# Patient Record
Sex: FEMALE | Race: BLACK OR AFRICAN AMERICAN | NOT HISPANIC OR LATINO | Employment: FULL TIME | URBAN - METROPOLITAN AREA
[De-identification: names, ages, dates, MRNs, and addresses within clinical notes are randomized per-mention and may not be internally consistent; named-entity substitution may affect disease eponyms.]

---

## 2017-01-03 ENCOUNTER — DOCUMENTATION ONLY (OUTPATIENT)
Dept: BARIATRICS | Facility: CLINIC | Age: 47
End: 2017-01-03

## 2017-01-03 NOTE — PROGRESS NOTES
Bariatric Surgery Online Course Form Submission  Someone has submitted a Bariatric Surgery Online Course Form Submission on this page.  Date: 2017-01-03 - 12:44  Patient's Name: SHAYE ADAME  YOB: 1970 Email: [email or web address]  Phone: [phone]   Patient Address: [facility] [place] [id number]  Preferred Surgical Location: [facility] [place]   I certify that I am 18 years of age or older:   Confirmation Code: [id number]  Verification of Bariatric Seminar: y  Insurance Information  Insurance or Self Pay? Insurance - Fill out fields below  Insurance Company Name: [facility]   Type of Coverage/Coverage Plan (i.e. PPO, HMO): PPO   Group Name: [id number]   Subscriber Name: SHAYE ADAME   Member Name (patient's name)   Member ID/Policy #:[id number]   Plan Effective Date: 09/28/2015  Card Issuance date: 09/28/2015

## 2017-01-06 ENCOUNTER — PATIENT MESSAGE (OUTPATIENT)
Dept: RESEARCH | Facility: HOSPITAL | Age: 47
End: 2017-01-06

## 2017-01-10 ENCOUNTER — CLINICAL SUPPORT (OUTPATIENT)
Dept: BARIATRICS | Facility: CLINIC | Age: 47
End: 2017-01-10
Payer: COMMERCIAL

## 2017-01-10 VITALS — BODY MASS INDEX: 43.41 KG/M2 | WEIGHT: 229.75 LBS

## 2017-01-10 DIAGNOSIS — E66.01 MORBID OBESITY WITH BMI OF 40.0-44.9, ADULT: ICD-10-CM

## 2017-01-10 DIAGNOSIS — E11.9 TYPE 2 DIABETES MELLITUS WITHOUT COMPLICATION, WITHOUT LONG-TERM CURRENT USE OF INSULIN: ICD-10-CM

## 2017-01-10 DIAGNOSIS — G47.33 OBSTRUCTIVE SLEEP APNEA (ADULT) (PEDIATRIC): ICD-10-CM

## 2017-01-10 DIAGNOSIS — Z71.3 DIETARY COUNSELING AND SURVEILLANCE: ICD-10-CM

## 2017-01-10 DIAGNOSIS — I10 ESSENTIAL HYPERTENSION: Chronic | ICD-10-CM

## 2017-01-10 PROCEDURE — 99499 UNLISTED E&M SERVICE: CPT | Mod: S$GLB,,, | Performed by: DIETITIAN, REGISTERED

## 2017-01-10 PROCEDURE — 99999 PR PBB SHADOW E&M-EST. PATIENT-LVL I: CPT | Mod: PBBFAC,,, | Performed by: DIETITIAN, REGISTERED

## 2017-01-10 PROCEDURE — 97802 MEDICAL NUTRITION INDIV IN: CPT | Mod: S$GLB,,, | Performed by: DIETITIAN, REGISTERED

## 2017-01-10 NOTE — PROGRESS NOTES
NUTRITIONAL CONSULT    Referring Physician: Roge Rich M.D.  Reason for MNT Referral: Initial assessment for sleeve gastrectomy work-up    46 y.o. female presents with a BMI of Body mass index is 43.41 kg/(m^2).     Weight history includes She reports that she has been overweight for the past 25 years since having her children who are now 21 and 26. She started the program in 2014 but did not complete it. She is now interested in having a gastric sleeve after David Payne in 2017 d/t the fact that her work at a [employer], [place] is busiest during that time and she cannot take off.    Dieting attempts include Atkins and exercise. She has been able to lose and maintain about 10 pounds.     She says she has recently cut out fried foods and cut back on bread in preparation for lifestyle change.    Past Medical History   Diagnosis Date    Diabetes mellitus     Eczema     GERD (gastroesophageal reflux disease)     Hypertension     Impaired fasting blood sugar     YSABEL on CPAP        CLINICAL DATA:  46 y.o.-year-old Black or  female.  Height: 5 ft. 1 in.  Weight: 229 lbs  IBW: 129 lbs  BMI: 43.4  The patient's goal weight (50 % EBW): 179 lbs    Goal for Bariatric Surgery: to improve health, to improve quality of life, to lose weight and to prevent future medical conditions    NUTRITION & HEALTH HISTORY:  Greatest challenge: dining out frequency, sweets, starchy CHO, portion control, irregular meal patterns and high-fat diet    Current diet recall:     B 7:30am: 1 egg and 3 strips turkey michelle  L: microwave bag caramel popcorn  D: 2 baked tilapia fillets and baked potato with broccoli and cheese    *drinks water and diet cranberry juice all day    Current Diet:  Meal pattern: 1 main meal + 1-2 smaller meals or snacks  Protein supplements: None. Has used whey powder in the past  Snacking: irregular, prefers sweets   Vegetables: Likes a variety. Eats almost daily.  Fruits: Likes a variety. Eats almost  daily.  Beverages: water and sugar-free beverages   Dining out: 2-3 times Weekly. Mostly fast food. Jennifer's grilled chicken sandwich with holland, lettuce and tomatoes.  Cooking at home: Weekly. Mostly baked, grilled and smothered meat, fish, starchy CHO and vegetables.    Exercise:  Past exercise: Adequate. Water aerobics.    Current exercise: Adequate. [place], 3 times/week  Restrictions to exercise: none    Vitamins / Minerals / Herbs:   Vit D Rx  Alive mv tablets, for women with Iron    Food Allergies:   None known    Social:  Works regular daytime shifts. Fairly active job.  Lives with her .  Grocery shopping and food prep done by the pt.  Patient believes the household will be supportive after surgery. She does admit that he gets tired of eating healthy at home and may promote dining out. She says she is willing to make better choices dining out with him.  Alcohol: None.  Smoking: None.    ASSESSMENT:  · Patient reports attempts at weight loss, only to regain lost weight.  · Patient demonstrated knowledge of healthy eating behaviors and exercise patterns; admits to not eating healthy and not exercising at this point.  · Patient states willingness to change lifestyle and make behavior modifications.  · Expect fair  compliance after surgery at this time.    Insurance requires 6 month medically supervised diet prior to consideration for bariatric surgery.    BARIATRIC DIET DISCUSSION:  Discussed diet after surgery and related to patients food record.  Reviewed diet progression before and after surgery.  Reinforced that surgery is not a magic bullet and importance of low fat foods and no snacking.  Stressed importance of exercise and its role in achieving weight loss goals.  Answered all questions.    RECOMMENDATIONS:  Patient is a potential candidate for bariatric surgery - Sleeve.    Needs additional visit(s) with RD for MSD, and to work on diet changes and preparation for  surgery.    PLAN:  Continue to review Bariatric Nutrition Guidebook at home and call with any questions.  Work on Bariatric Nutrition Checklist.  Work on gradually cutting back on starchy CHO in the diet.  Begin trying various protein supplements to determine preference.  1500-calorie diet.  5-6 meals per day.  Start including protein supplements in the diet plan daily.  Reduce frequency of dining out.  More grocery shopping and meal preparation at home.     Suggested sample menu:    B 7am: protein shake  Sn 9am: 2 boiled egg whites  L 12pm: tuna salad with mustard, with celery sticks  Sn 3pm: greek yogurt with 1-2 tbsp unsalted nuts  D 6pm: baked chicken or fish and vegetables or salad with drizzled dressing    RTC in one month with food/activity logs for review.    SESSION TIME:  60 minutes

## 2017-01-10 NOTE — MR AVS SNAPSHOT
[facility] - Bariatric Surgery  [address]  [facility] [address]  Phone: [phone]  Fax: [phone]                  Africa Jennings   1/10/2017 3:00 PM   Clinical Support    Description:  Female : 1970   Provider:  Shanell Graves   Department:  [facility] - Bariatric Surgery                To Do List           Future Appointments        Provider Department Dept Phone    2017 4:15 PM Kim Pritchard DPM [facility] [phone]    2017 4:00 PM [facility] XR1 300 LB LIMIT [facility] [phone]    2017 4:00 PM Shanell Graves [facility] - Bariatric Surgery [phone]      Goals (5 Years of Data)     None      [facility]     [facility] - / Assistance  Registered nurses in the [facility] provide clinical advisement, health education, appointment booking, and other advisory services.  Call for this free service at [phone].             Medications           Message regarding Medications     Verify the changes and/or additions to your medication regime listed below are the same as discussed with your clinician today.  If any of these changes or additions are incorrect, please notify your healthcare provider.             Verify that the below list of medications is an accurate representation of the medications you are currently taking.  If none reported, the list may be blank. If incorrect, please contact your healthcare provider. Carry this list with you in case of emergency.           Current Medications     amlodipine (NORVASC) 5 MG tablet Take 1 tablet (5 mg total) by mouth once daily.    blood sugar diagnostic Strp 100 each by Misc.(Non-Drug; Combo Route) route once daily. Dispense 100 strips and lancets.    carvedilol (COREG) 12.5 MG tablet TAKE 1 TABLET (12.5 MG TOTAL) BY MOUTH 2 (TWO) TIMES DAILY WITH MEALS.    desonide 0.05% (DESOWEN) 0.05 % Oint Apply topically 2 (two) times daily.     dicyclomine (BENTYL) 20 mg tablet Take 1 tablet (20 mg total) by mouth every 6 (six) hours.    ergocalciferol (ERGOCALCIFEROL) 50,000 unit Cap Take 1 capsule (50,000 Units total) by mouth twice a week.    metformin (GLUCOPHAGE-XR) 500 MG 24 hr tablet Take 1 tablet (500 mg total) by mouth every evening.    nystatin (MYCOSTATIN) ointment Apply topically 2 (two) times daily.      omeprazole (PRILOSEC) 40 MG capsule TAKE 1 CAPSULE (40 MG TOTAL) BY MOUTH ONCE DAILY.    ondansetron (ZOFRAN) 4 MG tablet Take 1 tablet (4 mg total) by mouth every 8 (eight) hours as needed.    triamcinolone acetonide 0.1% (KENALOG) 0.1 % cream Apply topically 2 (two) times daily.           Clinical Reference Information           Vital Signs - Last Recorded  Most recent update: 1/10/2017  3:23 PM by Shanell Graves    Wt BMI             104.2 kg (229 lb 11.5 oz) 43.41 kg/m2         Allergies as of 1/10/2017     No Known Allergies      Immunizations Administered on Date of Encounter - 1/10/2017     None      Instructions    1200- 1500 calorie Sample meal plan  80-120g protein per day.   Protein drinks and bars: 0-4 grams sugar  Drink lots of water throughout the day and exercise!  MENU # 1  Breakfast: 2 eggs, 1 turkey sausage alejandro, 1 apple  Snack: Atkins bar  Lunch: 2 roll-ups (sliced turkey, low-fat sliced cheese, mustard), 12 baby carrots dipped in 1 Tbsp natural peanut butter  Mid-Day Snack: ¼ cup unsalted almonds, ½ cup fruit  Dinner: 1 chicken thigh simmered in tomato sauce + 2 Tbsp mozzarella cheese, ½ cup black beans, 1/2 cup steamed carrots  Evening Snack: 1/2 cup grapes with 4 cubes low-fat cheddar cheese   ___________________________________________________  MENU # 2  Breakfast: 200 calorie protein drink  Mid-morning snack : ¼ cup unsalted nuts, medium banana  Lunch: 3oz tuna or chicken salad made with 2 tbsp light holland, over salad with tomatoes and cucumbers.   Snack: low-fat cheese stick  Dinner: 3oz hamburger alejandro, slice of  low-fat cheese, 1 cup boiled yellow squash and zucchini.   Snack: 6oz light yogurt  _______________________________________________________  Menu #3  Breakfast: 6oz plain Greek yogurt + fruit (½ banana, ½ cup fruit - pineapple, blueberries, strawberries, peach), may add Splenda to kashif.  Lunch: Grilled  chicken breast w/ slice low-fat pepper khushboo cheese, 1/2 cup grilled/baked asparagus and small salad with Salad Spritzer.    Mid-Day snack: 200 calorie protein drink   Dinner: 4oz Grilled fish, ½ cup white beans, ½ cup cooked spinach  Evening Snack: fudgsicle no-sugar-added    Menu # 4  Breakfast: 1 scoop vanilla protein powder + 4oz skim milk + 4oz coffee   Snack: Pure protein bar  Lunch: 2 Lettuce tacos: 3oz seasoned ground turkey wrapped in a Misael lettuce leaves with 1 Tbsp shredded cheese and dollop low-fat sour cream  Snack: ½ cup cottage cheese, ½ cup pineapple chunks  Dinner: Shrimp omelet: 2 eggs, ½ cup shrimp, green onions, and shredded cheese  ______________________________________________________  Menu #5  Breakfast: 1 cup low-fat cottage cheese, ½ cup peaches (no sugar added)  Snack: 1 apple, 4 cubes cheese  Lunch: 3oz baked pork chop, 1 cup okra and tomato stew  Snack: 1/2 cup black beans + salsa + dollop light sour cream  Dinner: Caprese chicken salad: 3 oz chicken breast, 1oz fresh mozzarella, sliced tomato, 1 Tbsp olive oil, basil  Snack: sugar-free popsicle    Menu #6  Breakfast: ½ cup part-skim ricotta cheese, 2 Tbsp sugar-free strawberry preserves, sprinkle of slivered almonds  Snack: 1 orange  Lunch: 3 oz canned chicken, 1oz shredded cheddar cheese, ½  cup black beans, 2 Tbsp salsa  Snack: 200 calorie Protein drink  Dinner: 4 oz grilled salmon steak, over mixed green salad with 2 tbsp light dressing    Meal Ideas for Regular Bariatric Diet  *Recipes and products available at [email or web address].com      Breakfast: (15-20g protein)    - Egg white omelet: 2 egg whites or ½ cup Egg Beaters.  (Optional proteins: cheese, shrimp, black beans, chicken, sliced turkey) (Optional veggies: tomatoes, salsa, spinach, mushrooms, onions, green peppers, or small slice avocado)     - Egg and sausage: 1 egg or ¼ cup Egg Beaters (any variety), with 1 alejandro or 2 links of Turkey sausage or Veggie breakfast sausage ([employer] or [facility])    - Crust-less breakfast quiche: To make a glass pie dish, mix 4oz part skim Ricotta, 1 cup skim milk, and 2 eggs as your base. Add protein: shredded cheese, sliced lean ham or turkey, turkey michelle/sausage. Add veggies: tomato, onion, green onion, mushroom, green pepper, spinach, etc.    - Yogurt parfait: Mix 1 - 6oz container Dannon Light N Fit vanilla yogurt, with ¼ cup crushed unsalted nuts    - Cottage cheese and fruit: ½ cup part-skim cottage cheese or ricotta cheese topped with fresh fruit or sugar free preserves     - Chelita Garcia's Vanilla Egg custard* (add 2 Tbsp instant coffee granules to make Cappuccino Custard*)    - Hi-Protein café latte (skim milk, decaf coffee, 1 scoop protein powder). Optional to add Sugar free syrup or extract flavoring.    - Breakfast Lox: spread fat free cream cheese on slices of smoked salmon. Serve over scrambled or egg over easy (sauteed with nonstick cookspray) OR on a cucumber slice    - Eggwhich: Scramble or cook 1 large egg over easy using nonstick cookspray. Place between 2 slices of [place] michelle and low fat cheese.     Lunch: (20-30g protein)    - ½ cup Black bean soup (Homemade or Progresso), with ¼ cup shredded low-fat cheese. Top with chopped tomato or fresh salsa.     - Lean deli turkey breast and low-fat sliced cheese, mustard or light holland to moisten, rolled up together, or wrapped in a Misael lettuce leaf    - Chicken salad made from dinner leftovers, moisten with low-fat salad dressing or light holland. Also try leftover salmon, shrimp, tuna or boiled eggs. Serve ½ cup over dark green salad    - Fat-free canned refried beans,  topped with ¼ cup shredded low-fat cheese. Top with chopped tomato or fresh salsa.     - Greek salad: Top mixed greens with 1-2oz grilled chicken, tomatoes, red onions, 2-3 kalamata olives, and sprinkle lightly with feta cheese. Spritz with Balsamic vinegar to taste.     - Crust-less lunch quiche: To make a glass pie dish, mix 4oz part skim Ricotta, 1 cup skim milk, and 2 eggs as your base. Add protein: shredded cheese, sliced lean ham or turkey, shrimp, chicken. Add veggies: tomato, onion, green onion, mushroom, green pepper, spinach, artichoke, broccoli, etc.    - Pizza bake: spread a  melo tonio mushroom with tomato sauce, low-fat shredded mozzarella and turkey pepperoni or Novi michelle. Add any veggies. Roast for 10-15 minutes, until cheese melted.     - Cucumber crab bites: Spread ¼ cup crab dip (lump crabmeat + light cream cheese and green onions) over sliced cucumber.     - Chicken with light spinach and artichoke dip*: Puree in : 6oz cooked and drained spinach, 2 cloves garlic, 1 can cannelloni beans, ½ cup chopped green onions, 1 can drained artichoke hearts (not marinated in oil), lemon juice and basil. Mix in 2oz chopped up chicken.    Supper: (20-30g protein)    - Serve grilled fish over dark green salad tossed with low-fat dressing, served with grilled asparagus masterson     - Rotisserie chicken salad: served with sliced strawberries, walnuts, fat-free feta cheese crumbles and 1 tbsp Paynes Own Light Raspberry [place] Vinaigrette    - Shrimp cocktail: Dip cold boiled shrimp in homemade low-sugar cocktail sauce (1/2 cup Naseem One Carb ketchup, 2 tbsp horseradish, 1/4 tsp hot sauce, 1 tsp Worcestershire sauce, 1 tbsp freshly-squeezed lemon juice). Serve with dark green salad, walnuts, and crumbled blue cheese drizzled with olive oil and Balsamic vinegar    - Tuna Melt: Spread tuna salad onto 2 thick slices of tomato. Top with low-fat cheese and broil until cheese is melted. May also be  made with chicken salad of shrimp salad. [place] with different types of cheeses.    - Chicken or beef fajitas (no tortilla, rice, beans, chips). Top meat and veggies w/ fresh salsa, fat free sour cream.     - Homemade low-fat Chili using extra lean ground beef or ground turkey. Top with shredded cheese and salsa as desired. May add dollop fat-free sour cream if desired    - Chicken parmesan: Top chicken breast w/ low sugar marinara sauce, mozzarella cheese and bake until chicken reaches 165*.  Serve w/ spaghetti SQUASH or Malaysian cut green beans    - Dinner Omelet with shrimp or chicken and onion, green peppers and chives.    - No noodle lasagna: Use sliced zucchini or eggplant in place of noodles.  Layer with part skim ricotta cheese and low sugar meat sauce (use very lean ground beef or ground turkey).    - Mexican chicken bake: Bake chunks of chicken breast or thigh with taco seasoning, Pace brand enchilada sauce, green onions and low-fat cheese. Serve with ¼ cup black beans or fat free refried beans topped with chopped tomatoes or salsa.    - Prosper frozen meatballs, simmered in Classico Marinara sauce. Different flavors of salsa or spaghetti sauce create different dishes! Sprinkle with parmesan cheese. Serve with grilled or steamed veggies, or a dark green salad.    - Simmer boneless skinless chicken thigh chunks in Classico Marinara sauce or roasted salsa until tender with chopped onion, bell pepper, garlic, mushrooms, spinach, etc.     - Hamburger or veggie burger, without the bun, dressed the way you like. Served with grilled or steamed veggies.    - Eggplant parmesan: Bake slices of eggplant at 350 degrees for 15 minutes. Layer tomato sauce, sliced eggplant and low-fat mozzarella cheese in a baking dish and cover with foil. Bake 30-40 more minutes or until bubbly. Uncover and bake at 400 degrees for about 15 more minutes, or until top is slightly crisp.    - Fish tacos: grilled/baked white fish,  wrapped in Misael lettuce leaf, topped with salsa, shredded low-fat cheese, and light coleslaw.    - Chicken trice: Sprinkle chicken w/ 1 tsp of hidden valley ranch dip mix. Then grill chicken and top with black beans, salsa and 1 tsp fat free sour cream.     - Cauliflower pizza crust: Use cauliflower as crust (see recipe on larisa, no flour!). Top w/ low fat cheese, turkey pepperoni and veggies and bake again    - chicken or turkey crust pizza: use ground chicken or turkey instead of cauliflower, spread in [place] and bake at 350 for about 20-30 minutes(may want to add garlic, black pepper, oregano and other herbs to ground meat mixture).  Remove and top w/ low fat cheese, turkey pepperoni and veggies and bake again for another 10 minutes or until cheese is browned.     Snacks: (100-200 calories; >5g protein)    - 1 low-fat cheese stick with 8 cherry tomatoes or 1 serving fresh fruit  - 4 thin slices fat-free turkey breast and 1 slice low-fat cheese  - 4 thin slices fat-free honey ham with wedge of melon  - 6-8 edamame pods (equivalent to about 1/4 cup edamame without pods).   - 1/4 cup unsalted nuts with ½ cup fruit  - 6-oz container Dannon Light n Fit vanilla yogurt, topped with 1oz unsalted nuts         - apple, celery or baby carrots spread with 2 Tbsp PB2  - apple slices with 1 oz slice low-fat cheese  - Apple slices dipped in 2 Tbsp of PB2  - celery, cucumber, bell pepper or baby carrots dipped in ¼ cup hummus bean spread or light spinach and artichoke dip (*recipe in lunch section)  - celery, cucumber, baby carrots dipped in high protein greek yogurt (Mix 16 oz plain greek yogurt + 1 packet of hidden valley ranch dip mix)  - Barrie Links Beef Steak - 14g protein! (similar to beef jerky)  - 2 wedges Laughing Cow - Light Herb & Garlic Cheese with sliced cucumber or green bell pepper  - 1/2 cup low-fat cottage cheese with ¼ cup fruit or ¼ cup salsa  - RTD Protein drinks: Atkins, Low Carb Slim Fast, EAS  light, Muscle Milk Light, etc.  - Homemade Protein drinks: [facility] Soy95, Isopure, Nectar, UNJURY, Whey Gourmet, etc. Mix 1 scoop powder with 8oz skim/1% milk or light soymilk.  - Protein bars: Atkins, EAS, Pure Protein, Think Thin, Detour, etc. Must have 0-4 grams sugar - Read the label.    Takeout Options: No more than twice/week  Deli - Salads (no pasta or rice), meats, cheeses. Roasted chicken. Lox (salmon)    Mexican - Platters which don't include tortillas, chips, or rice. Go easy on the beans. Example: Fajitas without the tortillas. Ask the  not to bring chips to the table if they are too tempting.    Greek - Meat or fish and vegetable, but no bread or rice. Including hummus, baba ganoush, etc, is OK. Most sit-down Greek restaurants can provide you with cucumber slices for dipping instead of colby bread.    Fast Food (Avoid as much as possible) - Salads (no croutons and limit salad dressing to 2 tbsp), grilled chicken sandwich without the bun and ask for no holland. Jennifers low fat chili or Taco Bell pintos and cheese.    BBQ - The meats are fine if you ask for sauces on the side, but most of the traditional side dishes are loaded with carbs. Fletcher slaw, baked beans and BBQ sauce are typically made with sugar.    Chinese - Nothing deep-fried, no rice or noodles. Many Chinese sauces have starch and sugar in them, so you'll have to use your judgement. If you find that these sauces trigger cravings, or cause Dumping, you can ask for the sauce to be made without sugar or just use soy sauce.

## 2017-01-10 NOTE — PATIENT INSTRUCTIONS
1200- 1500 calorie Sample meal plan  80-120g protein per day.   Protein drinks and bars: 0-4 grams sugar  Drink lots of water throughout the day and exercise!  MENU # 1  Breakfast: 2 eggs, 1 turkey sausage alejandro, 1 apple  Snack: Atkins bar  Lunch: 2 roll-ups (sliced turkey, low-fat sliced cheese, mustard), 12 baby carrots dipped in 1 Tbsp natural peanut butter  Mid-Day Snack: ¼ cup unsalted almonds, ½ cup fruit  Dinner: 1 chicken thigh simmered in tomato sauce + 2 Tbsp mozzarella cheese, ½ cup black beans, 1/2 cup steamed carrots  Evening Snack: 1/2 cup grapes with 4 cubes low-fat cheddar cheese   ___________________________________________________  MENU # 2  Breakfast: 200 calorie protein drink  Mid-morning snack : ¼ cup unsalted nuts, medium banana  Lunch: 3oz tuna or chicken salad made with 2 tbsp light holland, over salad with tomatoes and cucumbers.   Snack: low-fat cheese stick  Dinner: 3oz hamburger alejandro, slice of low-fat cheese, 1 cup boiled yellow squash and zucchini.   Snack: 6oz light yogurt  _______________________________________________________  Menu #3  Breakfast: 6oz plain Greek yogurt + fruit (½ banana, ½ cup fruit - pineapple, blueberries, strawberries, peach), may add Splenda to kashif.  Lunch: Grilled  chicken breast w/ slice low-fat pepper khushboo cheese, 1/2 cup grilled/baked asparagus and small salad with Salad Spritzer.    Mid-Day snack: 200 calorie protein drink   Dinner: 4oz Grilled fish, ½ cup white beans, ½ cup cooked spinach  Evening Snack: fudgsicle no-sugar-added    Menu # 4  Breakfast: 1 scoop vanilla protein powder + 4oz skim milk + 4oz coffee   Snack: Pure protein bar  Lunch: 2 Lettuce tacos: 3oz seasoned ground turkey wrapped in a Misael lettuce leaves with 1 Tbsp shredded cheese and dollop low-fat sour cream  Snack: ½ cup cottage cheese, ½ cup pineapple chunks  Dinner: Shrimp omelet: 2 eggs, ½ cup shrimp, green onions, and shredded  cheese  ______________________________________________________  Menu #5  Breakfast: 1 cup low-fat cottage cheese, ½ cup peaches (no sugar added)  Snack: 1 apple, 4 cubes cheese  Lunch: 3oz baked pork chop, 1 cup okra and tomato stew  Snack: 1/2 cup black beans + salsa + dollop light sour cream  Dinner: Caprese chicken salad: 3 oz chicken breast, 1oz fresh mozzarella, sliced tomato, 1 Tbsp olive oil, basil  Snack: sugar-free popsicle    Menu #6  Breakfast: ½ cup part-skim ricotta cheese, 2 Tbsp sugar-free strawberry preserves, sprinkle of slivered almonds  Snack: 1 orange  Lunch: 3 oz canned chicken, 1oz shredded cheddar cheese, ½  cup black beans, 2 Tbsp salsa  Snack: 200 calorie Protein drink  Dinner: 4 oz grilled salmon steak, over mixed green salad with 2 tbsp light dressing    Meal Ideas for Regular Bariatric Diet  *Recipes and products available at [email or web address]      Breakfast: (15-20g protein)    - Egg white omelet: 2 egg whites or ½ cup Egg Beaters. (Optional proteins: cheese, shrimp, black beans, chicken, sliced turkey) (Optional veggies: tomatoes, salsa, spinach, mushrooms, onions, green peppers, or small slice avocado)     - Egg and sausage: 1 egg or ¼ cup Egg Beaters (any variety), with 1 alejandro or 2 links of Turkey sausage or Veggie breakfast sausage (Vivastream or AdKeeper)    - Crust-less breakfast quiche: To make a glass pie dish, mix 4oz part skim Ricotta, 1 cup skim milk, and 2 eggs as your base. Add protein: shredded cheese, sliced lean ham or turkey, turkey michelle/sausage. Add veggies: tomato, onion, green onion, mushroom, green pepper, spinach, etc.    - Yogurt parfait: Mix 1 - 6oz container Dannon Light N Fit vanilla yogurt, with ¼ cup crushed unsalted nuts    - Cottage cheese and fruit: ½ cup part-skim cottage cheese or ricotta cheese topped with fresh fruit or sugar free preserves     - Chelita Garcia's Vanilla Egg custard* (add 2 Tbsp instant coffee granules to make Cappuccino  Custard*)    - Hi-Protein café latte (skim milk, decaf coffee, 1 scoop protein powder). Optional to add Sugar free syrup or extract flavoring.    - Breakfast Lox: spread fat free cream cheese on slices of smoked salmon. Serve over scrambled or egg over easy (sauteed with nonstick cookspray) OR on a cucumber slice    - Eggwhich: Scramble or cook 1 large egg over easy using nonstick cookspray. Place between 2 slices of Tunisian michelle and low fat cheese.     Lunch: (20-30g protein)    - ½ cup Black bean soup (Homemade or Progresso), with ¼ cup shredded low-fat cheese. Top with chopped tomato or fresh salsa.     - Lean deli turkey breast and low-fat sliced cheese, mustard or light holland to moisten, rolled up together, or wrapped in a Misael lettuce leaf    - Chicken salad made from dinner leftovers, moisten with low-fat salad dressing or light holland. Also try leftover salmon, shrimp, tuna or boiled eggs. Serve ½ cup over dark green salad    - Fat-free canned refried beans, topped with ¼ cup shredded low-fat cheese. Top with chopped tomato or fresh salsa.     - Greek salad: Top mixed greens with 1-2oz grilled chicken, tomatoes, red onions, 2-3 kalamata olives, and sprinkle lightly with feta cheese. Spritz with Balsamic vinegar to taste.     - Crust-less lunch quiche: To make a glass pie dish, mix 4oz part skim Ricotta, 1 cup skim milk, and 2 eggs as your base. Add protein: shredded cheese, sliced lean ham or turkey, shrimp, chicken. Add veggies: tomato, onion, green onion, mushroom, green pepper, spinach, artichoke, broccoli, etc.    - Pizza bake: spread a  melo tonio mushroom with tomato sauce, low-fat shredded mozzarella and turkey pepperoni or [place]cho michelle. Add any veggies. Roast for 10-15 minutes, until cheese melted.     - Cucumber crab bites: Spread ¼ cup crab dip (lump crabmeat + light cream cheese and green onions) over sliced cucumber.     - Chicken with light spinach and artichoke dip*: Puree in food  processor: 6oz cooked and drained spinach, 2 cloves garlic, 1 can cannelloni beans, ½ cup chopped green onions, 1 can drained artichoke hearts (not marinated in oil), lemon juice and basil. Mix in 2oz chopped up chicken.    Supper: (20-30g protein)    - Serve grilled fish over dark green salad tossed with low-fat dressing, served with grilled asparagus masterson     - Rotisserie chicken salad: served with sliced strawberries, walnuts, fat-free feta cheese crumbles and 1 tbsp Paynes Own Light Raspberry Dixon Vinaigrette    - Shrimp cocktail: Dip cold boiled shrimp in homemade low-sugar cocktail sauce (1/2 cup Naseem One Carb ketchup, 2 tbsp horseradish, 1/4 tsp hot sauce, 1 tsp Worcestershire sauce, 1 tbsp freshly-squeezed lemon juice). Serve with dark green salad, walnuts, and crumbled blue cheese drizzled with olive oil and Balsamic vinegar    - Tuna Melt: Spread tuna salad onto 2 thick slices of tomato. Top with low-fat cheese and broil until cheese is melted. May also be made with chicken salad of shrimp salad. [place] with different types of cheeses.    - Chicken or beef fajitas (no tortilla, rice, beans, chips). Top meat and veggies w/ fresh salsa, fat free sour cream.     - Homemade low-fat Chili using extra lean ground beef or ground turkey. Top with shredded cheese and salsa as desired. May add dollop fat-free sour cream if desired    - Chicken parmesan: Top chicken breast w/ low sugar marinara sauce, mozzarella cheese and bake until chicken reaches 165*.  Serve w/ spaghetti SQUASH or Amharic cut green beans    - Dinner Omelet with shrimp or chicken and onion, green peppers and chives.    - No noodle lasagna: Use sliced zucchini or eggplant in place of noodles.  Layer with part skim ricotta cheese and low sugar meat sauce (use very lean ground beef or ground turkey).    - Mexican chicken bake: Bake chunks of chicken breast or thigh with taco seasoning, Pace brand enchilada sauce, green onions and low-fat  cheese. Serve with ¼ cup black beans or fat free refried beans topped with chopped tomatoes or salsa.    - Prosper frozen meatballs, simmered in Classico Marinara sauce. Different flavors of salsa or spaghetti sauce create different dishes! Sprinkle with parmesan cheese. Serve with grilled or steamed veggies, or a dark green salad.    - Simmer boneless skinless chicken thigh chunks in Classico Marinara sauce or roasted salsa until tender with chopped onion, bell pepper, garlic, mushrooms, spinach, etc.     - Hamburger or veggie burger, without the bun, dressed the way you like. Served with grilled or steamed veggies.    - Eggplant parmesan: Bake slices of eggplant at 350 degrees for 15 minutes. Layer tomato sauce, sliced eggplant and low-fat mozzarella cheese in a baking dish and cover with foil. Bake 30-40 more minutes or until bubbly. Uncover and bake at 400 degrees for about 15 more minutes, or until top is slightly crisp.    - Fish tacos: grilled/baked white fish, wrapped in Misael lettuce leaf, topped with salsa, shredded low-fat cheese, and light coleslaw.    - Chicken trice: Sprinkle chicken w/ 1 tsp of hidden valley ranch dip mix. Then grill chicken and top with black beans, salsa and 1 tsp fat free sour cream.     - Cauliflower pizza crust: Use cauliflower as crust (see recipe on larisa, no flour!). Top w/ low fat cheese, turkey pepperoni and veggies and bake again    - chicken or turkey crust pizza: use ground chicken or turkey instead of cauliflower, spread in [place] and bake at 350 for about 20-30 minutes(may want to add garlic, black pepper, oregano and other herbs to ground meat mixture).  Remove and top w/ low fat cheese, turkey pepperoni and veggies and bake again for another 10 minutes or until cheese is browned.     Snacks: (100-200 calories; >5g protein)    - 1 low-fat cheese stick with 8 cherry tomatoes or 1 serving fresh fruit  - 4 thin slices fat-free turkey breast and 1 slice low-fat  cheese  - 4 thin slices fat-free honey ham with wedge of melon  - 6-8 edamame pods (equivalent to about 1/4 cup edamame without pods).   - 1/4 cup unsalted nuts with ½ cup fruit  - 6-oz container Dannon Light n Fit vanilla yogurt, topped with 1oz unsalted nuts         - apple, celery or baby carrots spread with 2 Tbsp PB2  - apple slices with 1 oz slice low-fat cheese  - Apple slices dipped in 2 Tbsp of PB2  - celery, cucumber, bell pepper or baby carrots dipped in ¼ cup hummus bean spread or light spinach and artichoke dip (*recipe in lunch section)  - celery, cucumber, baby carrots dipped in high protein greek yogurt (Mix 16 oz plain greek yogurt + 1 packet of hidden valley ranch dip mix)  - Barrie Links Beef Steak - 14g protein! (similar to beef jerky)  - 2 wedges Laughing Cow - Light Herb & Garlic Cheese with sliced cucumber or green bell pepper  - 1/2 cup low-fat cottage cheese with ¼ cup fruit or ¼ cup salsa  - RTD Protein drinks: Atkins, Low Carb Slim Fast, EAS light, Muscle Milk Light, etc.  - Homemade Protein drinks: GNC Soy95, Isopure, Nectar, UNJURY, Whey Gourmet, etc. Mix 1 scoop powder with 8oz skim/1% milk or light soymilk.  - Protein bars: Atkins, EAS, Pure Protein, Think Thin, Detour, etc. Must have 0-4 grams sugar - Read the label.    Takeout Options: No more than twice/week  Deli - Salads (no pasta or rice), meats, cheeses. Roasted chicken. Lox (salmon)    Mexican - Platters which don't include tortillas, chips, or rice. Go easy on the beans. Example: Fajitas without the tortillas. Ask the  not to bring chips to the table if they are too tempting.    Greek - Meat or fish and vegetable, but no bread or rice. Including hummus, baba ganoush, etc, is OK. Most sit-down Greek restaurants can provide you with cucumber slices for dipping instead of colby bread.    Fast Food (Avoid as much as possible) - Salads (no croutons and limit salad dressing to 2 tbsp), grilled chicken sandwich without the bun  and ask for no holland. Jennifers low fat chili or Taco Bell pintos and cheese.    BBQ - The meats are fine if you ask for sauces on the side, but most of the traditional side dishes are loaded with carbs. Fletcher slaw, baked beans and BBQ sauce are typically made with sugar.    Chinese - Nothing deep-fried, no rice or noodles. Many Chinese sauces have starch and sugar in them, so you'll have to use your judgement. If you find that these sauces trigger cravings, or cause Dumping, you can ask for the sauce to be made without sugar or just use soy sauce.

## 2017-01-12 ENCOUNTER — OFFICE VISIT (OUTPATIENT)
Dept: PODIATRY | Facility: CLINIC | Age: 47
End: 2017-01-12
Payer: COMMERCIAL

## 2017-01-12 VITALS
DIASTOLIC BLOOD PRESSURE: 93 MMHG | HEIGHT: 61 IN | WEIGHT: 229 LBS | SYSTOLIC BLOOD PRESSURE: 155 MMHG | HEART RATE: 75 BPM | BODY MASS INDEX: 43.23 KG/M2

## 2017-01-12 DIAGNOSIS — M21.41 PES PLANUS OF BOTH FEET: ICD-10-CM

## 2017-01-12 DIAGNOSIS — M20.41 HAMMER TOES OF BOTH FEET: ICD-10-CM

## 2017-01-12 DIAGNOSIS — M21.42 PES PLANUS OF BOTH FEET: ICD-10-CM

## 2017-01-12 DIAGNOSIS — M20.42 HAMMER TOES OF BOTH FEET: ICD-10-CM

## 2017-01-12 DIAGNOSIS — E11.9 CONTROLLED TYPE 2 DIABETES MELLITUS WITHOUT COMPLICATION, WITHOUT LONG-TERM CURRENT USE OF INSULIN: Primary | ICD-10-CM

## 2017-01-12 DIAGNOSIS — M20.10 HALLUX ABDUCTO VALGUS, UNSPECIFIED LATERALITY: ICD-10-CM

## 2017-01-12 DIAGNOSIS — B35.1 ONYCHOMYCOSIS DUE TO DERMATOPHYTE: ICD-10-CM

## 2017-01-12 DIAGNOSIS — B35.3 TINEA PEDIS OF BOTH FEET: ICD-10-CM

## 2017-01-12 PROCEDURE — 1159F MED LIST DOCD IN RCRD: CPT | Mod: S$GLB,,, | Performed by: PODIATRIST

## 2017-01-12 PROCEDURE — 2022F DILAT RTA XM EVC RTNOPTHY: CPT | Mod: S$GLB,,, | Performed by: PODIATRIST

## 2017-01-12 PROCEDURE — 3077F SYST BP >= 140 MM HG: CPT | Mod: S$GLB,,, | Performed by: PODIATRIST

## 2017-01-12 PROCEDURE — 99203 OFFICE O/P NEW LOW 30 MIN: CPT | Mod: S$GLB,,, | Performed by: PODIATRIST

## 2017-01-12 PROCEDURE — 99999 PR PBB SHADOW E&M-EST. PATIENT-LVL III: CPT | Mod: PBBFAC,,, | Performed by: PODIATRIST

## 2017-01-12 PROCEDURE — 3080F DIAST BP >= 90 MM HG: CPT | Mod: S$GLB,,, | Performed by: PODIATRIST

## 2017-01-12 PROCEDURE — 3045F PR MOST RECENT HEMOGLOBIN A1C LEVEL 7.0-9.0%: CPT | Mod: S$GLB,,, | Performed by: PODIATRIST

## 2017-01-12 PROCEDURE — 3060F POS MICROALBUMINURIA REV: CPT | Mod: S$GLB,,, | Performed by: PODIATRIST

## 2017-01-12 RX ORDER — TERBINAFINE HYDROCHLORIDE 250 MG/1
250 TABLET ORAL DAILY
Qty: 40 TABLET | Refills: 0 | Status: SHIPPED | OUTPATIENT
Start: 2017-01-12 | End: 2017-04-05

## 2017-01-12 NOTE — PATIENT INSTRUCTIONS
Recommend lotions: eucerin, aquaphor, A&D ointment, gold bond for diabetics, sween    Shoe recommendations: (try 6pm.com, zappos.com , nordstromrack.com, or shoes.com for discounted prices) you can visit DSW shoes in [place] as well    Asics (GT 1000 or gel foundations), new balance, saucony (stabil c3),  Foley (transcend), vionic, propet (tennis shoe)    soft brand, clarks, crocs, aerosoles, naturalizers, SAS, ecco, александр, elisabet clements, rockports (dress shoes)    Vionic, volitiles, burkenstocks, fitflops, propet (sandals)    Nike comfort thong sandals, crocs (house shoes)    Nail Home remedy:  Vicks Vapor rub OR Listerine and apple cider vinegar in a spray bottle to nails    Occasional soaks for 15-20 mins in luke warm water with 1 cup of listerine and 1 cup of apple cider vinegar are ok You may add several drops of oil of oregano or tea tree oil as well      Diabetes: Inspecting Your Feet  Diabetes increases your chances of developing foot problems. So inspect your feet every day. This helps you find small skin irritations before they become serious infections. If you have trouble seeing the bottoms of your feet, use a mirror or ask a family member or friend to help.     Pressure spots on the bottom of the foot are common areas where problems develop.   How to check your feet  Below are tips to help you look for foot problems. Try to check your feet at the same time each day, such as when you get out of bed in the morning:  · Check the top of each foot. The tops of toes, back of the heel, and outer edge of the foot can get a lot of rubbing from poor-fitting shoes.  · Check the bottom of each foot. Daily wear and tear often leads to problems at pressure spots.  · Check the toes and nails. Fungal infections often occur between toes. Toenail problems can also be a sign of fungal infections or lead to breaks in the skin.  · Check your shoes, too. Loose objects inside a shoe can injure the foot. Use your hand to feel  inside your shoes for things like damon, loose stitching, or rough areas that could irritate your skin.  Warning signs  Look for any color changes in the foot. Redness with streaks can signal a severe infection, which needs immediate medical attention. Tell your doctor right away if you have any of these problems:  · Swelling, sometimes with color changes, may be a sign of poor blood flow or infection. Symptoms include tenderness and an increase in the size of your foot.  · Warm or hot areas on your feet may be signs of infection. A foot that is cold may not be getting enough blood.  · Sensations such as burning, tingling, or pins and needles can be signs of a problem. Also check for areas that may be numb.  · Hot spots are caused by friction or pressure. Look for hot spots in areas that get a lot of rubbing. Hot spots can turn into blisters, calluses, or sores.  · Cracks and sores are caused by dry or irritated skin. They are a sign that the skin is breaking down, which can lead to infection.  · Toenail problems to watch for include nails growing into the skin (ingrown toenail) and causing redness or pain. Thick, yellow, or discolored nails can signal a fungal infection.  · Drainage and odor can develop from untreated sores and ulcers. Call your doctor right away if you notice white or yellow drainage, bleeding, or unpleasant odor.   © 3397-1138 [employer]. [address] [place] [address]. All rights reserved. This information is not intended as a substitute for professional medical care. Always follow your healthcare professional's instructions.          Athletes Foot     Athletes Foot is caused by a fungal infection in the skin. It affects the skin between the toes where it causes fissures (cracks in the skin). It can also affect the bottom of the foot where it causes dry white scales and peeling of the skin. This infection is more likely to occur when the foot is in hot, sweaty  socks and shoes for long periods of time.   This infection is treated with skin creams or oral medicine.     Home Care:   It is important to keep the feet dry. Use absorbent cotton socks and change them if they become sweaty; or wear an open-toe shoe or sandal. Wash the feet at least once a day with soap and water.   Rotate your shoes. If you must wear the same shoes everyday then spray the shoes with lysol or antifungal spray and allow that to dry overnight before wearing the shoes again  Apply the antifungal cream as prescribed. Some antifungal creams are available without a prescription (Lotrimin, Tinactin).   It may take a week before the rash starts to improve and it can take about three to four weeks to completely clear. Continue the medicine until the rash is all gone.   Use over-the-counter antifungal powders or sprays on your feet after exposure to high-risk environments (public showers, gyms and locker rooms) may prevent future infections. You may wish to use appropriate footwear to reduce exposure.  Clean tubs and bathroom floor with bleach  Clean feet with Nizoral shampoo or dial antibacterial soap and then dry completely.    Follow Up   with your doctor as recommended by our staff if the rash is not starting to improve after TEN days of treatment, or if the rash continues to spread.     Get Prompt Medical Attention   if any of the following occur:   Increasing redness or swelling of the foot   Pus draining from cracks in the skin   Fever of 100.4ºF (38ºC) or higher, or as directed by your healthcare provider    © 3834-6359 [employer], [place] [address]. All rights reserved. This information is not intended as a substitute for professional medical care. Always follow your healthcare professional's instructions.                 What Are Corns and Calluses?    Corns and calluses are your bodys response to friction or pressure against the skin. If your foot rubs the inside of  your shoe, the affected area of skin thickens. Or, if a bone is not in the normal position, skin caught between bone and shoe or bone and ground builds up. In either case, the outer layer of skin thickens to protect the foot from unusual pressure. In many cases, corns and calluses look bad but are not harmful. However, more severe corns and calluses may become infected, destroy healthy tissue, or affect foot movement.    Corns  Corns usually grow on top of the foot, often at the toe joint. Corns can range from a slight thickening of skin to a painful soft or hard bump. They often form on top of buckled toe joints (hammer toes). If your toes curl under, corns may grow on the tips of the toes. You may also get a corn on the end of a toe if it rubs against your shoe. Corns can also grow between toes, often between the first and second toes.    Calluses  Calluses grow on the bottom of the foot or on the outer edge of a toe or heel. A callus may spread across the ball of your foot. This type of callus is usually due to a problem with a metatarsal (the long bone at the base of a toe, near the ball of the foot). A pinch callus may grow along the outer edge of the heel or the big toe. Some calluses press up into the foot instead of spreading on the outside. A callus may form a central core or plug of tissue where pressure is greatest.  © 9093-1411 [employer]. [address] [place] [address], [place] [address]. All rights reserved. This information is not intended as a substitute for professional medical care. Always follow your healthcare professional's instructions.        Treating Corns and Calluses  If your corns or calluses are mild, reducing friction may help. Different shoes, moleskin patches, or soft pads may be all the treatment you need. In more severe cases, treating tissue buildup may require your doctors care. Sometimes custom-made shoe inserts (orthotics) or special pads are prescribed to reduce friction  and pressure.    Change shoes  If you have corns, your doctor may suggest wearing shoes that have more toe room. This way, buckled joints are less likely to be pinched against the top of the shoe. If you have calluses, wearing a cushioned insole, arch support, or heel counter can help reduce friction.  Visit your doctor  In some cases, your doctor may trim away the outer layers of skin that make up the corn or callus. For a painful corn, medicine may be injected beneath the built-up tissue.  Wear orthotics  Orthotics are specially made to meet the needs of your feet. They cushion calluses or divert pressure away from these problem areas. Worn as directed, orthotics help limit existing problems and prevent new ones from forming.  If you need surgery  If a bone or joint is out of place, certain parts of your foot may be under too much pressure. This can cause severe corns and calluses. In such cases, surgery may be the best way to correct the problem.  Outpatient procedures  In most cases, surgery to improve bone position is an outpatient procedure. Your doctor may cut away excess bone, reposition prominent bones, or even fuse joints. Sometimes tendons or ligaments are cut to reduce tension on a bone or joint. Your doctor will talk with you about the procedure that is best suited to your needs.  © 5154-1347 [employer]. [address] [place] [address], [place] [address]. All rights reserved. This information is not intended as a substitute for professional medical care. Always follow your healthcare professional's instructions.

## 2017-01-12 NOTE — MR AVS SNAPSHOT
[facility]  [address] [facility]  [place] [address]  Phone: [phone]                  Africa Jennings   2017 4:15 PM   Office Visit    Description:  Female : 1970   Provider:  Kim Pritchard DPM   Department:  [facility]           Reason for Visit     Diabetes Mellitus     Diabetic Foot Exam     Nail Care     Callouses           Diagnoses this Visit        Comments    Controlled type 2 diabetes mellitus without complication, without long-term current use of insulin    -  Primary     Tinea pedis of both feet         Onychomycosis due to dermatophyte         Hallux abducto valgus, unspecified laterality         Hammer toes of both feet         Pes planus of both feet                To Do List           Future Appointments        Provider Department Dept Phone    2017 4:00 PM [facility] XR1 [address] LIMIT [facility] [phone]    2017 4:00 PM Shanell Rodriguez [facility] - B[facility] [phone]      Goals (5 Years of Data)     None       These Medications        Disp Refills Start End    terbinafine HCl (LAMISIL) 250 mg tablet 40 tablet 0 2017     Take 1 tablet (250 mg total) by mouth once daily. 1 pill by mouth x 90 days. Avoid alcohol intake while taking medication - Oral    Pharmacy: [facility]/pharm[facility] #[id number] - [place] - [address] [facility] Ph #: [phone]         [facility]     [facility] On Call Nurse Care Line -  Assistance  Registered nurses in the [facility] provide clinical advisement, health education, appointment booking, and other advisory services.  Call for this free service at [phone].             Medications           Message regarding Medications     Verify the changes and/or additions to your medication regime listed below are the same as discussed with your clinician today.  If any of these changes or additions are incorrect, please notify your healthcare provider.        START taking  these NEW medications        Refills    terbinafine HCl (LAMISIL) 250 mg tablet 0    Sig: Take 1 tablet (250 mg total) by mouth once daily. 1 pill by mouth x 90 days. Avoid alcohol intake while taking medication    Class: Normal    Route: Oral           Verify that the below list of medications is an accurate representation of the medications you are currently taking.  If none reported, the list may be blank. If incorrect, please contact your healthcare provider. Carry this list with you in case of emergency.           Current Medications     amlodipine (NORVASC) 5 MG tablet Take 1 tablet (5 mg total) by mouth once daily.    blood sugar diagnostic Strp 100 each by Misc.(Non-Drug; Combo Route) route once daily. Dispense 100 strips and lancets.    carvedilol (COREG) 12.5 MG tablet TAKE 1 TABLET (12.5 MG TOTAL) BY MOUTH 2 (TWO) TIMES DAILY WITH MEALS.    dicyclomine (BENTYL) 20 mg tablet Take 1 tablet (20 mg total) by mouth every 6 (six) hours.    ergocalciferol (ERGOCALCIFEROL) 50,000 unit Cap Take 1 capsule (50,000 Units total) by mouth twice a week.    metformin (GLUCOPHAGE-XR) 500 MG 24 hr tablet Take 1 tablet (500 mg total) by mouth every evening.    nystatin (MYCOSTATIN) ointment Apply topically 2 (two) times daily.      omeprazole (PRILOSEC) 40 MG capsule TAKE 1 CAPSULE (40 MG TOTAL) BY MOUTH ONCE DAILY.    ondansetron (ZOFRAN) 4 MG tablet Take 1 tablet (4 mg total) by mouth every 8 (eight) hours as needed.    triamcinolone acetonide 0.1% (KENALOG) 0.1 % cream Apply topically 2 (two) times daily.    desonide 0.05% (DESOWEN) 0.05 % Oint Apply topically 2 (two) times daily.    terbinafine HCl (LAMISIL) 250 mg tablet Take 1 tablet (250 mg total) by mouth once daily. 1 pill by mouth x 90 days. Avoid alcohol intake while taking medication           Clinical Reference Information           Vital Signs - Last Recorded  Most recent update: 1/12/2017  4:50 PM by Suzy Sullivan MA    BP Pulse Ht Wt BMI    (!) 155/93 75 5'  "1" (1.549 m) 103.9 kg (229 lb) 43.27 kg/m2      Blood Pressure          Most Recent Value    BP  (!)  155/93      Allergies as of 1/12/2017     No Known Allergies      Immunizations Administered on Date of Encounter - 1/12/2017     None      Orders Placed During Today's Visit     Future Labs/Procedures Expected by Expires    Hepatic function panel  2/9/2017 3/13/2018      Instructions    Recommend lotions: eucerin, aquaphor, A&D ointment, gold bond for diabetics, sween    Shoe recommendations: (try 6pm.com, zappos.com , nordstromrack.com, or shoes.com for discounted prices) you can visit DSW shoes in [place] as well    Asics (GT 1000 or gel foundations), new balance, saucony (stabil c3),  Foley (transcend), vionic, propet (tennis shoe)    soft brand, clarks, crocs, aerosoles, naturalizers, SAS, ecco, александр, elisabet clements, rockports (dress shoes)    Vionic, volitiles, burkenstocks, fitflops, propet (sandals)    Nike comfort thong sandals, crocs (house shoes)    Nail Home remedy:  Vicks Vapor rub OR Listerine and apple cider vinegar in a spray bottle to nails    Occasional soaks for 15-20 mins in luke warm water with 1 cup of listerine and 1 cup of apple cider vinegar are ok You may add several drops of oil of oregano or tea tree oil as well      Diabetes: Inspecting Your Feet  Diabetes increases your chances of developing foot problems. So inspect your feet every day. This helps you find small skin irritations before they become serious infections. If you have trouble seeing the bottoms of your feet, use a mirror or ask a family member or friend to help.     Pressure spots on the bottom of the foot are common areas where problems develop.   How to check your feet  Below are tips to help you look for foot problems. Try to check your feet at the same time each day, such as when you get out of bed in the morning:  · Check the top of each foot. The tops of toes, back of the heel, and outer edge of the foot can get a lot of " rubbing from poor-fitting shoes.  · Check the bottom of each foot. Daily wear and tear often leads to problems at pressure spots.  · Check the toes and nails. Fungal infections often occur between toes. Toenail problems can also be a sign of fungal infections or lead to breaks in the skin.  · Check your shoes, too. Loose objects inside a shoe can injure the foot. Use your hand to feel inside your shoes for things like damon, loose stitching, or rough areas that could irritate your skin.  Warning signs  Look for any color changes in the foot. Redness with streaks can signal a severe infection, which needs immediate medical attention. Tell your doctor right away if you have any of these problems:  · Swelling, sometimes with color changes, may be a sign of poor blood flow or infection. Symptoms include tenderness and an increase in the size of your foot.  · Warm or hot areas on your feet may be signs of infection. A foot that is cold may not be getting enough blood.  · Sensations such as burning, tingling, or pins and needles can be signs of a problem. Also check for areas that may be numb.  · Hot spots are caused by friction or pressure. Look for hot spots in areas that get a lot of rubbing. Hot spots can turn into blisters, calluses, or sores.  · Cracks and sores are caused by dry or irritated skin. They are a sign that the skin is breaking down, which can lead to infection.  · Toenail problems to watch for include nails growing into the skin (ingrown toenail) and causing redness or pain. Thick, yellow, or discolored nails can signal a fungal infection.  · Drainage and odor can develop from untreated sores and ulcers. Call your doctor right away if you notice white or yellow drainage, bleeding, or unpleasant odor.   © 0466-3116 [employer]. [address] [place] [address], [place] [address]. All rights reserved. This information is not intended as a substitute for professional medical care. Always follow  your healthcare professional's instructions.          Athletes Foot     Athletes Foot is caused by a fungal infection in the skin. It affects the skin between the toes where it causes fissures (cracks in the skin). It can also affect the bottom of the foot where it causes dry white scales and peeling of the skin. This infection is more likely to occur when the foot is in hot, sweaty socks and shoes for long periods of time.   This infection is treated with skin creams or oral medicine.     Home Care:   It is important to keep the feet dry. Use absorbent cotton socks and change them if they become sweaty; or wear an open-toe shoe or sandal. Wash the feet at least once a day with soap and water.   Rotate your shoes. If you must wear the same shoes everyday then spray the shoes with lysol or antifungal spray and allow that to dry overnight before wearing the shoes again  Apply the antifungal cream as prescribed. Some antifungal creams are available without a prescription (Lotrimin, Tinactin).   It may take a week before the rash starts to improve and it can take about three to four weeks to completely clear. Continue the medicine until the rash is all gone.   Use over-the-counter antifungal powders or sprays on your feet after exposure to high-risk environments (public showers, gyms and locker rooms) may prevent future infections. You may wish to use appropriate footwear to reduce exposure.  Clean tubs and bathroom floor with bleach  Clean feet with Nizoral shampoo or dial antibacterial soap and then dry completely.    Follow Up   with your doctor as recommended by our staff if the rash is not starting to improve after TEN days of treatment, or if the rash continues to spread.     Get Prompt Medical Attention   if any of the following occur:   Increasing redness or swelling of the foot   Pus draining from cracks in the skin   Fever of 100.4ºF (38ºC) or higher, or as directed by your healthcare provider    © 1570-3618  [employer] Fleming, [address] [place], [place] [id number]. All rights reserved. This information is not intended as a substitute for professional medical care. Always follow your healthcare professional's instructions.                 What Are Corns and Calluses?    Corns and calluses are your bodys response to friction or pressure against the skin. If your foot rubs the inside of your shoe, the affected area of skin thickens. Or, if a bone is not in the normal position, skin caught between bone and shoe or bone and ground builds up. In either case, the outer layer of skin thickens to protect the foot from unusual pressure. In many cases, corns and calluses look bad but are not harmful. However, more severe corns and calluses may become infected, destroy healthy tissue, or affect foot movement.    Corns  Corns usually grow on top of the foot, often at the toe joint. Corns can range from a slight thickening of skin to a painful soft or hard bump. They often form on top of buckled toe joints (hammer toes). If your toes curl under, corns may grow on the tips of the toes. You may also get a corn on the end of a toe if it rubs against your shoe. Corns can also grow between toes, often between the first and second toes.    Calluses  Calluses grow on the bottom of the foot or on the outer edge of a toe or heel. A callus may spread across the ball of your foot. This type of callus is usually due to a problem with a metatarsal (the long bone at the base of a toe, near the ball of the foot). A pinch callus may grow along the outer edge of the heel or the big toe. Some calluses press up into the foot instead of spreading on the outside. A callus may form a central core or plug of tissue where pressure is greatest.  © 6343-0989 [employer]. [address] [place] [address], [place] [id number]. All rights reserved. This information is not intended as a substitute for professional medical care. Always follow your healthcare  professional's instructions.        Treating Corns and Calluses  If your corns or calluses are mild, reducing friction may help. Different shoes, moleskin patches, or soft pads may be all the treatment you need. In more severe cases, treating tissue buildup may require your doctors care. Sometimes custom-made shoe inserts (orthotics) or special pads are prescribed to reduce friction and pressure.    Change shoes  If you have corns, your doctor may suggest wearing shoes that have more toe room. This way, buckled joints are less likely to be pinched against the top of the shoe. If you have calluses, wearing a cushioned insole, arch support, or heel counter can help reduce friction.  Visit your doctor  In some cases, your doctor may trim away the outer layers of skin that make up the corn or callus. For a painful corn, medicine may be injected beneath the built-up tissue.  Wear orthotics  Orthotics are specially made to meet the needs of your feet. They cushion calluses or divert pressure away from these problem areas. Worn as directed, orthotics help limit existing problems and prevent new ones from forming.  If you need surgery  If a bone or joint is out of place, certain parts of your foot may be under too much pressure. This can cause severe corns and calluses. In such cases, surgery may be the best way to correct the problem.  Outpatient procedures  In most cases, surgery to improve bone position is an outpatient procedure. Your doctor may cut away excess bone, reposition prominent bones, or even fuse joints. Sometimes tendons or ligaments are cut to reduce tension on a bone or joint. Your doctor will talk with you about the procedure that is best suited to your needs.  © 3243-6123 [employer]. [address] [place] [address], [place] [address]. All rights reserved. This information is not intended as a substitute for professional medical care. Always follow your healthcare professional's  instructions.

## 2017-01-12 NOTE — PROGRESS NOTES
Subjective:      Patient ID: Africa Jennings is a 46 y.o. female.    Chief Complaint: Diabetes Mellitus (pcp Dr. Thompson 7/19/16); Diabetic Foot Exam; Nail Care; and Callouses    Africa is a 46 y.o. female who presents to the clinic for evaluation and treatment of high risk feet. Africa has a past medical history of Diabetes mellitus; Eczema; GERD (gastroesophageal reflux disease); Hypertension; Impaired fasting blood sugar; and YSABEL on CPAP. The patient's chief complaint is dry skin and discolored, long, thick toenails. This patient has documented high risk feet requiring routine maintenance secondary to diabetes mellitis and those secondary complications of diabetes, as mentioned..    PCP: Brandi Thompson MD    Date Last Seen by PCP:   Chief Complaint   Patient presents with    Diabetes Mellitus     pcp Dr. Thompson 7/19/16    Diabetic Foot Exam    Nail Care    Callouses   '    Current shoe gear:  Dress shoes      Hemoglobin A1C   Date Value Ref Range Status   12/30/2016 7.4 (H) 4.5 - 6.2 % Final     Comment:     According to ADA guidelines, hemoglobin A1C <7.0% represents  optimal control in non-pregnant diabetic patients.  Different  metrics may apply to specific populations.   Standards of Medical Care in Diabetes - 2016.  For the purpose of screening for the presence of diabetes:  <5.7%     Consistent with the absence of diabetes  5.7-6.4%  Consistent with increasing risk for diabetes   (prediabetes)  >or=6.5%  Consistent with diabetes  Currently no consensus exists for use of hemoglobin A1C  for diagnosis of diabetes for children.     12/31/2015 7.2 (H) 4.5 - 6.2 % Final   06/14/2014 7.3 (H) 4.5 - 6.2 % Final         Patient Active Problem List   Diagnosis    Essential hypertension    Type 2 diabetes mellitus without complication, without long-term current use of insulin    Obstructive sleep apnea (adult) (pediatric)    Morbid obesity with BMI of 45.0-49.9, adult    Gastroesophageal reflux  disease without esophagitis    Pyloric stenosis       Current Outpatient Prescriptions on File Prior to Visit   Medication Sig Dispense Refill    amlodipine (NORVASC) 5 MG tablet Take 1 tablet (5 mg total) by mouth once daily. 30 tablet 11    blood sugar diagnostic Strp 100 each by Misc.(Non-Drug; Combo Route) route once daily. Dispense 100 strips and lancets. 300 each 0    carvedilol (COREG) 12.5 MG tablet TAKE 1 TABLET (12.5 MG TOTAL) BY MOUTH 2 (TWO) TIMES DAILY WITH MEALS. 180 tablet 1    dicyclomine (BENTYL) 20 mg tablet Take 1 tablet (20 mg total) by mouth every 6 (six) hours. 360 tablet 4    ergocalciferol (ERGOCALCIFEROL) 50,000 unit Cap Take 1 capsule (50,000 Units total) by mouth twice a week. 24 capsule 1    metformin (GLUCOPHAGE-XR) 500 MG 24 hr tablet Take 1 tablet (500 mg total) by mouth every evening. 90 tablet 0    nystatin (MYCOSTATIN) ointment Apply topically 2 (two) times daily.        omeprazole (PRILOSEC) 40 MG capsule TAKE 1 CAPSULE (40 MG TOTAL) BY MOUTH ONCE DAILY. 90 capsule 0    ondansetron (ZOFRAN) 4 MG tablet Take 1 tablet (4 mg total) by mouth every 8 (eight) hours as needed. 20 tablet 0    triamcinolone acetonide 0.1% (KENALOG) 0.1 % cream Apply topically 2 (two) times daily. 135 g 4    desonide 0.05% (DESOWEN) 0.05 % Oint Apply topically 2 (two) times daily. 60 g 0     No current facility-administered medications on file prior to visit.        Review of patient's allergies indicates:  No Known Allergies    Past Surgical History   Procedure Laterality Date    Breast reduction      Cholecystectomy       laprascopic    Esophagogastroduodenoscopy  8/18/14    Hysterectomy  2007     laprascopic, total for fibroids.  Dr Polo       Family History   Problem Relation Age of Onset    Diabetes Maternal Grandmother     Heart disease Maternal Grandmother     Hypertension Maternal Grandmother     Crohn's disease Sister     Diabetes Mother     Hypertension Mother     Heart  "disease Mother     Cancer Father 58     Prostate       Social History     Social History    Marital status:      Spouse name: N/A    Number of children: N/A    Years of education: N/A     Occupational History    Not on file.     Social History Main Topics    Smoking status: Never Smoker    Smokeless tobacco: Never Used    Alcohol use No      Comment: socially    Drug use: No    Sexual activity: Yes     Partners: Male     Birth control/ protection: Surgical     Other Topics Concern    Not on file     Social History Narrative     since 2000.  2 children: 26 and 21.    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   Cardiovascular: Positive for leg swelling. Negative for claudication.   Respiratory: Negative for cough and shortness of breath.    Skin: Positive for dry skin, itching and nail changes. Negative for rash.   Musculoskeletal: Negative for arthritis, falls, joint pain, joint swelling and muscle weakness.   Gastrointestinal: Negative for diarrhea, nausea and vomiting.   Neurological: Negative for numbness, paresthesias and tremors.   Psychiatric/Behavioral: Negative for altered mental status and hallucinations.           Objective:       Vitals:    01/12/17 1649   BP: (!) 155/93   Pulse: 75   Weight: 103.9 kg (229 lb)   Height: 5' 1" (1.549 m)   PainSc: 0-No pain       Physical Exam   Constitutional:  Non-toxic appearance. She does not have a sickly appearance. No distress.   Cardiovascular:   Pulses:       Dorsalis pedis pulses are 2+ on the right side, and 2+ on the left side.        Posterior tibial pulses are 2+ on the right side, and 2+ on the left side.   dorsalis pedis and posterior tibial pulses are palpable bilaterally. Capillary refill time is within normal limits.   Pulmonary/Chest: No respiratory distress.   Musculoskeletal:        Right ankle: She exhibits normal range of " motion and no swelling. No tenderness. No lateral malleolus, no medial malleolus, no AITFL, no CF ligament and no posterior TFL tenderness found. Achilles tendon exhibits no pain, no defect and normal Sierra's test results.        Left ankle: She exhibits normal range of motion and no swelling. No tenderness. No lateral malleolus, no medial malleolus, no AITFL, no CF ligament and no posterior TFL tenderness found. Achilles tendon exhibits no pain, no defect and normal Sierra's test results.        Right foot: There is no tenderness and no bony tenderness.        Left foot: There is no tenderness and no bony tenderness.   Decreased stride, station of gait.  apropulsive toe off.  Increased angle and base of gait.    Patient has hammertoes of digits 2-5 bilateral partially reducible without symptom today.    Visible and palpable bunion without pain at dorsomedial 1st metatarsal head right and left.  Hallux abducted right and left partially reducible, tracks laterally without being track bound.  No ecchymosis, erythema, edema, or cardinal signs infection or signs of trauma same foot.     Neurological: She has normal reflexes. She displays no atrophy and no tremor. No sensory deficit. She exhibits normal muscle tone.   C[place]ova-Lindsay 5.07 monofilament is intact bilateral feet. Sharp/dull sensation is also intact Bilateral feet.     Skin: Skin is warm, dry and intact. No bruising, no burn, no laceration and no lesion noted. She is not diaphoretic. No cyanosis. No pallor. Nails show no clubbing.   Scaling dryness in a moccasin distribution is noted to the bilateral lower extremities with associated erythema.    Toenails 1, 4, 5 bilaterally are thickened by 2-3 mm, discolored/yellowed, dystrophic, brittle with subungual debris. Tender to distal nail plate pressure, without periungual skin abnormality of each.           Psychiatric: Her mood appears not anxious. Her affect is not inappropriate. Her speech is not  slurred. She is not combative. She is communicative. She is attentive.   Nursing note reviewed.            Assessment:       Encounter Diagnoses   Name Primary?    Controlled type 2 diabetes mellitus without complication, without long-term current use of insulin Yes    Tinea pedis of both feet     Onychomycosis due to dermatophyte     Hallux abducto valgus, unspecified laterality     Hammer toes of both feet     Pes planus of both feet          Plan:       Africa was seen today for diabetes mellitus, diabetic foot exam, nail care and callouses.    Diagnoses and all orders for this visit:    Controlled type 2 diabetes mellitus without complication, without long-term current use of insulin  -     Hepatic function panel; Future    Tinea pedis of both feet  -     Hepatic function panel; Future    Onychomycosis due to dermatophyte  -     Hepatic function panel; Future    Hallux abducto valgus, unspecified laterality    Hammer toes of both feet    Pes planus of both feet    Other orders  -     terbinafine HCl (LAMISIL) 250 mg tablet; Take 1 tablet (250 mg total) by mouth once daily. 1 pill by mouth x 90 days. Avoid alcohol intake while taking medication    I counseled the patient on her conditions, their implications and medical management.      Greater than 50% of this visit spent on counseling and coordination of care.    Greater than 20 minutes spent on education about the diabetic foot, neuropathy, and prevention of limb loss.    Shoe inspection. Diabetic Foot Education. Patient reminded of the importance of good nutrition and blood sugar control to help prevent podiatric complications of diabetes. Patient instructed on proper foot hygeine. We discussed wearing proper shoe gear, daily foot inspections, never walking without protective shoe gear, never putting sharp instruments to feet.      Discussed fungal nails and tinea pedis treatment options with pt, as well as poor results with most treatments for  nails. Discussed filing nails, using antifungal topical ointment vs oral treatment options. Instructed patient on the importance of keeping fungus off of skin while treating nails. Patient instructed to use absorbent cotton socks and change them if they become sweaty; or wear an open-toe shoe or sandal. Wash the feet at least once a day with soap and water. We discussed using Lamisil for onychomycosis. This drug offers a fairly high but not universal cure rate. A 12 week treatment course is recommended. The patient is aware that rare cases of liver injury have been reported; and agrees to come in for liver function tests at 6 weeks of treatment. The symptoms of liver disease have been discussed; call if such occurs. In addition, some insurance plans do not cover the expense of this drug for treating a cosmetic condition, and the patient understands they may have to pay for the medication. Other side effects, such as headaches and rashes, have also been discussed.    Patient instructed to use lysol or over-the-counter antifungal powders or sprays to shoes daily and allow them to air dry, switching shoes from every other day would be optimal. Patient is to avoid barefoot walking in  high-risk environments (public showers, gyms and locker rooms) may prevent future infections.     Instructed patient on the importance of keeping feet dry. Patient instructed to use absorbent cotton socks and change them if they become sweaty; or wear an open-toe shoe or sandal. Wash the feet at least once a day with soap and water. Apply the antifungal gel as prescribed. Instructed patient that it takes time for symptoms to completely dissipate. Patient instructed to use lysol or over-the-counter antifungal powders or sprays to shoes daily and allow them to air dry, switching shoes from every other day would be optimal. Patient is to avoid barefoot walking in  high-risk environments (public showers, gyms and locker rooms) may prevent  future infections.     Patient to RTC if:  Increasing redness or swelling of the foot   Pus draining from cracks in the skin   Fever of 100.4ºF (38ºC) or higher    She will continue to monitor the areas daily, inspect her feet, wear protective shoe gear when ambulatory, moisturizer to maintain skin integrity and follow in this office in approximately 12 months, sooner p.r.n.

## 2017-01-19 DIAGNOSIS — K21.9 GASTROESOPHAGEAL REFLUX DISEASE WITHOUT ESOPHAGITIS: ICD-10-CM

## 2017-01-19 RX ORDER — DICYCLOMINE HYDROCHLORIDE 20 MG/1
TABLET ORAL
Qty: 360 TABLET | Refills: 2 | Status: SHIPPED | OUTPATIENT
Start: 2017-01-19 | End: 2018-02-15 | Stop reason: SDUPTHER

## 2017-01-19 RX ORDER — OMEPRAZOLE 40 MG/1
CAPSULE, DELAYED RELEASE ORAL
Qty: 90 CAPSULE | Refills: 0 | Status: SHIPPED | OUTPATIENT
Start: 2017-01-19 | End: 2017-04-05

## 2017-01-26 ENCOUNTER — PATIENT MESSAGE (OUTPATIENT)
Dept: BARIATRICS | Facility: CLINIC | Age: 47
End: 2017-01-26

## 2017-01-30 ENCOUNTER — HOSPITAL ENCOUNTER (OUTPATIENT)
Dept: RADIOLOGY | Facility: HOSPITAL | Age: 47
Discharge: HOME | End: 2017-01-30
Attending: SURGERY
Payer: COMMERCIAL

## 2017-01-30 DIAGNOSIS — J18.9 PNEUMONIA OF RIGHT MIDDLE LOBE DUE TO INFECTIOUS ORGANISM: ICD-10-CM

## 2017-01-30 PROCEDURE — 71020 XR CHEST PA AND LATERAL: CPT | Mod: TC,PO

## 2017-01-30 PROCEDURE — 71020 XR CHEST PA AND LATERAL: CPT | Mod: 26,,, | Performed by: RADIOLOGY

## 2017-02-03 ENCOUNTER — DOCUMENTATION ONLY (OUTPATIENT)
Dept: BARIATRICS | Facility: CLINIC | Age: 47
End: 2017-02-03

## 2017-02-03 NOTE — PROGRESS NOTES
Per ALEJANDRA Costa, new psych eval is not needed unless required per insurance.  Per Erika, - preop psych eval is not needed with [facility].

## 2017-02-04 DIAGNOSIS — K21.9 GASTROESOPHAGEAL REFLUX DISEASE WITHOUT ESOPHAGITIS: ICD-10-CM

## 2017-02-04 RX ORDER — OMEPRAZOLE 40 MG/1
40 CAPSULE, DELAYED RELEASE ORAL DAILY
Qty: 90 CAPSULE | Refills: 0 | Status: SHIPPED | OUTPATIENT
Start: 2017-02-04 | End: 2017-08-18 | Stop reason: SDUPTHER

## 2017-02-08 ENCOUNTER — PATIENT MESSAGE (OUTPATIENT)
Dept: BARIATRICS | Facility: CLINIC | Age: 47
End: 2017-02-08

## 2017-02-13 ENCOUNTER — PATIENT MESSAGE (OUTPATIENT)
Dept: PODIATRY | Facility: CLINIC | Age: 47
End: 2017-02-13

## 2017-02-14 ENCOUNTER — CLINICAL SUPPORT (OUTPATIENT)
Dept: BARIATRICS | Facility: CLINIC | Age: 47
End: 2017-02-14
Payer: COMMERCIAL

## 2017-02-14 VITALS — WEIGHT: 227.31 LBS | BODY MASS INDEX: 42.95 KG/M2

## 2017-02-14 DIAGNOSIS — E66.01 MORBID OBESITY WITH BMI OF 45.0-49.9, ADULT: Chronic | ICD-10-CM

## 2017-02-14 DIAGNOSIS — E11.9 TYPE 2 DIABETES MELLITUS WITHOUT COMPLICATION, WITHOUT LONG-TERM CURRENT USE OF INSULIN: ICD-10-CM

## 2017-02-14 DIAGNOSIS — G47.33 OBSTRUCTIVE SLEEP APNEA (ADULT) (PEDIATRIC): ICD-10-CM

## 2017-02-14 DIAGNOSIS — Z71.3 DIETARY COUNSELING AND SURVEILLANCE: ICD-10-CM

## 2017-02-14 PROCEDURE — 99499 UNLISTED E&M SERVICE: CPT | Mod: S$GLB,,, | Performed by: DIETITIAN, REGISTERED

## 2017-02-14 PROCEDURE — 97803 MED NUTRITION INDIV SUBSEQ: CPT | Mod: S$GLB,ICN,, | Performed by: DIETITIAN, REGISTERED

## 2017-02-14 PROCEDURE — 99999 PR PBB SHADOW E&M-EST. PATIENT-LVL I: CPT | Mod: PBBFAC,,, | Performed by: DIETITIAN, REGISTERED

## 2017-02-14 NOTE — MR AVS SNAPSHOT
[facility]  [address]  [facility] [address]  Phone: [phone]  Fax: [phone]                  Africa Jennings   2017 4:00 PM   Clinical Support    Description:  Female : 1970   Provider:  Shanell Graves   Department:  [facility]                To Do List           Future Appointments        Provider Department Dept Phone    2/15/2017 4:00 PM LAB, [place] [facility] [phone]    3/14/2017 4:00 PM Shanell Graves [facility] [phone]      Goals (5 Years of Data)     None      Follow-Up and Disposition     Return in about 4 weeks (around 3/14/2017).      [facility]     [facility] -  Assistance  Registered nurses in the [facility] provide clinical advisement, health education, appointment booking, and other advisory services.  Call for this free service at [phone].             Medications           Message regarding Medications     Verify the changes and/or additions to your medication regime listed below are the same as discussed with your clinician today.  If any of these changes or additions are incorrect, please notify your healthcare provider.             Verify that the below list of medications is an accurate representation of the medications you are currently taking.  If none reported, the list may be blank. If incorrect, please contact your healthcare provider. Carry this list with you in case of emergency.           Current Medications     amlodipine (NORVASC) 5 MG tablet Take 1 tablet (5 mg total) by mouth once daily.    blood sugar diagnostic Strp 100 each by Misc.(Non-Drug; Combo Route) route once daily. Dispense 100 strips and lancets.    carvedilol (COREG) 12.5 MG tablet TAKE 1 TABLET (12.5 MG TOTAL) BY MOUTH 2 (TWO) TIMES DAILY WITH MEALS.    desonide 0.05% (DESOWEN) 0.05 % Oint Apply topically 2 (two) times daily.    dicyclomine  (BENTYL) 20 mg tablet TAKE 1 TABLET (20 MG TOTAL) BY MOUTH EVERY 6 (SIX) HOURS.    ergocalciferol (ERGOCALCIFEROL) 50,000 unit Cap Take 1 capsule (50,000 Units total) by mouth twice a week.    metformin (GLUCOPHAGE-XR) 500 MG 24 hr tablet Take 1 tablet (500 mg total) by mouth every evening.    nystatin (MYCOSTATIN) ointment Apply topically 2 (two) times daily.      omeprazole (PRILOSEC) 40 MG capsule TAKE 1 CAPSULE (40 MG TOTAL) BY MOUTH ONCE DAILY.    omeprazole (PRILOSEC) 40 MG capsule Take 1 capsule (40 mg total) by mouth once daily. Needs appointment for future refills    ondansetron (ZOFRAN) 4 MG tablet Take 1 tablet (4 mg total) by mouth every 8 (eight) hours as needed.    terbinafine HCl (LAMISIL) 250 mg tablet Take 1 tablet (250 mg total) by mouth once daily. 1 pill by mouth x 90 days. Avoid alcohol intake while taking medication    triamcinolone acetonide 0.1% (KENALOG) 0.1 % cream Apply topically 2 (two) times daily.           Clinical Reference Information           Your Vitals Were     Weight BMI             103.1 kg (227 lb 4.7 oz) 42.95 kg/m2         Allergies as of 2/14/2017     No Known Allergies      Immunizations Administered on Date of Encounter - 2/14/2017     None      Instructions    Menu Plan: 800-1000 Calories;  grams of Protein    DAY 1     Breakfast  ½ cup 2% cottage cheese  ¼ cup fruit (no sugar added)    Snack  2% mozzarella string cheese  10 grapes    Lunch  2oz Lean hamburger or turkey swetha  1 slice low-fat cheese  ¼ cup green beans    Snack  200 calorie low-carb protein drink (4 grams sugar or less)    Dinner  2oz chicken thigh  ¼ cup cooked spinach     Snack  Atkins bar (15g protein)      DAY 2    Breakfast  1 egg with 1oz shredded cheddar cheese and 2T salsa    Snack  200 calorie low-carb protein drink (4 grams sugar or less)    Lunch  Lettuce Wraps: 2oz sliced turkey, 1 slice low-fat Swiss cheese, tomato, and mustard wrapped in a Misael lettuce leaf    Snack  ½ cup low-fat  cottage cheese  Pear cup (no sugar added)    Dinner  2oz baked fish  ½ cup cooked broccoli    Snack  Sugar-free pudding cup      DAY 3    Breakfast   ½ cup low-fat ricotta cheese w/ Splenda to kashif  ½ scoop Vanilla protein powder   ¼ cup fresh fruit    Snack  2% string cheese  6 unsalted almonds    Lunch  Tuna/Chicken Salad: 2oz canned tuna/chicken, 1 egg white, and 1 tsp light holland  Pineapple cup (no sugar added)    Snack  200 calorie low-carb protein drink (4 grams sugar or less)    Dinner  ½ baked pork chop   ¼ cup beans      DAY 4    Breakfast  200 calorie low-carb protein drink (4 grams sugar or less)    Snack  Boiled egg    Lunch  ½ cup grilled shrimp  Salad w/ 2 tbsp crumbled fat-free feta  1 tbsp light vinaigrette    Snack  200 calorie low-carb protein drink (4 grams sugar or less)    Dinner  ¾ cup red beans    Snack  Mini Babybell light      DAY 5    Breakfast  [place] pie: 3oz Greek yogurt, 1 tbsp Splenda, ½ individual pack Crystal Light lemonade. Top with ¼ cup chopped walnuts     Snack  3-4 lean ham or turkey slices, ¼ - ½ cup fruit    Lunch  Fiesta Chicken: 2oz canned chicken, 1oz shredded cheddar cheese, ¼ cup black beans  Top with 2 tbsp salsa and a small dollop light sour cream    Snack  200 calorie low-carb protein drink (4 grams sugar or less)    Dinner  Omelette: ¼ cup Egg Beaters, 4 large (1oz) shrimp, 1oz shredded low-fat cheese. Add bell pepper, onion, mushrooms, green onions, or salsa, optional.      DAY 6    Breakfast  1 alejandro or 2 links turkey sausage  ½ cup fruit    Snack  200 calorie low-carb protein drink (4 grams sugar or less)    Lunch  Grilled tilapia  Salad of baby spinach leaves with light dressing    Snack  200 calorie low-carb protein drink (4 grams sugar or less)    Dinner  Chicken thigh simmered in 98% fat free cream of mushroom soup  ½ cup cooked green beans      Cauliflower Pizza Crust  Recipe courtesy of Kisha Mcgee    Cauliflower Pizza Crust   Total Time: 40 min   Prep: 5  min   Inactive: 10 min   Cook:25 min     Yield: 1 pizza crust   Level: Easy   Ingredients   1 head cauliflower, stalk removed    1/2 cup shredded mozzarella    1/4 cup grated Parmesan    1/2 teaspoon dried oregano    1/2 teaspoon kosher salt    1/4 teaspoon garlic powder    2 eggs, lightly beaten   Directions  Preheat the oven to 400 degrees F. Line a baking sheet with parchment paper.  Break the cauliflower into florets and pulse in a  until fine. Steam in a steamer basket and drain well. (I like to put it on a towel to get all the moisture out.) Let cool.  In a bowl, combine the cauliflower with the mozzarella, Parmesan, oregano, salt, garlic powder and eggs. Transfer to the center of the baking sheet and spread into a [place], resembling a pizza crust. Bake for 20 minutes.  Add desired toppings and bake an additional 10 minutes.  Recipe courtesy of Kisha Mcgee © 2015 [employer] All Rights Reserved.    Meal Ideas for Regular Bariatric Diet  *Recipes and products available at www.[email or web address].com      Breakfast: (15-20g protein)    - Egg white omelet: 2 egg whites or ½ cup Egg Beaters. (Optional proteins: cheese, shrimp, black beans, chicken, sliced turkey) (Optional veggies: tomatoes, salsa, spinach, mushrooms, onions, green peppers, or small slice avocado)     - Egg and sausage: 1 egg or ¼ cup Egg Beaters (any variety), with 1 alejandro or 2 links of Turkey sausage or Veggie breakfast sausage (Tube2Tone or Exit Games)    - Crust-less breakfast quiche: To make a glass pie dish, mix 4oz part skim Ricotta, 1 cup skim milk, and 2 eggs as your base. Add protein: shredded cheese, sliced lean ham or turkey, turkey michelle/sausage. Add veggies: tomato, onion, green onion, mushroom, green pepper, spinach, etc.    - Yogurt parfait: Mix 1 - 6oz container Dannon Light N Fit vanilla yogurt, with ¼ cup crushed unsalted nuts    - Cottage cheese and fruit: ½ cup part-skim cottage cheese or  ricotta cheese topped with fresh fruit or sugar free preserves     - Chelita Garcia's Vanilla Egg custard* (add 2 Tbsp instant coffee granules to make Cappuccino Custard*)    - Hi-Protein café latte (skim milk, decaf coffee, 1 scoop protein powder). Optional to add Sugar free syrup or extract flavoring.    - Breakfast Lox: spread fat free cream cheese on slices of smoked salmon. Serve over scrambled or egg over easy (sauteed with nonstick cookspray) OR on a cucumber slice    - Eggwhich: Scramble or cook 1 large egg over easy using nonstick cookspray. Place between 2 slices of Nigerian michelle and low fat cheese.     Lunch: (20-30g protein)    - ½ cup Black bean soup (Homemade or Progresso), with ¼ cup shredded low-fat cheese. Top with chopped tomato or fresh salsa.     - Lean deli turkey breast and low-fat sliced cheese, mustard or light holland to moisten, rolled up together, or wrapped in a Misael lettuce leaf    - Chicken salad made from dinner leftovers, moisten with low-fat salad dressing or light holland. Also try leftover salmon, shrimp, tuna or boiled eggs. Serve ½ cup over dark green salad    - Fat-free canned refried beans, topped with ¼ cup shredded low-fat cheese. Top with chopped tomato or fresh salsa.     - Greek salad: Top mixed greens with 1-2oz grilled chicken, tomatoes, red onions, 2-3 kalamata olives, and sprinkle lightly with feta cheese. Spritz with Balsamic vinegar to taste.     - Crust-less lunch quiche: To make a glass pie dish, mix 4oz part skim Ricotta, 1 cup skim milk, and 2 eggs as your base. Add protein: shredded cheese, sliced lean ham or turkey, shrimp, chicken. Add veggies: tomato, onion, green onion, mushroom, green pepper, spinach, artichoke, broccoli, etc.    - Pizza bake: spread a  melo tonio mushroom with tomato sauce, low-fat shredded mozzarella and turkey pepperoni or [place] michelle. Add any veggies. Roast for 10-15 minutes, until cheese melted.     - Cucumber crab bites: Spread ¼ cup  crab dip (lump crabmeat + light cream cheese and green onions) over sliced cucumber.     - Chicken with light spinach and artichoke dip*: Puree in : 6oz cooked and drained spinach, 2 cloves garlic, 1 can cannelloni beans, ½ cup chopped green onions, 1 can drained artichoke hearts (not marinated in oil), lemon juice and basil. Mix in 2oz chopped up chicken.    Supper: (20-30g protein)    - Serve grilled fish over dark green salad tossed with low-fat dressing, served with grilled asparagus masterson     - Rotisserie chicken salad: served with sliced strawberries, walnuts, fat-free feta cheese crumbles and 1 tbsp Paynes Own Light Raspberry [place] Vinaigrette    - Shrimp cocktail: Dip cold boiled shrimp in homemade low-sugar cocktail sauce (1/2 cup Naseem One Carb ketchup, 2 tbsp horseradish, 1/4 tsp hot sauce, 1 tsp Worcestershire sauce, 1 tbsp freshly-squeezed lemon juice). Serve with dark green salad, walnuts, and crumbled blue cheese drizzled with olive oil and Balsamic vinegar    - Tuna Melt: Spread tuna salad onto 2 thick slices of tomato. Top with low-fat cheese and broil until cheese is melted. May also be made with chicken salad of shrimp salad. [place] with different types of cheeses.    - Chicken or beef fajitas (no tortilla, rice, beans, chips). Top meat and veggies w/ fresh salsa, fat free sour cream.     - Homemade low-fat Chili using extra lean ground beef or ground turkey. Top with shredded cheese and salsa as desired. May add dollop fat-free sour cream if desired    - Chicken parmesan: Top chicken breast w/ low sugar marinara sauce, mozzarella cheese and bake until chicken reaches 165*.  Serve w/ spaghetti SQUASH or Gabonese cut green beans    - Dinner Omelet with shrimp or chicken and onion, green peppers and chives.    - No noodle lasagna: Use sliced zucchini or eggplant in place of noodles.  Layer with part skim ricotta cheese and low sugar meat sauce (use very lean ground beef or  ground turkey).    - Mexican chicken bake: Bake chunks of chicken breast or thigh with taco seasoning, Pace brand enchilada sauce, green onions and low-fat cheese. Serve with ¼ cup black beans or fat free refried beans topped with chopped tomatoes or salsa.    - Prosper frozen meatballs, simmered in Classico Marinara sauce. Different flavors of salsa or spaghetti sauce create different dishes! Sprinkle with parmesan cheese. Serve with grilled or steamed veggies, or a dark green salad.    - Simmer boneless skinless chicken thigh chunks in Classico Marinara sauce or roasted salsa until tender with chopped onion, bell pepper, garlic, mushrooms, spinach, etc.     - Hamburger or veggie burger, without the bun, dressed the way you like. Served with grilled or steamed veggies.    - Eggplant parmesan: Bake slices of eggplant at 350 degrees for 15 minutes. Layer tomato sauce, sliced eggplant and low-fat mozzarella cheese in a baking dish and cover with foil. Bake 30-40 more minutes or until bubbly. Uncover and bake at 400 degrees for about 15 more minutes, or until top is slightly crisp.    - Fish tacos: grilled/baked white fish, wrapped in Misael lettuce leaf, topped with salsa, shredded low-fat cheese, and light coleslaw.    - Chicken trice: Sprinkle chicken w/ 1 tsp of hidden valley ranch dip mix. Then grill chicken and top with black beans, salsa and 1 tsp fat free sour cream.     - Cauliflower pizza crust: Use cauliflower as crust (see recipe on pinamrik, no flour!). Top w/ low fat cheese, turkey pepperoni and veggies and bake again    - chicken or turkey crust pizza: use ground chicken or turkey instead of cauliflower, spread in [place] and bake at 350 for about 20-30 minutes(may want to add garlic, black pepper, oregano and other herbs to ground meat mixture).  Remove and top w/ low fat cheese, turkey pepperoni and veggies and bake again for another 10 minutes or until cheese is browned.     Snacks: (100-200  calories; >5g protein)    - 1 low-fat cheese stick with 8 cherry tomatoes or 1 serving fresh fruit  - 4 thin slices fat-free turkey breast and 1 slice low-fat cheese  - 4 thin slices fat-free honey ham with wedge of melon  - 6-8 edamame pods (equivalent to about 1/4 cup edamame without pods).   - 1/4 cup unsalted nuts with ½ cup fruit  - 6-oz container Dannon Light n Fit vanilla yogurt, topped with 1oz unsalted nuts         - apple, celery or baby carrots spread with 2 Tbsp PB2  - apple slices with 1 oz slice low-fat cheese  - Apple slices dipped in 2 Tbsp of PB2  - celery, cucumber, bell pepper or baby carrots dipped in ¼ cup hummus bean spread or light spinach and artichoke dip (*recipe in lunch section)  - celery, cucumber, baby carrots dipped in high protein greek yogurt (Mix 16 oz plain greek yogurt + 1 packet of hidden valley ranch dip mix)  - Barrie Links Beef Steak - 14g protein! (similar to beef jerky)  - 2 wedges Laughing Cow - Light Herb & Garlic Cheese with sliced cucumber or green bell pepper  - 1/2 cup low-fat cottage cheese with ¼ cup fruit or ¼ cup salsa  - RTD Protein drinks: Atkins, Low Carb Slim Fast, EAS light, Muscle Milk Light, etc.  - Homemade Protein drinks: GNC Soy95, Isopure, Nectar, UNJURY, Whey Gourmet, etc. Mix 1 scoop powder with 8oz skim/1% milk or light soymilk.  - Protein bars: Atkins, EAS, Pure Protein, Think Thin, Detour, etc. Must have 0-4 grams sugar - Read the label.    Takeout Options: No more than twice/week  Deli - Salads (no pasta or rice), meats, cheeses. Roasted chicken. Lox (salmon)    Mexican - Platters which don't include tortillas, chips, or rice. Go easy on the beans. Example: Fajitas without the tortillas. Ask the  not to bring chips to the table if they are too tempting.    Greek - Meat or fish and vegetable, but no bread or rice. Including hummus, baba ganoush, etc, is OK. Most sit-down Greek restaurants can provide you with cucumber slices for dipping  instead of colby bread.    Fast Food (Avoid as much as possible) - Salads (no croutons and limit salad dressing to 2 tbsp), grilled chicken sandwich without the bun and ask for no holland. Jennifers low fat chili or Taco Bell pintos and cheese.    BBQ - The meats are fine if you ask for sauces on the side, but most of the traditional side dishes are loaded with carbs. Fletcher slaw, baked beans and BBQ sauce are typically made with sugar.    Chinese - Nothing deep-fried, no rice or noodles. Many Chinese sauces have starch and sugar in them, so you'll have to use your judgement. If you find that these sauces trigger cravings, or cause Dumping, you can ask for the sauce to be made without sugar or just use soy sauce.         Language Assistance Services     ATTENTION: Language assistance services are available, free of charge. Please call [phone].      ATENCIÓN: Si habla laura, tiene a minaya disposición servicios gratuitos de asistencia lingüística. Llame al [phone].     ERIK Ý: N?u b?n nói Ti?ng Vi?t, có các d?ch v? h? tr? ngôn ng? mi?n phí dành cho b?n. G?i s? [phone].         [facility] complies with applicable Federal civil rights laws and does not discriminate on the basis of race, color, national origin, age, disability, or sex.

## 2017-02-14 NOTE — PROGRESS NOTES
NUTRITION NOTE    Referring Physician: Roge Rich M.D.  Reason for MNT Referral: Medically Supervised Diet pending Gastric Sleeve    PAST MEDICAL HISTORY:    Patient presents for 2nd visit for MSD with 2 lb weight loss over the past month; 2 lb total weight loss by making numerous dietary and lifestyle changes.    Past Medical History   Diagnosis Date    Diabetes mellitus     Eczema     GERD (gastroesophageal reflux disease)     Hypertension     Impaired fasting blood sugar     YSABEL on CPAP        CLINICAL DATA:  46 y.o. female.    There were no vitals filed for this visit.    Current Weight: 227 lbs  Weight Change Since Initial Visit: 2 lbs  Ideal Body Weight: 129 lbs  BMI: 42.95    CURRENT DIET:  Reduced-calorie diet.  Diet Recall: Food records are present.    Diet Includes: boiled eggs, plain greek yogurt with cinnamon, protein shake, asparagus, grilled fish on the chepe car twice week, and salads. Occasionally pizza.   Meal Pattern: Improved.  Protein Supplements:1 whey protein shake + almond milk per day.  Snacking: Inadequate. Snacks include healthy choices.    Vegetables: Likes a variety. Eats almost daily.  Fruits: Likes a variety. Eats almost daily.  Beverages: water and unsweetened almond milk  Dining Out: Dining out: Reduced lately. Mostly restaurants.  Cooking at home: Daily. Mostly baked and grilled meat, fish, starchy CHO and vegetables.    CURRENT EXERCISE:  Adequate. Patient walking 2 miles 3-4 times a week and biking in the park.     Vitamins / Minerals / Herbs:   Vitamin D  Alive! Women's Daily    Food Allergies:   None    Social:  Works regular daytime shifts.  Alcohol: None.  Smoking: None.    ASSESSMENT:  Patient demonstrates some willingness to change lifestyle habits as evidenced by weight loss, good exercise, daily food logs, dietary changes, increased fruits and increased vegetables.    Doing fairly well with working on greatest challenges.    Adequate calorie  intake.  Adequate protein intake.    PLAN:    Diet: Adjust diet plan.    Exercise: Maintain.    Behavior Modification: Continue to document food & activity logs daily.    Weight loss prior to surgery (5-10% TBW): 11-23 lbs    Return to clinic in one month.    SESSION TIME:  30 minutes

## 2017-02-14 NOTE — PATIENT INSTRUCTIONS
Menu Plan: 800-1000 Calories;  grams of Protein    DAY 1     Breakfast  ½ cup 2% cottage cheese  ¼ cup fruit (no sugar added)    Snack  2% mozzarella string cheese  10 grapes    Lunch  2oz Lean hamburger or turkey swetha  1 slice low-fat cheese  ¼ cup green beans    Snack  200 calorie low-carb protein drink (4 grams sugar or less)    Dinner  2oz chicken thigh  ¼ cup cooked spinach     Snack  Atkins bar (15g protein)      DAY 2    Breakfast  1 egg with 1oz shredded cheddar cheese and 2T salsa    Snack  200 calorie low-carb protein drink (4 grams sugar or less)    Lunch  Lettuce Wraps: 2oz sliced turkey, 1 slice low-fat Swiss cheese, tomato, and mustard wrapped in a Misael lettuce leaf    Snack  ½ cup low-fat cottage cheese  Pear cup (no sugar added)    Dinner  2oz baked fish  ½ cup cooked broccoli    Snack  Sugar-free pudding cup      DAY 3    Breakfast   ½ cup low-fat ricotta cheese w/ Splenda to kashif  ½ scoop Vanilla protein powder   ¼ cup fresh fruit    Snack  2% string cheese  6 unsalted almonds    Lunch  Tuna/Chicken Salad: 2oz canned tuna/chicken, 1 egg white, and 1 tsp light holland  Pineapple cup (no sugar added)    Snack  200 calorie low-carb protein drink (4 grams sugar or less)    Dinner  ½ baked pork chop   ¼ cup beans      DAY 4    Breakfast  200 calorie low-carb protein drink (4 grams sugar or less)    Snack  Boiled egg    Lunch  ½ cup grilled shrimp  Salad w/ 2 tbsp crumbled fat-free feta  1 tbsp light vinaigrette    Snack  200 calorie low-carb protein drink (4 grams sugar or less)    Dinner  ¾ cup red beans    Snack  Mini Babybell light      DAY 5    Breakfast  [place] pie: 3oz Greek yogurt, 1 tbsp Splenda, ½ individual pack Crystal Light lemonade. Top with ¼ cup chopped walnuts     Snack  3-4 lean ham or turkey slices, ¼ - ½ cup fruit    Lunch  Fiesta Chicken: 2oz canned chicken, 1oz shredded cheddar cheese, ¼ cup black beans  Top with 2 tbsp salsa and a small dollop light sour  cream    Snack  200 calorie low-carb protein drink (4 grams sugar or less)    Dinner  Omelette: ¼ cup Egg Beaters, 4 large (1oz) shrimp, 1oz shredded low-fat cheese. Add bell pepper, onion, mushrooms, green onions, or salsa, optional.      DAY 6    Breakfast  1 alejandro or 2 links turkey sausage  ½ cup fruit    Snack  200 calorie low-carb protein drink (4 grams sugar or less)    Lunch  Grilled tilapia  Salad of baby spinach leaves with light dressing    Snack  200 calorie low-carb protein drink (4 grams sugar or less)    Dinner  Chicken thigh simmered in 98% fat free cream of mushroom soup  ½ cup cooked green beans      Cauliflower Pizza Crust  Recipe courtesy of Kisha Mcgee    Cauliflower Pizza Crust   Total Time: 40 min   Prep: 5 min   Inactive: 10 min   Cook:25 min     Yield: 1 pizza crust   Level: Easy   Ingredients   1 head cauliflower, stalk removed    1/2 cup shredded mozzarella    1/4 cup grated Parmesan    1/2 teaspoon dried oregano    1/2 teaspoon kosher salt    1/4 teaspoon garlic powder    2 eggs, lightly beaten   Directions  Preheat the oven to 400 degrees F. Line a baking sheet with parchment paper.  Break the cauliflower into florets and pulse in a  until fine. Steam in a steamer basket and drain well. (I like to put it on a towel to get all the moisture out.) Let cool.  In a bowl, combine the cauliflower with the mozzarella, Parmesan, oregano, salt, garlic powder and eggs. Transfer to the center of the baking sheet and spread into a [place], resembling a pizza crust. Bake for 20 minutes.  Add desired toppings and bake an additional 10 minutes.  Recipe courtesy of Kisha Mcgee © 2015 [employer] All Rights Reserved.    Meal Ideas for Regular Bariatric Diet  *Recipes and products available at [email or web address].com      Breakfast: (15-20g protein)    - Egg white omelet: 2 egg whites or ½ cup Egg Beaters. (Optional proteins: cheese, shrimp, black beans, chicken, sliced  turkey) (Optional veggies: tomatoes, salsa, spinach, mushrooms, onions, green peppers, or small slice avocado)     - Egg and sausage: 1 egg or ¼ cup Egg Beaters (any variety), with 1 alejandro or 2 links of Turkey sausage or Veggie breakfast sausage ([employer] or [employer]    - Crust-less breakfast quiche: To make a glass pie dish, mix 4oz part skim Ricotta, 1 cup skim milk, and 2 eggs as your base. Add protein: shredded cheese, sliced lean ham or turkey, turkey michelle/sausage. Add veggies: tomato, onion, green onion, mushroom, green pepper, spinach, etc.    - Yogurt parfait: Mix 1 - 6oz container Dannon Light N Fit vanilla yogurt, with ¼ cup crushed unsalted nuts    - Cottage cheese and fruit: ½ cup part-skim cottage cheese or ricotta cheese topped with fresh fruit or sugar free preserves     - Chelita Garcia's Vanilla Egg custard* (add 2 Tbsp instant coffee granules to make Cappuccino Custard*)    - Hi-Protein café latte (skim milk, decaf coffee, 1 scoop protein powder). Optional to add Sugar free syrup or extract flavoring.    - Breakfast Lox: spread fat free cream cheese on slices of smoked salmon. Serve over scrambled or egg over easy (sauteed with nonstick cookspray) OR on a cucumber slice    - Eggwhich: Scramble or cook 1 large egg over easy using nonstick cookspray. Place between 2 slices of Burmese michelle and low fat cheese.     Lunch: (20-30g protein)    - ½ cup Black bean soup (Homemade or Progresso), with ¼ cup shredded low-fat cheese. Top with chopped tomato or fresh salsa.     - Lean deli turkey breast and low-fat sliced cheese, mustard or light holland to moisten, rolled up together, or wrapped in a Misael lettuce leaf    - Chicken salad made from dinner leftovers, moisten with low-fat salad dressing or light holland. Also try leftover salmon, shrimp, tuna or boiled eggs. Serve ½ cup over dark green salad    - Fat-free canned refried beans, topped with ¼ cup shredded low-fat cheese. Top with chopped tomato or  fresh salsa.     - Greek salad: Top mixed greens with 1-2oz grilled chicken, tomatoes, red onions, 2-3 kalamata olives, and sprinkle lightly with feta cheese. Spritz with Balsamic vinegar to taste.     - Crust-less lunch quiche: To make a glass pie dish, mix 4oz part skim Ricotta, 1 cup skim milk, and 2 eggs as your base. Add protein: shredded cheese, sliced lean ham or turkey, shrimp, chicken. Add veggies: tomato, onion, green onion, mushroom, green pepper, spinach, artichoke, broccoli, etc.    - Pizza bake: spread a  melo tonio mushroom with tomato sauce, low-fat shredded mozzarella and turkey pepperoni or Angolan michelle. Add any veggies. Roast for 10-15 minutes, until cheese melted.     - Cucumber crab bites: Spread ¼ cup crab dip (lump crabmeat + light cream cheese and green onions) over sliced cucumber.     - Chicken with light spinach and artichoke dip*: Puree in : 6oz cooked and drained spinach, 2 cloves garlic, 1 can cannelloni beans, ½ cup chopped green onions, 1 can drained artichoke hearts (not marinated in oil), lemon juice and basil. Mix in 2oz chopped up chicken.    Supper: (20-30g protein)    - Serve grilled fish over dark green salad tossed with low-fat dressing, served with grilled asparagus masterson     - Rotisserie chicken salad: served with sliced strawberries, walnuts, fat-free feta cheese crumbles and 1 tbsp Paynes Own Light Raspberry [place] Vinaigrette    - Shrimp cocktail: Dip cold boiled shrimp in homemade low-sugar cocktail sauce (1/2 cup Naseem One Carb ketchup, 2 tbsp horseradish, 1/4 tsp hot sauce, 1 tsp Worcestershire sauce, 1 tbsp freshly-squeezed lemon juice). Serve with dark green salad, walnuts, and crumbled blue cheese drizzled with olive oil and Balsamic vinegar    - Tuna Melt: Spread tuna salad onto 2 thick slices of tomato. Top with low-fat cheese and broil until cheese is melted. May also be made with chicken salad of shrimp salad. [place] with different types  of cheeses.    - Chicken or beef fajitas (no tortilla, rice, beans, chips). Top meat and veggies w/ fresh salsa, fat free sour cream.     - Homemade low-fat Chili using extra lean ground beef or ground turkey. Top with shredded cheese and salsa as desired. May add dollop fat-free sour cream if desired    - Chicken parmesan: Top chicken breast w/ low sugar marinara sauce, mozzarella cheese and bake until chicken reaches 165*.  Serve w/ spaghetti SQUASH or [place] cut green beans    - Dinner Omelet with shrimp or chicken and onion, green peppers and chives.    - No noodle lasagna: Use sliced zucchini or eggplant in place of noodles.  Layer with part skim ricotta cheese and low sugar meat sauce (use very lean ground beef or ground turkey).    - Mexican chicken bake: Bake chunks of chicken breast or thigh with taco seasoning, Pace brand enchilada sauce, green onions and low-fat cheese. Serve with ¼ cup black beans or fat free refried beans topped with chopped tomatoes or salsa.    - Prosper frozen meatballs, simmered in Classico Marinara sauce. Different flavors of salsa or spaghetti sauce create different dishes! Sprinkle with parmesan cheese. Serve with grilled or steamed veggies, or a dark green salad.    - Simmer boneless skinless chicken thigh chunks in Classico Marinara sauce or roasted salsa until tender with chopped onion, bell pepper, garlic, mushrooms, spinach, etc.     - Hamburger or veggie burger, without the bun, dressed the way you like. Served with grilled or steamed veggies.    - Eggplant parmesan: Bake slices of eggplant at 350 degrees for 15 minutes. Layer tomato sauce, sliced eggplant and low-fat mozzarella cheese in a baking dish and cover with foil. Bake 30-40 more minutes or until bubbly. Uncover and bake at 400 degrees for about 15 more minutes, or until top is slightly crisp.    - Fish tacos: grilled/baked white fish, wrapped in Misael lettuce leaf, topped with salsa, shredded low-fat cheese,  and light coleslaw.    - Chicken trice: Sprinkle chicken w/ 1 tsp of hidden valley ranch dip mix. Then grill chicken and top with black beans, salsa and 1 tsp fat free sour cream.     - Cauliflower pizza crust: Use cauliflower as crust (see recipe on miltonamrik, no flour!). Top w/ low fat cheese, turkey pepperoni and veggies and bake again    - chicken or turkey crust pizza: use ground chicken or turkey instead of cauliflower, spread in [place] and bake at 350 for about 20-30 minutes(may want to add garlic, black pepper, oregano and other herbs to ground meat mixture).  Remove and top w/ low fat cheese, turkey pepperoni and veggies and bake again for another 10 minutes or until cheese is browned.     Snacks: (100-200 calories; >5g protein)    - 1 low-fat cheese stick with 8 cherry tomatoes or 1 serving fresh fruit  - 4 thin slices fat-free turkey breast and 1 slice low-fat cheese  - 4 thin slices fat-free honey ham with wedge of melon  - 6-8 edamame pods (equivalent to about 1/4 cup edamame without pods).   - 1/4 cup unsalted nuts with ½ cup fruit  - 6-oz container Dannon Light n Fit vanilla yogurt, topped with 1oz unsalted nuts         - apple, celery or baby carrots spread with 2 Tbsp PB2  - apple slices with 1 oz slice low-fat cheese  - Apple slices dipped in 2 Tbsp of PB2  - celery, cucumber, bell pepper or baby carrots dipped in ¼ cup hummus bean spread or light spinach and artichoke dip (*recipe in lunch section)  - celery, cucumber, baby carrots dipped in high protein greek yogurt (Mix 16 oz plain greek yogurt + 1 packet of hidden valley ranch dip mix)  - Barrie Links Beef Steak - 14g protein! (similar to beef jerky)  - 2 wedges Laughing Cow - Light Herb & Garlic Cheese with sliced cucumber or green bell pepper  - 1/2 cup low-fat cottage cheese with ¼ cup fruit or ¼ cup salsa  - RTD Protein drinks: Atkins, Low Carb Slim Fast, EAS light, Muscle Milk Light, etc.  - Homemade Protein drinks: [employer]NC Soy95, Isopure,  [place], UNJURY, Whey Gourmet, etc. Mix 1 scoop powder with 8oz skim/1% milk or light soymilk.  - Protein bars: Atkins, EAS, Pure Protein, Think Thin, Detour, etc. Must have 0-4 grams sugar - Read the label.    Takeout Options: No more than twice/week  Deli - Salads (no pasta or rice), meats, cheeses. Roasted chicken. Lox (salmon)    Mexican - Platters which don't include tortillas, chips, or rice. Go easy on the beans. Example: Fajitas without the tortillas. Ask the  not to bring chips to the table if they are too tempting.    Greek - Meat or fish and vegetable, but no bread or rice. Including hummus, baba ganoush, etc, is OK. Most sit-down Greek restaurants can provide you with cucumber slices for dipping instead of colby bread.    Fast Food (Avoid as much as possible) - Salads (no croutons and limit salad dressing to 2 tbsp), grilled chicken sandwich without the bun and ask for no holland. Jennifers low fat chili or Taco Bell pintos and cheese.    BBQ - The meats are fine if you ask for sauces on the side, but most of the traditional side dishes are loaded with carbs. Fletcher slaw, baked beans and BBQ sauce are typically made with sugar.    Chinese - Nothing deep-fried, no rice or noodles. Many Chinese sauces have starch and sugar in them, so you'll have to use your judgement. If you find that these sauces trigger cravings, or cause Dumping, you can ask for the sauce to be made without sugar or just use soy sauce.

## 2017-02-15 ENCOUNTER — LAB VISIT (OUTPATIENT)
Dept: LAB | Facility: HOSPITAL | Age: 47
End: 2017-02-15
Attending: PODIATRIST
Payer: COMMERCIAL

## 2017-02-15 DIAGNOSIS — B35.1 ONYCHOMYCOSIS DUE TO DERMATOPHYTE: ICD-10-CM

## 2017-02-15 DIAGNOSIS — E11.9 CONTROLLED TYPE 2 DIABETES MELLITUS WITHOUT COMPLICATION, WITHOUT LONG-TERM CURRENT USE OF INSULIN: ICD-10-CM

## 2017-02-15 DIAGNOSIS — B35.3 TINEA PEDIS OF BOTH FEET: ICD-10-CM

## 2017-02-15 LAB
ALBUMIN SERPL BCP-MCNC: 3.9 G/DL
ALP SERPL-CCNC: 93 U/L
ALT SERPL W/O P-5'-P-CCNC: 19 U/L
AST SERPL-CCNC: 16 U/L
BILIRUB DIRECT SERPL-MCNC: 0.1 MG/DL
BILIRUB SERPL-MCNC: 0.3 MG/DL
PROT SERPL-MCNC: 8.4 G/DL

## 2017-02-15 PROCEDURE — 36415 COLL VENOUS BLD VENIPUNCTURE: CPT | Mod: PO

## 2017-02-15 PROCEDURE — 80076 HEPATIC FUNCTION PANEL: CPT

## 2017-02-16 ENCOUNTER — PATIENT MESSAGE (OUTPATIENT)
Dept: PODIATRY | Facility: CLINIC | Age: 47
End: 2017-02-16

## 2017-02-16 RX ORDER — TERBINAFINE HYDROCHLORIDE 250 MG/1
250 TABLET ORAL DAILY
Qty: 50 TABLET | Refills: 0 | Status: SHIPPED | OUTPATIENT
Start: 2017-02-16 | End: 2017-04-05

## 2017-03-04 RX ORDER — AMLODIPINE BESYLATE 5 MG/1
TABLET ORAL
Qty: 30 TABLET | Refills: 10 | Status: SHIPPED | OUTPATIENT
Start: 2017-03-04 | End: 2017-08-21 | Stop reason: SDUPTHER

## 2017-03-14 ENCOUNTER — CLINICAL SUPPORT (OUTPATIENT)
Dept: BARIATRICS | Facility: CLINIC | Age: 47
End: 2017-03-14
Payer: COMMERCIAL

## 2017-03-14 VITALS — BODY MASS INDEX: 43.57 KG/M2 | WEIGHT: 230.63 LBS

## 2017-03-14 DIAGNOSIS — I10 ESSENTIAL HYPERTENSION: Chronic | ICD-10-CM

## 2017-03-14 DIAGNOSIS — G47.33 OBSTRUCTIVE SLEEP APNEA (ADULT) (PEDIATRIC): ICD-10-CM

## 2017-03-14 DIAGNOSIS — Z71.3 DIETARY COUNSELING AND SURVEILLANCE: ICD-10-CM

## 2017-03-14 DIAGNOSIS — E66.01 MORBID OBESITY WITH BMI OF 40.0-44.9, ADULT: ICD-10-CM

## 2017-03-14 DIAGNOSIS — E66.01 MORBID OBESITY DUE TO EXCESS CALORIES: ICD-10-CM

## 2017-03-14 DIAGNOSIS — E11.9 TYPE 2 DIABETES MELLITUS WITHOUT COMPLICATION, WITHOUT LONG-TERM CURRENT USE OF INSULIN: ICD-10-CM

## 2017-03-14 PROCEDURE — 99499 UNLISTED E&M SERVICE: CPT | Mod: S$GLB,,, | Performed by: DIETITIAN, REGISTERED

## 2017-03-14 PROCEDURE — 97803 MED NUTRITION INDIV SUBSEQ: CPT | Mod: S$GLB,,, | Performed by: DIETITIAN, REGISTERED

## 2017-03-14 PROCEDURE — 99999 PR PBB SHADOW E&M-EST. PATIENT-LVL I: CPT | Mod: PBBFAC,,, | Performed by: DIETITIAN, REGISTERED

## 2017-03-14 NOTE — MR AVS SNAPSHOT
[facility]  [address]  [facility] [address]  Phone: [phone]  Fax: [phone]                  Africa Jennings   3/14/2017 4:00 PM   Clinical Support    Description:  Female : 1970   Provider:  Reggie Mckeon RD   Department:  [facility]                To Do List           Future Appointments        Provider Department Dept Phone    2017 4:00 PM Shanell Graves [facility] [phone]      Goals (5 Years of Data)     None      [facility]     [facility] -  Assistance  Registered nurses in the [facility] provide clinical advisement, health education, appointment booking, and other advisory services.  Call for this free service at [phone].             Medications           Message regarding Medications     Verify the changes and/or additions to your medication regime listed below are the same as discussed with your clinician today.  If any of these changes or additions are incorrect, please notify your healthcare provider.             Verify that the below list of medications is an accurate representation of the medications you are currently taking.  If none reported, the list may be blank. If incorrect, please contact your healthcare provider. Carry this list with you in case of emergency.           Current Medications     amlodipine (NORVASC) 5 MG tablet TAKE 1 TABLET (5 MG TOTAL) BY MOUTH ONCE DAILY.    blood sugar diagnostic Strp 100 each by Misc.(Non-Drug; Combo Route) route once daily. Dispense 100 strips and lancets.    carvedilol (COREG) 12.5 MG tablet TAKE 1 TABLET (12.5 MG TOTAL) BY MOUTH 2 (TWO) TIMES DAILY WITH MEALS.    desonide 0.05% (DESOWEN) 0.05 % Oint Apply topically 2 (two) times daily.    dicyclomine (BENTYL) 20 mg tablet TAKE 1 TABLET (20 MG TOTAL) BY MOUTH EVERY 6 (SIX) HOURS.    ergocalciferol (ERGOCALCIFEROL) 50,000 unit Cap Take 1 capsule (50,000  Units total) by mouth twice a week.    metformin (GLUCOPHAGE-XR) 500 MG 24 hr tablet Take 1 tablet (500 mg total) by mouth every evening.    nystatin (MYCOSTATIN) ointment Apply topically 2 (two) times daily.      omeprazole (PRILOSEC) 40 MG capsule TAKE 1 CAPSULE (40 MG TOTAL) BY MOUTH ONCE DAILY.    omeprazole (PRILOSEC) 40 MG capsule Take 1 capsule (40 mg total) by mouth once daily. Needs appointment for future refills    ondansetron (ZOFRAN) 4 MG tablet Take 1 tablet (4 mg total) by mouth every 8 (eight) hours as needed.    terbinafine HCl (LAMISIL) 250 mg tablet Take 1 tablet (250 mg total) by mouth once daily. 1 pill by mouth x 90 days. Avoid alcohol intake while taking medication    terbinafine HCl (LAMISIL) 250 mg tablet Take 1 tablet (250 mg total) by mouth once daily. 1 pill by mouth x 90 days. Avoid alcohol intake while taking medication    triamcinolone acetonide 0.1% (KENALOG) 0.1 % cream Apply topically 2 (two) times daily.           Clinical Reference Information           Your Vitals Were     Weight BMI             104.6 kg (230 lb 9.6 oz) 43.57 kg/m2         Allergies as of 3/14/2017     No Known Allergies      Immunizations Administered on Date of Encounter - 3/14/2017     None      Language Assistance Services     ATTENTION: Language assistance services are available, free of charge. Please call [phone].      ATENCIÓN: Si habla español, tiene a minaya disposición servicios gratuitos de asistencia lingüística. Llame al [phone].     CHÚ Ý: N?u b?n nói Ti?ng Vi?t, có các d?ch v? h? tr? ngôn ng? mi?n phí dành cho b?n. G?i s? [phone].         [facility] complies with applicable Federal civil rights laws and does not discriminate on the basis of race, color, national origin, age, disability, or sex.

## 2017-03-14 NOTE — PROGRESS NOTES
NUTRITION NOTE     Referring Physician: Roge Rich M.D.  Reason for MNT Referral: Medically Supervised Diet pending Gastric Sleeve     PAST MEDICAL HISTORY:     Patient presents for 3rd visit for MSD with 3 lb weight gain over the past month; 1 lb total weight gain by making numerous dietary and lifestyle changes. Patient went on vacation recently and got off track during her trip. However, patient states that she is back on track and feels great.           Past Medical History   Diagnosis Date    Diabetes mellitus      Eczema      GERD (gastroesophageal reflux disease)      Hypertension      Impaired fasting blood sugar      YSABEL on CPAP           CLINICAL DATA:  46 y.o. female.    Current Weight: 230 lbs  Weight Change Since Initial Visit: +1 lbs  Ideal Body Weight: 129 lbs  BMI: 43.57     CURRENT DIET:  Reduced-calorie diet.  Diet Recall: Food records are not present. She accidentally left them in her car and plans to email or scan them in.     B: fruit (banana, apple) + plain greek yogurt  S: peanuts or peanut butter + cinnamon  L: salad (spinach, cherry tomatoes, cottage cheese) + vinaigrette dressing  S: peanuts + activia  D: chicken breast or grilled fish + mixed vegetables (broccoli, cauliflower and carrots) or sauteed spinach     Diet Includes: boiled eggs, plain greek yogurt with cinnamon, protein shake, asparagus, grilled fish on the chepe car twice week, and salads.  Meal Pattern: Improved.  Protein Supplements:1 whey protein shake + almond milk per day. (25 gms protein)  Snacking: Inadequate. Snacks include healthy choices.     Vegetables: Likes a variety. Eats daily.  Fruits: Likes a variety. Eats daily.  Beverages: water, diet cranberry juice and unsweetened almond milk  Dining Out: Dining out: Reduced lately. Mostly restaurants.  Cooking at home: Daily. Mostly baked and grilled meat, fish, and vegetables.     CURRENT EXERCISE:  Adequate. Patient walking 2 miles 3-4 times a week and  biking in the park.   Started water aerobics last week. Plans to attend water aerobics class Monday, Wednesday and Friday.      Vitamins / Minerals / Herbs:   Vitamin D  Alive! Women's Daily     Food Allergies:   None     Social:  Works regular daytime shifts.  Alcohol: None.  Smoking: None.     ASSESSMENT:  Patient demonstrates some willingness to change lifestyle habits as evidenced by weight loss, good exercise, daily food logs, dietary changes, increased fruits and increased vegetables.     Doing well with working on greatest challenges.     Adequate calorie intake.  Adequate protein intake.     PLAN:     Diet: Continue excellent diet plan.      Exercise: Maintain or increase exercise.      Behavior Modification: Continue to document food & activity logs daily.     Weight loss prior to surgery (5-10% TBW): 11-23 lbs    Provided patient with recommended protein bar handout.      Return to clinic in one month.     SESSION TIME:  30 minutes

## 2017-04-04 ENCOUNTER — PATIENT MESSAGE (OUTPATIENT)
Dept: OBSTETRICS AND GYNECOLOGY | Facility: CLINIC | Age: 47
End: 2017-04-04

## 2017-04-04 ENCOUNTER — PATIENT MESSAGE (OUTPATIENT)
Dept: PODIATRY | Facility: CLINIC | Age: 47
End: 2017-04-04

## 2017-04-05 ENCOUNTER — OFFICE VISIT (OUTPATIENT)
Dept: OBSTETRICS AND GYNECOLOGY | Facility: CLINIC | Age: 47
End: 2017-04-05
Payer: COMMERCIAL

## 2017-04-05 VITALS
TEMPERATURE: 99 F | WEIGHT: 228.81 LBS | DIASTOLIC BLOOD PRESSURE: 82 MMHG | BODY MASS INDEX: 43.2 KG/M2 | SYSTOLIC BLOOD PRESSURE: 148 MMHG | HEIGHT: 61 IN

## 2017-04-05 DIAGNOSIS — Z12.31 VISIT FOR SCREENING MAMMOGRAM: ICD-10-CM

## 2017-04-05 DIAGNOSIS — Z01.419 WELL WOMAN EXAM WITH ROUTINE GYNECOLOGICAL EXAM: Primary | ICD-10-CM

## 2017-04-05 PROCEDURE — 3079F DIAST BP 80-89 MM HG: CPT | Mod: S$GLB,,, | Performed by: OBSTETRICS & GYNECOLOGY

## 2017-04-05 PROCEDURE — 3077F SYST BP >= 140 MM HG: CPT | Mod: S$GLB,,, | Performed by: OBSTETRICS & GYNECOLOGY

## 2017-04-05 PROCEDURE — 99396 PREV VISIT EST AGE 40-64: CPT | Mod: S$GLB,,, | Performed by: OBSTETRICS & GYNECOLOGY

## 2017-04-05 PROCEDURE — 99999 PR PBB SHADOW E&M-EST. PATIENT-LVL III: CPT | Mod: PBBFAC,,, | Performed by: OBSTETRICS & GYNECOLOGY

## 2017-04-05 NOTE — PROGRESS NOTES
Subjective:       Patient ID: Africa Jennings is a 47 y.o. female.    Chief Complaint:  Gynecologic Exam      History of Present Illness  HPI  Annual Exam-Premenopausal  Patient presents for annual exam. The patient has no complaints today. The patient is sexually active. GYN screening history: last pap: approximate date 2014 and was normal and last mammogram: approximate date 2016 and was normal. The patient wears seatbelts: yes. The patient participates in regular exercise: yes. Has the patient ever been transfused or tattooed?: yes. The patient reports that there is not domestic violence in her life.    She is status post total laparoscopic hysterectomy.  Normal Pap in  in our records      GYN & OB History  No LMP recorded. Patient has had a hysterectomy.   Date of Last Pap: No result found    OB History    Para Term  AB SAB TAB Ectopic Multiple Living   3 2   1 1          # Outcome Date GA Lbr Chris/2nd Weight Sex Delivery Anes PTL Lv   3 SAB            2 Para      Vag-Spont      1 Para      Vag-Spont           Past Medical History:   Diagnosis Date    Diabetes mellitus     Eczema     GERD (gastroesophageal reflux disease)     Hypertension     Impaired fasting blood sugar     YSABEL on CPAP        Past Surgical History:   Procedure Laterality Date    breast reduction      CHOLECYSTECTOMY      laprascopic    ESOPHAGOGASTRODUODENOSCOPY  14    HYSTERECTOMY  2007    laprascopic, total for fibroids.  Dr Polo       Family History   Problem Relation Age of Onset    Diabetes Maternal Grandmother     Heart disease Maternal Grandmother     Hypertension Maternal Grandmother     Crohn's disease Sister     Diabetes Mother     Hypertension Mother     Heart disease Mother     Cancer Father 58     Prostate       Social History     Social History    Marital status:      Spouse name: N/A    Number of children: N/A    Years of education: N/A     Social  History Main Topics    Smoking status: Never Smoker    Smokeless tobacco: Never Used    Alcohol use No      Comment: socially    Drug use: No    Sexual activity: Yes     Partners: Male     Birth control/ protection: Surgical     Other Topics Concern    None     Social History Narrative     since 2000.  2 children: 26 and 21.    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 30 tablet 10    blood sugar diagnostic Strp 100 each by Misc.(Non-Drug; Combo Route) route once daily. Dispense 100 strips and lancets. 300 each 0    carvedilol (COREG) 12.5 MG tablet TAKE 1 TABLET (12.5 MG TOTAL) BY MOUTH 2 (TWO) TIMES DAILY WITH MEALS. 180 tablet 1    dicyclomine (BENTYL) 20 mg tablet TAKE 1 TABLET (20 MG TOTAL) BY MOUTH EVERY 6 (SIX) HOURS. 360 tablet 2    ergocalciferol (ERGOCALCIFEROL) 50,000 unit Cap Take 1 capsule (50,000 Units total) by mouth twice a week. 24 capsule 1    metformin (GLUCOPHAGE-XR) 500 MG 24 hr tablet Take 1 tablet (500 mg total) by mouth every evening. 90 tablet 0    nystatin (MYCOSTATIN) ointment Apply topically 2 (two) times daily.        omeprazole (PRILOSEC) 40 MG capsule Take 1 capsule (40 mg total) by mouth once daily. Needs appointment for future refills 90 capsule 0    ondansetron (ZOFRAN) 4 MG tablet Take 1 tablet (4 mg total) by mouth every 8 (eight) hours as needed. 20 tablet 0    triamcinolone acetonide 0.1% (KENALOG) 0.1 % cream Apply topically 2 (two) times daily. 135 g 4     No current facility-administered medications for this visit.        Review of patient's allergies indicates:  No Known Allergies    Review of Systems  Review of Systems   Constitutional: Negative for activity change, appetite change, chills, fatigue, fever and unexpected weight change.   HENT: Negative for mouth sores.     Respiratory: Negative for cough, shortness of breath and wheezing.    Cardiovascular: Negative for chest pain and palpitations.   Gastrointestinal: Negative for abdominal pain, bloating, blood in stool, constipation, nausea and vomiting.   Endocrine: Negative for diabetes and hot flashes.   Genitourinary: Negative for dyspareunia, dysuria, frequency, hematuria, menorrhagia, menstrual problem, pelvic pain, urgency, vaginal bleeding, vaginal discharge, vaginal pain, dysmenorrhea, urinary incontinence, postcoital bleeding and vaginal odor.   Musculoskeletal: Negative for back pain and myalgias.   Skin:  Negative for rash.   Neurological: Negative for seizures and headaches.   Psychiatric/Behavioral: Negative for depression and sleep disturbance. The patient is not nervous/anxious.    Breast: Negative for breast mass, breast pain and nipple discharge          Objective:    Physical Exam:   Constitutional: She appears well-developed and well-nourished. No distress.    HENT:   Head: Normocephalic and atraumatic.    Eyes: EOM are normal.    Neck: Normal range of motion.     Pulmonary/Chest: Effort normal. No respiratory distress.   Breasts: Non-tender, no engorgement, no masses, no retraction, no discharge. Negative for lymphadenopathy.         Abdominal: Soft. She exhibits no distension. There is no tenderness. There is no rebound and no guarding.     Genitourinary: Vagina normal. No vaginal discharge found.   Genitourinary Comments: Vulva without any obvious lesions.  Vaginal vault and cuff with good support.  Minimal discharge noted.  No obvious lesion.  Cervix and Uterus are surgically absent.  Adnexa is without any masses or tenderness.           Musculoskeletal: Normal range of motion.       Neurological: She is alert.    Skin: Skin is warm and dry.    Psychiatric: She has a normal mood and affect.          Assessment:        1. Well woman exam with routine gynecological exam    2. Visit for screening mammogram     3.  Status post total laparoscopic hysterectomy         Plan:          I have discussed with the patient her condition.  Monthly breast examination was instructed, discussed, and encouraged.  Patient was encouraged to consume a low-calorie, low fat diet, and to increase of physical activity.  Healthy habits encouraged.  A Pap smear was NOT performed; she no longer needs Pap.  Mammogram was ordered for June 1, 2017 because of the combination of her age and risk factors.  Gonorrhea and Chlamydia testing not performed.  HIV test not ordered.  She will come back to see me in one year for her annual visit.  She can come back to see me sooner as necessary.  All of her questions were answered appropriately to her satisfaction.

## 2017-04-05 NOTE — MR AVS SNAPSHOT
[facility] [place] - [facility]/ GYN  [address]  [place] [address]  Phone: [phone]                  Africa Jennings   2017 8:40 AM   Office Visit    Description:  Female : 1970   Provider:  Tobias Gonzales MD   Department:  [facility] [place] - OB/ GYN           Reason for Visit     Gynecologic Exam           Diagnoses this Visit        Comments    Well woman exam with routine gynecological exam    -  Primary     Visit for screening mammogram                To Do List           Future Appointments        Provider Department Dept Phone    2017 4:00 PM Reggie Mckeon RD [facility] - Bariatric Surgery [phone]      Goals (5 Years of Data)     None      Follow-Up and Disposition     Return in about 1 year (around 2018).      [facility]     [facility] Nurse Care Line -  Assistance  Unless otherwise directed by your provider, please contact [facility], our nurse care line that is available for  assistance.     Registered nurses in the [facility] provide: appointment scheduling, clinical advisement, health education, and other advisory services.  Call: [phone] (toll free)               Medications           Message regarding Medications     Verify the changes and/or additions to your medication regime listed below are the same as discussed with your clinician today.  If any of these changes or additions are incorrect, please notify your healthcare provider.        STOP taking these medications     desonide 0.05% (DESOWEN) 0.05 % Oint Apply topically 2 (two) times daily.    terbinafine HCl (LAMISIL) 250 mg tablet Take 1 tablet (250 mg total) by mouth once daily. 1 pill by mouth x 90 days. Avoid alcohol intake while taking medication    terbinafine HCl (LAMISIL) 250 mg tablet Take 1 tablet (250 mg total) by mouth once daily. 1 pill by mouth x 90 days. Avoid alcohol intake while taking medication           Verify that the below list of medications  "is an accurate representation of the medications you are currently taking.  If none reported, the list may be blank. If incorrect, please contact your healthcare provider. Carry this list with you in case of emergency.           Current Medications     amlodipine (NORVASC) 5 MG tablet TAKE 1 TABLET (5 MG TOTAL) BY MOUTH ONCE DAILY.    blood sugar diagnostic Strp 100 each by Misc.(Non-Drug; Combo Route) route once daily. Dispense 100 strips and lancets.    carvedilol (COREG) 12.5 MG tablet TAKE 1 TABLET (12.5 MG TOTAL) BY MOUTH 2 (TWO) TIMES DAILY WITH MEALS.    dicyclomine (BENTYL) 20 mg tablet TAKE 1 TABLET (20 MG TOTAL) BY MOUTH EVERY 6 (SIX) HOURS.    ergocalciferol (ERGOCALCIFEROL) 50,000 unit Cap Take 1 capsule (50,000 Units total) by mouth twice a week.    metformin (GLUCOPHAGE-XR) 500 MG 24 hr tablet Take 1 tablet (500 mg total) by mouth every evening.    nystatin (MYCOSTATIN) ointment Apply topically 2 (two) times daily.      omeprazole (PRILOSEC) 40 MG capsule Take 1 capsule (40 mg total) by mouth once daily. Needs appointment for future refills    ondansetron (ZOFRAN) 4 MG tablet Take 1 tablet (4 mg total) by mouth every 8 (eight) hours as needed.    triamcinolone acetonide 0.1% (KENALOG) 0.1 % cream Apply topically 2 (two) times daily.           Clinical Reference Information           Your Vitals Were     BP Temp Height Weight BMI    148/82 (BP Location: Right arm, Patient Position: Sitting, BP Method: Manual) 98.6 °F (37 °C) (Oral) 5' 1" (1.549 m) 103.8 kg (228 lb 13.4 oz) 43.24 kg/m2      Blood Pressure          Most Recent Value    BP  (!)  148/82      Allergies as of 4/5/2017     No Known Allergies      Immunizations Administered on Date of Encounter - 4/5/2017     None      Orders Placed During Today's Visit     Future Labs/Procedures Expected by Expires    Mammo Digital Screening Bilat with CAD  6/1/2017 6/5/2018      Instructions    Call [phone] to schedule mammogram after 5/30/2017     "   Language Assistance Services     ATTENTION: Language assistance services are available, free of charge. Please call [phone].      ATENCIÓN: Si habla laura, tiene a minaya disposición servicios gratuitos de asistencia lingüística. Llame al [phone].     CHÚ Ý: N?u b?n nói Ti?ng Vi?t, có các d?ch v? h? tr? ngôn ng? mi?n phí dành cho b?n. G?i s? [phone].         [facility] [place] [facility] complies with applicable Federal civil rights laws and does not discriminate on the basis of race, color, national origin, age, disability, or sex.

## 2017-04-17 ENCOUNTER — TELEPHONE (OUTPATIENT)
Dept: BARIATRICS | Facility: CLINIC | Age: 47
End: 2017-04-17

## 2017-04-18 ENCOUNTER — CLINICAL SUPPORT (OUTPATIENT)
Dept: BARIATRICS | Facility: CLINIC | Age: 47
End: 2017-04-18
Payer: COMMERCIAL

## 2017-04-18 VITALS — WEIGHT: 231.69 LBS | BODY MASS INDEX: 43.78 KG/M2

## 2017-04-18 DIAGNOSIS — I10 ESSENTIAL HYPERTENSION: Chronic | ICD-10-CM

## 2017-04-18 DIAGNOSIS — E11.9 TYPE 2 DIABETES MELLITUS WITHOUT COMPLICATION, WITHOUT LONG-TERM CURRENT USE OF INSULIN: ICD-10-CM

## 2017-04-18 DIAGNOSIS — E66.01 MORBID OBESITY WITH BMI OF 45.0-49.9, ADULT: Chronic | ICD-10-CM

## 2017-04-18 DIAGNOSIS — Z71.3 DIETARY COUNSELING AND SURVEILLANCE: ICD-10-CM

## 2017-04-18 DIAGNOSIS — E66.01 MORBID OBESITY DUE TO EXCESS CALORIES: ICD-10-CM

## 2017-04-18 DIAGNOSIS — G47.33 OBSTRUCTIVE SLEEP APNEA (ADULT) (PEDIATRIC): ICD-10-CM

## 2017-04-18 PROCEDURE — 99999 PR PBB SHADOW E&M-EST. PATIENT-LVL I: CPT | Mod: PBBFAC,,, | Performed by: DIETITIAN, REGISTERED

## 2017-04-18 PROCEDURE — 97803 MED NUTRITION INDIV SUBSEQ: CPT | Mod: S$GLB,,, | Performed by: DIETITIAN, REGISTERED

## 2017-04-18 PROCEDURE — 99499 UNLISTED E&M SERVICE: CPT | Mod: S$GLB,,, | Performed by: DIETITIAN, REGISTERED

## 2017-04-18 NOTE — PATIENT INSTRUCTIONS
Meal Ideas for Regular Bariatric Diet  *Recipes and products available at [email or web address]      Breakfast: (15-20g protein)    - Egg white omelet: 2 egg whites or ½ cup Egg Beaters. (Optional proteins: cheese, shrimp, black beans, chicken, sliced turkey) (Optional veggies: tomatoes, salsa, spinach, mushrooms, onions, green peppers, or small slice avocado)     - Egg and sausage: 1 egg or ¼ cup Egg Beaters (any variety), with 1 alejandro or 2 links of Turkey sausage or Veggie breakfast sausage (MENA PRESTIGE or Cloudfind)    - Crust-less breakfast quiche: To make a glass pie dish, mix 4oz part skim Ricotta, 1 cup skim milk, and 2 eggs as your base. Add protein: shredded cheese, sliced lean ham or turkey, turkey michelle/sausage. Add veggies: tomato, onion, green onion, mushroom, green pepper, spinach, etc.    - Yogurt parfait: Mix 1 - 6oz container Dannon Light N Fit vanilla yogurt, with ¼ cup crushed unsalted nuts    - Cottage cheese and fruit: ½ cup part-skim cottage cheese or ricotta cheese topped with fresh fruit or sugar free preserves     - Chelita Garcia's Vanilla Egg custard* (add 2 Tbsp instant coffee granules to make Cappuccino Custard*)    - Hi-Protein café latte (skim milk, decaf coffee, 1 scoop protein powder). Optional to add Sugar free syrup or extract flavoring.    - Breakfast Lox: spread fat free cream cheese on slices of smoked salmon. Serve over scrambled or egg over easy (sauteed with nonstick cookspray) OR on a cucumber slice    - Eggwhich: Scramble or cook 1 large egg over easy using nonstick cookspray. Place between 2 slices of Taiwanese michelle and low fat cheese.     Lunch: (20-30g protein)    - ½ cup Black bean soup (Homemade or Progresso), with ¼ cup shredded low-fat cheese. Top with chopped tomato or fresh salsa.     - Lean deli turkey breast and low-fat sliced cheese, mustard or light holland to moisten, rolled up together, or wrapped in a Misael lettuce leaf    - Chicken salad made from dinner  leftovers, moisten with low-fat salad dressing or light holland. Also try leftover salmon, shrimp, tuna or boiled eggs. Serve ½ cup over dark green salad    - Fat-free canned refried beans, topped with ¼ cup shredded low-fat cheese. Top with chopped tomato or fresh salsa.     - Greek salad: Top mixed greens with 1-2oz grilled chicken, tomatoes, red onions, 2-3 kalamata olives, and sprinkle lightly with feta cheese. Spritz with Balsamic vinegar to taste.     - Crust-less lunch quiche: To make a glass pie dish, mix 4oz part skim Ricotta, 1 cup skim milk, and 2 eggs as your base. Add protein: shredded cheese, sliced lean ham or turkey, shrimp, chicken. Add veggies: tomato, onion, green onion, mushroom, green pepper, spinach, artichoke, broccoli, etc.    - Pizza bake: spread a  melo tonio mushroom with tomato sauce, low-fat shredded mozzarella and turkey pepperoni or [place] michelle. Add any veggies. Roast for 10-15 minutes, until cheese melted.     - Cucumber crab bites: Spread ¼ cup crab dip (lump crabmeat + light cream cheese and green onions) over sliced cucumber.     - Chicken with light spinach and artichoke dip*: Puree in : 6oz cooked and drained spinach, 2 cloves garlic, 1 can cannelloni beans, ½ cup chopped green onions, 1 can drained artichoke hearts (not marinated in oil), lemon juice and basil. Mix in 2oz chopped up chicken.    Supper: (20-30g protein)    - Serve grilled fish over dark green salad tossed with low-fat dressing, served with grilled asparagus masterson     - Rotisserie chicken salad: served with sliced strawberries, walnuts, fat-free feta cheese crumbles and 1 tbsp Paynes Own Light Raspberry Barre Vinaigrette    - Shrimp cocktail: Dip cold boiled shrimp in homemade low-sugar cocktail sauce (1/2 cup Naseem One Carb ketchup, 2 tbsp horseradish, 1/4 tsp hot sauce, 1 tsp Worcestershire sauce, 1 tbsp freshly-squeezed lemon juice). Serve with dark green salad, walnuts, and crumbled blue  cheese drizzled with olive oil and Balsamic vinegar    - Tuna Melt: Spread tuna salad onto 2 thick slices of tomato. Top with low-fat cheese and broil until cheese is melted. May also be made with chicken salad of shrimp salad. [place] with different types of cheeses.    - Chicken or beef fajitas (no tortilla, rice, beans, chips). Top meat and veggies w/ fresh salsa, fat free sour cream.     - Homemade low-fat Chili using extra lean ground beef or ground turkey. Top with shredded cheese and salsa as desired. May add dollop fat-free sour cream if desired    - Chicken parmesan: Top chicken breast w/ low sugar marinara sauce, mozzarella cheese and bake until chicken reaches 165*.  Serve w/ spaghetti SQUASH or Dutch cut green beans    - Dinner Omelet with shrimp or chicken and onion, green peppers and chives.    - No noodle lasagna: Use sliced zucchini or eggplant in place of noodles.  Layer with part skim ricotta cheese and low sugar meat sauce (use very lean ground beef or ground turkey).    - Mexican chicken bake: Bake chunks of chicken breast or thigh with taco seasoning, Pace brand enchilada sauce, green onions and low-fat cheese. Serve with ¼ cup black beans or fat free refried beans topped with chopped tomatoes or salsa.    - Prosper frozen meatballs, simmered in Classico Marinara sauce. Different flavors of salsa or spaghetti sauce create different dishes! Sprinkle with parmesan cheese. Serve with grilled or steamed veggies, or a dark green salad.    - Simmer boneless skinless chicken thigh chunks in Classico Marinara sauce or roasted salsa until tender with chopped onion, bell pepper, garlic, mushrooms, spinach, etc.     - Hamburger or veggie burger, without the bun, dressed the way you like. Served with grilled or steamed veggies.    - Eggplant parmesan: Bake slices of eggplant at 350 degrees for 15 minutes. Layer tomato sauce, sliced eggplant and low-fat mozzarella cheese in a baking dish and cover with  foil. Bake 30-40 more minutes or until bubbly. Uncover and bake at 400 degrees for about 15 more minutes, or until top is slightly crisp.    - Fish tacos: grilled/baked white fish, wrapped in Misael lettuce leaf, topped with salsa, shredded low-fat cheese, and light coleslaw.    - Chicken trice: Sprinkle chicken w/ 1 tsp of hidden valley ranch dip mix. Then grill chicken and top with black beans, salsa and 1 tsp fat free sour cream.     - Cauliflower pizza crust: Use cauliflower as crust (see recipe on pinterest, no flour!). Top w/ low fat cheese, turkey pepperoni and veggies and bake again    - chicken or turkey crust pizza: use ground chicken or turkey instead of cauliflower, spread in Elem and bake at 350 for about 20-30 minutes(may want to add garlic, black pepper, oregano and other herbs to ground meat mixture).  Remove and top w/ low fat cheese, turkey pepperoni and veggies and bake again for another 10 minutes or until cheese is browned.     Snacks: (100-200 calories; >5g protein)    - 1 low-fat cheese stick with 8 cherry tomatoes or 1 serving fresh fruit  - 4 thin slices fat-free turkey breast and 1 slice low-fat cheese  - 4 thin slices fat-free honey ham with wedge of melon  - 6-8 edamame pods (equivalent to about 1/4 cup edamame without pods).   - 1/4 cup unsalted nuts with ½ cup fruit  - 6-oz container Dannon Light n Fit vanilla yogurt, topped with 1oz unsalted nuts         - apple, celery or baby carrots spread with 2 Tbsp PB2  - apple slices with 1 oz slice low-fat cheese  - Apple slices dipped in 2 Tbsp of PB2  - celery, cucumber, bell pepper or baby carrots dipped in ¼ cup hummus bean spread or light spinach and artichoke dip (*recipe in lunch section)  - celery, cucumber, baby carrots dipped in high protein greek yogurt (Mix 16 oz plain greek yogurt + 1 packet of hidden valley ranch dip mix)  - Barrie Links Beef Steak - 14g protein! (similar to beef jerky)  - 2 wedges Laughing Cow - Light Herb  & Garlic Cheese with sliced cucumber or green bell pepper  - 1/2 cup low-fat cottage cheese with ¼ cup fruit or ¼ cup salsa  - RTD Protein drinks: Atkins, Low Carb Slim Fast, EAS light, Muscle Milk Light, etc.  - Homemade Protein drinks: GNC Soy95, Isopure, Nectar, UNJURY, Whey Gourmet, etc. Mix 1 scoop powder with 8oz skim/1% milk or light soymilk.  - Protein bars: Atkins, EAS, Pure Protein, Think Thin, Detour, etc. Must have 0-4 grams sugar - Read the label.    Takeout Options: No more than twice/week  Deli - Salads (no pasta or rice), meats, cheeses. Roasted chicken. Lox (salmon)    Mexican - Platters which don't include tortillas, chips, or rice. Go easy on the beans. Example: Fajitas without the tortillas. Ask the  not to bring chips to the table if they are too tempting.    Greek - Meat or fish and vegetable, but no bread or rice. Including hummus, baba ganoush, etc, is OK. Most sit-down Greek restaurants can provide you with cucumber slices for dipping instead of colby bread.    Fast Food (Avoid as much as possible) - Salads (no croutons and limit salad dressing to 2 tbsp), grilled chicken sandwich without the bun and ask for no holland. Jennifers low fat chili or Taco Bell pintos and cheese.    BBQ - The meats are fine if you ask for sauces on the side, but most of the traditional side dishes are loaded with carbs. Fletcher slaw, baked beans and BBQ sauce are typically made with sugar.    Chinese - Nothing deep-fried, no rice or noodles. Many Chinese sauces have starch and sugar in them, so you'll have to use your judgement. If you find that these sauces trigger cravings, or cause Dumping, you can ask for the sauce to be made without sugar or just use soy sauce.        Crustless Spinach Quiche Recipe      TOTAL TIME: Prep: 25 min. Bake: 40 min. YIELD:8 servings     Ingredients  1 cup chopped onion   1 cup sliced fresh mushrooms   1 tablespoon vegetable oil   1 package (10 ounces) frozen chopped spinach, thawed  and well drained   5 large eggs   3 cups shredded [place] or Pierson Barrie cheese   1/8 teaspoon pepper     Directions  1. In a large skillet, saute onion and mushrooms in oil until tender. Add spinach and ham; cook and stir until the excess moisture is evaporated. Cool slightly. Beat eggs; add cheese and mix well. Stir in spinach mixture and pepper; blend well. Spread evenly into a greased 9-in. pie plate or quiche dish. Bake at 350° for 40-45 minutes or until a knife inserted in center comes out clean. Yield: 8 servings.           Nutritional Facts     1 piece: 255 calories, 19g fat (10g saturated fat), 180mg cholesterol, 482mg sodium, 5g carbohydrate (2g sugars, 2g fiber), 17g protein.     © 2017 [employer]

## 2017-04-18 NOTE — MR AVS SNAPSHOT
[facility]  [address] [place]  [facility] [address]  Phone: [phone]  Fax: [phone]                  Africa Jennings   2017 4:00 PM   Clinical Support    Description:  Female : 1970   Provider:  Reggie Mckeon RD   [facility]                To Do List           Future Appointments        Provider Department Dept Phone    2017 4:00 PM Reggie Mckeon RD [facility] [phone]    2017 3:30 PM [facility] MAMMO1 [facility] [phone]      Goals (5 Years of Data)     None      [facility]     [facility] Nurse [facility] Line -  Assistance  Unless otherwise directed by your provider, please contact [facility], our nurse care line that is available for  assistance.     Registered nurses in the [facility] provide: appointment scheduling, clinical advisement, health education, and other advisory services.  Call: [phone] (toll free)               Medications           Message regarding Medications     Verify the changes and/or additions to your medication regime listed below are the same as discussed with your clinician today.  If any of these changes or additions are incorrect, please notify your healthcare provider.             Verify that the below list of medications is an accurate representation of the medications you are currently taking.  If none reported, the list may be blank. If incorrect, please contact your healthcare provider. Carry this list with you in case of emergency.           Current Medications     amlodipine (NORVASC) 5 MG tablet TAKE 1 TABLET (5 MG TOTAL) BY MOUTH ONCE DAILY.    blood sugar diagnostic Strp 100 each by Misc.(Non-Drug; Combo Route) route once daily. Dispense 100 strips and lancets.    carvedilol (COREG) 12.5 MG tablet TAKE 1 TABLET (12.5 MG TOTAL) BY MOUTH 2 (TWO) TIMES DAILY WITH MEALS.    dicyclomine (BENTYL) 20 mg  tablet TAKE 1 TABLET (20 MG TOTAL) BY MOUTH EVERY 6 (SIX) HOURS.    ergocalciferol (ERGOCALCIFEROL) 50,000 unit Cap Take 1 capsule (50,000 Units total) by mouth twice a week.    metformin (GLUCOPHAGE-XR) 500 MG 24 hr tablet Take 1 tablet (500 mg total) by mouth every evening.    nystatin (MYCOSTATIN) ointment Apply topically 2 (two) times daily.      omeprazole (PRILOSEC) 40 MG capsule Take 1 capsule (40 mg total) by mouth once daily. Needs appointment for future refills    ondansetron (ZOFRAN) 4 MG tablet Take 1 tablet (4 mg total) by mouth every 8 (eight) hours as needed.    triamcinolone acetonide 0.1% (KENALOG) 0.1 % cream Apply topically 2 (two) times daily.           Clinical Reference Information           Your Vitals Were     Weight BMI             105.1 kg (231 lb 11.3 oz) 43.78 kg/m2         Allergies as of 4/18/2017     No Known Allergies      Immunizations Administered on Date of Encounter - 4/18/2017     None      Instructions    Meal Ideas for Regular Bariatric Diet  *Recipes and products available at [email or web address]      Breakfast: (15-20g protein)    - Egg white omelet: 2 egg whites or ½ cup Egg Beaters. (Optional proteins: cheese, shrimp, black beans, chicken, sliced turkey) (Optional veggies: tomatoes, salsa, spinach, mushrooms, onions, green peppers, or small slice avocado)     - Egg and sausage: 1 egg or ¼ cup Egg Beaters (any variety), with 1 alejandro or 2 links of Turkey sausage or Veggie breakfast sausage (viaCycle or thredUP)    - Crust-less breakfast quiche: To make a glass pie dish, mix 4oz part skim Ricotta, 1 cup skim milk, and 2 eggs as your base. Add protein: shredded cheese, sliced lean ham or turkey, turkey michelle/sausage. Add veggies: tomato, onion, green onion, mushroom, green pepper, spinach, etc.    - Yogurt parfait: Mix 1 - 6oz container Dannon Light N Fit vanilla yogurt, with ¼ cup crushed unsalted nuts    - Cottage cheese and fruit: ½ cup part-skim cottage cheese or  ricotta cheese topped with fresh fruit or sugar free preserves     - Chelita Garcia's Vanilla Egg custard* (add 2 Tbsp instant coffee granules to make Cappuccino Custard*)    - Hi-Protein café latte (skim milk, decaf coffee, 1 scoop protein powder). Optional to add Sugar free syrup or extract flavoring.    - Breakfast Lox: spread fat free cream cheese on slices of smoked salmon. Serve over scrambled or egg over easy (sauteed with nonstick cookspray) OR on a cucumber slice    - Eggwhich: Scramble or cook 1 large egg over easy using nonstick cookspray. Place between 2 slices of Mauritian michelle and low fat cheese.     Lunch: (20-30g protein)    - ½ cup Black bean soup (Homemade or Progresso), with ¼ cup shredded low-fat cheese. Top with chopped tomato or fresh salsa.     - Lean deli turkey breast and low-fat sliced cheese, mustard or light holland to moisten, rolled up together, or wrapped in a Misael lettuce leaf    - Chicken salad made from dinner leftovers, moisten with low-fat salad dressing or light holland. Also try leftover salmon, shrimp, tuna or boiled eggs. Serve ½ cup over dark green salad    - Fat-free canned refried beans, topped with ¼ cup shredded low-fat cheese. Top with chopped tomato or fresh salsa.     - Greek salad: Top mixed greens with 1-2oz grilled chicken, tomatoes, red onions, 2-3 kalamata olives, and sprinkle lightly with feta cheese. Spritz with Balsamic vinegar to taste.     - Crust-less lunch quiche: To make a glass pie dish, mix 4oz part skim Ricotta, 1 cup skim milk, and 2 eggs as your base. Add protein: shredded cheese, sliced lean ham or turkey, shrimp, chicken. Add veggies: tomato, onion, green onion, mushroom, green pepper, spinach, artichoke, broccoli, etc.    - Pizza bake: spread a  melo tonio mushroom with tomato sauce, low-fat shredded mozzarella and turkey pepperoni or Grenadian michelle. Add any veggies. Roast for 10-15 minutes, until cheese melted.     - Cucumber crab bites: Spread ¼ cup  crab dip (lump crabmeat + light cream cheese and green onions) over sliced cucumber.     - Chicken with light spinach and artichoke dip*: Puree in : 6oz cooked and drained spinach, 2 cloves garlic, 1 can cannelloni beans, ½ cup chopped green onions, 1 can drained artichoke hearts (not marinated in oil), lemon juice and basil. Mix in 2oz chopped up chicken.    Supper: (20-30g protein)    - Serve grilled fish over dark green salad tossed with low-fat dressing, served with grilled asparagus masterson     - Rotisserie chicken salad: served with sliced strawberries, walnuts, fat-free feta cheese crumbles and 1 tbsp Paynes Own Light Raspberry Morgan Vinaigrette    - Shrimp cocktail: Dip cold boiled shrimp in homemade low-sugar cocktail sauce (1/2 cup Naseem One Carb ketchup, 2 tbsp horseradish, 1/4 tsp hot sauce, 1 tsp Worcestershire sauce, 1 tbsp freshly-squeezed lemon juice). Serve with dark green salad, walnuts, and crumbled blue cheese drizzled with olive oil and Balsamic vinegar    - Tuna Melt: Spread tuna salad onto 2 thick slices of tomato. Top with low-fat cheese and broil until cheese is melted. May also be made with chicken salad of shrimp salad. Hurt with different types of cheeses.    - Chicken or beef fajitas (no tortilla, rice, beans, chips). Top meat and veggies w/ fresh salsa, fat free sour cream.     - Homemade low-fat Chili using extra lean ground beef or ground turkey. Top with shredded cheese and salsa as desired. May add dollop fat-free sour cream if desired    - Chicken parmesan: Top chicken breast w/ low sugar marinara sauce, mozzarella cheese and bake until chicken reaches 165*.  Serve w/ spaghetti SQUASH or Burmese cut green beans    - Dinner Omelet with shrimp or chicken and onion, green peppers and chives.    - No noodle lasagna: Use sliced zucchini or eggplant in place of noodles.  Layer with part skim ricotta cheese and low sugar meat sauce (use very lean ground beef or  ground turkey).    - Mexican chicken bake: Bake chunks of chicken breast or thigh with taco seasoning, Pace brand enchilada sauce, green onions and low-fat cheese. Serve with ¼ cup black beans or fat free refried beans topped with chopped tomatoes or salsa.    - Prosper frozen meatballs, simmered in Classico Marinara sauce. Different flavors of salsa or spaghetti sauce create different dishes! Sprinkle with parmesan cheese. Serve with grilled or steamed veggies, or a dark green salad.    - Simmer boneless skinless chicken thigh chunks in Classico Marinara sauce or roasted salsa until tender with chopped onion, bell pepper, garlic, mushrooms, spinach, etc.     - Hamburger or veggie burger, without the bun, dressed the way you like. Served with grilled or steamed veggies.    - Eggplant parmesan: Bake slices of eggplant at 350 degrees for 15 minutes. Layer tomato sauce, sliced eggplant and low-fat mozzarella cheese in a baking dish and cover with foil. Bake 30-40 more minutes or until bubbly. Uncover and bake at 400 degrees for about 15 more minutes, or until top is slightly crisp.    - Fish tacos: grilled/baked white fish, wrapped in Misael lettuce leaf, topped with salsa, shredded low-fat cheese, and light coleslaw.    - Chicken trice: Sprinkle chicken w/ 1 tsp of hidden valley ranch dip mix. Then grill chicken and top with black beans, salsa and 1 tsp fat free sour cream.     - Cauliflower pizza crust: Use cauliflower as crust (see recipe on pinamrik, no flour!). Top w/ low fat cheese, turkey pepperoni and veggies and bake again    - chicken or turkey crust pizza: use ground chicken or turkey instead of cauliflower, spread in [place] and bake at 350 for about 20-30 minutes(may want to add garlic, black pepper, oregano and other herbs to ground meat mixture).  Remove and top w/ low fat cheese, turkey pepperoni and veggies and bake again for another 10 minutes or until cheese is browned.     Snacks: (100-200  calories; >5g protein)    - 1 low-fat cheese stick with 8 cherry tomatoes or 1 serving fresh fruit  - 4 thin slices fat-free turkey breast and 1 slice low-fat cheese  - 4 thin slices fat-free honey ham with wedge of melon  - 6-8 edamame pods (equivalent to about 1/4 cup edamame without pods).   - 1/4 cup unsalted nuts with ½ cup fruit  - 6-oz container Dannon Light n Fit vanilla yogurt, topped with 1oz unsalted nuts         - apple, celery or baby carrots spread with 2 Tbsp PB2  - apple slices with 1 oz slice low-fat cheese  - Apple slices dipped in 2 Tbsp of PB2  - celery, cucumber, bell pepper or baby carrots dipped in ¼ cup hummus bean spread or light spinach and artichoke dip (*recipe in lunch section)  - celery, cucumber, baby carrots dipped in high protein greek yogurt (Mix 16 oz plain greek yogurt + 1 packet of hidden valley ranch dip mix)  - Barrie Links Beef Steak - 14g protein! (similar to beef jerky)  - 2 wedges Laughing Cow - Light Herb & Garlic Cheese with sliced cucumber or green bell pepper  - 1/2 cup low-fat cottage cheese with ¼ cup fruit or ¼ cup salsa  - RTD Protein drinks: Atkins, Low Carb Slim Fast, EAS light, Muscle Milk Light, etc.  - Homemade Protein drinks: GNC Soy95, Isopure, Nectar, UNJURY, Whey Gourmet, etc. Mix 1 scoop powder with 8oz skim/1% milk or light soymilk.  - Protein bars: Atkins, EAS, Pure Protein, Think Thin, Detour, etc. Must have 0-4 grams sugar - Read the label.    Takeout Options: No more than twice/week  Deli - Salads (no pasta or rice), meats, cheeses. Roasted chicken. Lox (salmon)    Mexican - Platters which don't include tortillas, chips, or rice. Go easy on the beans. Example: Fajitas without the tortillas. Ask the  not to bring chips to the table if they are too tempting.    Greek - Meat or fish and vegetable, but no bread or rice. Including hummus, baba ganoush, etc, is OK. Most sit-down Greek restaurants can provide you with cucumber slices for dipping  instead of colby bread.    Fast Food (Avoid as much as possible) - Salads (no croutons and limit salad dressing to 2 tbsp), grilled chicken sandwich without the bun and ask for no holland. Jennifers low fat chili or Taco Bell pintos and cheese.    BBQ - The meats are fine if you ask for sauces on the side, but most of the traditional side dishes are loaded with carbs. Fletcher slaw, baked beans and BBQ sauce are typically made with sugar.    Chinese - Nothing deep-fried, no rice or noodles. Many Chinese sauces have starch and sugar in them, so you'll have to use your judgement. If you find that these sauces trigger cravings, or cause Dumping, you can ask for the sauce to be made without sugar or just use soy sauce.        Crustless Spinach Quiche Recipe      TOTAL TIME: Prep: 25 min. Bake: 40 min. YIELD:8 servings     Ingredients  1 cup chopped onion   1 cup sliced fresh mushrooms   1 tablespoon vegetable oil   1 package (10 ounces) frozen chopped spinach, thawed and well drained   5 large eggs   3 cups shredded La Veta or [place] Barrie cheese   1/8 teaspoon pepper     Directions  1. In a large skillet, saute onion and mushrooms in oil until tender. Add spinach and ham; cook and stir until the excess moisture is evaporated. Cool slightly. Beat eggs; add cheese and mix well. Stir in spinach mixture and pepper; blend well. Spread evenly into a greased 9-in. pie plate or quiche dish. Bake at 350° for 40-45 minutes or until a knife inserted in center comes out clean. Yield: 8 servings.           Nutritional Facts     1 piece: 255 calories, 19g fat (10g saturated fat), 180mg cholesterol, 482mg sodium, 5g carbohydrate (2g sugars, 2g fiber), 17g protein.     © 2017 [email or web address]       Language Assistance Services     ATTENTION: Language assistance services are available, free of charge. Please call [phone].      ATENCIÓN: Si habla español, tiene a minaya disposición servicios gratuitos de asistencia lingüística.  Kana spear [phone].     ERIK Ý: N?u b?n nói Ti?ng Vi?t, có các d?ch v? h? tr? ngôn ng? mi?n phí dành cho b?n. G?i s? [phone].         [facility] [place] [facility] complies with applicable Federal civil rights laws and does not discriminate on the basis of race, color, national origin, age, disability, or sex.

## 2017-04-18 NOTE — PROGRESS NOTES
NUTRITION NOTE      Referring Physician: Roge Rich M.D.  Reason for MNT Referral: Medically Supervised Diet pending Gastric Sleeve      PAST MEDICAL HISTORY:      Patient presents for 4th visit for MSD with 1 lb weight gain over the past month; 2 lb total weight gain by making numerous dietary and lifestyle changes. Patient reports doing well.              Past Medical History   Diagnosis Date    Diabetes mellitus       Eczema       GERD (gastroesophageal reflux disease)       Hypertension       Impaired fasting blood sugar       YSABEL on CPAP              CLINICAL DATA:  46 y.o. female.     Current Weight: 231 lbs  Weight Change Since Initial Visit: +2 lbs  Ideal Body Weight: 129 lbs  BMI: 43.78      CURRENT DIET:  Reduced-calorie diet.  Diet Recall: Food records are present.     B: 2 pieces of toast + eggs  S: apple  L: turkey burger with lettuce/tomato  S: almonds  D: chicken breast + 1/2 cup of brown rice     Diet Includes: boiled eggs, plain greek yogurt with cinnamon, protein shake, asparagus, grilled fish on the chepe car twice week, and salads.  Meal Pattern: Improved.  Protein Supplements:1 whey protein shake + almond milk per day. (25 gms protein)  Snacking: Inadequate. Snacks include healthy choices.      Vegetables: Likes a variety. Eats daily.  Fruits: Likes a variety. Eats daily.  Beverages: water, crystal lite and unsweetened almond milk  Dining Out: Reduced lately. Mostly restaurants.  Cooking at home: Daily. Mostly baked and grilled meat, fish, starchy CHO and vegetables.      CURRENT EXERCISE:  Adequate. Patient walking 2 miles 3-4 times a week and biking in the park. Recently purchased a treadmill.   Water aerobics class twice a week.       Vitamins / Minerals / Herbs:   Vitamin D  Alive! Women's Daily      Food Allergies:   None      Social:  Works regular daytime shifts.  Alcohol: None.  Smoking: None.      ASSESSMENT:  Patient demonstrates some willingness to change lifestyle  habits as evidenced by weight loss, good exercise, daily food logs, dietary changes, increased fruits and increased vegetables.      Doing well with working on greatest challenges.      Adequate calorie intake.  Adequate protein intake.      PLAN:      Diet: Continue excellent diet plan.       Exercise: Maintain or increase exercise.       Behavior Modification: Continue to document food & activity logs daily.      Weight loss prior to surgery (5-10% TBW): 11-23 lbs     Provided patient with recommended protein bar handout.       Return to clinic in one month.      SESSION TIME:  30 minutes

## 2017-05-01 RX ORDER — ONDANSETRON 4 MG/1
4 TABLET, FILM COATED ORAL EVERY 8 HOURS PRN
Qty: 20 TABLET | Refills: 0 | Status: SHIPPED | OUTPATIENT
Start: 2017-05-01 | End: 2017-08-21 | Stop reason: SDUPTHER

## 2017-05-01 NOTE — TELEPHONE ENCOUNTER
The Rx that she is requesting she has been using it for nausea she states  She has been feeling this way since Saturday and this is what she usually will take, termed calll

## 2017-05-05 DIAGNOSIS — I10 ESSENTIAL HYPERTENSION: ICD-10-CM

## 2017-05-05 RX ORDER — CARVEDILOL 12.5 MG/1
12.5 TABLET ORAL 2 TIMES DAILY WITH MEALS
Qty: 60 TABLET | Refills: 0 | Status: SHIPPED | OUTPATIENT
Start: 2017-05-05 | End: 2017-05-31 | Stop reason: SDUPTHER

## 2017-05-15 ENCOUNTER — TELEPHONE (OUTPATIENT)
Dept: BARIATRICS | Facility: CLINIC | Age: 47
End: 2017-05-15

## 2017-05-16 ENCOUNTER — CLINICAL SUPPORT (OUTPATIENT)
Dept: BARIATRICS | Facility: CLINIC | Age: 47
End: 2017-05-16
Payer: COMMERCIAL

## 2017-05-16 VITALS — BODY MASS INDEX: 43.61 KG/M2 | WEIGHT: 230.81 LBS

## 2017-05-16 DIAGNOSIS — Z71.3 DIETARY COUNSELING AND SURVEILLANCE: ICD-10-CM

## 2017-05-16 DIAGNOSIS — E66.01 MORBID OBESITY DUE TO EXCESS CALORIES: ICD-10-CM

## 2017-05-16 DIAGNOSIS — E11.9 TYPE 2 DIABETES MELLITUS WITHOUT COMPLICATION, WITHOUT LONG-TERM CURRENT USE OF INSULIN: ICD-10-CM

## 2017-05-16 DIAGNOSIS — I10 ESSENTIAL HYPERTENSION: Chronic | ICD-10-CM

## 2017-05-16 DIAGNOSIS — E66.01 MORBID OBESITY WITH BMI OF 40.0-44.9, ADULT: ICD-10-CM

## 2017-05-16 DIAGNOSIS — G47.33 OBSTRUCTIVE SLEEP APNEA (ADULT) (PEDIATRIC): ICD-10-CM

## 2017-05-16 PROCEDURE — 99499 UNLISTED E&M SERVICE: CPT | Mod: S$GLB,,, | Performed by: DIETITIAN, REGISTERED

## 2017-05-16 PROCEDURE — 97803 MED NUTRITION INDIV SUBSEQ: CPT | Mod: S$GLB,,, | Performed by: DIETITIAN, REGISTERED

## 2017-05-16 NOTE — PROGRESS NOTES
NUTRITION NOTE    Referring Physician: Roge Rich M.D.  Reason for MNT Referral: Medically Supervised Diet pending Gastric Sleeve    PAST MEDICAL HISTORY:    Patient presents for 5th visit for MSD with -1 lb weight loss over the past month; Patient has almost completely eliminated starchy carbohydrates and has not gone out to eat since our last visit. She is exercising 4-5x a week.     Past Medical History:   Diagnosis Date    Diabetes mellitus     Eczema     GERD (gastroesophageal reflux disease)     Hypertension     Impaired fasting blood sugar     YSABEL on CPAP        CLINICAL DATA:  47 y.o. female.    There were no vitals filed for this visit.    Current Weight: 230 lbs  Weight Change Since Initial Visit: +1 lbs  Ideal Body Weight:  lbs  BMI: 43.61    CURRENT DIET:  Regular diet.  Diet Recall: Food records are present.    Day #1  B: 2 scrambled eggs + 2 slices of turkey michelle  L: 1 cup tuna + 4 celery sticks  Sn: protein shake  D: cauliflower rice + black beans    Day # 2  B: 2 scrambled eggs + 2 slices of turkey michelle  Sn: greek yogurt  L: protein shake  D: baked fish + asparagus    Day #3  B: protein shake + breakfast bar  L: 1 slice of vegetable pizza  (wheat crust)  D: black beans, cauliflower rice + broccoli   Sn: sugar-free pudding    Sometimes skips meals particularly lunch.     Diet Includes: some starchy carbohydrates, vegetables, fruits, protein shakes, lean proteins   Meal Pattern: Improved.  Protein Supplements: 1-2 per day.  Snacking: Inadequate. Snacks include healthy choices.    Vegetables: Likes a variety. Eats daily.  Fruits: Likes a variety. Eats daily.  Beverages: water, sugar-free beverages and almond milk  Dining Out: Dining out: Never. Since last visit.   Cooking at home: Daily. Mostly baked and grilled meat, fish and vegetables.    CURRENT EXERCISE:  Adequate   Every other day. Walking, water aerobics and treadmill.     Vitamins / Minerals / Herbs:   Vitamin D   Alive     Food  Allergies:   None    Social:  Works regular daytime shifts.  Alcohol: None.  Smoking: None.    ASSESSMENT:  Patient demonstrates all willingness to change lifestyle habits as evidenced by weight loss, good exercise, daily food logs, dietary changes, including protein drinks, increased fruits, increased vegetables, reduced dining out, better choices when dining out, more food preparation at melinda, healthier cooking at home, bringing snacks to work, healthier snacking at work and healthier snacking at home.    Doing fairly well with working on greatest challenges (dining out frequency, sweets, starchy CHO, portion control, irregular meal patterns and high-fat diet).    Adequate calorie intake.  Adequate protein intake.    PLAN:    Diet: Maintain diet plan.  --Cut out starchy carbohydrates    Exercise: Maintain.    Behavior Modification: Continue to document food & activity logs daily.    Weight loss prior to surgery (5-10% TBW): 12-23 lbs    Return to clinic in one month.    SESSION TIME:  30 minutes

## 2017-05-17 ENCOUNTER — TELEPHONE (OUTPATIENT)
Dept: BARIATRICS | Facility: CLINIC | Age: 47
End: 2017-05-17

## 2017-05-17 NOTE — TELEPHONE ENCOUNTER
----- Message from Reggie Mckeon RD sent at 5/17/2017  8:13 AM CDT -----      ----- Message -----     From: Alexandra Mason PA-C     Sent: 5/16/2017   6:21 PM       To: DEVONTE Willis!      Yes, unfortunately it is out of date.  Pysch clearances are good for 1 year if there is no change in psych history.  So she will need to do it again :(    Bonny  ----- Message -----     From: Reggie Mckeon RD     Sent: 5/16/2017   4:06 PM       To: JAHAIRA Flores,     Mrs. Jennings started the program in 2014 but did not complete it. I was reviewing her dashboard and her Psych Clearance was dated September 2014 and she was cleared. Does she need to see psych again?     Thanks!

## 2017-05-17 NOTE — TELEPHONE ENCOUNTER
Informed patient that our policy is when restarting and if older than a yr the psych eval has to be redone.  She states ok and is aware that our psych dept will call to calule the psych eval. Note sent to Carlyn

## 2017-05-23 ENCOUNTER — OFFICE VISIT (OUTPATIENT)
Dept: OPTOMETRY | Facility: CLINIC | Age: 47
End: 2017-05-23
Payer: COMMERCIAL

## 2017-05-23 DIAGNOSIS — H52.03 HYPEROPIA OF BOTH EYES WITH ASTIGMATISM AND PRESBYOPIA: ICD-10-CM

## 2017-05-23 DIAGNOSIS — H52.4 HYPEROPIA OF BOTH EYES WITH ASTIGMATISM AND PRESBYOPIA: ICD-10-CM

## 2017-05-23 DIAGNOSIS — H52.203 HYPEROPIA OF BOTH EYES WITH ASTIGMATISM AND PRESBYOPIA: ICD-10-CM

## 2017-05-23 DIAGNOSIS — E11.9 DIABETES MELLITUS TYPE 2 WITHOUT RETINOPATHY: Primary | ICD-10-CM

## 2017-05-23 PROCEDURE — 99999 PR PBB SHADOW E&M-EST. PATIENT-LVL III: CPT | Mod: PBBFAC,,, | Performed by: OPTOMETRIST

## 2017-05-23 PROCEDURE — 92015 DETERMINE REFRACTIVE STATE: CPT | Mod: S$GLB,,, | Performed by: OPTOMETRIST

## 2017-05-23 PROCEDURE — 92004 COMPRE OPH EXAM NEW PT 1/>: CPT | Mod: S$GLB,,, | Performed by: OPTOMETRIST

## 2017-05-23 NOTE — PROGRESS NOTES
HPI     Concerns About Ocular Health    Additional comments: Pt is here for Ocular Health Check           Comments   Pt is here for Ocular Health Check  Last Eye Exam was in January 2016  Denies eye pian and f/f.   No noticeable VA changes since last visit.   Itching due to allergies. No burning or tearing.    Meds: No gtt       Last edited by EMMY Andrade on 5/23/2017  3:39 PM. (History)            Assessment /Plan     For exam results, see Encounter Report.    Diabetes mellitus type 2 without retinopathy  -No retinopathy noted today.  Continued control with primary care physician and annual comprehensive eye exam.    Hyperopia of both eyes with astigmatism and presbyopia  Eyeglass Final Rx     Eyeglass Final Rx       Sphere Cylinder Axis Dist VA Add    Right +1.25 +0.50 110 20/20 +1.50    Left +1.00 +0.50 075 20/20 +1.50    Type:  PAL    Expiration Date:  5/24/2018                  RTC 1 yr

## 2017-05-30 ENCOUNTER — TELEPHONE (OUTPATIENT)
Dept: BARIATRICS | Facility: CLINIC | Age: 47
End: 2017-05-30

## 2017-05-30 NOTE — TELEPHONE ENCOUNTER
----- Message from Janna Callahan MA sent at 5/29/2017  1:37 PM CDT -----  Patient no showed testing appt today, and she cancelled previous appts earlier this year. What would you like for me to do with appts for this patient, thanks

## 2017-05-31 DIAGNOSIS — I10 ESSENTIAL HYPERTENSION: ICD-10-CM

## 2017-05-31 RX ORDER — CARVEDILOL 12.5 MG/1
12.5 TABLET ORAL 2 TIMES DAILY WITH MEALS
Qty: 60 TABLET | Refills: 0 | Status: SHIPPED | OUTPATIENT
Start: 2017-05-31 | End: 2017-08-07 | Stop reason: SDUPTHER

## 2017-05-31 NOTE — TELEPHONE ENCOUNTER
She apologized and said she completely forgot and said that she didn't cancel any appointments before. I went through dates in Jan. I told her of the cancellation/no show policy and that it is in force for any dept that she's scheduled with from us for the work up.  She understands. Msg sent to Carlyn to rescheduled and any further no show will be on hold for 3 months.

## 2017-06-05 ENCOUNTER — HOSPITAL ENCOUNTER (OUTPATIENT)
Dept: RADIOLOGY | Facility: HOSPITAL | Age: 47
Discharge: HOME | End: 2017-06-05
Attending: OBSTETRICS & GYNECOLOGY
Payer: COMMERCIAL

## 2017-06-05 DIAGNOSIS — Z12.31 VISIT FOR SCREENING MAMMOGRAM: ICD-10-CM

## 2017-06-05 PROCEDURE — 77067 SCR MAMMO BI INCL CAD: CPT | Mod: TC

## 2017-06-05 PROCEDURE — 77063 BREAST TOMOSYNTHESIS BI: CPT | Mod: 26,,, | Performed by: RADIOLOGY

## 2017-06-05 PROCEDURE — 77067 SCR MAMMO BI INCL CAD: CPT | Mod: 26,,, | Performed by: RADIOLOGY

## 2017-06-07 DIAGNOSIS — E55.9 VITAMIN D DEFICIENCY: ICD-10-CM

## 2017-06-07 DIAGNOSIS — E11.9 TYPE 2 DIABETES MELLITUS WITHOUT COMPLICATION: ICD-10-CM

## 2017-06-07 RX ORDER — METFORMIN HYDROCHLORIDE 500 MG/1
500 TABLET, EXTENDED RELEASE ORAL NIGHTLY
Qty: 30 TABLET | Refills: 0 | Status: SHIPPED | OUTPATIENT
Start: 2017-06-07 | End: 2017-08-21 | Stop reason: SDUPTHER

## 2017-06-09 RX ORDER — ERGOCALCIFEROL 1.25 MG/1
50000 CAPSULE ORAL
Qty: 24 CAPSULE | Refills: 1 | Status: SHIPPED | OUTPATIENT
Start: 2017-06-12 | End: 2017-08-21 | Stop reason: SDUPTHER

## 2017-06-12 ENCOUNTER — PATIENT MESSAGE (OUTPATIENT)
Dept: BARIATRICS | Facility: CLINIC | Age: 47
End: 2017-06-12

## 2017-06-13 ENCOUNTER — CLINICAL SUPPORT (OUTPATIENT)
Dept: BARIATRICS | Facility: CLINIC | Age: 47
End: 2017-06-13
Payer: COMMERCIAL

## 2017-06-13 VITALS — WEIGHT: 215.81 LBS | BODY MASS INDEX: 40.78 KG/M2

## 2017-06-13 NOTE — PROGRESS NOTES
NUTRITION NOTE    Referring Physician: Roge Rich M.D.  Reason for MNT Referral: Medically Supervised Diet pending Gastric Sleeve    PAST MEDICAL HISTORY:    Patient presents for 6th visit for MSD with -15 weight loss over the past month; 14 lb total weight loss by making numerous dietary and lifestyle changes.    Past Medical History:   Diagnosis Date    Diabetes mellitus     Eczema     GERD (gastroesophageal reflux disease)     Hypertension     Impaired fasting blood sugar     YSABEL on CPAP        CLINICAL DATA:  47 y.o. female.    There were no vitals filed for this visit.    Current Weight: 215 lbs  Weight Change Since Initial Visit: -14 lbs  Ideal Body Weight: 129 lbs  BMI: 40.78  Weight at Initial Visit: 229 lbs    CURRENT DIET:  Regular diet.  Diet Recall: Food records are not present. Patient will bring them by or send them in.     B: boiled egg + turkey sausage or whey + almond milk  Sn: PB fit or peanuts  L: tuna + mustard and celery sticks  D: grilled chicken or fish + spinach or broccoli   Sn: plain greek yogurt     Diet Includes: non-starchy carbohydrates, fruits, vegetables, and lean proteins. No sugar-sweetened beverages.   Meal Pattern: Improved.  Protein Supplements:1 per day.  Snacking: Adequate. Snacks include healthy choices.    Vegetables: Likes a variety. Eats daily.  Fruits: Likes a variety. Eats 2-3 times per week.  Beverages: water, sugar-free beverages and almond milk  Dining Out: Dining out: Monthly. Mostly restaurants. Ordered grill chicken salad.   Cooking at home: Daily. Mostly baked and grilled meat, fish and vegetables.    CURRENT EXERCISE:  Adequate   Treadmill for 30 minutes every day  Water aerobics 2x a week    Vitamins / Minerals / Herbs:   Vitamin D 50,000 IU x 2 a week   Alive    Food Allergies:   none    Social:  Works regular daytime shifts.  Alcohol: None.  Smoking: None.    ASSESSMENT:  Patient demonstrates all willingness to change lifestyle habits as  evidenced by weight loss, good exercise, daily food logs, dietary changes, including protein drinks, increased fruits, increased vegetables, reduced dining out, better choices when dining out, more food preparation at [employer], healthier cooking at home, bringing snacks to work, healthier snacking at work and healthier snacking at home.    Doing well with working on greatest challenges (dining out frequency, sweets, starchy CHO, portion control, irregular meal patterns and high-fat diet).    Adequate calorie intake.  Adequate protein intake.    PLAN:    Diet: Maintain diet plan.    Exercise: Maintain.    Behavior Modification: Continue to document food & activity logs daily.    Weight loss prior to surgery (5-10% TBW): 12-24 lbs    Patient is a GOOD candidate for bariatric surgery--sleeve.     SESSION TIME:  30 minutes

## 2017-07-06 ENCOUNTER — PATIENT MESSAGE (OUTPATIENT)
Dept: BARIATRICS | Facility: CLINIC | Age: 47
End: 2017-07-06

## 2017-07-12 ENCOUNTER — DOCUMENTATION ONLY (OUTPATIENT)
Dept: BARIATRICS | Facility: CLINIC | Age: 47
End: 2017-07-12

## 2017-07-22 DIAGNOSIS — E11.9 TYPE 2 DIABETES MELLITUS WITHOUT COMPLICATION: ICD-10-CM

## 2017-07-24 RX ORDER — METFORMIN HYDROCHLORIDE 500 MG/1
500 TABLET, EXTENDED RELEASE ORAL NIGHTLY
Qty: 30 TABLET | Refills: 0 | Status: SHIPPED | OUTPATIENT
Start: 2017-07-24 | End: 2017-08-28 | Stop reason: SDUPTHER

## 2017-08-07 DIAGNOSIS — I10 ESSENTIAL HYPERTENSION: ICD-10-CM

## 2017-08-07 RX ORDER — CARVEDILOL 12.5 MG/1
12.5 TABLET ORAL 2 TIMES DAILY WITH MEALS
Qty: 60 TABLET | Refills: 0 | Status: SHIPPED | OUTPATIENT
Start: 2017-08-07 | End: 2017-08-21 | Stop reason: SDUPTHER

## 2017-08-10 ENCOUNTER — PATIENT OUTREACH (OUTPATIENT)
Dept: ADMINISTRATIVE | Facility: HOSPITAL | Age: 47
End: 2017-08-10

## 2017-08-10 NOTE — PROGRESS NOTES
A letter was mailed to the patient on today in regards to the information below.    The patient is due for a diabetes and  hypertension follow up fasting blood work w/ urine. The patient is also due for immunizations(pneumonia,tetanus,and flu) and a diabetic foot exam.

## 2017-08-18 DIAGNOSIS — K21.9 GASTROESOPHAGEAL REFLUX DISEASE WITHOUT ESOPHAGITIS: ICD-10-CM

## 2017-08-18 RX ORDER — OMEPRAZOLE 40 MG/1
40 CAPSULE, DELAYED RELEASE ORAL DAILY
Qty: 30 CAPSULE | Refills: 0 | Status: SHIPPED | OUTPATIENT
Start: 2017-08-18 | End: 2017-08-28 | Stop reason: SDUPTHER

## 2017-08-21 ENCOUNTER — OFFICE VISIT (OUTPATIENT)
Dept: FAMILY MEDICINE | Facility: CLINIC | Age: 47
End: 2017-08-21
Payer: COMMERCIAL

## 2017-08-21 ENCOUNTER — LAB VISIT (OUTPATIENT)
Dept: LAB | Facility: HOSPITAL | Age: 47
End: 2017-08-21
Attending: SURGERY
Payer: COMMERCIAL

## 2017-08-21 VITALS
BODY MASS INDEX: 42.37 KG/M2 | TEMPERATURE: 98 F | SYSTOLIC BLOOD PRESSURE: 120 MMHG | WEIGHT: 224.44 LBS | HEIGHT: 61 IN | HEART RATE: 70 BPM | OXYGEN SATURATION: 99 % | DIASTOLIC BLOOD PRESSURE: 86 MMHG

## 2017-08-21 DIAGNOSIS — G47.33 OBSTRUCTIVE SLEEP APNEA (ADULT) (PEDIATRIC): ICD-10-CM

## 2017-08-21 DIAGNOSIS — I10 ESSENTIAL HYPERTENSION: ICD-10-CM

## 2017-08-21 DIAGNOSIS — Z00.00 ANNUAL PHYSICAL EXAM: Primary | ICD-10-CM

## 2017-08-21 DIAGNOSIS — E11.9 TYPE 2 DIABETES MELLITUS WITHOUT COMPLICATION, WITHOUT LONG-TERM CURRENT USE OF INSULIN: ICD-10-CM

## 2017-08-21 DIAGNOSIS — K21.9 GASTROESOPHAGEAL REFLUX DISEASE WITHOUT ESOPHAGITIS: ICD-10-CM

## 2017-08-21 DIAGNOSIS — Z00.00 ANNUAL PHYSICAL EXAM: ICD-10-CM

## 2017-08-21 DIAGNOSIS — E66.01 MORBID OBESITY WITH BMI OF 40.0-44.9, ADULT: ICD-10-CM

## 2017-08-21 DIAGNOSIS — E55.9 VITAMIN D DEFICIENCY: ICD-10-CM

## 2017-08-21 LAB
25(OH)D3+25(OH)D2 SERPL-MCNC: 21 NG/ML
ALBUMIN SERPL BCP-MCNC: 3.4 G/DL
ALP SERPL-CCNC: 83 U/L
ALT SERPL W/O P-5'-P-CCNC: 32 U/L
ANION GAP SERPL CALC-SCNC: 9 MMOL/L
AST SERPL-CCNC: 18 U/L
BASOPHILS # BLD AUTO: 0.01 K/UL
BASOPHILS NFR BLD: 0.1 %
BILIRUB SERPL-MCNC: 0.2 MG/DL
BUN SERPL-MCNC: 15 MG/DL
CALCIUM SERPL-MCNC: 9.6 MG/DL
CHLORIDE SERPL-SCNC: 106 MMOL/L
CHOLEST/HDLC SERPL: 4 {RATIO}
CO2 SERPL-SCNC: 28 MMOL/L
CREAT SERPL-MCNC: 0.8 MG/DL
DIFFERENTIAL METHOD: ABNORMAL
EOSINOPHIL # BLD AUTO: 0.2 K/UL
EOSINOPHIL NFR BLD: 2.4 %
ERYTHROCYTE [DISTWIDTH] IN BLOOD BY AUTOMATED COUNT: 14.4 %
EST. GFR  (AFRICAN AMERICAN): >60 ML/MIN/1.73 M^2
EST. GFR  (NON AFRICAN AMERICAN): >60 ML/MIN/1.73 M^2
GLUCOSE SERPL-MCNC: 118 MG/DL
HCT VFR BLD AUTO: 34.4 %
HDL/CHOLESTEROL RATIO: 25 %
HDLC SERPL-MCNC: 172 MG/DL
HDLC SERPL-MCNC: 43 MG/DL
HGB BLD-MCNC: 11.3 G/DL
LDLC SERPL CALC-MCNC: 93 MG/DL
LYMPHOCYTES # BLD AUTO: 3.9 K/UL
LYMPHOCYTES NFR BLD: 48.4 %
MCH RBC QN AUTO: 26.8 PG
MCHC RBC AUTO-ENTMCNC: 32.8 G/DL
MCV RBC AUTO: 82 FL
MONOCYTES # BLD AUTO: 0.7 K/UL
MONOCYTES NFR BLD: 8.5 %
NEUTROPHILS # BLD AUTO: 3.2 K/UL
NEUTROPHILS NFR BLD: 40.3 %
NONHDLC SERPL-MCNC: 129 MG/DL
PLATELET # BLD AUTO: 347 K/UL
PMV BLD AUTO: 11.2 FL
POTASSIUM SERPL-SCNC: 3.5 MMOL/L
PROT SERPL-MCNC: 7.5 G/DL
RBC # BLD AUTO: 4.21 M/UL
SODIUM SERPL-SCNC: 143 MMOL/L
TRIGL SERPL-MCNC: 180 MG/DL
TSH SERPL DL<=0.005 MIU/L-ACNC: 2.37 UIU/ML
WBC # BLD AUTO: 7.97 K/UL

## 2017-08-21 PROCEDURE — 83036 HEMOGLOBIN GLYCOSYLATED A1C: CPT

## 2017-08-21 PROCEDURE — 84443 ASSAY THYROID STIM HORMONE: CPT

## 2017-08-21 PROCEDURE — 36415 COLL VENOUS BLD VENIPUNCTURE: CPT | Mod: PO

## 2017-08-21 PROCEDURE — 80061 LIPID PANEL: CPT

## 2017-08-21 PROCEDURE — 99999 PR PBB SHADOW E&M-EST. PATIENT-LVL III: CPT | Mod: PBBFAC,,, | Performed by: FAMILY MEDICINE

## 2017-08-21 PROCEDURE — 85025 COMPLETE CBC W/AUTO DIFF WBC: CPT

## 2017-08-21 PROCEDURE — 82306 VITAMIN D 25 HYDROXY: CPT

## 2017-08-21 PROCEDURE — 80053 COMPREHEN METABOLIC PANEL: CPT

## 2017-08-21 PROCEDURE — 99396 PREV VISIT EST AGE 40-64: CPT | Mod: S$GLB,,, | Performed by: FAMILY MEDICINE

## 2017-08-21 RX ORDER — CARVEDILOL 12.5 MG/1
12.5 TABLET ORAL 2 TIMES DAILY WITH MEALS
Qty: 180 TABLET | Refills: 3 | Status: SHIPPED | OUTPATIENT
Start: 2017-08-21 | End: 2017-09-13 | Stop reason: SDUPTHER

## 2017-08-21 RX ORDER — ONDANSETRON 4 MG/1
4 TABLET, FILM COATED ORAL EVERY 8 HOURS PRN
Qty: 20 TABLET | Refills: 0 | Status: SHIPPED | OUTPATIENT
Start: 2017-08-21 | End: 2017-08-28 | Stop reason: SDUPTHER

## 2017-08-21 RX ORDER — ERGOCALCIFEROL 1.25 MG/1
50000 CAPSULE ORAL
Qty: 24 CAPSULE | Refills: 1 | Status: SHIPPED | OUTPATIENT
Start: 2017-08-21 | End: 2018-02-15 | Stop reason: SDUPTHER

## 2017-08-21 RX ORDER — AMLODIPINE BESYLATE 5 MG/1
5 TABLET ORAL DAILY
Qty: 90 TABLET | Refills: 3 | Status: SHIPPED | OUTPATIENT
Start: 2017-08-21 | End: 2018-02-15 | Stop reason: SDUPTHER

## 2017-08-21 RX ORDER — METFORMIN HYDROCHLORIDE 500 MG/1
500 TABLET, EXTENDED RELEASE ORAL NIGHTLY
Qty: 90 TABLET | Refills: 3 | Status: SHIPPED | OUTPATIENT
Start: 2017-08-21 | End: 2018-02-15 | Stop reason: SDUPTHER

## 2017-08-21 NOTE — PROGRESS NOTES
Routine Office Visit    Patient Name: Africa Jennings    : 1970  MRN: [id number]    Subjective:  Africa is a 47 y.o. female who presents today for     1. Annual physical   2. Abdominal bloating, diarrhea and painful bowel movements - started recently. Pt has been drinking water and increasing fiber with some relief of symptoms. She would like to know what else can be done for her symptoms.     Review of Systems   Constitutional: Negative for chills and fever.   HENT: Negative for congestion.    Eyes: Negative for blurred vision.   Respiratory: Negative for cough.    Cardiovascular: Negative for chest pain.   Gastrointestinal: Negative for abdominal pain, constipation, diarrhea, heartburn, nausea and vomiting.   Genitourinary: Negative for dysuria.   Musculoskeletal: Negative for myalgias.   Skin: Negative for itching and rash.   Neurological: Negative for dizziness and headaches.   Psychiatric/Behavioral: Negative for depression.       Active Problem List  Patient Active Problem List   Diagnosis    Essential hypertension    Type 2 diabetes mellitus without complication, without long-term current use of insulin    Obstructive sleep apnea (adult) (pediatric)    Morbid obesity with BMI of 40.0-44.9, adult    Gastroesophageal reflux disease without esophagitis    Pyloric stenosis       Past Surgical History  Past Surgical History:   Procedure Laterality Date    breast reduction      CHOLECYSTECTOMY      laprascopic    ESOPHAGOGASTRODUODENOSCOPY  14    HYSTERECTOMY  2007    laprascopic, total for fibroids.  Dr Polo       Family History  Family History   Problem Relation Age of Onset    Diabetes Maternal Grandmother     Heart disease Maternal Grandmother     Hypertension Maternal Grandmother     Crohn's disease Sister     Diabetes Mother     Hypertension Mother     Heart disease Mother     Cancer Father 58     Prostate    Amblyopia Neg Hx     Blindness Neg Hx      Cataracts Neg Hx     Glaucoma Neg Hx     Macular degeneration Neg Hx     Retinal detachment Neg Hx     Strabismus Neg Hx        Social History  Social History     Social History    Marital status:      Spouse name: N/A    Number of children: N/A    Years of education: N/A     Occupational History    Not on file.     Social History Main Topics    Smoking status: Never Smoker    Smokeless tobacco: Never Used    Alcohol use No      Comment: socially    Drug use: No    Sexual activity: Yes     Partners: Male     Birth control/ protection: Surgical     Other Topics Concern    Not on file     Social History Narrative     since 2000.  2 children: 26 and 21.    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   Current Outpatient Prescriptions   Medication Sig    amlodipine (NORVASC) 5 MG tablet Take 1 tablet (5 mg total) by mouth once daily.    blood sugar diagnostic Strp 100 each by Misc.(Non-Drug; Combo Route) route once daily. Dispense 100 strips and lancets.    carvedilol (COREG) 12.5 MG tablet Take 1 tablet (12.5 mg total) by mouth 2 (two) times daily with meals. Due for office visit    dicyclomine (BENTYL) 20 mg tablet TAKE 1 TABLET (20 MG TOTAL) BY MOUTH EVERY 6 (SIX) HOURS.    ergocalciferol (VITAMIN D2) 50,000 unit Cap Take 1 capsule (50,000 Units total) by mouth twice a week.    metformin (GLUCOPHAGE-XR) 500 MG 24 hr tablet Take 1 tablet (500 mg total) by mouth every evening. Needs appointment for future refills    metformin (GLUCOPHAGE-XR) 500 MG 24 hr tablet Take 1 tablet (500 mg total) by mouth every evening. Needs appointment for future refills    nystatin (MYCOSTATIN) ointment Apply topically 2 (two) times daily.      omeprazole (PRILOSEC) 40 MG capsule TAKE 1 CAPSULE (40 MG TOTAL) BY MOUTH ONCE DAILY. NEEDS APPOINTMENT FOR FUTURE REFILLS    ondansetron (ZOFRAN) 4 MG tablet Take 1 tablet (4 mg total)  "by mouth every 8 (eight) hours as needed.    triamcinolone acetonide 0.1% (KENALOG) 0.1 % cream Apply topically 2 (two) times daily.     No current facility-administered medications for this visit.        Physical Exam  /86 (BP Location: Right arm, Patient Position: Sitting, BP Method: Large (Manual))   Pulse 70   Temp 98.2 °F (36.8 °C) (Oral)   Ht 5' 1" (1.549 m)   Wt 101.8 kg (224 lb 6.9 oz)   SpO2 99%   BMI 42.41 kg/m²   Physical Exam   Constitutional: She is oriented to person, place, and time. She appears well-developed and well-nourished.   HENT:   Head: Normocephalic and atraumatic.   Eyes: Conjunctivae and EOM are normal. Pupils are equal, round, and reactive to light.   Neck: Normal range of motion. Neck supple.   Cardiovascular: Normal rate, regular rhythm and normal heart sounds.  Exam reveals no gallop and no friction rub.    No murmur heard.  Pulmonary/Chest: Breath sounds normal. No respiratory distress.   Abdominal: Soft. Bowel sounds are normal. She exhibits no distension. There is no tenderness.   Musculoskeletal: Normal range of motion.   Lymphadenopathy:     She has no cervical adenopathy.   Neurological: She is alert and oriented to person, place, and time.   Skin: Skin is warm.   Psychiatric: She has a normal mood and affect.         Assessment/Plan:  fArica Jennings is a 47 y.o. female who presents today for :    Annual physical exam  -     Microalbumin/creatinine urine ratio; Future; Expected date: 08/21/2017  -     TSH; Future; Expected date: 08/21/2017  -     CBC auto differential; Future; Expected date: 08/21/2017  -     Comprehensive metabolic panel; Future; Expected date: 08/21/2017  -     Hemoglobin A1c; Future; Expected date: 08/21/2017  -     Lipid panel; Future; Expected date: 08/21/2017    Essential hypertension  -     carvedilol (COREG) 12.5 MG tablet; Take 1 tablet (12.5 mg total) by mouth 2 (two) times daily with meals. Due for office visit  Dispense: 180 " tablet; Refill: 3  -     amlodipine (NORVASC) 5 MG tablet; Take 1 tablet (5 mg total) by mouth once daily.  Dispense: 90 tablet; Refill: 3  The current medical regimen is effective;  continue present plan and medications.    Gastroesophageal reflux disease without esophagitis  -     ondansetron (ZOFRAN) 4 MG tablet; Take 1 tablet (4 mg total) by mouth every 8 (eight) hours as needed.  Dispense: 20 tablet; Refill: 0  The current medical regimen is effective;  continue present plan and medications.  Recommend bland diet  Recommend slow eating  Recommend taking probiotic   Will refer to GI if symptoms worsens / progress    Vitamin D deficiency  -     ergocalciferol (VITAMIN D2) 50,000 unit Cap; Take 1 capsule (50,000 Units total) by mouth twice a week.  Dispense: 24 capsule; Refill: 1  The current medical regimen is effective;  continue present plan and medications.    Obstructive sleep apnea (adult) (pediatric)  The current medical regimen is effective;  continue present plan and medications.    Morbid obesity with BMI of 40.0-44.9, adult  Pt has made lifestyle modifications  She has lost 10# since last year  Continue diet / exercise changes     Type 2 diabetes mellitus without complication, without long-term current use of insulin  -     metformin (GLUCOPHAGE-XR) 500 MG 24 hr tablet; Take 1 tablet (500 mg total) by mouth every evening. Needs appointment for future refills  Dispense: 90 tablet; Refill: 3  The current medical regimen is effective;  continue present plan and medications.              Return in about 6 months (around 2/21/2018), or if symptoms worsen or fail to improve.

## 2017-08-22 LAB
ESTIMATED AVG GLUCOSE: 157 MG/DL
HBA1C MFR BLD HPLC: 7.1 %

## 2017-08-24 ENCOUNTER — TELEPHONE (OUTPATIENT)
Dept: BARIATRICS | Facility: CLINIC | Age: 47
End: 2017-08-24

## 2017-08-24 NOTE — TELEPHONE ENCOUNTER
Nutrition    Confirmed that she did take the Vit D Rx twice/week for the past 3 months. She picked up the new 3 month Rx and started it.

## 2017-08-28 ENCOUNTER — PATIENT MESSAGE (OUTPATIENT)
Dept: FAMILY MEDICINE | Facility: CLINIC | Age: 47
End: 2017-08-28

## 2017-08-28 DIAGNOSIS — K21.9 GASTROESOPHAGEAL REFLUX DISEASE WITHOUT ESOPHAGITIS: ICD-10-CM

## 2017-08-28 RX ORDER — ONDANSETRON 4 MG/1
4 TABLET, FILM COATED ORAL EVERY 8 HOURS PRN
Qty: 90 TABLET | Refills: 0 | Status: SHIPPED | OUTPATIENT
Start: 2017-08-28 | End: 2018-02-15 | Stop reason: SDUPTHER

## 2017-08-28 RX ORDER — OMEPRAZOLE 40 MG/1
40 CAPSULE, DELAYED RELEASE ORAL DAILY
Qty: 90 CAPSULE | Refills: 0 | Status: SHIPPED | OUTPATIENT
Start: 2017-08-28 | End: 2017-10-19 | Stop reason: SDUPTHER

## 2017-08-28 NOTE — TELEPHONE ENCOUNTER
I notified the patient the provider approved the prescription,and the pharmacy will notify when prescription is ready for .

## 2017-09-13 DIAGNOSIS — I10 ESSENTIAL HYPERTENSION: ICD-10-CM

## 2017-09-13 RX ORDER — CARVEDILOL 12.5 MG/1
12.5 TABLET ORAL 2 TIMES DAILY WITH MEALS
Qty: 180 TABLET | Refills: 3 | Status: SHIPPED | OUTPATIENT
Start: 2017-09-13 | End: 2018-02-15 | Stop reason: SDUPTHER

## 2017-10-19 DIAGNOSIS — K21.9 GASTROESOPHAGEAL REFLUX DISEASE WITHOUT ESOPHAGITIS: ICD-10-CM

## 2017-10-19 RX ORDER — OMEPRAZOLE 40 MG/1
40 CAPSULE, DELAYED RELEASE ORAL DAILY
Qty: 90 CAPSULE | Refills: 0 | Status: SHIPPED | OUTPATIENT
Start: 2017-10-19 | End: 2018-01-15 | Stop reason: SDUPTHER

## 2017-11-30 ENCOUNTER — OFFICE VISIT (OUTPATIENT)
Dept: FAMILY MEDICINE | Facility: CLINIC | Age: 47
End: 2017-11-30
Payer: COMMERCIAL

## 2017-11-30 VITALS
BODY MASS INDEX: 43.88 KG/M2 | OXYGEN SATURATION: 97 % | WEIGHT: 232.38 LBS | HEART RATE: 70 BPM | TEMPERATURE: 99 F | SYSTOLIC BLOOD PRESSURE: 154 MMHG | DIASTOLIC BLOOD PRESSURE: 92 MMHG | HEIGHT: 61 IN

## 2017-11-30 DIAGNOSIS — J32.9 SINUSITIS, UNSPECIFIED CHRONICITY, UNSPECIFIED LOCATION: Primary | ICD-10-CM

## 2017-11-30 DIAGNOSIS — I10 ESSENTIAL HYPERTENSION: Chronic | ICD-10-CM

## 2017-11-30 DIAGNOSIS — R05.9 COUGHING: ICD-10-CM

## 2017-11-30 DIAGNOSIS — E11.9 TYPE 2 DIABETES MELLITUS WITHOUT COMPLICATION, WITHOUT LONG-TERM CURRENT USE OF INSULIN: Chronic | ICD-10-CM

## 2017-11-30 PROCEDURE — 99214 OFFICE O/P EST MOD 30 MIN: CPT | Mod: S$GLB,,, | Performed by: NURSE PRACTITIONER

## 2017-11-30 PROCEDURE — 99999 PR PBB SHADOW E&M-EST. PATIENT-LVL IV: CPT | Mod: PBBFAC,,, | Performed by: NURSE PRACTITIONER

## 2017-11-30 RX ORDER — FLUTICASONE PROPIONATE 50 MCG
1 SPRAY, SUSPENSION (ML) NASAL DAILY
Qty: 1 BOTTLE | Refills: 2 | Status: SHIPPED | OUTPATIENT
Start: 2017-11-30 | End: 2018-02-15

## 2017-11-30 RX ORDER — AMOXICILLIN AND CLAVULANATE POTASSIUM 875; 125 MG/1; MG/1
1 TABLET, FILM COATED ORAL 2 TIMES DAILY
Qty: 20 TABLET | Refills: 0 | Status: SHIPPED | OUTPATIENT
Start: 2017-11-30 | End: 2017-12-10

## 2017-11-30 RX ORDER — CODEINE PHOSPHATE AND GUAIFENESIN 10; 100 MG/5ML; MG/5ML
5 SOLUTION ORAL 3 TIMES DAILY PRN
Qty: 180 ML | Refills: 0 | Status: SHIPPED | OUTPATIENT
Start: 2017-11-30 | End: 2017-12-10

## 2017-11-30 NOTE — PROGRESS NOTES
Subjective:       Patient ID: Africa Jennings is a 47 y.o. female.    Chief Complaint: Cough (3 weeks) and Headache (3 weeks)    47-year-old female presents to the clinic today with complaint of pressure to her sinuses, ear pain, sneezing, coughing, and headaches off and on for the past 3 weeks.  She states initially she thinks she had fever a couple of nights.  She denies any wheezing, shortness breath, abdominal pain, nausea, vomiting, or diarrhea.  She denies any dizziness or blurred vision.  She's been taking Zyrtec, Tylenol Sinus, daytime cough, and Mucinex cough.  She states her blood sugars have been running anywhere from .  She has hypertension which is probably why it is nothing sleeping elevated today due to all the over-the-counter cold medications.       Past Medical History:   Diagnosis Date    Diabetes mellitus     Eczema     GERD (gastroesophageal reflux disease)     Hypertension     Impaired fasting blood sugar     YSABEL on CPAP      Past Surgical History:   Procedure Laterality Date    breast reduction      CHOLECYSTECTOMY      laprascopic    ESOPHAGOGASTRODUODENOSCOPY  8/18/14    HYSTERECTOMY  2007    laprascopic, total for fibroids.  Dr Polo      reports that she has never smoked. She has never used smokeless tobacco. She reports that she does not drink alcohol or use drugs.  Review of Systems   Constitutional: Positive for chills and fever.   HENT: Positive for congestion, ear pain and sinus pressure. Negative for ear discharge, postnasal drip, rhinorrhea and sore throat.    Eyes: Negative for pain, discharge and itching.   Respiratory: Positive for cough. Negative for shortness of breath and wheezing.    Cardiovascular: Negative for chest pain, palpitations and leg swelling.   Gastrointestinal: Negative for abdominal pain, diarrhea, nausea and vomiting.   Musculoskeletal: Negative for back pain, neck pain and neck stiffness.   Skin: Negative for rash.    Neurological: Negative for dizziness, light-headedness and headaches.   Hematological: Negative for adenopathy.       Objective:      Physical Exam   Constitutional: She is oriented to person, place, and time. She appears well-developed and well-nourished. No distress.   HENT:   Head: Normocephalic and atraumatic.   Right Ear: External ear normal.   Left Ear: External ear normal.   Mouth/Throat: Oropharynx is clear and moist. No oropharyngeal exudate.   Both nares red and inflamed with tenderness over both maxillary sinuses    Eyes: Conjunctivae and EOM are normal. Pupils are equal, round, and reactive to light. Right eye exhibits no discharge. Left eye exhibits no discharge. No scleral icterus.   Neck: Normal range of motion. Neck supple.   Cardiovascular: Normal rate and regular rhythm.  Exam reveals no gallop and no friction rub.    No murmur heard.  Pulmonary/Chest: Effort normal and breath sounds normal. No respiratory distress. She has no wheezes. She has no rales.   Congested cough    Abdominal: Soft. Bowel sounds are normal. There is no tenderness.   Musculoskeletal: Normal range of motion. She exhibits no edema.   Lymphadenopathy:     She has cervical adenopathy.   Neurological: She is alert and oriented to person, place, and time.   Skin: Skin is warm and dry. No rash noted. She is not diaphoretic.   Psychiatric: She has a normal mood and affect.       Assessment:       1. Sinusitis, unspecified chronicity, unspecified location    2. Coughing    3. Type 2 diabetes mellitus without complication, without long-term current use of insulin    4. Essential hypertension        Plan:         Sinusitis, unspecified chronicity, unspecified location  -     amoxicillin-clavulanate 875-125mg (AUGMENTIN) 875-125 mg per tablet; Take 1 tablet by mouth 2 (two) times daily.  Dispense: 20 tablet; Refill: 0  -     fluticasone (FLONASE) 50 mcg/actuation nasal spray; 1 spray by Each Nare route once daily.  Dispense: 1 Bottle;  Refill: 2    Coughing  -     guaifenesin-codeine 100-10 mg/5 ml (TUSSI-ORGANIDIN NR)  mg/5 mL syrup; Take 5 mLs by mouth 3 (three) times daily as needed.  Dispense: 180 mL; Refill: 0    Type 2 diabetes mellitus without complication, without long-term current use of insulin  -     blood sugar diagnostic Strp; 100 each by Misc.(Non-Drug; Combo Route) route once daily. Dispense 100 strips and lancets.  Dispense: 300 each; Refill: 0    Essential hypertension  That's wearing his right now he gets about 5:15- continue current medications  - blood pressure most likely elevated due to all the OTC cold medications     No Follow-up on file.

## 2017-11-30 NOTE — PATIENT INSTRUCTIONS
Drink lots of water  Stop OTC cold medications  Take Zyrtec  Use Flonase   Take all of the antibiotics  Use cough medication at night   Delsym during the day for cough  Avoid all decongestants

## 2018-01-04 ENCOUNTER — OFFICE VISIT (OUTPATIENT)
Dept: FAMILY MEDICINE | Facility: CLINIC | Age: 48
End: 2018-01-04
Payer: COMMERCIAL

## 2018-01-04 VITALS
OXYGEN SATURATION: 98 % | DIASTOLIC BLOOD PRESSURE: 94 MMHG | HEIGHT: 61 IN | SYSTOLIC BLOOD PRESSURE: 160 MMHG | TEMPERATURE: 99 F | WEIGHT: 226.19 LBS | BODY MASS INDEX: 42.71 KG/M2 | HEART RATE: 82 BPM

## 2018-01-04 DIAGNOSIS — J32.9 SINUSITIS, UNSPECIFIED CHRONICITY, UNSPECIFIED LOCATION: Primary | ICD-10-CM

## 2018-01-04 DIAGNOSIS — R05.9 COUGHING: ICD-10-CM

## 2018-01-04 DIAGNOSIS — I10 ESSENTIAL HYPERTENSION: Chronic | ICD-10-CM

## 2018-01-04 DIAGNOSIS — E11.9 TYPE 2 DIABETES MELLITUS WITHOUT COMPLICATION, WITHOUT LONG-TERM CURRENT USE OF INSULIN: Chronic | ICD-10-CM

## 2018-01-04 DIAGNOSIS — R06.2 WHEEZING: ICD-10-CM

## 2018-01-04 PROCEDURE — 99214 OFFICE O/P EST MOD 30 MIN: CPT | Mod: S$GLB,,, | Performed by: NURSE PRACTITIONER

## 2018-01-04 PROCEDURE — 99999 PR PBB SHADOW E&M-EST. PATIENT-LVL V: CPT | Mod: PBBFAC,,, | Performed by: NURSE PRACTITIONER

## 2018-01-04 RX ORDER — ALBUTEROL SULFATE 90 UG/1
2 AEROSOL, METERED RESPIRATORY (INHALATION) EVERY 6 HOURS PRN
Qty: 1 INHALER | Refills: 0 | Status: SHIPPED | OUTPATIENT
Start: 2018-01-04 | End: 2019-01-11

## 2018-01-04 RX ORDER — FLUTICASONE PROPIONATE 50 MCG
1 SPRAY, SUSPENSION (ML) NASAL DAILY
Qty: 1 BOTTLE | Refills: 2 | Status: SHIPPED | OUTPATIENT
Start: 2018-01-04 | End: 2018-02-15

## 2018-01-04 RX ORDER — LANCETS
EACH MISCELLANEOUS
Qty: 300 EACH | Refills: 0 | Status: SHIPPED | OUTPATIENT
Start: 2018-01-04 | End: 2019-01-11 | Stop reason: SDUPTHER

## 2018-01-04 RX ORDER — AZITHROMYCIN 250 MG/1
TABLET, FILM COATED ORAL
Qty: 6 TABLET | Refills: 0 | Status: SHIPPED | OUTPATIENT
Start: 2018-01-04 | End: 2018-02-15 | Stop reason: ALTCHOICE

## 2018-01-04 RX ORDER — BENZONATATE 200 MG/1
200 CAPSULE ORAL 3 TIMES DAILY PRN
Qty: 30 CAPSULE | Refills: 0 | Status: SHIPPED | OUTPATIENT
Start: 2018-01-04 | End: 2018-01-14

## 2018-01-04 NOTE — PROGRESS NOTES
Subjective:       Patient ID: Africa Jennings is a 47 y.o. female.    Chief Complaint: Sinusitis (1 week)    47-year-old female presents to the clinic today with complaint of fever, sinus congestion, sinus pressure, sore throat, ear stopped up, coughing, wheezing, shortness of breath, headaches, abdominal soreness secondary to coughing for the past week.  She states her fever last night was 102 and she took some ibuprofen.  She's been taking Zyrtec without any relief.  She denies any nausea, vomiting, or diarrhea.  She denies any dizziness or blurred vision.  She denies any cardiac chest pain, heart palpitations, shortness breath, or swelling to lower extremities.  She takes her blood pressure as prescribed.  She states last time she checked her blood pressure was 134/80.  She states her blood pressures always elevated when she doesn't feel well.  She has not been taking any decongestants.      Past Medical History:   Diagnosis Date    Diabetes mellitus     Eczema     GERD (gastroesophageal reflux disease)     Hypertension     Impaired fasting blood sugar     YSABEL on CPAP      Past Surgical History:   Procedure Laterality Date    breast reduction      CHOLECYSTECTOMY      laprascopic    ESOPHAGOGASTRODUODENOSCOPY  8/18/14    HYSTERECTOMY  2007    laprascopic, total for fibroids.  Dr Polo      reports that she has never smoked. She has never used smokeless tobacco. She reports that she does not drink alcohol or use drugs.  Review of Systems   Constitutional: Positive for fever. Negative for chills.   HENT: Positive for congestion, ear pain, sinus pressure and sore throat. Negative for ear discharge, postnasal drip, rhinorrhea and sneezing.    Eyes: Negative for pain, discharge and itching.   Respiratory: Positive for cough, shortness of breath and wheezing.    Cardiovascular: Negative for chest pain, palpitations and leg swelling.   Gastrointestinal: Positive for abdominal pain. Negative for  diarrhea, nausea and vomiting.   Musculoskeletal: Negative for back pain, neck pain and neck stiffness.   Skin: Negative for rash.   Neurological: Positive for headaches. Negative for dizziness and light-headedness.   Hematological: Negative for adenopathy.       Objective:      Physical Exam   Constitutional: She is oriented to person, place, and time. She appears well-developed and well-nourished. No distress.   HENT:   Head: Normocephalic and atraumatic.   Right Ear: External ear normal.   Left Ear: External ear normal.   Mouth/Throat: Oropharynx is clear and moist. No oropharyngeal exudate.   Both nares red and inflamed with tenderness noted over both maxillary sinuses    Eyes: Conjunctivae and EOM are normal. Pupils are equal, round, and reactive to light. Right eye exhibits no discharge. Left eye exhibits no discharge. No scleral icterus.   Neck: Normal range of motion. Neck supple.   Cardiovascular: Normal rate and regular rhythm.  Exam reveals no gallop and no friction rub.    No murmur heard.  Pulmonary/Chest: Effort normal and breath sounds normal. No respiratory distress. She has no wheezes. She has no rales.   Congested coughing    Abdominal: Soft. Bowel sounds are normal. There is no tenderness.   Musculoskeletal: Normal range of motion. She exhibits no edema.   Lymphadenopathy:     She has cervical adenopathy.   Neurological: She is alert and oriented to person, place, and time.   Skin: Skin is warm and dry. No rash noted. She is not diaphoretic.   Psychiatric: She has a normal mood and affect.       Assessment:       1. Sinusitis, unspecified chronicity, unspecified location    2. Type 2 diabetes mellitus without complication, without long-term current use of insulin    3. Wheezing    4. Essential hypertension    5. Coughing        Plan:         Sinusitis, unspecified chronicity, unspecified location  -     azithromycin (ZITHROMAX Z-NADER) 250 MG tablet; Take 2 pills day 1, then 1 pill day 2-5.   Dispense: 6 tablet; Refill: 0  -     fluticasone (FLONASE) 50 mcg/actuation nasal spray; 1 spray (50 mcg total) by Each Nare route once daily.  Dispense: 1 Bottle; Refill: 2    Type 2 diabetes mellitus without complication, without long-term current use of insulin  -     Discontinue: blood sugar diagnostic Strp; 100 each by Misc.(Non-Drug; Combo Route) route once daily. Dispense 100 strips and lancets.  Dispense: 300 each; Refill: 0  -     lancets Misc; Use to test blood sugar once daily.  Dispense: 300 each; Refill: 0  -     blood sugar diagnostic Strp; 100 each by Misc.(Non-Drug; Combo Route) route once daily. Dispense 100 strips and lancets.  Dispense: 300 each; Refill: 0    Wheezing  -     albuterol 90 mcg/actuation inhaler; Inhale 2 puffs into the lungs every 6 (six) hours as needed.  Dispense: 1 Inhaler; Refill: 0    Essential hypertension  - continue current blood pressure medication for now   - follow up with Dr. Thompson as scheduled     Coughing  -     benzonatate (TESSALON) 200 MG capsule; Take 1 capsule (200 mg total) by mouth 3 (three) times daily as needed.  Dispense: 30 capsule; Refill: 0        No Follow-up on file.

## 2018-01-04 NOTE — PATIENT INSTRUCTIONS
Alternate Ibuprofen and Tylenol as needed for fever and / or headaches  Take prescriptions  Drink lots of water   Avoid all decongestants

## 2018-01-15 DIAGNOSIS — K21.9 GASTROESOPHAGEAL REFLUX DISEASE WITHOUT ESOPHAGITIS: ICD-10-CM

## 2018-01-15 RX ORDER — OMEPRAZOLE 40 MG/1
40 CAPSULE, DELAYED RELEASE ORAL DAILY
Qty: 90 CAPSULE | Refills: 0 | Status: SHIPPED | OUTPATIENT
Start: 2018-01-15 | End: 2018-02-15 | Stop reason: SDUPTHER

## 2018-02-15 ENCOUNTER — LAB VISIT (OUTPATIENT)
Dept: LAB | Facility: HOSPITAL | Age: 48
End: 2018-02-15
Attending: FAMILY MEDICINE
Payer: COMMERCIAL

## 2018-02-15 ENCOUNTER — OFFICE VISIT (OUTPATIENT)
Dept: FAMILY MEDICINE | Facility: CLINIC | Age: 48
End: 2018-02-15
Payer: COMMERCIAL

## 2018-02-15 VITALS
OXYGEN SATURATION: 99 % | HEART RATE: 68 BPM | HEIGHT: 61 IN | WEIGHT: 228.19 LBS | BODY MASS INDEX: 43.08 KG/M2 | SYSTOLIC BLOOD PRESSURE: 120 MMHG | TEMPERATURE: 98 F | DIASTOLIC BLOOD PRESSURE: 80 MMHG

## 2018-02-15 DIAGNOSIS — E11.9 TYPE 2 DIABETES MELLITUS WITHOUT COMPLICATION, WITHOUT LONG-TERM CURRENT USE OF INSULIN: ICD-10-CM

## 2018-02-15 DIAGNOSIS — E55.9 VITAMIN D DEFICIENCY: ICD-10-CM

## 2018-02-15 DIAGNOSIS — I10 ESSENTIAL HYPERTENSION: Primary | ICD-10-CM

## 2018-02-15 DIAGNOSIS — K21.9 GASTROESOPHAGEAL REFLUX DISEASE WITHOUT ESOPHAGITIS: ICD-10-CM

## 2018-02-15 DIAGNOSIS — E66.01 MORBID OBESITY WITH BMI OF 40.0-44.9, ADULT: Chronic | ICD-10-CM

## 2018-02-15 LAB
ALBUMIN SERPL BCP-MCNC: 3.6 G/DL
ALP SERPL-CCNC: 95 U/L
ALT SERPL W/O P-5'-P-CCNC: 23 U/L
ANION GAP SERPL CALC-SCNC: 8 MMOL/L
AST SERPL-CCNC: 21 U/L
BILIRUB SERPL-MCNC: 0.3 MG/DL
BUN SERPL-MCNC: 17 MG/DL
CALCIUM SERPL-MCNC: 9.3 MG/DL
CHLORIDE SERPL-SCNC: 104 MMOL/L
CO2 SERPL-SCNC: 27 MMOL/L
CREAT SERPL-MCNC: 0.9 MG/DL
EST. GFR  (AFRICAN AMERICAN): >60 ML/MIN/1.73 M^2
EST. GFR  (NON AFRICAN AMERICAN): >60 ML/MIN/1.73 M^2
ESTIMATED AVG GLUCOSE: 163 MG/DL
GLUCOSE SERPL-MCNC: 148 MG/DL
HBA1C MFR BLD HPLC: 7.3 %
POTASSIUM SERPL-SCNC: 3.8 MMOL/L
PROT SERPL-MCNC: 7.9 G/DL
SODIUM SERPL-SCNC: 139 MMOL/L

## 2018-02-15 PROCEDURE — 36415 COLL VENOUS BLD VENIPUNCTURE: CPT | Mod: PO

## 2018-02-15 PROCEDURE — 99214 OFFICE O/P EST MOD 30 MIN: CPT | Mod: S$GLB,,, | Performed by: FAMILY MEDICINE

## 2018-02-15 PROCEDURE — 83036 HEMOGLOBIN GLYCOSYLATED A1C: CPT

## 2018-02-15 PROCEDURE — 3008F BODY MASS INDEX DOCD: CPT | Mod: S$GLB,,, | Performed by: FAMILY MEDICINE

## 2018-02-15 PROCEDURE — 80053 COMPREHEN METABOLIC PANEL: CPT

## 2018-02-15 PROCEDURE — 99999 PR PBB SHADOW E&M-EST. PATIENT-LVL III: CPT | Mod: PBBFAC,,, | Performed by: FAMILY MEDICINE

## 2018-02-15 RX ORDER — NEOMYCIN SULFATE, POLYMYXIN B SULFATE AND DEXAMETHASONE 3.5; 10000; 1 MG/ML; [USP'U]/ML; MG/ML
SUSPENSION/ DROPS OPHTHALMIC
COMMUNITY
Start: 2018-02-12

## 2018-02-15 RX ORDER — DICYCLOMINE HYDROCHLORIDE 20 MG/1
TABLET ORAL
Qty: 360 TABLET | Refills: 2 | Status: SHIPPED | OUTPATIENT
Start: 2018-02-15 | End: 2020-06-25 | Stop reason: ALTCHOICE

## 2018-02-15 RX ORDER — CARVEDILOL 12.5 MG/1
12.5 TABLET ORAL 2 TIMES DAILY WITH MEALS
Qty: 180 TABLET | Refills: 3 | Status: SHIPPED | OUTPATIENT
Start: 2018-02-15 | End: 2019-01-11 | Stop reason: SDUPTHER

## 2018-02-15 RX ORDER — METFORMIN HYDROCHLORIDE 500 MG/1
500 TABLET, EXTENDED RELEASE ORAL NIGHTLY
Qty: 90 TABLET | Refills: 2 | Status: SHIPPED | OUTPATIENT
Start: 2018-02-15 | End: 2019-01-11 | Stop reason: SDUPTHER

## 2018-02-15 RX ORDER — ERGOCALCIFEROL 1.25 MG/1
50000 CAPSULE ORAL
Qty: 24 CAPSULE | Refills: 2 | Status: SHIPPED | OUTPATIENT
Start: 2018-02-15 | End: 2018-10-28 | Stop reason: SDUPTHER

## 2018-02-15 RX ORDER — AMLODIPINE BESYLATE 5 MG/1
5 TABLET ORAL DAILY
Qty: 90 TABLET | Refills: 2 | Status: SHIPPED | OUTPATIENT
Start: 2018-02-15 | End: 2019-01-11 | Stop reason: SDUPTHER

## 2018-02-15 RX ORDER — OMEPRAZOLE 40 MG/1
40 CAPSULE, DELAYED RELEASE ORAL DAILY
Qty: 90 CAPSULE | Refills: 2 | Status: SHIPPED | OUTPATIENT
Start: 2018-02-15 | End: 2018-04-13 | Stop reason: SDUPTHER

## 2018-02-15 RX ORDER — ONDANSETRON 4 MG/1
4 TABLET, FILM COATED ORAL EVERY 8 HOURS PRN
Qty: 90 TABLET | Refills: 1 | Status: SHIPPED | OUTPATIENT
Start: 2018-02-15 | End: 2019-01-11 | Stop reason: SDUPTHER

## 2018-02-15 NOTE — PROGRESS NOTES
Routine Office Visit    Patient Name: Africa Jennings    : 1970  MRN: [id number]    Subjective:  Africa is a 47 y.o. female who presents today for     1. Diabetes follow-up - pt is doing well on current medication regimen. She has no issues/ complaints.   2. Hypertension - follow-up - pt did not take bp medications this morning because she fasted. She does not like taking medications on an empty stomach. She states her blood pressure at home has been in the 120s/80s.     Review of Systems   Constitutional: Negative for chills and fever.   HENT: Negative for congestion and hearing loss.    Eyes: Negative for blurred vision and discharge.   Respiratory: Negative for cough and wheezing.    Cardiovascular: Negative for chest pain and palpitations.   Gastrointestinal: Negative for abdominal pain, blood in stool, constipation, diarrhea, heartburn, nausea and vomiting.   Genitourinary: Negative for dysuria and hematuria.   Musculoskeletal: Negative for myalgias and neck pain.   Skin: Negative for itching and rash.   Neurological: Negative for dizziness, weakness and headaches.   Endo/Heme/Allergies: Negative for polydipsia.   Psychiatric/Behavioral: Negative for depression.       Active Problem List  Patient Active Problem List   Diagnosis    Essential hypertension    Type 2 diabetes mellitus without complication, without long-term current use of insulin    Obstructive sleep apnea (adult) (pediatric)    Morbid obesity with BMI of 40.0-44.9, adult    Gastroesophageal reflux disease without esophagitis    Pyloric stenosis       Past Surgical History  Past Surgical History:   Procedure Laterality Date    breast reduction      CHOLECYSTECTOMY      laprascopic    ESOPHAGOGASTRODUODENOSCOPY  13    HYSTERECTOMY  2007    laprascopic, total for fibroids.  Dr Polo       Family History  Family History   Problem Relation Age of Onset    Diabetes Maternal Grandmother     Heart disease Maternal  Grandmother     Hypertension Maternal Grandmother     Crohn's disease Sister     Diabetes Mother     Hypertension Mother     Heart disease Mother     Cancer Father 58     Prostate    Amblyopia Neg Hx     Blindness Neg Hx     Cataracts Neg Hx     Glaucoma Neg Hx     Macular degeneration Neg Hx     Retinal detachment Neg Hx     Strabismus Neg Hx        Social History  Social History     Social History    Marital status:      Spouse name: N/A    Number of children: N/A    Years of education: N/A     Occupational History    Not on file.     Social History Main Topics    Smoking status: Never Smoker    Smokeless tobacco: Never Used    Alcohol use No      Comment: socially    Drug use: No    Sexual activity: Yes     Partners: Male     Birth control/ protection: Surgical     Other Topics Concern    Not on file     Social History Narrative     since 2000.  2 children: 26 and 21.    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   Current Outpatient Prescriptions   Medication Sig    amLODIPine (NORVASC) 5 MG tablet Take 1 tablet (5 mg total) by mouth once daily.    blood sugar diagnostic Strp 100 each by Misc.(Non-Drug; Combo Route) route once daily. Dispense 100 strips and lancets.    carvedilol (COREG) 12.5 MG tablet Take 1 tablet (12.5 mg total) by mouth 2 (two) times daily with meals.    dicyclomine (BENTYL) 20 mg tablet TAKE 1 TABLET (20 MG TOTAL) BY MOUTH EVERY 6 (SIX) HOURS.    ergocalciferol (VITAMIN D2) 50,000 unit Cap Take 1 capsule (50,000 Units total) by mouth twice a week.    lancets Misc Use to test blood sugar once daily.    metFORMIN (GLUCOPHAGE-XR) 500 MG 24 hr tablet Take 1 tablet (500 mg total) by mouth every evening.    neomycin-polymyxin-dexamethasone (MAXITROL) 3.5mg/mL-10,000 unit/mL-0.1 % DrpS     nystatin (MYCOSTATIN) ointment Apply topically 2 (two) times daily.      omeprazole  "(PRILOSEC) 40 MG capsule Take 1 capsule (40 mg total) by mouth once daily.    ondansetron (ZOFRAN) 4 MG tablet Take 1 tablet (4 mg total) by mouth every 8 (eight) hours as needed.    triamcinolone acetonide 0.1% (KENALOG) 0.1 % cream Apply topically 2 (two) times daily.    albuterol 90 mcg/actuation inhaler Inhale 2 puffs into the lungs every 6 (six) hours as needed.     No current facility-administered medications for this visit.        Physical Exam  /80   Pulse 68   Temp 97.7 °F (36.5 °C) (Oral)   Ht 5' 1" (1.549 m)   Wt 103.5 kg (228 lb 2.8 oz)   SpO2 99%   BMI 43.11 kg/m²   Physical Exam   Constitutional: She is oriented to person, place, and time. She appears well-developed and well-nourished.   HENT:   Head: Normocephalic and atraumatic.   Eyes: Conjunctivae and EOM are normal. Pupils are equal, round, and reactive to light.   Neck: Normal range of motion. Neck supple.   Cardiovascular: Normal rate, regular rhythm and normal heart sounds.  Exam reveals no gallop and no friction rub.    No murmur heard.  Pulmonary/Chest: Breath sounds normal. No respiratory distress.   Abdominal: Soft. Bowel sounds are normal. She exhibits no distension. There is no tenderness.   Musculoskeletal: Normal range of motion.   Lymphadenopathy:     She has no cervical adenopathy.   Neurological: She is alert and oriented to person, place, and time.   Skin: Skin is warm.   Psychiatric: She has a normal mood and affect.         Assessment/Plan:  Africa Jennings is a 47 y.o. female who presents today for :    Problem List Items Addressed This Visit        Cardiac/Vascular    Essential hypertension - Primary (Chronic)    Relevant Medications    carvedilol (COREG) 12.5 MG tablet    amLODIPine (NORVASC) 5 MG tablet       Endocrine    Morbid obesity with BMI of 40.0-44.9, adult (Chronic)    Type 2 diabetes mellitus without complication, without long-term current use of insulin (Chronic)    Relevant Medications "    metFORMIN (GLUCOPHAGE-XR) 500 MG 24 hr tablet  The current medical regimen is effective;  continue present plan and medications.      Other Relevant Orders    Hemoglobin A1c    Comprehensive metabolic panel       GI    Gastroesophageal reflux disease without esophagitis (Chronic)    Relevant Medications    ondansetron (ZOFRAN) 4 MG tablet    omeprazole (PRILOSEC) 40 MG capsule    dicyclomine (BENTYL) 20 mg tablet  The current medical regimen is effective;  continue present plan and medications.        Other Visit Diagnoses     Vitamin D deficiency        Relevant Medications    ergocalciferol (VITAMIN D2) 50,000 unit Cap  The current medical regimen is effective;  continue present plan and medications.              No Follow-up on file.

## 2018-02-15 NOTE — PATIENT INSTRUCTIONS
Diet: Diabetes  Food is an important tool that you can use to control diabetes and stay healthy. Eating well-balanced meals in the correct amounts will help you control your blood glucose levels and prevent low blood sugar reactions. It will also help you reduce the health risks of diabetes. There is no one specific diet that is right for everyone with diabetes. But there are general guidelines to follow. A registered dietitian (RD) will create a tailored diet approach thats just right for you. He or she will also help you plan healthy meals and snacks. If you have any questions, call your dietitian for advice.     Guidelines for success  Talk with your healthcare provider before starting a diabetes diet or weight loss program. If you haven't talked with a dietitian yet, ask your provider for a referral. The following guidelines can help you succeed:  · Select foods from the 6 food groups below. Your dietitian will help you find food choices within each group. He or she will also show you serving sizes and how many servings you can have at each meal.  ¨ Grains, beans, and starchy vegetables  ¨ Vegetables  ¨ Fruit  ¨ Milk or yogurt  ¨ Meat, poultry, fish, or tofu  ¨ Healthy fats  · Check your blood sugar levels as directed by your provider. Take any medicine as prescribed by your provider.  · Learn to read food labels and pick the right portion sizes.  · Eat only the amount of food in your meal plan. Eat about the same amount of food at regular times each day. Dont skip meals. Eat meals 4 to 5 hours apart, with snacks in between.  · Limit alcohol. It raises blood sugar levels. Drink water or calorie-free diet drinks that use safe sweeteners.  · Eat less fat to help lower your risk of heart disease. Use nonfat or low-fat dairy products and lean meats. Avoid fried foods. Use cooking oils that are unsaturated, such as olive, canola, or peanut oil.  · Talk with your dietitian about safe sugar substitutes.  · Avoid  added salt. It can contribute to high blood pressure, which can cause heart disease. People with diabetes already have a risk of high blood pressure and heart disease.  · Stay at a healthy weight. If you need to lose weight, cut down on your portion sizes. But dont skip meals. Exercise is an important part of any weight management program. Talk with your provider about an exercise program thats right for you.  · For more information about the best diet plan for you, talk with a registered dietitian (RD). To find an RD in your area, contact:  ¨ Academy of Nutrition and Dietetics www.eatright.org  ¨ The American Diabetes Association 162-853-9997 www.diabetes.org  Date Last Reviewed: 8/1/2016 © 2000-2017 [employer]. [address] [place] [address], [place] [address]. All rights reserved. This information is not intended as a substitute for professional medical care. Always follow your healthcare professional's instructions.

## 2018-03-08 ENCOUNTER — TELEPHONE (OUTPATIENT)
Dept: ADMINISTRATIVE | Facility: HOSPITAL | Age: 48
End: 2018-03-08

## 2018-03-12 ENCOUNTER — OFFICE VISIT (OUTPATIENT)
Dept: URGENT CARE | Facility: CLINIC | Age: 48
End: 2018-03-12
Payer: COMMERCIAL

## 2018-03-12 VITALS
TEMPERATURE: 98 F | BODY MASS INDEX: 41.57 KG/M2 | WEIGHT: 220 LBS | DIASTOLIC BLOOD PRESSURE: 92 MMHG | SYSTOLIC BLOOD PRESSURE: 148 MMHG | HEART RATE: 86 BPM | OXYGEN SATURATION: 97 %

## 2018-03-12 DIAGNOSIS — R50.9 FEVER AND CHILLS: ICD-10-CM

## 2018-03-12 DIAGNOSIS — J01.40 ACUTE PANSINUSITIS, RECURRENCE NOT SPECIFIED: Primary | ICD-10-CM

## 2018-03-12 LAB
CTP QC/QA: YES
FLUAV AG NPH QL: NEGATIVE
FLUBV AG NPH QL: NEGATIVE

## 2018-03-12 PROCEDURE — 87804 INFLUENZA ASSAY W/OPTIC: CPT | Mod: QW,S$GLB,, | Performed by: FAMILY MEDICINE

## 2018-03-12 PROCEDURE — 96372 THER/PROPH/DIAG INJ SC/IM: CPT | Mod: S$GLB,,, | Performed by: FAMILY MEDICINE

## 2018-03-12 PROCEDURE — 99214 OFFICE O/P EST MOD 30 MIN: CPT | Mod: 25,S$GLB,, | Performed by: FAMILY MEDICINE

## 2018-03-12 PROCEDURE — 3080F DIAST BP >= 90 MM HG: CPT | Mod: CPTII,S$GLB,, | Performed by: FAMILY MEDICINE

## 2018-03-12 PROCEDURE — 3077F SYST BP >= 140 MM HG: CPT | Mod: CPTII,S$GLB,, | Performed by: FAMILY MEDICINE

## 2018-03-12 RX ORDER — CETIRIZINE HYDROCHLORIDE 10 MG/1
10 TABLET ORAL DAILY
Qty: 30 TABLET | Refills: 0 | COMMUNITY
Start: 2018-03-12 | End: 2019-01-11 | Stop reason: SDUPTHER

## 2018-03-12 RX ORDER — BETAMETHASONE SODIUM PHOSPHATE AND BETAMETHASONE ACETATE 3; 3 MG/ML; MG/ML
6 INJECTION, SUSPENSION INTRA-ARTICULAR; INTRALESIONAL; INTRAMUSCULAR; SOFT TISSUE
Status: COMPLETED | OUTPATIENT
Start: 2018-03-12 | End: 2018-03-12

## 2018-03-12 RX ORDER — AMOXICILLIN AND CLAVULANATE POTASSIUM 875; 125 MG/1; MG/1
1 TABLET, FILM COATED ORAL 2 TIMES DAILY
Qty: 20 TABLET | Refills: 0 | Status: SHIPPED | OUTPATIENT
Start: 2018-03-12 | End: 2018-03-22

## 2018-03-12 RX ADMIN — BETAMETHASONE SODIUM PHOSPHATE AND BETAMETHASONE ACETATE 6 MG: 3; 3 INJECTION, SUSPENSION INTRA-ARTICULAR; INTRALESIONAL; INTRAMUSCULAR; SOFT TISSUE at 05:03

## 2018-03-12 NOTE — PATIENT INSTRUCTIONS
Sinusitis (Antibiotic Treatment)    The sinuses are air-filled spaces within the bones of the face. They connect to the inside of the nose. Sinusitis is an inflammation of the tissue lining the sinus cavity. Sinus inflammation can occur during a cold. It can also be due to allergies to pollens and other particles in the air. Sinusitis can cause symptoms of sinus congestion and fullness. A sinus infection causes fever, headache and facial pain. There is often green or yellow drainage from the nose or into the back of the throat (post-nasal drip). You have been given antibiotics to treat this condition.  Home care:  · Take the full course of antibiotics as instructed. Do not stop taking them, even if you feel better.  · Drink plenty of water, hot tea, and other liquids. This may help thin mucus. It also may promote sinus drainage.  · Heat may help soothe painful areas of the face. Use a towel soaked in hot water. Or,  the shower and direct the hot spray onto your face. Using a vaporizer along with a menthol rub at night may also help.   · An expectorant containing guaifenesin may help thin the mucus and promote drainage from the sinuses.  · Over-the-counter decongestants may be used unless a similar medicine was prescribed. Nasal sprays work the fastest. Use one that contains phenylephrine or oxymetazoline. First blow the nose gently. Then use the spray. Do not use these medicines more often than directed on the label or symptoms may get worse. You may also use tablets containing pseudoephedrine. Avoid products that combine ingredients, because side effects may be increased. Read labels. You can also ask the pharmacist for help. (NOTE: Persons with high blood pressure should not use decongestants. They can raise blood pressure.)  · Over-the-counter antihistamines may help if allergies contributed to your sinusitis.    · Do not use nasal rinses or irrigation during an acute sinus infection, unless told to by  your health care provider. Rinsing may spread the infection to other sinuses.  · Use acetaminophen or ibuprofen to control pain, unless another pain medicine was prescribed. (If you have chronic liver or kidney disease or ever had a stomach ulcer, talk with your doctor before using these medicines. Aspirin should never be used in anyone under 18 years of age who is ill with a fever. It may cause severe liver damage.)  · Don't smoke. This can worsen symptoms.  Follow-up care  Follow up with your healthcare provider or our staff if you are not improving within the next week.  When to seek medical advice  Call your healthcare provider if any of these occur:  · Facial pain or headache becoming more severe  · Stiff neck  · Unusual drowsiness or confusion  · Swelling of the forehead or eyelids  · Vision problems, including blurred or double vision  · Fever of 100.4ºF (38ºC) or higher, or as directed by your healthcare provider  · Seizure  · Breathing problems  · Symptoms not resolving within 10 days  Date Last Reviewed: 4/13/2015  © 1358-7037 The "[employer]. [address] [place] [address], [place] [address]. All rights reserved. This information is not intended as a substitute for professional medical care. Always follow your healthcare professional's instructions.

## 2018-03-12 NOTE — PROGRESS NOTES
Subjective:       Patient ID: Africa Jennings is a 48 y.o. female.    Vitals:  weight is 99.8 kg (220 lb). Her oral temperature is 98.2 °F (36.8 °C). Her blood pressure is 148/92 (abnormal) and her pulse is 86. Her oxygen saturation is 97%.     Chief Complaint: Sinus Problem    Sinus Problem   This is a new problem. The current episode started in the past 7 days. The problem has been gradually worsening since onset. Associated symptoms include congestion, coughing, ear pain, headaches, sinus pressure, sneezing and a sore throat. Pertinent negatives include no chills, hoarse voice or shortness of breath. Treatments tried: zyrtec. The treatment provided no relief.     Review of Systems   Constitution: Positive for malaise/fatigue. Negative for chills and fever.   HENT: Positive for congestion, ear pain, sinus pressure, sneezing and sore throat. Negative for hoarse voice.    Eyes: Negative for discharge and redness.   Cardiovascular: Negative for chest pain, dyspnea on exertion and leg swelling.   Respiratory: Positive for cough and sputum production. Negative for shortness of breath and wheezing.    Musculoskeletal: Positive for myalgias.   Gastrointestinal: Negative for abdominal pain and nausea.   Neurological: Positive for headaches.   All other systems reviewed and are negative.      Objective:      Physical Exam   Constitutional: She is oriented to person, place, and time. She appears well-developed and well-nourished.   HENT:   Head: Normocephalic and atraumatic.   Nose: Mucosal edema, rhinorrhea and sinus tenderness present.   Mouth/Throat: Posterior oropharyngeal erythema present.   Eyes: Conjunctivae and EOM are normal. Pupils are equal, round, and reactive to light.   Neck: Normal range of motion.   Cardiovascular: Normal rate, regular rhythm and normal heart sounds.    Pulmonary/Chest: Effort normal and breath sounds normal. She has no wheezes. She has no rales.   Neurological: She is alert and  oriented to person, place, and time.       Assessment:       1. Acute pansinusitis, recurrence not specified    2. Fever and chills        Plan:         Acute pansinusitis, recurrence not specified  -     betamethasone acetate-betamethasone sodium phosphate injection 6 mg; Inject 1 mL (6 mg total) into the muscle one time.  -     amoxicillin-clavulanate 875-125mg (AUGMENTIN) 875-125 mg per tablet; Take 1 tablet by mouth 2 (two) times daily.  Dispense: 20 tablet; Refill: 0  -     cetirizine (ZYRTEC) 10 MG tablet; Take 1 tablet (10 mg total) by mouth once daily.  Dispense: 30 tablet; Refill: 0    Fever and chills  -     POCT Influenza A/B    Other orders  -     (Magic mouthwash) 1:1:1 Benadryl 12.5mg/5ml liq, aluminum & magnesium hydroxide-simehticone (Maalox), lidocaine viscous 2%; Swish and spit 10 mLs every 4 (four) hours as needed. for mouth sores  Dispense: 180 mL; Refill: 0        Consider adding over-the-counter PROBIOTICS to decrease some side-effects associated with antibiotics. When a person takes antibiotics, both the harmful bacteria and the beneficial bacteria are killed. A reduction of beneficial bacteria can lead to digestive problems, such as diarrhea, yeast infections and urinary tract infections.        Patient Instructions     Sinusitis (Antibiotic Treatment)    The sinuses are air-filled spaces within the bones of the face. They connect to the inside of the nose. Sinusitis is an inflammation of the tissue lining the sinus cavity. Sinus inflammation can occur during a cold. It can also be due to allergies to pollens and other particles in the air. Sinusitis can cause symptoms of sinus congestion and fullness. A sinus infection causes fever, headache and facial pain. There is often green or yellow drainage from the nose or into the back of the throat (post-nasal drip). You have been given antibiotics to treat this condition.  Home care:  · Take the full course of antibiotics as instructed. Do not  stop taking them, even if you feel better.  · Drink plenty of water, hot tea, and other liquids. This may help thin mucus. It also may promote sinus drainage.  · Heat may help soothe painful areas of the face. Use a towel soaked in hot water. Or,  the shower and direct the hot spray onto your face. Using a vaporizer along with a menthol rub at night may also help.   · An expectorant containing guaifenesin may help thin the mucus and promote drainage from the sinuses.  · Over-the-counter decongestants may be used unless a similar medicine was prescribed. Nasal sprays work the fastest. Use one that contains phenylephrine or oxymetazoline. First blow the nose gently. Then use the spray. Do not use these medicines more often than directed on the label or symptoms may get worse. You may also use tablets containing pseudoephedrine. Avoid products that combine ingredients, because side effects may be increased. Read labels. You can also ask the pharmacist for help. (NOTE: Persons with high blood pressure should not use decongestants. They can raise blood pressure.)  · Over-the-counter antihistamines may help if allergies contributed to your sinusitis.    · Do not use nasal rinses or irrigation during an acute sinus infection, unless told to by your health care provider. Rinsing may spread the infection to other sinuses.  · Use acetaminophen or ibuprofen to control pain, unless another pain medicine was prescribed. (If you have chronic liver or kidney disease or ever had a stomach ulcer, talk with your doctor before using these medicines. Aspirin should never be used in anyone under 18 years of age who is ill with a fever. It may cause severe liver damage.)  · Don't smoke. This can worsen symptoms.  Follow-up care  Follow up with your healthcare provider or our staff if you are not improving within the next week.  When to seek medical advice  Call your healthcare provider if any of these occur:  · Facial pain or  headache becoming more severe  · Stiff neck  · Unusual drowsiness or confusion  · Swelling of the forehead or eyelids  · Vision problems, including blurred or double vision  · Fever of 100.4ºF (38ºC) or higher, or as directed by your healthcare provider  · Seizure  · Breathing problems  · Symptoms not resolving within 10 days  Date Last Reviewed: 4/13/2015  © 0076-5740 [employer]. [address] [place] [address], [place] [address]. All rights reserved. This information is not intended as a substitute for professional medical care. Always follow your healthcare professional's instructions.

## 2018-04-13 DIAGNOSIS — K21.9 GASTROESOPHAGEAL REFLUX DISEASE WITHOUT ESOPHAGITIS: ICD-10-CM

## 2018-04-13 DIAGNOSIS — E11.9 DIABETES MELLITUS WITHOUT COMPLICATION: ICD-10-CM

## 2018-04-13 RX ORDER — OMEPRAZOLE 40 MG/1
40 CAPSULE, DELAYED RELEASE ORAL DAILY
Qty: 90 CAPSULE | Refills: 2 | Status: SHIPPED | OUTPATIENT
Start: 2018-04-13 | End: 2018-04-18 | Stop reason: SDUPTHER

## 2018-04-18 DIAGNOSIS — K21.9 GASTROESOPHAGEAL REFLUX DISEASE WITHOUT ESOPHAGITIS: ICD-10-CM

## 2018-04-18 RX ORDER — OMEPRAZOLE 40 MG/1
40 CAPSULE, DELAYED RELEASE ORAL DAILY
Qty: 90 CAPSULE | Refills: 0 | Status: SHIPPED | OUTPATIENT
Start: 2018-04-18 | End: 2019-01-11 | Stop reason: SDUPTHER

## 2018-06-13 ENCOUNTER — PATIENT MESSAGE (OUTPATIENT)
Dept: FAMILY MEDICINE | Facility: CLINIC | Age: 48
End: 2018-06-13

## 2018-06-13 DIAGNOSIS — Z12.11 SPECIAL SCREENING FOR MALIGNANT NEOPLASMS, COLON: Primary | ICD-10-CM

## 2018-06-13 DIAGNOSIS — Z12.31 SCREENING MAMMOGRAM, ENCOUNTER FOR: Primary | ICD-10-CM

## 2018-06-15 DIAGNOSIS — Z12.11 SPECIAL SCREENING FOR MALIGNANT NEOPLASMS, COLON: Primary | ICD-10-CM

## 2018-06-15 RX ORDER — POLYETHYLENE GLYCOL 3350, SODIUM SULFATE ANHYDROUS, SODIUM BICARBONATE, SODIUM CHLORIDE, POTASSIUM CHLORIDE 236; 22.74; 6.74; 5.86; 2.97 G/4L; G/4L; G/4L; G/4L; G/4L
4 POWDER, FOR SOLUTION ORAL ONCE
Qty: 4000 ML | Refills: 0 | Status: SHIPPED | OUTPATIENT
Start: 2018-06-15 | End: 2018-06-15

## 2018-06-20 ENCOUNTER — ANESTHESIA EVENT (OUTPATIENT)
Dept: ENDOSCOPY | Facility: HOSPITAL | Age: 48
End: 2018-06-20
Payer: COMMERCIAL

## 2018-06-21 ENCOUNTER — ANESTHESIA (OUTPATIENT)
Dept: ENDOSCOPY | Facility: HOSPITAL | Age: 48
End: 2018-06-21
Payer: COMMERCIAL

## 2018-06-21 ENCOUNTER — SURGERY (OUTPATIENT)
Age: 48
End: 2018-06-21

## 2018-06-21 ENCOUNTER — HOSPITAL ENCOUNTER (OUTPATIENT)
Facility: HOSPITAL | Age: 48
Discharge: HOME | End: 2018-06-21
Attending: INTERNAL MEDICINE | Admitting: INTERNAL MEDICINE
Payer: COMMERCIAL

## 2018-06-21 VITALS
SYSTOLIC BLOOD PRESSURE: 144 MMHG | DIASTOLIC BLOOD PRESSURE: 93 MMHG | RESPIRATION RATE: 16 BRPM | OXYGEN SATURATION: 93 % | BODY MASS INDEX: 43.43 KG/M2 | TEMPERATURE: 98 F | HEIGHT: 61 IN | WEIGHT: 230 LBS | HEART RATE: 68 BPM

## 2018-06-21 DIAGNOSIS — Z12.11 ENCOUNTER FOR SCREENING COLONOSCOPY: Primary | ICD-10-CM

## 2018-06-21 LAB — POCT GLUCOSE: 144 MG/DL (ref 70–110)

## 2018-06-21 PROCEDURE — 37000008 HC ANESTHESIA 1ST 15 MINUTES: Performed by: INTERNAL MEDICINE

## 2018-06-21 PROCEDURE — 25000003 PHARM REV CODE 250: Performed by: NURSE ANESTHETIST, CERTIFIED REGISTERED

## 2018-06-21 PROCEDURE — G0121 COLON CA SCRN NOT HI RSK IND: HCPCS | Mod: ,,, | Performed by: INTERNAL MEDICINE

## 2018-06-21 PROCEDURE — 37000009 HC ANESTHESIA EA ADD 15 MINS: Performed by: INTERNAL MEDICINE

## 2018-06-21 PROCEDURE — E9220 PRA ENDO ANESTHESIA: HCPCS | Mod: ,,, | Performed by: NURSE ANESTHETIST, CERTIFIED REGISTERED

## 2018-06-21 PROCEDURE — 63600175 PHARM REV CODE 636 W HCPCS: Performed by: NURSE ANESTHETIST, CERTIFIED REGISTERED

## 2018-06-21 PROCEDURE — G0121 COLON CA SCRN NOT HI RSK IND: HCPCS | Performed by: INTERNAL MEDICINE

## 2018-06-21 RX ORDER — PROPOFOL 10 MG/ML
VIAL (ML) INTRAVENOUS
Status: DISCONTINUED | OUTPATIENT
Start: 2018-06-21 | End: 2018-06-21

## 2018-06-21 RX ORDER — PROPOFOL 10 MG/ML
VIAL (ML) INTRAVENOUS CONTINUOUS PRN
Status: DISCONTINUED | OUTPATIENT
Start: 2018-06-21 | End: 2018-06-21

## 2018-06-21 RX ORDER — SODIUM CHLORIDE 9 MG/ML
INJECTION, SOLUTION INTRAVENOUS CONTINUOUS
Status: DISCONTINUED | OUTPATIENT
Start: 2018-06-21 | End: 2018-06-21 | Stop reason: HOSPADM

## 2018-06-21 RX ORDER — SODIUM CHLORIDE 9 MG/ML
INJECTION, SOLUTION INTRAVENOUS CONTINUOUS PRN
Status: DISCONTINUED | OUTPATIENT
Start: 2018-06-21 | End: 2018-06-21

## 2018-06-21 RX ORDER — LIDOCAINE HCL/PF 100 MG/5ML
SYRINGE (ML) INTRAVENOUS
Status: DISCONTINUED | OUTPATIENT
Start: 2018-06-21 | End: 2018-06-21

## 2018-06-21 RX ADMIN — LIDOCAINE HYDROCHLORIDE 100 MG: 20 INJECTION, SOLUTION INTRAVENOUS at 08:06

## 2018-06-21 RX ADMIN — PROPOFOL 150 MCG/KG/MIN: 10 INJECTION, EMULSION INTRAVENOUS at 08:06

## 2018-06-21 RX ADMIN — SODIUM CHLORIDE: 0.9 INJECTION, SOLUTION INTRAVENOUS at 08:06

## 2018-06-21 RX ADMIN — PROPOFOL 80 MG: 10 INJECTION, EMULSION INTRAVENOUS at 08:06

## 2018-06-21 NOTE — H&P
Short Stay Endoscopy History and Physical    PCP - Brandi Thompson MD  Referring Physician - Maximo Dobson MD  [address] [place] [address]    Procedure - Colonoscopy  ASA - per anesthesia  Mallampati - per anesthesia  History of Anesthesia problems - no  Family history Anesthesia problems -  no   Plan of anesthesia - General    HPI  48 y.o. female  Reason for procedure:  Screening colonoscopy      ROS:  Constitutional: No fevers, chills, No weight loss  CV: No chest pain  Pulm: No cough, No shortness of breath  GI: see HPI    Medical History:  has a past medical history of Diabetes mellitus; Diabetes mellitus, type 2; Eczema; GERD (gastroesophageal reflux disease); Hypertension; Impaired fasting blood sugar; and YSABEL on CPAP.    Surgical History:  has a past surgical history that includes breast reduction; Cholecystectomy; Esophagogastroduodenoscopy (8/18/14); and Hysterectomy (2007).    Family History: family history includes Cancer (age of onset: 58) in her father; Crohn's disease in her sister; Diabetes in her maternal grandmother and mother; Heart disease in her maternal grandmother and mother; Hypertension in her maternal grandmother and mother.. Otherwise no colon cancer, inflammatory bowel disease, or GI malignancies.    Social History:  reports that she has never smoked. She has never used smokeless tobacco. She reports that she does not drink alcohol or use drugs.    Review of patient's allergies indicates:  No Known Allergies    Medications:   Prescriptions Prior to Admission   Medication Sig Dispense Refill Last Dose    amLODIPine (NORVASC) 5 MG tablet Take 1 tablet (5 mg total) by mouth once daily. 90 tablet 2 6/21/2018 at Unknown time    carvedilol (COREG) 12.5 MG tablet Take 1 tablet (12.5 mg total) by mouth 2 (two) times daily with meals. 180 tablet 3 6/21/2018 at Unknown time    cetirizine (ZYRTEC) 10 MG tablet Take 1 tablet (10 mg total) by mouth once daily. 30 tablet 0 Past Month  at Unknown time    dicyclomine (BENTYL) 20 mg tablet TAKE 1 TABLET (20 MG TOTAL) BY MOUTH EVERY 6 (SIX) HOURS. 360 tablet 2 6/20/2018 at Unknown time    ergocalciferol (VITAMIN D2) 50,000 unit Cap Take 1 capsule (50,000 Units total) by mouth twice a week. 24 capsule 2 6/20/2018 at Unknown time    metFORMIN (GLUCOPHAGE-XR) 500 MG 24 hr tablet Take 1 tablet (500 mg total) by mouth every evening. 90 tablet 2 6/20/2018 at Unknown time    omeprazole (PRILOSEC) 40 MG capsule Take 1 capsule (40 mg total) by mouth once daily. 90 capsule 0 6/20/2018 at Unknown time    ondansetron (ZOFRAN) 4 MG tablet Take 1 tablet (4 mg total) by mouth every 8 (eight) hours as needed. 90 tablet 1 Past Month at Unknown time    albuterol 90 mcg/actuation inhaler Inhale 2 puffs into the lungs every 6 (six) hours as needed. 1 Inhaler 0 More than a month at Unknown time    blood sugar diagnostic Strp 100 each by Misc.(Non-Drug; Combo Route) route once daily. Dispense 100 strips and lancets. 300 each 0 Taking    lancets Misc Use to test blood sugar once daily. 300 each 0 Taking    neomycin-polymyxin-dexamethasone (MAXITROL) 3.5mg/mL-10,000 unit/mL-0.1 % DrpS    Taking    nystatin (MYCOSTATIN) ointment Apply topically 2 (two) times daily.     Unknown at Unknown time    triamcinolone acetonide 0.1% (KENALOG) 0.1 % cream Apply topically 2 (two) times daily. 135 g 4 Taking       Physical Exam:    Vital Signs:   Vitals:    06/21/18 0758   BP: (!) 155/90   Pulse: 69   Resp: 18   Temp: 98.1 °F (36.7 °C)       General Appearance: Well appearing in no acute distress  Lungs: CTA anteriorly  Heart:  Regular rate, S1, S2 normal, no murmurs heard.  Abdomen: Soft, non tender, non distended with normal bowel sounds, no masses  Extremities: No edema    Labs:  Lab Results   Component Value Date    WBC 7.97 08/21/2017    HGB 11.3 (L) 08/21/2017    HCT 34.4 (L) 08/21/2017     08/21/2017    CHOL 172 08/21/2017    TRIG 180 (H) 08/21/2017    HDL 43  08/21/2017    ALT 23 02/15/2018    AST 21 02/15/2018     02/15/2018    K 3.8 02/15/2018     02/15/2018    CREATININE 0.9 02/15/2018    BUN 17 02/15/2018    CO2 27 02/15/2018    TSH 2.367 08/21/2017    INR 1.1 10/02/2006    GLUF 101 06/19/2006    HGBA1C 7.3 (H) 02/15/2018       I have explained the risks and benefits of this endoscopic procedure to the patient including but not limited to bleeding, inflammation, infection, perforation, and death.      Miracle Burr MD

## 2018-06-21 NOTE — ANESTHESIA POSTPROCEDURE EVALUATION
"Anesthesia Post Evaluation    Patient: Africa Jennings    Procedure(s) Performed: Procedure(s) (LRB):  COLONOSCOPY (N/A)    Final Anesthesia Type: general  Patient location during evaluation: GI PACU  Patient participation: Yes- Able to Participate  Level of consciousness: awake and alert  Post-procedure vital signs: reviewed and stable  Pain management: adequate  Airway patency: patent  PONV status at discharge: No PONV  Anesthetic complications: no      Cardiovascular status: blood pressure returned to baseline  Respiratory status: unassisted  Hydration status: euvolemic  Follow-up not needed.        Visit Vitals  BP (!) 144/93   Pulse 68   Temp 36.8 °C (98.2 °F) (Temporal)   Resp 16   Ht 5' 1" (1.549 m)   Wt 104.3 kg (230 lb)   SpO2 (!) 93%   Breastfeeding? No   BMI 43.46 kg/m²       Pain/Sophia Score: Pain Assessment Performed: Yes (6/21/2018  9:57 AM)  Presence of Pain: denies (6/21/2018  9:57 AM)  Pain Rating Prior to Med Admin: 0 (6/21/2018  7:53 AM)  Sophia Score: 8 (6/21/2018  9:22 AM)      "

## 2018-06-21 NOTE — ANESTHESIA PREPROCEDURE EVALUATION
Past Medical History:   Diagnosis Date    Diabetes mellitus     Diabetes mellitus, type 2     Eczema     GERD (gastroesophageal reflux disease)     Hypertension     Impaired fasting blood sugar     YSABEL on CPAP      Past Surgical History:   Procedure Laterality Date    breast reduction      CHOLECYSTECTOMY      laprascopic    ESOPHAGOGASTRODUODENOSCOPY  8/18/14    HYSTERECTOMY  2007    laprascopic, total for fibroids.  Dr Polo     Patient Active Problem List   Diagnosis    Essential hypertension    Type 2 diabetes mellitus without complication, without long-term current use of insulin    Obstructive sleep apnea (adult) (pediatric)    Morbid obesity with BMI of 40.0-44.9, adult    Gastroesophageal reflux disease without esophagitis    Pyloric stenosis     2D Echo:  No results found for this or any previous visit.    Please See ROS/PMH and Active Problem List above                                                                                                              06/20/2018  Africa Jennings is a 48 y.o., female.    Anesthesia Evaluation    I have reviewed the Patient Summary Reports.    I have reviewed the Nursing Notes.   I have reviewed the Medications.     Review of Systems  Anesthesia Hx:  No problems with previous Anesthesia   Denies Personal Hx of Anesthesia complications.   Social:  Non-Smoker    Cardiovascular:   Exercise tolerance: good Hypertension Denies MI.  Denies CAD.    Denies CABG/stent.         Pulmonary:   Denies COPD.  Denies Asthma. Sleep Apnea    Renal/:  Renal/ Normal     Hepatic/GI:   No Bowel Prep. PUD, GERD    Neurological:  Neurology Normal    Endocrine:   Diabetes, type 2 Denies Hypothyroidism. Denies Hyperthyroidism.        Physical Exam  General:  Obesity    Airway/Jaw/Neck:  Airway Findings: Mouth Opening: Normal Tongue: Normal  Mallampati: II  TM Distance: Normal, at least 6 cm  Jaw/Neck Findings:  Neck ROM: Normal ROM      Eyes/Ears/Nose:  EYES/EARS/NOSE FINDINGS: Normal   Dental:  Dental Findings: In tact   Chest/Lungs:  Chest/Lungs Clear    Heart/Vascular:  Heart Findings: Normal       Mental Status:  Mental Status Findings: Normal        Anesthesia Plan  Type of Anesthesia, risks & benefits discussed:  Anesthesia Type:  general  Patient's Preference: gen  Intra-op Monitoring Plan: standard ASA monitors  Intra-op Monitoring Plan Comments:   Post Op Pain Control Plan:   Post Op Pain Control Plan Comments: Iv>po  Induction:   IV  Beta Blocker:  Patient is on a Beta-Blocker and has received one dose within the past 24 hours (No further documentation required).       Informed Consent: Patient understands risks and agrees with Anesthesia plan.  Questions answered. Anesthesia consent signed with patient.  ASA Score: 3     Day of Surgery Review of History & Physical:    H&P update referred to the surgeon.         Ready For Surgery From Anesthesia Perspective.

## 2018-06-21 NOTE — TRANSFER OF CARE
"Anesthesia Transfer of Care Note    Patient: Africa Jennings    Procedure(s) Performed: Procedure(s) (LRB):  COLONOSCOPY (N/A)    Patient location: PACU    Anesthesia Type: general    Transport from OR: Transported from OR on room air with adequate spontaneous ventilation    Post pain: adequate analgesia    Post assessment: tolerated procedure well and no apparent anesthetic complications    Post vital signs: stable    Level of consciousness: sedated and responds to stimulation    Nausea/Vomiting: no nausea/vomiting    Complications: none    Transfer of care protocol was followed      Last vitals:   Visit Vitals  BP (!) 155/90 (BP Location: Left arm, Patient Position: Lying)   Pulse 69   Temp 36.7 °C (98.1 °F) (Temporal)   Resp 18   Ht 5' 1" (1.549 m)   Wt 104.3 kg (230 lb)   SpO2 97%   Breastfeeding? No   BMI 43.46 kg/m²     "

## 2018-06-21 NOTE — [employer] PATIENT INSTRUCTIONS
Discharge Summary/Instructions after an Endoscopic Procedure  Patient Name: Africa Jennings  Patient MRN: [id number]  Patient YOB: 1970 Thursday, June 21, 2018  Miracle Burr MD  RESTRICTIONS:  During your procedure today, you received medications for sedation.  These   medications may affect your judgment, balance and coordination.  Therefore,   for 24 hours, you have the following restrictions:   - DO NOT drive a car, operate machinery, make legal/financial decisions,   sign important papers or drink alcohol.    ACTIVITY:  Today: no heavy lifting, straining or running due to procedural   sedation/anesthesia.  The following day: return to full activity including work.  DIET:  Eat and drink normally unless instructed otherwise.     TREATMENT FOR COMMON SIDE EFFECTS:  - Mild abdominal pain, nausea, belching, bloating or excessive gas:  rest,   eat lightly and use a heating pad.  - Sore Throat: treat with throat lozenges and/or gargle with warm salt   water.  - Because air was used during the procedure, expelling large amounts of air   from your rectum or belching is normal.  - If a bowel prep was taken, you may not have a bowel movement for 1-3 days.    This is normal.  SYMPTOMS TO WATCH FOR AND REPORT TO YOUR PHYSICIAN:  1. Abdominal pain or bloating, other than gas cramps.  2. Chest pain.  3. Back pain.  4. Signs of infection such as: chills or fever occurring within 24 hours   after the procedure.  5. Rectal bleeding, which would show as bright red, maroon, or black stools.   (A tablespoon of blood from the rectum is not serious, especially if   hemorrhoids are present.)  6. Vomiting.  7. Weakness or dizziness.  GO DIRECTLY TO THE NEAREST EMERGENCY ROOM IF YOU HAVE ANY OF THE FOLLOWING:      Difficulty breathing              Chills and/or fever over 101 F   Persistent vomiting and/or vomiting blood   Severe abdominal pain   Severe chest pain   Black, tarry stools   Bleeding- more than one  tablespoon   Any other symptom or condition that you feel may need urgent attention  Your doctor recommends these additional instructions:  If any biopsies were taken, your doctors clinic will contact you in 1 to 2   weeks with any results.  - Discharge patient to home.   - Patient has a contact number available for emergencies.  The signs and   symptoms of potential delayed complications were discussed with the   patient.  Return to normal activities tomorrow.  Written discharge   instructions were provided to the patient.   - Resume previous diet.   - Continue present medications.   - Repeat colonoscopy at appointment to be scheduled with advanced   endosopists on 2nd floor  For questions, problems or results please call your physician - Miracle Burr MD at Work:  [phone].  [facility], EMERGENCY ROOM PHONE NUMBER: [phone]  IF A COMPLICATION OR EMERGENCY SITUATION ARISES AND YOU ARE UNABLE TO REACH   YOUR PHYSICIAN - GO DIRECTLY TO THE EMERGENCY ROOM.  Miracle Burr MD  6/21/2018 9:29:31 AM  This report has been verified and signed electronically.  [employer]

## 2018-06-21 NOTE — DISCHARGE INSTRUCTIONS

## 2018-06-25 ENCOUNTER — HOSPITAL ENCOUNTER (OUTPATIENT)
Dept: RADIOLOGY | Facility: HOSPITAL | Age: 48
Discharge: HOME | End: 2018-06-25
Attending: FAMILY MEDICINE
Payer: COMMERCIAL

## 2018-06-25 VITALS — WEIGHT: 230 LBS | BODY MASS INDEX: 43.43 KG/M2 | HEIGHT: 61 IN

## 2018-06-25 DIAGNOSIS — Z12.31 SCREENING MAMMOGRAM, ENCOUNTER FOR: ICD-10-CM

## 2018-06-25 PROCEDURE — 77067 SCR MAMMO BI INCL CAD: CPT | Mod: TC,PO

## 2018-06-25 PROCEDURE — 77067 SCR MAMMO BI INCL CAD: CPT | Mod: 26,,, | Performed by: RADIOLOGY

## 2018-06-25 PROCEDURE — 77063 BREAST TOMOSYNTHESIS BI: CPT | Mod: 26,,, | Performed by: RADIOLOGY

## 2018-06-28 ENCOUNTER — TELEPHONE (OUTPATIENT)
Dept: ENDOSCOPY | Facility: HOSPITAL | Age: 48
End: 2018-06-28

## 2018-08-23 ENCOUNTER — OFFICE VISIT (OUTPATIENT)
Dept: URGENT CARE | Facility: CLINIC | Age: 48
End: 2018-08-23
Payer: COMMERCIAL

## 2018-08-23 VITALS
HEIGHT: 61 IN | SYSTOLIC BLOOD PRESSURE: 140 MMHG | DIASTOLIC BLOOD PRESSURE: 80 MMHG | TEMPERATURE: 97 F | BODY MASS INDEX: 43.43 KG/M2 | WEIGHT: 230 LBS | HEART RATE: 78 BPM | OXYGEN SATURATION: 98 %

## 2018-08-23 DIAGNOSIS — M54.42 ACUTE LEFT-SIDED LOW BACK PAIN WITH LEFT-SIDED SCIATICA: Primary | ICD-10-CM

## 2018-08-23 PROCEDURE — 96372 THER/PROPH/DIAG INJ SC/IM: CPT | Mod: S$GLB,,, | Performed by: SURGERY

## 2018-08-23 PROCEDURE — 99214 OFFICE O/P EST MOD 30 MIN: CPT | Mod: 25,S$GLB,, | Performed by: SURGERY

## 2018-08-23 PROCEDURE — 3079F DIAST BP 80-89 MM HG: CPT | Mod: CPTII,S$GLB,, | Performed by: SURGERY

## 2018-08-23 PROCEDURE — 3077F SYST BP >= 140 MM HG: CPT | Mod: CPTII,S$GLB,, | Performed by: SURGERY

## 2018-08-23 RX ORDER — METHYLPREDNISOLONE 4 MG/1
TABLET ORAL
Qty: 1 PACKAGE | Refills: 0 | Status: SHIPPED | OUTPATIENT
Start: 2018-08-24 | End: 2019-01-11

## 2018-08-23 RX ORDER — DEXAMETHASONE SODIUM PHOSPHATE 100 MG/10ML
10 INJECTION INTRAMUSCULAR; INTRAVENOUS ONCE
Status: COMPLETED | OUTPATIENT
Start: 2018-08-23 | End: 2018-08-23

## 2018-08-23 RX ORDER — CYCLOBENZAPRINE HCL 10 MG
10 TABLET ORAL 3 TIMES DAILY PRN
Qty: 21 TABLET | Refills: 0 | Status: SHIPPED | OUTPATIENT
Start: 2018-08-23 | End: 2018-08-30

## 2018-08-23 RX ADMIN — DEXAMETHASONE SODIUM PHOSPHATE 10 MG: 100 INJECTION INTRAMUSCULAR; INTRAVENOUS at 05:08

## 2018-08-23 NOTE — PROGRESS NOTES
"Subjective:       Patient ID: Africa Jennings is a 48 y.o. female.    Vitals:  height is 5' 1" (1.549 m) and weight is 104.3 kg (230 lb). Her temperature is 97.2 °F (36.2 °C). Her blood pressure is 140/80 (abnormal) and her pulse is 78. Her oxygen saturation is 98%.     Chief Complaint: Back Pain (hx of Buldging disc L4-L5)    Pt reports of having low back pain radiates to left leg c/o numbness and tingling       Back Pain   This is a chronic problem. Episode onset: 5 days. The problem has been gradually worsening since onset. The quality of the pain is described as aching and shooting. Radiates to: left leg. The pain is at a severity of 10/10. The pain is severe. The pain is worse during the night. The symptoms are aggravated by lying down, bending, standing and sitting. Stiffness is present all day. Associated symptoms include leg pain, numbness and tingling. Pertinent negatives include no abdominal pain, chest pain, fever or headaches. She has tried ice and NSAIDs (warm soaks, aleve) for the symptoms. The treatment provided no relief.     Review of Systems   Constitution: Negative for chills and fever.   HENT: Negative for sore throat.    Eyes: Negative for blurred vision.   Cardiovascular: Negative for chest pain.   Respiratory: Negative for shortness of breath.    Skin: Negative for rash.   Musculoskeletal: Positive for back pain and stiffness. Negative for joint pain.   Gastrointestinal: Negative for abdominal pain, diarrhea, nausea and vomiting.   Neurological: Positive for numbness and tingling. Negative for headaches.   Psychiatric/Behavioral: The patient is not nervous/anxious.        Objective:      Physical Exam   Constitutional: She is oriented to person, place, and time. Vital signs are normal. She appears well-developed and well-nourished. She is active and cooperative. No distress.   HENT:   Head: Normocephalic and atraumatic.   Nose: Nose normal.   Mouth/Throat: Oropharynx is clear and " moist and mucous membranes are normal.   Eyes: Conjunctivae and lids are normal.   Neck: Trachea normal, normal range of motion, full passive range of motion without pain and phonation normal. Neck supple.   Cardiovascular: Normal rate, regular rhythm, normal heart sounds, intact distal pulses and normal pulses.   Pulmonary/Chest: Effort normal and breath sounds normal.   Abdominal: Soft. Normal appearance and bowel sounds are normal. She exhibits no abdominal bruit, no pulsatile midline mass and no mass.   Musculoskeletal: She exhibits no edema or deformity.        Lumbar back: She exhibits decreased range of motion, tenderness, pain and spasm.        Back:    Left low back pain and positive SLR left   Neurological: She is alert and oriented to person, place, and time. She has normal strength and normal reflexes. No sensory deficit.   Skin: Skin is warm, dry and intact. She is not diaphoretic.   Psychiatric: She has a normal mood and affect. Her speech is normal and behavior is normal. Judgment and thought content normal. Cognition and memory are normal.   Nursing note and vitals reviewed.      Assessment:       1. Acute left-sided low back pain with left-sided sciatica        Plan:         Acute left-sided low back pain with left-sided sciatica  -     dexamethasone injection 10 mg; Inject 1 mL (10 mg total) into the muscle once.  -     methylPREDNISolone (MEDROL DOSEPACK) 4 mg tablet; use as directed  Dispense: 1 Package; Refill: 0  -     cyclobenzaprine (FLEXERIL) 10 MG tablet; Take 1 tablet (10 mg total) by mouth 3 (three) times daily as needed for Muscle spasms.  Dispense: 21 tablet; Refill: 0      Patient Instructions       Self-Care for Low Back Pain    Most people have low back pain now and then. In many cases, it isnt serious and self-care can help. Sometimes low back pain can be a sign of a bigger problem. Call your healthcare provider if your pain returns often or gets worse over time. For the long-term  care of your back, get regular exercise, lose any excess weight and learn good posture.  Take a short rest  Lying down during the day may be beneficial for short periods of time if severe pain increases with sitting or standing. Long-term bed rest could be detrimental.  Reduce pain and swelling  Cold reduces swelling. Both cold and heat can reduce pain. Protect your skin by placing a towel between your body and the ice or heat source.  · For the first few days, apply an ice pack for 15 to 20 minutes .  · After the first few days, try heat for 15 minutes at a time to ease pain. Never sleep on a heating pad.  · Over-the-counter medicine can help control pain and swelling. Try aspirin or ibuprofen.  Exercise  Exercise can help your back heal. It also helps your back get stronger and more flexible, preventing any reinjury. Ask your healthcare provider about specific exercises for your back.  Use good posture to avoid reinjury  · When moving, bend at the hips and knees. Dont bend at the waist or twist around.  · When lifting, keep the object close to your body. Dont try to lift more than you can handle.  · When sitting, keep your lower back supported. Use a rolled-up towel as needed.  Seek immediate medical care if:  · Youre unable to stand or walk.  · You have a temperature over 100.4°F (38.0°C)  · You have frequent, painful, or bloody urination.  · You have severe abdominal pain.  · You have a sharp, stabbing pain.  · Your pain is constant.  · You have pain or numbness in your leg.  · You feel pain in a new area of your back.  · You notice that the pain isnt decreasing after more than a week.   Date Last Reviewed: 9/29/2015  © 1308-1535 [employer]. [address] [place] [address], [place] [address]. All rights reserved. This information is not intended as a substitute for professional medical care. Always follow your healthcare professional's instructions.

## 2018-08-23 NOTE — PATIENT INSTRUCTIONS
Self-Care for Low Back Pain    Most people have low back pain now and then. In many cases, it isnt serious and self-care can help. Sometimes low back pain can be a sign of a bigger problem. Call your healthcare provider if your pain returns often or gets worse over time. For the long-term care of your back, get regular exercise, lose any excess weight and learn good posture.  Take a short rest  Lying down during the day may be beneficial for short periods of time if severe pain increases with sitting or standing. Long-term bed rest could be detrimental.  Reduce pain and swelling  Cold reduces swelling. Both cold and heat can reduce pain. Protect your skin by placing a towel between your body and the ice or heat source.  · For the first few days, apply an ice pack for 15 to 20 minutes .  · After the first few days, try heat for 15 minutes at a time to ease pain. Never sleep on a heating pad.  · Over-the-counter medicine can help control pain and swelling. Try aspirin or ibuprofen.  Exercise  Exercise can help your back heal. It also helps your back get stronger and more flexible, preventing any reinjury. Ask your healthcare provider about specific exercises for your back.  Use good posture to avoid reinjury  · When moving, bend at the hips and knees. Dont bend at the waist or twist around.  · When lifting, keep the object close to your body. Dont try to lift more than you can handle.  · When sitting, keep your lower back supported. Use a rolled-up towel as needed.  Seek immediate medical care if:  · Youre unable to stand or walk.  · You have a temperature over 100.4°F (38.0°C)  · You have frequent, painful, or bloody urination.  · You have severe abdominal pain.  · You have a sharp, stabbing pain.  · Your pain is constant.  · You have pain or numbness in your leg.  · You feel pain in a new area of your back.  · You notice that the pain isnt decreasing after more than a week.   Date Last Reviewed: 9/29/2015  ©  0230-8162 [employer]. [address] [place] [address], [place] [address]. All rights reserved. This information is not intended as a substitute for professional medical care. Always follow your healthcare professional's instructions.

## 2018-08-24 DIAGNOSIS — E11.9 TYPE 2 DIABETES MELLITUS WITHOUT COMPLICATION: ICD-10-CM

## 2018-10-28 DIAGNOSIS — E55.9 VITAMIN D DEFICIENCY: ICD-10-CM

## 2018-10-29 RX ORDER — ERGOCALCIFEROL 1.25 MG/1
CAPSULE ORAL
Qty: 24 CAPSULE | Refills: 2 | Status: SHIPPED | OUTPATIENT
Start: 2018-10-29 | End: 2019-01-11 | Stop reason: SDUPTHER

## 2018-11-02 DIAGNOSIS — E11.9 TYPE 2 DIABETES MELLITUS WITHOUT COMPLICATION: ICD-10-CM

## 2018-11-05 ENCOUNTER — DOCUMENTATION ONLY (OUTPATIENT)
Dept: BARIATRICS | Facility: CLINIC | Age: 48
End: 2018-11-05

## 2018-12-18 ENCOUNTER — DOCUMENTATION ONLY (OUTPATIENT)
Dept: BARIATRICS | Facility: CLINIC | Age: 48
End: 2018-12-18

## 2019-01-11 ENCOUNTER — OFFICE VISIT (OUTPATIENT)
Dept: FAMILY MEDICINE | Facility: CLINIC | Age: 49
End: 2019-01-11
Payer: COMMERCIAL

## 2019-01-11 VITALS
BODY MASS INDEX: 41.5 KG/M2 | WEIGHT: 219.81 LBS | HEART RATE: 101 BPM | TEMPERATURE: 98 F | OXYGEN SATURATION: 96 % | SYSTOLIC BLOOD PRESSURE: 120 MMHG | HEIGHT: 61 IN | DIASTOLIC BLOOD PRESSURE: 80 MMHG

## 2019-01-11 DIAGNOSIS — E11.9 TYPE 2 DIABETES MELLITUS WITHOUT COMPLICATION, WITHOUT LONG-TERM CURRENT USE OF INSULIN: ICD-10-CM

## 2019-01-11 DIAGNOSIS — E66.01 MORBID OBESITY WITH BMI OF 40.0-44.9, ADULT: Chronic | ICD-10-CM

## 2019-01-11 DIAGNOSIS — Z23 NEED FOR PROPHYLACTIC VACCINATION AND INOCULATION AGAINST INFLUENZA: ICD-10-CM

## 2019-01-11 DIAGNOSIS — Z23 NEED FOR VACCINATION FOR PNEUMOCOCCUS: ICD-10-CM

## 2019-01-11 DIAGNOSIS — E55.9 VITAMIN D DEFICIENCY: ICD-10-CM

## 2019-01-11 DIAGNOSIS — J01.40 ACUTE PANSINUSITIS, RECURRENCE NOT SPECIFIED: ICD-10-CM

## 2019-01-11 DIAGNOSIS — I10 ESSENTIAL HYPERTENSION: ICD-10-CM

## 2019-01-11 DIAGNOSIS — Z00.00 ANNUAL PHYSICAL EXAM: Primary | ICD-10-CM

## 2019-01-11 DIAGNOSIS — K21.9 GASTROESOPHAGEAL REFLUX DISEASE WITHOUT ESOPHAGITIS: ICD-10-CM

## 2019-01-11 PROCEDURE — 99396 PR PREVENTIVE VISIT,EST,40-64: ICD-10-PCS | Mod: 25,S$GLB,, | Performed by: FAMILY MEDICINE

## 2019-01-11 PROCEDURE — 90732 PNEUMOCOCCAL POLYSACCHARIDE VACCINE 23-VALENT =>2YO SQ IM: ICD-10-PCS | Mod: S$GLB,,, | Performed by: FAMILY MEDICINE

## 2019-01-11 PROCEDURE — 99999 PR PBB SHADOW E&M-EST. PATIENT-LVL III: CPT | Mod: PBBFAC,,, | Performed by: FAMILY MEDICINE

## 2019-01-11 PROCEDURE — 3079F DIAST BP 80-89 MM HG: CPT | Mod: CPTII,S$GLB,, | Performed by: FAMILY MEDICINE

## 2019-01-11 PROCEDURE — 99396 PREV VISIT EST AGE 40-64: CPT | Mod: 25,S$GLB,, | Performed by: FAMILY MEDICINE

## 2019-01-11 PROCEDURE — 90686 FLU VACCINE (QUAD) GREATER THAN OR EQUAL TO 3YO PRESERVATIVE FREE IM: ICD-10-PCS | Mod: S$GLB,,, | Performed by: FAMILY MEDICINE

## 2019-01-11 PROCEDURE — 3045F PR MOST RECENT HEMOGLOBIN A1C LEVEL 7.0-9.0%: CPT | Mod: CPTII,S$GLB,, | Performed by: FAMILY MEDICINE

## 2019-01-11 PROCEDURE — 90471 IMMUNIZATION ADMIN: CPT | Mod: S$GLB,,, | Performed by: FAMILY MEDICINE

## 2019-01-11 PROCEDURE — 90472 IMMUNIZATION ADMIN EACH ADD: CPT | Mod: S$GLB,,, | Performed by: FAMILY MEDICINE

## 2019-01-11 PROCEDURE — 3045F PR MOST RECENT HEMOGLOBIN A1C LEVEL 7.0-9.0%: ICD-10-PCS | Mod: CPTII,S$GLB,, | Performed by: FAMILY MEDICINE

## 2019-01-11 PROCEDURE — 90732 PPSV23 VACC 2 YRS+ SUBQ/IM: CPT | Mod: S$GLB,,, | Performed by: FAMILY MEDICINE

## 2019-01-11 PROCEDURE — 99999 PR PBB SHADOW E&M-EST. PATIENT-LVL III: ICD-10-PCS | Mod: PBBFAC,,, | Performed by: FAMILY MEDICINE

## 2019-01-11 PROCEDURE — 90471 FLU VACCINE (QUAD) GREATER THAN OR EQUAL TO 3YO PRESERVATIVE FREE IM: ICD-10-PCS | Mod: S$GLB,,, | Performed by: FAMILY MEDICINE

## 2019-01-11 PROCEDURE — 90472 PNEUMOCOCCAL POLYSACCHARIDE VACCINE 23-VALENT =>2YO SQ IM: ICD-10-PCS | Mod: S$GLB,,, | Performed by: FAMILY MEDICINE

## 2019-01-11 PROCEDURE — 3079F PR MOST RECENT DIASTOLIC BLOOD PRESSURE 80-89 MM HG: ICD-10-PCS | Mod: CPTII,S$GLB,, | Performed by: FAMILY MEDICINE

## 2019-01-11 PROCEDURE — 3074F SYST BP LT 130 MM HG: CPT | Mod: CPTII,S$GLB,, | Performed by: FAMILY MEDICINE

## 2019-01-11 PROCEDURE — 3074F PR MOST RECENT SYSTOLIC BLOOD PRESSURE < 130 MM HG: ICD-10-PCS | Mod: CPTII,S$GLB,, | Performed by: FAMILY MEDICINE

## 2019-01-11 PROCEDURE — 90686 IIV4 VACC NO PRSV 0.5 ML IM: CPT | Mod: S$GLB,,, | Performed by: FAMILY MEDICINE

## 2019-01-11 RX ORDER — LANCETS
EACH MISCELLANEOUS
Qty: 300 EACH | Refills: 0 | Status: SHIPPED | OUTPATIENT
Start: 2019-01-11 | End: 2023-10-12

## 2019-01-11 RX ORDER — CETIRIZINE HYDROCHLORIDE 10 MG/1
10 TABLET ORAL DAILY
Qty: 90 TABLET | Refills: 0 | Status: SHIPPED | OUTPATIENT
Start: 2019-01-11 | End: 2019-04-08 | Stop reason: SDUPTHER

## 2019-01-11 RX ORDER — METFORMIN HYDROCHLORIDE 500 MG/1
500 TABLET, EXTENDED RELEASE ORAL NIGHTLY
Qty: 90 TABLET | Refills: 2 | Status: SHIPPED | OUTPATIENT
Start: 2019-01-11 | End: 2019-09-30 | Stop reason: SDUPTHER

## 2019-01-11 RX ORDER — CARVEDILOL 12.5 MG/1
12.5 TABLET ORAL 2 TIMES DAILY WITH MEALS
Qty: 180 TABLET | Refills: 3 | Status: SHIPPED | OUTPATIENT
Start: 2019-01-11 | End: 2020-01-03

## 2019-01-11 RX ORDER — OMEPRAZOLE 40 MG/1
40 CAPSULE, DELAYED RELEASE ORAL DAILY
Qty: 90 CAPSULE | Refills: 0 | Status: SHIPPED | OUTPATIENT
Start: 2019-01-11 | End: 2019-07-04

## 2019-01-11 RX ORDER — ERGOCALCIFEROL 1.25 MG/1
CAPSULE ORAL
Qty: 24 CAPSULE | Refills: 2 | Status: SHIPPED | OUTPATIENT
Start: 2019-01-11 | End: 2020-03-23

## 2019-01-11 RX ORDER — AMLODIPINE BESYLATE 5 MG/1
5 TABLET ORAL DAILY
Qty: 90 TABLET | Refills: 2 | Status: SHIPPED | OUTPATIENT
Start: 2019-01-11 | End: 2020-01-03

## 2019-01-11 RX ORDER — ONDANSETRON 4 MG/1
4 TABLET, FILM COATED ORAL EVERY 8 HOURS PRN
Qty: 90 TABLET | Refills: 1 | Status: SHIPPED | OUTPATIENT
Start: 2019-01-11 | End: 2022-02-22 | Stop reason: SDUPTHER

## 2019-01-11 NOTE — PROGRESS NOTES
Patient received Pneumococcal 23 vaccine and flu vaccine tolerated it well. Patient received VIS information.

## 2019-01-11 NOTE — PROGRESS NOTES
Office Visit for Annual Exam    Patient Name: Africa Jennings    : 1970  MRN: [id number]    Subjective:  Africa is a 48 y.o. female who presents today for     1. Annual physical   2. Diabetes follow-up - pt has been working on her diet. She eats mainly fish and vegetables. She tries to exercise regularly especially when the weather is nice outside. She has been taking her medications regularly and reports no adverse side effects. She has lost ~10 pounds since starting her diet and she plans on continuing her lifestyle modifications. No issues/complaints. She is requesting prescription refills     Review of Systems   Constitutional: Negative for chills and fever.   HENT: Negative for congestion.    Eyes: Negative for blurred vision.   Respiratory: Negative for cough.    Cardiovascular: Negative for chest pain.   Gastrointestinal: Negative for abdominal pain, constipation, diarrhea, heartburn, nausea and vomiting.   Genitourinary: Negative for dysuria.   Musculoskeletal: Negative for myalgias.   Skin: Negative for itching and rash.   Neurological: Negative for dizziness and headaches.   Psychiatric/Behavioral: Negative for depression.       Active Problem List  Patient Active Problem List   Diagnosis    Essential hypertension    Type 2 diabetes mellitus without complication, without long-term current use of insulin    Obstructive sleep apnea (adult) (pediatric)    Morbid obesity with BMI of 40.0-44.9, adult    Gastroesophageal reflux disease without esophagitis    Pyloric stenosis    Encounter for screening colonoscopy       Past Surgical History  Past Surgical History:   Procedure Laterality Date    breast reduction      CHOLECYSTECTOMY      laprascopic    ESOPHAGOGASTRODUODENOSCOPY  14    ESOPHAGOGASTRODUODENOSCOPY (EGD) N/A 2014    Performed by Rufino Hough MD at [facility] ([address] [place] [address])    ESOPHAGOGASTRODUODENOSCOPY (EGD) N/A 2014    Performed by Rufino Hough,  MD at [facility] (4TH FLR)    HYSTERECTOMY  2007    laprascopic, total for fibroids.  Dr Polo    OOPHORECTOMY      TOTAL REDUCTION MAMMOPLASTY         Family History  Family History   Problem Relation Age of Onset    Diabetes Maternal Grandmother     Heart disease Maternal Grandmother     Hypertension Maternal Grandmother     Crohn's disease Sister     Diabetes Mother     Hypertension Mother     Heart disease Mother     Cancer Father 58        Prostate    Breast cancer Sister     Amblyopia Neg Hx     Blindness Neg Hx     Cataracts Neg Hx     Glaucoma Neg Hx     Macular degeneration Neg Hx     Retinal detachment Neg Hx     Strabismus Neg Hx        Social History  Social History     Socioeconomic History    Marital status:      Spouse name: Not on file    Number of children: Not on file    Years of education: Not on file    Highest education level: Not on file   Social Needs    Financial resource strain: Not on file    Food insecurity - worry: Not on file    Food insecurity - inability: Not on file    Transportation needs - medical: Not on file    Transportation needs - non-medical: Not on file   Occupational History    Not on file   Tobacco Use    Smoking status: Never Smoker    Smokeless tobacco: Never Used   Substance and Sexual Activity    Alcohol use: No     Comment: socially    Drug use: No    Sexual activity: Yes     Partners: Male     Birth control/protection: Surgical   Other Topics Concern    Not on file   Social History Narrative     since 2000.  2 children: 26 and 21.    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   Current Outpatient Medications   Medication Sig    amLODIPine (NORVASC) 5 MG tablet Take 1 tablet (5 mg total) by mouth once daily.    blood sugar diagnostic Strp 100 each by Misc.(Non-Drug; Combo Route) route once daily. Dispense 100 strips and lancets.     "carvedilol (COREG) 12.5 MG tablet Take 1 tablet (12.5 mg total) by mouth 2 (two) times daily with meals.    cetirizine (ZYRTEC) 10 MG tablet Take 1 tablet (10 mg total) by mouth once daily.    dicyclomine (BENTYL) 20 mg tablet TAKE 1 TABLET (20 MG TOTAL) BY MOUTH EVERY 6 (SIX) HOURS.    ergocalciferol (VITAMIN D2) 50,000 unit Cap TAKE 1 CAPSULE (50,000 UNITS TOTAL) BY MOUTH TWICE A WEEK.    lancets Misc Use to test blood sugar once daily.    metFORMIN (GLUCOPHAGE-XR) 500 MG 24 hr tablet Take 1 tablet (500 mg total) by mouth every evening.    neomycin-polymyxin-dexamethasone (MAXITROL) 3.5mg/mL-10,000 unit/mL-0.1 % DrpS     nystatin (MYCOSTATIN) ointment Apply topically 2 (two) times daily.      omeprazole (PRILOSEC) 40 MG capsule Take 1 capsule (40 mg total) by mouth once daily.    ondansetron (ZOFRAN) 4 MG tablet Take 1 tablet (4 mg total) by mouth every 8 (eight) hours as needed.    triamcinolone acetonide 0.1% (KENALOG) 0.1 % cream Apply topically 2 (two) times daily.     No current facility-administered medications for this visit.        Physical Exam  /80 (BP Location: Right arm, Patient Position: Sitting, BP Method: Medium (Manual))   Pulse 101   Temp 98.1 °F (36.7 °C) (Oral)   Ht 5' 1" (1.549 m)   Wt 99.7 kg (219 lb 12.8 oz)   SpO2 96%   BMI 41.53 kg/m²   Physical Exam   Constitutional: She is oriented to person, place, and time. She appears well-developed and well-nourished.   HENT:   Head: Normocephalic and atraumatic.   Right Ear: Hearing, tympanic membrane, external ear and ear canal normal.   Left Ear: Hearing, tympanic membrane, external ear and ear canal normal.   Nose: Nose normal.   Mouth/Throat: Oropharynx is clear and moist and mucous membranes are normal.   Eyes: Conjunctivae and EOM are normal. Pupils are equal, round, and reactive to light.   Neck: Normal range of motion. Neck supple.   Cardiovascular: Normal rate, regular rhythm and normal heart sounds. Exam reveals no " gallop and no friction rub.   No murmur heard.  Pulmonary/Chest: Breath sounds normal. No respiratory distress.   Abdominal: Soft. Bowel sounds are normal. She exhibits no distension. There is no tenderness.   Musculoskeletal: Normal range of motion.   Lymphadenopathy:     She has no cervical adenopathy.   Neurological: She is alert and oriented to person, place, and time.   Skin: Skin is warm.   Psychiatric: She has a normal mood and affect.     Protective Sensation (w/ 10 gram monofilament):  Right: Intact  Left: Intact    Visual Inspection:  Normal -  Bilateral    Pedal Pulses:   Right: Present  Left: Present    Posterior tibialis:   Right:Present  Left: Present          Assessment/Plan:  Africa Jennings is a 48 y.o. female who presents today for :    Annual physical exam  -     Lipid panel; Future; Expected date: 01/11/2019  -     TSH; Future; Expected date: 01/11/2019  -     CBC auto differential; Future; Expected date: 01/11/2019  -     Comprehensive metabolic panel; Future; Expected date: 01/11/2019  -     Hemoglobin A1c; Future; Expected date: 01/11/2019  Health Maintenance       Date Due Completion Date    TETANUS VACCINE 02/18/1987 ---    Low Dose Statin 02/18/1990 ---    Hemoglobin A1c 08/15/2018 2/15/2018    Lipid Panel 08/21/2018 8/21/2017    Urine Microalbumin 08/21/2018 8/21/2017    Eye Exam 02/12/2019 2/12/2018 (Done)    Override on 2/12/2018: Done (Dr. Roge Hudson([facility])- negative diabetic retinopathy bilat, positive bilat for presbyopia and hypermetropia)    Override on 5/23/2017: Done    Override on 12/31/2015: Done    Foot Exam 01/11/2020 1/11/2019 (Done)    Override on 1/11/2019: Done    Mammogram 06/25/2020 6/25/2018        I addressed all major concerns as it related to health maintenance.  All were ordered and scheduled based on the patients wishes.  Any additional health maintenance will be readdressed at the next physical if declined or deferred by the  patient.    Essential hypertension  -     amLODIPine (NORVASC) 5 MG tablet; Take 1 tablet (5 mg total) by mouth once daily.  Dispense: 90 tablet; Refill: 2  -     carvedilol (COREG) 12.5 MG tablet; Take 1 tablet (12.5 mg total) by mouth 2 (two) times daily with meals.  Dispense: 180 tablet; Refill: 3  The current medical regimen is effective;  continue present plan and medications.    Acute pansinusitis, recurrence not specified  -     cetirizine (ZYRTEC) 10 MG tablet; Take 1 tablet (10 mg total) by mouth once daily.  Dispense: 90 tablet; Refill: 0  The current medical regimen is effective;  continue present plan and medications.    Gastroesophageal reflux disease without esophagitis  -     ondansetron (ZOFRAN) 4 MG tablet; Take 1 tablet (4 mg total) by mouth every 8 (eight) hours as needed.  Dispense: 90 tablet; Refill: 1  -     omeprazole (PRILOSEC) 40 MG capsule; Take 1 capsule (40 mg total) by mouth once daily.  Dispense: 90 capsule; Refill: 0  The current medical regimen is effective;  continue present plan and medications.    Type 2 diabetes mellitus without complication, without long-term current use of insulin  -     metFORMIN (GLUCOPHAGE-XR) 500 MG 24 hr tablet; Take 1 tablet (500 mg total) by mouth every evening.  Dispense: 90 tablet; Refill: 2  -     lancets Misc; Use to test blood sugar once daily.  Dispense: 300 each; Refill: 0  -     blood sugar diagnostic Strp; 100 each by Misc.(Non-Drug; Combo Route) route once daily. Dispense 100 strips and lancets.  Dispense: 300 each; Refill: 0  -     Microalbumin/creatinine urine ratio; Future; Expected date: 01/11/2019  -     Lipid panel; Future; Expected date: 01/11/2019  -     TSH; Future; Expected date: 01/11/2019  -     CBC auto differential; Future; Expected date: 01/11/2019  -     Comprehensive metabolic panel; Future; Expected date: 01/11/2019  -     Hemoglobin A1c; Future; Expected date: 01/11/2019  The current medical regimen is effective;  continue  present plan and medications.     Vitamin D deficiency  -     ergocalciferol (VITAMIN D2) 50,000 unit Cap; TAKE 1 CAPSULE (50,000 UNITS TOTAL) BY MOUTH TWICE A WEEK.  Dispense: 24 capsule; Refill: 2  The current medical regimen is effective;  continue present plan and medications.    Morbid obesity with BMI of 40.0-44.9, adult  The patient's BMI has been recorded in the chart. The patient has been provided educational materials regarding the benefits of attaining and maintaining a normal weight. We will continue to address and follow this issue during follow up visits.    Need for prophylactic vaccination and inoculation against influenza  -     Influenza - Quadrivalent (3 years & older) (PF)    Need for vaccination for pneumococcus  -     (In Office Administered) Pneumococcal Polysaccharide Vaccine (23 Valent) (SQ/IM)            Follow-up in about 6 months (around 7/11/2019).

## 2019-01-12 ENCOUNTER — LAB VISIT (OUTPATIENT)
Dept: LAB | Facility: HOSPITAL | Age: 49
End: 2019-01-12
Attending: FAMILY MEDICINE
Payer: COMMERCIAL

## 2019-01-12 DIAGNOSIS — Z00.00 ANNUAL PHYSICAL EXAM: ICD-10-CM

## 2019-01-12 DIAGNOSIS — E11.9 TYPE 2 DIABETES MELLITUS WITHOUT COMPLICATION, WITHOUT LONG-TERM CURRENT USE OF INSULIN: ICD-10-CM

## 2019-01-12 LAB
ALBUMIN SERPL BCP-MCNC: 3.6 G/DL
ALP SERPL-CCNC: 100 U/L
ALT SERPL W/O P-5'-P-CCNC: 15 U/L
ANION GAP SERPL CALC-SCNC: 9 MMOL/L
AST SERPL-CCNC: 12 U/L
BASOPHILS # BLD AUTO: 0.04 K/UL
BASOPHILS NFR BLD: 0.5 %
BILIRUB SERPL-MCNC: 0.5 MG/DL
BUN SERPL-MCNC: 13 MG/DL
CALCIUM SERPL-MCNC: 9.5 MG/DL
CHLORIDE SERPL-SCNC: 103 MMOL/L
CHOLEST SERPL-MCNC: 213 MG/DL
CHOLEST/HDLC SERPL: 4.5 {RATIO}
CO2 SERPL-SCNC: 26 MMOL/L
CREAT SERPL-MCNC: 0.8 MG/DL
DIFFERENTIAL METHOD: ABNORMAL
EOSINOPHIL # BLD AUTO: 0.3 K/UL
EOSINOPHIL NFR BLD: 4.4 %
ERYTHROCYTE [DISTWIDTH] IN BLOOD BY AUTOMATED COUNT: 13.8 %
EST. GFR  (AFRICAN AMERICAN): >60 ML/MIN/1.73 M^2
EST. GFR  (NON AFRICAN AMERICAN): >60 ML/MIN/1.73 M^2
ESTIMATED AVG GLUCOSE: 171 MG/DL
GLUCOSE SERPL-MCNC: 120 MG/DL
HBA1C MFR BLD HPLC: 7.6 %
HCT VFR BLD AUTO: 38.5 %
HDLC SERPL-MCNC: 47 MG/DL
HDLC SERPL: 22.1 %
HGB BLD-MCNC: 12.3 G/DL
IMM GRANULOCYTES # BLD AUTO: 0.01 K/UL
IMM GRANULOCYTES NFR BLD AUTO: 0.1 %
LDLC SERPL CALC-MCNC: 146.8 MG/DL
LYMPHOCYTES # BLD AUTO: 3.4 K/UL
LYMPHOCYTES NFR BLD: 46.6 %
MCH RBC QN AUTO: 27 PG
MCHC RBC AUTO-ENTMCNC: 31.9 G/DL
MCV RBC AUTO: 85 FL
MONOCYTES # BLD AUTO: 0.5 K/UL
MONOCYTES NFR BLD: 6.6 %
NEUTROPHILS # BLD AUTO: 3.1 K/UL
NEUTROPHILS NFR BLD: 41.8 %
NONHDLC SERPL-MCNC: 166 MG/DL
NRBC BLD-RTO: 0 /100 WBC
PLATELET # BLD AUTO: 302 K/UL
PMV BLD AUTO: 11.9 FL
POTASSIUM SERPL-SCNC: 3.6 MMOL/L
PROT SERPL-MCNC: 7.7 G/DL
RBC # BLD AUTO: 4.55 M/UL
SODIUM SERPL-SCNC: 138 MMOL/L
TRIGL SERPL-MCNC: 96 MG/DL
TSH SERPL DL<=0.005 MIU/L-ACNC: 1.73 UIU/ML
WBC # BLD AUTO: 7.32 K/UL

## 2019-01-12 PROCEDURE — 83036 HEMOGLOBIN GLYCOSYLATED A1C: CPT

## 2019-01-12 PROCEDURE — 85025 COMPLETE CBC W/AUTO DIFF WBC: CPT

## 2019-01-12 PROCEDURE — 80053 COMPREHEN METABOLIC PANEL: CPT

## 2019-01-12 PROCEDURE — 84443 ASSAY THYROID STIM HORMONE: CPT

## 2019-01-12 PROCEDURE — 36415 COLL VENOUS BLD VENIPUNCTURE: CPT | Mod: PO

## 2019-01-12 PROCEDURE — 80061 LIPID PANEL: CPT

## 2019-04-08 DIAGNOSIS — J01.40 ACUTE PANSINUSITIS, RECURRENCE NOT SPECIFIED: ICD-10-CM

## 2019-04-08 RX ORDER — CETIRIZINE HYDROCHLORIDE 10 MG/1
TABLET ORAL
Qty: 90 TABLET | Refills: 0 | Status: SHIPPED | OUTPATIENT
Start: 2019-04-08 | End: 2019-07-05 | Stop reason: SDUPTHER

## 2019-07-04 DIAGNOSIS — K21.9 GASTROESOPHAGEAL REFLUX DISEASE WITHOUT ESOPHAGITIS: ICD-10-CM

## 2019-07-04 RX ORDER — OMEPRAZOLE 40 MG/1
40 CAPSULE, DELAYED RELEASE ORAL DAILY
Qty: 90 CAPSULE | Refills: 0 | Status: SHIPPED | OUTPATIENT
Start: 2019-07-04 | End: 2019-09-27 | Stop reason: SDUPTHER

## 2019-07-05 DIAGNOSIS — J01.40 ACUTE PANSINUSITIS, RECURRENCE NOT SPECIFIED: ICD-10-CM

## 2019-07-05 RX ORDER — CETIRIZINE HYDROCHLORIDE 10 MG/1
TABLET ORAL
Qty: 90 TABLET | Refills: 0 | Status: SHIPPED | OUTPATIENT
Start: 2019-07-05 | End: 2019-10-23 | Stop reason: SDUPTHER

## 2019-07-08 ENCOUNTER — TELEPHONE (OUTPATIENT)
Dept: FAMILY MEDICINE | Facility: CLINIC | Age: 49
End: 2019-07-08

## 2019-07-08 DIAGNOSIS — Z12.31 VISIT FOR SCREENING MAMMOGRAM: Primary | ICD-10-CM

## 2019-07-08 NOTE — TELEPHONE ENCOUNTER
----- Message from Anamika Laughlin sent at 7/5/2019  5:25 PM CDT -----  Contact: Health system  Appointment Request From: Africa Jennings    With Provider: radha    Preferred Date Range: 7/5/2019 - 7/19/2019    Preferred Times: Any time    Reason for visit: Annual Radha    Comments:  annual visit

## 2019-07-15 ENCOUNTER — HOSPITAL ENCOUNTER (OUTPATIENT)
Dept: RADIOLOGY | Facility: HOSPITAL | Age: 49
Discharge: HOME | End: 2019-07-15
Attending: NURSE PRACTITIONER
Payer: COMMERCIAL

## 2019-07-15 VITALS — BODY MASS INDEX: 41.35 KG/M2 | HEIGHT: 61 IN | WEIGHT: 219 LBS

## 2019-07-15 DIAGNOSIS — Z12.31 VISIT FOR SCREENING MAMMOGRAM: ICD-10-CM

## 2019-07-15 PROCEDURE — 77067 SCR MAMMO BI INCL CAD: CPT | Mod: 26,,, | Performed by: RADIOLOGY

## 2019-07-15 PROCEDURE — 77063 MAMMO DIGITAL SCREENING BILAT WITH TOMOSYNTHESIS_CAD: ICD-10-PCS | Mod: 26,,, | Performed by: RADIOLOGY

## 2019-07-15 PROCEDURE — 77063 BREAST TOMOSYNTHESIS BI: CPT | Mod: 26,,, | Performed by: RADIOLOGY

## 2019-07-15 PROCEDURE — 77067 SCR MAMMO BI INCL CAD: CPT | Mod: TC,PO

## 2019-07-15 PROCEDURE — 77067 MAMMO DIGITAL SCREENING BILAT WITH TOMOSYNTHESIS_CAD: ICD-10-PCS | Mod: 26,,, | Performed by: RADIOLOGY

## 2019-09-27 DIAGNOSIS — K21.9 GASTROESOPHAGEAL REFLUX DISEASE WITHOUT ESOPHAGITIS: ICD-10-CM

## 2019-09-27 RX ORDER — OMEPRAZOLE 40 MG/1
40 CAPSULE, DELAYED RELEASE ORAL DAILY
Qty: 30 CAPSULE | Refills: 0 | Status: SHIPPED | OUTPATIENT
Start: 2019-09-27 | End: 2019-10-15

## 2019-09-30 DIAGNOSIS — E11.9 TYPE 2 DIABETES MELLITUS WITHOUT COMPLICATION, WITHOUT LONG-TERM CURRENT USE OF INSULIN: ICD-10-CM

## 2019-09-30 DIAGNOSIS — K21.9 GASTROESOPHAGEAL REFLUX DISEASE WITHOUT ESOPHAGITIS: ICD-10-CM

## 2019-09-30 RX ORDER — BLOOD SUGAR DIAGNOSTIC
STRIP MISCELLANEOUS
Qty: 100 STRIP | Refills: 2 | Status: SHIPPED | OUTPATIENT
Start: 2019-09-30 | End: 2019-11-12

## 2019-09-30 RX ORDER — METFORMIN HYDROCHLORIDE 500 MG/1
500 TABLET, EXTENDED RELEASE ORAL NIGHTLY
Qty: 90 TABLET | Refills: 0 | Status: SHIPPED | OUTPATIENT
Start: 2019-09-30 | End: 2019-11-12 | Stop reason: SDUPTHER

## 2019-09-30 RX ORDER — OMEPRAZOLE 40 MG/1
40 CAPSULE, DELAYED RELEASE ORAL DAILY
Qty: 90 CAPSULE | Refills: 0 | OUTPATIENT
Start: 2019-09-30

## 2019-10-15 DIAGNOSIS — K21.9 GASTROESOPHAGEAL REFLUX DISEASE WITHOUT ESOPHAGITIS: ICD-10-CM

## 2019-10-15 RX ORDER — OMEPRAZOLE 40 MG/1
40 CAPSULE, DELAYED RELEASE ORAL DAILY
Qty: 90 CAPSULE | Refills: 0 | Status: SHIPPED | OUTPATIENT
Start: 2019-10-15 | End: 2019-11-12 | Stop reason: SDUPTHER

## 2019-10-23 DIAGNOSIS — J01.40 ACUTE PANSINUSITIS, RECURRENCE NOT SPECIFIED: ICD-10-CM

## 2019-10-23 RX ORDER — CETIRIZINE HYDROCHLORIDE 10 MG/1
10 TABLET ORAL DAILY
Qty: 90 TABLET | Refills: 0 | Status: SHIPPED | OUTPATIENT
Start: 2019-10-23 | End: 2019-11-12 | Stop reason: SDUPTHER

## 2019-10-23 RX ORDER — CETIRIZINE HYDROCHLORIDE 10 MG/1
10 TABLET ORAL DAILY
Qty: 90 TABLET | Refills: 0 | OUTPATIENT
Start: 2019-10-23

## 2019-10-23 RX ORDER — TRIAMCINOLONE ACETONIDE 1 MG/G
CREAM TOPICAL 2 TIMES DAILY
Qty: 135 G | Refills: 4 | Status: CANCELLED | OUTPATIENT
Start: 2019-10-23

## 2019-10-23 RX ORDER — TRIAMCINOLONE ACETONIDE 1 MG/G
CREAM TOPICAL 2 TIMES DAILY
OUTPATIENT
Start: 2019-10-23 | End: 2019-11-22

## 2019-10-25 DIAGNOSIS — E11.9 TYPE 2 DIABETES MELLITUS WITHOUT COMPLICATION: ICD-10-CM

## 2019-11-05 ENCOUNTER — TELEPHONE (OUTPATIENT)
Dept: FAMILY MEDICINE | Facility: CLINIC | Age: 49
End: 2019-11-05

## 2019-11-05 DIAGNOSIS — E11.9 TYPE 2 DIABETES MELLITUS WITHOUT COMPLICATION, WITHOUT LONG-TERM CURRENT USE OF INSULIN: Primary | ICD-10-CM

## 2019-11-08 DIAGNOSIS — E11.9 TYPE 2 DIABETES MELLITUS WITHOUT COMPLICATION, UNSPECIFIED WHETHER LONG TERM INSULIN USE: ICD-10-CM

## 2019-11-11 ENCOUNTER — LAB VISIT (OUTPATIENT)
Dept: LAB | Facility: HOSPITAL | Age: 49
End: 2019-11-11
Attending: FAMILY MEDICINE
Payer: COMMERCIAL

## 2019-11-11 DIAGNOSIS — E11.9 TYPE 2 DIABETES MELLITUS WITHOUT COMPLICATION, WITHOUT LONG-TERM CURRENT USE OF INSULIN: ICD-10-CM

## 2019-11-11 LAB
ALBUMIN SERPL BCP-MCNC: 3.7 G/DL (ref 3.5–5.2)
ALP SERPL-CCNC: 103 U/L (ref 55–135)
ALT SERPL W/O P-5'-P-CCNC: 22 U/L (ref 10–44)
ANION GAP SERPL CALC-SCNC: 9 MMOL/L (ref 8–16)
AST SERPL-CCNC: 17 U/L (ref 10–40)
BASOPHILS # BLD AUTO: 0.04 K/UL (ref 0–0.2)
BASOPHILS NFR BLD: 0.5 % (ref 0–1.9)
BILIRUB SERPL-MCNC: 0.3 MG/DL (ref 0.1–1)
BUN SERPL-MCNC: 12 MG/DL (ref 6–20)
CALCIUM SERPL-MCNC: 9.3 MG/DL (ref 8.7–10.5)
CHLORIDE SERPL-SCNC: 103 MMOL/L (ref 95–110)
CHOLEST SERPL-MCNC: 211 MG/DL (ref 120–199)
CHOLEST/HDLC SERPL: 4.4 {RATIO} (ref 2–5)
CO2 SERPL-SCNC: 29 MMOL/L (ref 23–29)
CREAT SERPL-MCNC: 0.7 MG/DL (ref 0.5–1.4)
DIFFERENTIAL METHOD: ABNORMAL
EOSINOPHIL # BLD AUTO: 0.2 K/UL (ref 0–0.5)
EOSINOPHIL NFR BLD: 1.9 % (ref 0–8)
ERYTHROCYTE [DISTWIDTH] IN BLOOD BY AUTOMATED COUNT: 14.1 % (ref 11.5–14.5)
EST. GFR  (AFRICAN AMERICAN): >60 ML/MIN/1.73 M^2
EST. GFR  (NON AFRICAN AMERICAN): >60 ML/MIN/1.73 M^2
ESTIMATED AVG GLUCOSE: 197 MG/DL (ref 68–131)
GLUCOSE SERPL-MCNC: 154 MG/DL (ref 70–110)
HBA1C MFR BLD HPLC: 8.5 % (ref 4–5.6)
HCT VFR BLD AUTO: 39.5 % (ref 37–48.5)
HDLC SERPL-MCNC: 48 MG/DL (ref 40–75)
HDLC SERPL: 22.7 % (ref 20–50)
HGB BLD-MCNC: 11.9 G/DL (ref 12–16)
IMM GRANULOCYTES # BLD AUTO: 0.02 K/UL (ref 0–0.04)
IMM GRANULOCYTES NFR BLD AUTO: 0.2 % (ref 0–0.5)
LDLC SERPL CALC-MCNC: 138.4 MG/DL (ref 63–159)
LYMPHOCYTES # BLD AUTO: 4.4 K/UL (ref 1–4.8)
LYMPHOCYTES NFR BLD: 52.5 % (ref 18–48)
MCH RBC QN AUTO: 26.6 PG (ref 27–31)
MCHC RBC AUTO-ENTMCNC: 30.1 G/DL (ref 32–36)
MCV RBC AUTO: 88 FL (ref 82–98)
MONOCYTES # BLD AUTO: 0.6 K/UL (ref 0.3–1)
MONOCYTES NFR BLD: 6.7 % (ref 4–15)
NEUTROPHILS # BLD AUTO: 3.2 K/UL (ref 1.8–7.7)
NEUTROPHILS NFR BLD: 38.2 % (ref 38–73)
NONHDLC SERPL-MCNC: 163 MG/DL
NRBC BLD-RTO: 0 /100 WBC
PLATELET # BLD AUTO: 305 K/UL (ref 150–350)
PLATELET BLD QL SMEAR: ABNORMAL
PMV BLD AUTO: 11.7 FL (ref 9.2–12.9)
POTASSIUM SERPL-SCNC: 4.2 MMOL/L (ref 3.5–5.1)
PROT SERPL-MCNC: 7.8 G/DL (ref 6–8.4)
RBC # BLD AUTO: 4.47 M/UL (ref 4–5.4)
SODIUM SERPL-SCNC: 141 MMOL/L (ref 136–145)
TRIGL SERPL-MCNC: 123 MG/DL (ref 30–150)
TSH SERPL DL<=0.005 MIU/L-ACNC: 1.71 UIU/ML (ref 0.4–4)
WBC # BLD AUTO: 8.38 K/UL (ref 3.9–12.7)

## 2019-11-11 PROCEDURE — 80061 LIPID PANEL: CPT

## 2019-11-11 PROCEDURE — 80053 COMPREHEN METABOLIC PANEL: CPT

## 2019-11-11 PROCEDURE — 84443 ASSAY THYROID STIM HORMONE: CPT

## 2019-11-11 PROCEDURE — 36415 COLL VENOUS BLD VENIPUNCTURE: CPT | Mod: PO

## 2019-11-11 PROCEDURE — 85025 COMPLETE CBC W/AUTO DIFF WBC: CPT

## 2019-11-11 PROCEDURE — 83036 HEMOGLOBIN GLYCOSYLATED A1C: CPT

## 2019-11-12 ENCOUNTER — OFFICE VISIT (OUTPATIENT)
Dept: FAMILY MEDICINE | Facility: CLINIC | Age: 49
End: 2019-11-12
Payer: COMMERCIAL

## 2019-11-12 VITALS
WEIGHT: 230.38 LBS | OXYGEN SATURATION: 98 % | TEMPERATURE: 98 F | HEIGHT: 61 IN | HEART RATE: 81 BPM | SYSTOLIC BLOOD PRESSURE: 122 MMHG | DIASTOLIC BLOOD PRESSURE: 80 MMHG | BODY MASS INDEX: 43.5 KG/M2

## 2019-11-12 DIAGNOSIS — L30.9 ECZEMA, UNSPECIFIED TYPE: ICD-10-CM

## 2019-11-12 DIAGNOSIS — E11.9 TYPE 2 DIABETES MELLITUS WITHOUT COMPLICATION, WITHOUT LONG-TERM CURRENT USE OF INSULIN: ICD-10-CM

## 2019-11-12 DIAGNOSIS — J32.9 SINUSITIS, UNSPECIFIED CHRONICITY, UNSPECIFIED LOCATION: ICD-10-CM

## 2019-11-12 DIAGNOSIS — E66.01 MORBID OBESITY WITH BMI OF 40.0-44.9, ADULT: Chronic | ICD-10-CM

## 2019-11-12 DIAGNOSIS — E78.5 HYPERLIPIDEMIA, UNSPECIFIED HYPERLIPIDEMIA TYPE: ICD-10-CM

## 2019-11-12 DIAGNOSIS — I10 ESSENTIAL HYPERTENSION: Primary | Chronic | ICD-10-CM

## 2019-11-12 DIAGNOSIS — K21.9 GASTROESOPHAGEAL REFLUX DISEASE WITHOUT ESOPHAGITIS: ICD-10-CM

## 2019-11-12 DIAGNOSIS — J30.9 ALLERGIC RHINITIS, UNSPECIFIED SEASONALITY, UNSPECIFIED TRIGGER: ICD-10-CM

## 2019-11-12 PROCEDURE — 3079F DIAST BP 80-89 MM HG: CPT | Mod: CPTII,S$GLB,, | Performed by: FAMILY MEDICINE

## 2019-11-12 PROCEDURE — 3079F PR MOST RECENT DIASTOLIC BLOOD PRESSURE 80-89 MM HG: ICD-10-PCS | Mod: CPTII,S$GLB,, | Performed by: FAMILY MEDICINE

## 2019-11-12 PROCEDURE — 99214 OFFICE O/P EST MOD 30 MIN: CPT | Mod: S$GLB,,, | Performed by: FAMILY MEDICINE

## 2019-11-12 PROCEDURE — 3074F PR MOST RECENT SYSTOLIC BLOOD PRESSURE < 130 MM HG: ICD-10-PCS | Mod: CPTII,S$GLB,, | Performed by: FAMILY MEDICINE

## 2019-11-12 PROCEDURE — 99999 PR PBB SHADOW E&M-EST. PATIENT-LVL III: CPT | Mod: PBBFAC,,, | Performed by: FAMILY MEDICINE

## 2019-11-12 PROCEDURE — 3008F PR BODY MASS INDEX (BMI) DOCUMENTED: ICD-10-PCS | Mod: CPTII,S$GLB,, | Performed by: FAMILY MEDICINE

## 2019-11-12 PROCEDURE — 99999 PR PBB SHADOW E&M-EST. PATIENT-LVL III: ICD-10-PCS | Mod: PBBFAC,,, | Performed by: FAMILY MEDICINE

## 2019-11-12 PROCEDURE — 3008F BODY MASS INDEX DOCD: CPT | Mod: CPTII,S$GLB,, | Performed by: FAMILY MEDICINE

## 2019-11-12 PROCEDURE — 3074F SYST BP LT 130 MM HG: CPT | Mod: CPTII,S$GLB,, | Performed by: FAMILY MEDICINE

## 2019-11-12 PROCEDURE — 99214 PR OFFICE/OUTPT VISIT, EST, LEVL IV, 30-39 MIN: ICD-10-PCS | Mod: S$GLB,,, | Performed by: FAMILY MEDICINE

## 2019-11-12 RX ORDER — PEN NEEDLE, DIABETIC 30 GX3/16"
1 NEEDLE, DISPOSABLE MISCELLANEOUS WEEKLY
Qty: 50 EACH | Refills: 1 | Status: SHIPPED | OUTPATIENT
Start: 2019-11-12 | End: 2023-09-20

## 2019-11-12 RX ORDER — LANCING DEVICE
1 EACH MISCELLANEOUS 2 TIMES DAILY WITH MEALS
Qty: 1 EACH | Refills: 0 | Status: SHIPPED | OUTPATIENT
Start: 2019-11-12 | End: 2020-11-11

## 2019-11-12 RX ORDER — INSULIN PUMP SYRINGE, 3 ML
EACH MISCELLANEOUS
Qty: 1 EACH | Refills: 0 | Status: SHIPPED | OUTPATIENT
Start: 2019-11-12 | End: 2020-02-13

## 2019-11-12 RX ORDER — TRIAMCINOLONE ACETONIDE 1 MG/G
CREAM TOPICAL 2 TIMES DAILY
Qty: 135 G | Refills: 4 | Status: SHIPPED | OUTPATIENT
Start: 2019-11-12 | End: 2020-09-11 | Stop reason: SDUPTHER

## 2019-11-12 RX ORDER — CETIRIZINE HYDROCHLORIDE 10 MG/1
10 TABLET ORAL DAILY
Qty: 90 TABLET | Refills: 1 | Status: ON HOLD | OUTPATIENT
Start: 2019-11-12 | End: 2023-07-27 | Stop reason: HOSPADM

## 2019-11-12 RX ORDER — FLUTICASONE PROPIONATE 50 MCG
SPRAY, SUSPENSION (ML) NASAL
Qty: 16 ML | Refills: 2 | Status: SHIPPED | OUTPATIENT
Start: 2019-11-12 | End: 2019-12-06 | Stop reason: SDUPTHER

## 2019-11-12 RX ORDER — METFORMIN HYDROCHLORIDE 500 MG/1
500 TABLET, EXTENDED RELEASE ORAL 2 TIMES DAILY WITH MEALS
Qty: 180 TABLET | Refills: 1 | Status: SHIPPED | OUTPATIENT
Start: 2019-11-12 | End: 2020-03-14 | Stop reason: ALTCHOICE

## 2019-11-12 RX ORDER — OMEPRAZOLE 40 MG/1
40 CAPSULE, DELAYED RELEASE ORAL DAILY
Qty: 90 CAPSULE | Refills: 1 | Status: SHIPPED | OUTPATIENT
Start: 2019-11-12 | End: 2020-03-04 | Stop reason: SDUPTHER

## 2019-11-12 RX ORDER — ATORVASTATIN CALCIUM 20 MG/1
20 TABLET, FILM COATED ORAL DAILY
Qty: 90 TABLET | Refills: 3 | Status: SHIPPED | OUTPATIENT
Start: 2019-11-12 | End: 2020-03-05 | Stop reason: SDUPTHER

## 2019-11-12 NOTE — PROGRESS NOTES
Routine Office Visit    Patient Name: Africa Jennings    : 1970  MRN: [id number]    Subjective:  Africa is a 49 y.o. female who presents today for     1. Diabetes follow-up - pt has been working on her die, but admits it could improve.  She tries to exercise regularly especially when the weather is nice outside, however it has been colder outside. She has been taking her medications regularly and reports no adverse side effects. She plans on continuing her lifestyle modifications. No issues/complaints. She is requesting prescription refills. She has found it difficult to lose weight.   2. Menopause symptoms - Patient c/o increased night sweats. She feels hot and cold. She does not want to be on medications. She does not feel johnson or have fluctuations in mood. Recommend black cohash for symptoms   3. Bulging disc - chronic condition for patient - Patient has intermittent episodes of low back pain 2/2 disc problems. She has been doing exercises for sciatica and feels walking helps. She does not feel she needs physical therapy at this time.     Review of Systems   Constitutional: Negative for chills, fever and weight loss.   HENT: Negative for congestion.    Eyes: Negative for blurred vision.   Respiratory: Negative for cough.    Cardiovascular: Negative for chest pain.   Gastrointestinal: Negative for abdominal pain, constipation, diarrhea, heartburn, nausea and vomiting.   Genitourinary: Negative for dysuria.   Musculoskeletal: Positive for back pain. Negative for myalgias.   Skin: Negative for itching and rash.   Neurological: Negative for dizziness, tremors, seizures, weakness and headaches.   Endo/Heme/Allergies: Positive for polydipsia.   Psychiatric/Behavioral: Negative for depression. The patient is not nervous/anxious.        Active Problem List  Patient Active Problem List   Diagnosis    Essential hypertension    Type 2 diabetes mellitus without complication, without long-term current  use of insulin    Obstructive sleep apnea (adult) (pediatric)    Morbid obesity with BMI of 40.0-44.9, adult    Gastroesophageal reflux disease without esophagitis    Pyloric stenosis    Encounter for screening colonoscopy       Past Surgical History  Past Surgical History:   Procedure Laterality Date    breast reduction      CHOLECYSTECTOMY      laprascopic    ESOPHAGOGASTRODUODENOSCOPY  8/18/14    HYSTERECTOMY  2007    laprascopic, total for fibroids.  Dr Polo    OOPHORECTOMY      TOTAL REDUCTION MAMMOPLASTY         Family History  Family History   Problem Relation Age of Onset    Diabetes Maternal Grandmother     Heart disease Maternal Grandmother     Hypertension Maternal Grandmother     Crohn's disease Sister     Diabetes Mother     Hypertension Mother     Heart disease Mother     Cancer Father 58        Prostate    Breast cancer Sister     Amblyopia Neg Hx     Blindness Neg Hx     Cataracts Neg Hx     Glaucoma Neg Hx     Macular degeneration Neg Hx     Retinal detachment Neg Hx     Strabismus Neg Hx        Social History  Social History     Socioeconomic History    Marital status:      Spouse name: Not on file    Number of children: Not on file    Years of education: Not on file    Highest education level: Not on file   Occupational History    Not on file   Social Needs    Financial resource strain: Hard    Food insecurity:     Worry: Often true     Inability: Often true    Transportation needs:     Medical: No     Non-medical: No   Tobacco Use    Smoking status: Never Smoker    Smokeless tobacco: Never Used   Substance and Sexual Activity    Alcohol use: No     Frequency: Monthly or less     Drinks per session: Patient refused     Binge frequency: Never     Comment: socially    Drug use: No    Sexual activity: Yes     Partners: Male     Birth control/protection: Surgical   Lifestyle    Physical activity:     Days per week: 3 days     Minutes per session: 60  min    Stress: To some extent   Relationships    Social connections:     Talks on phone: More than three times a week     Gets together: Once a week     Attends Christianity service: Not on file     Active member of club or organization: Yes     Attends meetings of clubs or organizations: More than 4 times per year     Relationship status:    Other Topics Concern    Not on file   Social History Narrative     since 2000.  2 children: 26 and 21.    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   Current Outpatient Medications   Medication Sig    amLODIPine (NORVASC) 5 MG tablet Take 1 tablet (5 mg total) by mouth once daily.    carvedilol (COREG) 12.5 MG tablet Take 1 tablet (12.5 mg total) by mouth 2 (two) times daily with meals.    cetirizine (ZYRTEC) 10 MG tablet Take 1 tablet (10 mg total) by mouth once daily.    dicyclomine (BENTYL) 20 mg tablet TAKE 1 TABLET (20 MG TOTAL) BY MOUTH EVERY 6 (SIX) HOURS.    ergocalciferol (VITAMIN D2) 50,000 unit Cap TAKE 1 CAPSULE (50,000 UNITS TOTAL) BY MOUTH TWICE A WEEK.    lancets Misc Use to test blood sugar once daily.    metFORMIN (GLUCOPHAGE-XR) 500 MG 24 hr tablet Take 1 tablet (500 mg total) by mouth 2 (two) times daily with meals.    neomycin-polymyxin-dexamethasone (MAXITROL) 3.5mg/mL-10,000 unit/mL-0.1 % DrpS     nystatin (MYCOSTATIN) ointment Apply topically 2 (two) times daily.      omeprazole (PRILOSEC) 40 MG capsule Take 1 capsule (40 mg total) by mouth once daily.    ondansetron (ZOFRAN) 4 MG tablet Take 1 tablet (4 mg total) by mouth every 8 (eight) hours as needed.    triamcinolone acetonide 0.1% (KENALOG) 0.1 % cream Apply topically 2 (two) times daily. Apply topically 2 (two) times daily.    atorvastatin (LIPITOR) 20 MG tablet Take 1 tablet (20 mg total) by mouth once daily.    blood sugar diagnostic Strp 1 strip by Misc.(Non-Drug; Combo Route) route 2 (two)  "times daily with meals.    blood-glucose meter kit Use as instructed    lancing device Misc 1 Device by Misc.(Non-Drug; Combo Route) route 2 (two) times daily with meals.    pen needle, diabetic (PEN NEEDLE) 32 gauge x 5/32" Ndle 1 each by Misc.(Non-Drug; Combo Route) route once a week.    semaglutide (OZEMPIC) 0.25 mg or 0.5 mg(2 mg/1.5 mL) PnIj Inject 0.25 mg into the skin every 7 days. X 2 weeks, then increase to 0.5mg once weekly     No current facility-administered medications for this visit.        Physical Exam  /80 (BP Location: Left arm, Patient Position: Sitting, BP Method: Large (Manual))   Pulse 81   Temp 98.3 °F (36.8 °C) (Oral)   Ht 5' 1" (1.549 m)   Wt 104.5 kg (230 lb 6.1 oz)   SpO2 98%   BMI 43.53 kg/m²   Physical Exam   Constitutional: She is oriented to person, place, and time. She appears well-developed and well-nourished.   HENT:   Head: Normocephalic and atraumatic.   Eyes: Pupils are equal, round, and reactive to light. Conjunctivae and EOM are normal.   Neck: Normal range of motion. Neck supple.   Cardiovascular: Normal rate, regular rhythm and normal heart sounds. Exam reveals no gallop and no friction rub.   No murmur heard.  Pulmonary/Chest: Breath sounds normal. No respiratory distress.   Abdominal: Soft. Bowel sounds are normal. She exhibits no distension. There is no tenderness.   Musculoskeletal: Normal range of motion.   Lymphadenopathy:     She has no cervical adenopathy.   Neurological: She is alert and oriented to person, place, and time.   Skin: Skin is warm.   Psychiatric: She has a normal mood and affect.         Assessment/Plan:  Africa Jennings is a 49 y.o. female who presents today for :    Problem List Items Addressed This Visit        Cardiac/Vascular    Essential hypertension - Primary (Chronic)  The current medical regimen is effective;  continue present plan and medications.        Endocrine    Morbid obesity with BMI of 40.0-44.9, adult " "(Chronic)    Type 2 diabetes mellitus without complication, without long-term current use of insulin (Chronic)    Relevant Medications    semaglutide (OZEMPIC) 0.25 mg or 0.5 mg(2 mg/1.5 mL) PnIj    metFORMIN (GLUCOPHAGE-XR) 500 MG 24 hr tablet    pen needle, diabetic (PEN NEEDLE) 32 gauge x 5/32" Ndle    blood-glucose meter kit    blood sugar diagnostic Strp    lancing device Misc    a1c increased from 7.6 to 8.5  Recommend increase metformin 500mg bid   Will also start on ozempic   Common side effects of this medication were discussed with the patient. Questions regarding medications were discussed during this visit.   Discussed cardiovascular and weight loss benefits   F/u 3 months       Other Relevant Orders    Comprehensive metabolic panel    Lipid panel    Hemoglobin A1c       GI    Gastroesophageal reflux disease without esophagitis (Chronic)    Relevant Medications    omeprazole (PRILOSEC) 40 MG capsule  The current medical regimen is effective;  continue present plan and medications.        Other Visit Diagnoses     Allergic rhinitis, unspecified seasonality, unspecified trigger        Relevant Medications    cetirizine (ZYRTEC) 10 MG tablet    Hyperlipidemia, unspecified hyperlipidemia type        Relevant Medications    atorvastatin (LIPITOR) 20 MG tablet  Common side effects of this medication were discussed with the patient. Questions regarding medications were discussed during this visit.   Recommend to start on statin due to increase in cholesterol levels   Recommend to take with coQ10 100mg       Eczema, unspecified type        Relevant Medications    triamcinolone acetonide 0.1% (KENALOG) 0.1 % cream            Follow up in about 3 months (around 2/12/2020), or if symptoms worsen or fail to improve.    "

## 2019-11-18 LAB
LEFT EYE DM RETINOPATHY: NEGATIVE
RIGHT EYE DM RETINOPATHY: NEGATIVE

## 2019-12-06 DIAGNOSIS — J32.9 SINUSITIS, UNSPECIFIED CHRONICITY, UNSPECIFIED LOCATION: ICD-10-CM

## 2019-12-06 RX ORDER — FLUTICASONE PROPIONATE 50 MCG
SPRAY, SUSPENSION (ML) NASAL
Qty: 16 ML | Refills: 2 | Status: SHIPPED | OUTPATIENT
Start: 2019-12-06 | End: 2022-11-08 | Stop reason: ALTCHOICE

## 2019-12-12 ENCOUNTER — PATIENT OUTREACH (OUTPATIENT)
Dept: ADMINISTRATIVE | Facility: HOSPITAL | Age: 49
End: 2019-12-12

## 2019-12-12 RX ORDER — TOBRAMYCIN AND DEXAMETHASONE 3; 1 MG/ML; MG/ML
SUSPENSION/ DROPS OPHTHALMIC
Refills: 2 | COMMUNITY
Start: 2019-11-18 | End: 2022-10-04 | Stop reason: CLARIF

## 2019-12-26 ENCOUNTER — TELEPHONE (OUTPATIENT)
Dept: PAIN MEDICINE | Facility: CLINIC | Age: 49
End: 2019-12-26

## 2019-12-26 ENCOUNTER — PATIENT MESSAGE (OUTPATIENT)
Dept: PAIN MEDICINE | Facility: CLINIC | Age: 49
End: 2019-12-26

## 2019-12-26 NOTE — TELEPHONE ENCOUNTER
Called to confirm appointment with Dr. Valdez on 12/30/19 @ 1:30 in [facility].    Pt did not answer. Left pt detailed message to contact office @ [phone] to confirm appointment

## 2019-12-27 ENCOUNTER — PATIENT OUTREACH (OUTPATIENT)
Dept: ADMINISTRATIVE | Facility: OTHER | Age: 49
End: 2019-12-27

## 2020-01-03 DIAGNOSIS — I10 ESSENTIAL HYPERTENSION: ICD-10-CM

## 2020-01-03 RX ORDER — AMLODIPINE BESYLATE 5 MG/1
TABLET ORAL
Qty: 90 TABLET | Refills: 2 | Status: SHIPPED | OUTPATIENT
Start: 2020-01-03 | End: 2020-08-27 | Stop reason: DRUGHIGH

## 2020-01-03 RX ORDER — CARVEDILOL 12.5 MG/1
TABLET ORAL
Qty: 180 TABLET | Refills: 3 | Status: SHIPPED | OUTPATIENT
Start: 2020-01-03 | End: 2020-02-13 | Stop reason: ALTCHOICE

## 2020-01-30 ENCOUNTER — PATIENT OUTREACH (OUTPATIENT)
Dept: ADMINISTRATIVE | Facility: HOSPITAL | Age: 50
End: 2020-01-30

## 2020-01-30 DIAGNOSIS — E11.9 TYPE 2 DIABETES MELLITUS WITHOUT COMPLICATION, WITHOUT LONG-TERM CURRENT USE OF INSULIN: Primary | Chronic | ICD-10-CM

## 2020-01-31 ENCOUNTER — PATIENT OUTREACH (OUTPATIENT)
Dept: ADMINISTRATIVE | Facility: OTHER | Age: 50
End: 2020-01-31

## 2020-02-03 ENCOUNTER — OFFICE VISIT (OUTPATIENT)
Dept: PAIN MEDICINE | Facility: CLINIC | Age: 50
End: 2020-02-03
Payer: COMMERCIAL

## 2020-02-03 VITALS
WEIGHT: 227.13 LBS | BODY MASS INDEX: 42.88 KG/M2 | DIASTOLIC BLOOD PRESSURE: 71 MMHG | TEMPERATURE: 98 F | OXYGEN SATURATION: 96 % | HEIGHT: 61 IN | HEART RATE: 68 BPM | SYSTOLIC BLOOD PRESSURE: 133 MMHG

## 2020-02-03 DIAGNOSIS — M51.36 DDD (DEGENERATIVE DISC DISEASE), LUMBAR: Primary | ICD-10-CM

## 2020-02-03 DIAGNOSIS — M47.816 LUMBAR SPONDYLOSIS: ICD-10-CM

## 2020-02-03 DIAGNOSIS — M54.16 LUMBAR RADICULOPATHY: ICD-10-CM

## 2020-02-03 PROBLEM — M51.369 DDD (DEGENERATIVE DISC DISEASE), LUMBAR: Status: ACTIVE | Noted: 2020-02-03

## 2020-02-03 PROCEDURE — 99999 PR PBB SHADOW E&M-EST. PATIENT-LVL IV: CPT | Mod: PBBFAC,,, | Performed by: PAIN MEDICINE

## 2020-02-03 PROCEDURE — 99204 PR OFFICE/OUTPT VISIT, NEW, LEVL IV, 45-59 MIN: ICD-10-PCS | Mod: S$GLB,,, | Performed by: PAIN MEDICINE

## 2020-02-03 PROCEDURE — 99204 OFFICE O/P NEW MOD 45 MIN: CPT | Mod: S$GLB,,, | Performed by: PAIN MEDICINE

## 2020-02-03 PROCEDURE — 99999 PR PBB SHADOW E&M-EST. PATIENT-LVL IV: ICD-10-PCS | Mod: PBBFAC,,, | Performed by: PAIN MEDICINE

## 2020-02-03 RX ORDER — GABAPENTIN 300 MG/1
600 CAPSULE ORAL 3 TIMES DAILY
Qty: 180 CAPSULE | Refills: 11 | Status: SHIPPED | OUTPATIENT
Start: 2020-02-03 | End: 2021-02-02

## 2020-02-10 ENCOUNTER — HOSPITAL ENCOUNTER (OUTPATIENT)
Dept: RADIOLOGY | Facility: HOSPITAL | Age: 50
Discharge: HOME | End: 2020-02-10
Attending: PAIN MEDICINE
Payer: COMMERCIAL

## 2020-02-10 DIAGNOSIS — M54.16 LUMBAR RADICULOPATHY: ICD-10-CM

## 2020-02-10 DIAGNOSIS — M51.36 DDD (DEGENERATIVE DISC DISEASE), LUMBAR: ICD-10-CM

## 2020-02-10 DIAGNOSIS — M47.816 LUMBAR SPONDYLOSIS: ICD-10-CM

## 2020-02-10 PROCEDURE — 72148 MRI LUMBAR SPINE WITHOUT CONTRAST: ICD-10-PCS | Mod: 26,,, | Performed by: RADIOLOGY

## 2020-02-10 PROCEDURE — 72148 MRI LUMBAR SPINE W/O DYE: CPT | Mod: TC

## 2020-02-10 PROCEDURE — 72148 MRI LUMBAR SPINE W/O DYE: CPT | Mod: 26,,, | Performed by: RADIOLOGY

## 2020-02-13 ENCOUNTER — PATIENT MESSAGE (OUTPATIENT)
Dept: ADMINISTRATIVE | Facility: OTHER | Age: 50
End: 2020-02-13

## 2020-02-13 ENCOUNTER — OFFICE VISIT (OUTPATIENT)
Dept: FAMILY MEDICINE | Facility: CLINIC | Age: 50
End: 2020-02-13
Payer: COMMERCIAL

## 2020-02-13 ENCOUNTER — PATIENT OUTREACH (OUTPATIENT)
Dept: ADMINISTRATIVE | Facility: OTHER | Age: 50
End: 2020-02-13

## 2020-02-13 VITALS
TEMPERATURE: 98 F | WEIGHT: 228.19 LBS | DIASTOLIC BLOOD PRESSURE: 84 MMHG | HEIGHT: 61 IN | HEART RATE: 83 BPM | SYSTOLIC BLOOD PRESSURE: 130 MMHG | BODY MASS INDEX: 43.08 KG/M2 | OXYGEN SATURATION: 95 %

## 2020-02-13 DIAGNOSIS — K21.9 GASTROESOPHAGEAL REFLUX DISEASE WITHOUT ESOPHAGITIS: Chronic | ICD-10-CM

## 2020-02-13 DIAGNOSIS — Z00.00 ANNUAL PHYSICAL EXAM: Primary | ICD-10-CM

## 2020-02-13 DIAGNOSIS — I10 ESSENTIAL HYPERTENSION: Chronic | ICD-10-CM

## 2020-02-13 DIAGNOSIS — M51.36 DDD (DEGENERATIVE DISC DISEASE), LUMBAR: ICD-10-CM

## 2020-02-13 DIAGNOSIS — E11.9 TYPE 2 DIABETES MELLITUS WITHOUT COMPLICATION, WITHOUT LONG-TERM CURRENT USE OF INSULIN: Chronic | ICD-10-CM

## 2020-02-13 DIAGNOSIS — E66.01 MORBID OBESITY WITH BMI OF 40.0-44.9, ADULT: Chronic | ICD-10-CM

## 2020-02-13 PROCEDURE — 99396 PREV VISIT EST AGE 40-64: CPT | Mod: S$GLB,,, | Performed by: FAMILY MEDICINE

## 2020-02-13 PROCEDURE — 99999 PR PBB SHADOW E&M-EST. PATIENT-LVL III: CPT | Mod: PBBFAC,,, | Performed by: FAMILY MEDICINE

## 2020-02-13 PROCEDURE — 99999 PR PBB SHADOW E&M-EST. PATIENT-LVL III: ICD-10-PCS | Mod: PBBFAC,,, | Performed by: FAMILY MEDICINE

## 2020-02-13 PROCEDURE — 3075F PR MOST RECENT SYSTOLIC BLOOD PRESS GE 130-139MM HG: ICD-10-PCS | Mod: CPTII,S$GLB,, | Performed by: FAMILY MEDICINE

## 2020-02-13 PROCEDURE — 3051F HG A1C>EQUAL 7.0%<8.0%: CPT | Mod: CPTII,S$GLB,, | Performed by: FAMILY MEDICINE

## 2020-02-13 PROCEDURE — 3079F DIAST BP 80-89 MM HG: CPT | Mod: CPTII,S$GLB,, | Performed by: FAMILY MEDICINE

## 2020-02-13 PROCEDURE — 3075F SYST BP GE 130 - 139MM HG: CPT | Mod: CPTII,S$GLB,, | Performed by: FAMILY MEDICINE

## 2020-02-13 PROCEDURE — 3079F PR MOST RECENT DIASTOLIC BLOOD PRESSURE 80-89 MM HG: ICD-10-PCS | Mod: CPTII,S$GLB,, | Performed by: FAMILY MEDICINE

## 2020-02-13 PROCEDURE — 3051F PR MOST RECENT HEMOGLOBIN A1C LEVEL 7.0 - < 8.0%: ICD-10-PCS | Mod: CPTII,S$GLB,, | Performed by: FAMILY MEDICINE

## 2020-02-13 PROCEDURE — 99396 PR PREVENTIVE VISIT,EST,40-64: ICD-10-PCS | Mod: S$GLB,,, | Performed by: FAMILY MEDICINE

## 2020-02-13 RX ORDER — LANCING DEVICE
1 EACH MISCELLANEOUS 2 TIMES DAILY WITH MEALS
Qty: 100 EACH | Refills: 11 | Status: SHIPPED | OUTPATIENT
Start: 2020-02-13 | End: 2023-09-20

## 2020-02-13 RX ORDER — VALSARTAN 80 MG/1
80 TABLET ORAL DAILY
Qty: 30 TABLET | Refills: 3 | Status: SHIPPED | OUTPATIENT
Start: 2020-02-13 | End: 2020-06-16

## 2020-02-13 RX ORDER — INSULIN PUMP SYRINGE, 3 ML
EACH MISCELLANEOUS
Qty: 1 EACH | Refills: 0 | Status: SHIPPED | OUTPATIENT
Start: 2020-02-13 | End: 2023-09-20

## 2020-02-13 NOTE — PROGRESS NOTES
Routine Office Visit    Patient Name: Africa Jennings    : 1970  MRN: [id number]    Subjective:  Africa is a 49 y.o. female who presents today for     1. Annual physical   2. Hypertension - Patient has been on medication regimen for quite some time. It is working for her but she is not on an ACEi or ARB and UA does show abnormal micro albumin. She was advised to change bp medications which she is open to changing.     Review of Systems   Constitutional: Negative for chills and fever.   HENT: Negative for congestion and hearing loss.    Eyes: Negative for blurred vision and discharge.   Respiratory: Negative for cough and wheezing.    Cardiovascular: Negative for chest pain and palpitations.   Gastrointestinal: Negative for abdominal pain, blood in stool, constipation, diarrhea, heartburn, nausea and vomiting.   Genitourinary: Negative for dysuria and hematuria.   Musculoskeletal: Negative for myalgias and neck pain.   Skin: Negative for itching and rash.   Neurological: Negative for dizziness, weakness and headaches.   Endo/Heme/Allergies: Negative for polydipsia.   Psychiatric/Behavioral: Negative for depression.       Active Problem List  Patient Active Problem List   Diagnosis    Essential hypertension    Type 2 diabetes mellitus without complication, without long-term current use of insulin    Obstructive sleep apnea (adult) (pediatric)    Morbid obesity with BMI of 40.0-44.9, adult    Gastroesophageal reflux disease without esophagitis    Pyloric stenosis    Encounter for screening colonoscopy    Lumbar radiculopathy    Lumbar spondylosis    DDD (degenerative disc disease), lumbar       Past Surgical History  Past Surgical History:   Procedure Laterality Date    breast reduction      CHOLECYSTECTOMY      laprascopic    ESOPHAGOGASTRODUODENOSCOPY  14    HYSTERECTOMY  2007    laprascopic, total for fibroids.  Dr Polo    OOPHORECTOMY      TOTAL REDUCTION MAMMOPLASTY          Family History  Family History   Problem Relation Age of Onset    Diabetes Maternal Grandmother     Heart disease Maternal Grandmother     Hypertension Maternal Grandmother     Crohn's disease Sister     Diabetes Mother     Hypertension Mother     Heart disease Mother     Cancer Father 58        Prostate    Breast cancer Sister     Amblyopia Neg Hx     Blindness Neg Hx     Cataracts Neg Hx     Glaucoma Neg Hx     Macular degeneration Neg Hx     Retinal detachment Neg Hx     Strabismus Neg Hx        Social History  Social History     Socioeconomic History    Marital status:      Spouse name: Not on file    Number of children: Not on file    Years of education: Not on file    Highest education level: Not on file   Occupational History    Not on file   Social Needs    Financial resource strain: Hard    Food insecurity:     Worry: Often true     Inability: Often true    Transportation needs:     Medical: No     Non-medical: No   Tobacco Use    Smoking status: Never Smoker    Smokeless tobacco: Never Used   Substance and Sexual Activity    Alcohol use: No     Frequency: Monthly or less     Drinks per session: Patient refused     Binge frequency: Never     Comment: socially    Drug use: No    Sexual activity: Yes     Partners: Male     Birth control/protection: Surgical   Lifestyle    Physical activity:     Days per week: 3 days     Minutes per session: 60 min    Stress: To some extent   Relationships    Social connections:     Talks on phone: More than three times a week     Gets together: Once a week     Attends [employer] service: Not on file     Active member of club or organization: Yes     Attends meetings of clubs or organizations: More than 4 times per year     Relationship status:    Other Topics Concern    Not on file   Social History Narrative     since 2000.  2 children: 26 and 21.    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   Current Outpatient Medications   Medication Sig    amLODIPine (NORVASC) 5 MG tablet TAKE 1 TABLET BY MOUTH EVERY DAY    atorvastatin (LIPITOR) 20 MG tablet Take 1 tablet (20 mg total) by mouth once daily.    blood sugar diagnostic Strp 1 strip by Misc.(Non-Drug; Combo Route) route 2 (two) times daily with meals.    cetirizine (ZYRTEC) 10 MG tablet Take 1 tablet (10 mg total) by mouth once daily.    ergocalciferol (VITAMIN D2) 50,000 unit Cap TAKE 1 CAPSULE (50,000 UNITS TOTAL) BY MOUTH TWICE A WEEK.    fluticasone propionate (FLONASE) 50 mcg/actuation nasal spray INSTILL 1 SPRAY (50 MCG TOTAL) IN EACH NOSTRIL ONCE DAILY.    gabapentin (NEURONTIN) 300 MG capsule Take 2 capsules (600 mg total) by mouth 3 (three) times daily.    lancets [facility] Use to test blood sugar once daily.    lancing device Misc 1 Device by Misc.(Non-Drug; Combo Route) route 2 (two) times daily with meals.    metFORMIN (GLUCOPHAGE-XR) 500 MG 24 hr tablet Take 1 tablet (500 mg total) by mouth 2 (two) times daily with meals.    neomycin-polymyxin-dexamethasone (MAXITROL) 3.5mg/mL-10,000 unit/mL-0.1 % DrpS     nystatin (MYCOSTATIN) ointment Apply topically 2 (two) times daily.      omeprazole (PRILOSEC) 40 MG capsule Take 1 capsule (40 mg total) by mouth once daily.    ondansetron (ZOFRAN) 4 MG tablet Take 1 tablet (4 mg total) by mouth every 8 (eight) hours as needed.    pen needle, diabetic (PEN NEEDLE) 32 gauge x 5/32" Ndle 1 each by Misc.(Non-Drug; Combo Route) route once a week.    semaglutide (OZEMPIC) 0.25 mg or 0.5 mg(2 mg/1.5 mL) PnIj Inject 0.5 mg into the skin every 7 days.    tobramycin-dexamethasone 0.3-0.1% (TOBRADEX) 0.3-0.1 % DrpS INSTILL 1 DROP INTO BOTH EYES 4 TIMES A DAY    triamcinolone acetonide 0.1% (KENALOG) 0.1 % cream Apply topically 2 (two) times daily. Apply topically 2 (two) times daily.    blood sugar diagnostic Strp 1 strip by Misc.(Non-Drug; " "Combo Route) route 2 (two) times daily with meals.    blood-glucose meter kit Use as instructed    dicyclomine (BENTYL) 20 mg tablet TAKE 1 TABLET (20 MG TOTAL) BY MOUTH EVERY 6 (SIX) HOURS. (Patient not taking: Reported on 2/13/2020)    lancing device Misc 1 Device by Misc.(Non-Drug; Combo Route) route 2 (two) times daily with meals.    valsartan (DIOVAN) 80 MG tablet Take 1 tablet (80 mg total) by mouth once daily.     No current facility-administered medications for this visit.        Physical Exam  /84 (BP Location: Right arm, Patient Position: Sitting, BP Method: Large (Manual))   Pulse 83   Temp 98 °F (36.7 °C) (Oral)   Ht 5' 1" (1.549 m)   Wt 103.5 kg (228 lb 2.8 oz)   SpO2 95%   BMI 43.11 kg/m²   Physical Exam   Constitutional: She is oriented to person, place, and time. She appears well-developed and well-nourished.   HENT:   Head: Normocephalic and atraumatic.   Eyes: Pupils are equal, round, and reactive to light. Conjunctivae and EOM are normal.   Neck: Normal range of motion. Neck supple.   Cardiovascular: Normal rate, regular rhythm and normal heart sounds. Exam reveals no gallop and no friction rub.   No murmur heard.  Pulmonary/Chest: Breath sounds normal. No respiratory distress.   Abdominal: Soft. Bowel sounds are normal. She exhibits no distension. There is no tenderness.   Musculoskeletal: Normal range of motion.   Lymphadenopathy:     She has no cervical adenopathy.   Neurological: She is alert and oriented to person, place, and time.   Skin: Skin is warm.   Psychiatric: She has a normal mood and affect.     Protective Sensation (w/ 10 gram monofilament):  Right: Intact  Left: Intact    Visual Inspection:  Normal -  Bilateral    Pedal Pulses:   Right: Present  Left: Present    Posterior tibialis:   Right:Present  Left: Present        Assessment/Plan:  Africa Jennings is a 49 y.o. female who presents today for :    Problem List Items Addressed This Visit        Neuro "    DDD (degenerative disc disease), lumbar  Noted in chart         Cardiac/Vascular    Essential hypertension (Chronic)    Relevant Medications    valsartan (DIOVAN) 80 MG tablet  The current medical regimen is effective;  continue present plan and medications.  Stop coreg   F.u nurse visit in 2 weeks   Recommend digital medicine program       Other Relevant Orders    Hypertension Digital Medicine (HDMP) Enrollment Order (Completed)    Hypertension Digital Medicine (HDMP): Assign Onboarding Questionnaires (Completed)       Endocrine    Morbid obesity with BMI of 40.0-44.9, adult (Chronic)  The patient is asked to make an attempt to improve diet and exercise patterns to aid in medical management of this problem.      Type 2 diabetes mellitus without complication, without long-term current use of insulin (Chronic)    Relevant Medications    blood-glucose meter kit    blood sugar diagnostic Strp    lancing device Misc    semaglutide (OZEMPIC) 0.25 mg or 0.5 mg(2 mg/1.5 mL) PnIj  Increase ozempic       Other Relevant Orders    Diabetes Digital Medicine (DDMP) Enrollment Order (Completed)    Diabetes Digital Medicine (DDMP): Assign Onboarding Questionnaires (Completed)    CBC auto differential    Comprehensive metabolic panel    Lipid panel    Hemoglobin A1c    TSH       GI    Gastroesophageal reflux disease without esophagitis (Chronic)  The current medical regimen is effective;  continue present plan and medications.        Other Visit Diagnoses     Annual physical exam    -  Primary  Health Maintenance       Date Due Completion Date    TETANUS VACCINE 02/18/1987 ---    Shingles Vaccine (1 of 2) 02/18/2019 ---    Hemoglobin A1c 05/10/2020 2/10/2020    Eye Exam 11/18/2020 11/18/2019    Override on 12/31/2015: Done    Lipid Panel 02/10/2021 2/10/2020    Low Dose Statin 02/13/2021 2/13/2020    Foot Exam 02/13/2021 2/13/2020 (Done)    Override on 2/13/2020: Done    Mammogram 07/15/2021 7/15/2019    Colonoscopy 06/21/2028  6/21/2018        I addressed all major concerns as it related to health maintenance.  All were ordered and scheduled based on the patients wishes.  Any additional health maintenance will be readdressed at the next physical if declined or deferred by the patient.              Follow up in about 6 months (around 8/13/2020), or if symptoms worsen or fail to improve.

## 2020-02-14 ENCOUNTER — PATIENT OUTREACH (OUTPATIENT)
Dept: OTHER | Facility: OTHER | Age: 50
End: 2020-02-14

## 2020-02-14 DIAGNOSIS — E11.9 TYPE 2 DIABETES MELLITUS: ICD-10-CM

## 2020-02-14 NOTE — LETTER
March 5, 2020     Africa Finleyqian Jennings  [address] [place] [address]       Dear Africa,    Welcome to [facility]! Our goal is to make care effective, proactive and convenient by using data you send us from home to better treat your chronic conditions.              My name is Ilya MantillaYobanyMatt, and I am your dedicated Digital Medicine clinician. As an expert in medication management, I will help ensure that the medications you are taking continue to provide the intended benefits and help you reach your goals. You can reach me directly at [phone] or by sending me a message directly through your [facility] account.      I am Angelica Kyle and I will be your health . My job is to help you identify lifestyle changes to improve your disease control. We will talk about nutrition, exercise, and other ways you may be able to adjust your current habits to better your health. Additionally, we will help ensure you are completing the tests and screenings that are necessary to help manage your conditions. You can reach me directly at [phone] or by sending me a message directly through your [facility] account.    Most importantly, YOU are at the center of this team. Together, we will work to improve your overall health and encourage you to meet your goals for a healthier lifestyle.     What we expect from YOU:  · Please take frequent home blood pressure measurements. We ask that you take at least 1 blood pressure reading per week, but more information will better help us get you know you. Be sure you rest for a few minutes before taking the reading in a quiet, comfortable place.     Be available to receive phone calls or [employer] messages, when appropriate, from your care team. Please let us know if there are any specific days or times that work best for us to reach you via phone.     Complete routine tests and screenings. Dont worry, we will help keep you on track!            What you should expect from your Digital Medicine Care Team:   We will work with you to create a personalized plan of care and provide you with encouragement and education, including regarding lifestyle changes, that could help you manage your disease states.     We will adjust your current medications, if needed, and continue to monitor your long-term progress.     We will provide you and your physician with monthly progress reports after you have been in the program for more than 30 days.     We will send you reminders through [employer] and text messages to help ensure you do not miss any testing deadlines to help manage your disease states.    You will be able to reach us by phone or through your [employer] account by clicking our names under Care Team on the right side of the home screen.    I look forward to working with you to achieve your blood pressure goals!    We look forward to working with you to help manage your health,    Sincerely,    Your Digital Medicine Team    Please visit our websites to learn more:   · Hypertension: [email or web address]      Remember, we are not available for emergencies. If you have an emergency, please contact your doctors office directly or call [facility]call ([phone] or [phone]) or [phone].

## 2020-02-14 NOTE — LETTER
March 5, 2020     Africa Finleyqian Jennings  [address] [place] [id number]       Dear Africa,    Welcome to [facility]! Our goal is to make care effective, proactive and convenient by using data you send us from home to better treat your chronic conditions.              My name is Ilya MantillaYobanyMatt, and I am your dedicated Digital Medicine clinician. As an expert in medication management, I will help ensure that the medications you are taking continue to provide the intended benefits and help you reach your goals. You can reach me directly at [phone] or by sending me a message directly through your [facility] account.      I am Angelica Kyle and I will be your health . My job is to help you identify lifestyle changes to improve your disease control. We will talk about nutrition, exercise, and other ways you may be able to adjust your current habits to better your health. Additionally, we will help ensure you are completing the tests and screenings that are necessary to help manage your conditions. You can reach me directly at [phone] or by sending me a message directly through your [facility] account.    Most importantly, YOU are at the center of this team. Together, we will work to improve your overall health and encourage you to meet your goals for a healthier lifestyle.     What we expect from YOU:  · Please take frequent home blood pressure measurements. We ask that you take at least 1 blood pressure reading per week, but more information will better help us get you know you. Be sure you rest for a few minutes before taking the reading in a quiet, comfortable place.     Be available to receive phone calls or [employer] messages, when appropriate, from your care team. Please let us know if there are any specific days or times that work best for us to reach you via phone.     Complete routine tests and screenings. Dont worry, we will help keep you on track!            What you should expect from your Digital Medicine Care Team:   We will work with you to create a personalized plan of care and provide you with encouragement and education, including regarding lifestyle changes, that could help you manage your disease states.     We will adjust your current medications, if needed, and continue to monitor your long-term progress.     We will provide you and your physician with monthly progress reports after you have been in the program for more than 30 days.     We will send you reminders through [employer] and text messages to help ensure you do not miss any testing deadlines to help manage your disease states.    You will be able to reach us by phone or through your [employer] account by clicking our names under Care Team on the right side of the home screen.    I look forward to working with you to achieve your blood pressure goals!    We look forward to working with you to help manage your health,    Sincerely,    Your Digital Medicine Team    Please visit our websites to learn more:   · Hypertension: [email or web address]      Remember, we are not available for emergencies. If you have an emergency, please contact your doctors office directly or call [facility] ([phone] or [phone]) or [phone].

## 2020-02-14 NOTE — PROGRESS NOTES
In attempt to complete the enrollment call the patient reports she's headed to a safety meeting. She request to complete the enrollment next week after 3pm.

## 2020-02-17 ENCOUNTER — OFFICE VISIT (OUTPATIENT)
Dept: PAIN MEDICINE | Facility: CLINIC | Age: 50
End: 2020-02-17
Payer: COMMERCIAL

## 2020-02-17 VITALS
WEIGHT: 228 LBS | SYSTOLIC BLOOD PRESSURE: 129 MMHG | HEART RATE: 86 BPM | OXYGEN SATURATION: 96 % | HEIGHT: 61 IN | BODY MASS INDEX: 43.05 KG/M2 | DIASTOLIC BLOOD PRESSURE: 75 MMHG | TEMPERATURE: 99 F

## 2020-02-17 DIAGNOSIS — M47.816 LUMBAR SPONDYLOSIS: Primary | ICD-10-CM

## 2020-02-17 DIAGNOSIS — M51.36 DDD (DEGENERATIVE DISC DISEASE), LUMBAR: ICD-10-CM

## 2020-02-17 PROCEDURE — 99999 PR PBB SHADOW E&M-EST. PATIENT-LVL III: ICD-10-PCS | Mod: PBBFAC,,, | Performed by: PAIN MEDICINE

## 2020-02-17 PROCEDURE — 3008F BODY MASS INDEX DOCD: CPT | Mod: CPTII,S$GLB,, | Performed by: PAIN MEDICINE

## 2020-02-17 PROCEDURE — 3074F SYST BP LT 130 MM HG: CPT | Mod: CPTII,S$GLB,, | Performed by: PAIN MEDICINE

## 2020-02-17 PROCEDURE — 3078F PR MOST RECENT DIASTOLIC BLOOD PRESSURE < 80 MM HG: ICD-10-PCS | Mod: CPTII,S$GLB,, | Performed by: PAIN MEDICINE

## 2020-02-17 PROCEDURE — 3008F PR BODY MASS INDEX (BMI) DOCUMENTED: ICD-10-PCS | Mod: CPTII,S$GLB,, | Performed by: PAIN MEDICINE

## 2020-02-17 PROCEDURE — 99213 OFFICE O/P EST LOW 20 MIN: CPT | Mod: S$GLB,,, | Performed by: PAIN MEDICINE

## 2020-02-17 PROCEDURE — 99213 PR OFFICE/OUTPT VISIT, EST, LEVL III, 20-29 MIN: ICD-10-PCS | Mod: S$GLB,,, | Performed by: PAIN MEDICINE

## 2020-02-17 PROCEDURE — 3078F DIAST BP <80 MM HG: CPT | Mod: CPTII,S$GLB,, | Performed by: PAIN MEDICINE

## 2020-02-17 PROCEDURE — 3074F PR MOST RECENT SYSTOLIC BLOOD PRESSURE < 130 MM HG: ICD-10-PCS | Mod: CPTII,S$GLB,, | Performed by: PAIN MEDICINE

## 2020-02-17 PROCEDURE — 99999 PR PBB SHADOW E&M-EST. PATIENT-LVL III: CPT | Mod: PBBFAC,,, | Performed by: PAIN MEDICINE

## 2020-02-17 NOTE — PROGRESS NOTES
Subjective:     Patient ID: Africa Jennings is a 50 y.o. female    Chief Complaint: No chief complaint on file.      Referred by: No ref. provider found      HPI:    Interval History (2/17/20):  She returns today for follow up and MRI review.  She reports that her pain is unchanged in quality and location since last encounter.  She does state that the pain has significantly improved and is overall not very bothersome for the time being.        Initial Encounter (2/3/20):  Africa Jennings is a 50 y.o. female who presents today with chronic right-sided low back and lower extremity pain. This pain has been present for 6-7 months.  No specific inciting event or injury noted.  Patient localizes the pain to the right lumbar region.  Pain radiates to the right posterior and anterior thigh.  She reports associated numbness in this area as well. She also reports weakness of the right lower extremity when ambulating.  She denies any associated bowel or bladder dysfunction.  The pain is constant and worsened with activity.  She states that the pain interrupt her sleep.  Patient does have a history of a similar problem roughly 8 years ago.  She underwent epidural steroid injections with good relief.  This pain is described in detail below.    Physical Therapy:  Not for this episode.    Non-pharmacologic Treatment:  Rest helps         · TENS?  No    Pain Medications:         · Currently taking:  Aleve    · Has tried in the past:      · Has not tried:  Opioids, Tylenol, Muscle relaxants, TCAs, SNRIs, anticonvulsants, topical creams    Blood thinners:  None    Interventional Therapies:   L4-5 interlaminar epidural steroid injection x2    Relevant Surgeries:  None    Affecting sleep?  Yes    Affecting daily activities? yes    Depressive symptoms? no          · SI/HI? No    Work status: Employed    Pain Scores:    Best:       6/10  Worst:     10/10  Usually:   8/10  Today:    0/10    Review of Systems    Constitutional: Negative for activity change, appetite change, chills, fatigue, fever and unexpected weight change.   HENT: Negative for hearing loss.    Eyes: Negative for visual disturbance.   Respiratory: Negative for chest tightness and shortness of breath.    Cardiovascular: Negative for chest pain.   Gastrointestinal: Negative for abdominal pain, constipation, diarrhea, nausea and vomiting.   Genitourinary: Negative for difficulty urinating.   Musculoskeletal: Positive for back pain, gait problem and myalgias. Negative for neck pain.   Skin: Negative for rash.   Neurological: Positive for weakness and numbness. Negative for dizziness, light-headedness and headaches.   Psychiatric/Behavioral: Positive for sleep disturbance. Negative for hallucinations and suicidal ideas. The patient is not nervous/anxious.        Past Medical History:   Diagnosis Date    Diabetes mellitus     Diabetes mellitus, type 2     Eczema     GERD (gastroesophageal reflux disease)     Hypertension     Impaired fasting blood sugar     YSABEL on CPAP        Past Surgical History:   Procedure Laterality Date    breast reduction      CHOLECYSTECTOMY      laprascopic    ESOPHAGOGASTRODUODENOSCOPY  8/18/14    HYSTERECTOMY  2007    laprascopic, total for fibroids.  Dr Polo    OOPHORECTOMY      TOTAL REDUCTION MAMMOPLASTY         Social History     Socioeconomic History    Marital status:      Spouse name: Not on file    Number of children: Not on file    Years of education: Not on file    Highest education level: Not on file   Occupational History    Not on file   Social Needs    Financial resource strain: Hard    Food insecurity:     Worry: Often true     Inability: Often true    Transportation needs:     Medical: No     Non-medical: No   Tobacco Use    Smoking status: Never Smoker    Smokeless tobacco: Never Used   Substance and Sexual Activity    Alcohol use: No     Frequency: Monthly or less     Drinks per  session: Patient refused     Binge frequency: Never     Comment: socially    Drug use: No    Sexual activity: Yes     Partners: Male     Birth control/protection: Surgical   Lifestyle    Physical activity:     Days per week: 3 days     Minutes per session: 60 min    Stress: To some extent   Relationships    Social connections:     Talks on phone: More than three times a week     Gets together: Once a week     Attends [employer] service: Not on file     Active member of club or organization: Yes     Attends meetings of clubs or organizations: More than 4 times per year     Relationship status:    Other Topics Concern    Not on file   Social History Narrative     since 2000.  2 children: 26 and 21.    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 90 tablet 3    blood sugar diagnostic Strp 1 strip by Misc.(Non-Drug; Combo Route) route 2 (two) times daily with meals. 100 strip 11    blood sugar diagnostic Strp 1 strip by Misc.(Non-Drug; Combo Route) route 2 (two) times daily with meals. 100 strip 11    blood-glucose meter kit Use as instructed 1 each 0    cetirizine (ZYRTEC) 10 MG tablet Take 1 tablet (10 mg total) by mouth once daily. 90 tablet 1    dicyclomine (BENTYL) 20 mg tablet TAKE 1 TABLET (20 MG TOTAL) BY MOUTH EVERY 6 (SIX) HOURS. 360 tablet 2    ergocalciferol (VITAMIN D2) 50,000 unit Cap TAKE 1 CAPSULE (50,000 UNITS TOTAL) BY MOUTH TWICE A WEEK. 24 capsule 2    fluticasone propionate (FLONASE) 50 mcg/actuation nasal spray INSTILL 1 SPRAY (50 MCG  "TOTAL) IN EACH NOSTRIL ONCE DAILY. 16 mL 2    gabapentin (NEURONTIN) 300 MG capsule Take 2 capsules (600 mg total) by mouth 3 (three) times daily. 180 capsule 11    lancets Misc Use to test blood sugar once daily. 300 each 0    lancing device Misc 1 Device by Misc.(Non-Drug; Combo Route) route 2 (two) times daily with meals. 1 each 0    lancing device Misc 1 Device by Misc.(Non-Drug; Combo Route) route 2 (two) times daily with meals. 100 each 11    metFORMIN (GLUCOPHAGE-XR) 500 MG 24 hr tablet Take 1 tablet (500 mg total) by mouth 2 (two) times daily with meals. 180 tablet 1    neomycin-polymyxin-dexamethasone (MAXITROL) 3.5mg/mL-10,000 unit/mL-0.1 % DrpS       nystatin (MYCOSTATIN) ointment Apply topically 2 (two) times daily.        omeprazole (PRILOSEC) 40 MG capsule Take 1 capsule (40 mg total) by mouth once daily. 90 capsule 1    ondansetron (ZOFRAN) 4 MG tablet Take 1 tablet (4 mg total) by mouth every 8 (eight) hours as needed. 90 tablet 1    pen needle, diabetic (PEN NEEDLE) 32 gauge x 5/32" Ndle 1 each by Misc.(Non-Drug; Combo Route) route once a week. 50 each 1    semaglutide (OZEMPIC) 0.25 mg or 0.5 mg(2 mg/1.5 mL) PnIj Inject 0.5 mg into the skin every 7 days. 3 mL 5    tobramycin-dexamethasone 0.3-0.1% (TOBRADEX) 0.3-0.1 % DrpS INSTILL 1 DROP INTO BOTH EYES 4 TIMES A DAY  2    triamcinolone acetonide 0.1% (KENALOG) 0.1 % cream Apply topically 2 (two) times daily. Apply topically 2 (two) times daily. 135 g 4    valsartan (DIOVAN) 80 MG tablet Take 1 tablet (80 mg total) by mouth once daily. 30 tablet 3     No current facility-administered medications on file prior to visit.        Objective:      /75 (BP Location: Right arm, Patient Position: Sitting, BP Method: Large (Automatic))   Pulse 86   Temp 98.5 °F (36.9 °C) (Oral)   Ht 5' 1" (1.549 m)   Wt 103.4 kg (228 lb)   SpO2 96%   BMI 43.08 kg/m²     Exam:  GEN:  Well developed, well nourished.  No acute distress.   HEENT:  No " trauma.  Mucous membranes moist.  Nares patent bilaterally.  PSYCH: Normal affect. Thought content appropriate.  CHEST:  Breathing symmetric.  No audible wheezing.  ABD: Soft, non-distended.  SKIN:  Warm, pink, dry.  No rash on exposed areas.    EXT:  No cyanosis, clubbing, or edema.  No color change or changes in nail or hair growth.  NEURO/MUSCULOSKELETAL:  Fully alert, oriented, and appropriate. Speech normal brant. No cranial nerve deficits.   Gait:  Normal.  No focal motor deficits.         Imaging:  Narrative     EXAMINATION:  MRI LUMBAR SPINE WITHOUT CONTRAST    CLINICAL HISTORY:  lumbar radiculopathy; Other intervertebral disc degeneration, lumbar region    TECHNIQUE:  Multiplanar, multisequence MR images were acquired from the thoracolumbar junction to the sacrum without the administration of contrast.    COMPARISON:  MRI of the lumbar spine 09/21/2011.    FINDINGS:  There is again normal lumbar lordosis.  There is no spondylolisthesis or spondylolysis or signs for acute fractures.  The tip of the conus is at L1.  No definite signs for intradural masses.  Paraspinal soft tissues are unremarkable.    L5-S1: There is disc dehydration.  Mild circumferential annular disc bulge.  No significant disc protrusions or spinal stenosis.  There is bilateral mild foraminal narrowing associated with disc bulge.  There is degenerative hypertrophic facet arthropathy.    L4-5: Disc dehydration.  There is a left L4-5 neural foraminal disc protrusion without definite signs for compression of the exiting L4 root in the foramen.  These findings have improved when compared to the previous study.  Within the central canal there is a broad-based mild disc protrusion or disc bulge greater on the left associated with left lateral recess stenosis.  Findings suspicious for compression of the descending left L5 root in the lateral recess (image 21 series 6.  Clinical correlation for left L5 radiculopathy suggested.  Hypertrophic  changes of the facet joints and the ligamentum flava resulting in at least a mild central canal spinal stenosis.    L3-4: There is dehydration of the nucleus pulposis.  Mild circumferential annular disc bulge.  No significant central canal spinal stenosis or foraminal stenosis.  There is facet arthropathy bilaterally.    L2-3: No disc protrusions or spinal stenosis or foraminal stenosis.    L1-2: No disc herniations or spinal stenosis or foraminal stenosis.    T12-L1: No disc herniations or spinal stenosis or foraminal stenosis.    T11-T12: No axial images performed through this region.  However on the sagittal images there is dehydration of nucleus pulposis associated with a circumferential annular disc bulge with mild compression of the thecal sac.  No cord compression however noted.   Impression       1. Multifactorial central canal spinal stenosis L4-5 disc space as above.  In addition there is a left paracentral disc bulge or protrusion associated with left lateral recess stenosis and compression of the descending left L5 root as above.  The left foraminal disc protrusion seen on the previous examination less pronounced when compared to the previous study.  2. Multilevel facet arthropathy as above.  3. Milder spondylotic changes at the rest of the disc spaces as above.      Electronically signed by: Gideon Webber MD  Date: 02/11/2020  Time: 07:42         Assessment:       Encounter Diagnoses   Name Primary?    Lumbar spondylosis Yes    DDD (degenerative disc disease), lumbar          Plan:       Diagnoses and all orders for this visit:    Lumbar spondylosis    DDD (degenerative disc disease), lumbar        Africa Ayaka Jennings is a 50 y.o. female with chronic right-sided low back and lower extremity pain. Symptoms somewhat consistent with a right upper to mid lumbar radiculopathy, but MRI without any significant evidence to suggest this.  Does have some evidence for possible left lumbar radiculopathy on  MRI the patient without symptoms consistent with this.    1.  At this time, pain is fairly well managed.  No further treatment is needed.  2.  If pain worsens or persists and patient wishes to attempt physical therapy she can call to request a referral.  3.  Return to clinic as needed.

## 2020-02-17 NOTE — PROGRESS NOTES
Digital Medicine Program Enrollment  HPI     In an attempt at enrollment pt was at a doctors appt and requested a callback tomorrow around this same time.

## 2020-02-20 NOTE — PROGRESS NOTES
Digital Medicine Program Enrollment  HPI     Note is in ref to 02/19 call     1st attempt for enrollment call.  No answer, left voicemail.

## 2020-03-04 DIAGNOSIS — K21.9 GASTROESOPHAGEAL REFLUX DISEASE WITHOUT ESOPHAGITIS: ICD-10-CM

## 2020-03-04 RX ORDER — OMEPRAZOLE 40 MG/1
40 CAPSULE, DELAYED RELEASE ORAL DAILY
Qty: 90 CAPSULE | Refills: 1 | Status: SHIPPED | OUTPATIENT
Start: 2020-03-04 | End: 2021-01-11 | Stop reason: SDUPTHER

## 2020-03-05 DIAGNOSIS — E78.5 HYPERLIPIDEMIA, UNSPECIFIED HYPERLIPIDEMIA TYPE: ICD-10-CM

## 2020-03-05 RX ORDER — ATORVASTATIN CALCIUM 20 MG/1
20 TABLET, FILM COATED ORAL DAILY
Qty: 90 TABLET | Refills: 3 | Status: SHIPPED | OUTPATIENT
Start: 2020-03-05 | End: 2020-06-16 | Stop reason: SDUPTHER

## 2020-03-05 NOTE — PROGRESS NOTES
Digital Medicine Program Enrollment      Our goal is to get BP to consistently below 130/80mmHg and make the process convenient so patient can avoid extra trips to the office. Getting your blood pressure below 130/80mmHg (definition of control) will reduce your risk for heart attack, kidney failure, stroke and death (as well as kidney failure, eye disease, & dementia)      Reviewed that the Digital Medicine care team - consisting of a clinician and a health  - will follow the most current evidence-based national guidelines for treating your condition.  The health  will focus on lifestyle modifications and motivation while the clinician will focus on medication therapy.  The care team will review all data on a regular basis and reach out as needed.      Explained that one of the key parts of the program is communication with the care team.  Asked patient to respond to outreach attempts and complete questionnaires.  Stressed importance of medication adherence.      Explained that we expect patient to obtain several blood pressures per week at random times of day.  Instructed patient not to allow anyone else to use phone and monitoring device.  Confirmed appropriate BP monitoring technique.      Explained to patient that the digital medicine team is not available for emergencies.  Patient will call [facility] on-call ([phone] or [phone]) or [phone] if needed.      The patient reports that she had a death in her family recently which is why she stopped monitoring her BP for about 2 weeks. She plans to monitor everyday going forward.

## 2020-03-07 DIAGNOSIS — E11.9 TYPE 2 DIABETES MELLITUS WITHOUT COMPLICATION, WITHOUT LONG-TERM CURRENT USE OF INSULIN: Chronic | ICD-10-CM

## 2020-03-07 RX ORDER — SEMAGLUTIDE 1.34 MG/ML
INJECTION, SOLUTION SUBCUTANEOUS
Qty: 4.5 SYRINGE | Refills: 0 | Status: SHIPPED | OUTPATIENT
Start: 2020-03-07 | End: 2020-04-30

## 2020-03-09 ENCOUNTER — PATIENT OUTREACH (OUTPATIENT)
Dept: OTHER | Facility: OTHER | Age: 50
End: 2020-03-09

## 2020-03-09 NOTE — LETTER
April 1, 2020     Africa Finleyqian Jennings  [address] [place] [address]       Dear Africa,    Welcome to [facility]! Our goal is to make care effective, proactive and convenient by using data you send us from home to better treat your chronic conditions.              My name is Ilya MantillaYobanyMatt, and I am your dedicated Digital Medicine clinician. As an expert in medication management, I will help ensure that the medications you are taking continue to provide the intended benefits and help you reach your goals. You can reach me directly at [phone] or by sending me a message directly through your [facility] account.      I am Angelica Kyle and I will be your health . My job is to help you identify lifestyle changes to improve your disease control. We will talk about nutrition, exercise, and other ways you may be able to adjust your current habits to better your health. Additionally, we will help ensure you are completing the tests and screenings that are necessary to help manage your conditions. You can reach me directly at [phone] or by sending me a message directly through your [facility] account.    Most importantly, YOU are at the center of this team. Together, we will work to improve your overall health and encourage you to meet your goals for a healthier lifestyle.     What we expect from YOU:  · Please take frequent home blood pressure measurements. We ask that you take at least 1 blood pressure reading per week, but more information will better help us get you know you. Be sure you rest for a few minutes before taking the reading in a quiet, comfortable place.     Be available to receive phone calls or [employer] messages, when appropriate, from your care team. Please let us know if there are any specific days or times that work best for us to reach you via phone.     Complete routine tests and screenings. Dont worry, we will help keep you on track!            What you should expect from your Digital Medicine Care Team:   We will work with you to create a personalized plan of care and provide you with encouragement and education, including regarding lifestyle changes, that could help you manage your disease states.     We will adjust your current medications, if needed, and continue to monitor your long-term progress.     We will provide you and your physician with monthly progress reports after you have been in the program for more than 30 days.     We will send you reminders through [employer] and text messages to help ensure you do not miss any testing deadlines to help manage your disease states.    You will be able to reach us by phone or through your [employer] account by clicking our names under Care Team on the right side of the home screen.    I look forward to working with you to achieve your blood pressure goals!    We look forward to working with you to help manage your health,    Sincerely,    Your Digital Medicine Team    Please visit our websites to learn more:   · Hypertension: [email or web address]      Remember, we are not available for emergencies. If you have an emergency, please contact your doctors office directly or call [facility] ([phone] or [phone]) or [phone].

## 2020-03-10 ENCOUNTER — OFFICE VISIT (OUTPATIENT)
Dept: URGENT CARE | Facility: CLINIC | Age: 50
End: 2020-03-10
Payer: COMMERCIAL

## 2020-03-10 VITALS
WEIGHT: 230 LBS | RESPIRATION RATE: 18 BRPM | BODY MASS INDEX: 43.43 KG/M2 | TEMPERATURE: 102 F | OXYGEN SATURATION: 98 % | HEART RATE: 104 BPM | SYSTOLIC BLOOD PRESSURE: 156 MMHG | HEIGHT: 61 IN | DIASTOLIC BLOOD PRESSURE: 99 MMHG

## 2020-03-10 DIAGNOSIS — R50.9 FEVER, UNSPECIFIED FEVER CAUSE: ICD-10-CM

## 2020-03-10 DIAGNOSIS — R06.2 WHEEZING: ICD-10-CM

## 2020-03-10 DIAGNOSIS — J18.9 PNEUMONIA OF LEFT LOWER LOBE DUE TO INFECTIOUS ORGANISM: Primary | ICD-10-CM

## 2020-03-10 LAB
CTP QC/QA: YES
FLUAV AG NPH QL: NEGATIVE
FLUBV AG NPH QL: NEGATIVE

## 2020-03-10 PROCEDURE — 87804 INFLUENZA ASSAY W/OPTIC: CPT | Mod: QW,S$GLB,, | Performed by: PHYSICIAN ASSISTANT

## 2020-03-10 PROCEDURE — 87804 POCT INFLUENZA A/B: ICD-10-PCS | Mod: QW,S$GLB,, | Performed by: PHYSICIAN ASSISTANT

## 2020-03-10 PROCEDURE — 99214 PR OFFICE/OUTPT VISIT, EST, LEVL IV, 30-39 MIN: ICD-10-PCS | Mod: 25,S$GLB,, | Performed by: PHYSICIAN ASSISTANT

## 2020-03-10 PROCEDURE — 94640 PR INHAL RX, AIRWAY OBST/DX SPUTUM INDUCT: ICD-10-PCS | Mod: S$GLB,,, | Performed by: EMERGENCY MEDICINE

## 2020-03-10 PROCEDURE — 71046 X-RAY EXAM CHEST 2 VIEWS: CPT | Mod: S$GLB,,, | Performed by: RADIOLOGY

## 2020-03-10 PROCEDURE — 99214 OFFICE O/P EST MOD 30 MIN: CPT | Mod: 25,S$GLB,, | Performed by: PHYSICIAN ASSISTANT

## 2020-03-10 PROCEDURE — 94640 AIRWAY INHALATION TREATMENT: CPT | Mod: S$GLB,,, | Performed by: EMERGENCY MEDICINE

## 2020-03-10 PROCEDURE — 71046 XR CHEST PA AND LATERAL: ICD-10-PCS | Mod: S$GLB,,, | Performed by: RADIOLOGY

## 2020-03-10 RX ORDER — ALBUTEROL SULFATE 90 UG/1
2 AEROSOL, METERED RESPIRATORY (INHALATION) EVERY 4 HOURS PRN
Qty: 6.7 G | Refills: 0 | Status: SHIPPED | OUTPATIENT
Start: 2020-03-10 | End: 2022-03-16

## 2020-03-10 RX ORDER — BENZONATATE 100 MG/1
100 CAPSULE ORAL EVERY 6 HOURS PRN
Qty: 30 CAPSULE | Refills: 1 | Status: SHIPPED | OUTPATIENT
Start: 2020-03-10 | End: 2020-03-17

## 2020-03-10 RX ORDER — AMOXICILLIN AND CLAVULANATE POTASSIUM 875; 125 MG/1; MG/1
1 TABLET, FILM COATED ORAL 2 TIMES DAILY
Qty: 20 TABLET | Refills: 0 | Status: ON HOLD | OUTPATIENT
Start: 2020-03-10 | End: 2020-03-19 | Stop reason: HOSPADM

## 2020-03-10 RX ORDER — IPRATROPIUM BROMIDE 0.5 MG/2.5ML
0.5 SOLUTION RESPIRATORY (INHALATION)
Status: COMPLETED | OUTPATIENT
Start: 2020-03-10 | End: 2020-03-10

## 2020-03-10 RX ORDER — ALBUTEROL SULFATE 0.83 MG/ML
2.5 SOLUTION RESPIRATORY (INHALATION)
Status: COMPLETED | OUTPATIENT
Start: 2020-03-10 | End: 2020-03-10

## 2020-03-10 RX ORDER — AZITHROMYCIN 250 MG/1
TABLET, FILM COATED ORAL
Qty: 6 TABLET | Refills: 0 | Status: ON HOLD | OUTPATIENT
Start: 2020-03-10 | End: 2020-03-19 | Stop reason: HOSPADM

## 2020-03-10 RX ADMIN — ALBUTEROL SULFATE 2.5 MG: 0.83 SOLUTION RESPIRATORY (INHALATION) at 07:03

## 2020-03-10 RX ADMIN — IPRATROPIUM BROMIDE 0.5 MG: 0.5 SOLUTION RESPIRATORY (INHALATION) at 07:03

## 2020-03-10 NOTE — PROGRESS NOTES
"Subjective:       Patient ID: Africa Jennings is a 50 y.o. female.    Vitals:  height is 5' 1" (1.549 m) and weight is 104.3 kg (230 lb). Her temperature is 101.9 °F (38.8 °C) (abnormal). Her blood pressure is 156/99 (abnormal) and her pulse is 104. Her respiration is 18 and oxygen saturation is 98%.     Chief Complaint: URI    Pt c/o productive cough and fever since Friday. Associated symptoms include SOB. Denies wheezing, CP, sinus pain/pressure, headaches, neck pain/stiffness. Denies history of asthma or COPD. Reprots taking ibuprofen for the fever with mild relief.     URI    This is a new problem. The current episode started in the past 7 days. The maximum temperature recorded prior to her arrival was 102 - 102.9 F. Associated symptoms include congestion and coughing. Pertinent negatives include no abdominal pain, chest pain, diarrhea, dysuria, ear pain, headaches, nausea, neck pain, rash, sinus pain, sore throat, vomiting or wheezing. She has tried acetaminophen and NSAIDs for the symptoms. The treatment provided mild relief.       Constitution: Positive for chills, fatigue and fever. Negative for sweating.   HENT: Positive for congestion and postnasal drip. Negative for ear pain, sinus pain, sinus pressure, sore throat and trouble swallowing.    Neck: Negative for neck pain, neck stiffness and painful lymph nodes.   Cardiovascular: Negative for chest pain and leg swelling.   Eyes: Negative for double vision and blurred vision.   Respiratory: Positive for cough, sputum production and shortness of breath. Negative for bloody sputum, COPD, wheezing and asthma.    Gastrointestinal: Negative for abdominal pain, nausea, vomiting and diarrhea.   Genitourinary: Negative for dysuria, frequency, urgency and history of kidney stones.   Musculoskeletal: Negative for joint pain, joint swelling, muscle cramps and muscle ache.   Skin: Negative for color change, pale, rash and bruising.   Allergic/Immunologic: " Negative for seasonal allergies and asthma.   Neurological: Negative for dizziness, history of vertigo, light-headedness, passing out, headaches, numbness and tingling.   Hematologic/Lymphatic: Negative for swollen lymph nodes.   Psychiatric/Behavioral: Negative for nervous/anxious, sleep disturbance and depression. The patient is not nervous/anxious.        Objective:      Physical Exam   Constitutional: She is oriented to person, place, and time. She appears well-developed and well-nourished. She is cooperative.  Non-toxic appearance. She does not have a sickly appearance. She appears ill. No distress.   Patient is sitting on exam table in no acute distress. Nontoxic appearing.    HENT:   Head: Normocephalic and atraumatic.   Right Ear: Hearing, tympanic membrane, external ear and ear canal normal.   Left Ear: Hearing, tympanic membrane, external ear and ear canal normal.   Nose: Nose normal. No mucosal edema, rhinorrhea or nasal deformity. No epistaxis. Right sinus exhibits no maxillary sinus tenderness and no frontal sinus tenderness. Left sinus exhibits no maxillary sinus tenderness and no frontal sinus tenderness.   Mouth/Throat: Uvula is midline and mucous membranes are normal. No trismus in the jaw. Normal dentition. No uvula swelling. Posterior oropharyngeal erythema and cobblestoning present. No oropharyngeal exudate or posterior oropharyngeal edema. No tonsillar exudate.   Eyes: Pupils are equal, round, and reactive to light. Conjunctivae and lids are normal. No scleral icterus.   Neck: Trachea normal, full passive range of motion without pain and phonation normal. Neck supple. No neck rigidity. No edema and no erythema present.   Cardiovascular: Normal rate, regular rhythm, normal heart sounds, intact distal pulses and normal pulses.   Pulmonary/Chest: Effort normal. No respiratory distress. She has no decreased breath sounds. She has wheezes. She has no rhonchi. She has rales.   On initial assessment,  diffuse expiratory wheezing noted to all lung fields. O2 sat 96% on RA. Duo neb given in clinic. On reassessment, wheezing significantly improved, O2 sat improved to 98%, and patient reports improvement in ease of breathing after treatment.    Abdominal: Normal appearance.   Musculoskeletal: Normal range of motion. She exhibits no edema or deformity.   Lymphadenopathy:     She has no cervical adenopathy.   Neurological: She is alert and oriented to person, place, and time. She exhibits normal muscle tone. Coordination normal.   Skin: Skin is warm, dry, intact, not diaphoretic and not pale.   Psychiatric: She has a normal mood and affect. Her speech is normal and behavior is normal. Judgment and thought content normal. Cognition and memory are normal.   Nursing note and vitals reviewed.        X-ray Chest Pa And Lateral  Result Date: 3/10/2020  EXAMINATION: XR CHEST PA AND LATERAL CLINICAL HISTORY: COUGH; Fever, unspecified TECHNIQUE: PA and lateral views of the chest were performed. COMPARISON: January 2017. FINDINGS: Cardiac silhouette is normal in size.  Lungs are symmetrically expanded.  Multifocal airspace opacities are seen within the left lung base and within the right mid to upper lung zone.  No confluent focal consolidation seen.  No evidence of pneumothorax or significant pleural effusion.  No acute osseous abnormality identified.   Multifocal airspace opacities seen at the left lung base and within the right mid to upper lung zone.  Findings could reflect developing pneumonia or other multifocal infectious or inflammatory process.  Future radiographic follow-up is recommended to ensure resolution. Electronically signed by: Parul Jackson MD Date:    03/10/2020 Time:    19:42    Results for orders placed or performed in visit on 03/10/20   POCT Influenza A/B   Result Value Ref Range    Rapid Influenza A Ag Negative Negative    Rapid Influenza B Ag Negative Negative     Acceptable Yes      CXR  indicates developing pneumonia which is consistent with clinical exam findings. Advised on strict ER precautions. Advised on return/follow-up precautions. Answered all patient questions. Patient verbalized understanding and voiced agreement with current treatment plan.    Assessment:       1. Pneumonia of left lower lobe due to infectious organism    2. Fever, unspecified fever cause    3. Wheezing        Plan:         Pneumonia of left lower lobe due to infectious organism  -     albuterol (PROVENTIL/VENTOLIN HFA) 90 mcg/actuation inhaler; Inhale 2 puffs into the lungs every 4 (four) hours as needed for Wheezing or Shortness of Breath. Rescue  Dispense: 6.7 g; Refill: 0  -     amoxicillin-clavulanate 875-125mg (AUGMENTIN) 875-125 mg per tablet; Take 1 tablet by mouth 2 (two) times daily. for 10 days  Dispense: 20 tablet; Refill: 0  -     azithromycin (Z-NADER) 250 MG tablet; Take 2 tablets by mouth on day 1; Take 1 tablet by mouth on days 2-5  Dispense: 6 tablet; Refill: 0  -     benzonatate (TESSALON PERLES) 100 MG capsule; Take 1 capsule (100 mg total) by mouth every 6 (six) hours as needed.  Dispense: 30 capsule; Refill: 1    Fever, unspecified fever cause  -     POCT Influenza A/B  -     X-Ray Chest PA And Lateral; Future; Expected date: 03/10/2020    Wheezing  -     X-Ray Chest PA And Lateral; Future; Expected date: 03/10/2020  -     ipratropium 0.02 % nebulizer solution 0.5 mg  -     albuterol nebulizer solution 2.5 mg      Patient Instructions     If your condition worsens or fails to improve we recommend that you receive another evaluation at the ER immediately or contact your PCP to discuss your concerns or return here. You must understand that you've received an urgent care treatment only and that you may be released before all your medical problems are known or treated. You the patient will arrange for followup care as instructed.    Rest and fluids are important.  Can use honey with haydee to soothe your  throat.    Take full course of both antibiotics as directed.  -  Take antibiotics with meals and to add yogurt or probiotic over the counter to diet to help prevent GI upset while taking antibiotic.    Take inhaler as prescribed and needed for wheezing. Nebulizer treatments every 4-6 hours as needed.  Take prescription cough medication as prescribed as needed for cough.  Take mucinex as directed to help with chest congestion.    -  Flonase (fluticasone) is a nasal spray which is available over the counter and may help with symptoms of nasal congestion.     -  Tylenol or ibuprofen can also be used as directed for pain unless you have an allergy to them or medical condition such as stomach ulcers, kidney or liver disease or blood thinners etc for which you should not be taking these type of medications.     Please follow up with your primary care doctor or specialist in the next 48-72hrs as needed and if no improvement.    Repeat Chest Xray in the next 10 days.    Please stop smoking.      Pneumonia (Adult)  Pneumonia is an infection deep within the lungs. It is in the small air sacs (alveoli). Pneumonia may be caused by a virus or bacteria. Pneumonia caused by bacteria is usually treated with an antibiotic. Severe cases may need to be treated in the hospital. Milder cases can be treated at home. Symptoms usually start to get better during the first 2 days of treatment.    Home care  Follow these guidelines when caring for yourself at home:  · Rest at home for the first 2 to 3 days, or until you feel stronger. Dont let yourself get overly tired when you go back to your activities.  · Stay away from cigarette smoke - yours or other peoples.  · You may use acetaminophen or ibuprofen to control fever or pain, unless another medicine was prescribed. If you have chronic liver or kidney disease, talk with your healthcare provider before using these medicines. Also talk with your provider if youve had a stomach ulcer or  gastrointestinal bleeding. Dont give aspirin to anyone younger than 18 years of age who is ill with a fever. It may cause severe liver damage.  · Your appetite may be poor, so a light diet is fine.  · Drink 6 to 8 glasses of fluids every day to make sure you are getting enough fluids. Beverages can include water, sport drinks, sodas without caffeine, juices, tea, or soup. Fluids will help loosen secretions in the lung. This will make it easier for you to cough up the phlegm (sputum). If you also have heart or kidney disease, check with your healthcare provider before you drink extra fluids.  · Take antibiotic medicine prescribed until it is all gone, even if you are feeling better after a few days.  Follow-up care  Follow up with your healthcare provider in the next 2 to 3 days, or as advised. This is to be sure the medicine is helping you get better.  If you are 65 or older, you should get a pneumococcal vaccine and a yearly flu (influenza) shot. You should also get these vaccines if you have chronic lung disease like asthma, emphysema, or COPD. Recently, a second type of pneumonia vaccine has become available for everyone over 65 years old. This is in addition to the previous vaccine. Ask your provider about this.  When to seek medical advice  Call your healthcare provider right away if any of these occur:  · You dont get better within the first 48 hours of treatment  · Shortness of breath gets worse  · Rapid breathing (more than 25 breaths per minute)  · Coughing up blood  · Chest pain gets worse with breathing  · Fever of 100.4°F (38°C) or higher that doesnt get better with fever medicine  · Weakness, dizziness, or fainting that gets worse  · Thirst or dry mouth that gets worse  · Sinus pain, headache, or a stiff neck  · Chest pain not caused by coughing  Date Last Reviewed: 1/1/2017  © 6980-8533 [employer]. [address] [place] [address], [place] [address]. All rights reserved. This information is not  intended as a substitute for professional medical care. Always follow your healthcare professional's instructions.

## 2020-03-10 NOTE — LETTER
March 10, 2020      [facility] [address] [facility] [address]  Phone: [phone]  Fax: [phone]       Patient: Africa Jennings   YOB: 1970  Date of Visit: 03/10/2020    To Whom It May Concern:    Zaki Jennings  was at [facility] on 03/10/2020. She may return to work/school on 02/13/2020 with no restrictions. If you have any questions or concerns, or if I can be of further assistance, please do not hesitate to contact me.    Sincerely,    Liban Rain PA-C

## 2020-03-11 ENCOUNTER — PATIENT OUTREACH (OUTPATIENT)
Dept: OTHER | Facility: OTHER | Age: 50
End: 2020-03-11

## 2020-03-11 NOTE — PATIENT INSTRUCTIONS
If your condition worsens or fails to improve we recommend that you receive another evaluation at the ER immediately or contact your PCP to discuss your concerns or return here. You must understand that you've received an urgent care treatment only and that you may be released before all your medical problems are known or treated. You the patient will arrange for followup care as instructed.    Rest and fluids are important.  Can use honey with haydee to soothe your throat.    Take full course of both antibiotics as directed.  -  Take antibiotics with meals and to add yogurt or probiotic over the counter to diet to help prevent GI upset while taking antibiotic.    Take inhaler as prescribed and needed for wheezing. Nebulizer treatments every 4-6 hours as needed.  Take prescription cough medication as prescribed as needed for cough.  Take mucinex as directed to help with chest congestion.    -  Flonase (fluticasone) is a nasal spray which is available over the counter and may help with symptoms of nasal congestion.     -  Tylenol or ibuprofen can also be used as directed for pain unless you have an allergy to them or medical condition such as stomach ulcers, kidney or liver disease or blood thinners etc for which you should not be taking these type of medications.     Please follow up with your primary care doctor or specialist in the next 48-72hrs as needed and if no improvement.    Repeat Chest Xray in the next 10 days.    Please stop smoking.      Pneumonia (Adult)  Pneumonia is an infection deep within the lungs. It is in the small air sacs (alveoli). Pneumonia may be caused by a virus or bacteria. Pneumonia caused by bacteria is usually treated with an antibiotic. Severe cases may need to be treated in the hospital. Milder cases can be treated at home. Symptoms usually start to get better during the first 2 days of treatment.    Home care  Follow these guidelines when caring for yourself at home:  · Rest at  home for the first 2 to 3 days, or until you feel stronger. Dont let yourself get overly tired when you go back to your activities.  · Stay away from cigarette smoke - yours or other peoples.  · You may use acetaminophen or ibuprofen to control fever or pain, unless another medicine was prescribed. If you have chronic liver or kidney disease, talk with your healthcare provider before using these medicines. Also talk with your provider if youve had a stomach ulcer or gastrointestinal bleeding. Dont give aspirin to anyone younger than 18 years of age who is ill with a fever. It may cause severe liver damage.  · Your appetite may be poor, so a light diet is fine.  · Drink 6 to 8 glasses of fluids every day to make sure you are getting enough fluids. Beverages can include water, sport drinks, sodas without caffeine, juices, tea, or soup. Fluids will help loosen secretions in the lung. This will make it easier for you to cough up the phlegm (sputum). If you also have heart or kidney disease, check with your healthcare provider before you drink extra fluids.  · Take antibiotic medicine prescribed until it is all gone, even if you are feeling better after a few days.  Follow-up care  Follow up with your healthcare provider in the next 2 to 3 days, or as advised. This is to be sure the medicine is helping you get better.  If you are 65 or older, you should get a pneumococcal vaccine and a yearly flu (influenza) shot. You should also get these vaccines if you have chronic lung disease like asthma, emphysema, or COPD. Recently, a second type of pneumonia vaccine has become available for everyone over 65 years old. This is in addition to the previous vaccine. Ask your provider about this.  When to seek medical advice  Call your healthcare provider right away if any of these occur:  · You dont get better within the first 48 hours of treatment  · Shortness of breath gets worse  · Rapid breathing (more than 25 breaths per  minute)  · Coughing up blood  · Chest pain gets worse with breathing  · Fever of 100.4°F (38°C) or higher that doesnt get better with fever medicine  · Weakness, dizziness, or fainting that gets worse  · Thirst or dry mouth that gets worse  · Sinus pain, headache, or a stiff neck  · Chest pain not caused by coughing  Date Last Reviewed: 1/1/2017  © 4091-6138 [employer]. [address] [place] [address], [place] [address]. All rights reserved. This information is not intended as a substitute for professional medical care. Always follow your healthcare professional's instructions.

## 2020-03-11 NOTE — PROGRESS NOTES
03/11/2020 10:03 AM   Called patient, I was able to leave a voicemail. I will reach back out in 2 weeks.

## 2020-03-14 ENCOUNTER — HOSPITAL ENCOUNTER (INPATIENT)
Facility: HOSPITAL | Age: 50
LOS: 5 days | Discharge: HOME | DRG: 193 | End: 2020-03-19
Attending: EMERGENCY MEDICINE | Admitting: HOSPITALIST
Payer: COMMERCIAL

## 2020-03-14 ENCOUNTER — OFFICE VISIT (OUTPATIENT)
Dept: URGENT CARE | Facility: CLINIC | Age: 50
End: 2020-03-14
Payer: COMMERCIAL

## 2020-03-14 VITALS
RESPIRATION RATE: 18 BRPM | OXYGEN SATURATION: 85 % | TEMPERATURE: 101 F | HEART RATE: 108 BPM | SYSTOLIC BLOOD PRESSURE: 129 MMHG | BODY MASS INDEX: 43.43 KG/M2 | DIASTOLIC BLOOD PRESSURE: 87 MMHG | WEIGHT: 230 LBS | HEIGHT: 61 IN

## 2020-03-14 DIAGNOSIS — R50.9 FEVER, UNSPECIFIED FEVER CAUSE: ICD-10-CM

## 2020-03-14 DIAGNOSIS — R09.02 HYPOXIA: ICD-10-CM

## 2020-03-14 DIAGNOSIS — R00.0 SINUS TACHYCARDIA: ICD-10-CM

## 2020-03-14 DIAGNOSIS — J18.9 PNEUMONIA OF LEFT LOWER LOBE DUE TO INFECTIOUS ORGANISM: Primary | ICD-10-CM

## 2020-03-14 DIAGNOSIS — J96.01 ACUTE RESPIRATORY FAILURE WITH HYPOXEMIA: ICD-10-CM

## 2020-03-14 DIAGNOSIS — J18.9 PNEUMONIA OF BOTH LUNGS DUE TO INFECTIOUS ORGANISM, UNSPECIFIED PART OF LUNG: Primary | ICD-10-CM

## 2020-03-14 LAB
ALBUMIN SERPL BCP-MCNC: 3.4 G/DL (ref 3.5–5.2)
ALP SERPL-CCNC: 94 U/L (ref 55–135)
ALT SERPL W/O P-5'-P-CCNC: 25 U/L (ref 10–44)
ANION GAP SERPL CALC-SCNC: 13 MMOL/L (ref 8–16)
AST SERPL-CCNC: 22 U/L (ref 10–40)
BASOPHILS # BLD AUTO: ABNORMAL K/UL (ref 0–0.2)
BASOPHILS NFR BLD: 0 % (ref 0–1.9)
BILIRUB SERPL-MCNC: 0.5 MG/DL (ref 0.1–1)
BUN SERPL-MCNC: 7 MG/DL (ref 6–20)
CALCIUM SERPL-MCNC: 8.4 MG/DL (ref 8.7–10.5)
CHLORIDE SERPL-SCNC: 98 MMOL/L (ref 95–110)
CO2 SERPL-SCNC: 26 MMOL/L (ref 23–29)
CREAT SERPL-MCNC: 0.9 MG/DL (ref 0.5–1.4)
CRP SERPL-MCNC: 86.2 MG/L (ref 0–8.2)
CTP QC/QA: YES
DIFFERENTIAL METHOD: ABNORMAL
EOSINOPHIL # BLD AUTO: ABNORMAL K/UL (ref 0–0.5)
EOSINOPHIL NFR BLD: 0 % (ref 0–8)
ERYTHROCYTE [DISTWIDTH] IN BLOOD BY AUTOMATED COUNT: 14.3 % (ref 11.5–14.5)
EST. GFR  (AFRICAN AMERICAN): >60 ML/MIN/1.73 M^2
EST. GFR  (NON AFRICAN AMERICAN): >60 ML/MIN/1.73 M^2
GLUCOSE SERPL-MCNC: 254 MG/DL (ref 70–110)
GLUCOSE SERPL-MCNC: 282 MG/DL (ref 70–110)
HCT VFR BLD AUTO: 36.3 % (ref 37–48.5)
HGB BLD-MCNC: 11.2 G/DL (ref 12–16)
IMM GRANULOCYTES # BLD AUTO: ABNORMAL K/UL (ref 0–0.04)
IMM GRANULOCYTES NFR BLD AUTO: ABNORMAL % (ref 0–0.5)
LYMPHOCYTES # BLD AUTO: ABNORMAL K/UL (ref 1–4.8)
LYMPHOCYTES NFR BLD: 12 % (ref 18–48)
MCH RBC QN AUTO: 26.7 PG (ref 27–31)
MCHC RBC AUTO-ENTMCNC: 30.9 G/DL (ref 32–36)
MCV RBC AUTO: 87 FL (ref 82–98)
MONOCYTES # BLD AUTO: ABNORMAL K/UL (ref 0.3–1)
MONOCYTES NFR BLD: 3 % (ref 4–15)
NEUTROPHILS NFR BLD: 42 % (ref 38–73)
NEUTS BAND NFR BLD MANUAL: 43 %
NRBC BLD-RTO: 0 /100 WBC
PLATELET # BLD AUTO: 275 K/UL (ref 150–350)
PMV BLD AUTO: 11.1 FL (ref 9.2–12.9)
POC MOLECULAR INFLUENZA A AGN: NEGATIVE
POC MOLECULAR INFLUENZA B AGN: NEGATIVE
POCT GLUCOSE: 254 MG/DL (ref 70–110)
POCT GLUCOSE: 408 MG/DL (ref 70–110)
POTASSIUM SERPL-SCNC: 3.9 MMOL/L (ref 3.5–5.1)
PROCALCITONIN SERPL IA-MCNC: 0.03 NG/ML
PROT SERPL-MCNC: 7.9 G/DL (ref 6–8.4)
RBC # BLD AUTO: 4.19 M/UL (ref 4–5.4)
SODIUM SERPL-SCNC: 137 MMOL/L (ref 136–145)
WBC # BLD AUTO: 7.82 K/UL (ref 3.9–12.7)

## 2020-03-14 PROCEDURE — 96372 PR INJECTION,THERAP/PROPH/DIAG2ST, IM OR SUBCUT: ICD-10-PCS | Mod: 59,S$GLB,, | Performed by: PHYSICIAN ASSISTANT

## 2020-03-14 PROCEDURE — 94640 PR INHAL RX, AIRWAY OBST/DX SPUTUM INDUCT: ICD-10-PCS | Mod: 59,S$GLB,, | Performed by: EMERGENCY MEDICINE

## 2020-03-14 PROCEDURE — 63600175 PHARM REV CODE 636 W HCPCS: Performed by: EMERGENCY MEDICINE

## 2020-03-14 PROCEDURE — 87205 SMEAR GRAM STAIN: CPT

## 2020-03-14 PROCEDURE — 99285 EMERGENCY DEPT VISIT HI MDM: CPT | Mod: 25

## 2020-03-14 PROCEDURE — 25000003 PHARM REV CODE 250: Performed by: EMERGENCY MEDICINE

## 2020-03-14 PROCEDURE — 85007 BL SMEAR W/DIFF WBC COUNT: CPT

## 2020-03-14 PROCEDURE — 71046 XR CHEST PA AND LATERAL: ICD-10-PCS | Mod: S$GLB,,, | Performed by: RADIOLOGY

## 2020-03-14 PROCEDURE — 12000002 HC ACUTE/MED SURGE SEMI-PRIVATE ROOM

## 2020-03-14 PROCEDURE — 96375 TX/PRO/DX INJ NEW DRUG ADDON: CPT

## 2020-03-14 PROCEDURE — 94640 AIRWAY INHALATION TREATMENT: CPT | Mod: 59,S$GLB,, | Performed by: PHYSICIAN ASSISTANT

## 2020-03-14 PROCEDURE — 96372 THER/PROPH/DIAG INJ SC/IM: CPT

## 2020-03-14 PROCEDURE — 96372 THER/PROPH/DIAG INJ SC/IM: CPT | Mod: 59,S$GLB,, | Performed by: PHYSICIAN ASSISTANT

## 2020-03-14 PROCEDURE — 87633 RESP VIRUS 12-25 TARGETS: CPT

## 2020-03-14 PROCEDURE — U0002 COVID-19 LAB TEST NON-CDC: HCPCS

## 2020-03-14 PROCEDURE — 80053 COMPREHEN METABOLIC PANEL: CPT

## 2020-03-14 PROCEDURE — 94640 AIRWAY INHALATION TREATMENT: CPT

## 2020-03-14 PROCEDURE — 71046 X-RAY EXAM CHEST 2 VIEWS: CPT | Mod: S$GLB,,, | Performed by: RADIOLOGY

## 2020-03-14 PROCEDURE — 63600175 PHARM REV CODE 636 W HCPCS: Performed by: HOSPITALIST

## 2020-03-14 PROCEDURE — 94640 AIRWAY INHALATION TREATMENT: CPT | Mod: 59,S$GLB,, | Performed by: EMERGENCY MEDICINE

## 2020-03-14 PROCEDURE — 86140 C-REACTIVE PROTEIN: CPT

## 2020-03-14 PROCEDURE — 96365 THER/PROPH/DIAG IV INF INIT: CPT

## 2020-03-14 PROCEDURE — 85027 COMPLETE CBC AUTOMATED: CPT

## 2020-03-14 PROCEDURE — 87070 CULTURE OTHR SPECIMN AEROBIC: CPT

## 2020-03-14 PROCEDURE — 94640 PR INHAL RX, AIRWAY OBST/DX SPUTUM INDUCT: ICD-10-PCS | Mod: 59,S$GLB,, | Performed by: PHYSICIAN ASSISTANT

## 2020-03-14 PROCEDURE — 99214 PR OFFICE/OUTPT VISIT, EST, LEVL IV, 30-39 MIN: ICD-10-PCS | Mod: 25,S$GLB,, | Performed by: PHYSICIAN ASSISTANT

## 2020-03-14 PROCEDURE — 87040 BLOOD CULTURE FOR BACTERIA: CPT

## 2020-03-14 PROCEDURE — 84145 PROCALCITONIN (PCT): CPT

## 2020-03-14 PROCEDURE — 94761 N-INVAS EAR/PLS OXIMETRY MLT: CPT

## 2020-03-14 PROCEDURE — 99214 OFFICE O/P EST MOD 30 MIN: CPT | Mod: 25,S$GLB,, | Performed by: PHYSICIAN ASSISTANT

## 2020-03-14 PROCEDURE — 25000242 PHARM REV CODE 250 ALT 637 W/ HCPCS: Performed by: EMERGENCY MEDICINE

## 2020-03-14 RX ORDER — GLUCAGON 1 MG
1 KIT INJECTION
Status: DISCONTINUED | OUTPATIENT
Start: 2020-03-14 | End: 2020-03-19 | Stop reason: HOSPADM

## 2020-03-14 RX ORDER — PANTOPRAZOLE SODIUM 40 MG/1
40 TABLET, DELAYED RELEASE ORAL DAILY
Status: DISCONTINUED | OUTPATIENT
Start: 2020-03-15 | End: 2020-03-19 | Stop reason: HOSPADM

## 2020-03-14 RX ORDER — ATORVASTATIN CALCIUM 10 MG/1
20 TABLET, FILM COATED ORAL DAILY
Status: DISCONTINUED | OUTPATIENT
Start: 2020-03-15 | End: 2020-03-19 | Stop reason: HOSPADM

## 2020-03-14 RX ORDER — ALBUTEROL SULFATE 0.83 MG/ML
2.5 SOLUTION RESPIRATORY (INHALATION)
Status: DISCONTINUED | OUTPATIENT
Start: 2020-03-14 | End: 2020-03-14 | Stop reason: HOSPADM

## 2020-03-14 RX ORDER — IPRATROPIUM BROMIDE 0.5 MG/2.5ML
0.5 SOLUTION RESPIRATORY (INHALATION)
Status: DISCONTINUED | OUTPATIENT
Start: 2020-03-14 | End: 2020-03-14 | Stop reason: HOSPADM

## 2020-03-14 RX ORDER — INSULIN ASPART 100 [IU]/ML
1-10 INJECTION, SOLUTION INTRAVENOUS; SUBCUTANEOUS
Status: DISCONTINUED | OUTPATIENT
Start: 2020-03-14 | End: 2020-03-19 | Stop reason: HOSPADM

## 2020-03-14 RX ORDER — ENOXAPARIN SODIUM 100 MG/ML
40 INJECTION SUBCUTANEOUS EVERY 24 HOURS
Status: DISCONTINUED | OUTPATIENT
Start: 2020-03-14 | End: 2020-03-15 | Stop reason: SDUPTHER

## 2020-03-14 RX ORDER — AMLODIPINE BESYLATE 5 MG/1
5 TABLET ORAL DAILY
Status: DISCONTINUED | OUTPATIENT
Start: 2020-03-15 | End: 2020-03-19 | Stop reason: HOSPADM

## 2020-03-14 RX ORDER — IBUPROFEN 200 MG
16 TABLET ORAL
Status: DISCONTINUED | OUTPATIENT
Start: 2020-03-14 | End: 2020-03-19 | Stop reason: HOSPADM

## 2020-03-14 RX ORDER — IBUPROFEN 200 MG
24 TABLET ORAL
Status: DISCONTINUED | OUTPATIENT
Start: 2020-03-14 | End: 2020-03-19 | Stop reason: HOSPADM

## 2020-03-14 RX ORDER — IBUPROFEN 600 MG/1
600 TABLET ORAL EVERY 8 HOURS PRN
Status: DISCONTINUED | OUTPATIENT
Start: 2020-03-14 | End: 2020-03-19 | Stop reason: HOSPADM

## 2020-03-14 RX ORDER — SODIUM CHLORIDE 0.9 % (FLUSH) 0.9 %
10 SYRINGE (ML) INJECTION
Status: DISCONTINUED | OUTPATIENT
Start: 2020-03-14 | End: 2020-03-19 | Stop reason: HOSPADM

## 2020-03-14 RX ORDER — ACETAMINOPHEN 325 MG/1
650 TABLET ORAL EVERY 6 HOURS PRN
Status: DISCONTINUED | OUTPATIENT
Start: 2020-03-14 | End: 2020-03-19 | Stop reason: HOSPADM

## 2020-03-14 RX ORDER — IPRATROPIUM BROMIDE AND ALBUTEROL SULFATE 2.5; .5 MG/3ML; MG/3ML
3 SOLUTION RESPIRATORY (INHALATION)
Status: DISCONTINUED | OUTPATIENT
Start: 2020-03-14 | End: 2020-03-15

## 2020-03-14 RX ORDER — IBUPROFEN 200 MG
600 TABLET ORAL
Status: DISCONTINUED | OUTPATIENT
Start: 2020-03-14 | End: 2020-03-14 | Stop reason: HOSPADM

## 2020-03-14 RX ORDER — VALSARTAN 80 MG/1
80 TABLET ORAL DAILY
Status: DISCONTINUED | OUTPATIENT
Start: 2020-03-15 | End: 2020-03-19 | Stop reason: HOSPADM

## 2020-03-14 RX ORDER — METHYLPREDNISOLONE SOD SUCC 125 MG
80 VIAL (EA) INJECTION EVERY 8 HOURS
Status: DISCONTINUED | OUTPATIENT
Start: 2020-03-15 | End: 2020-03-15

## 2020-03-14 RX ORDER — ONDANSETRON 4 MG/1
4 TABLET, FILM COATED ORAL EVERY 6 HOURS PRN
Status: DISCONTINUED | OUTPATIENT
Start: 2020-03-14 | End: 2020-03-19 | Stop reason: HOSPADM

## 2020-03-14 RX ORDER — IBUPROFEN 600 MG/1
600 TABLET ORAL
Status: COMPLETED | OUTPATIENT
Start: 2020-03-14 | End: 2020-03-14

## 2020-03-14 RX ORDER — METHYLPREDNISOLONE SOD SUCC 125 MG
80 VIAL (EA) INJECTION EVERY 6 HOURS
Status: DISCONTINUED | OUTPATIENT
Start: 2020-03-14 | End: 2020-03-14

## 2020-03-14 RX ADMIN — CEFTRIAXONE SODIUM 1 G: 1 INJECTION, POWDER, FOR SOLUTION INTRAMUSCULAR; INTRAVENOUS at 08:03

## 2020-03-14 RX ADMIN — ALBUTEROL SULFATE 2.5 MG: 0.83 SOLUTION RESPIRATORY (INHALATION) at 11:03

## 2020-03-14 RX ADMIN — AZITHROMYCIN MONOHYDRATE 500 MG: 500 INJECTION, POWDER, LYOPHILIZED, FOR SOLUTION INTRAVENOUS at 08:03

## 2020-03-14 RX ADMIN — Medication 600 MG: at 10:03

## 2020-03-14 RX ADMIN — IBUPROFEN 600 MG: 600 TABLET, FILM COATED ORAL at 03:03

## 2020-03-14 RX ADMIN — IPRATROPIUM BROMIDE 0.5 MG: 0.5 SOLUTION RESPIRATORY (INHALATION) at 10:03

## 2020-03-14 RX ADMIN — ALBUTEROL SULFATE 2.5 MG: 0.83 SOLUTION RESPIRATORY (INHALATION) at 10:03

## 2020-03-14 RX ADMIN — METHYLPREDNISOLONE SODIUM SUCCINATE 80 MG: 125 INJECTION, POWDER, FOR SOLUTION INTRAMUSCULAR; INTRAVENOUS at 08:03

## 2020-03-14 RX ADMIN — ENOXAPARIN SODIUM 40 MG: 100 INJECTION SUBCUTANEOUS at 08:03

## 2020-03-14 RX ADMIN — IPRATROPIUM BROMIDE AND ALBUTEROL SULFATE 3 ML: .5; 3 SOLUTION RESPIRATORY (INHALATION) at 08:03

## 2020-03-14 RX ADMIN — IPRATROPIUM BROMIDE 0.5 MG: 0.5 SOLUTION RESPIRATORY (INHALATION) at 11:03

## 2020-03-14 NOTE — ED PROVIDER NOTES
"Encounter Date: 3/14/2020    SCRIBE #1 NOTE: I, Ana Maria Gruber, am scribing for, and in the presence of,  Dr. Munoz. I have scribed the following portions of the note - Other sections scribed: HPI, ROS.       History     Chief Complaint   Patient presents with    Cough     c/o " I have low oxygen level and they sent me over here from ." Pt endorses cough x 2 months. edorses fevers of "103".      Time of initial assessment: 2:19 PM    CC: Cough    HPI:  This is a 50 y.o. female with a PMHx of Diabetes Mellitus, Hypertension, and GERD, who presents to the Emergency Department with a cc of coughing x5 days. Pt was diagnosed with Pneumonia via chest x-ray 5 days ago. Associated symptoms include fever, SOB, and headaches. She notes that her headaches are most likely due to her sinuses. She denies any congestion, rhinorrhea, CP, abdominal pain, diarrhea, dysuria, nausea, vomiting, diarrhea, eye pain, or ear pain. Denies a prior history of Pneumonia. Pt is currently on medication for HTN. No known allergies. She reports a history of multiple surgeries including a breast reduction, cholecystectomy, esophagogastroduodenoscopy, hysterectomy, oophorectomy, and a total reduction mammoplasty. Pt denies use of tobacco or any alcohol consumption.     The history is provided by the patient. No  was used.     Review of patient's allergies indicates:  No Known Allergies  Past Medical History:   Diagnosis Date    Diabetes mellitus     Diabetes mellitus, type 2     Eczema     GERD (gastroesophageal reflux disease)     Hypertension     Impaired fasting blood sugar     YSABEL on CPAP      Past Surgical History:   Procedure Laterality Date    breast reduction      CHOLECYSTECTOMY      laprascopic    ESOPHAGOGASTRODUODENOSCOPY  8/18/14    HYSTERECTOMY  2007    laprascopic, total for fibroids.  Dr Polo    OOPHORECTOMY      TOTAL REDUCTION MAMMOPLASTY       Family History   Problem Relation Age of Onset "    Diabetes Maternal Grandmother     Heart disease Maternal Grandmother     Hypertension Maternal Grandmother     Crohn's disease Sister     Diabetes Mother     Hypertension Mother     Heart disease Mother     Cancer Father 58        Prostate    Breast cancer Sister     Amblyopia Neg Hx     Blindness Neg Hx     Cataracts Neg Hx     Glaucoma Neg Hx     Macular degeneration Neg Hx     Retinal detachment Neg Hx     Strabismus Neg Hx      Social History     Tobacco Use    Smoking status: Never Smoker    Smokeless tobacco: Never Used   Substance Use Topics    Alcohol use: No     Frequency: Monthly or less     Drinks per session: Patient refused     Binge frequency: Never     Comment: socially    Drug use: No     Review of Systems   Constitutional: Positive for fever. Negative for chills and diaphoresis.   HENT: Negative for congestion, ear pain, rhinorrhea and sore throat.    Eyes: Negative for pain.   Respiratory: Positive for cough and shortness of breath.    Cardiovascular: Negative for chest pain.   Gastrointestinal: Negative for abdominal pain, diarrhea, nausea and vomiting.   Genitourinary: Negative for dysuria.   Musculoskeletal:        (-) arm/ leg pain   Skin: Negative for rash.   Neurological: Positive for headaches.   Psychiatric/Behavioral: Negative for confusion.       Physical Exam     Initial Vitals [03/14/20 1410]   BP Pulse Resp Temp SpO2   132/79 101 19 99 °F (37.2 °C) (S) (!) 83 %      MAP       --         Physical Exam  The patient was examined specifically for the following:   General:No significant distress, Good color, Warm and dry. Head and neck:Scalp atraumatic, Neck supple. Neurological:Appropriate conversation, Gross motor deficits. Eyes:Conjugate gaze, Clear corneas. ENT: No epistaxis. Cardiac: Regular rate and rhythm, Grossly normal heart tones. Pulmonary: Wheezing, Rales. Gastrointestinal: Abdominal tenderness, Abdominal distention. Musculoskeletal: Extremity deformity,  Apparent pain with range of motion of the joints. Skin: Rash.   The findings on examination were normal except for the following:  The patient is slightly breathless.  Oxygen saturations are 83%.  The heart rate is 101.  The patient is afebrile.  The respiratory rate is 19.  ED Course   Procedures  Labs Reviewed   CULTURE, BLOOD   CULTURE, BLOOD   CULTURE, RESPIRATORY   COMPREHENSIVE METABOLIC PANEL   CBC W/ AUTO DIFFERENTIAL   PROCALCITONIN   SARS-COV-2 (COVID-19) QUALITATIVE PCR   POCT INFLUENZA A/B MOLECULAR          Imaging Results    None          Medical Decision Making:   History:   Old Medical Records: I decided to obtain old medical records.  Initial Assessment:   This is a 50 y.o. female with a PMHx of Diabetes Mellitus, Hypertension, and GERD, who presents to the Emergency Department with a cc of coughing x5 days.   Medical decision making:  Given the above this patient has a very unimpressive patchy bilateral pneumonia pattern on chest x-ray.  Oxygen saturations are low.  There is low 77%.  The patient does have a fever.  I believe this is likely Covid-19.  I discussed the case with , who advises treatment with Rocephin and Zithromax.  She also has for CRP.  I will admit to her service.          Scribe Attestation:   Scribe #1: I performed the above scribed service and the documentation accurately describes the services I performed. I attest to the accuracy of the note.                          Clinical Impression:     1. Pneumonia of both lungs due to infectious organism, unspecified part of lung    2. Hypoxia    3. Sinus tachycardia    4. Fever, unspecified fever cause            ED Disposition Condition    Admit            I personally performed the services described in this documentation.  All medical record  entries made by the scribe are at my direction and in my presence.  Signed, Dr. Alexander Munoz MD  03/14/20 1539       Gordon Munoz MD  03/14/20 1545

## 2020-03-14 NOTE — PATIENT INSTRUCTIONS
GO TO THE EMERGENCY ROOM IMMEDIATELY.      What is Pneumonia?    Pneumonia is a serious lung infection. Many cases of pneumonia are caused by bacteria or viruses. Fungi may also cause pneumonia, but this is less common.  You may also get pneumonia after another illness, such as a cold, flu, or bronchitis. Those most at risk include older adults, smokers, and people with long-term (chronic) health problems or weak immune systems.  Healthy lungs  · Air travels in and out of the lungs through tubes called airways.  · The tubes branch into smaller passages called bronchioles. These end in tiny sacs called alveoli.  · Blood vessels surrounding the alveoli take oxygen into the bloodstream. At the same time, the alveoli remove carbon dioxide (a waste gas) from the blood. The carbon dioxide is then exhaled.  When you have pneumonia  · Pneumonia causes the bronchioles and the alveoli to fill with excess mucus and become inflamed.  · Your bodys response may be to cough. This can help clear out the fluid.  · The fluid (or mucus) you cough up may appear green or dark yellow.  · The excess mucus may make you feel short of breath.  · The inflammation and infection may give you a fever.  What are the symptoms?  Symptoms of pneumonia can come without warning. At first, you may think you have a cold or flu. But symptoms may get worse quickly, turning into pneumonia. Symptoms can be different for bacterial and viral pneumonia. Common symptoms may include the following:  · Severe cough with green or yellow mucus that doesn't improve or that gets worse  · Fever and chills  · Nausea, vomiting or diarrhea  · Shortness of breath with normal daily activities  · Increased heart rate  · Chest pain or discomfort when breathing in or coughing  · Headache  · Excessive sweating and clammy skin  Date Last Reviewed: 12/1/2016  © 8971-7386 [employer]. [address] [place] [address], [place] [address]. All rights reserved. This information  is not intended as a substitute for professional medical care. Always follow your healthcare professional's instructions.

## 2020-03-14 NOTE — PROGRESS NOTES
"Subjective:       Patient ID: Africa Jennings is a 50 y.o. female.    Vitals:  height is 5' 1" (1.549 m) and weight is 104.3 kg (230 lb). Her temperature is 100.7 °F (38.2 °C) (abnormal). Her blood pressure is 129/87 and her pulse is 108. Her respiration is 18 and oxygen saturation is 85% (abnormal).     Chief Complaint: URI    Pt was seen four days ago and diagnosed with LLL PNA. She was treated with a duoneb in clinic and sent home with Tessalon, albuterol inhaler, Azithromycin, and Augmentin. She has returned with worsening shortness of breath, dizziness, and vomiting. States that she has been taking all the medication as prescribed however thinks she may have vomited a few doses of the antibiotics. Last dose of Tylenol around 8am (3 hours ago).    URI    This is a new problem. The current episode started in the past 7 days. The problem has been gradually worsening. The maximum temperature recorded prior to her arrival was 102 - 102.9 F. Associated symptoms include coughing, nausea and vomiting. Pertinent negatives include no chest pain, congestion, diarrhea, dysuria, headaches, rash or sore throat.       Constitution: Negative for chills, fatigue and fever.   HENT: Negative for congestion and sore throat.    Neck: Negative for painful lymph nodes.   Cardiovascular: Negative for chest pain and leg swelling.   Eyes: Negative for double vision and blurred vision.   Respiratory: Positive for chest tightness, cough, sputum production and shortness of breath.    Gastrointestinal: Positive for nausea and vomiting. Negative for diarrhea.   Genitourinary: Negative for dysuria, frequency, urgency and history of kidney stones.   Musculoskeletal: Negative for joint pain, joint swelling, muscle cramps and muscle ache.   Skin: Negative for color change, pale, rash and bruising.   Allergic/Immunologic: Negative for seasonal allergies.   Neurological: Positive for dizziness. Negative for history of vertigo, " light-headedness, passing out and headaches.   Hematologic/Lymphatic: Negative for swollen lymph nodes.   Psychiatric/Behavioral: Negative for nervous/anxious, sleep disturbance and depression. The patient is not nervous/anxious.        Objective:      Physical Exam   Constitutional: She is oriented to person, place, and time. She appears well-developed and well-nourished. She is cooperative.  Non-toxic appearance. She has a sickly appearance. She does not appear ill. She appears distressed.   Febrile 102.2 on presentation. Ibuprofen 600mg given. Repeat temp 100.7   HENT:   Head: Normocephalic and atraumatic.   Right Ear: Hearing, tympanic membrane, external ear and ear canal normal.   Left Ear: Hearing, tympanic membrane, external ear and ear canal normal.   Nose: Nose normal. No mucosal edema, rhinorrhea or nasal deformity. No epistaxis. Right sinus exhibits no maxillary sinus tenderness and no frontal sinus tenderness. Left sinus exhibits no maxillary sinus tenderness and no frontal sinus tenderness.   Mouth/Throat: Uvula is midline, oropharynx is clear and moist and mucous membranes are normal. No trismus in the jaw. Normal dentition. No uvula swelling. No oropharyngeal exudate, posterior oropharyngeal edema or posterior oropharyngeal erythema.   Eyes: Conjunctivae and lids are normal. No scleral icterus.   Neck: Trachea normal, full passive range of motion without pain and phonation normal. Neck supple. No neck rigidity. No edema and no erythema present.   Cardiovascular: Normal rate, regular rhythm, normal heart sounds, intact distal pulses and normal pulses.   Pulmonary/Chest: Accessory muscle usage present. She is in respiratory distress. She has decreased breath sounds. She has no wheezes. She has no rhonchi.   Pre-neb tx: decreased breath sounds bilat.; patient in acute respiratory distress with accessory muscle usage; Unable to take a deep breath O2 stat on presentation 74-77%  Pt placed on 6L O2 nasal  "canula and given 125mg Solumedrol and duoneb tx: patient able to take deeper breaths; reports mild relief of breathing; O2 on 6L 93% and 80% without supplemental O2  Pt given second duoneb tx: patient reports resolution of dizziness and "feels a world of difference;" O2 on 6L 85% and 76% without supplemental O2   Abdominal: Normal appearance.   Musculoskeletal: Normal range of motion. She exhibits no edema or deformity.   Neurological: She is alert and oriented to person, place, and time. She exhibits normal muscle tone. Coordination normal.   Skin: Skin is warm, dry, intact, not diaphoretic and not pale.   Psychiatric: She has a normal mood and affect. Her speech is normal and behavior is normal. Judgment and thought content normal. Cognition and memory are normal.   Nursing note and vitals reviewed.      Xr Chest Pa And Lateral    Result Date: 3/14/2020  EXAMINATION: XR CHEST PA AND LATERAL CLINICAL HISTORY: . Hypoxemia TECHNIQUE: Single frontal portable view of the chest was performed. COMPARISON: March 10, 2020 FINDINGS: Support devices: None Cardiac silhouette is exaggerated by submaximal inspiration.  Pulmonary vascularity is not increased.  Since the previous examination, patchy areas of consolidation have progressed in the mid and lower lungs with associated atelectasis bilaterally.  The lateral image is somewhat under penetrated, but there may be a small amount of pleural fluid bilaterally.  No pneumothorax.  Visualized osseous structures are stable.     Interval progression of patchy areas of airspace opacification and atelectasis since March 10, 2020.  It would be difficult to exclude a small amount of pleural fluid. This report was flagged in Epic as abnormal. Electronically signed by: Margarita James MD Date:    03/14/2020 Time:    11:28    Assessment:       1. Pneumonia of left lower lobe due to infectious organism    2. Fever, unspecified fever cause    3. Hypoxia        Plan:         Pneumonia of " left lower lobe due to infectious organism  -     XR CHEST PA AND LATERAL; Future; Expected date: 03/14/2020    Fever, unspecified fever cause  -     ibuprofen tablet 600 mg    Hypoxia  -     Discontinue: methylPREDNISolone sod suc(PF) injection 125 mg  -     albuterol nebulizer solution 2.5 mg  -     ipratropium 0.02 % nebulizer solution 0.5 mg  -     methylPREDNISolone sod suc(PF) injection 125 mg  -     XR CHEST PA AND LATERAL; Future; Expected date: 03/14/2020  -     albuterol nebulizer solution 2.5 mg  -     ipratropium 0.02 % nebulizer solution 0.5 mg  -     Refer to Emergency Dept.      Patient Instructions   GO TO THE EMERGENCY ROOM IMMEDIATELY.      What is Pneumonia?    Pneumonia is a serious lung infection. Many cases of pneumonia are caused by bacteria or viruses. Fungi may also cause pneumonia, but this is less common.  You may also get pneumonia after another illness, such as a cold, flu, or bronchitis. Those most at risk include older adults, smokers, and people with long-term (chronic) health problems or weak immune systems.  Healthy lungs  · Air travels in and out of the lungs through tubes called airways.  · The tubes branch into smaller passages called bronchioles. These end in tiny sacs called alveoli.  · Blood vessels surrounding the alveoli take oxygen into the bloodstream. At the same time, the alveoli remove carbon dioxide (a waste gas) from the blood. The carbon dioxide is then exhaled.  When you have pneumonia  · Pneumonia causes the bronchioles and the alveoli to fill with excess mucus and become inflamed.  · Your bodys response may be to cough. This can help clear out the fluid.  · The fluid (or mucus) you cough up may appear green or dark yellow.  · The excess mucus may make you feel short of breath.  · The inflammation and infection may give you a fever.  What are the symptoms?  Symptoms of pneumonia can come without warning. At first, you may think you have a cold or flu. But symptoms  may get worse quickly, turning into pneumonia. Symptoms can be different for bacterial and viral pneumonia. Common symptoms may include the following:  · Severe cough with green or yellow mucus that doesn't improve or that gets worse  · Fever and chills  · Nausea, vomiting or diarrhea  · Shortness of breath with normal daily activities  · Increased heart rate  · Chest pain or discomfort when breathing in or coughing  · Headache  · Excessive sweating and clammy skin  Date Last Reviewed: 12/1/2016  © 6072-3371 [employer]. [address] [place] [address], [place] [address]. All rights reserved. This information is not intended as a substitute for professional medical care. Always follow your healthcare professional's instructions.

## 2020-03-14 NOTE — ED TRIAGE NOTES
"Pt comes to ED comlpains of coughing, vomiting, fever, headache since Monday.states" I have pneumonia". Denies diarrhea.  "

## 2020-03-15 LAB
ADENOVIRUS: NOT DETECTED
ANION GAP SERPL CALC-SCNC: 13 MMOL/L (ref 8–16)
BASOPHILS # BLD AUTO: 0.02 K/UL (ref 0–0.2)
BASOPHILS NFR BLD: 0.2 % (ref 0–1.9)
BORDETELLA PARAPERTUSSIS (IS1001): NOT DETECTED
BORDETELLA PERTUSSIS (PTXP): NOT DETECTED
BUN SERPL-MCNC: 10 MG/DL (ref 6–20)
CALCIUM SERPL-MCNC: 8.7 MG/DL (ref 8.7–10.5)
CHLAMYDIA PNEUMONIAE: NOT DETECTED
CHLORIDE SERPL-SCNC: 103 MMOL/L (ref 95–110)
CO2 SERPL-SCNC: 23 MMOL/L (ref 23–29)
CORONAVIRUS 229E, COMMON COLD VIRUS: NOT DETECTED
CORONAVIRUS HKU1, COMMON COLD VIRUS: NOT DETECTED
CORONAVIRUS NL63, COMMON COLD VIRUS: NOT DETECTED
CORONAVIRUS OC43, COMMON COLD VIRUS: NOT DETECTED
CREAT SERPL-MCNC: 0.8 MG/DL (ref 0.5–1.4)
DIFFERENTIAL METHOD: ABNORMAL
EOSINOPHIL # BLD AUTO: 0 K/UL (ref 0–0.5)
EOSINOPHIL NFR BLD: 0.2 % (ref 0–8)
ERYTHROCYTE [DISTWIDTH] IN BLOOD BY AUTOMATED COUNT: 14.5 % (ref 11.5–14.5)
EST. GFR  (AFRICAN AMERICAN): >60 ML/MIN/1.73 M^2
EST. GFR  (NON AFRICAN AMERICAN): >60 ML/MIN/1.73 M^2
FLUBV RNA NPH QL NAA+NON-PROBE: NOT DETECTED
GLUCOSE SERPL-MCNC: 254 MG/DL (ref 70–110)
HCT VFR BLD AUTO: 39.2 % (ref 37–48.5)
HGB BLD-MCNC: 12 G/DL (ref 12–16)
HPIV1 RNA NPH QL NAA+NON-PROBE: NOT DETECTED
HPIV2 RNA NPH QL NAA+NON-PROBE: NOT DETECTED
HPIV3 RNA NPH QL NAA+NON-PROBE: NOT DETECTED
HPIV4 RNA NPH QL NAA+NON-PROBE: NOT DETECTED
HUMAN METAPNEUMOVIRUS: NOT DETECTED
IMM GRANULOCYTES # BLD AUTO: 0.11 K/UL (ref 0–0.04)
IMM GRANULOCYTES NFR BLD AUTO: 0.9 % (ref 0–0.5)
INFLUENZA A (SUBTYPES H1,H1-2009,H3): NOT DETECTED
LYMPHOCYTES # BLD AUTO: 2.2 K/UL (ref 1–4.8)
LYMPHOCYTES NFR BLD: 17.1 % (ref 18–48)
MCH RBC QN AUTO: 27 PG (ref 27–31)
MCHC RBC AUTO-ENTMCNC: 30.6 G/DL (ref 32–36)
MCV RBC AUTO: 88 FL (ref 82–98)
MONOCYTES # BLD AUTO: 0.7 K/UL (ref 0.3–1)
MONOCYTES NFR BLD: 5.1 % (ref 4–15)
MYCOPLASMA PNEUMONIAE: NOT DETECTED
NEUTROPHILS # BLD AUTO: 9.8 K/UL (ref 1.8–7.7)
NEUTROPHILS NFR BLD: 76.5 % (ref 38–73)
NRBC BLD-RTO: 0 /100 WBC
PLATELET # BLD AUTO: 320 K/UL (ref 150–350)
PMV BLD AUTO: 11.2 FL (ref 9.2–12.9)
POCT GLUCOSE: 212 MG/DL (ref 70–110)
POCT GLUCOSE: 236 MG/DL (ref 70–110)
POCT GLUCOSE: 277 MG/DL (ref 70–110)
POCT GLUCOSE: 336 MG/DL (ref 70–110)
POTASSIUM SERPL-SCNC: 4.2 MMOL/L (ref 3.5–5.1)
RBC # BLD AUTO: 4.45 M/UL (ref 4–5.4)
RESPIRATORY INFECTION PANEL SOURCE: NORMAL
RSV RNA NPH QL NAA+NON-PROBE: NOT DETECTED
RV+EV RNA NPH QL NAA+NON-PROBE: NOT DETECTED
SODIUM SERPL-SCNC: 139 MMOL/L (ref 136–145)
WBC # BLD AUTO: 12.84 K/UL (ref 3.9–12.7)

## 2020-03-15 PROCEDURE — 20000000 HC ICU ROOM

## 2020-03-15 PROCEDURE — 63600175 PHARM REV CODE 636 W HCPCS: Performed by: HOSPITALIST

## 2020-03-15 PROCEDURE — 85025 COMPLETE CBC W/AUTO DIFF WBC: CPT

## 2020-03-15 PROCEDURE — C9399 UNCLASSIFIED DRUGS OR BIOLOG: HCPCS | Performed by: HOSPITALIST

## 2020-03-15 PROCEDURE — 36415 COLL VENOUS BLD VENIPUNCTURE: CPT

## 2020-03-15 PROCEDURE — 25000242 PHARM REV CODE 250 ALT 637 W/ HCPCS: Performed by: HOSPITALIST

## 2020-03-15 PROCEDURE — 25000003 PHARM REV CODE 250: Performed by: HOSPITALIST

## 2020-03-15 PROCEDURE — 94640 AIRWAY INHALATION TREATMENT: CPT

## 2020-03-15 PROCEDURE — 27100092 HC HIGH FLOW DELIVERY CANNULA

## 2020-03-15 PROCEDURE — 94761 N-INVAS EAR/PLS OXIMETRY MLT: CPT

## 2020-03-15 PROCEDURE — 80048 BASIC METABOLIC PNL TOTAL CA: CPT

## 2020-03-15 PROCEDURE — 27100171 HC OXYGEN HIGH FLOW UP TO 24 HOURS

## 2020-03-15 RX ORDER — ALBUTEROL SULFATE 90 UG/1
2 AEROSOL, METERED RESPIRATORY (INHALATION) 4 TIMES DAILY
Status: DISCONTINUED | OUTPATIENT
Start: 2020-03-15 | End: 2020-03-16

## 2020-03-15 RX ORDER — ENOXAPARIN SODIUM 100 MG/ML
40 INJECTION SUBCUTANEOUS EVERY 24 HOURS
Status: DISCONTINUED | OUTPATIENT
Start: 2020-03-15 | End: 2020-03-19 | Stop reason: HOSPADM

## 2020-03-15 RX ORDER — INSULIN ASPART 100 [IU]/ML
8 INJECTION, SOLUTION INTRAVENOUS; SUBCUTANEOUS
Status: DISCONTINUED | OUTPATIENT
Start: 2020-03-15 | End: 2020-03-19 | Stop reason: HOSPADM

## 2020-03-15 RX ORDER — GABAPENTIN 300 MG/1
600 CAPSULE ORAL 3 TIMES DAILY
Status: DISCONTINUED | OUTPATIENT
Start: 2020-03-15 | End: 2020-03-16

## 2020-03-15 RX ORDER — BENZONATATE 100 MG/1
200 CAPSULE ORAL 3 TIMES DAILY PRN
Status: DISCONTINUED | OUTPATIENT
Start: 2020-03-15 | End: 2020-03-19 | Stop reason: HOSPADM

## 2020-03-15 RX ORDER — CETIRIZINE HYDROCHLORIDE 10 MG/1
10 TABLET ORAL DAILY
Status: DISCONTINUED | OUTPATIENT
Start: 2020-03-15 | End: 2020-03-17

## 2020-03-15 RX ADMIN — INSULIN ASPART 2 UNITS: 100 INJECTION, SOLUTION INTRAVENOUS; SUBCUTANEOUS at 09:03

## 2020-03-15 RX ADMIN — INSULIN ASPART 8 UNITS: 100 INJECTION, SOLUTION INTRAVENOUS; SUBCUTANEOUS at 05:03

## 2020-03-15 RX ADMIN — ATORVASTATIN CALCIUM 20 MG: 10 TABLET, FILM COATED ORAL at 08:03

## 2020-03-15 RX ADMIN — INSULIN ASPART 4 UNITS: 100 INJECTION, SOLUTION INTRAVENOUS; SUBCUTANEOUS at 05:03

## 2020-03-15 RX ADMIN — PANTOPRAZOLE SODIUM 40 MG: 40 TABLET, DELAYED RELEASE ORAL at 08:03

## 2020-03-15 RX ADMIN — CEFTRIAXONE SODIUM 1 G: 1 INJECTION, POWDER, FOR SOLUTION INTRAMUSCULAR; INTRAVENOUS at 08:03

## 2020-03-15 RX ADMIN — AMLODIPINE BESYLATE 5 MG: 5 TABLET ORAL at 08:03

## 2020-03-15 RX ADMIN — ACETAMINOPHEN 650 MG: 325 TABLET ORAL at 08:03

## 2020-03-15 RX ADMIN — ALBUTEROL SULFATE 2 PUFF: 90 AEROSOL, METERED RESPIRATORY (INHALATION) at 08:03

## 2020-03-15 RX ADMIN — GABAPENTIN 600 MG: 300 CAPSULE ORAL at 08:03

## 2020-03-15 RX ADMIN — CETIRIZINE HYDROCHLORIDE 10 MG: 10 TABLET, FILM COATED ORAL at 08:03

## 2020-03-15 RX ADMIN — INSULIN DETEMIR 20 UNITS: 100 INJECTION, SOLUTION SUBCUTANEOUS at 12:03

## 2020-03-15 RX ADMIN — INSULIN DETEMIR 30 UNITS: 100 INJECTION, SOLUTION SUBCUTANEOUS at 09:03

## 2020-03-15 RX ADMIN — ENOXAPARIN SODIUM 40 MG: 100 INJECTION SUBCUTANEOUS at 04:03

## 2020-03-15 RX ADMIN — ALBUTEROL SULFATE 2 PUFF: 90 AEROSOL, METERED RESPIRATORY (INHALATION) at 01:03

## 2020-03-15 RX ADMIN — AZITHROMYCIN MONOHYDRATE 500 MG: 500 INJECTION, POWDER, LYOPHILIZED, FOR SOLUTION INTRAVENOUS at 08:03

## 2020-03-15 RX ADMIN — BENZONATATE 200 MG: 100 CAPSULE ORAL at 08:03

## 2020-03-15 RX ADMIN — INSULIN ASPART 6 UNITS: 100 INJECTION, SOLUTION INTRAVENOUS; SUBCUTANEOUS at 11:03

## 2020-03-15 RX ADMIN — VALSARTAN 80 MG: 80 TABLET, FILM COATED ORAL at 08:03

## 2020-03-15 RX ADMIN — ALBUTEROL SULFATE 2 PUFF: 90 AEROSOL, METERED RESPIRATORY (INHALATION) at 05:03

## 2020-03-15 NOTE — HOSPITAL COURSE
Ms Jennings presented with acute respiratory failure with hypoxia. Given viral like symptoms and negative flu in setting of COVID pandemic, COVID infection very likely. Patient required ICU admission for close observation while on high flow supplemental O2. Also initiated on hydroxychloroquine for 5 days (investigational drug that has shown in vitro benefit). Patient clinically improved and stepped down to floor on low flow supplemental O2 and clinically stable. Did well overall and remained independent/ambulatory. In no distress and able to speak in full sentences. Discharged with portable O2 for hypoxia at room air. Will need two more days of hydroxychloroquine to complete treatment. Information provided regarding self care pending COVID-19 results. Masks also provided. PCP follow up. Diabetic diet. Activity as tolerated.

## 2020-03-15 NOTE — HPI
This is a 50 y.o. female with a PMHx of Diabetes Mellitus, Hypertension, and GERD, who presents to the Emergency Department with a cc of coughing x5 days. Pt was diagnosed with Pneumonia via chest x-ray 5 days ago. Associated symptoms include fever, SOB, and headaches. She notes that her headaches are most likely due to her sinuses. She denies any congestion, rhinorrhea, CP, abdominal pain, diarrhea, dysuria, nausea, vomiting, diarrhea, eye pain, or ear pain. Denies a prior history of Pneumonia. Pt is currently on medication for HTN. No known allergies. She reports a history of multiple surgeries including a breast reduction, cholecystectomy, esophagogastroduodenoscopy, hysterectomy, oophorectomy, and a total reduction mammoplasty. Pt denies use of tobacco or any alcohol consumption.     Pt reports that she went to a party with known Covid19 patient at another hospital in the area.  Pt will be admitted on isolation and covid19 test pending. Supportive care.

## 2020-03-15 NOTE — SUBJECTIVE & OBJECTIVE
Past Medical History:   Diagnosis Date    Diabetes mellitus     Diabetes mellitus, type 2     Eczema     GERD (gastroesophageal reflux disease)     Hypertension     Impaired fasting blood sugar     YSABEL on CPAP        Past Surgical History:   Procedure Laterality Date    breast reduction      CHOLECYSTECTOMY      laprascopic    ESOPHAGOGASTRODUODENOSCOPY  8/18/14    HYSTERECTOMY  2007    laprascopic, total for fibroids.  Dr Polo    OOPHORECTOMY      TOTAL REDUCTION MAMMOPLASTY         Review of patient's allergies indicates:  No Known Allergies    Current Facility-Administered Medications on File Prior to Encounter   Medication    [COMPLETED] albuterol nebulizer solution 2.5 mg    [COMPLETED] albuterol nebulizer solution 2.5 mg    [COMPLETED] ibuprofen tablet 600 mg    [COMPLETED] ipratropium 0.02 % nebulizer solution 0.5 mg    [COMPLETED] ipratropium 0.02 % nebulizer solution 0.5 mg    [COMPLETED] methylPREDNISolone sod suc(PF) injection 125 mg    [DISCONTINUED] methylPREDNISolone sod suc(PF) injection 125 mg     Current Outpatient Medications on File Prior to Encounter   Medication Sig    albuterol (PROVENTIL/VENTOLIN HFA) 90 mcg/actuation inhaler Inhale 2 puffs into the lungs every 4 (four) hours as needed for Wheezing or Shortness of Breath. Rescue    amLODIPine (NORVASC) 5 MG tablet TAKE 1 TABLET BY MOUTH EVERY DAY    atorvastatin (LIPITOR) 20 MG tablet Take 1 tablet (20 mg total) by mouth once daily.    azithromycin (Z-NADER) 250 MG tablet Take 2 tablets by mouth on day 1; Take 1 tablet by mouth on days 2-5    benzonatate (TESSALON PERLES) 100 MG capsule Take 1 capsule (100 mg total) by mouth every 6 (six) hours as needed.    blood sugar diagnostic Strp 1 strip by Misc.(Non-Drug; Combo Route) route 2 (two) times daily with meals.    dicyclomine (BENTYL) 20 mg tablet TAKE 1 TABLET (20 MG TOTAL) BY MOUTH EVERY 6 (SIX) HOURS.    ergocalciferol (VITAMIN D2) 50,000 unit Cap TAKE 1  "CAPSULE (50,000 UNITS TOTAL) BY MOUTH TWICE A WEEK.    gabapentin (NEURONTIN) 300 MG capsule Take 2 capsules (600 mg total) by mouth 3 (three) times daily.    lancets [facility] Use to test blood sugar once daily.    omeprazole (PRILOSEC) 40 MG capsule Take 1 capsule (40 mg total) by mouth once daily.    ondansetron (ZOFRAN) 4 MG tablet Take 1 tablet (4 mg total) by mouth every 8 (eight) hours as needed.    OZEMPIC 0.25 mg or 0.5 mg(2 mg/1.5 mL) PnIj INJECT 0.25 MG INTO THE SKIN EVERY 7 DAYS. X 2 WEEKS, THEN INCREASE TO 0.5MG ONCE WEEKLY    valsartan (DIOVAN) 80 MG tablet Take 1 tablet (80 mg total) by mouth once daily.    [DISCONTINUED] metFORMIN (GLUCOPHAGE-XR) 500 MG 24 hr tablet Take 1 tablet (500 mg total) by mouth 2 (two) times daily with meals.    amoxicillin-clavulanate 875-125mg (AUGMENTIN) 875-125 mg per tablet Take 1 tablet by mouth 2 (two) times daily. for 10 days    blood sugar diagnostic Strp 1 strip by Misc.(Non-Drug; Combo Route) route 2 (two) times daily with meals.    blood-glucose meter kit Use as instructed    cetirizine (ZYRTEC) 10 MG tablet Take 1 tablet (10 mg total) by mouth once daily.    fluticasone propionate (FLONASE) 50 mcg/actuation nasal spray INSTILL 1 SPRAY (50 MCG TOTAL) IN EACH NOSTRIL ONCE DAILY.    lancing device Misc 1 Device by Misc.(Non-Drug; Combo Route) route 2 (two) times daily with meals.    lancing device Misc 1 Device by Misc.(Non-Drug; Combo Route) route 2 (two) times daily with meals.    neomycin-polymyxin-dexamethasone (MAXITROL) 3.5mg/mL-10,000 unit/mL-0.1 % DrpS     nystatin (MYCOSTATIN) ointment Apply topically 2 (two) times daily.      pen needle, diabetic (PEN NEEDLE) 32 gauge x 5/32" Ndle 1 each by Misc.(Non-Drug; Combo Route) route once a week.    tobramycin-dexamethasone 0.3-0.1% (TOBRADEX) 0.3-0.1 % DrpS INSTILL 1 DROP INTO BOTH EYES 4 TIMES A DAY    triamcinolone acetonide 0.1% (KENALOG) 0.1 % cream Apply topically 2 (two) times daily. Apply " topically 2 (two) times daily.     Family History     Problem Relation (Age of Onset)    Breast cancer Sister    Cancer Father (58)    Crohn's disease Sister    Diabetes Maternal Grandmother, Mother    Heart disease Maternal Grandmother, Mother    Hypertension Maternal Grandmother, Mother        Tobacco Use    Smoking status: Never Smoker    Smokeless tobacco: Never Used   Substance and Sexual Activity    Alcohol use: No     Frequency: Monthly or less     Drinks per session: Patient refused     Binge frequency: Never     Comment: socially    Drug use: No    Sexual activity: Yes     Partners: Male     Birth control/protection: Surgical     Review of Systems   Constitutional: Positive for chills, fatigue and fever.   HENT: Negative.    Eyes: Negative.    Respiratory: Positive for cough and shortness of breath.    Cardiovascular: Negative.    Gastrointestinal: Negative.    Endocrine: Negative.    Genitourinary: Negative.    Musculoskeletal: Positive for myalgias.   Skin: Negative.    Neurological: Negative.    Psychiatric/Behavioral: Negative.      Objective:     Vital Signs (Most Recent):  Temp: 98.5 °F (36.9 °C) (03/14/20 1902)  Pulse: 94 (03/14/20 2131)  Resp: (!) 26 (03/14/20 2131)  BP: 130/79 (03/14/20 2131)  SpO2: (!) 88 % (03/14/20 2131) Vital Signs (24h Range):  Temp:  [98.5 °F (36.9 °C)-102.2 °F (39 °C)] 98.5 °F (36.9 °C)  Pulse:  [] 94  Resp:  [18-33] 26  SpO2:  [77 %-98 %] 88 %  BP: (128-158)/(66-94) 130/79     Weight: 90.3 kg (199 lb)  Body mass index is 37.6 kg/m².    Physical Exam   Constitutional: She is oriented to person, place, and time. She appears well-developed. No distress.   HENT:   Head: Normocephalic and atraumatic.   Mouth/Throat: Oropharynx is clear and moist.   Eyes: Pupils are equal, round, and reactive to light. EOM are normal.   Neck: Normal range of motion. Neck supple. No JVD present.   Cardiovascular: Normal rate, regular rhythm, normal heart sounds and intact distal pulses.    Pulmonary/Chest: She has no wheezes.   Shallow breaths   Abdominal: Soft. Bowel sounds are normal. She exhibits no distension. There is no tenderness.   Musculoskeletal: Normal range of motion. She exhibits no edema.   Lymphadenopathy:     She has no cervical adenopathy.   Neurological: She is alert and oriented to person, place, and time.   Skin: Capillary refill takes less than 2 seconds.   Psychiatric: She has a normal mood and affect. Her behavior is normal.         CRANIAL NERVES     CN III, IV, VI   Pupils are equal, round, and reactive to light.  Extraocular motions are normal.        Significant Labs:   A1C:   Recent Labs   Lab 11/11/19  0815 02/10/20  0817   HGBA1C 8.5* 7.5*     ABGs: No results for input(s): PH, PCO2, HCO3, POCSATURATED, BE, TOTALHB, COHB, METHB, O2HB, POCFIO2 in the last 48 hours.  Blood Culture: No results for input(s): LABBLOO in the last 48 hours.  CBC:   Recent Labs   Lab 03/14/20  1513   WBC 7.82   HGB 11.2*   HCT 36.3*        CMP:   Recent Labs   Lab 03/14/20  1513      K 3.9   CL 98   CO2 26   *   BUN 7   CREATININE 0.9   CALCIUM 8.4*   PROT 7.9   ALBUMIN 3.4*   BILITOT 0.5   ALKPHOS 94   AST 22   ALT 25   ANIONGAP 13   EGFRNONAA >60     Cardiac Markers: No results for input(s): CKMB, MYOGLOBIN, BNP, TROPISTAT in the last 48 hours.  Coagulation: No results for input(s): PT, INR, APTT in the last 48 hours.  Lactic Acid: No results for input(s): LACTATE in the last 48 hours.  Magnesium: No results for input(s): MG in the last 48 hours.  POCT Glucose:   Recent Labs   Lab 03/14/20  1442   POCTGLUCOSE 254*     Respiratory Culture: No results for input(s): GSRESP, RESPIRATORYC in the last 48 hours.  Urine Culture: No results for input(s): LABURIN in the last 48 hours.  Urine Studies: No results for input(s): COLORU, APPEARANCEUA, PHUR, SPECGRAV, PROTEINUA, GLUCUA, KETONESU, BILIRUBINUA, OCCULTUA, NITRITE, UROBILINOGEN, LEUKOCYTESUR, RBCUA, WBCUA, BACTERIA,  SQUAMEPITHEL, HYALINECASTS in the last 48 hours.    Invalid input(s): WRIGHTSUR    Significant Imaging:   CXR:  Impression     Interval progression of patchy areas of airspace opacification and atelectasis since March 10, 2020. It would be difficult to exclude a small amount of pleural fluid.

## 2020-03-15 NOTE — PLAN OF CARE
Problem: Adult Inpatient Plan of Care  Goal: Plan of Care Review  Outcome: Ongoing, Progressing  Goal: Patient-Specific Goal (Individualization)  Outcome: Ongoing, Progressing  Goal: Absence of Hospital-Acquired Illness or Injury  Outcome: Ongoing, Progressing  Goal: Optimal Comfort and Wellbeing  Outcome: Ongoing, Progressing  Goal: Readiness for Transition of Care  Outcome: Ongoing, Progressing  Goal: Rounds/Family Conference  Outcome: Ongoing, Progressing     Problem: Diabetes Comorbidity  Goal: Blood Glucose Level Within Desired Range  Outcome: Ongoing, Progressing     Problem: Fluid Imbalance (Pneumonia)  Goal: Fluid Balance  Outcome: Ongoing, Progressing     Problem: Infection (Pneumonia)  Goal: Resolution of Infection Signs/Symptoms  Outcome: Ongoing, Progressing     Problem: Respiratory Compromise (Pneumonia)  Goal: Effective Oxygenation and Ventilation  Outcome: Ongoing, Progressing     Problem: Airway Clearance Ineffective  Goal: Effective Airway Clearance  Outcome: Ongoing, Progressing

## 2020-03-15 NOTE — SUBJECTIVE & OBJECTIVE
Interval History: pt reports breathing still difficult and no noticeable difference    Review of Systems   Constitutional: Positive for chills, fatigue and fever.   HENT: Negative.    Eyes: Negative.    Respiratory: Positive for cough and shortness of breath.    Cardiovascular: Negative.    Gastrointestinal: Negative.    Endocrine: Negative.    Genitourinary: Negative.    Musculoskeletal: Positive for myalgias.   Skin: Negative.    Neurological: Negative.    Psychiatric/Behavioral: Negative.      Objective:     Vital Signs (Most Recent):  Temp: 98.1 °F (36.7 °C) (03/15/20 1426)  Pulse: 87 (03/15/20 1703)  Resp: (!) 28 (03/15/20 1703)  BP: 137/86 (03/15/20 1426)  SpO2: (!) 94 % (03/15/20 1703) Vital Signs (24h Range):  Temp:  [97.3 °F (36.3 °C)-98.6 °F (37 °C)] 98.1 °F (36.7 °C)  Pulse:  [] 87  Resp:  [18-51] 28  SpO2:  [84 %-98 %] 94 %  BP: (123-176)/() 137/86     Weight: 90.8 kg (200 lb 3.6 oz)  Body mass index is 37.83 kg/m².    Intake/Output Summary (Last 24 hours) at 3/15/2020 1726  Last data filed at 3/15/2020 1430  Gross per 24 hour   Intake 620 ml   Output 500 ml   Net 120 ml      Physical Exam   Constitutional: She is oriented to person, place, and time. She appears well-developed. No distress.   HENT:   Head: Normocephalic and atraumatic.   Mouth/Throat: Oropharynx is clear and moist.   Eyes: Pupils are equal, round, and reactive to light. EOM are normal.   Neck: Normal range of motion. Neck supple. No JVD present.   Cardiovascular: Normal rate, regular rhythm, normal heart sounds and intact distal pulses.   Pulmonary/Chest: She has no wheezes.   Shallow breaths   Abdominal: Soft. Bowel sounds are normal. She exhibits no distension. There is no tenderness.   Musculoskeletal: Normal range of motion. She exhibits no edema.   Lymphadenopathy:     She has no cervical adenopathy.   Neurological: She is alert and oriented to person, place, and time.   Skin: Capillary refill takes less than 2 seconds.    Psychiatric: She has a normal mood and affect. Her behavior is normal.       Significant Labs:   Blood Culture:   Recent Labs   Lab 03/14/20  1513 03/14/20  1521   LABBLOO No Growth to date  No Growth to date No Growth to date  No Growth to date     BMP:   Recent Labs   Lab 03/16/20  0416   *      K 4.3      CO2 27   BUN 15   CREATININE 0.8   CALCIUM 8.4*     CBC:   Recent Labs   Lab 03/14/20  1513 03/15/20  1243 03/16/20  0416   WBC 7.82 12.84* 16.89*   HGB 11.2* 12.0 10.9*   HCT 36.3* 39.2 35.4*    320 377*     Respiratory Culture:   Recent Labs   Lab 03/14/20  1505   GSRESP CYTOSPIN SHOWS: No WBC's or organisms seen  03/14/2020  23:03  Corrected gram stain report:  >10 epithelial cells per low power field  Few WBC's  Moderate Gram positive rods  Few budding yeast  Few Gram positive cocci in pairs and chains   RESPIRATORYC Normal respiratory skinny       Significant Imaging: I have reviewed and interpreted all pertinent imaging results/findings within the past 24 hours.

## 2020-03-15 NOTE — PLAN OF CARE
03/15/20 1541   Discharge Assessment   Assessment Type Discharge Planning Assessment   Confirmed/corrected address and phone number on facesheet? Yes   Assessment information obtained from? Caregiver;Medical Record   Communicated expected length of stay with patient/caregiver no   Prior to hospitilization cognitive status: Alert/Oriented   Prior to hospitalization functional status: Independent   Current cognitive status: Alert/Oriented   Current Functional Status: Independent   Facility Arrived From: Home   Lives With alone   Able to Return to Prior Arrangements yes   Is patient able to care for self after discharge? Yes   Who are your caregiver(s) and their phone number(s)? Bud-son: [phone]   Patient's perception of discharge disposition home or selfcare   Readmission Within the Last 30 Days planned readmission   Patient currently being followed by outpatient case management? No   Patient currently receives any other outside agency services? No   Equipment Currently Used at Home none   Do you have any problems affording any of your prescribed medications? No   Is the patient taking medications as prescribed? yes   Does the patient have transportation home? Yes   Transportation Anticipated car, drives self;family or friend will provide   Does the patient receive services at the [facility]? No   Discharge Plan A Home   Discharge Plan B Other  (TBD)   DME Needed Upon Discharge  other (see comments)  (TBD)   Patient/Family in Agreement with Plan yes   Readmission Questionnaire   At the time of your discharge, did someone talk to you about what your health problems were? Yes   At the time of discharge, did someone talk to you about what to watch out for regarding worsening of your health problem? Yes   At the time of discharge, did someone talk to you about what to do if you experienced worsening of your health problem? Yes   At the time of discharge, did someone talk to you about which medication to take  when you left the hospital and which ones to stop taking? Yes   At the time of discharge, did someone talk to you about when and where to follow up with a doctor after you left the hospital? Yes   What do you believe caused you to be sick enough to be re-admitted? Unknown   How often do you need to have someone help you when you read instructions, pamphlets, or other written material from your doctor or pharmacy? Rarely   Do you have problems taking your medications as prescribed? No   Do you have any problems affording any of  your prescribed medications? No   Do you have problems obtaining/receiving your medications? No   Does the patient have transportation to healthcare appointments? Yes   Living Arrangements house   Does the patient have family/friends to help with healtcare needs after discharge? yes   Does your caregiver provide all the help you need? Yes   If no, what kind of help do you need at home? None really needed   SW Role explained to patient; two patient identifiers recognized; SW contact information placed on Communication board. Discussed patient managing health care at home; determined who would be helping patient at home with recovery: home alone and independent; family (Bud-son) helps if needed    PCP: Brandi Thompson MD Prefers morning appointments    Extended Emergency Contact Information  Primary Emergency Contact: Bud Marley           [place], [place]  Home Phone: [phone]  Mobile Phone: [phone]  Relation: Son     [id number] - [place] - [address]  [address] [address]  Phone: [phone] Fax: [phone]    Payor: [employer] CROSS BLUE SHIELD / Plan: BCBS ALL OUT OF STATE / Product Type: PPO /

## 2020-03-15 NOTE — H&P
"[facility] Hospital Medicine  History & Physical    Patient Name: Africa Jennings  MRN: [id number]  Admission Date: 3/14/2020  Attending Physician: Rosa Taylor MD   Primary Care Provider: Brandi Thompson MD         Patient information was obtained from patient and ER records.     Subjective:     Principal Problem:Pneumonia of both lungs due to infectious organism    Chief Complaint:   Chief Complaint   Patient presents with    Cough     c/o " I have low oxygen level and they sent me over here from ." Pt endorses cough x 2 months. edorses fevers of "103".         HPI: This is a 50 y.o. female with a PMHx of Diabetes Mellitus, Hypertension, and GERD, who presents to the Emergency Department with a cc of coughing x5 days. Pt was diagnosed with Pneumonia via chest x-ray 5 days ago. Associated symptoms include fever, SOB, and headaches. She notes that her headaches are most likely due to her sinuses. She denies any congestion, rhinorrhea, CP, abdominal pain, diarrhea, dysuria, nausea, vomiting, diarrhea, eye pain, or ear pain. Denies a prior history of Pneumonia. Pt is currently on medication for HTN. No known allergies. She reports a history of multiple surgeries including a breast reduction, cholecystectomy, esophagogastroduodenoscopy, hysterectomy, oophorectomy, and a total reduction mammoplasty. Pt denies use of tobacco or any alcohol consumption.     Pt reports that she went to a party with known Covid19 patient at another hospital in the area.  Pt will be admitted on isolation and covid19 test pending. Supportive care.     Past Medical History:   Diagnosis Date    Diabetes mellitus     Diabetes mellitus, type 2     Eczema     GERD (gastroesophageal reflux disease)     Hypertension     Impaired fasting blood sugar     YSABEL on CPAP        Past Surgical History:   Procedure Laterality Date    breast reduction      CHOLECYSTECTOMY      laprascopic    " ESOPHAGOGASTRODUODENOSCOPY  8/18/14    HYSTERECTOMY  2007    laprascopic, total for fibroids.  Dr Polo    OOPHORECTOMY      TOTAL REDUCTION MAMMOPLASTY         Review of patient's allergies indicates:  No Known Allergies    Current Facility-Administered Medications on File Prior to Encounter   Medication    [COMPLETED] albuterol nebulizer solution 2.5 mg    [COMPLETED] albuterol nebulizer solution 2.5 mg    [COMPLETED] ibuprofen tablet 600 mg    [COMPLETED] ipratropium 0.02 % nebulizer solution 0.5 mg    [COMPLETED] ipratropium 0.02 % nebulizer solution 0.5 mg    [COMPLETED] methylPREDNISolone sod suc(PF) injection 125 mg    [DISCONTINUED] methylPREDNISolone sod suc(PF) injection 125 mg     Current Outpatient Medications on File Prior to Encounter   Medication Sig    albuterol (PROVENTIL/VENTOLIN HFA) 90 mcg/actuation inhaler Inhale 2 puffs into the lungs every 4 (four) hours as needed for Wheezing or Shortness of Breath. Rescue    amLODIPine (NORVASC) 5 MG tablet TAKE 1 TABLET BY MOUTH EVERY DAY    atorvastatin (LIPITOR) 20 MG tablet Take 1 tablet (20 mg total) by mouth once daily.    azithromycin (Z-NADER) 250 MG tablet Take 2 tablets by mouth on day 1; Take 1 tablet by mouth on days 2-5    benzonatate (TESSALON PERLES) 100 MG capsule Take 1 capsule (100 mg total) by mouth every 6 (six) hours as needed.    blood sugar diagnostic Strp 1 strip by Misc.(Non-Drug; Combo Route) route 2 (two) times daily with meals.    dicyclomine (BENTYL) 20 mg tablet TAKE 1 TABLET (20 MG TOTAL) BY MOUTH EVERY 6 (SIX) HOURS.    ergocalciferol (VITAMIN D2) 50,000 unit Cap TAKE 1 CAPSULE (50,000 UNITS TOTAL) BY MOUTH TWICE A WEEK.    gabapentin (NEURONTIN) 300 MG capsule Take 2 capsules (600 mg total) by mouth 3 (three) times daily.    lancets [facility] Use to test blood sugar once daily.    omeprazole (PRILOSEC) 40 MG capsule Take 1 capsule (40 mg total) by mouth once daily.    ondansetron (ZOFRAN) 4 MG tablet Take 1  "tablet (4 mg total) by mouth every 8 (eight) hours as needed.    OZEMPIC 0.25 mg or 0.5 mg(2 mg/1.5 mL) PnIj INJECT 0.25 MG INTO THE SKIN EVERY 7 DAYS. X 2 WEEKS, THEN INCREASE TO 0.5MG ONCE WEEKLY    valsartan (DIOVAN) 80 MG tablet Take 1 tablet (80 mg total) by mouth once daily.    [DISCONTINUED] metFORMIN (GLUCOPHAGE-XR) 500 MG 24 hr tablet Take 1 tablet (500 mg total) by mouth 2 (two) times daily with meals.    amoxicillin-clavulanate 875-125mg (AUGMENTIN) 875-125 mg per tablet Take 1 tablet by mouth 2 (two) times daily. for 10 days    blood sugar diagnostic Strp 1 strip by Misc.(Non-Drug; Combo Route) route 2 (two) times daily with meals.    blood-glucose meter kit Use as instructed    cetirizine (ZYRTEC) 10 MG tablet Take 1 tablet (10 mg total) by mouth once daily.    fluticasone propionate (FLONASE) 50 mcg/actuation nasal spray INSTILL 1 SPRAY (50 MCG TOTAL) IN EACH NOSTRIL ONCE DAILY.    lancing device Misc 1 Device by Misc.(Non-Drug; Combo Route) route 2 (two) times daily with meals.    lancing device Misc 1 Device by Misc.(Non-Drug; Combo Route) route 2 (two) times daily with meals.    neomycin-polymyxin-dexamethasone (MAXITROL) 3.5mg/mL-10,000 unit/mL-0.1 % DrpS     nystatin (MYCOSTATIN) ointment Apply topically 2 (two) times daily.      pen needle, diabetic (PEN NEEDLE) 32 gauge x 5/32" Ndle 1 each by Misc.(Non-Drug; Combo Route) route once a week.    tobramycin-dexamethasone 0.3-0.1% (TOBRADEX) 0.3-0.1 % DrpS INSTILL 1 DROP INTO BOTH EYES 4 TIMES A DAY    triamcinolone acetonide 0.1% (KENALOG) 0.1 % cream Apply topically 2 (two) times daily. Apply topically 2 (two) times daily.     Family History     Problem Relation (Age of Onset)    Breast cancer Sister    Cancer Father (58)    Crohn's disease Sister    Diabetes Maternal Grandmother, Mother    Heart disease Maternal Grandmother, Mother    Hypertension Maternal Grandmother, Mother        Tobacco Use    Smoking status: Never Smoker    " Smokeless tobacco: Never Used   Substance and Sexual Activity    Alcohol use: No     Frequency: Monthly or less     Drinks per session: Patient refused     Binge frequency: Never     Comment: socially    Drug use: No    Sexual activity: Yes     Partners: Male     Birth control/protection: Surgical     Review of Systems   Constitutional: Positive for chills, fatigue and fever.   HENT: Negative.    Eyes: Negative.    Respiratory: Positive for cough and shortness of breath.    Cardiovascular: Negative.    Gastrointestinal: Negative.    Endocrine: Negative.    Genitourinary: Negative.    Musculoskeletal: Positive for myalgias.   Skin: Negative.    Neurological: Negative.    Psychiatric/Behavioral: Negative.      Objective:     Vital Signs (Most Recent):  Temp: 98.5 °F (36.9 °C) (03/14/20 1902)  Pulse: 94 (03/14/20 2131)  Resp: (!) 26 (03/14/20 2131)  BP: 130/79 (03/14/20 2131)  SpO2: (!) 88 % (03/14/20 2131) Vital Signs (24h Range):  Temp:  [98.5 °F (36.9 °C)-102.2 °F (39 °C)] 98.5 °F (36.9 °C)  Pulse:  [] 94  Resp:  [18-33] 26  SpO2:  [77 %-98 %] 88 %  BP: (128-158)/(66-94) 130/79     Weight: 90.3 kg (199 lb)  Body mass index is 37.6 kg/m².    Physical Exam   Constitutional: She is oriented to person, place, and time. She appears well-developed. No distress.   HENT:   Head: Normocephalic and atraumatic.   Mouth/Throat: Oropharynx is clear and moist.   Eyes: Pupils are equal, round, and reactive to light. EOM are normal.   Neck: Normal range of motion. Neck supple. No JVD present.   Cardiovascular: Normal rate, regular rhythm, normal heart sounds and intact distal pulses.   Pulmonary/Chest: She has no wheezes.   Shallow breaths   Abdominal: Soft. Bowel sounds are normal. She exhibits no distension. There is no tenderness.   Musculoskeletal: Normal range of motion. She exhibits no edema.   Lymphadenopathy:     She has no cervical adenopathy.   Neurological: She is alert and oriented to person, place, and time.    Skin: Capillary refill takes less than 2 seconds.   Psychiatric: She has a normal mood and affect. Her behavior is normal.         CRANIAL NERVES     CN III, IV, VI   Pupils are equal, round, and reactive to light.  Extraocular motions are normal.        Significant Labs:   A1C:   Recent Labs   Lab 11/11/19  0815 02/10/20  0817   HGBA1C 8.5* 7.5*     ABGs: No results for input(s): PH, PCO2, HCO3, POCSATURATED, BE, TOTALHB, COHB, METHB, O2HB, POCFIO2 in the last 48 hours.  Blood Culture: No results for input(s): LABBLOO in the last 48 hours.  CBC:   Recent Labs   Lab 03/14/20  1513   WBC 7.82   HGB 11.2*   HCT 36.3*        CMP:   Recent Labs   Lab 03/14/20  1513      K 3.9   CL 98   CO2 26   *   BUN 7   CREATININE 0.9   CALCIUM 8.4*   PROT 7.9   ALBUMIN 3.4*   BILITOT 0.5   ALKPHOS 94   AST 22   ALT 25   ANIONGAP 13   EGFRNONAA >60     Cardiac Markers: No results for input(s): CKMB, MYOGLOBIN, BNP, TROPISTAT in the last 48 hours.  Coagulation: No results for input(s): PT, INR, APTT in the last 48 hours.  Lactic Acid: No results for input(s): LACTATE in the last 48 hours.  Magnesium: No results for input(s): MG in the last 48 hours.  POCT Glucose:   Recent Labs   Lab 03/14/20  1442   POCTGLUCOSE 254*     Respiratory Culture: No results for input(s): GSRESP, RESPIRATORYC in the last 48 hours.  Urine Culture: No results for input(s): LABURIN in the last 48 hours.  Urine Studies: No results for input(s): COLORU, APPEARANCEUA, PHUR, SPECGRAV, PROTEINUA, GLUCUA, KETONESU, BILIRUBINUA, OCCULTUA, NITRITE, UROBILINOGEN, LEUKOCYTESUR, RBCUA, WBCUA, BACTERIA, SQUAMEPITHEL, HYALINECASTS in the last 48 hours.    Invalid input(s): WRIGHTSUR    Significant Imaging:   CXR:  Impression     Interval progression of patchy areas of airspace opacification and atelectasis since March 10, 2020. It would be difficult to exclude a small amount of pleural fluid.      Assessment/Plan:     * Pneumonia of both lungs due to  infectious organism  Likely viral/ covid19  Pt had exposure with known positive patient  See above hypoxemia         Hypoxemia  Secondary to pneumonia   POssible covid19  High flow O2 and titrate for O2 sats >90%  IV steroid + antibiotics   CRP elevated  Pulm consulted  Supportive care         Gastroesophageal reflux disease without esophagitis  Resume PPI      Type 2 diabetes mellitus without complication, without long-term current use of insulin  Hold home agents  Basal-bolus regimen  Diabetic diet  Gabapentin resumed        Essential hypertension  Uncontrolled  Resume home meds        VTE Risk Mitigation (From admission, onward)         Ordered     enoxaparin injection 40 mg  Daily      03/14/20 1934     IP VTE HIGH RISK PATIENT  Once      03/14/20 1934                   Rosa Taylor MD  Department of Hospital Medicine   [facility]

## 2020-03-15 NOTE — EICU
Rounding (Video Assessment):  Video rounding completed.  Pt awake & talking eating lunch.  Hernandez c/o's.  B/P 139/84, , RR 25, Sat 91% on NC.

## 2020-03-15 NOTE — ASSESSMENT & PLAN NOTE
Secondary to pneumonia   POssible covid19  High flow O2 and titrate for O2 sats >90%  IV steroid + antibiotics   CRP elevated  Pulm consulted  Supportive care

## 2020-03-16 LAB
ANION GAP SERPL CALC-SCNC: 10 MMOL/L (ref 8–16)
BACTERIA SPEC AEROBE CULT: NORMAL
BASOPHILS # BLD AUTO: 0.01 K/UL (ref 0–0.2)
BASOPHILS NFR BLD: 0.1 % (ref 0–1.9)
BUN SERPL-MCNC: 15 MG/DL (ref 6–20)
CALCIUM SERPL-MCNC: 8.4 MG/DL (ref 8.7–10.5)
CHLORIDE SERPL-SCNC: 101 MMOL/L (ref 95–110)
CO2 SERPL-SCNC: 27 MMOL/L (ref 23–29)
CREAT SERPL-MCNC: 0.8 MG/DL (ref 0.5–1.4)
DIFFERENTIAL METHOD: ABNORMAL
EOSINOPHIL # BLD AUTO: 0 K/UL (ref 0–0.5)
EOSINOPHIL NFR BLD: 0 % (ref 0–8)
ERYTHROCYTE [DISTWIDTH] IN BLOOD BY AUTOMATED COUNT: 14.3 % (ref 11.5–14.5)
EST. GFR  (AFRICAN AMERICAN): >60 ML/MIN/1.73 M^2
EST. GFR  (NON AFRICAN AMERICAN): >60 ML/MIN/1.73 M^2
GLUCOSE SERPL-MCNC: 136 MG/DL (ref 70–110)
GRAM STN SPEC: NORMAL
HCT VFR BLD AUTO: 35.4 % (ref 37–48.5)
HGB BLD-MCNC: 10.9 G/DL (ref 12–16)
IMM GRANULOCYTES # BLD AUTO: 0.19 K/UL (ref 0–0.04)
IMM GRANULOCYTES NFR BLD AUTO: 1.1 % (ref 0–0.5)
LYMPHOCYTES # BLD AUTO: 2.6 K/UL (ref 1–4.8)
LYMPHOCYTES NFR BLD: 15.6 % (ref 18–48)
MCH RBC QN AUTO: 26.6 PG (ref 27–31)
MCHC RBC AUTO-ENTMCNC: 30.8 G/DL (ref 32–36)
MCV RBC AUTO: 86 FL (ref 82–98)
MONOCYTES # BLD AUTO: 0.9 K/UL (ref 0.3–1)
MONOCYTES NFR BLD: 5.6 % (ref 4–15)
NEUTROPHILS # BLD AUTO: 13.1 K/UL (ref 1.8–7.7)
NEUTROPHILS NFR BLD: 77.6 % (ref 38–73)
NRBC BLD-RTO: 0 /100 WBC
PLATELET # BLD AUTO: 377 K/UL (ref 150–350)
PMV BLD AUTO: 10.5 FL (ref 9.2–12.9)
POCT GLUCOSE: 110 MG/DL (ref 70–110)
POTASSIUM SERPL-SCNC: 4.3 MMOL/L (ref 3.5–5.1)
RBC # BLD AUTO: 4.1 M/UL (ref 4–5.4)
SODIUM SERPL-SCNC: 138 MMOL/L (ref 136–145)
WBC # BLD AUTO: 16.89 K/UL (ref 3.9–12.7)

## 2020-03-16 PROCEDURE — 20000000 HC ICU ROOM

## 2020-03-16 PROCEDURE — 36415 COLL VENOUS BLD VENIPUNCTURE: CPT

## 2020-03-16 PROCEDURE — 94640 AIRWAY INHALATION TREATMENT: CPT

## 2020-03-16 PROCEDURE — 25000003 PHARM REV CODE 250: Performed by: INTERNAL MEDICINE

## 2020-03-16 PROCEDURE — 99291 PR CRITICAL CARE, E/M 30-74 MINUTES: ICD-10-PCS | Mod: ,,, | Performed by: INTERNAL MEDICINE

## 2020-03-16 PROCEDURE — 25000003 PHARM REV CODE 250: Performed by: HOSPITALIST

## 2020-03-16 PROCEDURE — 99291 CRITICAL CARE FIRST HOUR: CPT | Mod: ,,, | Performed by: INTERNAL MEDICINE

## 2020-03-16 PROCEDURE — 63600175 PHARM REV CODE 636 W HCPCS: Performed by: HOSPITALIST

## 2020-03-16 PROCEDURE — 99292 CRITICAL CARE ADDL 30 MIN: CPT | Mod: ,,, | Performed by: INTERNAL MEDICINE

## 2020-03-16 PROCEDURE — 85025 COMPLETE CBC W/AUTO DIFF WBC: CPT

## 2020-03-16 PROCEDURE — 80048 BASIC METABOLIC PNL TOTAL CA: CPT

## 2020-03-16 PROCEDURE — 99292 PR CRITICAL CARE, ADDL 30 MIN: ICD-10-PCS | Mod: ,,, | Performed by: INTERNAL MEDICINE

## 2020-03-16 PROCEDURE — 27000221 HC OXYGEN, UP TO 24 HOURS

## 2020-03-16 RX ORDER — ALBUTEROL SULFATE 90 UG/1
2 AEROSOL, METERED RESPIRATORY (INHALATION) EVERY 8 HOURS
Status: DISCONTINUED | OUTPATIENT
Start: 2020-03-16 | End: 2020-03-18

## 2020-03-16 RX ORDER — GABAPENTIN 300 MG/1
600 CAPSULE ORAL 2 TIMES DAILY
Status: DISCONTINUED | OUTPATIENT
Start: 2020-03-16 | End: 2020-03-19 | Stop reason: HOSPADM

## 2020-03-16 RX ADMIN — AMLODIPINE BESYLATE 5 MG: 5 TABLET ORAL at 08:03

## 2020-03-16 RX ADMIN — INSULIN DETEMIR 30 UNITS: 100 INJECTION, SOLUTION SUBCUTANEOUS at 09:03

## 2020-03-16 RX ADMIN — INSULIN ASPART 2 UNITS: 100 INJECTION, SOLUTION INTRAVENOUS; SUBCUTANEOUS at 04:03

## 2020-03-16 RX ADMIN — ALBUTEROL SULFATE 2 PUFF: 90 AEROSOL, METERED RESPIRATORY (INHALATION) at 02:03

## 2020-03-16 RX ADMIN — INSULIN ASPART 8 UNITS: 100 INJECTION, SOLUTION INTRAVENOUS; SUBCUTANEOUS at 04:03

## 2020-03-16 RX ADMIN — AZITHROMYCIN MONOHYDRATE 500 MG: 500 INJECTION, POWDER, LYOPHILIZED, FOR SOLUTION INTRAVENOUS at 08:03

## 2020-03-16 RX ADMIN — ALBUTEROL SULFATE 2 PUFF: 90 AEROSOL, METERED RESPIRATORY (INHALATION) at 05:03

## 2020-03-16 RX ADMIN — ATORVASTATIN CALCIUM 20 MG: 10 TABLET, FILM COATED ORAL at 08:03

## 2020-03-16 RX ADMIN — INSULIN ASPART 2 UNITS: 100 INJECTION, SOLUTION INTRAVENOUS; SUBCUTANEOUS at 11:03

## 2020-03-16 RX ADMIN — ENOXAPARIN SODIUM 40 MG: 100 INJECTION SUBCUTANEOUS at 04:03

## 2020-03-16 RX ADMIN — VALSARTAN 80 MG: 80 TABLET, FILM COATED ORAL at 08:03

## 2020-03-16 RX ADMIN — CETIRIZINE HYDROCHLORIDE 10 MG: 10 TABLET, FILM COATED ORAL at 08:03

## 2020-03-16 RX ADMIN — PANTOPRAZOLE SODIUM 40 MG: 40 TABLET, DELAYED RELEASE ORAL at 08:03

## 2020-03-16 RX ADMIN — ALBUTEROL SULFATE 2 PUFF: 90 AEROSOL, METERED RESPIRATORY (INHALATION) at 08:03

## 2020-03-16 RX ADMIN — ALBUTEROL SULFATE 2 PUFF: 90 AEROSOL, METERED RESPIRATORY (INHALATION) at 11:03

## 2020-03-16 RX ADMIN — GABAPENTIN 600 MG: 300 CAPSULE ORAL at 09:03

## 2020-03-16 RX ADMIN — INSULIN ASPART 8 UNITS: 100 INJECTION, SOLUTION INTRAVENOUS; SUBCUTANEOUS at 11:03

## 2020-03-16 RX ADMIN — CEFTRIAXONE SODIUM 1 G: 1 INJECTION, POWDER, FOR SOLUTION INTRAMUSCULAR; INTRAVENOUS at 07:03

## 2020-03-16 RX ADMIN — INSULIN ASPART 1 UNITS: 100 INJECTION, SOLUTION INTRAVENOUS; SUBCUTANEOUS at 09:03

## 2020-03-16 NOTE — HPI
50 y.o. female with a PMHx of Diabetes Mellitus, Hypertension, and GERD, who presents to the Emergency Department with a cc of coughing x5 days. Pt was diagnosed with Pneumonia via chest x-ray 5 days ago. Associated symptoms include fever, SOB, and headaches. She notes that her headaches are most likely due to her sinuses. She denies any congestion, rhinorrhea, CP, abdominal pain, diarrhea, dysuria, nausea, vomiting, diarrhea, eye pain, or ear pain. Denies a prior history of Pneumonia. Pt is currently on medication for HTN. No known allergies. She reports a history of multiple surgeries including a breast reduction, cholecystectomy, esophagogastroduodenoscopy, hysterectomy, oophorectomy, and a total reduction mammoplasty. Pt denies use of tobacco or any alcohol consumption.      Pt reports that she went to a party with known Covid19 patient at another hospital in the area.  Pt will be admitted on isolation and covid19 test pending. Supportive care.      Pulmonary consulted for acute respiratory failure.

## 2020-03-16 NOTE — PLAN OF CARE
PT in ICU on isolation for COVID-19 rule out. On 15 L high flow NC. Sats decrease to 87% when pt gets on bedside commode. 1 stool occurrence. Pt tolerating diet. Accuchecks monitored per order. No falls, injuries, or skin breakdown. Pt educated to not get out of bed without calling. Bed in low position, bed locked, call light in reach.

## 2020-03-16 NOTE — ASSESSMENT & PLAN NOTE
Patient bilateral infiltrates. Concern for COVID-19.   -Follow up SARS-CoV2 PCR  -Limit fluids  -steroid

## 2020-03-16 NOTE — PROGRESS NOTES
[facility] Hospital Medicine  Progress Note    Patient Name: Africa Jennings  MRN: [id number]  Patient Class: IP- Inpatient   Admission Date: 3/14/2020  Length of Stay: 2 days  Attending Physician: Rosa Taylro MD  Primary Care Provider: Brandi Thompson MD        Subjective:     Principal Problem:Pneumonia of both lungs due to infectious organism        HPI:  This is a 50 y.o. female with a PMHx of Diabetes Mellitus, Hypertension, and GERD, who presents to the Emergency Department with a cc of coughing x5 days. Pt was diagnosed with Pneumonia via chest x-ray 5 days ago. Associated symptoms include fever, SOB, and headaches. She notes that her headaches are most likely due to her sinuses. She denies any congestion, rhinorrhea, CP, abdominal pain, diarrhea, dysuria, nausea, vomiting, diarrhea, eye pain, or ear pain. Denies a prior history of Pneumonia. Pt is currently on medication for HTN. No known allergies. She reports a history of multiple surgeries including a breast reduction, cholecystectomy, esophagogastroduodenoscopy, hysterectomy, oophorectomy, and a total reduction mammoplasty. Pt denies use of tobacco or any alcohol consumption.     Pt reports that she went to a party with known Covid19 patient at another hospital in the area.  Pt will be admitted on isolation and covid19 test pending. Supportive care.     Overview/Hospital Course:  Pt admitted with flu symptoms and hypoxia. COVID19 testing in place and pt placed in ICU. O2 requirements currently at 10-12 liters high flow.     3/16- O2 sats not improved, up to 15 liters high flow; discussed with patient that if O2 requirements need to be escalated further, we may need to go toward intubation.  COVID pending      Interval History: pt reports breathing still difficult and no noticeable difference    Review of Systems   Constitutional: Positive for chills, fatigue and fever.   HENT: Negative.    Eyes: Negative.    Respiratory:  Positive for cough and shortness of breath.    Cardiovascular: Negative.    Gastrointestinal: Negative.    Endocrine: Negative.    Genitourinary: Negative.    Musculoskeletal: Positive for myalgias.   Skin: Negative.    Neurological: Negative.    Psychiatric/Behavioral: Negative.      Objective:     Vital Signs (Most Recent):  Temp: 98.1 °F (36.7 °C) (03/15/20 1426)  Pulse: 87 (03/15/20 1703)  Resp: (!) 28 (03/15/20 1703)  BP: 137/86 (03/15/20 1426)  SpO2: (!) 94 % (03/15/20 1703) Vital Signs (24h Range):  Temp:  [97.3 °F (36.3 °C)-98.6 °F (37 °C)] 98.1 °F (36.7 °C)  Pulse:  [] 87  Resp:  [18-51] 28  SpO2:  [84 %-98 %] 94 %  BP: (123-176)/() 137/86     Weight: 90.8 kg (200 lb 3.6 oz)  Body mass index is 37.83 kg/m².    Intake/Output Summary (Last 24 hours) at 3/15/2020 1726  Last data filed at 3/15/2020 1430  Gross per 24 hour   Intake 620 ml   Output 500 ml   Net 120 ml      Physical Exam   Constitutional: She is oriented to person, place, and time. She appears well-developed. No distress.   HENT:   Head: Normocephalic and atraumatic.   Mouth/Throat: Oropharynx is clear and moist.   Eyes: Pupils are equal, round, and reactive to light. EOM are normal.   Neck: Normal range of motion. Neck supple. No JVD present.   Cardiovascular: Normal rate, regular rhythm, normal heart sounds and intact distal pulses.   Pulmonary/Chest: She has no wheezes.   Shallow breaths   Abdominal: Soft. Bowel sounds are normal. She exhibits no distension. There is no tenderness.   Musculoskeletal: Normal range of motion. She exhibits no edema.   Lymphadenopathy:     She has no cervical adenopathy.   Neurological: She is alert and oriented to person, place, and time.   Skin: Capillary refill takes less than 2 seconds.   Psychiatric: She has a normal mood and affect. Her behavior is normal.       Significant Labs:   Blood Culture:   Recent Labs   Lab 03/14/20  1513 03/14/20  1521   [facility] No Growth to date  No Growth to date No  Growth to date  No Growth to date     BMP:   Recent Labs   Lab 03/16/20  0416   *      K 4.3      CO2 27   BUN 15   CREATININE 0.8   CALCIUM 8.4*     CBC:   Recent Labs   Lab 03/14/20  1513 03/15/20  1243 03/16/20  0416   WBC 7.82 12.84* 16.89*   HGB 11.2* 12.0 10.9*   HCT 36.3* 39.2 35.4*    320 377*     Respiratory Culture:   Recent Labs   Lab 03/14/20  1505   GSRESP CYTOSPIN SHOWS: No WBC's or organisms seen  03/14/2020  23:03  Corrected gram stain report:  >10 epithelial cells per low power field  Few WBC's  Moderate Gram positive rods  Few budding yeast  Few Gram positive cocci in pairs and chains   RESPIRATORYC Normal respiratory skinny       Significant Imaging: I have reviewed and interpreted all pertinent imaging results/findings within the past 24 hours.      Assessment/Plan:      * Pneumonia of both lungs due to infectious organism  Likely viral/ covid19  Pt had exposure with known positive patient  See above hypoxemia         Hypoxemia  Secondary to pneumonia   POssible covid19  High flow O2 and titrate for O2 sats >90%  IV steroid + antibiotics   CRP elevated  Pulm consulted  Supportive care         Gastroesophageal reflux disease without esophagitis  Resume PPI      Obstructive sleep apnea (adult) (pediatric)  Not recommending PPV with concern for covid        Type 2 diabetes mellitus without complication, without long-term current use of insulin  Hold home agents  Basal-bolus regimen  Diabetic diet  Gabapentin resumed        Essential hypertension  Uncontrolled  Resume home meds        VTE Risk Mitigation (From admission, onward)         Ordered     enoxaparin injection 40 mg  Daily      03/15/20 0625     IP VTE HIGH RISK PATIENT  Once      03/14/20 1934                Critical care time spent on the evaluation and treatment of severe organ dysfunction, review of pertinent labs and imaging studies, discussions with consulting providers and discussions with  patient/family: 40 minutes.      Rosa Taylor MD  Department of Hospital Medicine   [facility]

## 2020-03-16 NOTE — PROGRESS NOTES
Report given to COLLIN Srivastava. Pt is AO x4. Denies pain. 15 lpm high flow n/c. Up to bedside commode x standby assist. CBG corrected with scheduled novolog and SSI. VS stable. Denies additional needs at this time

## 2020-03-16 NOTE — NURSING
Pt remains in ICU overnight. AAOX4. Afebrile. 15L HFNC. OOB to commode with minimal assist. Denies any pain or SOB. ABX given per order. CBG monitored with SSI given per orders. Pt remains under isolation pending COVID 19 test results. Pt remains free of any falls, injuries, or new skin breakdown during this shift

## 2020-03-16 NOTE — SUBJECTIVE & OBJECTIVE
Past Medical History:   Diagnosis Date    Diabetes mellitus     Diabetes mellitus, type 2     Eczema     GERD (gastroesophageal reflux disease)     Hypertension     Impaired fasting blood sugar     YSABEL on CPAP        Past Surgical History:   Procedure Laterality Date    breast reduction      CHOLECYSTECTOMY      laprascopic    ESOPHAGOGASTRODUODENOSCOPY  8/18/14    HYSTERECTOMY  2007    laprascopic, total for fibroids.  Dr Polo    OOPHORECTOMY      TOTAL REDUCTION MAMMOPLASTY         Review of patient's allergies indicates:  No Known Allergies    No current facility-administered medications on file prior to encounter.      Current Outpatient Medications on File Prior to Encounter   Medication Sig    albuterol (PROVENTIL/VENTOLIN HFA) 90 mcg/actuation inhaler Inhale 2 puffs into the lungs every 4 (four) hours as needed for Wheezing or Shortness of Breath. Rescue    amLODIPine (NORVASC) 5 MG tablet TAKE 1 TABLET BY MOUTH EVERY DAY    atorvastatin (LIPITOR) 20 MG tablet Take 1 tablet (20 mg total) by mouth once daily.    azithromycin (Z-NADER) 250 MG tablet Take 2 tablets by mouth on day 1; Take 1 tablet by mouth on days 2-5    benzonatate (TESSALON PERLES) 100 MG capsule Take 1 capsule (100 mg total) by mouth every 6 (six) hours as needed.    blood sugar diagnostic Strp 1 strip by Misc.(Non-Drug; Combo Route) route 2 (two) times daily with meals.    dicyclomine (BENTYL) 20 mg tablet TAKE 1 TABLET (20 MG TOTAL) BY MOUTH EVERY 6 (SIX) HOURS.    ergocalciferol (VITAMIN D2) 50,000 unit Cap TAKE 1 CAPSULE (50,000 UNITS TOTAL) BY MOUTH TWICE A WEEK.    gabapentin (NEURONTIN) 300 MG capsule Take 2 capsules (600 mg total) by mouth 3 (three) times daily.    lancets [facility] Use to test blood sugar once daily.    omeprazole (PRILOSEC) 40 MG capsule Take 1 capsule (40 mg total) by mouth once daily.    ondansetron (ZOFRAN) 4 MG tablet Take 1 tablet (4 mg total) by mouth every 8 (eight) hours as needed.     "OZEMPIC 0.25 mg or 0.5 mg(2 mg/1.5 mL) PnIj INJECT 0.25 MG INTO THE SKIN EVERY 7 DAYS. X 2 WEEKS, THEN INCREASE TO 0.5MG ONCE WEEKLY    valsartan (DIOVAN) 80 MG tablet Take 1 tablet (80 mg total) by mouth once daily.    amoxicillin-clavulanate 875-125mg (AUGMENTIN) 875-125 mg per tablet Take 1 tablet by mouth 2 (two) times daily. for 10 days    blood sugar diagnostic Strp 1 strip by Misc.(Non-Drug; Combo Route) route 2 (two) times daily with meals.    blood-glucose meter kit Use as instructed    cetirizine (ZYRTEC) 10 MG tablet Take 1 tablet (10 mg total) by mouth once daily.    fluticasone propionate (FLONASE) 50 mcg/actuation nasal spray INSTILL 1 SPRAY (50 MCG TOTAL) IN EACH NOSTRIL ONCE DAILY.    lancing device Misc 1 Device by Misc.(Non-Drug; Combo Route) route 2 (two) times daily with meals.    lancing device Misc 1 Device by Misc.(Non-Drug; Combo Route) route 2 (two) times daily with meals.    neomycin-polymyxin-dexamethasone (MAXITROL) 3.5mg/mL-10,000 unit/mL-0.1 % DrpS     nystatin (MYCOSTATIN) ointment Apply topically 2 (two) times daily.      pen needle, diabetic (PEN NEEDLE) 32 gauge x 5/32" Ndle 1 each by Misc.(Non-Drug; Combo Route) route once a week.    tobramycin-dexamethasone 0.3-0.1% (TOBRADEX) 0.3-0.1 % DrpS INSTILL 1 DROP INTO BOTH EYES 4 TIMES A DAY    triamcinolone acetonide 0.1% (KENALOG) 0.1 % cream Apply topically 2 (two) times daily. Apply topically 2 (two) times daily.     Family History     Problem Relation (Age of Onset)    Breast cancer Sister    Cancer Father (58)    Crohn's disease Sister    Diabetes Maternal Grandmother, Mother    Heart disease Maternal Grandmother, Mother    Hypertension Maternal Grandmother, Mother        Tobacco Use    Smoking status: Never Smoker    Smokeless tobacco: Never Used   Substance and Sexual Activity    Alcohol use: No     Frequency: Monthly or less     Drinks per session: Patient refused     Binge frequency: Never     Comment: socially "    Drug use: No    Sexual activity: Yes     Partners: Male     Birth control/protection: Surgical     Review of Systems   Constitutional: Positive for chills, fatigue and fever.   HENT: Negative.    Eyes: Negative.    Respiratory: Positive for cough and shortness of breath.    Cardiovascular: Negative.    Gastrointestinal: Negative.    Endocrine: Negative.    Genitourinary: Negative.    Musculoskeletal: Positive for myalgias.   Skin: Negative.    Neurological: Negative.    Psychiatric/Behavioral: Negative.      Objective:     Vital Signs (Most Recent):  Temp: 98.1 °F (36.7 °C) (03/16/20 1138)  Pulse: 90 (03/16/20 1200)  Resp: (!) 31 (03/16/20 1200)  BP: 138/82 (03/16/20 1200)  SpO2: (!) 94 % (03/16/20 1200) Vital Signs (24h Range):  Temp:  [98 °F (36.7 °C)-98.2 °F (36.8 °C)] 98.1 °F (36.7 °C)  Pulse:  [60-98] 90  Resp:  [10-39] 31  SpO2:  [87 %-100 %] 94 %  BP: (118-152)/(72-89) 138/82     Weight: 90.8 kg (200 lb 3.6 oz)  Body mass index is 37.83 kg/m².    Physical Exam   Constitutional: She is oriented to person, place, and time. She appears well-developed. No distress.   HENT:   Head: Normocephalic and atraumatic.   Mouth/Throat: Oropharynx is clear and moist.   Eyes: Pupils are equal, round, and reactive to light. EOM are normal.   Neck: Normal range of motion. Neck supple. No JVD present.   Cardiovascular: Normal rate, regular rhythm, normal heart sounds and intact distal pulses.   Pulmonary/Chest: She has no wheezes.   Shallow breaths   Abdominal: Soft. Bowel sounds are normal. She exhibits no distension. There is no tenderness.   Musculoskeletal: Normal range of motion. She exhibits no edema.   Lymphadenopathy:     She has no cervical adenopathy.   Neurological: She is alert and oriented to person, place, and time.   Skin: Capillary refill takes less than 2 seconds.   Psychiatric: She has a normal mood and affect. Her behavior is normal.         CRANIAL NERVES     CN III, IV, VI   Pupils are equal, round,  and reactive to light.  Extraocular motions are normal.        Significant Labs:   A1C:   Recent Labs   Lab 11/11/19  0815 02/10/20  0817   HGBA1C 8.5* 7.5*     ABGs: No results for input(s): PH, PCO2, HCO3, POCSATURATED, BE, TOTALHB, COHB, METHB, O2HB, POCFIO2 in the last 48 hours.  Blood Culture:   Recent Labs   Lab 03/14/20  1513 03/14/20  1521   [facility] No Growth to date  No Growth to date No Growth to date  No Growth to date     CBC:   Recent Labs   Lab 03/14/20  1513 03/15/20  1243 03/16/20  0416   WBC 7.82 12.84* 16.89*   HGB 11.2* 12.0 10.9*   HCT 36.3* 39.2 35.4*    320 377*     CMP:   Recent Labs   Lab 03/14/20  1513 03/15/20  1243 03/16/20  0416    139 138   K 3.9 4.2 4.3   CL 98 103 101   CO2 26 23 27   * 254* 136*   BUN 7 10 15   CREATININE 0.9 0.8 0.8   CALCIUM 8.4* 8.7 8.4*   PROT 7.9  --   --    ALBUMIN 3.4*  --   --    BILITOT 0.5  --   --    ALKPHOS 94  --   --    AST 22  --   --    ALT 25  --   --    ANIONGAP 13 13 10   EGFRNONAA >60 >60 >60     Cardiac Markers: No results for input(s): CKMB, MYOGLOBIN, BNP, TROPISTAT in the last 48 hours.  Coagulation: No results for input(s): PT, INR, APTT in the last 48 hours.  Lactic Acid: No results for input(s): LACTATE in the last 48 hours.  Magnesium: No results for input(s): MG in the last 48 hours.  POCT Glucose:   Recent Labs   Lab 03/15/20  1658 03/15/20  2150 03/16/20  0810   POCTGLUCOSE 236* 212* 110     Respiratory Culture:   Recent Labs   Lab 03/14/20  1505   GSRESP CYTOSPIN SHOWS: No WBC's or organisms seen  03/14/2020  23:03  Corrected gram stain report:  >10 epithelial cells per low power field  Few WBC's  Moderate Gram positive rods  Few budding yeast  Few Gram positive cocci in pairs and chains   RESPIRATORYC Normal respiratory skinny     Urine Culture: No results for input(s): LABURIN in the last 48 hours.  Urine Studies: No results for input(s): COLORU, APPEARANCEUA, PHUR, SPECGRAV, PROTEINUA, GLUCUA, KETONESU,  BILIRUBINUA, OCCULTUA, NITRITE, UROBILINOGEN, LEUKOCYTESUR, RBCUA, WBCUA, BACTERIA, SQUAMEPITHEL, HYALINECASTS in the last 48 hours.    Invalid input(s): WRIGHTSUR    Significant Imaging:   CXR:  Impression     Interval progression of patchy areas of airspace opacification and atelectasis since March 10, 2020. It would be difficult to exclude a small amount of pleural fluid.

## 2020-03-16 NOTE — CARE UPDATE
[facility]  ICU Multidisciplinary Bedside Rounds   SUMMARY     Date: 3/16/2020    Prehospitalization: Home  Admit Date / LOS : 3/14/2020/ 2 days    Diagnosis: Pneumonia of both lungs due to infectious organism    Consults:        Active: SW       Needed: N/A     Code Status: Full Code   Advanced Directive: <no information>    LDA: PIV       Central Lines/Site/Justification:Patient Does Not Have Central Line       Urinary Cath/Order/Justification:Patient Does Not Have Urinary Catheter    Vasopressors/Infusions:        GOALS: Volume/ Hemodynamic: SBP >160                     RASS: N/A    CAM ICU: N/A  Pain Management: none       Pain Controlled: not applicable     Rhythm: NSR    Respiratory Device: Room Air                    VTE Prophylaxis: Pharm  Mobility: A: Assist  Stress Ulcer Prophylaxis: Yes    Dietary: PO  Tolerance: yes  /  Advancement: @ goal    Isolation: Airborne and Contact and Droplet    Noteworthy Labs:  none    CBC/Anemia Labs: Coags:    Recent Labs   Lab 03/15/20  1243 03/16/20  0416   WBC 12.84* 16.89*   HGB 12.0 10.9*   HCT 39.2 35.4*    377*   MCV 88 86   RDW 14.5 14.3    No results for input(s): PT, INR, APTT in the last 168 hours.     Chemistries:   Recent Labs   Lab 03/14/20  1513 03/15/20  1243 03/16/20  0416    139 138   K 3.9 4.2 4.3   CL 98 103 101   CO2 26 23 27   BUN 7 10 15   CREATININE 0.9 0.8 0.8   CALCIUM 8.4* 8.7 8.4*   PROT 7.9  --   --    BILITOT 0.5  --   --    ALKPHOS 94  --   --    ALT 25  --   --    AST 22  --   --         Cardiac Enzymes: Ejection Fractions:    No results for input(s): CPK, CPKMB, MB, TROPONINI in the last 72 hours. No results found for: EF     POCT Glucose: HbA1c:    Recent Labs   Lab 03/15/20  1116 03/15/20  1658 03/15/20  2150   POCTGLUCOSE 277* 236* 212*    Hemoglobin A1C   Date Value Ref Range Status   02/10/2020 7.5 (H) 4.0 - 5.6 % Final     Comment:     ADA Screening Guidelines:  5.7-6.4%  Consistent with  prediabetes  >or=6.5%  Consistent with diabetes  High levels of fetal hemoglobin interfere with the HbA1C  assay. Heterozygous hemoglobin variants (HbS, HgC, etc)do  not significantly interfere with this assay.   However, presence of multiple variants may affect accuracy.          Needs from Care Team: awaiting COVID19 test results     ICU LOS 23h  Level of Care: Critical Care

## 2020-03-16 NOTE — CONSULTS
[facility]  Pulmonology  Consult Note    Patient Name: Africa Jennings  MRN: [id number]  Admission Date: 3/14/2020  Hospital Length of Stay: 2 days  Code Status: Full Code  Attending Physician: Rosa Taylor MD  Primary Care Provider: Brandi Thompson MD   Principal Problem: Pneumonia of both lungs due to infectious organism    Inpatient consult to Pulmonology  Consult performed by: Adrienne Kemp MD  Consult ordered by: Rosa Taylor MD        Subjective:     HPI:   50 y.o. female with a PMHx of Diabetes Mellitus, Hypertension, and GERD, who presents to the Emergency Department with a cc of coughing x5 days. Pt was diagnosed with Pneumonia via chest x-ray 5 days ago. Associated symptoms include fever, SOB, and headaches. She notes that her headaches are most likely due to her sinuses. She denies any congestion, rhinorrhea, CP, abdominal pain, diarrhea, dysuria, nausea, vomiting, diarrhea, eye pain, or ear pain. Denies a prior history of Pneumonia. Pt is currently on medication for HTN. No known allergies. She reports a history of multiple surgeries including a breast reduction, cholecystectomy, esophagogastroduodenoscopy, hysterectomy, oophorectomy, and a total reduction mammoplasty. Pt denies use of tobacco or any alcohol consumption.      Pt reports that she went to a party with known Covid19 patient at another hospital in the area.  Pt will be admitted on isolation and covid19 test pending. Supportive care.      Pulmonary consulted for acute respiratory failure.     Past Medical History:   Diagnosis Date    Diabetes mellitus     Diabetes mellitus, type 2     Eczema     GERD (gastroesophageal reflux disease)     Hypertension     Impaired fasting blood sugar     YSABEL on CPAP        Past Surgical History:   Procedure Laterality Date    breast reduction      CHOLECYSTECTOMY      laprascopic    ESOPHAGOGASTRODUODENOSCOPY  8/18/14    HYSTERECTOMY  2007    laprascopic,  total for fibroids.  Dr Polo    OOPHORECTOMY      TOTAL REDUCTION MAMMOPLASTY         Review of patient's allergies indicates:  No Known Allergies    No current facility-administered medications on file prior to encounter.      Current Outpatient Medications on File Prior to Encounter   Medication Sig    albuterol (PROVENTIL/VENTOLIN HFA) 90 mcg/actuation inhaler Inhale 2 puffs into the lungs every 4 (four) hours as needed for Wheezing or Shortness of Breath. Rescue    amLODIPine (NORVASC) 5 MG tablet TAKE 1 TABLET BY MOUTH EVERY DAY    atorvastatin (LIPITOR) 20 MG tablet Take 1 tablet (20 mg total) by mouth once daily.    azithromycin (Z-NADER) 250 MG tablet Take 2 tablets by mouth on day 1; Take 1 tablet by mouth on days 2-5    benzonatate (TESSALON PERLES) 100 MG capsule Take 1 capsule (100 mg total) by mouth every 6 (six) hours as needed.    blood sugar diagnostic Strp 1 strip by Misc.(Non-Drug; Combo Route) route 2 (two) times daily with meals.    dicyclomine (BENTYL) 20 mg tablet TAKE 1 TABLET (20 MG TOTAL) BY MOUTH EVERY 6 (SIX) HOURS.    ergocalciferol (VITAMIN D2) 50,000 unit Cap TAKE 1 CAPSULE (50,000 UNITS TOTAL) BY MOUTH TWICE A WEEK.    gabapentin (NEURONTIN) 300 MG capsule Take 2 capsules (600 mg total) by mouth 3 (three) times daily.    lancets [facility] Use to test blood sugar once daily.    omeprazole (PRILOSEC) 40 MG capsule Take 1 capsule (40 mg total) by mouth once daily.    ondansetron (ZOFRAN) 4 MG tablet Take 1 tablet (4 mg total) by mouth every 8 (eight) hours as needed.    OZEMPIC 0.25 mg or 0.5 mg(2 mg/1.5 mL) PnIj INJECT 0.25 MG INTO THE SKIN EVERY 7 DAYS. X 2 WEEKS, THEN INCREASE TO 0.5MG ONCE WEEKLY    valsartan (DIOVAN) 80 MG tablet Take 1 tablet (80 mg total) by mouth once daily.    amoxicillin-clavulanate 875-125mg (AUGMENTIN) 875-125 mg per tablet Take 1 tablet by mouth 2 (two) times daily. for 10 days    blood sugar diagnostic Strp 1 strip by Misc.(Non-Drug; Combo  "Route) route 2 (two) times daily with meals.    blood-glucose meter kit Use as instructed    cetirizine (ZYRTEC) 10 MG tablet Take 1 tablet (10 mg total) by mouth once daily.    fluticasone propionate (FLONASE) 50 mcg/actuation nasal spray INSTILL 1 SPRAY (50 MCG TOTAL) IN EACH NOSTRIL ONCE DAILY.    lancing device Misc 1 Device by Misc.(Non-Drug; Combo Route) route 2 (two) times daily with meals.    lancing device Misc 1 Device by Misc.(Non-Drug; Combo Route) route 2 (two) times daily with meals.    neomycin-polymyxin-dexamethasone (MAXITROL) 3.5mg/mL-10,000 unit/mL-0.1 % DrpS     nystatin (MYCOSTATIN) ointment Apply topically 2 (two) times daily.      pen needle, diabetic (PEN NEEDLE) 32 gauge x 5/32" Ndle 1 each by Misc.(Non-Drug; Combo Route) route once a week.    tobramycin-dexamethasone 0.3-0.1% (TOBRADEX) 0.3-0.1 % DrpS INSTILL 1 DROP INTO BOTH EYES 4 TIMES A DAY    triamcinolone acetonide 0.1% (KENALOG) 0.1 % cream Apply topically 2 (two) times daily. Apply topically 2 (two) times daily.     Family History     Problem Relation (Age of Onset)    Breast cancer Sister    Cancer Father (58)    Crohn's disease Sister    Diabetes Maternal Grandmother, Mother    Heart disease Maternal Grandmother, Mother    Hypertension Maternal Grandmother, Mother        Tobacco Use    Smoking status: Never Smoker    Smokeless tobacco: Never Used   Substance and Sexual Activity    Alcohol use: No     Frequency: Monthly or less     Drinks per session: Patient refused     Binge frequency: Never     Comment: socially    Drug use: No    Sexual activity: Yes     Partners: Male     Birth control/protection: Surgical     Review of Systems   Constitutional: Positive for chills, fatigue and fever.   HENT: Negative.    Eyes: Negative.    Respiratory: Positive for cough and shortness of breath.    Cardiovascular: Negative.    Gastrointestinal: Negative.    Endocrine: Negative.    Genitourinary: Negative.    Musculoskeletal: " Positive for myalgias.   Skin: Negative.    Neurological: Negative.    Psychiatric/Behavioral: Negative.      Objective:     Vital Signs (Most Recent):  Temp: 98.1 °F (36.7 °C) (03/16/20 1138)  Pulse: 90 (03/16/20 1200)  Resp: (!) 31 (03/16/20 1200)  BP: 138/82 (03/16/20 1200)  SpO2: (!) 94 % (03/16/20 1200) Vital Signs (24h Range):  Temp:  [98 °F (36.7 °C)-98.2 °F (36.8 °C)] 98.1 °F (36.7 °C)  Pulse:  [60-96] 90  Resp:  [10-34] 31  SpO2:  [88 %-100 %] 94 %  BP: (118-152)/(72-86) 138/82     Weight: 90.8 kg (200 lb 3.6 oz)  Body mass index is 37.83 kg/m².    Physical Exam   Constitutional: She is oriented to person, place, and time. She appears well-developed. No distress.   HENT:   Head: Normocephalic and atraumatic.   Mouth/Throat: Oropharynx is clear and moist.   Eyes: Pupils are equal, round, and reactive to light. EOM are normal.   Neck: Normal range of motion. Neck supple. No JVD present.   Cardiovascular: Normal rate, regular rhythm, normal heart sounds and intact distal pulses.   Pulmonary/Chest: She has no wheezes.   Shallow breaths   Abdominal: Soft. Bowel sounds are normal. She exhibits no distension. There is no tenderness.   Musculoskeletal: Normal range of motion. She exhibits no edema.   Lymphadenopathy:     She has no cervical adenopathy.   Neurological: She is alert and oriented to person, place, and time.   Skin: Capillary refill takes less than 2 seconds.   Psychiatric: She has a normal mood and affect. Her behavior is normal.         CRANIAL NERVES     CN III, IV, VI   Pupils are equal, round, and reactive to light.  Extraocular motions are normal.        Significant Labs:   A1C:   Recent Labs   Lab 11/11/19  0815 02/10/20  0817   HGBA1C 8.5* 7.5*     ABGs: No results for input(s): PH, PCO2, HCO3, POCSATURATED, BE, TOTALHB, COHB, METHB, O2HB, POCFIO2 in the last 48 hours.  Blood Culture:   Recent Labs   Lab 03/14/20  1513 03/14/20  1521   LABBLOO No Growth to date  No Growth to date No Growth to  date  No Growth to date     CBC:   Recent Labs   Lab 03/14/20  1513 03/15/20  1243 03/16/20  0416   WBC 7.82 12.84* 16.89*   HGB 11.2* 12.0 10.9*   HCT 36.3* 39.2 35.4*    320 377*     CMP:   Recent Labs   Lab 03/14/20  1513 03/15/20  1243 03/16/20  0416    139 138   K 3.9 4.2 4.3   CL 98 103 101   CO2 26 23 27   * 254* 136*   BUN 7 10 15   CREATININE 0.9 0.8 0.8   CALCIUM 8.4* 8.7 8.4*   PROT 7.9  --   --    ALBUMIN 3.4*  --   --    BILITOT 0.5  --   --    ALKPHOS 94  --   --    AST 22  --   --    ALT 25  --   --    ANIONGAP 13 13 10   EGFRNONAA >60 >60 >60     Cardiac Markers: No results for input(s): CKMB, MYOGLOBIN, BNP, TROPISTAT in the last 48 hours.  Coagulation: No results for input(s): PT, INR, APTT in the last 48 hours.  Lactic Acid: No results for input(s): LACTATE in the last 48 hours.  Magnesium: No results for input(s): MG in the last 48 hours.  POCT Glucose:   Recent Labs   Lab 03/15/20  1658 03/15/20  2150 03/16/20  0810   POCTGLUCOSE 236* 212* 110     Respiratory Culture:   Recent Labs   Lab 03/14/20  1505   GSRESP CYTOSPIN SHOWS: No WBC's or organisms seen  03/14/2020  23:03  Corrected gram stain report:  >10 epithelial cells per low power field  Few WBC's  Moderate Gram positive rods  Few budding yeast  Few Gram positive cocci in pairs and chains   RESPIRATORYC Normal respiratory skinny     Urine Culture: No results for input(s): LABURIN in the last 48 hours.  Urine Studies: No results for input(s): COLORU, APPEARANCEUA, PHUR, SPECGRAV, PROTEINUA, GLUCUA, KETONESU, BILIRUBINUA, OCCULTUA, NITRITE, UROBILINOGEN, LEUKOCYTESUR, RBCUA, WBCUA, BACTERIA, SQUAMEPITHEL, HYALINECASTS in the last 48 hours.    Invalid input(s): WRIGHTSUR    Significant Imaging:   CXR:  Impression     Interval progression of patchy areas of airspace opacification and atelectasis since March 10, 2020. It would be difficult to exclude a small amount of pleural fluid.      Assessment/Plan:     *  Pneumonia of both lungs due to infectious organism  Acute hypoxic respiratory failure- Patient with bilateral infiltrates. Procal normal  Concern for COVID-19 PUI  -SARS-CoV2 PCR pending  -Limit fluids  -BNP with AM labs  -Titrate oxygen. Low threshold for intubation.   -No indication for steroids.   -Supportive therapy  -Agree with CAP coverage.     Obstructive sleep apnea (adult) (pediatric)  CPAP as needed at night.    Critical Care time: 85 minutes    Critical Care time for the evaluation and treatment for severe organ dysfunction, review of pertinent labs and imaging studies discussions with consulting services and discussions with patient/family.    Family updated at bedside    Continue ICU care.       Thank you for your consult. I will follow-up with patient. Please contact us if you have any additional questions.     Adrienne Kemp MD  Pulmonology  [facility]

## 2020-03-16 NOTE — SUBJECTIVE & OBJECTIVE
Past Medical History:   Diagnosis Date    Diabetes mellitus     Diabetes mellitus, type 2     Eczema     GERD (gastroesophageal reflux disease)     Hypertension     Impaired fasting blood sugar     YSABEL on CPAP        Past Surgical History:   Procedure Laterality Date    breast reduction      CHOLECYSTECTOMY      laprascopic    ESOPHAGOGASTRODUODENOSCOPY  8/18/14    HYSTERECTOMY  2007    laprascopic, total for fibroids.  Dr Polo    OOPHORECTOMY      TOTAL REDUCTION MAMMOPLASTY         Review of patient's allergies indicates:  No Known Allergies    No current facility-administered medications on file prior to encounter.      Current Outpatient Medications on File Prior to Encounter   Medication Sig    albuterol (PROVENTIL/VENTOLIN HFA) 90 mcg/actuation inhaler Inhale 2 puffs into the lungs every 4 (four) hours as needed for Wheezing or Shortness of Breath. Rescue    amLODIPine (NORVASC) 5 MG tablet TAKE 1 TABLET BY MOUTH EVERY DAY    atorvastatin (LIPITOR) 20 MG tablet Take 1 tablet (20 mg total) by mouth once daily.    azithromycin (Z-NADER) 250 MG tablet Take 2 tablets by mouth on day 1; Take 1 tablet by mouth on days 2-5    benzonatate (TESSALON PERLES) 100 MG capsule Take 1 capsule (100 mg total) by mouth every 6 (six) hours as needed.    blood sugar diagnostic Strp 1 strip by Misc.(Non-Drug; Combo Route) route 2 (two) times daily with meals.    dicyclomine (BENTYL) 20 mg tablet TAKE 1 TABLET (20 MG TOTAL) BY MOUTH EVERY 6 (SIX) HOURS.    ergocalciferol (VITAMIN D2) 50,000 unit Cap TAKE 1 CAPSULE (50,000 UNITS TOTAL) BY MOUTH TWICE A WEEK.    gabapentin (NEURONTIN) 300 MG capsule Take 2 capsules (600 mg total) by mouth 3 (three) times daily.    lancets [facility] Use to test blood sugar once daily.    omeprazole (PRILOSEC) 40 MG capsule Take 1 capsule (40 mg total) by mouth once daily.    ondansetron (ZOFRAN) 4 MG tablet Take 1 tablet (4 mg total) by mouth every 8 (eight) hours as needed.     "OZEMPIC 0.25 mg or 0.5 mg(2 mg/1.5 mL) PnIj INJECT 0.25 MG INTO THE SKIN EVERY 7 DAYS. X 2 WEEKS, THEN INCREASE TO 0.5MG ONCE WEEKLY    valsartan (DIOVAN) 80 MG tablet Take 1 tablet (80 mg total) by mouth once daily.    amoxicillin-clavulanate 875-125mg (AUGMENTIN) 875-125 mg per tablet Take 1 tablet by mouth 2 (two) times daily. for 10 days    blood sugar diagnostic Strp 1 strip by Misc.(Non-Drug; Combo Route) route 2 (two) times daily with meals.    blood-glucose meter kit Use as instructed    cetirizine (ZYRTEC) 10 MG tablet Take 1 tablet (10 mg total) by mouth once daily.    fluticasone propionate (FLONASE) 50 mcg/actuation nasal spray INSTILL 1 SPRAY (50 MCG TOTAL) IN EACH NOSTRIL ONCE DAILY.    lancing device Misc 1 Device by Misc.(Non-Drug; Combo Route) route 2 (two) times daily with meals.    lancing device Misc 1 Device by Misc.(Non-Drug; Combo Route) route 2 (two) times daily with meals.    neomycin-polymyxin-dexamethasone (MAXITROL) 3.5mg/mL-10,000 unit/mL-0.1 % DrpS     nystatin (MYCOSTATIN) ointment Apply topically 2 (two) times daily.      pen needle, diabetic (PEN NEEDLE) 32 gauge x 5/32" Ndle 1 each by Misc.(Non-Drug; Combo Route) route once a week.    tobramycin-dexamethasone 0.3-0.1% (TOBRADEX) 0.3-0.1 % DrpS INSTILL 1 DROP INTO BOTH EYES 4 TIMES A DAY    triamcinolone acetonide 0.1% (KENALOG) 0.1 % cream Apply topically 2 (two) times daily. Apply topically 2 (two) times daily.     Family History     Problem Relation (Age of Onset)    Breast cancer Sister    Cancer Father (58)    Crohn's disease Sister    Diabetes Maternal Grandmother, Mother    Heart disease Maternal Grandmother, Mother    Hypertension Maternal Grandmother, Mother        Tobacco Use    Smoking status: Never Smoker    Smokeless tobacco: Never Used   Substance and Sexual Activity    Alcohol use: No     Frequency: Monthly or less     Drinks per session: Patient refused     Binge frequency: Never     Comment: socially "    Drug use: No    Sexual activity: Yes     Partners: Male     Birth control/protection: Surgical     Review of Systems   Constitutional: Positive for chills, fatigue and fever.   HENT: Negative.    Eyes: Negative.    Respiratory: Positive for cough and shortness of breath.    Cardiovascular: Negative.    Gastrointestinal: Negative.    Endocrine: Negative.    Genitourinary: Negative.    Musculoskeletal: Positive for myalgias.   Skin: Negative.    Neurological: Negative.    Psychiatric/Behavioral: Negative.      Objective:     Vital Signs (Most Recent):  Temp: 98.1 °F (36.7 °C) (03/16/20 1138)  Pulse: 90 (03/16/20 1200)  Resp: (!) 31 (03/16/20 1200)  BP: 138/82 (03/16/20 1200)  SpO2: (!) 94 % (03/16/20 1200) Vital Signs (24h Range):  Temp:  [98 °F (36.7 °C)-98.2 °F (36.8 °C)] 98.1 °F (36.7 °C)  Pulse:  [60-96] 90  Resp:  [10-34] 31  SpO2:  [88 %-100 %] 94 %  BP: (118-152)/(72-86) 138/82     Weight: 90.8 kg (200 lb 3.6 oz)  Body mass index is 37.83 kg/m².    Physical Exam   Constitutional: She is oriented to person, place, and time. She appears well-developed. No distress.   HENT:   Head: Normocephalic and atraumatic.   Mouth/Throat: Oropharynx is clear and moist.   Eyes: Pupils are equal, round, and reactive to light. EOM are normal.   Neck: Normal range of motion. Neck supple. No JVD present.   Cardiovascular: Normal rate, regular rhythm, normal heart sounds and intact distal pulses.   Pulmonary/Chest: She has no wheezes.   Shallow breaths   Abdominal: Soft. Bowel sounds are normal. She exhibits no distension. There is no tenderness.   Musculoskeletal: Normal range of motion. She exhibits no edema.   Lymphadenopathy:     She has no cervical adenopathy.   Neurological: She is alert and oriented to person, place, and time.   Skin: Capillary refill takes less than 2 seconds.   Psychiatric: She has a normal mood and affect. Her behavior is normal.         CRANIAL NERVES     CN III, IV, VI   Pupils are equal, round,  and reactive to light.  Extraocular motions are normal.        Significant Labs:   A1C:   Recent Labs   Lab 11/11/19  0815 02/10/20  0817   HGBA1C 8.5* 7.5*     ABGs: No results for input(s): PH, PCO2, HCO3, POCSATURATED, BE, TOTALHB, COHB, METHB, O2HB, POCFIO2 in the last 48 hours.  Blood Culture:   Recent Labs   Lab 03/14/20  1513 03/14/20  1521   LABBLOO No Growth to date  No Growth to date No Growth to date  No Growth to date     CBC:   Recent Labs   Lab 03/14/20  1513 03/15/20  1243 03/16/20  0416   WBC 7.82 12.84* 16.89*   HGB 11.2* 12.0 10.9*   HCT 36.3* 39.2 35.4*    320 377*     CMP:   Recent Labs   Lab 03/14/20  1513 03/15/20  1243 03/16/20  0416    139 138   K 3.9 4.2 4.3   CL 98 103 101   CO2 26 23 27   * 254* 136*   BUN 7 10 15   CREATININE 0.9 0.8 0.8   CALCIUM 8.4* 8.7 8.4*   PROT 7.9  --   --    ALBUMIN 3.4*  --   --    BILITOT 0.5  --   --    ALKPHOS 94  --   --    AST 22  --   --    ALT 25  --   --    ANIONGAP 13 13 10   EGFRNONAA >60 >60 >60     Cardiac Markers: No results for input(s): CKMB, MYOGLOBIN, BNP, TROPISTAT in the last 48 hours.  Coagulation: No results for input(s): PT, INR, APTT in the last 48 hours.  Lactic Acid: No results for input(s): LACTATE in the last 48 hours.  Magnesium: No results for input(s): MG in the last 48 hours.  POCT Glucose:   Recent Labs   Lab 03/15/20  1658 03/15/20  2150 03/16/20  0810   POCTGLUCOSE 236* 212* 110     Respiratory Culture:   Recent Labs   Lab 03/14/20  1505   GSRESP CYTOSPIN SHOWS: No WBC's or organisms seen  03/14/2020  23:03  Corrected gram stain report:  >10 epithelial cells per low power field  Few WBC's  Moderate Gram positive rods  Few budding yeast  Few Gram positive cocci in pairs and chains   RESPIRATORYC Normal respiratory skinny     Urine Culture: No results for input(s): LABURIN in the last 48 hours.  Urine Studies: No results for input(s): COLORU, APPEARANCEUA, PHUR, SPECGRAV, PROTEINUA, GLUCUA, KETONESU,  BILIRUBINUA, OCCULTUA, NITRITE, UROBILINOGEN, LEUKOCYTESUR, RBCUA, WBCUA, BACTERIA, SQUAMEPITHEL, HYALINECASTS in the last 48 hours.    Invalid input(s): WRIGHTSUR    Significant Imaging:   CXR:  Impression     Interval progression of patchy areas of airspace opacification and atelectasis since March 10, 2020. It would be difficult to exclude a small amount of pleural fluid.

## 2020-03-16 NOTE — ASSESSMENT & PLAN NOTE
Acute hypoxic respiratory failure- Patient with bilateral infiltrates. Procal normal  Concern for COVID-19 PUI  -SARS-CoV2 PCR pending  -Limit fluids  -BNP with AM labs  -Titrate oxygen. Low threshold for intubation.   -No indication for steroids.   -Supportive therapy  -Agree with CAP coverage.

## 2020-03-16 NOTE — EICU
Rounding (Video Assessment):  Yes    Comments: patient asleep, receiving supplemental O2 with no apparent signs of distress.

## 2020-03-16 NOTE — NURSING
AAOx4, denies pain or SOB/distress. Unable to maintain O2 sat on 5L this AM, increased to 8L then 10L with O2 90-91%. High Flow started at 15L O2 sat increased to 94%. Blood glucose in 230's. OOB to BSC with SBA. Minimal weakness noted. Lovenox for VTE. Abx continue for bilateral pna. Airborne, contract, droplet isolation continues for pending COVID-19 test. Bed is low and call light in reach.

## 2020-03-16 NOTE — HPI
50 y.o. female with a PMHx of Diabetes Mellitus, Hypertension, and GERD, who presents to the Emergency Department with a cc of coughing x5 days. Pt was diagnosed with Pneumonia via chest x-ray 5 days ago. Associated symptoms include fever, SOB, and headaches. She notes that her headaches are most likely due to her sinuses. She denies any congestion, rhinorrhea, CP, abdominal pain, diarrhea, dysuria, nausea, vomiting, diarrhea, eye pain, or ear pain. Denies a prior history of Pneumonia. Pt is currently on medication for HTN. No known allergies. She reports a history of multiple surgeries including a breast reduction, cholecystectomy, esophagogastroduodenoscopy, hysterectomy, oophorectomy, and a total reduction mammoplasty. Pt denies use of tobacco or any alcohol consumption.      Pt reports that she went to a party with known Covid19 patient at another hospital in the area.  Pt will be admitted on isolation and covid19 test pending. Supportive care.      Pulmonary consulted for acute hypoxic respiratory failure.    You were diagnosed with iron deficiency anemia during this admission. Because blood loss is a common cause of iron deficiency, we consulted gastroenterology to perform an esophagoduodenoscopy and a colonoscopy to evaluate for a gastrointestinal source of bleeding. The studies revealed no significant abnormalities except for hemorrhoids which are the likely cause of chronic bleeding. Therefore, to replete your iron levels, we have prescribed iron tablets for you to take with meals. Please take them with a source of vitamin C or orange juice to help increase absorption. Avoid taking them with dairy, coffee, tea, or antacids as they may reduce absorption of iron.  Please follow up with your primary care physician in 1 week to check a Complete Blood Count to keep track of your anemia. After the gastroenterology studies, it is likely that the hemorrhoids were the likely source of bleeding. Constipation can make them worse, and iron supplements can induce constipation. To combat constipation, eat a high-fiber diet and keep yourself hydrate. In addition, we have prescribed colace as a stool softener to be used as needed if you do become constipated. Please follow up You are currently on irbesartan for your hypertension. We had you on losartan during the hospital admission and your blood pressure was well-controlled. You expressed interest in changing to losartan. Please discuss with your primary care doctor whether changing to losartan is right for you. Please continue to take your Synthroid as prescribed. You were found to have a diastolic heart murmur on physical examination. You had an echocardiogram performed. The gastroenterologist team was concerned about adenomyomatosis of your gallbladder and wanted to check your gallbladder with an ultrasound. Specifically, they want to make sure there are no enlarging polyps which can become malignant. Please follow up with a gastroenterologist for an outpatient ultrasound. Because you had arm/chest pain in the setting of anemia, it is possible it may have been due to an underlying heart problem due to a lack of blood (because of the anemia). After the anemia is corrected, any pain should have resolved. We recommend that you follow up with your primary care doctor to see whether a cardiology referral for a stress test may be appropriate to evaluate your heart's functional ability. Resolution You were found to have a diastolic heart murmur on physical examination. You had an echocardiogram performed which did not show significant valvular abnormalities. Please follow up with your cardiologist for further management.

## 2020-03-17 PROBLEM — J96.01 ACUTE RESPIRATORY FAILURE WITH HYPOXEMIA: Status: ACTIVE | Noted: 2020-03-14

## 2020-03-17 LAB
BASOPHILS # BLD AUTO: 0.02 K/UL (ref 0–0.2)
BASOPHILS NFR BLD: 0.2 % (ref 0–1.9)
BNP SERPL-MCNC: <10 PG/ML (ref 0–99)
DIFFERENTIAL METHOD: ABNORMAL
EOSINOPHIL # BLD AUTO: 0 K/UL (ref 0–0.5)
EOSINOPHIL NFR BLD: 0.1 % (ref 0–8)
ERYTHROCYTE [DISTWIDTH] IN BLOOD BY AUTOMATED COUNT: 14.2 % (ref 11.5–14.5)
HCT VFR BLD AUTO: 35.4 % (ref 37–48.5)
HGB BLD-MCNC: 10.9 G/DL (ref 12–16)
IMM GRANULOCYTES # BLD AUTO: 0.19 K/UL (ref 0–0.04)
IMM GRANULOCYTES NFR BLD AUTO: 1.7 % (ref 0–0.5)
LYMPHOCYTES # BLD AUTO: 3.8 K/UL (ref 1–4.8)
LYMPHOCYTES NFR BLD: 33.8 % (ref 18–48)
MCH RBC QN AUTO: 26.8 PG (ref 27–31)
MCHC RBC AUTO-ENTMCNC: 30.8 G/DL (ref 32–36)
MCV RBC AUTO: 87 FL (ref 82–98)
MONOCYTES # BLD AUTO: 0.9 K/UL (ref 0.3–1)
MONOCYTES NFR BLD: 8.2 % (ref 4–15)
NEUTROPHILS # BLD AUTO: 6.3 K/UL (ref 1.8–7.7)
NEUTROPHILS NFR BLD: 56 % (ref 38–73)
NRBC BLD-RTO: 0 /100 WBC
PLATELET # BLD AUTO: 392 K/UL (ref 150–350)
PMV BLD AUTO: 10.4 FL (ref 9.2–12.9)
POCT GLUCOSE: 126 MG/DL (ref 70–110)
POCT GLUCOSE: 133 MG/DL (ref 70–110)
RBC # BLD AUTO: 4.06 M/UL (ref 4–5.4)
WBC # BLD AUTO: 11.28 K/UL (ref 3.9–12.7)

## 2020-03-17 PROCEDURE — 20000000 HC ICU ROOM

## 2020-03-17 PROCEDURE — 99291 CRITICAL CARE FIRST HOUR: CPT | Mod: ,,, | Performed by: INTERNAL MEDICINE

## 2020-03-17 PROCEDURE — 94761 N-INVAS EAR/PLS OXIMETRY MLT: CPT

## 2020-03-17 PROCEDURE — 63600175 PHARM REV CODE 636 W HCPCS: Performed by: HOSPITALIST

## 2020-03-17 PROCEDURE — 99292 CRITICAL CARE ADDL 30 MIN: CPT | Mod: ,,, | Performed by: INTERNAL MEDICINE

## 2020-03-17 PROCEDURE — 25000003 PHARM REV CODE 250: Performed by: INTERNAL MEDICINE

## 2020-03-17 PROCEDURE — 85025 COMPLETE CBC W/AUTO DIFF WBC: CPT

## 2020-03-17 PROCEDURE — 25000003 PHARM REV CODE 250: Performed by: HOSPITALIST

## 2020-03-17 PROCEDURE — 99291 PR CRITICAL CARE, E/M 30-74 MINUTES: ICD-10-PCS | Mod: ,,, | Performed by: INTERNAL MEDICINE

## 2020-03-17 PROCEDURE — 94640 AIRWAY INHALATION TREATMENT: CPT

## 2020-03-17 PROCEDURE — 99292 PR CRITICAL CARE, ADDL 30 MIN: ICD-10-PCS | Mod: ,,, | Performed by: INTERNAL MEDICINE

## 2020-03-17 PROCEDURE — 27100171 HC OXYGEN HIGH FLOW UP TO 24 HOURS

## 2020-03-17 PROCEDURE — 83880 ASSAY OF NATRIURETIC PEPTIDE: CPT

## 2020-03-17 RX ORDER — HYDROXYCHLOROQUINE SULFATE 200 MG/1
400 TABLET, FILM COATED ORAL DAILY
Status: DISCONTINUED | OUTPATIENT
Start: 2020-03-17 | End: 2020-03-19 | Stop reason: HOSPADM

## 2020-03-17 RX ADMIN — ENOXAPARIN SODIUM 40 MG: 100 INJECTION SUBCUTANEOUS at 05:03

## 2020-03-17 RX ADMIN — VALSARTAN 80 MG: 80 TABLET, FILM COATED ORAL at 08:03

## 2020-03-17 RX ADMIN — GABAPENTIN 600 MG: 300 CAPSULE ORAL at 09:03

## 2020-03-17 RX ADMIN — AMLODIPINE BESYLATE 5 MG: 5 TABLET ORAL at 08:03

## 2020-03-17 RX ADMIN — INSULIN ASPART 8 UNITS: 100 INJECTION, SOLUTION INTRAVENOUS; SUBCUTANEOUS at 08:03

## 2020-03-17 RX ADMIN — INSULIN DETEMIR 30 UNITS: 100 INJECTION, SOLUTION SUBCUTANEOUS at 09:03

## 2020-03-17 RX ADMIN — ATORVASTATIN CALCIUM 20 MG: 10 TABLET, FILM COATED ORAL at 08:03

## 2020-03-17 RX ADMIN — ALBUTEROL SULFATE 2 PUFF: 90 AEROSOL, METERED RESPIRATORY (INHALATION) at 05:03

## 2020-03-17 RX ADMIN — ALBUTEROL SULFATE 2 PUFF: 90 AEROSOL, METERED RESPIRATORY (INHALATION) at 09:03

## 2020-03-17 RX ADMIN — ALBUTEROL SULFATE 2 PUFF: 90 AEROSOL, METERED RESPIRATORY (INHALATION) at 02:03

## 2020-03-17 RX ADMIN — CEFTRIAXONE SODIUM 1 G: 1 INJECTION, POWDER, FOR SOLUTION INTRAMUSCULAR; INTRAVENOUS at 07:03

## 2020-03-17 RX ADMIN — AZITHROMYCIN MONOHYDRATE 500 MG: 500 INJECTION, POWDER, LYOPHILIZED, FOR SOLUTION INTRAVENOUS at 08:03

## 2020-03-17 RX ADMIN — HYDROXYCHLOROQUINE SULFATE 400 MG: 200 TABLET, FILM COATED ORAL at 11:03

## 2020-03-17 RX ADMIN — INSULIN ASPART 8 UNITS: 100 INJECTION, SOLUTION INTRAVENOUS; SUBCUTANEOUS at 05:03

## 2020-03-17 RX ADMIN — IBUPROFEN 600 MG: 600 TABLET, FILM COATED ORAL at 02:03

## 2020-03-17 RX ADMIN — PANTOPRAZOLE SODIUM 40 MG: 40 TABLET, DELAYED RELEASE ORAL at 08:03

## 2020-03-17 RX ADMIN — GABAPENTIN 600 MG: 300 CAPSULE ORAL at 08:03

## 2020-03-17 RX ADMIN — CETIRIZINE HYDROCHLORIDE 10 MG: 10 TABLET, FILM COATED ORAL at 08:03

## 2020-03-17 RX ADMIN — INSULIN ASPART 8 UNITS: 100 INJECTION, SOLUTION INTRAVENOUS; SUBCUTANEOUS at 11:03

## 2020-03-17 NOTE — SUBJECTIVE & OBJECTIVE
Interval History: feeling better today. Still on high flow. Denies SOB while at rest. O2 sats drop to 88% while speaking but not using accessory muscles.     Review of Systems   Respiratory: Positive for shortness of breath (on exertion only).    Cardiovascular: Negative.    Gastrointestinal: Negative.      Objective:     Vital Signs (Most Recent):  Temp: 98.3 °F (36.8 °C) (03/17/20 0800)  Pulse: 86 (03/17/20 0945)  Resp: (!) 26 (03/17/20 0945)  BP: 118/77 (03/17/20 0900)  SpO2: 99 % (03/17/20 0945) Vital Signs (24h Range):  Temp:  [97.6 °F (36.4 °C)-98.3 °F (36.8 °C)] 98.3 °F (36.8 °C)  Pulse:  [67-92] 86  Resp:  [17-32] 26  SpO2:  [24 %-100 %] 99 %  BP: (118-160)/(72-94) 118/77     Weight: 90.8 kg (200 lb 3.6 oz)  Body mass index is 37.83 kg/m².    Intake/Output Summary (Last 24 hours) at 3/17/2020 1001  Last data filed at 3/16/2020 2305  Gross per 24 hour   Intake 300 ml   Output 750 ml   Net -450 ml      Physical Exam   Constitutional: She is oriented to person, place, and time. She appears well-developed. No distress.   Sitting up in chair   Cardiovascular: Normal rate and regular rhythm.   Pulmonary/Chest: Effort normal.   Coarse throughout (minimally)  No rales or wheezing   Abdominal: Soft.   Musculoskeletal: She exhibits no edema.   Neurological: She is alert and oriented to person, place, and time.   Skin: She is not diaphoretic.   Psychiatric: She has a normal mood and affect. Her behavior is normal. Judgment and thought content normal.   Nursing note and vitals reviewed.      Significant Labs: All pertinent labs within the past 24 hours have been reviewed.    Significant Imaging: I have reviewed all pertinent imaging results/findings within the past 24 hours.  I have reviewed and interpreted all pertinent imaging results/findings within the past 24 hours.

## 2020-03-17 NOTE — PLAN OF CARE
POC reviewed with pt at 0500. Pt verbalized understanding. Questions and concerns addressed. No acute events overnight. Pt progressing toward goals.  Pt ambulated to chair.  Tolerated well.  Pt on 15L nonrebreather. Will continue to monitor. See flowsheets for full assessment and VS info

## 2020-03-17 NOTE — PROGRESS NOTES
[facility] Hospital Medicine  Progress Note    Patient Name: Africa Jennings  MRN: [id number]  Patient Class: IP- Inpatient   Admission Date: 3/14/2020  Length of Stay: 3 days  Attending Physician: Trish Andrade MD  Primary Care Provider: Brandi Thompson MD        Subjective:     Principal Problem:Acute respiratory failure with hypoxemia        HPI:  This is a 50 y.o. female with a PMHx of Diabetes Mellitus, Hypertension, and GERD, who presents to the Emergency Department with a cc of coughing x5 days. Pt was diagnosed with Pneumonia via chest x-ray 5 days ago. Associated symptoms include fever, SOB, and headaches. She notes that her headaches are most likely due to her sinuses. She denies any congestion, rhinorrhea, CP, abdominal pain, diarrhea, dysuria, nausea, vomiting, diarrhea, eye pain, or ear pain. Denies a prior history of Pneumonia. Pt is currently on medication for HTN. No known allergies. She reports a history of multiple surgeries including a breast reduction, cholecystectomy, esophagogastroduodenoscopy, hysterectomy, oophorectomy, and a total reduction mammoplasty. Pt denies use of tobacco or any alcohol consumption.     Pt reports that she went to a party with known Covid19 patient at another hospital in the area.  Pt will be admitted on isolation and covid19 test pending. Supportive care.     Overview/Hospital Course:  Pt admitted with flu symptoms and hypoxia. COVID19 testing in place and pt placed in ICU. O2 requirements currently at 10-12 liters high flow.     3/16- O2 sats not improved, up to 15 liters high flow; discussed with patient that if O2 requirements need to be escalated further, we may need to go toward intubation.  COVID pending      Interval History: feeling better today. Still on high flow. Denies SOB while at rest. O2 sats drop to 88% while speaking but not using accessory muscles.     Review of Systems   Respiratory: Positive for shortness of breath  (on exertion only).    Cardiovascular: Negative.    Gastrointestinal: Negative.      Objective:     Vital Signs (Most Recent):  Temp: 98.3 °F (36.8 °C) (03/17/20 0800)  Pulse: 86 (03/17/20 0945)  Resp: (!) 26 (03/17/20 0945)  BP: 118/77 (03/17/20 0900)  SpO2: 99 % (03/17/20 0945) Vital Signs (24h Range):  Temp:  [97.6 °F (36.4 °C)-98.3 °F (36.8 °C)] 98.3 °F (36.8 °C)  Pulse:  [67-92] 86  Resp:  [17-32] 26  SpO2:  [24 %-100 %] 99 %  BP: (118-160)/(72-94) 118/77     Weight: 90.8 kg (200 lb 3.6 oz)  Body mass index is 37.83 kg/m².    Intake/Output Summary (Last 24 hours) at 3/17/2020 1001  Last data filed at 3/16/2020 2305  Gross per 24 hour   Intake 300 ml   Output 750 ml   Net -450 ml      Physical Exam   Constitutional: She is oriented to person, place, and time. She appears well-developed. No distress.   Sitting up in chair   Cardiovascular: Normal rate and regular rhythm.   Pulmonary/Chest: Effort normal.   Coarse throughout (minimally)  No rales or wheezing   Abdominal: Soft.   Musculoskeletal: She exhibits no edema.   Neurological: She is alert and oriented to person, place, and time.   Skin: She is not diaphoretic.   Psychiatric: She has a normal mood and affect. Her behavior is normal. Judgment and thought content normal.   Nursing note and vitals reviewed.      Significant Labs: All pertinent labs within the past 24 hours have been reviewed.    Significant Imaging: I have reviewed all pertinent imaging results/findings within the past 24 hours.  I have reviewed and interpreted all pertinent imaging results/findings within the past 24 hours.      Assessment/Plan:      * Acute respiratory failure with hypoxemia  Secondary to pneumonia   Viral vs bacterial  Possible COVID 19 given recent exposure  Agree with empiric antibiotics for possible superimposed CAP as this has not been ruled out (on day # 4)  High flow O2 and titrate for O2 sats >90%. Nebulized bronchodilators  No steroids. Initiated hydroxychloroquine  x 5 days (this is an investigational drug with some in vitro data)  Wean supplemental O2 as tolerated  Pulm on board for co-management        Pneumonia of both lungs due to infectious organism  See above        Gastroesophageal reflux disease without esophagitis  Resumed PPI      Obstructive sleep apnea (adult) (pediatric)  Not recommending PPV with concern for covid        Type 2 diabetes mellitus without complication, without long-term current use of insulin  Hold home agents  Basal-bolus regimen  Diabetic diet  Gabapentin resumed        Essential hypertension  BP at goal on current oral antihypertensive regimen        VTE Risk Mitigation (From admission, onward)         Ordered     enoxaparin injection 40 mg  Daily      03/15/20 0625     IP VTE HIGH RISK PATIENT  Once      03/14/20 1934                Critical care time spent on the evaluation and treatment of severe organ dysfunction, review of pertinent labs and imaging studies, discussions with consulting providers and discussions with patient/family: > 35 minutes.      Trish Bustillos MD  Department of Hospital Medicine   [facility]

## 2020-03-17 NOTE — SUBJECTIVE & OBJECTIVE
Interval History: Patient continues to be on 15L with good O2 sat.     Review of Systems   Constitutional: Positive for chills, fatigue and fever.   HENT: Negative.    Eyes: Negative.    Respiratory: Positive for cough and shortness of breath.    Cardiovascular: Negative.    Gastrointestinal: Negative.    Endocrine: Negative.    Genitourinary: Negative.    Musculoskeletal: Positive for myalgias.   Skin: Negative.    Neurological: Negative.    Psychiatric/Behavioral: Negative.      Objective:     Vital Signs (Most Recent):  Temp: 98.3 °F (36.8 °C) (03/17/20 0800)  Pulse: 92 (03/17/20 0815)  Resp: (!) 30 (03/17/20 0815)  BP: 128/77 (03/17/20 0815)  SpO2: (!) 94 % (03/17/20 0815) Vital Signs (24h Range):  Temp:  [97.6 °F (36.4 °C)-98.3 °F (36.8 °C)] 98.3 °F (36.8 °C)  Pulse:  [67-92] 92  Resp:  [17-32] 30  SpO2:  [24 %-100 %] 94 %  BP: (118-160)/(72-94) 128/77     Weight: 90.8 kg (200 lb 3.6 oz)  Body mass index is 37.83 kg/m².    Intake/Output Summary (Last 24 hours) at 3/17/2020 0835  Last data filed at 3/16/2020 2305  Gross per 24 hour   Intake 300 ml   Output 750 ml   Net -450 ml      Physical Exam   Constitutional: She is oriented to person, place, and time. She appears well-developed. No distress.   HENT:   Head: Normocephalic and atraumatic.   Mouth/Throat: Oropharynx is clear and moist.   Eyes: Pupils are equal, round, and reactive to light. EOM are normal.   Neck: Normal range of motion. Neck supple. No JVD present.   Cardiovascular: Normal rate, regular rhythm, normal heart sounds and intact distal pulses.   Pulmonary/Chest: She has no wheezes.   Shallow breaths   Abdominal: Soft. Bowel sounds are normal. She exhibits no distension. There is no tenderness.   Musculoskeletal: Normal range of motion. She exhibits no edema.   Lymphadenopathy:     She has no cervical adenopathy.   Neurological: She is alert and oriented to person, place, and time.   Skin: Capillary refill takes less than 2 seconds.   Psychiatric:  She has a normal mood and affect. Her behavior is normal.       Significant Labs:   Blood Culture:   No results for input(s): LABBLOO in the last 48 hours.  BMP:   Recent Labs   Lab 03/16/20  0416   *      K 4.3      CO2 27   BUN 15   CREATININE 0.8   CALCIUM 8.4*     CBC:   Recent Labs   Lab 03/15/20  1243 03/16/20  0416 03/17/20  0320   WBC 12.84* 16.89* 11.28   HGB 12.0 10.9* 10.9*   HCT 39.2 35.4* 35.4*    377* 392*     Respiratory Culture:   No results for input(s): GSRESP, RESPIRATORYC in the last 48 hours.    Significant Imaging: I have reviewed and interpreted all pertinent imaging results/findings within the past 24 hours.

## 2020-03-17 NOTE — PROGRESS NOTES
[facility]  Pulmonology  Progress Note    Patient Name: Africa Jennings  MRN: [id number]  Admission Date: 3/14/2020  Hospital Length of Stay: 3 days  Code Status: Full Code  Attending Provider: Trish Andrade MD  Primary Care Provider: Brandi Thompson MD   Principal Problem: Pneumonia of both lungs due to infectious organism    Subjective:     Interval History: Patient continues to be on 15L with good O2 sat.     Review of Systems   Constitutional: Positive for chills, fatigue and fever.   HENT: Negative.    Eyes: Negative.    Respiratory: Positive for cough and shortness of breath.    Cardiovascular: Negative.    Gastrointestinal: Negative.    Endocrine: Negative.    Genitourinary: Negative.    Musculoskeletal: Positive for myalgias.   Skin: Negative.    Neurological: Negative.    Psychiatric/Behavioral: Negative.      Objective:     Vital Signs (Most Recent):  Temp: 98.3 °F (36.8 °C) (03/17/20 0800)  Pulse: 92 (03/17/20 0815)  Resp: (!) 30 (03/17/20 0815)  BP: 128/77 (03/17/20 0815)  SpO2: (!) 94 % (03/17/20 0815) Vital Signs (24h Range):  Temp:  [97.6 °F (36.4 °C)-98.3 °F (36.8 °C)] 98.3 °F (36.8 °C)  Pulse:  [67-92] 92  Resp:  [17-32] 30  SpO2:  [24 %-100 %] 94 %  BP: (118-160)/(72-94) 128/77     Weight: 90.8 kg (200 lb 3.6 oz)  Body mass index is 37.83 kg/m².    Intake/Output Summary (Last 24 hours) at 3/17/2020 0835  Last data filed at 3/16/2020 2305  Gross per 24 hour   Intake 300 ml   Output 750 ml   Net -450 ml      Physical Exam   Constitutional: She is oriented to person, place, and time. She appears well-developed. No distress.   HENT:   Head: Normocephalic and atraumatic.   Mouth/Throat: Oropharynx is clear and moist.   Eyes: Pupils are equal, round, and reactive to light. EOM are normal.   Neck: Normal range of motion. Neck supple. No JVD present.   Cardiovascular: Normal rate, regular rhythm, normal heart sounds and intact distal pulses.   Pulmonary/Chest: She has no  wheezes.   Shallow breaths   Abdominal: Soft. Bowel sounds are normal. She exhibits no distension. There is no tenderness.   Musculoskeletal: Normal range of motion. She exhibits no edema.   Lymphadenopathy:     She has no cervical adenopathy.   Neurological: She is alert and oriented to person, place, and time.   Skin: Capillary refill takes less than 2 seconds.   Psychiatric: She has a normal mood and affect. Her behavior is normal.       Significant Labs:   Blood Culture:   No results for input(s): LABBLOO in the last 48 hours.  BMP:   Recent Labs   Lab 03/16/20  0416   *      K 4.3      CO2 27   BUN 15   CREATININE 0.8   CALCIUM 8.4*     CBC:   Recent Labs   Lab 03/15/20  1243 03/16/20  0416 03/17/20  0320   WBC 12.84* 16.89* 11.28   HGB 12.0 10.9* 10.9*   HCT 39.2 35.4* 35.4*    377* 392*     Respiratory Culture:   No results for input(s): GSRESP, RESPIRATORYC in the last 48 hours.    Significant Imaging: I have reviewed and interpreted all pertinent imaging results/findings within the past 24 hours.    Assessment/Plan:     * Pneumonia of both lungs due to infectious organism  Acute hypoxic respiratory failure- Patient with bilateral infiltrates. Procal normal  Concern for COVID-19 PUI  -SARS-CoV2 PCR pending  -Limit fluids  -BNP normal  -Titrate oxygen. Low threshold for intubation.   -No indication for steroids.   -Supportive therapy  -Agree with CAP coverage.   hydroxychlorquine     Critical Care time: 85 minutes.    Critical Care time for the evaluation and treatment for severe organ dysfunction, review of pertinent labs and imaging studies discussions with primary team/consulting services and discussions with patient.        Continue ICU care.          Adrienne Kemp MD  Pulmonology  [facility]

## 2020-03-17 NOTE — ASSESSMENT & PLAN NOTE
Acute hypoxic respiratory failure- Patient with bilateral infiltrates. Procal normal  Concern for COVID-19 PUI  -SARS-CoV2 PCR pending  -Limit fluids  -BNP normal  -Titrate oxygen. Low threshold for intubation.   -No indication for steroids.   -Supportive therapy  -Agree with CAP coverage.   hydroxychlorquine

## 2020-03-17 NOTE — ASSESSMENT & PLAN NOTE
Secondary to pneumonia   Viral vs bacterial  Possible COVID 19 given recent exposure  Agree with empiric antibiotics for possible superimposed CAP as this has not been ruled out (on day # 4)  High flow O2 and titrate for O2 sats >90%. Nebulized bronchodilators  No steroids. Initiated hydroxychloroquine x 5 days (this is an investigational drug with some in vitro data)  Wean supplemental O2 as tolerated  Pulm on board for co-management

## 2020-03-18 LAB
POCT GLUCOSE: 123 MG/DL (ref 70–110)
POCT GLUCOSE: 136 MG/DL (ref 70–110)
POCT GLUCOSE: 141 MG/DL (ref 70–110)
POCT GLUCOSE: 166 MG/DL (ref 70–110)
POCT GLUCOSE: 97 MG/DL (ref 70–110)

## 2020-03-18 PROCEDURE — 25000003 PHARM REV CODE 250: Performed by: INTERNAL MEDICINE

## 2020-03-18 PROCEDURE — 63600175 PHARM REV CODE 636 W HCPCS: Performed by: INTERNAL MEDICINE

## 2020-03-18 PROCEDURE — 11000001 HC ACUTE MED/SURG PRIVATE ROOM

## 2020-03-18 RX ADMIN — ALBUTEROL SULFATE 2 PUFF: 90 AEROSOL, METERED RESPIRATORY (INHALATION) at 02:03

## 2020-03-18 RX ADMIN — AMLODIPINE BESYLATE 5 MG: 5 TABLET ORAL at 09:03

## 2020-03-18 RX ADMIN — GABAPENTIN 600 MG: 300 CAPSULE ORAL at 09:03

## 2020-03-18 RX ADMIN — INSULIN ASPART 8 UNITS: 100 INJECTION, SOLUTION INTRAVENOUS; SUBCUTANEOUS at 11:03

## 2020-03-18 RX ADMIN — PANTOPRAZOLE SODIUM 40 MG: 40 TABLET, DELAYED RELEASE ORAL at 09:03

## 2020-03-18 RX ADMIN — INSULIN DETEMIR 30 UNITS: 100 INJECTION, SOLUTION SUBCUTANEOUS at 09:03

## 2020-03-18 RX ADMIN — ENOXAPARIN SODIUM 40 MG: 100 INJECTION SUBCUTANEOUS at 05:03

## 2020-03-18 RX ADMIN — HYDROXYCHLOROQUINE SULFATE 400 MG: 200 TABLET, FILM COATED ORAL at 09:03

## 2020-03-18 RX ADMIN — VALSARTAN 80 MG: 80 TABLET, FILM COATED ORAL at 09:03

## 2020-03-18 RX ADMIN — ATORVASTATIN CALCIUM 20 MG: 10 TABLET, FILM COATED ORAL at 09:03

## 2020-03-18 RX ADMIN — INSULIN ASPART 8 UNITS: 100 INJECTION, SOLUTION INTRAVENOUS; SUBCUTANEOUS at 04:03

## 2020-03-18 NOTE — PLAN OF CARE
Brief ID Note:     50F with DM, HTN admitted 3/14 with cough x5d with fever 103f and sob and headaches. Reports positive covid19 contact. CXR with Interval progression of patchy areas of airspace opacification. On 3L O2. SARS-CoV-2 (COVID-19) pending.    Vital Signs (Most Recent):   Temp: 97.3 °F (36.3 °C) (03/18/20 1300)  Pulse: 82 (03/18/20 1300)  Resp: 18 (03/18/20 1300)  BP: 131/86 (03/18/20 1300)  SpO2: (!) 94 % (03/18/20 1300)  Body mass index is 37.83 kg/m².    Last ABG:   No results for input(s): PH, PO2, PCO2, HCO3, BE in the last 168 hours.    Laboratory:  Lab Results   Component Value Date    WBC 11.28 03/17/2020    HGB 10.9 (L) 03/17/2020    HCT 35.4 (L) 03/17/2020    MCV 87 03/17/2020     (H) 03/17/2020       Ventilator Settings:       Chest Imaging:   Most recent CXR: bilateral infiltrates    EKG:  No ekg available for review.     Recommendations:  - Continue airborne and contact precautions  - would consider stopping antibiotics  - if respiratory status stable and doesn't require home o2, not opposed to discharge with home isolation from the ID perspective. family members/close contacts of positive case should self quarantine x 2 weeks and call [facility] if symptoms for testing  - If respiratory status worsens, would consider starting hydroxychloroquine 400mg po bid x 1 day, then 200mg po bid x4d (5 days total). . Will likely help with cytokine storm process and there is in vitro data and clinical experience.   - If patient requires mechanical ventilation, may meet criteria for compassionate use Remdesivir. Please contact ID ASAP if intubated and will then complete this request.   - goals of care discussion     The following patient criteria must currently be met in order to submit a compassionate use request for remdesivir:    Key Inclusion criteria:  [x] Hospitalization  [] Confirmed SARS-CoV-2 by PCR  [] Invasive Mechanical ventilation    Key Exclusion criteria:  [x] Evidence of  Multi-organ failure  [x] Pressor requirement to maintain blood pressure  [x] ALT levels > 5 X ULN  [x] Cr Clearance <30 mL/min or dialysis or Continuous Veno-Venous Hemofiltration  [x] Remdesivir cannot be used in conjunction with other experimental antiviral agents for COVID-19       Reference:  Marvel lopez al. In vitro antiviral activity and projection of optimized dosing design of hydroxychloroquine for the treatment of severe ARDS coronavirus 2 (SARS-CoV-2). [facility] 2020.

## 2020-03-18 NOTE — SUBJECTIVE & OBJECTIVE
Interval History: doing well on low flow.     Review of Systems   Respiratory: Negative for shortness of breath.    Cardiovascular: Negative.    Gastrointestinal: Negative.      Objective:     Vital Signs (Most Recent):  Temp: 97.3 °F (36.3 °C) (03/18/20 1300)  Pulse: 82 (03/18/20 1300)  Resp: 18 (03/18/20 1300)  BP: 131/86 (03/18/20 1300)  SpO2: (!) 94 % (03/18/20 1300) Vital Signs (24h Range):  Temp:  [97.3 °F (36.3 °C)-98.7 °F (37.1 °C)] 97.3 °F (36.3 °C)  Pulse:  [68-88] 82  Resp:  [18-28] 18  SpO2:  [90 %-100 %] 94 %  BP: (131-159)/(75-93) 131/86     Weight: 90.8 kg (200 lb 3.6 oz)  Body mass index is 37.83 kg/m².    Intake/Output Summary (Last 24 hours) at 3/18/2020 1628  Last data filed at 3/17/2020 2300  Gross per 24 hour   Intake 480 ml   Output 900 ml   Net -420 ml      Physical Exam   Constitutional: She is oriented to person, place, and time. She appears well-developed. No distress.   Sitting up in chair   Cardiovascular: Normal rate and regular rhythm.   Pulmonary/Chest: Effort normal.     No rales or wheezing   Abdominal: Soft.   Musculoskeletal: She exhibits no edema.   Neurological: She is alert and oriented to person, place, and time.   Skin: She is not diaphoretic.   Psychiatric: She has a normal mood and affect. Her behavior is normal. Judgment and thought content normal.   Nursing note and vitals reviewed.      Significant Labs: All pertinent labs within the past 24 hours have been reviewed.    Significant Imaging: I have reviewed all pertinent imaging results/findings within the past 24 hours.  I have reviewed and interpreted all pertinent imaging results/findings within the past 24 hours.

## 2020-03-18 NOTE — PROGRESS NOTES
[facility] Hospital Medicine  Progress Note    Patient Name: Africa Jennings  MRN: [id number]  Patient Class: IP- Inpatient   Admission Date: 3/14/2020  Length of Stay: 4 days  Attending Physician: Trish Andrade MD  Primary Care Provider: Brandi Thompson MD        Subjective:     Principal Problem:Acute respiratory failure with hypoxemia        HPI:  This is a 50 y.o. female with a PMHx of Diabetes Mellitus, Hypertension, and GERD, who presents to the Emergency Department with a cc of coughing x5 days. Pt was diagnosed with Pneumonia via chest x-ray 5 days ago. Associated symptoms include fever, SOB, and headaches. She notes that her headaches are most likely due to her sinuses. She denies any congestion, rhinorrhea, CP, abdominal pain, diarrhea, dysuria, nausea, vomiting, diarrhea, eye pain, or ear pain. Denies a prior history of Pneumonia. Pt is currently on medication for HTN. No known allergies. She reports a history of multiple surgeries including a breast reduction, cholecystectomy, esophagogastroduodenoscopy, hysterectomy, oophorectomy, and a total reduction mammoplasty. Pt denies use of tobacco or any alcohol consumption.     Pt reports that she went to a party with known Covid19 patient at another hospital in the area.  Pt will be admitted on isolation and covid19 test pending. Supportive care.     Overview/Hospital Course:        Interval History: doing well on low flow.     Review of Systems   Respiratory: Negative for shortness of breath.    Cardiovascular: Negative.    Gastrointestinal: Negative.      Objective:     Vital Signs (Most Recent):  Temp: 97.3 °F (36.3 °C) (03/18/20 1300)  Pulse: 82 (03/18/20 1300)  Resp: 18 (03/18/20 1300)  BP: 131/86 (03/18/20 1300)  SpO2: (!) 94 % (03/18/20 1300) Vital Signs (24h Range):  Temp:  [97.3 °F (36.3 °C)-98.7 °F (37.1 °C)] 97.3 °F (36.3 °C)  Pulse:  [68-88] 82  Resp:  [18-28] 18  SpO2:  [90 %-100 %] 94 %  BP: (131-159)/(75-93)  131/86     Weight: 90.8 kg (200 lb 3.6 oz)  Body mass index is 37.83 kg/m².    Intake/Output Summary (Last 24 hours) at 3/18/2020 1628  Last data filed at 3/17/2020 2300  Gross per 24 hour   Intake 480 ml   Output 900 ml   Net -420 ml      Physical Exam   Constitutional: She is oriented to person, place, and time. She appears well-developed. No distress.   Sitting up in chair   Cardiovascular: Normal rate and regular rhythm.   Pulmonary/Chest: Effort normal.     No rales or wheezing   Abdominal: Soft.   Musculoskeletal: She exhibits no edema.   Neurological: She is alert and oriented to person, place, and time.   Skin: She is not diaphoretic.   Psychiatric: She has a normal mood and affect. Her behavior is normal. Judgment and thought content normal.   Nursing note and vitals reviewed.      Significant Labs: All pertinent labs within the past 24 hours have been reviewed.    Significant Imaging: I have reviewed all pertinent imaging results/findings within the past 24 hours.  I have reviewed and interpreted all pertinent imaging results/findings within the past 24 hours.      Assessment/Plan:      * Acute respiratory failure with hypoxemia  Secondary to viral lower resp infection  Possible COVID 19 given recent exposure  Dc antibiotics, as recommended by ID  No steroids. Initiated hydroxychloroquine x 5 days (this is an investigational drug with some in vitro data)  Wean supplemental O2 as tolerated  Pulm on board for co-management        Pneumonia of both lungs due to infectious organism  See above        Gastroesophageal reflux disease without esophagitis  Resumed PPI      Obstructive sleep apnea (adult) (pediatric)  Not recommending PPV with concern for covid        Type 2 diabetes mellitus without complication, without long-term current use of insulin  Hold home agents  Basal-bolus regimen  Diabetic diet  Gabapentin resumed        Essential hypertension  BP at goal on current oral antihypertensive  regimen      VTE Risk Mitigation (From admission, onward)         Ordered     enoxaparin injection 40 mg  Daily      03/15/20 0625     IP VTE HIGH RISK PATIENT  Once      03/14/20 1934                Dispo: home antoinette Bustillos MD  Department of Hospital Medicine   [facility]

## 2020-03-18 NOTE — ASSESSMENT & PLAN NOTE
Secondary to viral lower resp infection  Possible COVID 19 given recent exposure  Dc antibiotics, as recommended by ID  No steroids. Initiated hydroxychloroquine x 5 days (this is an investigational drug with some in vitro data)  Wean supplemental O2 as tolerated  Pulm on board for co-management

## 2020-03-18 NOTE — PLAN OF CARE
Problem: Adult Inpatient Plan of Care  Goal: Plan of Care Review  Outcome: Ongoing, Progressing  Goal: Patient-Specific Goal (Individualization)  Outcome: Ongoing, Progressing  Goal: Absence of Hospital-Acquired Illness or Injury  Outcome: Ongoing, Progressing  Intervention: Identify and Manage Fall Risk  Flowsheets (Taken 3/18/2020 0330)  Safety Promotion/Fall Prevention: assistive device/personal item within reach; Fall Risk reviewed with patient/family; Fall Risk signage in place; medications reviewed; nonskid shoes/socks when out of bed; room near unit station; instructed to call staff for mobility  Goal: Optimal Comfort and Wellbeing  Outcome: Ongoing, Progressing  Goal: Readiness for Transition of Care  Outcome: Ongoing, Progressing  Goal: Rounds/Family Conference  Outcome: Ongoing, Progressing     Problem: Diabetes Comorbidity  Goal: Blood Glucose Level Within Desired Range  Outcome: Ongoing, Progressing     Problem: Infection (Pneumonia)  Goal: Resolution of Infection Signs/Symptoms  Outcome: Ongoing, Progressing     Problem: Respiratory Compromise (Pneumonia)  Goal: Effective Oxygenation and Ventilation  Outcome: Ongoing, Progressing  Intervention: Promote Airway Secretion Clearance  Flowsheets (Taken 3/18/2020 0330)  Cough And Deep Breathing: --  Intervention: Optimize Oxygenation and Ventilation  Flowsheets (Taken 3/18/2020 0330)  Head of Bed (HOB): HOB at 30-45 degrees     Problem: Airway Clearance Ineffective  Goal: Effective Airway Clearance  Outcome: Ongoing, Progressing  Intervention: Promote Airway Secretion Clearance  Flowsheets (Taken 3/18/2020 0330)  Cough And Deep Breathing: done independently per patient  Activity Management: activity clustered for rest period

## 2020-03-18 NOTE — NURSING
Pt on floor via stretcher, connected to 3LNC, pt resting comfortably, NADN, VSS, will continue to monitor.

## 2020-03-18 NOTE — NURSING TRANSFER
Nursing Transfer Note      3/18/2020     Transfer To: [id number]    Transfer via bed    Transfer with NC  to O2    Transported by Rn and transport    Medicines sent: yes    Chart send with patient: Yes    Notified: pt notified her family    Patient reassessed at:   3/18/2020 ,0100 (date, time)    Upon arrival to floor: patient oriented to room, call bell in reach and bed in lowest position

## 2020-03-18 NOTE — CARE UPDATE
Patient currently on 3L NC. Pulmonary will sign off at this time. Please call with any questions.     Kinza Obrien NP  Critical Care Medicine

## 2020-03-18 NOTE — PLAN OF CARE
03/18/20 1200   Discharge Reassessment   Assessment Type Discharge Planning Reassessment   Patient lives alone and is independent.  She plans to return home with her son's help if needed.  TN to continue to assist with discharge planning as needed.

## 2020-03-18 NOTE — EICU
Rounding (Video Assessment):  Yes    Intervention Initiated From:  COR / EICU    Comments:   Pt awake, no distress noted, O2 via NC on, RR 26, O2 sat 96%

## 2020-03-19 VITALS
HEIGHT: 61 IN | HEART RATE: 95 BPM | OXYGEN SATURATION: 91 % | TEMPERATURE: 99 F | SYSTOLIC BLOOD PRESSURE: 145 MMHG | DIASTOLIC BLOOD PRESSURE: 82 MMHG | BODY MASS INDEX: 37.81 KG/M2 | RESPIRATION RATE: 17 BRPM | WEIGHT: 200.25 LBS

## 2020-03-19 LAB
BACTERIA BLD CULT: NORMAL
BACTERIA BLD CULT: NORMAL
POCT GLUCOSE: 106 MG/DL (ref 70–110)
POCT GLUCOSE: 121 MG/DL (ref 70–110)

## 2020-03-19 PROCEDURE — 63600175 PHARM REV CODE 636 W HCPCS: Performed by: INTERNAL MEDICINE

## 2020-03-19 PROCEDURE — 25000003 PHARM REV CODE 250: Performed by: INTERNAL MEDICINE

## 2020-03-19 RX ORDER — POLYETHYLENE GLYCOL 3350 17 G/17G
17 POWDER, FOR SOLUTION ORAL DAILY
Status: DISCONTINUED | OUTPATIENT
Start: 2020-03-19 | End: 2020-03-19 | Stop reason: HOSPADM

## 2020-03-19 RX ORDER — HYDROXYCHLOROQUINE SULFATE 200 MG/1
400 TABLET, FILM COATED ORAL DAILY
Qty: 4 TABLET | Refills: 0 | Status: SHIPPED | OUTPATIENT
Start: 2020-03-20 | End: 2020-03-22

## 2020-03-19 RX ADMIN — INSULIN ASPART 8 UNITS: 100 INJECTION, SOLUTION INTRAVENOUS; SUBCUTANEOUS at 11:03

## 2020-03-19 RX ADMIN — HYDROXYCHLOROQUINE SULFATE 400 MG: 200 TABLET, FILM COATED ORAL at 08:03

## 2020-03-19 RX ADMIN — PANTOPRAZOLE SODIUM 40 MG: 40 TABLET, DELAYED RELEASE ORAL at 08:03

## 2020-03-19 RX ADMIN — VALSARTAN 80 MG: 80 TABLET, FILM COATED ORAL at 08:03

## 2020-03-19 RX ADMIN — GABAPENTIN 600 MG: 300 CAPSULE ORAL at 08:03

## 2020-03-19 RX ADMIN — ATORVASTATIN CALCIUM 20 MG: 10 TABLET, FILM COATED ORAL at 08:03

## 2020-03-19 RX ADMIN — INSULIN ASPART 8 UNITS: 100 INJECTION, SOLUTION INTRAVENOUS; SUBCUTANEOUS at 08:03

## 2020-03-19 RX ADMIN — AMLODIPINE BESYLATE 5 MG: 5 TABLET ORAL at 08:03

## 2020-03-19 RX ADMIN — POLYETHYLENE GLYCOL 3350 17 G: 17 POWDER, FOR SOLUTION ORAL at 11:03

## 2020-03-19 NOTE — NURSING
Covid-19 discharge instruction given to patient verbalized understanding. Patient was given a surgical mask and applied correctly upon discharge. Patient left with transport by wheelchair to the family vehicle. No distress noted.

## 2020-03-19 NOTE — PLAN OF CARE
03/19/20 1130   Post-Acute Status   Post-Acute Authorization [facility] Status Pending Payor Review   Order received for Home O2.  Awaiting response from [facility].

## 2020-03-19 NOTE — DISCHARGE SUMMARY
[facility] Hospital Medicine  Discharge Summary      Patient Name: Africa Jennings  MRN: [id number]  Admission Date: 3/14/2020  Hospital Length of Stay: 5 days  Discharge Date and Time:  03/19/2020 2:42 PM  Attending Physician: Trish Andrade MD   Discharging Provider: Trish Bustillos MD  Primary Care Provider: Brandi Thompson MD      HPI:   This is a 50 y.o. female with a PMHx of Diabetes Mellitus, Hypertension, and GERD, who presents to the Emergency Department with a cc of coughing x5 days. Pt was diagnosed with Pneumonia via chest x-ray 5 days ago. Associated symptoms include fever, SOB, and headaches. She notes that her headaches are most likely due to her sinuses. She denies any congestion, rhinorrhea, CP, abdominal pain, diarrhea, dysuria, nausea, vomiting, diarrhea, eye pain, or ear pain. Denies a prior history of Pneumonia. Pt is currently on medication for HTN. No known allergies. She reports a history of multiple surgeries including a breast reduction, cholecystectomy, esophagogastroduodenoscopy, hysterectomy, oophorectomy, and a total reduction mammoplasty. Pt denies use of tobacco or any alcohol consumption.     Pt reports that she went to a party with known Covid19 patient at another hospital in the area.  Pt will be admitted on isolation and covid19 test pending. Supportive care.     * No surgery found *      Hospital Course:   Ms Jennings presented with acute respiratory failure with hypoxia. Given viral like symptoms and negative flu in setting of COVID pandemic, COVID infection very likely. Patient required ICU admission for close observation while on high flow supplemental O2. Also initiated on hydroxychloroquine for 5 days (investigational drug that has shown in vitro benefit). Patient clinically improved and stepped down to floor on low flow supplemental O2 and clinically stable. Did well overall and remained independent/ambulatory. In no distress and able to speak in  "full sentences. Discharged with portable O2 for hypoxia at room air. Will need two more days of hydroxychloroquine to complete treatment. Information provided regarding self care and precautions pending COVID-19 results. Masks also provided. PCP follow up. Diabetic diet. Activity as tolerated.        Consults:   Consults (From admission, onward)        Status Ordering Provider     Inpatient consult to Pulmonology  Once     Provider:  Maximo Owen MD    Completed MAINE NAVARRO        Final Active Diagnoses:    Diagnosis Date Noted POA    PRINCIPAL PROBLEM:  Acute respiratory failure with hypoxemia [J96.01] 03/14/2020 Yes    Pneumonia of both lungs due to infectious organism [J18.9] 03/14/2020 Yes    Gastroesophageal reflux disease without esophagitis [K21.9] 07/03/2014 Yes     Chronic    Obstructive sleep apnea (adult) (pediatric) [G47.33] 08/23/2013 Yes    Type 2 diabetes mellitus without complication, without long-term current use of insulin [E11.9] 08/03/2012 Yes     Chronic    Essential hypertension [I10] 06/29/2012 Yes     Chronic      Problems Resolved During this Admission:       Discharged Condition: good    Disposition: home    Follow Up:  Follow-up Information     Brandi Thompson MD On 3/24/2020.    Specialties:  Family Medicine, Wound Care  Why:  @1:20pm, for Hospital Follow up  Contact information:  [address] [place] [id number] [phone]             [facility].    Specialty:  DME Provider  Why:  DME  Contact information:  [address] [place] [id number] [phone]                 Patient Instructions:      OXYGEN FOR HOME USE     Order Specific Question Answer Comments   Liter Flow 2    Duration Continuous    Qualifying SpO2: 85    Testing done at: Rest    Route nasal cannula    Device home concentrator with portable unit    Length of need (in months):  2 weeks   Patient condition with qualifying saturation  COVID and resp failure   Height: 5' 1" " (1.549 m)    Weight: 90.8 kg (200 lb 3.6 oz)    Does patient have medical equipment at home? none    Alternative treatment measures have been tried or considered and deemed clinically ineffective. Yes    Vendor: [employer]    Expected Date of Delivery: 3/19/2020      Pending Diagnostic Studies:     Procedure Component Value Units Date/Time    SARS- CoV-2 (COVID-19) QUALITATIVE PCR [[id number]] Collected:  03/14/20 7032    Order Status:  Sent Lab Status:  In process Updated:  03/14/20 9207    Specimen:  Nasopharyngeal          Medications:  Reconciled Home Medications:      Medication List      START taking these medications    hydroxychloroquine 200 mg tablet  Commonly known as:  PLAQUENIL  Take 2 tablets (400 mg total) by mouth once daily. for 2 days  Start taking on:  March 20, 2020        CONTINUE taking these medications    albuterol 90 mcg/actuation inhaler  Commonly known as:  PROVENTIL/VENTOLIN HFA  Inhale 2 puffs into the lungs every 4 (four) hours as needed for Wheezing or Shortness of Breath. Rescue     amLODIPine 5 MG tablet  Commonly known as:  NORVASC  TAKE 1 TABLET BY MOUTH EVERY DAY     atorvastatin 20 MG tablet  Commonly known as:  LIPITOR  Take 1 tablet (20 mg total) by mouth once daily.     * blood sugar diagnostic Strp  1 strip by Misc.(Non-Drug; Combo Route) route 2 (two) times daily with meals.     * blood sugar diagnostic Strp  1 strip by Misc.(Non-Drug; Combo Route) route 2 (two) times daily with meals.     blood-glucose meter kit  Use as instructed     cetirizine 10 MG tablet  Commonly known as:  ZYRTEC  Take 1 tablet (10 mg total) by mouth once daily.     dicyclomine 20 mg tablet  Commonly known as:  BENTYL  TAKE 1 TABLET (20 MG TOTAL) BY MOUTH EVERY 6 (SIX) HOURS.     ergocalciferol 50,000 unit Cap  Commonly known as:  VITAMIN D2  TAKE 1 CAPSULE (50,000 UNITS TOTAL) BY MOUTH TWICE A WEEK.     fluticasone propionate 50 mcg/actuation nasal spray  Commonly known as:  FLONASE  INSTILL 1 SPRAY  "(50 MCG TOTAL) IN EACH NOSTRIL ONCE DAILY.     gabapentin 300 MG capsule  Commonly known as:  NEURONTIN  Take 2 capsules (600 mg total) by mouth 3 (three) times daily.     lancets Misc  Use to test blood sugar once daily.     * lancing device Misc  1 Device by Misc.(Non-Drug; Combo Route) route 2 (two) times daily with meals.     * lancing device Misc  1 Device by Misc.(Non-Drug; Combo Route) route 2 (two) times daily with meals.     neomycin-polymyxin-dexamethasone 3.5mg/mL-10,000 unit/mL-0.1 % Drps  Commonly known as:  MAXITROL     nystatin ointment  Commonly known as:  MYCOSTATIN  Apply topically 2 (two) times daily.     omeprazole 40 MG capsule  Commonly known as:  PRILOSEC  Take 1 capsule (40 mg total) by mouth once daily.     ondansetron 4 MG tablet  Commonly known as:  ZOFRAN  Take 1 tablet (4 mg total) by mouth every 8 (eight) hours as needed.     OZEMPIC 0.25 mg or 0.5 mg(2 mg/1.5 mL) Pnij  Generic drug:  semaglutide  INJECT 0.25 MG INTO THE SKIN EVERY 7 DAYS. X 2 WEEKS, THEN INCREASE TO 0.5MG ONCE WEEKLY     pen needle, diabetic 32 gauge x 5/32" Ndle  Commonly known as:  PEN NEEDLE  1 each by Misc.(Non-Drug; Combo Route) route once a week.     tobramycin-dexamethasone 0.3-0.1% 0.3-0.1 % Drps  Commonly known as:  TOBRADEX  INSTILL 1 DROP INTO BOTH EYES 4 TIMES A DAY     triamcinolone acetonide 0.1% 0.1 % cream  Commonly known as:  KENALOG  Apply topically 2 (two) times daily. Apply topically 2 (two) times daily.     valsartan 80 MG tablet  Commonly known as:  DIOVAN  Take 1 tablet (80 mg total) by mouth once daily.         * This list has 4 medication(s) that are the same as other medications prescribed for you. Read the directions carefully, and ask your doctor or other care provider to review them with you.            STOP taking these medications    amoxicillin-clavulanate 875-125mg 875-125 mg per tablet  Commonly known as:  AUGMENTIN     azithromycin 250 MG tablet  Commonly known as:  Z-NADER        ASK " your doctor about these medications    benzonatate 100 MG capsule  Commonly known as:  TESSALON PERLES  Take 1 capsule (100 mg total) by mouth every 6 (six) hours as needed.  Ask about: Should I take this medication?            Indwelling Lines/Drains at time of discharge:   Lines/Drains/Airways     None                 Time spent on the discharge of patient: > 35 minutes  Patient was seen and examined on the date of discharge and determined to be suitable for discharge.         Trish Bustillos MD  Department of Hospital Medicine  [facility]

## 2020-03-19 NOTE — PLAN OF CARE
03/19/20 1244   Final Note   Assessment Type Final Discharge Note   Anticipated Discharge Disposition Home   Hospital Follow Up  Appt(s) scheduled? Yes   Discharge plans and expectations educations in teach back method with documentation complete? Yes   Right Care Referral Info   Post Acute Recommendation No Care   Post-Acute Status   Discharge Delays None known at this time   TN attempted to speak with patient via phone to discuss DC, no answer.  Nurse to provide written discharge instructions and education.  TN notified Margo CINTRON that all CM needs are met once O2 tank delivered to the room.

## 2020-03-19 NOTE — PLAN OF CARE
03/19/20 1236   Post-Acute Status   Post-Acute Authorization [facility] Status Set-up Complete   Per Hannah at [facility] ok to dispense O2.  TN notified RT who will deliver to the unit.

## 2020-03-19 NOTE — PLAN OF CARE
Pt remained free from falls and injury during shift, medication administered per MD orders, 3LNC, pt ambulates to restroom in room, safety maintained, bed in low lock position with call bell in reach, pt is able to make her needs known, will continue to monitor.   Problem: Adult Inpatient Plan of Care  Goal: Plan of Care Review  Outcome: Ongoing, Progressing  Goal: Patient-Specific Goal (Individualization)  Outcome: Ongoing, Progressing  Goal: Absence of Hospital-Acquired Illness or Injury  Outcome: Ongoing, Progressing  Goal: Optimal Comfort and Wellbeing  Outcome: Ongoing, Progressing  Goal: Readiness for Transition of Care  Outcome: Ongoing, Progressing  Goal: Rounds/Family Conference  Outcome: Ongoing, Progressing     Problem: Diabetes Comorbidity  Goal: Blood Glucose Level Within Desired Range  Outcome: Ongoing, Progressing     Problem: Fluid Imbalance (Pneumonia)  Goal: Fluid Balance  Outcome: Ongoing, Progressing     Problem: Infection (Pneumonia)  Goal: Resolution of Infection Signs/Symptoms  Outcome: Ongoing, Progressing     Problem: Respiratory Compromise (Pneumonia)  Goal: Effective Oxygenation and Ventilation  Outcome: Ongoing, Progressing

## 2020-03-21 ENCOUNTER — TELEPHONE (OUTPATIENT)
Dept: FAMILY MEDICINE | Facility: CLINIC | Age: 50
End: 2020-03-21

## 2020-03-21 LAB
POCT GLUCOSE: 156 MG/DL (ref 70–110)
POCT GLUCOSE: 165 MG/DL (ref 70–110)
POCT GLUCOSE: 175 MG/DL (ref 70–110)

## 2020-03-21 NOTE — TELEPHONE ENCOUNTER
This result was communicated to the patient by Shanell Boateng MD on 03/21/2020.    Patient states she got ill 3/7. States she is not going back to work until May.  Patient self isolating and feeling better.  All questions answered.    Has follow up with PCP on 3/24 - asked her to call the clinic on Monday and change this to a virtual visit.    Your test was POSITIVE for COVID-19 (coronavirus).     [facility]  Preventing the Spread of Coronavirus Disease 2019 in Homes and Residential Communities      Prevention steps for people with confirmed or suspected COVID-19 (including persons under investigation) who do not need to be hospitalized and people with confirmed COVID-19 who were hospitalized and determined to be medically stable to go home.    Your healthcare provider and public health staff will evaluate whether you can be cared for at home.   Stay home except to get medical care.   Separate yourself from other people and animals in your home   Call ahead before visiting your doctor.   Wear a facemask.   Cover your coughs and sneezes.   Clean your hands often.   Avoid sharing personal household items.   Clean all high-touch surfaces every day.   Monitor your symptoms. Seek prompt medical attention if your illness is worsening (e.g., difficulty breathing). Before seeking care, call your healthcare provider.   If you have a medical emergency and need to call 911, notify the dispatch personnel that you have, or are being evaluated for COVID-19. If possible, put on a facemask before emergency medical services arrive.   Discontinuing home isolation. Call your provider about guidance to discontinue home isolation.    Recommended precautions for household members, intimate partners, and caregivers in a nonhealthcare setting of a patient with symptomatic laboratory-confirmed COVID-19 or a patient under investigation.  Household members, intimate partners, and caregivers in  a nonhealthcare setting may have close contact with a person with symptomatic, laboratory-confirmed COVID-19 or a person under investigation. Close contacts should monitor their health; they should call their healthcare provider right away if they develop symptoms suggestive of COVID-19 (e.g., fever, cough, shortness of breath).    Close contacts should also follow these recommendations:   Make sure that you understand and can help the patient follow their healthcare provider's instructions for medication(s) and care. You should help the patient with basic needs in the home and provide support for getting groceries, prescriptions, and other personal needs.   Monitor the patient's symptoms. If the patient is getting sicker, call his or her healthcare provider and tell them that the patient has laboratory-confirmed COVID-19. This will help the healthcare provider's office take steps to keep other people in the office or waiting room from getting infected. Ask the healthcare provider to call the local or [facility] health department for additional guidance. If the patient has a medical emergency and you need to call 911, notify the dispatch personnel that the patient has, or is being evaluated for COVID-19.   Household members should stay in another room or be  from the patient as much as possible. Household members should use a separate bedroom and bathroom, if available.   Prohibit visitors who do not have an essential need to be in the home.   Household members should care for any pets in the home. Do not handle pets or other animals while sick.   Make sure that shared spaces in the home have good air flow, such as by an air conditioner or an opened window, weather permitting.   Perform hand hygiene frequently. Wash your hands often with soap and water for at least 20 seconds or use an alcohol-based hand  that contains 60 to 95% alcohol, covering all surfaces of your hands and rubbing them  together until they feel dry. Soap and water should be used preferentially if hands are visibly dirty.   Avoid touching your eyes, nose, and mouth with unwashed hands.   The patient should wear a facemask when you are around other people. If the patient is not able to wear a facemask (for example, because it causes trouble breathing), you, as the caregiver should wear a mask when you are in the same room as the patient.   Wear a disposable facemask and gloves when you touch or have contact with the patient's blood, stool, or body fluids, such as saliva, sputum, nasal mucus, vomit, urine.  o Throw out disposable facemasks and gloves after using them. Do not reuse.  o When removing personal protective equipment, first remove and dispose of gloves. Then, immediately clean your hands with soap and water or alcohol-based hand . Next, remove and dispose of facemask, and immediately clean your hands again with soap and water or alcohol-based hand .   Avoid sharing household items with the patient. You should not share dishes, drinking glasses, cups, eating utensils, towels, bedding, or other items. After the patient uses these items, you should wash them thoroughly (see below Wash laundry thoroughly).   Clean all high-touch surfaces, such as counters, tabletops, doorknobs, bathroom fixtures, toilets, phones, keyboards, tablets, and bedside tables, every day. Also, clean any surfaces that may have blood, stool, or body fluids on them.   Use a household cleaning spray or wipe, according to the label instructions. Labels contain instructions for safe and effective use of the cleaning product including precautions you should take when applying the product, such as wearing gloves and making sure you have good ventilation during use of the product.   Wash laundry thoroughly.  o Immediately remove and wash clothes or bedding that have blood, stool, or body fluids on them.  o Wear disposable gloves  while handling soiled items and keep soiled items away from your body. Clean your hands (with soap and water or an alcohol-based hand ) immediately after removing your gloves.  o Read and follow directions on labels of laundry or clothing items and detergent. In general, using a normal laundry detergent according to washing machine instructions and dry thoroughly using the warmest temperatures recommended on the clothing label.   Place all used disposable gloves, facemasks, and other contaminated items in a lined container before disposing of them with other household waste. Clean your hands (with soap and water or an alcohol-based hand ) immediately after handling these items. Soap and water should be used preferentially if hands are visibly dirty.   Discuss any additional questions with your state or local health department or healthcare provider. Check available hours when contacting your local health department.    For more information see CDC link below.      https://www.cdc.gov/coronavirus/2019-ncov/hcp/guidance-prevent-spread.html#precautions              If your symptoms worsen or if you have any other concerns, please contact [facility] at [phone].     Sincerely,     Shanell Boateng MD

## 2020-03-22 DIAGNOSIS — E55.9 VITAMIN D DEFICIENCY: ICD-10-CM

## 2020-03-23 DIAGNOSIS — E11.9 TYPE 2 DIABETES MELLITUS: ICD-10-CM

## 2020-03-23 RX ORDER — ERGOCALCIFEROL 1.25 MG/1
CAPSULE ORAL
Qty: 24 CAPSULE | Refills: 2 | Status: SHIPPED | OUTPATIENT
Start: 2020-03-23 | End: 2021-01-04 | Stop reason: SDUPTHER

## 2020-03-24 ENCOUNTER — OFFICE VISIT (OUTPATIENT)
Dept: FAMILY MEDICINE | Facility: CLINIC | Age: 50
End: 2020-03-24
Payer: COMMERCIAL

## 2020-03-24 DIAGNOSIS — U07.1 COVID-19 VIRUS DETECTED: Primary | ICD-10-CM

## 2020-03-24 DIAGNOSIS — E11.9 TYPE 2 DIABETES MELLITUS WITHOUT COMPLICATION, WITHOUT LONG-TERM CURRENT USE OF INSULIN: Chronic | ICD-10-CM

## 2020-03-24 DIAGNOSIS — E66.01 MORBID OBESITY WITH BMI OF 40.0-44.9, ADULT: Chronic | ICD-10-CM

## 2020-03-24 DIAGNOSIS — K21.9 GASTROESOPHAGEAL REFLUX DISEASE WITHOUT ESOPHAGITIS: Chronic | ICD-10-CM

## 2020-03-24 DIAGNOSIS — G47.33 OBSTRUCTIVE SLEEP APNEA (ADULT) (PEDIATRIC): ICD-10-CM

## 2020-03-24 PROCEDURE — 99214 PR OFFICE/OUTPT VISIT, EST, LEVL IV, 30-39 MIN: ICD-10-PCS | Mod: 95,,, | Performed by: FAMILY MEDICINE

## 2020-03-24 PROCEDURE — 3051F PR MOST RECENT HEMOGLOBIN A1C LEVEL 7.0 - < 8.0%: ICD-10-PCS | Mod: CPTII,95,, | Performed by: FAMILY MEDICINE

## 2020-03-24 PROCEDURE — 99214 OFFICE O/P EST MOD 30 MIN: CPT | Mod: 95,,, | Performed by: FAMILY MEDICINE

## 2020-03-24 PROCEDURE — 3051F HG A1C>EQUAL 7.0%<8.0%: CPT | Mod: CPTII,95,, | Performed by: FAMILY MEDICINE

## 2020-03-24 NOTE — PROGRESS NOTES
Telemedicine Office Visit    Patient Name: Africa Jennings    : 1970  MRN: [id number]    Subjective:  Africa is a 50 y.o. female who presents today for    The patient location is: home   The chief complaint leading to consultation is: positive covid 19 follow-up  Visit type: Virtual visit with synchronous audio and video  Total time spent with patient: 12 minutes   Each patient to whom he or she provides medical services by telemedicine is:  (1) informed of the relationship between the physician and patient and the respective role of any other health care provider with respect to management of the patient; and (2) notified that he or she may decline to receive medical services by telemedicine and may withdraw from such care at any time.    [facility]   Hospital follow-up for ARF from COVID - Patient was recently admitted for respiratory failure from covid-19 confirmed. She has been discharged and is now how and self quarantined. She has completed her course of antibiotics.     Currently, she is feeling much better. She is still short of breath at times but is feeling much better. She has been practicing breathing exercises. She is social distancing.     Review of Systems   Constitutional: Negative for chills and fever.   HENT: Positive for congestion.    Eyes: Negative for blurred vision.   Respiratory: Positive for shortness of breath. Negative for cough and wheezing.    Cardiovascular: Negative for chest pain.   Gastrointestinal: Negative for abdominal pain, constipation, diarrhea, heartburn, nausea and vomiting.   Genitourinary: Negative for dysuria.   Musculoskeletal: Negative for myalgias.   Skin: Negative for itching and rash.   Neurological: Negative for dizziness and headaches.   Psychiatric/Behavioral: Negative for depression.       Active Problem List  Patient Active Problem List   Diagnosis    Essential hypertension    Type 2 diabetes mellitus without complication, without long-term  current use of insulin    Obstructive sleep apnea (adult) (pediatric)    Morbid obesity with BMI of 40.0-44.9, adult    Gastroesophageal reflux disease without esophagitis    Pyloric stenosis    Encounter for screening colonoscopy    Lumbar radiculopathy    Lumbar spondylosis    DDD (degenerative disc disease), lumbar    Pneumonia of both lungs due to infectious organism    Acute respiratory failure with hypoxemia       Past Surgical History  Past Surgical History:   Procedure Laterality Date    breast reduction      CHOLECYSTECTOMY      laprascopic    ESOPHAGOGASTRODUODENOSCOPY  8/18/14    HYSTERECTOMY  2007    laprascopic, total for fibroids.  Dr Polo    OOPHORECTOMY      TOTAL REDUCTION MAMMOPLASTY         Family History  Family History   Problem Relation Age of Onset    Diabetes Maternal Grandmother     Heart disease Maternal Grandmother     Hypertension Maternal Grandmother     Crohn's disease Sister     Diabetes Mother     Hypertension Mother     Heart disease Mother     Cancer Father 58        Prostate    Breast cancer Sister     Amblyopia Neg Hx     Blindness Neg Hx     Cataracts Neg Hx     Glaucoma Neg Hx     Macular degeneration Neg Hx     Retinal detachment Neg Hx     Strabismus Neg Hx        Social History  Social History     Socioeconomic History    Marital status:      Spouse name: Not on file    Number of children: Not on file    Years of education: Not on file    Highest education level: Not on file   Occupational History    Not on file   Social Needs    Financial resource strain: Hard    Food insecurity:     Worry: Often true     Inability: Often true    Transportation needs:     Medical: No     Non-medical: No   Tobacco Use    Smoking status: Never Smoker    Smokeless tobacco: Never Used   Substance and Sexual Activity    Alcohol use: No     Frequency: Monthly or less     Drinks per session: Patient refused     Binge frequency: Never      Comment: socially    Drug use: No    Sexual activity: Yes     Partners: Male     Birth control/protection: Surgical   Lifestyle    Physical activity:     Days per week: 3 days     Minutes per session: 60 min    Stress: To some extent   Relationships    Social connections:     Talks on phone: More than three times a week     Gets together: Once a week     Attends Church service: Not on file     Active member of club or organization: Yes     Attends meetings of clubs or organizations: More than 4 times per year     Relationship status:    Other Topics Concern    Not on file   Social History Narrative     since 2000.  2 children: 26 and 21.    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       Physical Exam  LMP  (LMP Unknown)   Physical Exam   Constitutional: She is oriented to person, place, and time. She appears well-developed and well-nourished.   HENT:   Head: Normocephalic and atraumatic.   Eyes: Pupils are equal, round, and reactive to light. Conjunctivae and EOM are normal.   Neck: Normal range of motion. Neck supple.   Cardiovascular: Normal rate, regular rhythm and normal heart sounds. Exam reveals no gallop and no friction rub.   No murmur heard.  Pulmonary/Chest: Breath sounds normal. No respiratory distress.   Abdominal: Soft. Bowel sounds are normal. She exhibits no distension. There is no tenderness.   Musculoskeletal: Normal range of motion.   Lymphadenopathy:     She has no cervical adenopathy.   Neurological: She is alert and oriented to person, place, and time.   Skin: Skin is warm.   Psychiatric: She has a normal mood and affect.         Assessment/Plan:  Africa Jennings is a 50 y.o. female who presents today for :    Covid-19 Virus Detected  Symptoms are improving  Continue to monitor  Recommend breathing exercises  Recommend to continue self quarantine   Recommend good hand hygiene      Morbid obesity with BMI of 40.0-44.9, adult  Noted in chart    Type 2 diabetes mellitus without complication, without long-term current use of insulin  The current medical regimen is effective;  continue present plan and medications.    Gastroesophageal reflux disease without esophagitis  The current medical regimen is effective;  continue present plan and medications.    Obstructive sleep apnea (adult) (pediatric)  Noted in chart        Follow up if symptoms worsen or fail to improve.

## 2020-03-30 ENCOUNTER — TELEPHONE (OUTPATIENT)
Dept: FAMILY MEDICINE | Facility: CLINIC | Age: 50
End: 2020-03-30

## 2020-03-30 NOTE — TELEPHONE ENCOUNTER
----- Message from Bessie Jackson sent at 3/30/2020  1:20 PM CDT -----  Contact: SHAYE ADAME [[id number]]  Name of Who is Calling: SHAYE ADAME [[id number]]    What is the request in detail: Patient is requesting a letter to return back to work on 4/6. Please contact to further discuss and advise      Can the clinic reply by [facility]: yes    What Number to Call Back if not in [facility]: [phone]

## 2020-03-30 NOTE — LETTER
[address] [facility] ? [place] [address] ? Phone [phone] ? Fax [phone] ? [email or web address]"          Return to Work/School    Patient: Africa Jennings  YOB: 1970   Date: 03/30/2020      To Whom It May Concern:     Africa Jennings was evaluated by me on 03/24/2020. COVID-19 is present in our communities across the state. There is limited testing for COVID at this time, so not all patients can be tested. In this situation, your employee meets the following criteria:     Africa Jennings has met the criteria for COVID-19 testing and has a POSITIVE result. The employee can return to work once they are asymptomatic for 72 hours without the use of fever reducing medications AND at least 14 days from the onset of symptoms. Anticipate return to work 4/6/2020.      If you have any questions or concerns, or if I can be of further assistance, please do not hesitate to contact me.     Sincerely,      Brandi Thompson MD

## 2020-04-01 NOTE — PROGRESS NOTES
Digital Medicine: Health  Introduction    Introduced Africa Jennings to Digital Medicine. Discussed health  role and recommended lifestyle modifications.    Brief health  enrollment call. Patient stated she is doing well. Was taking her BP while seated in a soft chair with her arm resting on the arm of the chair. Advised proper technique. Patient stated she will start taking her readings at her dinning room table. Agreed to discuss lifestyle at follow-up.    The history is provided by the patient.     HYPERTENSION  Our goal is to get BP to consistently below 130/80mmHg and make the process convenient so patient can avoid extra trips to the office. Getting your blood pressure below 130/80mmHg (definition of control) will reduce your risk for heart attack, kidney failure, stroke and death (as well as kidney failure, eye disease, & dementia)      Reviewed that the Digital Medicine care team - consisting of a clinician and a health  - will follow the most current evidence-based national guidelines for treating your condition.  The health  will focus on lifestyle modifications and motivation while the clinician will focus on medication therapy.  The care team will review all data on a regular basis and reach out as needed.      Explained that one of the key parts of the program is communication with the care team.  Asked patient to respond to outreach attempts and complete questionnaires.  Stressed importance of medication adherence.    Reviewed non-pharmacologic therapies and impact on BP.      Explained that we expect patient to obtain several blood pressures per week at random times of day.  Instructed patient not to allow anyone else to use phone and monitoring device.  Confirmed appropriate BP monitoring technique.      Explained to patient that the digital medicine team is not available for emergencies.  Patient will call [facility] on-call ([phone] or [phone]) or [phone] if  needed.      Patient's BP goal is 130/80.Patient's BP average is 133/87 mmHg, which is above goal, per 2017 ACC/AHA Hypertension Guidelines.          Last 5 Patient Entered Readings                                      Current 30 Day Average: 133/87     Recent Readings 3/31/2020 3/25/2020 3/25/2020 3/23/2020 3/21/2020    SBP (mmHg) 133 132 130 138 133    DBP (mmHg) 88 88 87 89 75    Pulse 99 97 94 88 89            INTERVENTION(S)  reviewed monitoring technique, encouragement/support and denied questions    PLAN  patient verbalizes understanding      There are no preventive care reminders to display for this patient.    Reviewed the importance of self-monitoring, medication adherence, and that the health  can be used as a resource for lifestyle modifications to help reduce or maintain a healthy lifestyle.    Sent link to [facility] [place] webpages and my contact information via [employer] for future questions. Follow up scheduled.         Screenings    SDOH

## 2020-04-08 ENCOUNTER — PATIENT MESSAGE (OUTPATIENT)
Dept: FAMILY MEDICINE | Facility: CLINIC | Age: 50
End: 2020-04-08

## 2020-04-09 ENCOUNTER — PATIENT MESSAGE (OUTPATIENT)
Dept: PAIN MEDICINE | Facility: CLINIC | Age: 50
End: 2020-04-09

## 2020-04-13 ENCOUNTER — TELEPHONE (OUTPATIENT)
Dept: PAIN MEDICINE | Facility: CLINIC | Age: 50
End: 2020-04-13

## 2020-04-13 ENCOUNTER — OFFICE VISIT (OUTPATIENT)
Dept: PAIN MEDICINE | Facility: CLINIC | Age: 50
End: 2020-04-13
Payer: COMMERCIAL

## 2020-04-13 ENCOUNTER — PATIENT OUTREACH (OUTPATIENT)
Dept: ADMINISTRATIVE | Facility: OTHER | Age: 50
End: 2020-04-13

## 2020-04-13 DIAGNOSIS — M51.36 DDD (DEGENERATIVE DISC DISEASE), LUMBAR: Primary | ICD-10-CM

## 2020-04-13 DIAGNOSIS — M47.816 LUMBAR SPONDYLOSIS: ICD-10-CM

## 2020-04-13 PROCEDURE — 99212 PR OFFICE/OUTPT VISIT, EST, LEVL II, 10-19 MIN: ICD-10-PCS | Mod: 95,,, | Performed by: PAIN MEDICINE

## 2020-04-13 PROCEDURE — 99212 OFFICE O/P EST SF 10 MIN: CPT | Mod: 95,,, | Performed by: PAIN MEDICINE

## 2020-04-13 NOTE — TELEPHONE ENCOUNTER
----- Message from Keke Marie sent at 4/13/2020 10:11 AM CDT -----  Contact: pt  Type: Patient Call Back    Who called:pt    What is the request in detail:pt returned the doctor's call. Call pt    Can the clinic reply by [facility]?    Would the patient rather a call back or a response via [facility]? call    Best call back number:[phone] (home)       Additional Information:

## 2020-04-13 NOTE — PROGRESS NOTES
Subjective:     Patient ID: Africa Jennings is a 50 y.o. female    Chief Complaint: Follow-up      Referred by: No ref. provider found      HPI:    Interval History (4/13/20):    The patient location is: home  The chief complaint leading to consultation is: follow up  Visit type: Virtual visit with audio.  Patient verbally consented for audio visit.  Total time spent with patient: 15 minutes  Each patient to whom he or she provides medical services by telemedicine is:  (1) informed of the relationship between the physician and patient and the respective role of any other health care provider with respect to management of the patient; and (2) notified that he or she may decline to receive medical services by telemedicine and may withdraw from such care at any time.      She returns today for follow up.  She reports that her right-sided low back and lower extremity pain has returned since last encounter.  She denies any changes in the quality or location of pain.  She continues to report associated numbness.  She denies any associated weakness or bowel bladder dysfunction.  She has attempted gabapentin 600 mg t.i.d..  She states this medication did not provide any relief.  She has also tried goody's powder, Motrin, Tylenol without any relief.        Interval History (2/17/20):  She returns today for follow up and MRI review.  She reports that her pain is unchanged in quality and location since last encounter.  She does state that the pain has significantly improved and is overall not very bothersome for the time being.        Initial Encounter (2/3/20):  Africa Jennings is a 50 y.o. female who presents today with chronic right-sided low back and lower extremity pain. This pain has been present for 6-7 months.  No specific inciting event or injury noted.  Patient localizes the pain to the right lumbar region.  Pain radiates to the right posterior and anterior thigh.  She reports associated  numbness in this area as well. She also reports weakness of the right lower extremity when ambulating.  She denies any associated bowel or bladder dysfunction.  The pain is constant and worsened with activity.  She states that the pain interrupt her sleep.  Patient does have a history of a similar problem roughly 8 years ago.  She underwent epidural steroid injections with good relief.  This pain is described in detail below.    Physical Therapy:  No    Non-pharmacologic Treatment:  Rest helps         · TENS?  No    Pain Medications:         · Currently taking:  Aleve, gabapentin    · Has tried in the past:  Tylenol, Motrin    · Has not tried:  Opioids, Tylenol, Muscle relaxants, TCAs, SNRIs, anticonvulsants, topical creams    Blood thinners:  None    Interventional Therapies:   L4-5 interlaminar epidural steroid injection x2    Relevant Surgeries:  None    Affecting sleep?  Yes    Affecting daily activities? yes    Depressive symptoms? no          · SI/HI? No    Work status: Employed    Pain Scores:    Best:       6/10  Worst:     10/10  Usually:   8/10  Today:    0/10    Review of Systems   Constitutional: Negative for activity change, appetite change, chills, fatigue, fever and unexpected weight change.   HENT: Negative for hearing loss.    Eyes: Negative for visual disturbance.   Respiratory: Negative for chest tightness and shortness of breath.    Cardiovascular: Negative for chest pain.   Gastrointestinal: Negative for abdominal pain, constipation, diarrhea, nausea and vomiting.   Genitourinary: Negative for difficulty urinating.   Musculoskeletal: Positive for back pain, gait problem and myalgias. Negative for neck pain.   Skin: Negative for rash.   Neurological: Positive for weakness and numbness. Negative for dizziness, light-headedness and headaches.   Psychiatric/Behavioral: Positive for sleep disturbance. Negative for hallucinations and suicidal ideas. The patient is not nervous/anxious.        Past  Medical History:   Diagnosis Date    Diabetes mellitus     Diabetes mellitus, type 2     Eczema     GERD (gastroesophageal reflux disease)     Hypertension     Impaired fasting blood sugar     YSABEL on CPAP        Past Surgical History:   Procedure Laterality Date    breast reduction      CHOLECYSTECTOMY      laprascopic    ESOPHAGOGASTRODUODENOSCOPY  8/18/14    HYSTERECTOMY  2007    laprascopic, total for fibroids.  Dr Polo    OOPHORECTOMY      TOTAL REDUCTION MAMMOPLASTY         Social History     Socioeconomic History    Marital status:      Spouse name: Not on file    Number of children: Not on file    Years of education: Not on file    Highest education level: Not on file   Occupational History    Not on file   Social Needs    Financial resource strain: Hard    Food insecurity:     Worry: Often true     Inability: Often true    Transportation needs:     Medical: No     Non-medical: No   Tobacco Use    Smoking status: Never Smoker    Smokeless tobacco: Never Used   Substance and Sexual Activity    Alcohol use: No     Frequency: Monthly or less     Drinks per session: Patient refused     Binge frequency: Never     Comment: socially    Drug use: No    Sexual activity: Yes     Partners: Male     Birth control/protection: Surgical   Lifestyle    Physical activity:     Days per week: 3 days     Minutes per session: 60 min    Stress: To some extent   Relationships    Social connections:     Talks on phone: More than three times a week     Gets together: Once a week     Attends Evangelical service: Not on file     Active member of club or organization: Yes     Attends meetings of clubs or organizations: More than 4 times per year     Relationship status:    Other Topics Concern    Not on file   Social History Narrative     since 2000.  2 children: 26 and 21.    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 Rescue 6.7 g 0    amLODIPine (NORVASC) 5 MG tablet TAKE 1 TABLET BY MOUTH EVERY DAY 90 tablet 2    atorvastatin (LIPITOR) 20 MG tablet Take 1 tablet (20 mg total) by mouth once daily. 90 tablet 3    blood sugar diagnostic Strp 1 strip by Misc.(Non-Drug; Combo Route) route 2 (two) times daily with meals. 100 strip 11    blood sugar diagnostic Strp 1 strip by Misc.(Non-Drug; Combo Route) route 2 (two) times daily with meals. 100 strip 11    blood-glucose meter kit Use as instructed 1 each 0    cetirizine (ZYRTEC) 10 MG tablet Take 1 tablet (10 mg total) by mouth once daily. 90 tablet 1    dicyclomine (BENTYL) 20 mg tablet TAKE 1 TABLET (20 MG TOTAL) BY MOUTH EVERY 6 (SIX) HOURS. 360 tablet 2    ergocalciferol (ERGOCALCIFEROL) 50,000 unit Cap TAKE 1 CAPSULE (50,000 UNITS TOTAL) BY MOUTH TWICE A WEEK. 24 capsule 2    fluticasone propionate (FLONASE) 50 mcg/actuation nasal spray INSTILL 1 SPRAY (50 MCG TOTAL) IN EACH NOSTRIL ONCE DAILY. 16 mL 2    gabapentin (NEURONTIN) 300 MG capsule Take 2 capsules (600 mg total) by mouth 3 (three) times daily. 180 capsule 11    lancets Misc Use to test blood sugar once daily. 300 each 0    lancing device Misc 1 Device by Misc.(Non-Drug; Combo Route) route 2 (two) times daily with meals. 1 each 0    lancing device Misc 1 Device by Misc.(Non-Drug; Combo Route) route 2 (two) times daily with meals. 100 each 11    neomycin-polymyxin-dexamethasone (MAXITROL) 3.5mg/mL-10,000 unit/mL-0.1 % DrpS       nystatin (MYCOSTATIN) ointment Apply topically 2 (two) times daily.        omeprazole (PRILOSEC) 40 MG capsule Take 1 capsule (40 mg total) by mouth once daily. 90 capsule 1    ondansetron (ZOFRAN) 4  "MG tablet Take 1 tablet (4 mg total) by mouth every 8 (eight) hours as needed. 90 tablet 1    OZEMPIC 0.25 mg or 0.5 mg(2 mg/1.5 mL) PnIj INJECT 0.25 MG INTO THE SKIN EVERY 7 DAYS. X 2 WEEKS, THEN INCREASE TO 0.5MG ONCE WEEKLY 4.5 Syringe 0    pen needle, diabetic (PEN NEEDLE) 32 gauge x 5/32" Ndle 1 each by Misc.(Non-Drug; Combo Route) route once a week. 50 each 1    tobramycin-dexamethasone 0.3-0.1% (TOBRADEX) 0.3-0.1 % DrpS INSTILL 1 DROP INTO BOTH EYES 4 TIMES A DAY  2    triamcinolone acetonide 0.1% (KENALOG) 0.1 % cream Apply topically 2 (two) times daily. Apply topically 2 (two) times daily. 135 g 4    valsartan (DIOVAN) 80 MG tablet Take 1 tablet (80 mg total) by mouth once daily. 30 tablet 3     No current facility-administered medications on file prior to visit.        Objective:      LMP  (LMP Unknown)     Exam:  No examination done        Imaging:  Narrative     EXAMINATION:  MRI LUMBAR SPINE WITHOUT CONTRAST    CLINICAL HISTORY:  lumbar radiculopathy; Other intervertebral disc degeneration, lumbar region    TECHNIQUE:  Multiplanar, multisequence MR images were acquired from the thoracolumbar junction to the sacrum without the administration of contrast.    COMPARISON:  MRI of the lumbar spine 09/21/2011.    FINDINGS:  There is again normal lumbar lordosis.  There is no spondylolisthesis or spondylolysis or signs for acute fractures.  The tip of the conus is at L1.  No definite signs for intradural masses.  Paraspinal soft tissues are unremarkable.    L5-S1: There is disc dehydration.  Mild circumferential annular disc bulge.  No significant disc protrusions or spinal stenosis.  There is bilateral mild foraminal narrowing associated with disc bulge.  There is degenerative hypertrophic facet arthropathy.    L4-5: Disc dehydration.  There is a left L4-5 neural foraminal disc protrusion without definite signs for compression of the exiting L4 root in the foramen.  These findings have improved when " compared to the previous study.  Within the central canal there is a broad-based mild disc protrusion or disc bulge greater on the left associated with left lateral recess stenosis.  Findings suspicious for compression of the descending left L5 root in the lateral recess (image 21 series 6.  Clinical correlation for left L5 radiculopathy suggested.  Hypertrophic changes of the facet joints and the ligamentum flava resulting in at least a mild central canal spinal stenosis.    L3-4: There is dehydration of the nucleus pulposis.  Mild circumferential annular disc bulge.  No significant central canal spinal stenosis or foraminal stenosis.  There is facet arthropathy bilaterally.    L2-3: No disc protrusions or spinal stenosis or foraminal stenosis.    L1-2: No disc herniations or spinal stenosis or foraminal stenosis.    T12-L1: No disc herniations or spinal stenosis or foraminal stenosis.    T11-T12: No axial images performed through this region.  However on the sagittal images there is dehydration of nucleus pulposis associated with a circumferential annular disc bulge with mild compression of the thecal sac.  No cord compression however noted.   Impression       1. Multifactorial central canal spinal stenosis L4-5 disc space as above.  In addition there is a left paracentral disc bulge or protrusion associated with left lateral recess stenosis and compression of the descending left L5 root as above.  The left foraminal disc protrusion seen on the previous examination less pronounced when compared to the previous study.  2. Multilevel facet arthropathy as above.  3. Milder spondylotic changes at the rest of the disc spaces as above.      Electronically signed by: Gideon Webber MD  Date: 02/11/2020  Time: 07:42         Assessment:       Encounter Diagnoses   Name Primary?    DDD (degenerative disc disease), lumbar Yes    Lumbar spondylosis          Plan:       Africa was seen today for follow-up.    Diagnoses and  all orders for this visit:    DDD (degenerative disc disease), lumbar    Lumbar spondylosis        Africa Jennings is a 50 y.o. female with chronic right-sided low back and lower extremity pain. Symptoms somewhat consistent with a right upper to mid lumbar radiculopathy, but MRI without any significant evidence to suggest this.  Does have some evidence for possible left lumbar radiculopathy on MRI the patient without symptoms consistent with this.    1.  We discussed that I would ideally like to refer her to physical therapy, but given current COVID-19 restrictions we will not do that at this time.  Once COVID-19 restrictions have been lifted, we will likely refer to physical therapy for her low back pain.  2.  When COVID-19 restrictions have been lift, patient should return to clinic.  At that time we will repeat physical examination the help highlight specific etiology of pain.  May consider lumbar medial branch blocks versus sacroiliac joint steroid injection.  3.  Okay to discontinue gabapentin.  This medication is not helpful.  4.  Okay to continue over-the-counter NSAIDs as needed for pain.  We discussed potential GI adverse effects of these medications.  I advised patient to discontinue these medications if she experiences any GI upset or black/bloody stools.  Patient expressed understanding.

## 2020-04-30 DIAGNOSIS — E11.9 TYPE 2 DIABETES MELLITUS WITHOUT COMPLICATION, WITHOUT LONG-TERM CURRENT USE OF INSULIN: Chronic | ICD-10-CM

## 2020-04-30 RX ORDER — SEMAGLUTIDE 1.34 MG/ML
INJECTION, SOLUTION SUBCUTANEOUS
Qty: 1.5 SYRINGE | Refills: 2 | Status: SHIPPED | OUTPATIENT
Start: 2020-04-30 | End: 2020-08-23

## 2020-05-04 DIAGNOSIS — E11.9 TYPE 2 DIABETES MELLITUS WITHOUT COMPLICATION, WITHOUT LONG-TERM CURRENT USE OF INSULIN: ICD-10-CM

## 2020-05-04 RX ORDER — METFORMIN HYDROCHLORIDE 500 MG/1
TABLET, EXTENDED RELEASE ORAL
Qty: 180 TABLET | Refills: 1 | Status: SHIPPED | OUTPATIENT
Start: 2020-05-04 | End: 2020-07-09 | Stop reason: SDUPTHER

## 2020-05-15 ENCOUNTER — PATIENT OUTREACH (OUTPATIENT)
Dept: OTHER | Facility: OTHER | Age: 50
End: 2020-05-15

## 2020-06-16 ENCOUNTER — PATIENT MESSAGE (OUTPATIENT)
Dept: FAMILY MEDICINE | Facility: CLINIC | Age: 50
End: 2020-06-16

## 2020-06-16 DIAGNOSIS — E78.5 HYPERLIPIDEMIA, UNSPECIFIED HYPERLIPIDEMIA TYPE: ICD-10-CM

## 2020-06-16 RX ORDER — ATORVASTATIN CALCIUM 20 MG/1
20 TABLET, FILM COATED ORAL DAILY
Qty: 90 TABLET | Refills: 3 | Status: SHIPPED | OUTPATIENT
Start: 2020-06-16 | End: 2020-06-23 | Stop reason: SDUPTHER

## 2020-06-23 RX ORDER — ATORVASTATIN CALCIUM 20 MG/1
20 TABLET, FILM COATED ORAL DAILY
Qty: 90 TABLET | Refills: 3 | Status: SHIPPED | OUTPATIENT
Start: 2020-06-23 | End: 2021-07-06

## 2020-06-25 NOTE — PROGRESS NOTES
Digital Medicine: Clinician Introduction    Africa Jennings is a 50 y.o. female who is newly enrolled in the Digital Medicine Clinic.    The following information was reviewed and updated:  Preferred pharmacy   [facility]/pharmacy #[id number] - [place] - [address]  [address] [place] [id number]  Phone: [phone] Fax: [phone]      Patient prefers a 90 days supply.     Review of patient's allergies indicates:  No Known Allergies    Called patient to welcome into [facility]. Patient endorses adherence to medication regimen. Patient denies hypotensive s/sx (lightheadedness, dizziness, nausea, fatigue); patient denies hypertensive s/sx (SOB, CP, severe headaches, changes in vision). Instructed patient to seek medical care if BP > 180/110 and is accompanied by hypertensive s/sx associated, patient confirms understanding.     Pt was diagnosed with COVID earlier this year, and since then has been making lifestyle modifications. She exercises by walking 5 miles 3x/week, and has recently lost 15 lbs. She is also about to start water aerobics classes. She has improved nutrition, and for the past month just been doing only fish and vegetables. She does admit she needs to increase water intake. She uses [employer] seasoning and only oven bakes.    Reviewed BP monitoring technique, pt has been taking readings between 5:30-7am, but does not take meds until after 7am. I counseled pt to take readings at least an hour after taking meds, pt verbalized understanding. She will begin taking monitor to work, I encouraged her to only take readings when she is rested and relaxed. I also counseled on how to take a 2nd reading, pt verbally agreed to do so.     Patient denies having questions or concerns. Patient has my contact information and knows to call with any concerns or clinical changes.        The history is provided by the patient. No  was used.     HYPERTENSION  Our goal is to get BP to  consistently below 130/80mmHg and make the process convenient so patient can avoid extra trips to the office. Getting your blood pressure below 130/80mmHg (definition of control) will reduce your risk for heart attack, kidney failure, stroke and death (as well as kidney failure, eye disease, & dementia)      Reviewed non-pharmacologic therapies and impact on BP      Explained that we expect patient to obtain several blood pressures per week at random times of day.  Instructed patient not to allow anyone else to use phone and monitoring device.  Confirmed appropriate BP monitoring technique.      Explained to patient that the digital medicine team is not available for emergencies.  Patient will call [facility] on-call ([phone] or [phone]) or [phone] if needed.    Patient's BP goal is 130/80. Patients BP average is 142/96 mmHg, which is above goal, per 2017 ACC/AHA Hypertension Guidelines.          Assessment:  Reviewed recent readings. Per 2017 ACC/ AHA HTN guidelines (goal of BP < 130/80), current 30-day average needs to be addressed more thoroughly today. Although pt is making positive lifestyle changes, she was not utilizing proper monitoring technique. Both CCB and ARB therapy can be increased if appropriate.        Med Review complete.    Allergies reviewed.      Last 5 Patient Entered Readings                                      Current 30 Day Average: 142/96     Recent Readings 6/17/2020 6/8/2020 6/2/2020 5/30/2020 5/30/2020    SBP (mmHg) 144 140 149 136 138    DBP (mmHg) 98 87 92 103 98    Pulse 74 80 85 110 82            INTERVENTION(S)  reviewed appropriate dose schedule, reviewed monitoring technique, encouragement/support and goal setting    PLAN  patient verbalizes understanding, demonstrates understanding via teach back, patient amenable to changes and continue monitoring    Continue current medication regimen as this time, focus on improving technique and maintaining lifestyle changes. I will  continue to monitor regularly and will follow-up in 2 to 3 weeks, sooner if blood pressure begins to trend upward or downward. If BP remains elevated, can increase valsartan to 160 mg QD.           Topic    Hemoglobin A1C        Current Medication Regimen:  Hypertension Medications             amLODIPine (NORVASC) 5 MG tablet TAKE 1 TABLET BY MOUTH EVERY DAY    valsartan (DIOVAN) 80 MG tablet TAKE 1 TABLET BY MOUTH EVERY DAY            Reviewed the importance of self-monitoring, medication adherence, and that the health  can be used as a resource for lifestyle modifications to help reduce or maintain a healthy lifestyle.    Sent link to [facility] Digital [facility] webpages and my contact information via [employer] for future questions. Follow up scheduled.             Sleep Apnea Screening  Patient previously diagnosed with YSABEL         Medication Adherence Screening   She did not miss a dose this month.  Patient knows purpose of medications.      Patient identified the following reasons for non-compliance: None    Adherence tools used: Pill box

## 2020-07-06 ENCOUNTER — PATIENT OUTREACH (OUTPATIENT)
Dept: OTHER | Facility: OTHER | Age: 50
End: 2020-07-06

## 2020-07-06 ENCOUNTER — PATIENT OUTREACH (OUTPATIENT)
Dept: ADMINISTRATIVE | Facility: OTHER | Age: 50
End: 2020-07-06

## 2020-07-06 DIAGNOSIS — I10 ESSENTIAL HYPERTENSION: ICD-10-CM

## 2020-07-06 NOTE — PROGRESS NOTES
Requested updates within Care Everywhere.  Patient's chart was reviewed for overdue JONAH topics.  Immunizations reconciled.

## 2020-07-07 ENCOUNTER — OFFICE VISIT (OUTPATIENT)
Dept: PODIATRY | Facility: CLINIC | Age: 50
End: 2020-07-07
Payer: COMMERCIAL

## 2020-07-07 VITALS
HEIGHT: 61 IN | HEART RATE: 69 BPM | BODY MASS INDEX: 37.76 KG/M2 | DIASTOLIC BLOOD PRESSURE: 89 MMHG | WEIGHT: 200 LBS | SYSTOLIC BLOOD PRESSURE: 125 MMHG

## 2020-07-07 DIAGNOSIS — M72.2 PLANTAR FASCIITIS: ICD-10-CM

## 2020-07-07 DIAGNOSIS — M21.41 PES PLANUS OF BOTH FEET: ICD-10-CM

## 2020-07-07 DIAGNOSIS — M20.41 HAMMER TOES OF BOTH FEET: ICD-10-CM

## 2020-07-07 DIAGNOSIS — M20.5X2 HALLUX LIMITUS, ACQUIRED, LEFT: ICD-10-CM

## 2020-07-07 DIAGNOSIS — M20.5X1 HALLUX LIMITUS, ACQUIRED, RIGHT: ICD-10-CM

## 2020-07-07 DIAGNOSIS — M20.42 HAMMER TOES OF BOTH FEET: ICD-10-CM

## 2020-07-07 DIAGNOSIS — E11.9 COMPREHENSIVE DIABETIC FOOT EXAMINATION, TYPE 2 DM, ENCOUNTER FOR: Primary | ICD-10-CM

## 2020-07-07 DIAGNOSIS — M21.42 PES PLANUS OF BOTH FEET: ICD-10-CM

## 2020-07-07 DIAGNOSIS — M79.672 PAIN OF LEFT HEEL: ICD-10-CM

## 2020-07-07 PROCEDURE — 99203 PR OFFICE/OUTPT VISIT, NEW, LEVL III, 30-44 MIN: ICD-10-PCS | Mod: 25,S$GLB,, | Performed by: PODIATRIST

## 2020-07-07 PROCEDURE — 99999 PR PBB SHADOW E&M-EST. PATIENT-LVL V: ICD-10-PCS | Mod: PBBFAC,,, | Performed by: PODIATRIST

## 2020-07-07 PROCEDURE — 99999 PR PBB SHADOW E&M-EST. PATIENT-LVL V: CPT | Mod: PBBFAC,,, | Performed by: PODIATRIST

## 2020-07-07 PROCEDURE — 20550 NJX 1 TENDON SHEATH/LIGAMENT: CPT | Mod: LT,S$GLB,, | Performed by: PODIATRIST

## 2020-07-07 PROCEDURE — 3079F DIAST BP 80-89 MM HG: CPT | Mod: CPTII,S$GLB,, | Performed by: PODIATRIST

## 2020-07-07 PROCEDURE — 3079F PR MOST RECENT DIASTOLIC BLOOD PRESSURE 80-89 MM HG: ICD-10-PCS | Mod: CPTII,S$GLB,, | Performed by: PODIATRIST

## 2020-07-07 PROCEDURE — 20550 PR INJECT TENDON SHEATH/LIGAMENT: ICD-10-PCS | Mod: LT,S$GLB,, | Performed by: PODIATRIST

## 2020-07-07 PROCEDURE — 3008F PR BODY MASS INDEX (BMI) DOCUMENTED: ICD-10-PCS | Mod: CPTII,S$GLB,, | Performed by: PODIATRIST

## 2020-07-07 PROCEDURE — 3051F PR MOST RECENT HEMOGLOBIN A1C LEVEL 7.0 - < 8.0%: ICD-10-PCS | Mod: CPTII,S$GLB,, | Performed by: PODIATRIST

## 2020-07-07 PROCEDURE — 99203 OFFICE O/P NEW LOW 30 MIN: CPT | Mod: 25,S$GLB,, | Performed by: PODIATRIST

## 2020-07-07 PROCEDURE — 3051F HG A1C>EQUAL 7.0%<8.0%: CPT | Mod: CPTII,S$GLB,, | Performed by: PODIATRIST

## 2020-07-07 PROCEDURE — 3074F SYST BP LT 130 MM HG: CPT | Mod: CPTII,S$GLB,, | Performed by: PODIATRIST

## 2020-07-07 PROCEDURE — 3074F PR MOST RECENT SYSTOLIC BLOOD PRESSURE < 130 MM HG: ICD-10-PCS | Mod: CPTII,S$GLB,, | Performed by: PODIATRIST

## 2020-07-07 PROCEDURE — 3008F BODY MASS INDEX DOCD: CPT | Mod: CPTII,S$GLB,, | Performed by: PODIATRIST

## 2020-07-07 RX ORDER — TRIAMCINOLONE ACETONIDE 40 MG/ML
20 INJECTION, SUSPENSION INTRA-ARTICULAR; INTRAMUSCULAR ONCE
Status: COMPLETED | OUTPATIENT
Start: 2020-07-07 | End: 2020-07-07

## 2020-07-07 RX ORDER — DEXAMETHASONE SODIUM PHOSPHATE 4 MG/ML
2 INJECTION, SOLUTION INTRA-ARTICULAR; INTRALESIONAL; INTRAMUSCULAR; INTRAVENOUS; SOFT TISSUE ONCE
Status: COMPLETED | OUTPATIENT
Start: 2020-07-07 | End: 2020-07-07

## 2020-07-07 RX ORDER — LIDOCAINE HYDROCHLORIDE 20 MG/ML
1 INJECTION, SOLUTION EPIDURAL; INFILTRATION; INTRACAUDAL; PERINEURAL ONCE
Status: COMPLETED | OUTPATIENT
Start: 2020-07-07 | End: 2020-07-07

## 2020-07-07 RX ORDER — MELOXICAM 15 MG/1
15 TABLET ORAL DAILY
Qty: 30 TABLET | Refills: 1 | Status: SHIPPED | OUTPATIENT
Start: 2020-07-07 | End: 2020-08-30

## 2020-07-07 RX ORDER — BUPIVACAINE HYDROCHLORIDE 5 MG/ML
1 INJECTION, SOLUTION EPIDURAL; INTRACAUDAL ONCE
Status: COMPLETED | OUTPATIENT
Start: 2020-07-07 | End: 2020-07-07

## 2020-07-07 RX ADMIN — TRIAMCINOLONE ACETONIDE 20 MG: 40 INJECTION, SUSPENSION INTRA-ARTICULAR; INTRAMUSCULAR at 07:07

## 2020-07-07 RX ADMIN — DEXAMETHASONE SODIUM PHOSPHATE 2 MG: 4 INJECTION, SOLUTION INTRA-ARTICULAR; INTRALESIONAL; INTRAMUSCULAR; INTRAVENOUS; SOFT TISSUE at 07:07

## 2020-07-07 RX ADMIN — BUPIVACAINE HYDROCHLORIDE 5 MG: 5 INJECTION, SOLUTION EPIDURAL; INTRACAUDAL at 07:07

## 2020-07-07 RX ADMIN — LIDOCAINE HYDROCHLORIDE 20 MG: 20 INJECTION, SOLUTION EPIDURAL; INFILTRATION; INTRACAUDAL; PERINEURAL at 07:07

## 2020-07-07 NOTE — PROGRESS NOTES
Subjective:      Patient ID: Africa Jennings is a 50 y.o. female.    Chief Complaint: Foot Problem (ov 3/24/20 [facility] pcp), Heel Pain (left heel), and Diabetes Mellitus (initial visit)    Africa is a 50 y.o. female who presents to the clinic for evaluation and treatment of high risk feet. Africa has a past medical history of Diabetes mellitus, Diabetes mellitus, type 2, Eczema, GERD (gastroesophageal reflux disease), Hypertension, Impaired fasting blood sugar, and YSABEL on CPAP. The patient's chief complaint is left plantar heel pain , especially with the first step in the morning. The pain is described as Aching and Throbbing. The onset of the pain was gradual and has worsened over the past several months. Africa Jennings rates the pain as almost unbearable. she denies a history of trauma. she relates pain began with increased physical activity/exercise regimen Prior treatments include some stretching and icing..    Shoe gear: Slip-on shoes  Hours on Feet: 8+  Exercise: moderately active    PCP: Brandi Thompson MD    Date Last Seen by PCP:   Chief Complaint   Patient presents with    Foot Problem     ov 3/24/20 [facility] pcp    Heel Pain     left heel    Diabetes Mellitus     initial visit       Hemoglobin A1C   Date Value Ref Range Status   02/10/2020 7.5 (H) 4.0 - 5.6 % Final     Comment:     ADA Screening Guidelines:  5.7-6.4%  Consistent with prediabetes  >or=6.5%  Consistent with diabetes  High levels of fetal hemoglobin interfere with the HbA1C  assay. Heterozygous hemoglobin variants (HbS, HgC, etc)do  not significantly interfere with this assay.   However, presence of multiple variants may affect accuracy.     11/11/2019 8.5 (H) 4.0 - 5.6 % Final     Comment:     ADA Screening Guidelines:  5.7-6.4%  Consistent with prediabetes  >or=6.5%  Consistent with diabetes  High levels of fetal hemoglobin interfere with the HbA1C  assay. Heterozygous hemoglobin variants (HbS, HgC, etc)do  not  significantly interfere with this assay.   However, presence of multiple variants may affect accuracy.     01/12/2019 7.6 (H) 4.0 - 5.6 % Final     Comment:     ADA Screening Guidelines:  5.7-6.4%  Consistent with prediabetes  >or=6.5%  Consistent with diabetes  High levels of fetal hemoglobin interfere with the HbA1C  assay. Heterozygous hemoglobin variants (HbS, HgC, etc)do  not significantly interfere with this assay.   However, presence of multiple variants may affect accuracy.           Patient Active Problem List   Diagnosis    Essential hypertension    Type 2 diabetes mellitus without complication, without long-term current use of insulin    Obstructive sleep apnea (adult) (pediatric)    Morbid obesity with BMI of 40.0-44.9, adult    Gastroesophageal reflux disease without esophagitis    Pyloric stenosis    Encounter for screening colonoscopy    Lumbar radiculopathy    Lumbar spondylosis    DDD (degenerative disc disease), lumbar    Pneumonia of both lungs due to infectious organism    Acute respiratory failure with hypoxemia       Current Outpatient Medications on File Prior to Visit   Medication Sig Dispense Refill    albuterol (PROVENTIL/VENTOLIN HFA) 90 mcg/actuation inhaler Inhale 2 puffs into the lungs every 4 (four) hours as needed for Wheezing or Shortness of Breath. Rescue 6.7 g 0    amLODIPine (NORVASC) 5 MG tablet TAKE 1 TABLET BY MOUTH EVERY DAY 90 tablet 2    atorvastatin (LIPITOR) 20 MG tablet Take 1 tablet (20 mg total) by mouth once daily. 90 tablet 3    blood sugar diagnostic Strp 1 strip by Misc.(Non-Drug; Combo Route) route 2 (two) times daily with meals. 100 strip 11    blood sugar diagnostic Strp 1 strip by Misc.(Non-Drug; Combo Route) route 2 (two) times daily with meals. 100 strip 11    blood-glucose meter kit Use as instructed 1 each 0    cetirizine (ZYRTEC) 10 MG tablet Take 1 tablet (10 mg total) by mouth once daily. 90 tablet 1    ergocalciferol (ERGOCALCIFEROL)  "50,000 unit Cap TAKE 1 CAPSULE (50,000 UNITS TOTAL) BY MOUTH TWICE A WEEK. 24 capsule 2    fluticasone propionate (FLONASE) 50 mcg/actuation nasal spray INSTILL 1 SPRAY (50 MCG TOTAL) IN EACH NOSTRIL ONCE DAILY. 16 mL 2    gabapentin (NEURONTIN) 300 MG capsule Take 2 capsules (600 mg total) by mouth 3 (three) times daily. 180 capsule 11    lancets Misc Use to test blood sugar once daily. 300 each 0    lancing device Misc 1 Device by Misc.(Non-Drug; Combo Route) route 2 (two) times daily with meals. 1 each 0    lancing device Misc 1 Device by Misc.(Non-Drug; Combo Route) route 2 (two) times daily with meals. 100 each 11    metFORMIN (GLUCOPHAGE-XR) 500 MG XR 24hr tablet TAKE 1 TABLET BY MOUTH TWICE A DAY WITH MEALS 180 tablet 1    neomycin-polymyxin-dexamethasone (MAXITROL) 3.5mg/mL-10,000 unit/mL-0.1 % DrpS       nystatin (MYCOSTATIN) ointment Apply topically 2 (two) times daily.        omeprazole (PRILOSEC) 40 MG capsule Take 1 capsule (40 mg total) by mouth once daily. 90 capsule 1    ondansetron (ZOFRAN) 4 MG tablet Take 1 tablet (4 mg total) by mouth every 8 (eight) hours as needed. 90 tablet 1    OZEMPIC 0.25 mg or 0.5 mg(2 mg/1.5 mL) PnIj INJECT 0.25 MG INTO THE SKIN EVERY 7 DAYS. X 2 WEEKS, THEN INCREASE TO 0.5MG ONCE WEEKLY 1.5 Syringe 2    pen needle, diabetic (PEN NEEDLE) 32 gauge x 5/32" Ndle 1 each by Misc.(Non-Drug; Combo Route) route once a week. 50 each 1    tobramycin-dexamethasone 0.3-0.1% (TOBRADEX) 0.3-0.1 % DrpS INSTILL 1 DROP INTO BOTH EYES 4 TIMES A DAY  2    triamcinolone acetonide 0.1% (KENALOG) 0.1 % cream Apply topically 2 (two) times daily. Apply topically 2 (two) times daily. 135 g 4    valsartan (DIOVAN) 80 MG tablet TAKE 1 TABLET BY MOUTH EVERY DAY 90 tablet 1     No current facility-administered medications on file prior to visit.        Review of patient's allergies indicates:  No Known Allergies    Past Surgical History:   Procedure Laterality Date    breast reduction "      CHOLECYSTECTOMY      laprascopic    ESOPHAGOGASTRODUODENOSCOPY  8/18/14    HYSTERECTOMY  2007    laprascopic, total for fibroids.  Dr Polo    OOPHORECTOMY      TOTAL REDUCTION MAMMOPLASTY         Family History   Problem Relation Age of Onset    Diabetes Maternal Grandmother     Heart disease Maternal Grandmother     Hypertension Maternal Grandmother     Crohn's disease Sister     Diabetes Mother     Hypertension Mother     Heart disease Mother     Cancer Father 58        Prostate    Breast cancer Sister     Amblyopia Neg Hx     Blindness Neg Hx     Cataracts Neg Hx     Glaucoma Neg Hx     Macular degeneration Neg Hx     Retinal detachment Neg Hx     Strabismus Neg Hx        Social History     Socioeconomic History    Marital status:      Spouse name: Not on file    Number of children: Not on file    Years of education: Not on file    Highest education level: Not on file   Occupational History    Not on file   Social Needs    Financial resource strain: Hard    Food insecurity     Worry: Often true     Inability: Often true    Transportation needs     Medical: No     Non-medical: No   Tobacco Use    Smoking status: Never Smoker    Smokeless tobacco: Never Used   Substance and Sexual Activity    Alcohol use: No     Frequency: Monthly or less     Drinks per session: Patient refused     Binge frequency: Never     Comment: socially    Drug use: No    Sexual activity: Yes     Partners: Male     Birth control/protection: Surgical   Lifestyle    Physical activity     Days per week: 3 days     Minutes per session: 60 min    Stress: To some extent   Relationships    Social connections     Talks on phone: More than three times a week     Gets together: Once a week     Attends Mandaen service: Not on file     Active member of club or organization: Yes     Attends meetings of clubs or organizations: More than 4 times per year     Relationship status:    Other Topics  "Concern    Not on file   Social History Narrative     since 2000.  2 children: 26 and 21.    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   Cardiovascular: Positive for leg swelling. Negative for claudication.   Respiratory: Negative for cough and shortness of breath.    Skin: Positive for dry skin, itching and nail changes. Negative for rash.   Musculoskeletal: Positive for arthritis, back pain, joint pain and myalgias. Negative for falls, joint swelling and muscle weakness.   Gastrointestinal: Negative for diarrhea, nausea and vomiting.   Neurological: Negative for numbness, paresthesias, tremors and weakness.   Psychiatric/Behavioral: Negative for altered mental status and hallucinations.           Objective:       Vitals:    07/07/20 0715   BP: 125/89   Pulse: 69   Weight: 90.7 kg (200 lb)   Height: 5' 1" (1.549 m)   PainSc:   6       Physical Exam  Nursing note reviewed.   Constitutional:       General: She is not in acute distress.     Appearance: She is not toxic-appearing or diaphoretic.   Cardiovascular:      Pulses:           Dorsalis pedis pulses are 2+ on the right side and 2+ on the left side.        Posterior tibial pulses are 2+ on the right side and 2+ on the left side.      Comments: dorsalis pedis and posterior tibial pulses are palpable bilaterally. Capillary refill time is within normal limits.  Pulmonary:      Effort: No respiratory distress.   Musculoskeletal:      Right ankle: She exhibits normal range of motion and no swelling. No tenderness. No lateral malleolus, no medial malleolus, no AITFL, no CF ligament and no posterior TFL tenderness found. Achilles tendon exhibits no pain, no defect and normal Sierra's test results.      Left ankle: She exhibits normal range of motion and no swelling. No tenderness. No lateral malleolus, no medial malleolus, no AITFL, no CF ligament and no posterior " TFL tenderness found. Achilles tendon exhibits no pain, no defect and normal Sierra's test results.      Right foot: No tenderness or bony tenderness.      Left foot: Tenderness present. No bony tenderness.      Comments: Biomechanical exam: Pain on palpation left medial calcaneal tubercle at origin of plantar fascia. There is equinus deformity bilateral with decreased dorsiflexion at the ankle joint bilateral. No tenderness with compression of heel. Negative tinels sign    Decreased stride, station of gait.  apropulsive toe off.  Increased angle and base of gait.    Patient has hammertoes of digits 2-5 bilateral partially reducible without symptom today.    Visible and palpable bunion without pain at dorsomedial 1st metatarsal head right and left.  Hallux abducted right and left partially reducible, tracks laterally without being track bound.  No ecchymosis, erythema, edema, or cardinal signs infection or signs of trauma same foot.     Skin:     General: Skin is warm and dry.      Coloration: Skin is not pale.      Findings: No bruising, burn, laceration or lesion.      Nails: There is no clubbing.        Comments: Scaling dryness in a moccasin distribution is noted to the bilateral lower extremities with associated erythema.    Toenails 1, 4, 5 bilaterally are thickened by 2-3 mm, discolored/yellowed, dystrophic, brittle with subungual debris. Tender to distal nail plate pressure, without periungual skin abnormality of each.           Neurological:      Sensory: No sensory deficit.      Motor: No tremor, atrophy or abnormal muscle tone.      Deep Tendon Reflexes: Reflexes are normal and symmetric.      Comments: Zenia-Lindsay 5.07 monofilament is intact bilateral feet. Sharp/dull sensation is also intact Bilateral feet.     Psychiatric:         Attention and Perception: She is attentive.         Mood and Affect: Mood is not anxious. Affect is not inappropriate.         Speech: She is communicative. Speech is  not slurred.         Behavior: Behavior is not combative.               Assessment:       Encounter Diagnoses   Name Primary?    Comprehensive diabetic foot examination, type 2 DM, encounter for Yes    Pain of left heel     Plantar fasciitis     Pes planus of both feet     Hammer toes of both feet     Hallux limitus, acquired, left     Hallux limitus, acquired, right          Plan:       Africa was seen today for foot problem, heel pain and diabetes mellitus.    Diagnoses and all orders for this visit:    Comprehensive diabetic foot examination, type 2 DM, encounter for    Pain of left heel    Plantar fasciitis    Pes planus of both feet    Hammer toes of both feet    Hallux limitus, acquired, left    Hallux limitus, acquired, right    Other orders  -     bupivacaine (PF) 0.5% (5 mg/mL) injection 5 mg  -     dexamethasone injection 2 mg  -     lidocaine (PF) 20 mg/ml (2%) injection 20 mg  -     triamcinolone acetonide injection 20 mg  -     meloxicam (MOBIC) 15 MG tablet; Take 1 tablet (15 mg total) by mouth once daily. 1 pill per day everyday for the next 3 weeks with food      I counseled the patient on her conditions, their implications and medical management.    Greater than 50% of this visit spent on counseling and coordination of care.      Discussed different treatment options for heel pain. I gave written and verbal instructions on stretching exercises. Patient expressed understanding. Discussed icing the affected area as needed and also wearing appropriate shoe gear and avoiding flats, slippers, sandals, and going barefoot. My recommendation for OTC supports is [employer]. Patient instructed on adequate icing techniques. Patient should ice the affected area at least once per day x 10 minutes for 10 days . I advised the patient that extra icing would also be beneficial to ensure adequate anti inflammatory effect. We also discussed cortisone injections and NSAID therapy. Mobic prescribed.  Patient was instructed on dosing information. Discontinue if adverse effects occur     Patient would like injection today. Skin was prepped with alcohol and anesthetized with ethyl chloride.  The following mixture was injected into left heel, plantar medial approach: 3ccs of mixture of (1cc 1% plain Lidocaine : 1cc 0.5% Marcaine plain:  0.5cc kenalog-[id number] : 0.5cc dexamethasone) directly into problematic areas.  Patient  tolerated the injection well. Related nearly 100% relief following injection.     Shoe inspection. Diabetic Foot Education. Patient reminded of the importance of good nutrition/healthy diet/weight management and blood sugar control to help prevent podiatric complications of diabetes. Patient instructed on proper foot hygeine. Wear comfortable, proper fitting shoes. Wash feet daily. Dry well. After drying, apply moisturizer to feet (no lotion to webspaces). Inspect feet daily for skin breaks, blisters, swelling, or redness. Wear cotton socks (preferably white)  Change socks every day. Do NOT walk barefoot. Do NOT use heating pads or hot water soaks. We discussed wearing proper shoe gear, daily foot inspections, never walking without protective shoe gear.     Discussed edema control and the importance of daily moisturizer to the feet such as Gold bonds diabetic foot cream    Recommend applying vicks vaporub to thick abnormal toenails daily x 6 months to treat fungal nail infection.      RTC in 9-12 weeks if no improvement, at this time she will receive cortisone injections and referral to PT. Patient is amenable to plan.

## 2020-07-07 NOTE — PATIENT INSTRUCTIONS
Recommend lotions: eucerin, eucerin for diabetics, aquaphor, A&D ointment, gold bond for diabetics, sween, Keymar's Bees all purpose baby ointment,  urea 40 with aloe (found on amazon.com)    Shoe recommendations: (try 6pm.com, zappos.WellnessFX , nordstromrack.WellnessFX, or shoes.WellnessFX for discounted prices) you can visit [employer] shoes in [place]  or [employer] in the [facility] (there are also several shoe brand outlets in the [facility])    Asics (GT 2000 or gel foundations), new balance stability type shoes, saucony (stabil c3),  Foley (GTS or Beast or transcend), propet (tennis shoe)    Sofft Jos (women) Geraldine&Geoff (men), clarks, crocs, aerosoles, naturalizers, SAS, ecco, born, elisabet clements, rockports (dress shoes)    Vionic, burkenstocks, fitflops, propet (sandals)  Nike comfort thong sandals, crocs, propet (house shoes)    Nail Home remedy:  Vicks Vapor rub to nails for easier manageability    Occasional soaks for 15-20 mins in luke warm water with 1/2 cup of listerine and 1 cup of apple cider vinegar are ok You may add several drops of oil of oregano or tea tree oil as well    Understanding Plantar Fasciitis    Plantar fasciitis is a condition that causes foot and heel pain. The plantar fascia is a tough band of tissue that runs across the bottom of the foot from the heel to the toes. This tissue pulls on the heel bone. It supports the arch of the foot as it pushes off the ground. If the tissue becomes irritated or red and swollen (inflamed), it is called plantar fasciitis.  How to say it  PLAN-tuhr fa-see-IY-tis   What causes plantar fasciitis?  Plantar fasciitis most often occurs from overusing the plantar fascia. The tissue may become damaged from activities that put repeated stress on the heel and foot. Or it may wear down over time with age and ankle stiffness. You are more likely to have plantar fasciitis if you:  · Do activities that require a lot of running, jumping, or dancing  · Have a job that requires being on  your feet for long periods  · Are overweight or obese  · Have certain foot problems, such as a tight Achilles tendon, flat feet, or high arches  · Often wear poorly fitting shoes  Symptoms of plantar fasciitis  The condition most often causes pain in the heel and the bottom of the foot. The pain may occur when you take your first steps in the morning. It may get better as you walk throughout the day. But as you continue to put weight on the foot, the pain often returns. Pain may also occur after standing or sitting for long periods.  Treating plantar fasciitis  Treatments for plantar fasciitis include:  · Resting the foot. This involves limiting movements that make your foot hurt. You may also need to avoid certain sports and types of work for a time.  · Using cold packs. Put an ice pack on the heel and foot to help reduce pain and swelling.  · Taking pain medicines. Prescription and over-the-counter pain medicines can help relieve pain and swelling.  · Using heel cups or foot inserts (orthotics). These are placed in the shoes to help support the heel or arch and cushion the heel. You may also be told to buy proper-fitting shoes with good arch support and cushioned soles.  · Taping the foot. This supports the arch and limits the movement of the plantar fascia to help relieve symptoms.  · Wearing a night splint. This stretches the plantar fascia and leg muscles while you sleep. This may help relieve pain.  · Doing exercises and physical therapy. These stretch and strengthen the plantar fascia and the muscles in the leg that support the heel and foot.  · Getting shots of medicine into the foot. These may help relieve symptoms for a time.  · Having surgery. This may be needed if other treatments fail to relieve symptoms. During surgery, the surgeon may partially cut the plantar fascia to release tension.  Possible complications of plantar fasciitis  Without proper care and treatment, healing may take longer than  normal. Also, symptoms may continue or get worse. Over time, the plantar fascia may be damaged. This can make it hard to walk or even stand without pain.  When to call your healthcare provider  Call your healthcare provider right away if you have any of these:  · Fever of 100.4°F (38°C) or higher, or as directed  · Symptoms that dont get better with treatment, or get worse  · New symptoms, such as numbness, tingling, or weakness in the foot   © 5133-9459 [employer]. [address] [place] [address]. All rights reserved. This information is not intended as a substitute for professional medical care. Always follow your healthcare professional's instructions.        Treating Plantar Fasciitis  First, your healthcare provider tries to determine the cause of your problem in order to suggest ways to relieve pain. If your pain is due to poor foot mechanics, custom-made shoe inserts (orthoses) may help.    Reduce symptoms  · To relieve mild symptoms, try aspirin, ibuprofen, or other medicines as directed. Rubbing ice on the affected area may also help.  · To reduce severe pain and swelling, your healthcare provider may prescribe pills or injections or a walking cast in some instances. Physical therapy, such as ultrasound or a daily stretching program, may also be recommended. Surgery is rarely required.  · To reduce symptoms caused by poor foot mechanics, your foot may be taped. This supports the arch and temporarily controls movement. Night splints may also help by stretching the fascia.  Control movement  If taping helps, your healthcare provider may prescribe orthoses. Built from plaster casts of your feet, these inserts control the way your foot moves. As a result, your symptoms should go away.  Reduce overuse  Every time your foot strikes the ground, the plantar fascia is stretched. You can reduce the strain on the plantar fascia and the possibility of overuse by following these  suggestions:  · Lose any excess weight.  · Avoid running on hard or uneven ground.  · Use orthoses at all times in your shoes and house slippers.  If surgery is needed  Your healthcare provider may consider surgery if other types of treatment don't control your pain. During surgery, the plantar fascia is partially cut to release tension. As you heal, fibrous tissue fills the space between the heel bone and the plantar fascia.   © 5374-1522 [employer]. [address] [place] [address], [place] [address]. All rights reserved. This information is not intended as a substitute for professional medical care. Always follow your healthcare professional's instructions.        Wearing Proper Shoes                    You walk on your feet every day, forcing them to support the weight of your body. Repeated stress on your feet can cause damage over time. The right shoes can help protect your feet. The wrong shoes can cause more foot problems. Read the information below to help you find a shoe that fits your foot needs.     A good shoe fit will cover your foot outline.       A shoe that doesnt cover the outline is a bad fit.      Whats Your Foot Shape?  To get a good fit, you need to know the shape of your foot. Do this simple test: While standing, place your foot on a piece of paper and trace around it. Is your foot straight or curved? Do you have a foot problem, such as a bunion, that causes your foot outline to show a bulge on the side of your big toe?  Finding Your Fit  Bring your foot outline to the [employer] store to help you find the right shoe. Place a shoe you like on top of the outline to see if it matches the shape. The shoe should cover the outline. (If you have a bunion, the shoe may not cover the bulge on the outline. Look for soft leather shoes to stretch over the bunion.) Once youve found a pair of proper shoes, put them on. Walk around. Be sure the shoes dont rub or pinch. If the shoes feel good, youve  found your fit!  The Right Shoe for You  A good shoe has features that provide comfort and support. It must also be the right size and shape for your feet. Look for a shoe made of breathable fabric and lining, such as leather or canvas. Be sure that shoes have enough tread to prevent slipping. Go to a good shoe store for help finding the right shoe.  Good Shoe Features  An ideal shoe has the following:  · Laces for support. If tying laces is a problem for you, try shoes with Velcro fasteners or imelda.  · A front of the shoe (toe box) with ½ inch space in front of your longest toes.  · An arch shape that supports your foot.  · No more than 1½ inches of heel.  · A stiff, snug back of the shoe to keep your foot from sliding around.  · A smooth lining with no rough seams.  Shoe Shopping Tips  Below are some dos and donts for when you go to the shoe store.  Do:  · Select the shoes that feel right. Wear them around the house. Then bring them to your foot doctor to check for fit. If they dont fit well, return them.  · Shop late in the day, when your feet will be slightly bigger.  · Each time you buy shoes, have both your feet measured while you are standing. Foot size changes with time.  · Pick shoes to suit their purpose. High heels are okay for an occasional night on the town. But for everyday wear, choose a more sensible shoe.  · Try on shoes while wearing any inserts specially made for your feet (orthoses).  · Try on both the right and left shoes. If your feet are different sizes, pick a pair that fits the larger foot.  Dont:  · Dont buy shoes based on shoe size alone. Always try on shoes, as sizes differ from brand to brand and within brands.  · Dont expect shoes to break in. If they dont fit at the store, dont buy them.  · Dont buy a shoe that doesnt match your foot shape.  What About Socks?  Always wear socks with shoes. Socks help absorb sweat and reduce friction and blistering. When shopping for  shoes, choose soft, padded socks with seams that dont irritate your feet.  If You Have Foot Problems  Some foot problems cause deformities. This can make it hard to find a good fit. Look for shoes made of soft leather to stretch over the deformity. If you have bunions, buy shoes with a wider toe box. To fit hammertoes, look for shoes with a tall toe box. If you have arch problems, you may need inserts. In some cases, youll need to have custom footwear or orthoses made for your feet.  Suggested Footwear  Ask your healthcare provider what kind of footwear you need. He or she may recommend a certain brand or shoe store.  © 4776-9204 [employer]. [address] [place] [address]. All rights reserved. This information is not intended as a substitute for professional medical care. Always follow your healthcare professional's instructions.        Toe Extension (Flexibility)    These instructions are for your right foot. Switch sides for your left foot.  1. Sit in a chair. Rest your right ankle on your left knee.  2. Hold your toes with your right hand. Gently bend the toes backward. Feel a stretch in the undersides of the toes and ball of the foot. Hold for 30 to 60 seconds.  3. Then gently bend the toes in the other direction. Gently press on them until your foot is pointed. Hold for 30 to 60 seconds.  4. Repeat 5 times, or as instructed.  © 2880-7207 [employer]. [address] [place] [address]. All rights reserved. This information is not intended as a substitute for professional medical care. Always follow your healthcare professional's instructions.

## 2020-07-29 ENCOUNTER — PATIENT MESSAGE (OUTPATIENT)
Dept: FAMILY MEDICINE | Facility: CLINIC | Age: 50
End: 2020-07-29

## 2020-07-29 DIAGNOSIS — Z12.31 ENCOUNTER FOR SCREENING MAMMOGRAM FOR BREAST CANCER: Primary | ICD-10-CM

## 2020-07-31 ENCOUNTER — PATIENT MESSAGE (OUTPATIENT)
Dept: FAMILY MEDICINE | Facility: CLINIC | Age: 50
End: 2020-07-31

## 2020-08-27 RX ORDER — AMLODIPINE BESYLATE 10 MG/1
10 TABLET ORAL DAILY
Qty: 90 TABLET | Refills: 1 | Status: SHIPPED | OUTPATIENT
Start: 2020-08-27 | End: 2021-05-06 | Stop reason: SDUPTHER

## 2020-08-27 NOTE — PROGRESS NOTES
Digital Medicine: Clinician Follow-Up    Called patient to f/u. She is doing well, but is not able to walk as much d/t recent surgery to resolve plantar fasciitis. She continues to maintain good nutrition and eats mainly fish and vegetables. She does not use a lot of seasoning, if any she will use garlic. She continues to drink a lot of water throughout the day.     The history is provided by the patient.      Review of patient's allergies indicates:  No Known Allergies  Follow-up reason(s): routine follow up.     Hypertension    Readings are trending down       Last 5 Patient Entered Readings                                      Current 30 Day Average: 144/93     Recent Readings 8/19/2020 8/2/2020 7/28/2020 7/24/2020 7/23/2020    SBP (mmHg) 138 137 157 148 131    DBP (mmHg) 94 89 97 97 85    Pulse 92 86 74 72 80               Depression Screening  Did not address depression screening.    Sleep Apnea Screening    Did not address sleep apnea screening.     Medication Affordability Screening  Did not address medication affordability screening.     Medication Adherence-Medication adherence was assessed.          ASSESSMENT(S)  Patients BP average is 144/93 mmHg, which is above goal. Patient's BP goal is less than or equal to 130/80 per 2017 ACC/AHA Hypertension Guidelines.     BP still elevated, either ARB or CCB can be elevated at this time.       Hypertension Plan  Medication change. Increase amlodipine to 10 mg.   Continue current therapy. Valsartan as prescribed.   Continue current diet/physical activity routine.  Provided patient education. BP monitoring technique  I sent new amlodipine rx to preferred pharmacy, patient to use home supply at this time. F/u in 2 weeks.     Addressed any questions or concerns and patient has my contact information if needed prior to next outreach. Patient verbalizes understanding.                Topic    Hemoglobin A1C          Hypertension Medications             amLODIPine  (NORVASC) 5 MG tablet TAKE 1 TABLET BY MOUTH EVERY DAY    valsartan (DIOVAN) 80 MG tablet TAKE 1 TABLET BY MOUTH EVERY DAY

## 2020-08-30 ENCOUNTER — HOSPITAL ENCOUNTER (OUTPATIENT)
Dept: RADIOLOGY | Facility: HOSPITAL | Age: 50
Discharge: HOME | End: 2020-08-30
Attending: FAMILY MEDICINE
Payer: COMMERCIAL

## 2020-08-30 DIAGNOSIS — Z12.31 ENCOUNTER FOR SCREENING MAMMOGRAM FOR BREAST CANCER: ICD-10-CM

## 2020-08-30 PROCEDURE — 77067 SCR MAMMO BI INCL CAD: CPT | Mod: TC

## 2020-08-30 PROCEDURE — 77067 SCR MAMMO BI INCL CAD: CPT | Mod: 26,,, | Performed by: RADIOLOGY

## 2020-08-30 PROCEDURE — 77063 MAMMO DIGITAL SCREENING BILAT WITH TOMOSYNTHESIS_CAD: ICD-10-PCS | Mod: 26,,, | Performed by: RADIOLOGY

## 2020-08-30 PROCEDURE — 77063 BREAST TOMOSYNTHESIS BI: CPT | Mod: 26,,, | Performed by: RADIOLOGY

## 2020-08-30 PROCEDURE — 77067 MAMMO DIGITAL SCREENING BILAT WITH TOMOSYNTHESIS_CAD: ICD-10-PCS | Mod: 26,,, | Performed by: RADIOLOGY

## 2020-09-08 ENCOUNTER — PATIENT OUTREACH (OUTPATIENT)
Dept: OTHER | Facility: OTHER | Age: 50
End: 2020-09-08

## 2020-09-08 NOTE — PROGRESS NOTES
Digital Medicine: Clinician Follow-Up    The history is provided by the patient.   Follow-up reason(s): medication change follow-up.   Care Team received high BP alert.  Patient stated that reading on 9/4 of 192/102 was not her.       Hypertension    Patient readings are stable Patient is not experiencing signs/symptoms of hypertension.      Patient did make medication change.    Is patient tolerating med change? yes      Additional Follow-up details: I educated patient to not let others use her BP digital cuff. She did start taking 2 tabs of amlodipine 5mg, and will be picking up her new prescription soon. Denies ADRs.       Last 5 Patient Entered Readings                                      Current 30 Day Average: 155/92     Recent Readings 9/4/2020 9/4/2020 9/4/2020 8/31/2020 8/30/2020    SBP (mmHg) 192 162 134 152 150    DBP (mmHg) 102 77 93 90 93    Pulse 89 93 92 71 72             Screenings    ASSESSMENT(S)  Patients BP average is 155/92 mmHg, which is above goal. Patient's BP goal is less than or equal to 130/80 per 2017 ACC/AHA Hypertension Guidelines.       Hypertension Plan  Continue current therapy. Continue amlodipine and valsartan as prescribed  Continue current diet/physical activity routine.  Provided patient education.  I will delete false reading. F/u in 1-2 weeks.      Addressed any questions or concerns and patient has my contact information if needed prior to next outreach. Patient verbalizes understanding.                Topic    Hemoglobin A1C          Hypertension Medications             amLODIPine (NORVASC) 10 MG tablet Take 1 tablet (10 mg total) by mouth once daily.    valsartan (DIOVAN) 80 MG tablet TAKE 1 TABLET BY MOUTH EVERY DAY

## 2020-09-11 DIAGNOSIS — L30.9 ECZEMA, UNSPECIFIED TYPE: ICD-10-CM

## 2020-09-11 RX ORDER — TRIAMCINOLONE ACETONIDE 1 MG/G
CREAM TOPICAL 2 TIMES DAILY
Qty: 135 G | Refills: 4 | Status: SHIPPED | OUTPATIENT
Start: 2020-09-11

## 2020-09-28 ENCOUNTER — PATIENT OUTREACH (OUTPATIENT)
Dept: ADMINISTRATIVE | Facility: OTHER | Age: 50
End: 2020-09-28

## 2020-09-28 ENCOUNTER — PATIENT OUTREACH (OUTPATIENT)
Dept: OTHER | Facility: OTHER | Age: 50
End: 2020-09-28

## 2020-09-29 ENCOUNTER — OFFICE VISIT (OUTPATIENT)
Dept: PODIATRY | Facility: CLINIC | Age: 50
End: 2020-09-29
Payer: COMMERCIAL

## 2020-09-29 VITALS
WEIGHT: 200 LBS | HEART RATE: 85 BPM | BODY MASS INDEX: 37.76 KG/M2 | SYSTOLIC BLOOD PRESSURE: 128 MMHG | DIASTOLIC BLOOD PRESSURE: 87 MMHG | HEIGHT: 61 IN

## 2020-09-29 DIAGNOSIS — M20.5X2 HALLUX LIMITUS, ACQUIRED, LEFT: ICD-10-CM

## 2020-09-29 DIAGNOSIS — M21.42 PES PLANUS OF BOTH FEET: ICD-10-CM

## 2020-09-29 DIAGNOSIS — M72.2 PLANTAR FASCIITIS: ICD-10-CM

## 2020-09-29 DIAGNOSIS — M21.41 PES PLANUS OF BOTH FEET: ICD-10-CM

## 2020-09-29 DIAGNOSIS — M20.41 HAMMER TOES OF BOTH FEET: ICD-10-CM

## 2020-09-29 DIAGNOSIS — M20.5X1 HALLUX LIMITUS, ACQUIRED, RIGHT: ICD-10-CM

## 2020-09-29 DIAGNOSIS — M20.42 HAMMER TOES OF BOTH FEET: ICD-10-CM

## 2020-09-29 DIAGNOSIS — M79.672 PAIN OF LEFT HEEL: Primary | ICD-10-CM

## 2020-09-29 PROCEDURE — 3074F SYST BP LT 130 MM HG: CPT | Mod: CPTII,S$GLB,, | Performed by: PODIATRIST

## 2020-09-29 PROCEDURE — 3079F DIAST BP 80-89 MM HG: CPT | Mod: CPTII,S$GLB,, | Performed by: PODIATRIST

## 2020-09-29 PROCEDURE — 99214 PR OFFICE/OUTPT VISIT, EST, LEVL IV, 30-39 MIN: ICD-10-PCS | Mod: S$GLB,,, | Performed by: PODIATRIST

## 2020-09-29 PROCEDURE — 99214 OFFICE O/P EST MOD 30 MIN: CPT | Mod: S$GLB,,, | Performed by: PODIATRIST

## 2020-09-29 PROCEDURE — 3074F PR MOST RECENT SYSTOLIC BLOOD PRESSURE < 130 MM HG: ICD-10-PCS | Mod: CPTII,S$GLB,, | Performed by: PODIATRIST

## 2020-09-29 PROCEDURE — 3008F BODY MASS INDEX DOCD: CPT | Mod: CPTII,S$GLB,, | Performed by: PODIATRIST

## 2020-09-29 PROCEDURE — 3079F PR MOST RECENT DIASTOLIC BLOOD PRESSURE 80-89 MM HG: ICD-10-PCS | Mod: CPTII,S$GLB,, | Performed by: PODIATRIST

## 2020-09-29 PROCEDURE — 99999 PR PBB SHADOW E&M-EST. PATIENT-LVL V: CPT | Mod: PBBFAC,,, | Performed by: PODIATRIST

## 2020-09-29 PROCEDURE — 99999 PR PBB SHADOW E&M-EST. PATIENT-LVL V: ICD-10-PCS | Mod: PBBFAC,,, | Performed by: PODIATRIST

## 2020-09-29 PROCEDURE — 3008F PR BODY MASS INDEX (BMI) DOCUMENTED: ICD-10-PCS | Mod: CPTII,S$GLB,, | Performed by: PODIATRIST

## 2020-09-29 NOTE — PATIENT INSTRUCTIONS
Supplements for inflammation: Arnica Tabs, bromelain with tumeric, alpha lipoic acid, garlic     Over the counter pain creams: Biofreeze, Bengay, tiger balm, two old goat, lidocaine gel,  Absorbine Veterinary Liniment Gel Topical Analgesic Sore Muscle and Joint Pain Relief    Recommend lotions: eucerin, eucerin for diabetics, aquaphor, A&D ointment, gold bond for diabetics, sween, Jose's Bees all purpose baby ointment,  urea 40 with aloe (found on amazon.com)    Shoe recommendations: (try 6pm.com, zappos.com , [email or web address].[employer], or shoes[email or web address] for discounted prices) you can visit [employer] in [place]  or kinkon in the [facility] (there are also several shoe brand outlets in the [facility])    Asics (GT 2000 or gel foundations), new balance stability type shoes (such as the 940 series), saucony (stabil c3),  Foley (GTS or Beast or transcend), propet (tennis shoe)    Sofft Brand, baretraps (women) Geraldine&Geoff (men), clarks, crocs, aerosoles, naturalizers, SAS, ecco, born, elisabet clements, thelma (dress shoes)    Vionic, burkenstocks, fitflops, propet, baretraps (sandals)    Nike comfort thong sandals, crocs, propet (house shoes)    Nail Home remedy:  Vicks Vapor rub or Emuaid to nails for easier manageability      Understanding Plantar Fasciitis    Plantar fasciitis is a condition that causes foot and heel pain. The plantar fascia is a tough band of tissue that runs across the bottom of the foot from the heel to the toes. This tissue pulls on the heel bone. It supports the arch of the foot as it pushes off the ground. If the tissue becomes irritated or red and swollen (inflamed), it is called plantar fasciitis.  How to say it  PLAN-tuhr fa-see-IY-tis   What causes plantar fasciitis?  Plantar fasciitis most often occurs from overusing the plantar fascia. The tissue may become damaged from activities that put repeated stress on the heel and foot. Or it may wear down over time with age and ankle stiffness. You are  more likely to have plantar fasciitis if you:  · Do activities that require a lot of running, jumping, or dancing  · Have a job that requires being on your feet for long periods  · Are overweight or obese  · Have certain foot problems, such as a tight Achilles tendon, flat feet, or high arches  · Often wear poorly fitting shoes  Symptoms of plantar fasciitis  The condition most often causes pain in the heel and the bottom of the foot. The pain may occur when you take your first steps in the morning. It may get better as you walk throughout the day. But as you continue to put weight on the foot, the pain often returns. Pain may also occur after standing or sitting for long periods.  Treating plantar fasciitis  Treatments for plantar fasciitis include:  · Resting the foot. This involves limiting movements that make your foot hurt. You may also need to avoid certain sports and types of work for a time.  · Using cold packs. Put an ice pack on the heel and foot to help reduce pain and swelling.  · Taking pain medicines. Prescription and over-the-counter pain medicines can help relieve pain and swelling.  · Using heel cups or foot inserts (orthotics). These are placed in the shoes to help support the heel or arch and cushion the heel. You may also be told to buy proper-fitting shoes with good arch support and cushioned soles.  · Taping the foot. This supports the arch and limits the movement of the plantar fascia to help relieve symptoms.  · Wearing a night splint. This stretches the plantar fascia and leg muscles while you sleep. This may help relieve pain.  · Doing exercises and physical therapy. These stretch and strengthen the plantar fascia and the muscles in the leg that support the heel and foot.  · Getting shots of medicine into the foot. These may help relieve symptoms for a time.  · Having surgery. This may be needed if other treatments fail to relieve symptoms. During surgery, the surgeon may partially cut the  plantar fascia to release tension.  Possible complications of plantar fasciitis  Without proper care and treatment, healing may take longer than normal. Also, symptoms may continue or get worse. Over time, the plantar fascia may be damaged. This can make it hard to walk or even stand without pain.  When to call your healthcare provider  Call your healthcare provider right away if you have any of these:  · Fever of 100.4°F (38°C) or higher, or as directed  · Symptoms that dont get better with treatment, or get worse  · New symptoms, such as numbness, tingling, or weakness in the foot   © 7493-2467 [employer]. [address] [place] [id number]. All rights reserved. This information is not intended as a substitute for professional medical care. Always follow your healthcare professional's instructions.        Treating Plantar Fasciitis  First, your healthcare provider tries to determine the cause of your problem in order to suggest ways to relieve pain. If your pain is due to poor foot mechanics, custom-made shoe inserts (orthoses) may help.    Reduce symptoms  · To relieve mild symptoms, try aspirin, ibuprofen, or other medicines as directed. Rubbing ice on the affected area may also help.  · To reduce severe pain and swelling, your healthcare provider may prescribe pills or injections or a walking cast in some instances. Physical therapy, such as ultrasound or a daily stretching program, may also be recommended. Surgery is rarely required.  · To reduce symptoms caused by poor foot mechanics, your foot may be taped. This supports the arch and temporarily controls movement. Night splints may also help by stretching the fascia.  Control movement  If taping helps, your healthcare provider may prescribe orthoses. Built from plaster casts of your feet, these inserts control the way your foot moves. As a result, your symptoms should go away.  Reduce overuse  Every time your foot strikes the ground,  the plantar fascia is stretched. You can reduce the strain on the plantar fascia and the possibility of overuse by following these suggestions:  · Lose any excess weight.  · Avoid running on hard or uneven ground.  · Use orthoses at all times in your shoes and house slippers.  If surgery is needed  Your healthcare provider may consider surgery if other types of treatment don't control your pain. During surgery, the plantar fascia is partially cut to release tension. As you heal, fibrous tissue fills the space between the heel bone and the plantar fascia.   © 9421-4315 [employer]. [address] [place] [id number]. All rights reserved. This information is not intended as a substitute for professional medical care. Always follow your healthcare professional's instructions.        Wearing Proper Shoes                    You walk on your feet every day, forcing them to support the weight of your body. Repeated stress on your feet can cause damage over time. The right shoes can help protect your feet. The wrong shoes can cause more foot problems. Read the information below to help you find a shoe that fits your foot needs.     A good shoe fit will cover your foot outline.       A shoe that doesnt cover the outline is a bad fit.      Whats Your Foot Shape?  To get a good fit, you need to know the shape of your foot. Do this simple test: While standing, place your foot on a piece of paper and trace around it. Is your foot straight or curved? Do you have a foot problem, such as a bunion, that causes your foot outline to show a bulge on the side of your big toe?  Finding Your Fit  Bring your foot outline to the [employer] store to help you find the right shoe. Place a shoe you like on top of the outline to see if it matches the shape. The shoe should cover the outline. (If you have a bunion, the shoe may not cover the bulge on the outline. Look for soft leather shoes to stretch over the bunion.) Once  youve found a pair of proper shoes, put them on. Walk around. Be sure the shoes dont rub or pinch. If the shoes feel good, youve found your fit!  The Right Shoe for You  A good shoe has features that provide comfort and support. It must also be the right size and shape for your feet. Look for a shoe made of breathable fabric and lining, such as leather or canvas. Be sure that shoes have enough tread to prevent slipping. Go to a good shoe store for help finding the right shoe.  Good Shoe Features  An ideal shoe has the following:  · Laces for support. If tying laces is a problem for you, try shoes with Velcro fasteners or imelda.  · A front of the shoe (toe box) with ½ inch space in front of your longest toes.  · An arch shape that supports your foot.  · No more than 1½ inches of heel.  · A stiff, snug back of the shoe to keep your foot from sliding around.  · A smooth lining with no rough seams.  Shoe Shopping Tips  Below are some dos and donts for when you go to the shoe store.  Do:  · Select the shoes that feel right. Wear them around the house. Then bring them to your foot doctor to check for fit. If they dont fit well, return them.  · Shop late in the day, when your feet will be slightly bigger.  · Each time you buy shoes, have both your feet measured while you are standing. Foot size changes with time.  · Pick shoes to suit their purpose. High heels are okay for an occasional night on the town. But for everyday wear, choose a more sensible shoe.  · Try on shoes while wearing any inserts specially made for your feet (orthoses).  · Try on both the right and left shoes. If your feet are different sizes, pick a pair that fits the larger foot.  Dont:  · Dont buy shoes based on shoe size alone. Always try on shoes, as sizes differ from brand to brand and within brands.  · Dont expect shoes to break in. If they dont fit at the store, dont buy them.  · Dont buy a shoe that doesnt match your foot  shape.  What About Socks?  Always wear socks with shoes. Socks help absorb sweat and reduce friction and blistering. When shopping for shoes, choose soft, padded socks with seams that dont irritate your feet.  If You Have Foot Problems  Some foot problems cause deformities. This can make it hard to find a good fit. Look for shoes made of soft leather to stretch over the deformity. If you have bunions, buy shoes with a wider toe box. To fit hammertoes, look for shoes with a tall toe box. If you have arch problems, you may need inserts. In some cases, youll need to have custom footwear or orthoses made for your feet.  Suggested Footwear  Ask your healthcare provider what kind of footwear you need. He or she may recommend a certain brand or shoe store.  © 9880-0299 [employer]. [address] [place] [address]. All rights reserved. This information is not intended as a substitute for professional medical care. Always follow your healthcare professional's instructions.        Toe Extension (Flexibility)    These instructions are for your right foot. Switch sides for your left foot.  1. Sit in a chair. Rest your right ankle on your left knee.  2. Hold your toes with your right hand. Gently bend the toes backward. Feel a stretch in the undersides of the toes and ball of the foot. Hold for 30 to 60 seconds.  3. Then gently bend the toes in the other direction. Gently press on them until your foot is pointed. Hold for 30 to 60 seconds.  4. Repeat 5 times, or as instructed.  © 8484-7760 [employer]. [address] [place] [address]. All rights reserved. This information is not intended as a substitute for professional medical care. Always follow your healthcare professional's instructions.

## 2020-09-29 NOTE — PROGRESS NOTES
Subjective:      Patient ID: Africa Jennings is a 50 y.o. female.    Chief Complaint: Foot Problem (f/u plantar fasciitis PCP Dr. Thompson 03/24/2020), Foot Pain, and Follow-up    Africa is a 50 y.o. female who presents to the clinic for evaluation and treatment of high risk feet. Africa has a past medical history of Diabetes mellitus, Diabetes mellitus, type 2, Eczema, GERD (gastroesophageal reflux disease), Hypertension, Impaired fasting blood sugar, and YSABEL on CPAP. The patient's chief complaint is left plantar heel pain , especially with the first step in the morning. The pain is described as Aching and Throbbing. The onset of the pain was gradual and has worsened over the past several months. Africa Jennings rates the pain as almost unbearable. she denies a history of trauma. she relates pain began with increased physical activity/exercise regimen Prior treatments include some stretching and icing..      09/29/20 She has been wearing supportive shoes and doing some stretching, she relates improvement but not resolution    Shoe gear: Slip-on shoes  Hours on Feet: 8+  Exercise: moderately active    PCP: Brandi Thompson MD    Date Last Seen by PCP:   Chief Complaint   Patient presents with    Foot Problem     f/u plantar fasciitis PCP Dr. Thompson 03/24/2020    Foot Pain    Follow-up       Hemoglobin A1C   Date Value Ref Range Status   02/10/2020 7.5 (H) 4.0 - 5.6 % Final     Comment:     ADA Screening Guidelines:  5.7-6.4%  Consistent with prediabetes  >or=6.5%  Consistent with diabetes  High levels of fetal hemoglobin interfere with the HbA1C  assay. Heterozygous hemoglobin variants (HbS, HgC, etc)do  not significantly interfere with this assay.   However, presence of multiple variants may affect accuracy.     11/11/2019 8.5 (H) 4.0 - 5.6 % Final     Comment:     ADA Screening Guidelines:  5.7-6.4%  Consistent with prediabetes  >or=6.5%  Consistent with diabetes  High levels of fetal  hemoglobin interfere with the HbA1C  assay. Heterozygous hemoglobin variants (HbS, HgC, etc)do  not significantly interfere with this assay.   However, presence of multiple variants may affect accuracy.     01/12/2019 7.6 (H) 4.0 - 5.6 % Final     Comment:     ADA Screening Guidelines:  5.7-6.4%  Consistent with prediabetes  >or=6.5%  Consistent with diabetes  High levels of fetal hemoglobin interfere with the HbA1C  assay. Heterozygous hemoglobin variants (HbS, HgC, etc)do  not significantly interfere with this assay.   However, presence of multiple variants may affect accuracy.           Patient Active Problem List   Diagnosis    Essential hypertension    Type 2 diabetes mellitus without complication, without long-term current use of insulin    Obstructive sleep apnea (adult) (pediatric)    Morbid obesity with BMI of 40.0-44.9, adult    Gastroesophageal reflux disease without esophagitis    Pyloric stenosis    Encounter for screening colonoscopy    Lumbar radiculopathy    Lumbar spondylosis    DDD (degenerative disc disease), lumbar    Pneumonia of both lungs due to infectious organism    Acute respiratory failure with hypoxemia       Current Outpatient Medications on File Prior to Visit   Medication Sig Dispense Refill    albuterol (PROVENTIL/VENTOLIN HFA) 90 mcg/actuation inhaler Inhale 2 puffs into the lungs every 4 (four) hours as needed for Wheezing or Shortness of Breath. Rescue 6.7 g 0    amLODIPine (NORVASC) 10 MG tablet Take 1 tablet (10 mg total) by mouth once daily. 90 tablet 1    atorvastatin (LIPITOR) 20 MG tablet Take 1 tablet (20 mg total) by mouth once daily. 90 tablet 3    blood sugar diagnostic Strp 1 strip by Misc.(Non-Drug; Combo Route) route 2 (two) times daily with meals. 100 strip 11    blood sugar diagnostic Strp 1 strip by Misc.(Non-Drug; Combo Route) route 2 (two) times daily with meals. 100 strip 11    blood-glucose meter kit Use as instructed 1 each 0    cetirizine  "(ZYRTEC) 10 MG tablet Take 1 tablet (10 mg total) by mouth once daily. 90 tablet 1    ergocalciferol (ERGOCALCIFEROL) 50,000 unit Cap TAKE 1 CAPSULE (50,000 UNITS TOTAL) BY MOUTH TWICE A WEEK. 24 capsule 2    fluticasone propionate (FLONASE) 50 mcg/actuation nasal spray INSTILL 1 SPRAY (50 MCG TOTAL) IN EACH NOSTRIL ONCE DAILY. 16 mL 2    gabapentin (NEURONTIN) 300 MG capsule Take 2 capsules (600 mg total) by mouth 3 (three) times daily. 180 capsule 11    lancets Misc Use to test blood sugar once daily. 300 each 0    lancing device Misc 1 Device by Misc.(Non-Drug; Combo Route) route 2 (two) times daily with meals. 1 each 0    lancing device Misc 1 Device by Misc.(Non-Drug; Combo Route) route 2 (two) times daily with meals. 100 each 11    meloxicam (MOBIC) 15 MG tablet TAKE 1 TABLET BY MOUTH ONCE DAILY WITH FOOD FOR THE NEXT 3 WEEKS 30 tablet 1    metFORMIN (GLUCOPHAGE-XR) 500 MG XR 24hr tablet Take 1 tablet (500 mg total) by mouth 2 (two) times daily with meals. 180 tablet 1    neomycin-polymyxin-dexamethasone (MAXITROL) 3.5mg/mL-10,000 unit/mL-0.1 % DrpS       nystatin (MYCOSTATIN) ointment Apply topically 2 (two) times daily.        omeprazole (PRILOSEC) 40 MG capsule Take 1 capsule (40 mg total) by mouth once daily. 90 capsule 1    ondansetron (ZOFRAN) 4 MG tablet Take 1 tablet (4 mg total) by mouth every 8 (eight) hours as needed. 90 tablet 1    OZEMPIC 0.25 mg or 0.5 mg(2 mg/1.5 mL) PnIj INJECT 0.25 MG INTO THE SKIN EVERY 7 DAYS. X 2 WEEKS, THEN INCREASE TO 0.5MG ONCE WEEKLY 4.5 Syringe 0    pen needle, diabetic (PEN NEEDLE) 32 gauge x 5/32" Ndle 1 each by Misc.(Non-Drug; Combo Route) route once a week. 50 each 1    tobramycin-dexamethasone 0.3-0.1% (TOBRADEX) 0.3-0.1 % DrpS INSTILL 1 DROP INTO BOTH EYES 4 TIMES A DAY  2    triamcinolone acetonide 0.1% (KENALOG) 0.1 % cream Apply topically 2 (two) times daily. Apply topically 2 (two) times daily. 135 g 4    valsartan (DIOVAN) 80 MG tablet TAKE " 1 TABLET BY MOUTH EVERY DAY 90 tablet 1     No current facility-administered medications on file prior to visit.        Review of patient's allergies indicates:  No Known Allergies    Past Surgical History:   Procedure Laterality Date    breast reduction      CHOLECYSTECTOMY      laprascopic    ESOPHAGOGASTRODUODENOSCOPY  8/18/14    HYSTERECTOMY  2007    laprascopic, total for fibroids.  Dr Polo    OOPHORECTOMY      TOTAL REDUCTION MAMMOPLASTY         Family History   Problem Relation Age of Onset    Diabetes Maternal Grandmother     Heart disease Maternal Grandmother     Hypertension Maternal Grandmother     Crohn's disease Sister     Diabetes Mother     Hypertension Mother     Heart disease Mother     Cancer Father 58        Prostate    Breast cancer Sister     Amblyopia Neg Hx     Blindness Neg Hx     Cataracts Neg Hx     Glaucoma Neg Hx     Macular degeneration Neg Hx     Retinal detachment Neg Hx     Strabismus Neg Hx        Social History     Socioeconomic History    Marital status:      Spouse name: Not on file    Number of children: Not on file    Years of education: Not on file    Highest education level: Not on file   Occupational History    Not on file   Social Needs    Financial resource strain: Hard    Food insecurity     Worry: Often true     Inability: Often true    Transportation needs     Medical: No     Non-medical: No   Tobacco Use    Smoking status: Never Smoker    Smokeless tobacco: Never Used   Substance and Sexual Activity    Alcohol use: No     Frequency: Monthly or less     Drinks per session: Patient refused     Binge frequency: Never     Comment: socially    Drug use: No    Sexual activity: Yes     Partners: Male     Birth control/protection: Surgical   Lifestyle    Physical activity     Days per week: 3 days     Minutes per session: 60 min    Stress: To some extent   Relationships    Social connections     Talks on phone: More than three  "times a week     Gets together: Once a week     Attends Spiritism service: More than 4 times per year     Active member of club or organization: Yes     Attends meetings of clubs or organizations: More than 4 times per year     Relationship status:    Other Topics Concern    Not on file   Social History Narrative     since 2000.  2 children: 26 and 21.    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   Cardiovascular: Positive for leg swelling. Negative for claudication.   Respiratory: Negative for cough and shortness of breath.    Skin: Positive for dry skin, itching and nail changes. Negative for rash.   Musculoskeletal: Positive for arthritis, back pain, joint pain and myalgias. Negative for falls, joint swelling and muscle weakness.   Gastrointestinal: Negative for diarrhea, nausea and vomiting.   Neurological: Negative for numbness, paresthesias, tremors and weakness.   Psychiatric/Behavioral: Negative for altered mental status and hallucinations.           Objective:       Vitals:    09/29/20 0722   BP: 128/87   Pulse: 85   Weight: 90.7 kg (200 lb)   Height: 5' 1" (1.549 m)   PainSc:   5       Physical Exam  Nursing note reviewed.   Constitutional:       General: She is not in acute distress.     Appearance: She is not toxic-appearing or diaphoretic.   Cardiovascular:      Pulses:           Dorsalis pedis pulses are 2+ on the right side and 2+ on the left side.        Posterior tibial pulses are 2+ on the right side and 2+ on the left side.      Comments: dorsalis pedis and posterior tibial pulses are palpable bilaterally. Capillary refill time is within normal limits.  Pulmonary:      Effort: No respiratory distress.   Musculoskeletal:      Right ankle: She exhibits normal range of motion and no swelling. No tenderness. No lateral malleolus, no medial malleolus, no AITFL, no CF ligament and no posterior " TFL tenderness found. Achilles tendon exhibits no pain, no defect and normal Sierra's test results.      Left ankle: She exhibits normal range of motion and no swelling. No tenderness. No lateral malleolus, no medial malleolus, no AITFL, no CF ligament and no posterior TFL tenderness found. Achilles tendon exhibits no pain, no defect and normal Sierra's test results.      Right foot: No tenderness or bony tenderness.      Left foot: Tenderness present. No bony tenderness.      Comments: Biomechanical exam: Pain on palpation left medial calcaneal tubercle at origin of plantar fascia. There is equinus deformity bilateral with decreased dorsiflexion at the ankle joint bilateral. No tenderness with compression of heel. Negative tinels sign    Decreased stride, station of gait.  apropulsive toe off.  Increased angle and base of gait.    Patient has hammertoes of digits 2-5 bilateral partially reducible without symptom today.    Visible and palpable bunion without pain at dorsomedial 1st metatarsal head right and left.  Hallux abducted right and left partially reducible, tracks laterally without being track bound.  No ecchymosis, erythema, edema, or cardinal signs infection or signs of trauma same foot.     Skin:     General: Skin is warm and dry.      Coloration: Skin is not pale.      Findings: No bruising, burn, laceration or lesion.      Nails: There is no clubbing.        Comments: Scaling dryness in a moccasin distribution is noted to the bilateral lower extremities with associated erythema.    Toenails 1, 4, 5 bilaterally are thickened by 2-3 mm, discolored/yellowed, dystrophic, brittle with subungual debris. Tender to distal nail plate pressure, without periungual skin abnormality of each.           Neurological:      Sensory: No sensory deficit.      Motor: No tremor, atrophy or abnormal muscle tone.      Deep Tendon Reflexes: Reflexes are normal and symmetric.      Comments: [place] 5.07  monofilament is intact bilateral feet. Sharp/dull sensation is also intact Bilateral feet.     Psychiatric:         Attention and Perception: She is attentive.         Mood and Affect: Mood is not anxious. Affect is not inappropriate.         Speech: She is communicative. Speech is not slurred.         Behavior: Behavior is not combative.               Assessment:       Encounter Diagnoses   Name Primary?    Pain of left heel Yes    Plantar fasciitis     Pes planus of both feet     Hammer toes of both feet     Hallux limitus, acquired, left     Hallux limitus, acquired, right          Plan:       Africa was seen today for foot problem, foot pain and follow-up.    Diagnoses and all orders for this visit:    Pain of left heel    Plantar fasciitis  -     Ambulatory referral/consult to Physical/Occupational Therapy; Future    Pes planus of both feet    Hammer toes of both feet    Hallux limitus, acquired, left    Hallux limitus, acquired, right      I counseled the patient on her conditions, their implications and medical management.    Greater than 50% of this visit spent on counseling and coordination of care.      Discussed different treatment options for heel pain. I gave written and verbal instructions on stretching exercises. Patient expressed understanding. Discussed icing the affected area as needed and also wearing appropriate shoe gear and avoiding flats, slippers, sandals, and going barefoot. My recommendation for OTC supports is [employer]. Patient instructed on adequate icing techniques. Patient should ice the affected area at least once per day x 10 minutes for 10 days . I advised the patient that extra icing would also be beneficial to ensure adequate anti inflammatory effect. We also discussed cortisone injections and NSAID therapy. Continue Mobic as prescribed. Patient was instructed on dosing information. Discontinue if adverse effects occur     Referral placed to PT to aid in  increasing ROM and breaking up of scar tissue. Dry needling requested    RTC in 9-12 weeks if no improvement, at this time she will receive cortisone injections and MRI for surgical planning. Patient is amenable to plan.

## 2020-09-30 ENCOUNTER — OFFICE VISIT (OUTPATIENT)
Dept: URGENT CARE | Facility: CLINIC | Age: 50
End: 2020-09-30
Payer: COMMERCIAL

## 2020-09-30 VITALS
DIASTOLIC BLOOD PRESSURE: 89 MMHG | HEART RATE: 125 BPM | TEMPERATURE: 98 F | SYSTOLIC BLOOD PRESSURE: 132 MMHG | OXYGEN SATURATION: 96 % | RESPIRATION RATE: 16 BRPM

## 2020-09-30 DIAGNOSIS — J02.9 SORE THROAT: ICD-10-CM

## 2020-09-30 DIAGNOSIS — J03.90 EXUDATIVE TONSILLITIS: Primary | ICD-10-CM

## 2020-09-30 LAB
CTP QC/QA: YES
MOLECULAR STREP A: NEGATIVE

## 2020-09-30 PROCEDURE — 99214 PR OFFICE/OUTPT VISIT, EST, LEVL IV, 30-39 MIN: ICD-10-PCS | Mod: S$GLB,,, | Performed by: NURSE PRACTITIONER

## 2020-09-30 PROCEDURE — 87651 POCT STREP A MOLECULAR: ICD-10-PCS | Mod: QW,S$GLB,, | Performed by: NURSE PRACTITIONER

## 2020-09-30 PROCEDURE — 87651 STREP A DNA AMP PROBE: CPT | Mod: QW,S$GLB,, | Performed by: NURSE PRACTITIONER

## 2020-09-30 PROCEDURE — 99214 OFFICE O/P EST MOD 30 MIN: CPT | Mod: S$GLB,,, | Performed by: NURSE PRACTITIONER

## 2020-09-30 RX ORDER — AMOXICILLIN 500 MG/1
1000 CAPSULE ORAL EVERY 12 HOURS
Qty: 28 CAPSULE | Refills: 0 | Status: SHIPPED | OUTPATIENT
Start: 2020-09-30 | End: 2020-10-07

## 2020-09-30 NOTE — PATIENT INSTRUCTIONS
If not allergic,take tylenol (acetominophen) for fever control, chills, or body aches every 4 hours. Do not exceed 4000 mg/ day.If not allergic, take Motrin (Ibuprofen) every 4 hours for fever, chills, pain or inflammation. Do not exceed 2400 mg/day. You can alternate taking tylenol and motrin.    You must understand that you've received an Urgent Care treatment only and that you may be released before all your medical problems are known or treated. You, the patient, will arrange for follow up care as instructed.     Follow up with your PCP or specialty clinic as instructed in the next 2-3 days if not improved or as needed. You can call [phone] to schedule an appointment with appropriate provider.     If you condition worsens, we recommend that you receive another evaluation at the emergency room immediately or contact your primary medical clinic's after hours call service to discuss your concerns.     Please return here or go to the Emergency Department for any concerns or worsening condition.     If you were prescribed a narcotic or controlled substance, do not drive or operate heavy equipment or machinery while taking these medications.     Self-Care for Sore Throats    Sore throats happen for many reasons, such as colds, allergies, and infections caused by viruses or bacteria. In any case, your throat becomes red and sore. Your goal for self-care is to reduce your discomfort while giving your throat a chance to heal.  Moisten and soothe your throat  Tips include the following:  · Try a sip of water first thing after waking up.  · Keep your throat moist by drinking 6 or more glasses of clear liquids every day.  · Run a cool-air humidifier in your room overnight.  · Avoid cigarette smoke.   · Suck on throat lozenges, cough drops, hard candy, ice chips, or frozen fruit-juice bars. Use the sugar-free versions if your diet or medical condition requires them.  Gargle to ease irritation  Gargling every hour  or 2 can ease irritation. Try gargling with 1 of these solutions:  · 1/4 teaspoon of salt in 1/2 cup of warm water  · An over-the-counter anesthetic gargle  Use medicine for more relief  Over-the-counter medicine can reduce sore throat symptoms. Ask your pharmacist if you have questions about which medicine to use:  · Ease pain with anesthetic sprays. Aspirin or an aspirin substitute also helps. Remember, never give aspirin to anyone 18 or younger, or if you are already taking blood thinners.   · For sore throats caused by allergies, try antihistamines to block the allergic reaction.  · Remember: unless a sore throat is caused by a bacterial infection, antibiotics wont help you.  Prevent future sore throats  Prevention tips include the following:  · Stop smoking or reduce contact with secondhand smoke. Smoke irritates the tender throat lining.  · Limit contact with pets and with allergy-causing substances, such as pollen and mold.  · When youre around someone with a sore throat or cold, wash your hands often to keep viruses or bacteria from spreading.  · Dont strain your vocal cords.  Call your healthcare provider  Contact your healthcare provider if you have:  · A temperature over 101°F (38.3°C)  · White spots on the throat  · Great difficulty swallowing  · Trouble breathing  · A skin rash  · Recent exposure to someone else with strep bacteria  · Severe hoarseness and swollen glands in the neck or jaw   Date Last Reviewed: 8/1/2016  © 4985-4787 [employer]. [address] [place] [address], [place] [address]. All rights reserved. This information is not intended as a substitute for professional medical care. Always follow your healthcare professional's instructions.

## 2020-09-30 NOTE — PROGRESS NOTES
Subjective:       Patient ID: Africa Jennings is a 50 y.o. female.    Vitals:  temperature is 97.5 °F (36.4 °C). Her blood pressure is 132/89 and her pulse is 125 (abnormal). Her respiration is 16 and oxygen saturation is 96%.     Chief Complaint: Sore Throat    Pt reports sore throat x 2 days. Hurts to swallow. Pain is moderate to severe. She developed fever last night- tmax 102. She has some sinus pressure and headache as well. No cough, neck pain, CP, SOB, abd pain, n/v/d, anosmia. She had covid 6 months ago. No known sick contacts.     Sore Throat   This is a new problem. The current episode started yesterday. The problem has been unchanged. The pain is worse on the right side. The maximum temperature recorded prior to her arrival was 101 - 101.9 F. The fever has been present for less than 1 day. The pain is mild. Pertinent negatives include no congestion, coughing, diarrhea, headaches, shortness of breath or vomiting. She has tried acetaminophen for the symptoms. The treatment provided no relief.       Constitution: Positive for fever. Negative for chills and fatigue.   HENT: Positive for sore throat. Negative for congestion.    Neck: Negative for painful lymph nodes.   Cardiovascular: Negative for chest pain and leg swelling.   Eyes: Negative for double vision and blurred vision.   Respiratory: Negative for cough and shortness of breath.    Gastrointestinal: Negative for nausea, vomiting and diarrhea.   Genitourinary: Negative for dysuria, frequency, urgency and history of kidney stones.   Musculoskeletal: Negative for joint pain, joint swelling, muscle cramps and muscle ache.   Skin: Negative for color change, pale, rash and bruising.   Allergic/Immunologic: Negative for seasonal allergies.   Neurological: Negative for dizziness, history of vertigo, light-headedness, passing out and headaches.   Hematologic/Lymphatic: Negative for swollen lymph nodes.   Psychiatric/Behavioral: Negative for  nervous/anxious, sleep disturbance and depression. The patient is not nervous/anxious.        Objective:      Physical Exam   Constitutional: She is oriented to person, place, and time. She appears well-developed. She is cooperative.  Non-toxic appearance. She does not appear ill. No distress.   HENT:   Head: Normocephalic and atraumatic.   Ears:   Right Ear: Hearing, tympanic membrane, external ear and ear canal normal.   Left Ear: Hearing, tympanic membrane, external ear and ear canal normal.   Nose: Nose normal. No mucosal edema, rhinorrhea or nasal deformity. No epistaxis. Right sinus exhibits no maxillary sinus tenderness and no frontal sinus tenderness. Left sinus exhibits no maxillary sinus tenderness and no frontal sinus tenderness.   Mouth/Throat: Uvula is midline and mucous membranes are normal. No trismus in the jaw. Normal dentition. No uvula swelling. Posterior oropharyngeal erythema present. No oropharyngeal exudate or posterior oropharyngeal edema. Tonsils are 2+ on the right. Tonsils are 2+ on the left. Tonsillar exudate (purulent exudate).   Eyes: Pupils are equal, round, and reactive to light. Conjunctivae and lids are normal. No scleral icterus.   Neck: Trachea normal, full passive range of motion without pain and phonation normal. Neck supple. No neck rigidity. No edema and no erythema present.   Cardiovascular: Normal rate, regular rhythm, normal heart sounds and normal pulses.   Pulmonary/Chest: Effort normal and breath sounds normal. No respiratory distress. She has no decreased breath sounds. She has no wheezes. She has no rhonchi. She has no rales.   Abdominal: Normal appearance.   Musculoskeletal: Normal range of motion.         General: No deformity.   Lymphadenopathy:     She has cervical adenopathy (TTP).   Neurological: She is alert and oriented to person, place, and time. She exhibits normal muscle tone. Coordination normal.   Skin: Skin is warm, dry, intact, not diaphoretic and not  pale. Psychiatric: Her speech is normal and behavior is normal. Judgment and thought content normal.   Nursing note and vitals reviewed.    Results for orders placed or performed in visit on 09/30/20   POCT Strep A, Molecular   Result Value Ref Range    Molecular Strep A, POC Negative Negative     Acceptable Yes            Assessment:       1. Exudative tonsillitis    2. Sore throat        Plan:         Exudative tonsillitis  -     amoxicillin (AMOXIL) 500 MG capsule; Take 2 capsules (1,000 mg total) by mouth every 12 (twelve) hours. for 7 days  Dispense: 28 capsule; Refill: 0  -     (Magic mouthwash) 1:1:1 Benadryl 12.5mg/5ml liq, aluminum & magnesium hydroxide-simehticone (Maalox), lidocaine viscous 2%; Swish and spit 10 mLs every 4 (four) hours as needed. for sore throat  Dispense: 360 mL; Refill: 0    Sore throat  -     POCT Strep A, Molecular         Reviewed previous pertinent office visits, PMH, PSH, fam hx  Recommended otc motrin or tylenol as needed for fever/aches unless contraindicated  F/u with PCP if no improvement or if worsening  Advised on return/follow-up precautions. Advised on ER precautions. Answered all patient questions. Patient verbalized understanding and voiced agreement with current treatment plan.    Patient Instructions        If not allergic,take tylenol (acetominophen) for fever control, chills, or body aches every 4 hours. Do not exceed 4000 mg/ day.If not allergic, take Motrin (Ibuprofen) every 4 hours for fever, chills, pain or inflammation. Do not exceed 2400 mg/day. You can alternate taking tylenol and motrin.    You must understand that you've received an Urgent Care treatment only and that you may be released before all your medical problems are known or treated. You, the patient, will arrange for follow up care as instructed.     Follow up with your PCP or specialty clinic as instructed in the next 2-3 days if not improved or as needed. You can call [phone]  to schedule an appointment with appropriate provider.     If you condition worsens, we recommend that you receive another evaluation at the emergency room immediately or contact your primary medical clinic's after hours call service to discuss your concerns.     Please return here or go to the Emergency Department for any concerns or worsening condition.     If you were prescribed a narcotic or controlled substance, do not drive or operate heavy equipment or machinery while taking these medications.     Self-Care for Sore Throats    Sore throats happen for many reasons, such as colds, allergies, and infections caused by viruses or bacteria. In any case, your throat becomes red and sore. Your goal for self-care is to reduce your discomfort while giving your throat a chance to heal.  Moisten and soothe your throat  Tips include the following:  · Try a sip of water first thing after waking up.  · Keep your throat moist by drinking 6 or more glasses of clear liquids every day.  · Run a cool-air humidifier in your room overnight.  · Avoid cigarette smoke.   · Suck on throat lozenges, cough drops, hard candy, ice chips, or frozen fruit-juice bars. Use the sugar-free versions if your diet or medical condition requires them.  Gargle to ease irritation  Gargling every hour or 2 can ease irritation. Try gargling with 1 of these solutions:  · 1/4 teaspoon of salt in 1/2 cup of warm water  · An over-the-counter anesthetic gargle  Use medicine for more relief  Over-the-counter medicine can reduce sore throat symptoms. Ask your pharmacist if you have questions about which medicine to use:  · Ease pain with anesthetic sprays. Aspirin or an aspirin substitute also helps. Remember, never give aspirin to anyone 18 or younger, or if you are already taking blood thinners.   · For sore throats caused by allergies, try antihistamines to block the allergic reaction.  · Remember: unless a sore throat is caused by a bacterial infection,  antibiotics wont help you.  Prevent future sore throats  Prevention tips include the following:  · Stop smoking or reduce contact with secondhand smoke. Smoke irritates the tender throat lining.  · Limit contact with pets and with allergy-causing substances, such as pollen and mold.  · When youre around someone with a sore throat or cold, wash your hands often to keep viruses or bacteria from spreading.  · Dont strain your vocal cords.  Call your healthcare provider  Contact your healthcare provider if you have:  · A temperature over 101°F (38.3°C)  · White spots on the throat  · Great difficulty swallowing  · Trouble breathing  · A skin rash  · Recent exposure to someone else with strep bacteria  · Severe hoarseness and swollen glands in the neck or jaw   Date Last Reviewed: 8/1/2016  © 0862-5097 [employer]. [address] [place] [address], [place] [address]. All rights reserved. This information is not intended as a substitute for professional medical care. Always follow your healthcare professional's instructions.

## 2020-11-09 ENCOUNTER — LAB VISIT (OUTPATIENT)
Dept: LAB | Facility: HOSPITAL | Age: 50
End: 2020-11-09
Attending: FAMILY MEDICINE
Payer: COMMERCIAL

## 2020-11-09 DIAGNOSIS — E11.9 TYPE 2 DIABETES MELLITUS WITHOUT COMPLICATION, WITHOUT LONG-TERM CURRENT USE OF INSULIN: Chronic | ICD-10-CM

## 2020-11-09 LAB
ALBUMIN SERPL BCP-MCNC: 4 G/DL (ref 3.5–5.2)
ALP SERPL-CCNC: 82 U/L (ref 55–135)
ALT SERPL W/O P-5'-P-CCNC: 29 U/L (ref 10–44)
ANION GAP SERPL CALC-SCNC: 11 MMOL/L (ref 8–16)
AST SERPL-CCNC: 24 U/L (ref 10–40)
BASOPHILS # BLD AUTO: 0.03 K/UL (ref 0–0.2)
BASOPHILS NFR BLD: 0.4 % (ref 0–1.9)
BILIRUB SERPL-MCNC: 0.4 MG/DL (ref 0.1–1)
BUN SERPL-MCNC: 15 MG/DL (ref 6–20)
CALCIUM SERPL-MCNC: 9 MG/DL (ref 8.7–10.5)
CHLORIDE SERPL-SCNC: 104 MMOL/L (ref 95–110)
CHOLEST SERPL-MCNC: 132 MG/DL (ref 120–199)
CHOLEST/HDLC SERPL: 2.9 {RATIO} (ref 2–5)
CO2 SERPL-SCNC: 23 MMOL/L (ref 23–29)
CREAT SERPL-MCNC: 0.8 MG/DL (ref 0.5–1.4)
DIFFERENTIAL METHOD: ABNORMAL
EOSINOPHIL # BLD AUTO: 0.2 K/UL (ref 0–0.5)
EOSINOPHIL NFR BLD: 2.2 % (ref 0–8)
ERYTHROCYTE [DISTWIDTH] IN BLOOD BY AUTOMATED COUNT: 14.1 % (ref 11.5–14.5)
EST. GFR  (AFRICAN AMERICAN): >60 ML/MIN/1.73 M^2
EST. GFR  (NON AFRICAN AMERICAN): >60 ML/MIN/1.73 M^2
ESTIMATED AVG GLUCOSE: 146 MG/DL (ref 68–131)
GLUCOSE SERPL-MCNC: 145 MG/DL (ref 70–110)
HBA1C MFR BLD HPLC: 6.7 % (ref 4–5.6)
HCT VFR BLD AUTO: 40.2 % (ref 37–48.5)
HDLC SERPL-MCNC: 45 MG/DL (ref 40–75)
HDLC SERPL: 34.1 % (ref 20–50)
HGB BLD-MCNC: 12.1 G/DL (ref 12–16)
IMM GRANULOCYTES # BLD AUTO: 0.01 K/UL (ref 0–0.04)
IMM GRANULOCYTES NFR BLD AUTO: 0.1 % (ref 0–0.5)
LDLC SERPL CALC-MCNC: 67 MG/DL (ref 63–159)
LYMPHOCYTES # BLD AUTO: 3.7 K/UL (ref 1–4.8)
LYMPHOCYTES NFR BLD: 45.6 % (ref 18–48)
MCH RBC QN AUTO: 27 PG (ref 27–31)
MCHC RBC AUTO-ENTMCNC: 30.1 G/DL (ref 32–36)
MCV RBC AUTO: 90 FL (ref 82–98)
MONOCYTES # BLD AUTO: 0.5 K/UL (ref 0.3–1)
MONOCYTES NFR BLD: 5.6 % (ref 4–15)
NEUTROPHILS # BLD AUTO: 3.7 K/UL (ref 1.8–7.7)
NEUTROPHILS NFR BLD: 46.1 % (ref 38–73)
NONHDLC SERPL-MCNC: 87 MG/DL
NRBC BLD-RTO: 0 /100 WBC
PLATELET # BLD AUTO: 289 K/UL (ref 150–350)
PMV BLD AUTO: 12.3 FL (ref 9.2–12.9)
POTASSIUM SERPL-SCNC: 3.3 MMOL/L (ref 3.5–5.1)
PROT SERPL-MCNC: 7.8 G/DL (ref 6–8.4)
RBC # BLD AUTO: 4.48 M/UL (ref 4–5.4)
SODIUM SERPL-SCNC: 138 MMOL/L (ref 136–145)
TRIGL SERPL-MCNC: 100 MG/DL (ref 30–150)
TSH SERPL DL<=0.005 MIU/L-ACNC: 1.56 UIU/ML (ref 0.4–4)
WBC # BLD AUTO: 8.03 K/UL (ref 3.9–12.7)

## 2020-11-09 PROCEDURE — 83036 HEMOGLOBIN GLYCOSYLATED A1C: CPT

## 2020-11-09 PROCEDURE — 84443 ASSAY THYROID STIM HORMONE: CPT

## 2020-11-09 PROCEDURE — 80053 COMPREHEN METABOLIC PANEL: CPT

## 2020-11-09 PROCEDURE — 80061 LIPID PANEL: CPT

## 2020-11-09 PROCEDURE — 85025 COMPLETE CBC W/AUTO DIFF WBC: CPT

## 2020-11-09 PROCEDURE — 36415 COLL VENOUS BLD VENIPUNCTURE: CPT | Mod: PO

## 2020-11-09 NOTE — PROGRESS NOTES
"Digital Medicine: Clinician Follow-Up    Patient states that she has been working to improve her nutrition and physical activity. Her meals consist of "fish and zucchini, fish and broccoli", etc, and she is watching the amount of salt that she consumes. She uses the elliptical 3x/week, and walks every day except Sundays. She does a little over 1 mile a day and will gradually increase this to avoid exacerbating her plantar fascitis.     The history is provided by the patient.      Review of patient's allergies indicates:  No Known Allergies  Follow-up reason(s): routine follow up.     Hypertension    Readings are trending down due to lifestyle change.     Patient is not experiencing signs/symptoms of hypertension.            Last 5 Patient Entered Readings                                      Current 30 Day Average: 127/92     Recent Readings 11/4/2020 10/22/2020 10/21/2020 10/17/2020 10/1/2020    SBP (mmHg) 118 135 107 148 119    DBP (mmHg) 84 94 98 90 72    Pulse 74 94 78 104 118                 Depression Screening  Did not address depression screening.    Sleep Apnea Screening    Did not address sleep apnea screening.     Medication Affordability Screening  Did not address medication affordability screening.     Medication Adherence-Medication adherence was assessed.        Denies any issues/concerns with HTN meds.       ASSESSMENT(S)  Patients BP average is 127/92 mmHg, which is above goal. Patient's BP goal is less than or equal to 130/80.     DBP remains elevated but BP is trending down nicely. No med changes recommended at this time.      Hypertension Plan  Continue current therapy.  Continue current diet/physical activity routine.  Instructed to charge device.  Provided patient education. BP monitoring technique including appropriate rest and retaking BP if elevated/   F/u in 6-8 weeks. Can consider addition of HCTZ 12.5 mg if DBP remains elevated.      Addressed patient questions and patient has my contact " information if needed prior to next outreach. Patient verbalizes understanding.                 Topic    Hemoglobin A1C     Eye Exam      There are no preventive care reminders to display for this patient.      Hypertension Medications             amLODIPine (NORVASC) 10 MG tablet Take 1 tablet (10 mg total) by mouth once daily.    valsartan (DIOVAN) 80 MG tablet TAKE 1 TABLET BY MOUTH EVERY DAY

## 2020-11-13 ENCOUNTER — OFFICE VISIT (OUTPATIENT)
Dept: FAMILY MEDICINE | Facility: CLINIC | Age: 50
End: 2020-11-13
Payer: COMMERCIAL

## 2020-11-13 VITALS
DIASTOLIC BLOOD PRESSURE: 80 MMHG | OXYGEN SATURATION: 97 % | HEIGHT: 61 IN | WEIGHT: 227.75 LBS | SYSTOLIC BLOOD PRESSURE: 120 MMHG | TEMPERATURE: 98 F | HEART RATE: 77 BPM | BODY MASS INDEX: 43 KG/M2

## 2020-11-13 DIAGNOSIS — Z11.59 ENCOUNTER FOR HEPATITIS C SCREENING TEST FOR LOW RISK PATIENT: ICD-10-CM

## 2020-11-13 DIAGNOSIS — E11.9 TYPE 2 DIABETES MELLITUS WITHOUT COMPLICATION, WITHOUT LONG-TERM CURRENT USE OF INSULIN: ICD-10-CM

## 2020-11-13 DIAGNOSIS — Z23 NEED FOR PROPHYLACTIC VACCINATION AND INOCULATION AGAINST INFLUENZA: Primary | ICD-10-CM

## 2020-11-13 DIAGNOSIS — Z23 NEED FOR SHINGLES VACCINE: ICD-10-CM

## 2020-11-13 PROCEDURE — 1126F AMNT PAIN NOTED NONE PRSNT: CPT | Mod: S$GLB,,, | Performed by: FAMILY MEDICINE

## 2020-11-13 PROCEDURE — 3079F DIAST BP 80-89 MM HG: CPT | Mod: CPTII,S$GLB,, | Performed by: FAMILY MEDICINE

## 2020-11-13 PROCEDURE — 3074F SYST BP LT 130 MM HG: CPT | Mod: CPTII,S$GLB,, | Performed by: FAMILY MEDICINE

## 2020-11-13 PROCEDURE — 3008F PR BODY MASS INDEX (BMI) DOCUMENTED: ICD-10-PCS | Mod: CPTII,S$GLB,, | Performed by: FAMILY MEDICINE

## 2020-11-13 PROCEDURE — 90750 HZV VACC RECOMBINANT IM: CPT | Mod: S$GLB,,, | Performed by: FAMILY MEDICINE

## 2020-11-13 PROCEDURE — 99999 PR PBB SHADOW E&M-EST. PATIENT-LVL V: ICD-10-PCS | Mod: PBBFAC,,, | Performed by: FAMILY MEDICINE

## 2020-11-13 PROCEDURE — 90686 FLU VACCINE (QUAD) GREATER THAN OR EQUAL TO 3YO PRESERVATIVE FREE IM: ICD-10-PCS | Mod: S$GLB,,, | Performed by: FAMILY MEDICINE

## 2020-11-13 PROCEDURE — 99214 PR OFFICE/OUTPT VISIT, EST, LEVL IV, 30-39 MIN: ICD-10-PCS | Mod: 25,S$GLB,, | Performed by: FAMILY MEDICINE

## 2020-11-13 PROCEDURE — 90750 ZOSTER RECOMBINANT VACCINE: ICD-10-PCS | Mod: S$GLB,,, | Performed by: FAMILY MEDICINE

## 2020-11-13 PROCEDURE — 90471 FLU VACCINE (QUAD) GREATER THAN OR EQUAL TO 3YO PRESERVATIVE FREE IM: ICD-10-PCS | Mod: S$GLB,,, | Performed by: FAMILY MEDICINE

## 2020-11-13 PROCEDURE — 99999 PR PBB SHADOW E&M-EST. PATIENT-LVL V: CPT | Mod: PBBFAC,,, | Performed by: FAMILY MEDICINE

## 2020-11-13 PROCEDURE — 3079F PR MOST RECENT DIASTOLIC BLOOD PRESSURE 80-89 MM HG: ICD-10-PCS | Mod: CPTII,S$GLB,, | Performed by: FAMILY MEDICINE

## 2020-11-13 PROCEDURE — 90686 IIV4 VACC NO PRSV 0.5 ML IM: CPT | Mod: S$GLB,,, | Performed by: FAMILY MEDICINE

## 2020-11-13 PROCEDURE — 90471 IMMUNIZATION ADMIN: CPT | Mod: S$GLB,,, | Performed by: FAMILY MEDICINE

## 2020-11-13 PROCEDURE — 99214 OFFICE O/P EST MOD 30 MIN: CPT | Mod: 25,S$GLB,, | Performed by: FAMILY MEDICINE

## 2020-11-13 PROCEDURE — 3044F HG A1C LEVEL LT 7.0%: CPT | Mod: CPTII,S$GLB,, | Performed by: FAMILY MEDICINE

## 2020-11-13 PROCEDURE — 3008F BODY MASS INDEX DOCD: CPT | Mod: CPTII,S$GLB,, | Performed by: FAMILY MEDICINE

## 2020-11-13 PROCEDURE — 3044F PR MOST RECENT HEMOGLOBIN A1C LEVEL <7.0%: ICD-10-PCS | Mod: CPTII,S$GLB,, | Performed by: FAMILY MEDICINE

## 2020-11-13 PROCEDURE — 1126F PR PAIN SEVERITY QUANTIFIED, NO PAIN PRESENT: ICD-10-PCS | Mod: S$GLB,,, | Performed by: FAMILY MEDICINE

## 2020-11-13 PROCEDURE — 90472 ZOSTER RECOMBINANT VACCINE: ICD-10-PCS | Mod: S$GLB,,, | Performed by: FAMILY MEDICINE

## 2020-11-13 PROCEDURE — 3074F PR MOST RECENT SYSTOLIC BLOOD PRESSURE < 130 MM HG: ICD-10-PCS | Mod: CPTII,S$GLB,, | Performed by: FAMILY MEDICINE

## 2020-11-13 PROCEDURE — 90472 IMMUNIZATION ADMIN EACH ADD: CPT | Mod: S$GLB,,, | Performed by: FAMILY MEDICINE

## 2020-11-13 NOTE — PROGRESS NOTES
Routine Office Visit    Patient Name: Africa Jennings    : 1970  MRN: [id number]    Subjective:  Africa is a 50 y.o. female who presents today for     1. Diabetes follow-up - Patient has no issues/concerns. She is doing well on current medication regimen. She is here for blood work follow-up. She has been making changes to her diet and eating more vegetables. She likes zucchini and squash. She admits that she needs to exercise more frequently  2. Plantar fascitis - Patient has pain from plantar fascitis. She has been trying to stretch and use the proper shoes. It is still painful. She is unable to walk regularly.     Answers for HPI/ROS submitted by the patient on 2020   activity change: No  unexpected weight change: No  rhinorrhea: No  trouble swallowing: No  visual disturbance: No  chest tightness: No  polyuria: No  difficulty urinating: No  menstrual problem: No  joint swelling: No  confusion: No  dysphoric mood: No      Review of Systems   Constitutional: Negative for chills and fever.   HENT: Negative for congestion and hearing loss.    Eyes: Negative for blurred vision and discharge.   Respiratory: Negative for cough and wheezing.    Cardiovascular: Negative for chest pain and palpitations.   Gastrointestinal: Negative for abdominal pain, blood in stool, constipation, diarrhea, heartburn, nausea and vomiting.   Genitourinary: Negative for dysuria and hematuria.   Musculoskeletal: Negative for myalgias and neck pain.   Skin: Negative for itching and rash.   Neurological: Negative for dizziness, weakness and headaches.   Endo/Heme/Allergies: Negative for polydipsia.   Psychiatric/Behavioral: Negative for depression.       Active Problem List  Patient Active Problem List   Diagnosis    Essential hypertension    Type 2 diabetes mellitus without complication, without long-term current use of insulin    Obstructive sleep apnea (adult) (pediatric)    Morbid obesity with BMI of  40.0-44.9, adult    Gastroesophageal reflux disease without esophagitis    Pyloric stenosis    Encounter for screening colonoscopy    Lumbar radiculopathy    Lumbar spondylosis    DDD (degenerative disc disease), lumbar    Pneumonia of both lungs due to infectious organism    Acute respiratory failure with hypoxemia       Past Surgical History  Past Surgical History:   Procedure Laterality Date    breast reduction      CHOLECYSTECTOMY      laprascopic    ESOPHAGOGASTRODUODENOSCOPY  8/18/14    HYSTERECTOMY  2007    laprascopic, total for fibroids.  Dr Polo    OOPHORECTOMY      TOTAL REDUCTION MAMMOPLASTY         Family History  Family History   Problem Relation Age of Onset    Diabetes Maternal Grandmother     Heart disease Maternal Grandmother     Hypertension Maternal Grandmother     Crohn's disease Sister     Diabetes Mother     Hypertension Mother     Heart disease Mother     Cancer Father 58        Prostate    Breast cancer Sister     Amblyopia Neg Hx     Blindness Neg Hx     Cataracts Neg Hx     Glaucoma Neg Hx     Macular degeneration Neg Hx     Retinal detachment Neg Hx     Strabismus Neg Hx        Social History  Social History     Socioeconomic History    Marital status:      Spouse name: Not on file    Number of children: Not on file    Years of education: Not on file    Highest education level: Not on file   Occupational History    Not on file   Social Needs    Financial resource strain: Hard    Food insecurity     Worry: Often true     Inability: Often true    Transportation needs     Medical: No     Non-medical: No   Tobacco Use    Smoking status: Never Smoker    Smokeless tobacco: Never Used   Substance and Sexual Activity    Alcohol use: No     Frequency: Monthly or less     Drinks per session: Patient refused     Binge frequency: Never     Comment: socially    Drug use: No    Sexual activity: Yes     Partners: Male     Birth control/protection:  Surgical   Lifestyle    Physical activity     Days per week: 3 days     Minutes per session: 60 min    Stress: To some extent   Relationships    Social connections     Talks on phone: More than three times a week     Gets together: Once a week     Attends [employer] witness service: More than 4 times per year     Active member of club or organization: Yes     Attends meetings of clubs or organizations: More than 4 times per year     Relationship status:    Other Topics Concern    Not on file   Social History Narrative     since 2000.  2 children: 26 and 21.    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   Current Outpatient Medications   Medication Sig    (Magic mouthwash) 1:1:1 Benadryl 12.5mg/5ml liq, aluminum & magnesium hydroxide-simehticone (Maalox), lidocaine viscous 2% Swish and spit 10 mLs every 4 (four) hours as needed. for sore throat    albuterol (PROVENTIL/VENTOLIN HFA) 90 mcg/actuation inhaler Inhale 2 puffs into the lungs every 4 (four) hours as needed for Wheezing or Shortness of Breath. Rescue    amLODIPine (NORVASC) 10 MG tablet Take 1 tablet (10 mg total) by mouth once daily.    atorvastatin (LIPITOR) 20 MG tablet Take 1 tablet (20 mg total) by mouth once daily.    blood sugar diagnostic Strp 1 strip by Misc.(Non-Drug; Combo Route) route 2 (two) times daily with meals.    blood sugar diagnostic Strp 1 strip by Misc.(Non-Drug; Combo Route) route 2 (two) times daily with meals.    blood-glucose meter kit Use as instructed    cetirizine (ZYRTEC) 10 MG tablet Take 1 tablet (10 mg total) by mouth once daily.    ergocalciferol (ERGOCALCIFEROL) 50,000 unit Cap TAKE 1 CAPSULE (50,000 UNITS TOTAL) BY MOUTH TWICE A WEEK.    fluticasone propionate (FLONASE) 50 mcg/actuation nasal spray INSTILL 1 SPRAY (50 MCG TOTAL) IN EACH NOSTRIL ONCE DAILY.    gabapentin (NEURONTIN) 300 MG capsule Take 2 capsules (600 mg total)  "by mouth 3 (three) times daily.    lancets [facility] Use to test blood sugar once daily.    lancing device Misc 1 Device by Misc.(Non-Drug; Combo Route) route 2 (two) times daily with meals.    meloxicam (MOBIC) 15 MG tablet TAKE 1 TABLET BY MOUTH ONCE DAILY WITH FOOD FOR THE NEXT 3 WEEKS    metFORMIN (GLUCOPHAGE-XR) 500 MG XR 24hr tablet Take 1 tablet (500 mg total) by mouth 2 (two) times daily with meals.    neomycin-polymyxin-dexamethasone (MAXITROL) 3.5mg/mL-10,000 unit/mL-0.1 % DrpS     nystatin (MYCOSTATIN) ointment Apply topically 2 (two) times daily.      omeprazole (PRILOSEC) 40 MG capsule Take 1 capsule (40 mg total) by mouth once daily.    ondansetron (ZOFRAN) 4 MG tablet Take 1 tablet (4 mg total) by mouth every 8 (eight) hours as needed.    OZEMPIC 0.25 mg or 0.5 mg(2 mg/1.5 mL) PnIj INJECT 0.25 MG INTO THE SKIN EVERY 7 DAYS. X 2 WEEKS, THEN INCREASE TO 0.5MG ONCE WEEKLY    pen needle, diabetic (PEN NEEDLE) 32 gauge x 5/32" Ndle 1 each by Misc.(Non-Drug; Combo Route) route once a week.    tobramycin-dexamethasone 0.3-0.1% (TOBRADEX) 0.3-0.1 % DrpS INSTILL 1 DROP INTO BOTH EYES 4 TIMES A DAY    triamcinolone acetonide 0.1% (KENALOG) 0.1 % cream Apply topically 2 (two) times daily. Apply topically 2 (two) times daily.    valsartan (DIOVAN) 80 MG tablet TAKE 1 TABLET BY MOUTH EVERY DAY     No current facility-administered medications for this visit.        Physical Exam  /80 (BP Location: Left arm, Patient Position: Sitting, BP Method: Large (Manual))   Pulse 77   Temp 98.3 °F (36.8 °C) (Oral)   Ht 5' 1" (1.549 m)   Wt 103.3 kg (227 lb 11.8 oz)   LMP  (LMP Unknown)   SpO2 97%   BMI 43.03 kg/m²   Physical Exam  Constitutional:       Appearance: She is well-developed.   HENT:      Head: Normocephalic and atraumatic.   Eyes:      Conjunctiva/sclera: Conjunctivae normal.      Pupils: Pupils are equal, round, and reactive to light.   Neck:      Musculoskeletal: Normal range of motion and " neck supple.   Cardiovascular:      Rate and Rhythm: Normal rate and regular rhythm.      Heart sounds: Normal heart sounds. No murmur. No friction rub. No gallop.    Pulmonary:      Effort: No respiratory distress.      Breath sounds: Normal breath sounds.   Abdominal:      General: Bowel sounds are normal. There is no distension.      Palpations: Abdomen is soft.      Tenderness: There is no abdominal tenderness.   Musculoskeletal: Normal range of motion.   Lymphadenopathy:      Cervical: No cervical adenopathy.   Skin:     General: Skin is warm.   Neurological:      Mental Status: She is alert and oriented to person, place, and time.           Assessment/Plan:  Africa Jennings is a 50 y.o. female who presents today for :    Problem List Items Addressed This Visit        Endocrine    Type 2 diabetes mellitus without complication, without long-term current use of insulin (Chronic)    Relevant Orders    Comprehensive Metabolic Panel    Hemoglobin A1C  The current medical regimen is effective;  continue present plan and medications.        Other Visit Diagnoses     Need for prophylactic vaccination and inoculation against influenza    -  Primary    Relevant Orders    (In Office Administered) Zoster Recombinant Vaccine (Completed)    Need for shingles vaccine        Relevant Orders    Influenza - Quadrivalent (PF) (Completed)    Encounter for hepatitis C screening test for low risk patient        Relevant Orders    Hepatitis C Antibody          Follow up in about 6 months (around 5/13/2021), or if symptoms worsen or fail to improve.

## 2020-11-13 NOTE — PROGRESS NOTES
Patient tolerated Shingrix and  Flu Shot well. Patient advised to wait 15 minutes. Gave patient VIS information.

## 2020-11-24 ENCOUNTER — PATIENT OUTREACH (OUTPATIENT)
Dept: ADMINISTRATIVE | Facility: HOSPITAL | Age: 50
End: 2020-11-24

## 2021-01-04 DIAGNOSIS — E55.9 VITAMIN D DEFICIENCY: ICD-10-CM

## 2021-01-04 RX ORDER — ERGOCALCIFEROL 1.25 MG/1
CAPSULE ORAL
Qty: 24 CAPSULE | Refills: 2 | Status: SHIPPED | OUTPATIENT
Start: 2021-01-04 | End: 2021-09-21

## 2021-01-08 DIAGNOSIS — E11.9 TYPE 2 DIABETES MELLITUS WITHOUT COMPLICATION, WITHOUT LONG-TERM CURRENT USE OF INSULIN: Chronic | ICD-10-CM

## 2021-01-11 RX ORDER — SEMAGLUTIDE 1.34 MG/ML
1.5 INJECTION, SOLUTION SUBCUTANEOUS WEEKLY
Qty: 4.5 SYRINGE | Refills: 0 | Status: SHIPPED | OUTPATIENT
Start: 2021-01-11 | End: 2021-04-20

## 2021-01-13 ENCOUNTER — CLINICAL SUPPORT (OUTPATIENT)
Dept: FAMILY MEDICINE | Facility: CLINIC | Age: 51
End: 2021-01-13
Payer: COMMERCIAL

## 2021-01-13 DIAGNOSIS — Z23 NEED FOR SHINGLES VACCINE: Primary | ICD-10-CM

## 2021-01-13 PROCEDURE — 90471 ZOSTER RECOMBINANT VACCINE: ICD-10-PCS | Mod: S$GLB,,, | Performed by: FAMILY MEDICINE

## 2021-01-13 PROCEDURE — 99499 UNLISTED E&M SERVICE: CPT | Mod: S$GLB,,, | Performed by: FAMILY MEDICINE

## 2021-01-13 PROCEDURE — 90750 ZOSTER RECOMBINANT VACCINE: ICD-10-PCS | Mod: S$GLB,,, | Performed by: FAMILY MEDICINE

## 2021-01-13 PROCEDURE — 90471 IMMUNIZATION ADMIN: CPT | Mod: S$GLB,,, | Performed by: FAMILY MEDICINE

## 2021-01-13 PROCEDURE — 99499 NO LOS: ICD-10-PCS | Mod: S$GLB,,, | Performed by: FAMILY MEDICINE

## 2021-01-13 PROCEDURE — 90750 HZV VACC RECOMBINANT IM: CPT | Mod: S$GLB,,, | Performed by: FAMILY MEDICINE

## 2021-01-25 DIAGNOSIS — E11.9 TYPE 2 DIABETES MELLITUS WITHOUT COMPLICATION, WITHOUT LONG-TERM CURRENT USE OF INSULIN: ICD-10-CM

## 2021-01-25 RX ORDER — METFORMIN HYDROCHLORIDE 500 MG/1
500 TABLET, EXTENDED RELEASE ORAL 2 TIMES DAILY WITH MEALS
Qty: 180 TABLET | Refills: 1 | Status: SHIPPED | OUTPATIENT
Start: 2021-01-25 | End: 2021-08-02

## 2021-03-02 ENCOUNTER — PATIENT MESSAGE (OUTPATIENT)
Dept: FAMILY MEDICINE | Facility: CLINIC | Age: 51
End: 2021-03-02

## 2021-03-20 ENCOUNTER — IMMUNIZATION (OUTPATIENT)
Dept: PRIMARY CARE CLINIC | Facility: CLINIC | Age: 51
End: 2021-03-20
Payer: COMMERCIAL

## 2021-03-20 DIAGNOSIS — Z23 NEED FOR VACCINATION: Primary | ICD-10-CM

## 2021-03-20 PROCEDURE — 0001A PR IMMUNIZ ADMIN, SARS-COV-2 COVID-19 VACC, 30MCG/0.3ML, 1ST DOSE: ICD-10-PCS | Mod: CV19,S$GLB,, | Performed by: INTERNAL MEDICINE

## 2021-03-20 PROCEDURE — 91300 PR SARS-COV- 2 COVID-19 VACCINE, NO PRSV, 30MCG/0.3ML, IM: CPT | Mod: S$GLB,,, | Performed by: INTERNAL MEDICINE

## 2021-03-20 PROCEDURE — 0001A PR IMMUNIZ ADMIN, SARS-COV-2 COVID-19 VACC, 30MCG/0.3ML, 1ST DOSE: CPT | Mod: CV19,S$GLB,, | Performed by: INTERNAL MEDICINE

## 2021-03-20 PROCEDURE — 91300 PR SARS-COV- 2 COVID-19 VACCINE, NO PRSV, 30MCG/0.3ML, IM: ICD-10-PCS | Mod: S$GLB,,, | Performed by: INTERNAL MEDICINE

## 2021-03-20 RX ADMIN — Medication 0.3 ML: at 09:03

## 2021-04-11 ENCOUNTER — IMMUNIZATION (OUTPATIENT)
Dept: PRIMARY CARE CLINIC | Facility: CLINIC | Age: 51
End: 2021-04-11
Payer: COMMERCIAL

## 2021-04-11 DIAGNOSIS — Z23 NEED FOR VACCINATION: Primary | ICD-10-CM

## 2021-04-11 PROCEDURE — 0002A PR IMMUNIZ ADMIN, SARS-COV-2 COVID-19 VACC, 30MCG/0.3ML, 2ND DOSE: CPT | Mod: CV19,S$GLB,, | Performed by: INTERNAL MEDICINE

## 2021-04-11 PROCEDURE — 0002A PR IMMUNIZ ADMIN, SARS-COV-2 COVID-19 VACC, 30MCG/0.3ML, 2ND DOSE: ICD-10-PCS | Mod: CV19,S$GLB,, | Performed by: INTERNAL MEDICINE

## 2021-04-11 PROCEDURE — 91300 PR SARS-COV- 2 COVID-19 VACCINE, NO PRSV, 30MCG/0.3ML, IM: ICD-10-PCS | Mod: S$GLB,,, | Performed by: INTERNAL MEDICINE

## 2021-04-11 PROCEDURE — 91300 PR SARS-COV- 2 COVID-19 VACCINE, NO PRSV, 30MCG/0.3ML, IM: CPT | Mod: S$GLB,,, | Performed by: INTERNAL MEDICINE

## 2021-04-11 RX ADMIN — Medication 0.3 ML: at 08:04

## 2021-04-19 DIAGNOSIS — E11.9 TYPE 2 DIABETES MELLITUS WITHOUT COMPLICATION, WITHOUT LONG-TERM CURRENT USE OF INSULIN: Chronic | ICD-10-CM

## 2021-04-20 RX ORDER — SEMAGLUTIDE 1.34 MG/ML
1 INJECTION, SOLUTION SUBCUTANEOUS WEEKLY
Qty: 3 ML | Refills: 3 | Status: SHIPPED | OUTPATIENT
Start: 2021-04-20 | End: 2021-05-13

## 2021-04-28 ENCOUNTER — PATIENT MESSAGE (OUTPATIENT)
Dept: OTHER | Facility: OTHER | Age: 51
End: 2021-04-28

## 2021-04-29 ENCOUNTER — PATIENT OUTREACH (OUTPATIENT)
Dept: ADMINISTRATIVE | Facility: HOSPITAL | Age: 51
End: 2021-04-29

## 2021-04-29 DIAGNOSIS — E11.9 TYPE 2 DIABETES MELLITUS WITHOUT COMPLICATION, WITHOUT LONG-TERM CURRENT USE OF INSULIN: Primary | ICD-10-CM

## 2021-04-29 RX ORDER — OMEPRAZOLE 40 MG/1
40 CAPSULE, DELAYED RELEASE ORAL
COMMUNITY
End: 2021-05-07 | Stop reason: SDUPTHER

## 2021-04-29 RX ORDER — ERGOCALCIFEROL 1.25 MG/1
CAPSULE ORAL
COMMUNITY
Start: 2021-01-04 | End: 2021-05-07 | Stop reason: SDUPTHER

## 2021-04-29 RX ORDER — GABAPENTIN 300 MG/1
300 CAPSULE ORAL
COMMUNITY
End: 2022-03-16

## 2021-04-29 RX ORDER — METFORMIN HYDROCHLORIDE 500 MG/1
500 TABLET ORAL
COMMUNITY
End: 2021-05-07 | Stop reason: SDUPTHER

## 2021-04-29 RX ORDER — VALSARTAN 80 MG/1
80 TABLET ORAL
COMMUNITY
End: 2021-05-07 | Stop reason: SDUPTHER

## 2021-04-29 RX ORDER — AMLODIPINE BESYLATE AND ATORVASTATIN CALCIUM 10; 10 MG/1; MG/1
1 TABLET, FILM COATED ORAL
COMMUNITY
End: 2022-01-25

## 2021-04-29 RX ORDER — SEMAGLUTIDE 1.34 MG/ML
INJECTION, SOLUTION SUBCUTANEOUS
COMMUNITY
Start: 2021-04-20 | End: 2021-05-13 | Stop reason: SDUPTHER

## 2021-04-29 RX ORDER — SEMAGLUTIDE 1.34 MG/ML
0.25 INJECTION, SOLUTION SUBCUTANEOUS
COMMUNITY
End: 2021-05-07 | Stop reason: SDUPTHER

## 2021-04-29 RX ORDER — ATORVASTATIN CALCIUM 20 MG/1
20 TABLET, FILM COATED ORAL
COMMUNITY
End: 2021-05-07 | Stop reason: SDUPTHER

## 2021-05-01 ENCOUNTER — PATIENT OUTREACH (OUTPATIENT)
Dept: ADMINISTRATIVE | Facility: OTHER | Age: 51
End: 2021-05-01

## 2021-05-01 DIAGNOSIS — E11.9 TYPE 2 DIABETES MELLITUS WITHOUT COMPLICATION, WITHOUT LONG-TERM CURRENT USE OF INSULIN: Primary | ICD-10-CM

## 2021-05-06 ENCOUNTER — LAB VISIT (OUTPATIENT)
Dept: LAB | Facility: HOSPITAL | Age: 51
End: 2021-05-06
Attending: FAMILY MEDICINE
Payer: COMMERCIAL

## 2021-05-06 DIAGNOSIS — E11.9 TYPE 2 DIABETES MELLITUS WITHOUT COMPLICATION, WITHOUT LONG-TERM CURRENT USE OF INSULIN: ICD-10-CM

## 2021-05-06 DIAGNOSIS — Z11.59 ENCOUNTER FOR HEPATITIS C SCREENING TEST FOR LOW RISK PATIENT: ICD-10-CM

## 2021-05-06 LAB
ALBUMIN SERPL BCP-MCNC: 3.9 G/DL (ref 3.5–5.2)
ALP SERPL-CCNC: 75 U/L (ref 55–135)
ALT SERPL W/O P-5'-P-CCNC: 25 U/L (ref 10–44)
ANION GAP SERPL CALC-SCNC: 7 MMOL/L (ref 8–16)
AST SERPL-CCNC: 16 U/L (ref 10–40)
BILIRUB SERPL-MCNC: 0.4 MG/DL (ref 0.1–1)
BUN SERPL-MCNC: 11 MG/DL (ref 6–20)
CALCIUM SERPL-MCNC: 9.6 MG/DL (ref 8.7–10.5)
CHLORIDE SERPL-SCNC: 102 MMOL/L (ref 95–110)
CO2 SERPL-SCNC: 32 MMOL/L (ref 23–29)
CREAT SERPL-MCNC: 0.8 MG/DL (ref 0.5–1.4)
EST. GFR  (AFRICAN AMERICAN): >60 ML/MIN/1.73 M^2
EST. GFR  (NON AFRICAN AMERICAN): >60 ML/MIN/1.73 M^2
ESTIMATED AVG GLUCOSE: 148 MG/DL (ref 68–131)
GLUCOSE SERPL-MCNC: 104 MG/DL (ref 70–110)
HBA1C MFR BLD: 6.8 % (ref 4–5.6)
HCV AB SERPL QL IA: NEGATIVE
POTASSIUM SERPL-SCNC: 3.9 MMOL/L (ref 3.5–5.1)
PROT SERPL-MCNC: 7.7 G/DL (ref 6–8.4)
SODIUM SERPL-SCNC: 141 MMOL/L (ref 136–145)

## 2021-05-06 PROCEDURE — 86803 HEPATITIS C AB TEST: CPT | Performed by: FAMILY MEDICINE

## 2021-05-06 PROCEDURE — 83036 HEMOGLOBIN GLYCOSYLATED A1C: CPT | Performed by: FAMILY MEDICINE

## 2021-05-06 PROCEDURE — 80053 COMPREHEN METABOLIC PANEL: CPT | Performed by: FAMILY MEDICINE

## 2021-05-06 PROCEDURE — 36415 COLL VENOUS BLD VENIPUNCTURE: CPT | Mod: PO | Performed by: FAMILY MEDICINE

## 2021-05-07 ENCOUNTER — OFFICE VISIT (OUTPATIENT)
Dept: OBSTETRICS AND GYNECOLOGY | Facility: CLINIC | Age: 51
End: 2021-05-07
Payer: COMMERCIAL

## 2021-05-07 VITALS
BODY MASS INDEX: 43.28 KG/M2 | SYSTOLIC BLOOD PRESSURE: 120 MMHG | WEIGHT: 229.25 LBS | DIASTOLIC BLOOD PRESSURE: 80 MMHG | HEIGHT: 61 IN

## 2021-05-07 DIAGNOSIS — N95.1 HOT FLASH, MENOPAUSAL: ICD-10-CM

## 2021-05-07 DIAGNOSIS — N95.1 MENOPAUSE SYNDROME: ICD-10-CM

## 2021-05-07 DIAGNOSIS — Z01.419 WELL WOMAN EXAM WITH ROUTINE GYNECOLOGICAL EXAM: Primary | ICD-10-CM

## 2021-05-07 DIAGNOSIS — Z12.31 SCREENING MAMMOGRAM, ENCOUNTER FOR: ICD-10-CM

## 2021-05-07 PROCEDURE — 3008F BODY MASS INDEX DOCD: CPT | Mod: CPTII,S$GLB,, | Performed by: OBSTETRICS & GYNECOLOGY

## 2021-05-07 PROCEDURE — 99386 PR PREVENTIVE VISIT,NEW,40-64: ICD-10-PCS | Mod: S$GLB,,, | Performed by: OBSTETRICS & GYNECOLOGY

## 2021-05-07 PROCEDURE — 3008F PR BODY MASS INDEX (BMI) DOCUMENTED: ICD-10-PCS | Mod: CPTII,S$GLB,, | Performed by: OBSTETRICS & GYNECOLOGY

## 2021-05-07 PROCEDURE — 99386 PREV VISIT NEW AGE 40-64: CPT | Mod: S$GLB,,, | Performed by: OBSTETRICS & GYNECOLOGY

## 2021-05-07 PROCEDURE — 1126F PR PAIN SEVERITY QUANTIFIED, NO PAIN PRESENT: ICD-10-PCS | Mod: S$GLB,,, | Performed by: OBSTETRICS & GYNECOLOGY

## 2021-05-07 PROCEDURE — 99999 PR PBB SHADOW E&M-EST. PATIENT-LVL V: ICD-10-PCS | Mod: PBBFAC,,, | Performed by: OBSTETRICS & GYNECOLOGY

## 2021-05-07 PROCEDURE — 1126F AMNT PAIN NOTED NONE PRSNT: CPT | Mod: S$GLB,,, | Performed by: OBSTETRICS & GYNECOLOGY

## 2021-05-07 PROCEDURE — 99999 PR PBB SHADOW E&M-EST. PATIENT-LVL V: CPT | Mod: PBBFAC,,, | Performed by: OBSTETRICS & GYNECOLOGY

## 2021-05-07 RX ORDER — ESTRADIOL 1 MG/1
1 TABLET ORAL DAILY
Qty: 30 TABLET | Refills: 3 | Status: SHIPPED | OUTPATIENT
Start: 2021-05-07 | End: 2022-02-22 | Stop reason: SDUPTHER

## 2021-05-12 ENCOUNTER — PATIENT MESSAGE (OUTPATIENT)
Dept: OTHER | Facility: OTHER | Age: 51
End: 2021-05-12

## 2021-05-13 ENCOUNTER — OFFICE VISIT (OUTPATIENT)
Dept: FAMILY MEDICINE | Facility: CLINIC | Age: 51
End: 2021-05-13
Payer: COMMERCIAL

## 2021-05-13 VITALS
HEART RATE: 70 BPM | OXYGEN SATURATION: 96 % | TEMPERATURE: 98 F | WEIGHT: 229.25 LBS | DIASTOLIC BLOOD PRESSURE: 80 MMHG | BODY MASS INDEX: 43.28 KG/M2 | HEIGHT: 61 IN | SYSTOLIC BLOOD PRESSURE: 120 MMHG

## 2021-05-13 DIAGNOSIS — M51.36 DDD (DEGENERATIVE DISC DISEASE), LUMBAR: ICD-10-CM

## 2021-05-13 DIAGNOSIS — I10 ESSENTIAL HYPERTENSION: Chronic | ICD-10-CM

## 2021-05-13 DIAGNOSIS — E11.9 TYPE 2 DIABETES MELLITUS WITHOUT COMPLICATION, WITHOUT LONG-TERM CURRENT USE OF INSULIN: Primary | ICD-10-CM

## 2021-05-13 DIAGNOSIS — K21.9 GASTROESOPHAGEAL REFLUX DISEASE WITHOUT ESOPHAGITIS: Chronic | ICD-10-CM

## 2021-05-13 DIAGNOSIS — E66.01 MORBID OBESITY WITH BMI OF 40.0-44.9, ADULT: Chronic | ICD-10-CM

## 2021-05-13 DIAGNOSIS — Z23 NEED FOR DIPHTHERIA-TETANUS-PERTUSSIS (TDAP) VACCINE: ICD-10-CM

## 2021-05-13 PROCEDURE — 99999 PR PBB SHADOW E&M-EST. PATIENT-LVL V: ICD-10-PCS | Mod: PBBFAC,,, | Performed by: FAMILY MEDICINE

## 2021-05-13 PROCEDURE — 3079F PR MOST RECENT DIASTOLIC BLOOD PRESSURE 80-89 MM HG: ICD-10-PCS | Mod: CPTII,S$GLB,, | Performed by: FAMILY MEDICINE

## 2021-05-13 PROCEDURE — 3008F BODY MASS INDEX DOCD: CPT | Mod: CPTII,S$GLB,, | Performed by: FAMILY MEDICINE

## 2021-05-13 PROCEDURE — 1126F AMNT PAIN NOTED NONE PRSNT: CPT | Mod: S$GLB,,, | Performed by: FAMILY MEDICINE

## 2021-05-13 PROCEDURE — 99213 PR OFFICE/OUTPT VISIT, EST, LEVL III, 20-29 MIN: ICD-10-PCS | Mod: 25,S$GLB,, | Performed by: FAMILY MEDICINE

## 2021-05-13 PROCEDURE — 3008F PR BODY MASS INDEX (BMI) DOCUMENTED: ICD-10-PCS | Mod: CPTII,S$GLB,, | Performed by: FAMILY MEDICINE

## 2021-05-13 PROCEDURE — 3044F PR MOST RECENT HEMOGLOBIN A1C LEVEL <7.0%: ICD-10-PCS | Mod: CPTII,S$GLB,, | Performed by: FAMILY MEDICINE

## 2021-05-13 PROCEDURE — 99213 OFFICE O/P EST LOW 20 MIN: CPT | Mod: 25,S$GLB,, | Performed by: FAMILY MEDICINE

## 2021-05-13 PROCEDURE — 90471 TDAP VACCINE GREATER THAN OR EQUAL TO 7YO IM: ICD-10-PCS | Mod: S$GLB,,, | Performed by: FAMILY MEDICINE

## 2021-05-13 PROCEDURE — 99999 PR PBB SHADOW E&M-EST. PATIENT-LVL V: CPT | Mod: PBBFAC,,, | Performed by: FAMILY MEDICINE

## 2021-05-13 PROCEDURE — 3074F PR MOST RECENT SYSTOLIC BLOOD PRESSURE < 130 MM HG: ICD-10-PCS | Mod: CPTII,S$GLB,, | Performed by: FAMILY MEDICINE

## 2021-05-13 PROCEDURE — 90715 TDAP VACCINE GREATER THAN OR EQUAL TO 7YO IM: ICD-10-PCS | Mod: S$GLB,,, | Performed by: FAMILY MEDICINE

## 2021-05-13 PROCEDURE — 3074F SYST BP LT 130 MM HG: CPT | Mod: CPTII,S$GLB,, | Performed by: FAMILY MEDICINE

## 2021-05-13 PROCEDURE — 90715 TDAP VACCINE 7 YRS/> IM: CPT | Mod: S$GLB,,, | Performed by: FAMILY MEDICINE

## 2021-05-13 PROCEDURE — 3079F DIAST BP 80-89 MM HG: CPT | Mod: CPTII,S$GLB,, | Performed by: FAMILY MEDICINE

## 2021-05-13 PROCEDURE — 3044F HG A1C LEVEL LT 7.0%: CPT | Mod: CPTII,S$GLB,, | Performed by: FAMILY MEDICINE

## 2021-05-13 PROCEDURE — 90471 IMMUNIZATION ADMIN: CPT | Mod: S$GLB,,, | Performed by: FAMILY MEDICINE

## 2021-05-13 PROCEDURE — 1126F PR PAIN SEVERITY QUANTIFIED, NO PAIN PRESENT: ICD-10-PCS | Mod: S$GLB,,, | Performed by: FAMILY MEDICINE

## 2021-05-13 RX ORDER — SEMAGLUTIDE 1.34 MG/ML
1 INJECTION, SOLUTION SUBCUTANEOUS
Qty: 3 PEN | Refills: 0 | Status: SHIPPED | OUTPATIENT
Start: 2021-05-13 | End: 2021-06-04

## 2021-05-13 RX ORDER — SEMAGLUTIDE 1.34 MG/ML
INJECTION, SOLUTION SUBCUTANEOUS
Qty: 2 PEN | Refills: 3 | Status: SHIPPED | OUTPATIENT
Start: 2021-05-13 | End: 2021-05-13

## 2021-05-19 LAB
LEFT EYE DM RETINOPATHY: NEGATIVE
RIGHT EYE DM RETINOPATHY: NEGATIVE

## 2021-05-25 ENCOUNTER — PATIENT OUTREACH (OUTPATIENT)
Dept: ADMINISTRATIVE | Facility: HOSPITAL | Age: 51
End: 2021-05-25

## 2021-05-25 RX ORDER — PREDNISOLONE ACETATE 10 MG/ML
SUSPENSION/ DROPS OPHTHALMIC
COMMUNITY
Start: 2021-05-19

## 2021-05-25 RX ORDER — SEMAGLUTIDE 1.34 MG/ML
1 INJECTION, SOLUTION SUBCUTANEOUS
COMMUNITY
Start: 2021-05-13 | End: 2022-01-25

## 2021-06-04 DIAGNOSIS — E11.9 TYPE 2 DIABETES MELLITUS WITHOUT COMPLICATION, WITHOUT LONG-TERM CURRENT USE OF INSULIN: ICD-10-CM

## 2021-06-04 RX ORDER — SEMAGLUTIDE 1.34 MG/ML
INJECTION, SOLUTION SUBCUTANEOUS
Qty: 3 PEN | Refills: 3 | Status: SHIPPED | OUTPATIENT
Start: 2021-06-04 | End: 2021-12-08

## 2021-08-25 ENCOUNTER — PATIENT MESSAGE (OUTPATIENT)
Dept: FAMILY MEDICINE | Facility: CLINIC | Age: 51
End: 2021-08-25

## 2021-08-26 ENCOUNTER — PATIENT MESSAGE (OUTPATIENT)
Dept: FAMILY MEDICINE | Facility: CLINIC | Age: 51
End: 2021-08-26

## 2021-09-20 ENCOUNTER — HOSPITAL ENCOUNTER (OUTPATIENT)
Dept: RADIOLOGY | Facility: HOSPITAL | Age: 51
Discharge: HOME | End: 2021-09-20
Attending: OBSTETRICS & GYNECOLOGY
Payer: COMMERCIAL

## 2021-09-20 DIAGNOSIS — Z12.31 SCREENING MAMMOGRAM, ENCOUNTER FOR: ICD-10-CM

## 2021-09-20 PROCEDURE — 77067 SCR MAMMO BI INCL CAD: CPT | Mod: 26,,, | Performed by: RADIOLOGY

## 2021-09-20 PROCEDURE — 77067 SCR MAMMO BI INCL CAD: CPT | Mod: TC,PO

## 2021-09-20 PROCEDURE — 77063 BREAST TOMOSYNTHESIS BI: CPT | Mod: 26,,, | Performed by: RADIOLOGY

## 2021-09-20 PROCEDURE — 77067 MAMMO DIGITAL SCREENING BILAT WITH TOMO: ICD-10-PCS | Mod: 26,,, | Performed by: RADIOLOGY

## 2021-09-20 PROCEDURE — 77063 MAMMO DIGITAL SCREENING BILAT WITH TOMO: ICD-10-PCS | Mod: 26,,, | Performed by: RADIOLOGY

## 2021-09-27 ENCOUNTER — OCCUPATIONAL HEALTH (OUTPATIENT)
Dept: URGENT CARE | Facility: CLINIC | Age: 51
End: 2021-09-27

## 2021-09-27 DIAGNOSIS — Z02.89 ENCOUNTER FOR PHYSICAL EXAMINATION RELATED TO EMPLOYMENT: ICD-10-CM

## 2021-09-27 PROCEDURE — 97750 PHYSICAL PERFORMANCE TEST: CPT | Mod: S$GLB,,, | Performed by: PREVENTIVE MEDICINE

## 2021-09-27 PROCEDURE — 97750 PR IPCS BIODEX: ICD-10-PCS | Mod: S$GLB,,, | Performed by: PREVENTIVE MEDICINE

## 2021-10-12 ENCOUNTER — OFFICE VISIT (OUTPATIENT)
Dept: PODIATRY | Facility: CLINIC | Age: 51
End: 2021-10-12
Payer: COMMERCIAL

## 2021-10-12 VITALS — HEIGHT: 61 IN | BODY MASS INDEX: 43.28 KG/M2 | WEIGHT: 229.25 LBS

## 2021-10-12 DIAGNOSIS — M20.5X1 HALLUX LIMITUS, ACQUIRED, RIGHT: ICD-10-CM

## 2021-10-12 DIAGNOSIS — M20.41 HAMMER TOES OF BOTH FEET: ICD-10-CM

## 2021-10-12 DIAGNOSIS — M21.41 PES PLANUS OF BOTH FEET: ICD-10-CM

## 2021-10-12 DIAGNOSIS — M20.5X2 HALLUX LIMITUS, ACQUIRED, LEFT: ICD-10-CM

## 2021-10-12 DIAGNOSIS — E11.9 COMPREHENSIVE DIABETIC FOOT EXAMINATION, TYPE 2 DM, ENCOUNTER FOR: Primary | ICD-10-CM

## 2021-10-12 DIAGNOSIS — M21.42 PES PLANUS OF BOTH FEET: ICD-10-CM

## 2021-10-12 DIAGNOSIS — M20.42 HAMMER TOES OF BOTH FEET: ICD-10-CM

## 2021-10-12 PROCEDURE — 99213 OFFICE O/P EST LOW 20 MIN: CPT | Mod: S$GLB,,, | Performed by: PODIATRIST

## 2021-10-12 PROCEDURE — 1160F PR REVIEW ALL MEDS BY PRESCRIBER/CLIN PHARMACIST DOCUMENTED: ICD-10-PCS | Mod: CPTII,S$GLB,, | Performed by: PODIATRIST

## 2021-10-12 PROCEDURE — 3008F PR BODY MASS INDEX (BMI) DOCUMENTED: ICD-10-PCS | Mod: CPTII,S$GLB,, | Performed by: PODIATRIST

## 2021-10-12 PROCEDURE — 1160F RVW MEDS BY RX/DR IN RCRD: CPT | Mod: CPTII,S$GLB,, | Performed by: PODIATRIST

## 2021-10-12 PROCEDURE — 99999 PR PBB SHADOW E&M-EST. PATIENT-LVL III: CPT | Mod: PBBFAC,,, | Performed by: PODIATRIST

## 2021-10-12 PROCEDURE — 3066F PR DOCUMENTATION OF TREATMENT FOR NEPHROPATHY: ICD-10-PCS | Mod: CPTII,S$GLB,, | Performed by: PODIATRIST

## 2021-10-12 PROCEDURE — 3061F NEG MICROALBUMINURIA REV: CPT | Mod: CPTII,S$GLB,, | Performed by: PODIATRIST

## 2021-10-12 PROCEDURE — 3061F PR NEG MICROALBUMINURIA RESULT DOCUMENTED/REVIEW: ICD-10-PCS | Mod: CPTII,S$GLB,, | Performed by: PODIATRIST

## 2021-10-12 PROCEDURE — 3044F PR MOST RECENT HEMOGLOBIN A1C LEVEL <7.0%: ICD-10-PCS | Mod: CPTII,S$GLB,, | Performed by: PODIATRIST

## 2021-10-12 PROCEDURE — 3066F NEPHROPATHY DOC TX: CPT | Mod: CPTII,S$GLB,, | Performed by: PODIATRIST

## 2021-10-12 PROCEDURE — 1159F MED LIST DOCD IN RCRD: CPT | Mod: CPTII,S$GLB,, | Performed by: PODIATRIST

## 2021-10-12 PROCEDURE — 3044F HG A1C LEVEL LT 7.0%: CPT | Mod: CPTII,S$GLB,, | Performed by: PODIATRIST

## 2021-10-12 PROCEDURE — 1159F PR MEDICATION LIST DOCUMENTED IN MEDICAL RECORD: ICD-10-PCS | Mod: CPTII,S$GLB,, | Performed by: PODIATRIST

## 2021-10-12 PROCEDURE — 4010F ACE/ARB THERAPY RXD/TAKEN: CPT | Mod: CPTII,S$GLB,, | Performed by: PODIATRIST

## 2021-10-12 PROCEDURE — 4010F PR ACE/ARB THEARPY RXD/TAKEN: ICD-10-PCS | Mod: CPTII,S$GLB,, | Performed by: PODIATRIST

## 2021-10-12 PROCEDURE — 3008F BODY MASS INDEX DOCD: CPT | Mod: CPTII,S$GLB,, | Performed by: PODIATRIST

## 2021-10-12 PROCEDURE — 99999 PR PBB SHADOW E&M-EST. PATIENT-LVL III: ICD-10-PCS | Mod: PBBFAC,,, | Performed by: PODIATRIST

## 2021-10-12 PROCEDURE — 99213 PR OFFICE/OUTPT VISIT, EST, LEVL III, 20-29 MIN: ICD-10-PCS | Mod: S$GLB,,, | Performed by: PODIATRIST

## 2021-10-26 ENCOUNTER — PATIENT MESSAGE (OUTPATIENT)
Dept: ADMINISTRATIVE | Facility: OTHER | Age: 51
End: 2021-10-26
Payer: COMMERCIAL

## 2021-10-27 ENCOUNTER — IMMUNIZATION (OUTPATIENT)
Dept: OBSTETRICS AND GYNECOLOGY | Facility: CLINIC | Age: 51
End: 2021-10-27
Payer: COMMERCIAL

## 2021-10-27 DIAGNOSIS — Z23 NEED FOR VACCINATION: Primary | ICD-10-CM

## 2021-10-27 PROCEDURE — 91300 COVID-19, MRNA, LNP-S, PF, 30 MCG/0.3 ML DOSE VACCINE: CPT | Mod: PBBFAC | Performed by: FAMILY MEDICINE

## 2021-10-27 PROCEDURE — 0003A COVID-19, MRNA, LNP-S, PF, 30 MCG/0.3 ML DOSE VACCINE: CPT | Mod: PBBFAC | Performed by: FAMILY MEDICINE

## 2021-11-08 ENCOUNTER — LAB VISIT (OUTPATIENT)
Dept: LAB | Facility: HOSPITAL | Age: 51
End: 2021-11-08
Attending: FAMILY MEDICINE
Payer: COMMERCIAL

## 2021-11-08 DIAGNOSIS — E11.9 TYPE 2 DIABETES MELLITUS WITHOUT COMPLICATION, WITHOUT LONG-TERM CURRENT USE OF INSULIN: ICD-10-CM

## 2021-11-08 LAB
ALBUMIN SERPL BCP-MCNC: 3.8 G/DL (ref 3.5–5.2)
ALP SERPL-CCNC: 77 U/L (ref 55–135)
ALT SERPL W/O P-5'-P-CCNC: 23 U/L (ref 10–44)
ANION GAP SERPL CALC-SCNC: 10 MMOL/L (ref 8–16)
AST SERPL-CCNC: 24 U/L (ref 10–40)
BASOPHILS # BLD AUTO: 0.03 K/UL (ref 0–0.2)
BASOPHILS NFR BLD: 0.3 % (ref 0–1.9)
BILIRUB SERPL-MCNC: 0.4 MG/DL (ref 0.1–1)
BUN SERPL-MCNC: 15 MG/DL (ref 6–20)
CALCIUM SERPL-MCNC: 9.2 MG/DL (ref 8.7–10.5)
CHLORIDE SERPL-SCNC: 104 MMOL/L (ref 95–110)
CHOLEST SERPL-MCNC: 120 MG/DL (ref 120–199)
CHOLEST/HDLC SERPL: 3.2 {RATIO} (ref 2–5)
CO2 SERPL-SCNC: 27 MMOL/L (ref 23–29)
CREAT SERPL-MCNC: 0.7 MG/DL (ref 0.5–1.4)
DIFFERENTIAL METHOD: ABNORMAL
EOSINOPHIL # BLD AUTO: 0.3 K/UL (ref 0–0.5)
EOSINOPHIL NFR BLD: 2.9 % (ref 0–8)
ERYTHROCYTE [DISTWIDTH] IN BLOOD BY AUTOMATED COUNT: 14.1 % (ref 11.5–14.5)
EST. GFR  (AFRICAN AMERICAN): >60 ML/MIN/1.73 M^2
EST. GFR  (NON AFRICAN AMERICAN): >60 ML/MIN/1.73 M^2
GLUCOSE SERPL-MCNC: 120 MG/DL (ref 70–110)
HCT VFR BLD AUTO: 34.8 % (ref 37–48.5)
HDLC SERPL-MCNC: 37 MG/DL (ref 40–75)
HDLC SERPL: 30.8 % (ref 20–50)
HGB BLD-MCNC: 11 G/DL (ref 12–16)
IMM GRANULOCYTES # BLD AUTO: 0.01 K/UL (ref 0–0.04)
IMM GRANULOCYTES NFR BLD AUTO: 0.1 % (ref 0–0.5)
LDLC SERPL CALC-MCNC: 61 MG/DL (ref 63–159)
LYMPHOCYTES # BLD AUTO: 4.3 K/UL (ref 1–4.8)
LYMPHOCYTES NFR BLD: 46.4 % (ref 18–48)
MCH RBC QN AUTO: 27.5 PG (ref 27–31)
MCHC RBC AUTO-ENTMCNC: 31.6 G/DL (ref 32–36)
MCV RBC AUTO: 87 FL (ref 82–98)
MONOCYTES # BLD AUTO: 0.5 K/UL (ref 0.3–1)
MONOCYTES NFR BLD: 5.9 % (ref 4–15)
NEUTROPHILS # BLD AUTO: 4.1 K/UL (ref 1.8–7.7)
NEUTROPHILS NFR BLD: 44.4 % (ref 38–73)
NONHDLC SERPL-MCNC: 83 MG/DL
NRBC BLD-RTO: 0 /100 WBC
PLATELET # BLD AUTO: 315 K/UL (ref 150–450)
PMV BLD AUTO: 11.8 FL (ref 9.2–12.9)
POTASSIUM SERPL-SCNC: 3.7 MMOL/L (ref 3.5–5.1)
PROT SERPL-MCNC: 7.4 G/DL (ref 6–8.4)
RBC # BLD AUTO: 4 M/UL (ref 4–5.4)
SODIUM SERPL-SCNC: 141 MMOL/L (ref 136–145)
TRIGL SERPL-MCNC: 110 MG/DL (ref 30–150)
TSH SERPL DL<=0.005 MIU/L-ACNC: 1.82 UIU/ML (ref 0.4–4)
WBC # BLD AUTO: 9.22 K/UL (ref 3.9–12.7)

## 2021-11-08 PROCEDURE — 83036 HEMOGLOBIN GLYCOSYLATED A1C: CPT | Performed by: FAMILY MEDICINE

## 2021-11-08 PROCEDURE — 85025 COMPLETE CBC W/AUTO DIFF WBC: CPT | Performed by: FAMILY MEDICINE

## 2021-11-08 PROCEDURE — 80061 LIPID PANEL: CPT | Performed by: FAMILY MEDICINE

## 2021-11-08 PROCEDURE — 36415 COLL VENOUS BLD VENIPUNCTURE: CPT | Mod: PO | Performed by: FAMILY MEDICINE

## 2021-11-08 PROCEDURE — 84443 ASSAY THYROID STIM HORMONE: CPT | Performed by: FAMILY MEDICINE

## 2021-11-08 PROCEDURE — 80053 COMPREHEN METABOLIC PANEL: CPT | Performed by: FAMILY MEDICINE

## 2021-11-09 ENCOUNTER — PATIENT MESSAGE (OUTPATIENT)
Dept: FAMILY MEDICINE | Facility: CLINIC | Age: 51
End: 2021-11-09
Payer: COMMERCIAL

## 2021-11-09 LAB
ESTIMATED AVG GLUCOSE: 143 MG/DL (ref 68–131)
HBA1C MFR BLD: 6.6 % (ref 4–5.6)

## 2021-11-12 ENCOUNTER — PATIENT MESSAGE (OUTPATIENT)
Dept: FAMILY MEDICINE | Facility: CLINIC | Age: 51
End: 2021-11-12

## 2021-11-12 ENCOUNTER — OFFICE VISIT (OUTPATIENT)
Dept: FAMILY MEDICINE | Facility: CLINIC | Age: 51
End: 2021-11-12
Payer: COMMERCIAL

## 2021-11-12 DIAGNOSIS — E11.9 TYPE 2 DIABETES MELLITUS WITHOUT COMPLICATION, WITHOUT LONG-TERM CURRENT USE OF INSULIN: Primary | ICD-10-CM

## 2021-11-12 DIAGNOSIS — I10 ESSENTIAL HYPERTENSION: ICD-10-CM

## 2021-11-12 DIAGNOSIS — E66.01 MORBID OBESITY WITH BMI OF 40.0-44.9, ADULT: ICD-10-CM

## 2021-11-12 PROCEDURE — 3061F NEG MICROALBUMINURIA REV: CPT | Mod: CPTII,95,, | Performed by: FAMILY MEDICINE

## 2021-11-12 PROCEDURE — 3066F PR DOCUMENTATION OF TREATMENT FOR NEPHROPATHY: ICD-10-PCS | Mod: CPTII,95,, | Performed by: FAMILY MEDICINE

## 2021-11-12 PROCEDURE — 3044F PR MOST RECENT HEMOGLOBIN A1C LEVEL <7.0%: ICD-10-PCS | Mod: CPTII,95,, | Performed by: FAMILY MEDICINE

## 2021-11-12 PROCEDURE — 3066F NEPHROPATHY DOC TX: CPT | Mod: CPTII,95,, | Performed by: FAMILY MEDICINE

## 2021-11-12 PROCEDURE — 1160F PR REVIEW ALL MEDS BY PRESCRIBER/CLIN PHARMACIST DOCUMENTED: ICD-10-PCS | Mod: CPTII,95,, | Performed by: FAMILY MEDICINE

## 2021-11-12 PROCEDURE — 4010F PR ACE/ARB THEARPY RXD/TAKEN: ICD-10-PCS | Mod: CPTII,95,, | Performed by: FAMILY MEDICINE

## 2021-11-12 PROCEDURE — 1159F MED LIST DOCD IN RCRD: CPT | Mod: CPTII,95,, | Performed by: FAMILY MEDICINE

## 2021-11-12 PROCEDURE — 1159F PR MEDICATION LIST DOCUMENTED IN MEDICAL RECORD: ICD-10-PCS | Mod: CPTII,95,, | Performed by: FAMILY MEDICINE

## 2021-11-12 PROCEDURE — 1160F RVW MEDS BY RX/DR IN RCRD: CPT | Mod: CPTII,95,, | Performed by: FAMILY MEDICINE

## 2021-11-12 PROCEDURE — 99213 OFFICE O/P EST LOW 20 MIN: CPT | Mod: 95,,, | Performed by: FAMILY MEDICINE

## 2021-11-12 PROCEDURE — 4010F ACE/ARB THERAPY RXD/TAKEN: CPT | Mod: CPTII,95,, | Performed by: FAMILY MEDICINE

## 2021-11-12 PROCEDURE — 99213 PR OFFICE/OUTPT VISIT, EST, LEVL III, 20-29 MIN: ICD-10-PCS | Mod: 95,,, | Performed by: FAMILY MEDICINE

## 2021-11-12 PROCEDURE — 3061F PR NEG MICROALBUMINURIA RESULT DOCUMENTED/REVIEW: ICD-10-PCS | Mod: CPTII,95,, | Performed by: FAMILY MEDICINE

## 2021-11-12 PROCEDURE — 3044F HG A1C LEVEL LT 7.0%: CPT | Mod: CPTII,95,, | Performed by: FAMILY MEDICINE

## 2021-12-10 NOTE — PROGRESS NOTES
Subjective:     Patient ID: Africa Jennings is a 49 y.o. female    Chief Complaint: Back Pain (pt takes OTC medication and no PT or injections ); Low-back Pain; and Leg Pain      Referred by: Self, Aaareferral      HPI:    Initial Encounter (2/3/20):  Africa Jennings is a 49 y.o. female who presents today with chronic right-sided low back and lower extremity pain. This pain has been present for 6-7 months.  No specific inciting event or injury noted.  Patient localizes the pain to the right lumbar region.  Pain radiates to the right posterior and anterior thigh.  She reports associated numbness in this area as well. She also reports weakness of the right lower extremity when ambulating.  She denies any associated bowel or bladder dysfunction.  The pain is constant and worsened with activity.  She states that the pain interrupt her sleep.  Patient does have a history of a similar problem roughly 8 years ago.  She underwent epidural steroid injections with good relief.  This pain is described in detail below.    Physical Therapy:  Not for this episode.    Non-pharmacologic Treatment:  Rest helps         · TENS?  No    Pain Medications:         · Currently taking:  Aleve    · Has tried in the past:      · Has not tried:  Opioids, Tylenol, Muscle relaxants, TCAs, SNRIs, anticonvulsants, topical creams    Blood thinners:  None    Interventional Therapies:   L4-5 interlaminar epidural steroid injection x2    Relevant Surgeries:  None    Affecting sleep?  Yes    Affecting daily activities? yes    Depressive symptoms? no          · SI/HI? No    Work status: Employed    Pain Scores:    Best:       6/10  Worst:     10/10  Usually:   8/10  Today:    4/10    Review of Systems   Constitutional: Negative for activity change, appetite change, chills, fatigue, fever and unexpected weight change.   HENT: Negative for hearing loss.    Eyes: Negative for visual disturbance.   Respiratory: Negative for chest  tightness and shortness of breath.    Cardiovascular: Negative for chest pain.   Gastrointestinal: Negative for abdominal pain, constipation, diarrhea, nausea and vomiting.   Genitourinary: Negative for difficulty urinating.   Musculoskeletal: Positive for back pain, gait problem and myalgias. Negative for neck pain.   Skin: Negative for rash.   Neurological: Positive for weakness and numbness. Negative for dizziness, light-headedness and headaches.   Psychiatric/Behavioral: Positive for sleep disturbance. Negative for hallucinations and suicidal ideas. The patient is not nervous/anxious.        Past Medical History:   Diagnosis Date    Diabetes mellitus     Diabetes mellitus, type 2     Eczema     GERD (gastroesophageal reflux disease)     Hypertension     Impaired fasting blood sugar     YSABEL on CPAP        Past Surgical History:   Procedure Laterality Date    breast reduction      CHOLECYSTECTOMY      laprascopic    ESOPHAGOGASTRODUODENOSCOPY  8/18/14    HYSTERECTOMY  2007    laprascopic, total for fibroids.  Dr Polo    OOPHORECTOMY      TOTAL REDUCTION MAMMOPLASTY         Social History     Socioeconomic History    Marital status:      Spouse name: Not on file    Number of children: Not on file    Years of education: Not on file    Highest education level: Not on file   Occupational History    Not on file   Social Needs    Financial resource strain: Hard    Food insecurity:     Worry: Often true     Inability: Often true    Transportation needs:     Medical: No     Non-medical: No   Tobacco Use    Smoking status: Never Smoker    Smokeless tobacco: Never Used   Substance and Sexual Activity    Alcohol use: No     Frequency: Monthly or less     Drinks per session: Patient refused     Binge frequency: Never     Comment: socially    Drug use: No    Sexual activity: Yes     Partners: Male     Birth control/protection: Surgical   Lifestyle    Physical activity:     Days per week:  3 days     Minutes per session: 60 min    Stress: To some extent   Relationships    Social connections:     Talks on phone: More than three times a week     Gets together: Once a week     Attends Adventism service: Not on file     Active member of club or organization: Yes     Attends meetings of clubs or organizations: More than 4 times per year     Relationship status:    Other Topics Concern    Not on file   Social History Narrative     since 2000.  2 children: 26 and 21.    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 90 tablet 3    blood sugar diagnostic Strp 1 strip by Misc.(Non-Drug; Combo Route) route 2 (two) times daily with meals. 100 strip 11    blood-glucose meter kit Use as instructed 1 each 0    carvedilol (COREG) 12.5 MG tablet TAKE 1 TABLET BY MOUTH TWICE A DAY WITH MEALS 180 tablet 3    cetirizine (ZYRTEC) 10 MG tablet Take 1 tablet (10 mg total) by mouth once daily. 90 tablet 1    dicyclomine (BENTYL) 20 mg tablet TAKE 1 TABLET (20 MG TOTAL) BY MOUTH EVERY 6 (SIX) HOURS. 360 tablet 2    ergocalciferol (VITAMIN D2) 50,000 unit Cap TAKE 1 CAPSULE (50,000 UNITS TOTAL) BY MOUTH TWICE A WEEK. 24 capsule 2    fluticasone propionate (FLONASE) 50 mcg/actuation nasal spray INSTILL 1 SPRAY (50 MCG TOTAL) IN EACH NOSTRIL ONCE DAILY. 16 mL 2    lancets Misc Use to test blood sugar once daily. 300 each 0    lancing device Misc 1 Device by Misc.(Non-Drug; Combo Route) route 2 (two) times daily  "with meals. 1 each 0    metFORMIN (GLUCOPHAGE-XR) 500 MG 24 hr tablet Take 1 tablet (500 mg total) by mouth 2 (two) times daily with meals. 180 tablet 1    neomycin-polymyxin-dexamethasone (MAXITROL) 3.5mg/mL-10,000 unit/mL-0.1 % DrpS       nystatin (MYCOSTATIN) ointment Apply topically 2 (two) times daily.        omeprazole (PRILOSEC) 40 MG capsule Take 1 capsule (40 mg total) by mouth once daily. 90 capsule 1    ondansetron (ZOFRAN) 4 MG tablet Take 1 tablet (4 mg total) by mouth every 8 (eight) hours as needed. 90 tablet 1    pen needle, diabetic (PEN NEEDLE) 32 gauge x 5/32" Ndle 1 each by Misc.(Non-Drug; Combo Route) route once a week. 50 each 1    tobramycin-dexamethasone 0.3-0.1% (TOBRADEX) 0.3-0.1 % DrpS INSTILL 1 DROP INTO BOTH EYES 4 TIMES A DAY  2    triamcinolone acetonide 0.1% (KENALOG) 0.1 % cream Apply topically 2 (two) times daily. Apply topically 2 (two) times daily. 135 g 4     No current facility-administered medications on file prior to visit.        Objective:      /71 (BP Location: Right arm, Patient Position: Sitting, BP Method: Large (Automatic))   Pulse 68   Temp 98.3 °F (36.8 °C)   Ht 5' 1" (1.549 m)   Wt 103 kg (227 lb 1.6 oz)   SpO2 96%   BMI 42.91 kg/m²     Exam:  GEN:  Well developed, well nourished.  No acute distress.  Normal pain behavior.  HEENT:  No trauma.  Mucous membranes moist.  Nares patent bilaterally.  PSYCH: Normal affect. Thought content appropriate.  CHEST:  Breathing symmetric.  No audible wheezing.  ABD: Soft, non-distended.  SKIN:  Warm, pink, dry.  No rash on exposed areas.    EXT:  No cyanosis, clubbing, or edema.  No color change or changes in nail or hair growth.  NEURO/MUSCULOSKELETAL:  Fully alert, oriented, and appropriate. Speech normal brant. No cranial nerve deficits.   Gait:  Normal.  No trendelenburg sign bilaterally.   Motor Strength: 5/5 motor strength throughout lower extremities.   Sensory:  No sensory deficit in the lower " extremities.   Reflexes:  1 + and symmetric throughout.  Downgoing Babinski's bilaterally.  No clonus or spasticity.  L-Spine:  Full ROM with pain on extension. Negative pain with axial/facet loading bilaterally.  Negative SLR bilaterally.  Positive femoral stretch on the right.   No TTP over lumbar paraspinals, bilateral SI joints, hips, piriformis muscles, or GTB.          Imaging:  No pertinent imaging at this time    Assessment:       Encounter Diagnoses   Name Primary?    DDD (degenerative disc disease), lumbar Yes    Lumbar spondylosis     Lumbar radiculopathy          Plan:       Africa was seen today for back pain, low-back pain and leg pain.    Diagnoses and all orders for this visit:    DDD (degenerative disc disease), lumbar  -     gabapentin (NEURONTIN) 300 MG capsule; Take 2 capsules (600 mg total) by mouth 3 (three) times daily.  -     MRI Lumbar Spine Without Contrast; Future  -     X-Ray Lumbar Spine Ap Lateral w/Flex Ext; Future    Lumbar spondylosis  -     gabapentin (NEURONTIN) 300 MG capsule; Take 2 capsules (600 mg total) by mouth 3 (three) times daily.  -     MRI Lumbar Spine Without Contrast; Future  -     X-Ray Lumbar Spine Ap Lateral w/Flex Ext; Future    Lumbar radiculopathy  -     gabapentin (NEURONTIN) 300 MG capsule; Take 2 capsules (600 mg total) by mouth 3 (three) times daily.  -     MRI Lumbar Spine Without Contrast; Future  -     X-Ray Lumbar Spine Ap Lateral w/Flex Ext; Future        Africa Ayaka Jennings is a 49 y.o. female with chronic right-sided low back and lower extremity pain. I suspect a right mid to upper lumbar radiculopathy..    1.  Start gabapentin 300 mg q.h.s..  Gradually increase to 600 mg t.i.d. as tolerated/needed.  2.  Lumbar x-rays to evaluate for spondylosis and to rule out instability.  3.  Lumbar MRI to evaluate for right lumbar radiculopathy.  4.  Return to clinic after MRI to discuss results.  May consider right lumbar transforaminal epidural  steroid injection if appropriate.   Clear

## 2021-12-22 ENCOUNTER — TELEPHONE (OUTPATIENT)
Dept: PAIN MEDICINE | Facility: CLINIC | Age: 51
End: 2021-12-22
Payer: COMMERCIAL

## 2021-12-23 ENCOUNTER — TELEPHONE (OUTPATIENT)
Dept: ADMINISTRATIVE | Facility: OTHER | Age: 51
End: 2021-12-23
Payer: COMMERCIAL

## 2021-12-23 ENCOUNTER — PATIENT MESSAGE (OUTPATIENT)
Dept: FAMILY MEDICINE | Facility: CLINIC | Age: 51
End: 2021-12-23
Payer: COMMERCIAL

## 2021-12-23 ENCOUNTER — OFFICE VISIT (OUTPATIENT)
Dept: PAIN MEDICINE | Facility: CLINIC | Age: 51
End: 2021-12-23
Payer: COMMERCIAL

## 2021-12-23 VITALS
HEART RATE: 71 BPM | OXYGEN SATURATION: 98 % | SYSTOLIC BLOOD PRESSURE: 206 MMHG | WEIGHT: 231.06 LBS | HEIGHT: 61 IN | BODY MASS INDEX: 43.63 KG/M2 | DIASTOLIC BLOOD PRESSURE: 122 MMHG | RESPIRATION RATE: 17 BRPM

## 2021-12-23 DIAGNOSIS — M54.16 LUMBAR RADICULOPATHY: Primary | ICD-10-CM

## 2021-12-23 DIAGNOSIS — M47.816 LUMBAR SPONDYLOSIS: ICD-10-CM

## 2021-12-23 PROCEDURE — 99999 PR PBB SHADOW E&M-EST. PATIENT-LVL IV: ICD-10-PCS | Mod: PBBFAC,,, | Performed by: NURSE PRACTITIONER

## 2021-12-23 PROCEDURE — 1159F PR MEDICATION LIST DOCUMENTED IN MEDICAL RECORD: ICD-10-PCS | Mod: CPTII,S$GLB,, | Performed by: NURSE PRACTITIONER

## 2021-12-23 PROCEDURE — 3044F HG A1C LEVEL LT 7.0%: CPT | Mod: CPTII,S$GLB,, | Performed by: NURSE PRACTITIONER

## 2021-12-23 PROCEDURE — 1159F MED LIST DOCD IN RCRD: CPT | Mod: CPTII,S$GLB,, | Performed by: NURSE PRACTITIONER

## 2021-12-23 PROCEDURE — 3008F PR BODY MASS INDEX (BMI) DOCUMENTED: ICD-10-PCS | Mod: CPTII,S$GLB,, | Performed by: NURSE PRACTITIONER

## 2021-12-23 PROCEDURE — 3044F PR MOST RECENT HEMOGLOBIN A1C LEVEL <7.0%: ICD-10-PCS | Mod: CPTII,S$GLB,, | Performed by: NURSE PRACTITIONER

## 2021-12-23 PROCEDURE — 1160F RVW MEDS BY RX/DR IN RCRD: CPT | Mod: CPTII,S$GLB,, | Performed by: NURSE PRACTITIONER

## 2021-12-23 PROCEDURE — 3080F PR MOST RECENT DIASTOLIC BLOOD PRESSURE >= 90 MM HG: ICD-10-PCS | Mod: CPTII,S$GLB,, | Performed by: NURSE PRACTITIONER

## 2021-12-23 PROCEDURE — 3077F PR MOST RECENT SYSTOLIC BLOOD PRESSURE >= 140 MM HG: ICD-10-PCS | Mod: CPTII,S$GLB,, | Performed by: NURSE PRACTITIONER

## 2021-12-23 PROCEDURE — 99999 PR PBB SHADOW E&M-EST. PATIENT-LVL IV: CPT | Mod: PBBFAC,,, | Performed by: NURSE PRACTITIONER

## 2021-12-23 PROCEDURE — 4010F ACE/ARB THERAPY RXD/TAKEN: CPT | Mod: CPTII,S$GLB,, | Performed by: NURSE PRACTITIONER

## 2021-12-23 PROCEDURE — 99213 OFFICE O/P EST LOW 20 MIN: CPT | Mod: S$GLB,,, | Performed by: NURSE PRACTITIONER

## 2021-12-23 PROCEDURE — 1160F PR REVIEW ALL MEDS BY PRESCRIBER/CLIN PHARMACIST DOCUMENTED: ICD-10-PCS | Mod: CPTII,S$GLB,, | Performed by: NURSE PRACTITIONER

## 2021-12-23 PROCEDURE — 3066F PR DOCUMENTATION OF TREATMENT FOR NEPHROPATHY: ICD-10-PCS | Mod: CPTII,S$GLB,, | Performed by: NURSE PRACTITIONER

## 2021-12-23 PROCEDURE — 4010F PR ACE/ARB THEARPY RXD/TAKEN: ICD-10-PCS | Mod: CPTII,S$GLB,, | Performed by: NURSE PRACTITIONER

## 2021-12-23 PROCEDURE — 3066F NEPHROPATHY DOC TX: CPT | Mod: CPTII,S$GLB,, | Performed by: NURSE PRACTITIONER

## 2021-12-23 PROCEDURE — 3077F SYST BP >= 140 MM HG: CPT | Mod: CPTII,S$GLB,, | Performed by: NURSE PRACTITIONER

## 2021-12-23 PROCEDURE — 3061F PR NEG MICROALBUMINURIA RESULT DOCUMENTED/REVIEW: ICD-10-PCS | Mod: CPTII,S$GLB,, | Performed by: NURSE PRACTITIONER

## 2021-12-23 PROCEDURE — 3080F DIAST BP >= 90 MM HG: CPT | Mod: CPTII,S$GLB,, | Performed by: NURSE PRACTITIONER

## 2021-12-23 PROCEDURE — 3061F NEG MICROALBUMINURIA REV: CPT | Mod: CPTII,S$GLB,, | Performed by: NURSE PRACTITIONER

## 2021-12-23 PROCEDURE — 99213 PR OFFICE/OUTPT VISIT, EST, LEVL III, 20-29 MIN: ICD-10-PCS | Mod: S$GLB,,, | Performed by: NURSE PRACTITIONER

## 2021-12-23 PROCEDURE — 3008F BODY MASS INDEX DOCD: CPT | Mod: CPTII,S$GLB,, | Performed by: NURSE PRACTITIONER

## 2021-12-23 NOTE — H&P (VIEW-ONLY)
Subjective:     Patient ID: Africa Jennings is a 51 y.o. female    Chief Complaint: No chief complaint on file.      Referred by: No ref. provider found      HPI:    Interval History 12/23/2021:  Mrs Jennings presents to establish care at [facility]. She states continued lower back pain to left side but leg pain/paresthesias=back pain. She states left lower back pain and radiation in L4/5 pattern but also on right side. She has MRI findings to left L4/5 NF disc protrusion. She has been doing HEP she learned through [facility] for over 6 months straight and continues with no lasting benefit. She is currently taking Mobic 15mg and Neurontin 300mg TID. There is no complaint of loss of b/b or saddle paresthesias.     Interval History (4/13/20):    The patient location is: home  The chief complaint leading to consultation is: follow up  Visit type: Virtual visit with audio.  Patient verbally consented for audio visit.  Total time spent with patient: 15 minutes  Each patient to whom he or she provides medical services by telemedicine is:  (1) informed of the relationship between the physician and patient and the respective role of any other health care provider with respect to management of the patient; and (2) notified that he or she may decline to receive medical services by telemedicine and may withdraw from such care at any time.      She returns today for follow up.  She reports that her right-sided low back and lower extremity pain has returned since last encounter.  She denies any changes in the quality or location of pain.  She continues to report associated numbness.  She denies any associated weakness or bowel bladder dysfunction.  She has attempted gabapentin 600 mg t.i.d..  She states this medication did not provide any relief.  She has also tried goody's powder, Motrin, Tylenol without any relief.        Interval History (2/17/20):  She returns today for follow up and MRI review.  She  reports that her pain is unchanged in quality and location since last encounter.  She does state that the pain has significantly improved and is overall not very bothersome for the time being.        Initial Encounter (2/3/20):  Africa Jennings is a 51 y.o. female who presents today with chronic right-sided low back and lower extremity pain. This pain has been present for 6-7 months.  No specific inciting event or injury noted.  Patient localizes the pain to the right lumbar region.  Pain radiates to the right posterior and anterior thigh.  She reports associated numbness in this area as well. She also reports weakness of the right lower extremity when ambulating.  She denies any associated bowel or bladder dysfunction.  The pain is constant and worsened with activity.  She states that the pain interrupt her sleep.  Patient does have a history of a similar problem roughly 8 years ago.  She underwent epidural steroid injections with good relief.  This pain is described in detail below.    Physical Therapy:  No    Non-pharmacologic Treatment:  Rest helps         · TENS?  No    Pain Medications:         · Currently taking:  Aleve, gabapentin    · Has tried in the past:  Tylenol, Motrin    · Has not tried:  Opioids, Tylenol, Muscle relaxants, TCAs, SNRIs, anticonvulsants, topical creams    Blood thinners:  None    Interventional Therapies:   L4-5 interlaminar epidural steroid injection x2    Relevant Surgeries:  None    Affecting sleep?  Yes    Affecting daily activities? yes    Depressive symptoms? no          · SI/HI? No    Work status: Employed    Pain Scores:    Best:       6/10  Worst:     10/10  Usually:   8/10  Today:    0/10    Review of Systems   Constitutional: Negative for activity change, appetite change, chills, fatigue, fever and unexpected weight change.   HENT: Negative for hearing loss.    Eyes: Negative for visual disturbance.   Respiratory: Negative for chest tightness and shortness of  breath.    Cardiovascular: Negative for chest pain.   Gastrointestinal: Negative for abdominal pain, constipation, diarrhea, nausea and vomiting.   Genitourinary: Negative for difficulty urinating.   Musculoskeletal: Positive for back pain, gait problem and myalgias. Negative for neck pain.   Skin: Negative for rash.   Neurological: Positive for weakness and numbness. Negative for dizziness, light-headedness and headaches.   Psychiatric/Behavioral: Positive for sleep disturbance. Negative for hallucinations and suicidal ideas. The patient is not nervous/anxious.        Past Medical History:   Diagnosis Date    Diabetes mellitus     Diabetes mellitus, type 2     Eczema     GERD (gastroesophageal reflux disease)     Hypertension     Impaired fasting blood sugar     YSABEL on CPAP        Past Surgical History:   Procedure Laterality Date    breast reduction      CHOLECYSTECTOMY      laprascopic    ESOPHAGOGASTRODUODENOSCOPY  8/18/14    HYSTERECTOMY  2007    laprascopic, total for fibroids.  Dr Polo    OOPHORECTOMY      TOTAL REDUCTION MAMMOPLASTY         Social History     Socioeconomic History    Marital status:    Tobacco Use    Smoking status: Never Smoker    Smokeless tobacco: Never Used   Substance and Sexual Activity    Alcohol use: No     Comment: socially    Drug use: No    Sexual activity: Yes     Partners: Male     Birth control/protection: Surgical   Social History Narrative    Was  since 2000.      Has a friend since 2016.    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     Social Determinants of Health     Financial Resource Strain: Medium Risk    Difficulty of Paying Living Expenses: Somewhat hard   Food Insecurity: No Food Insecurity    Worried About Running Out of Food in the Last Year: Never true    Ran Out of Food in the Last Year: Never true   Transportation Needs: Unknown    Lack of Transportation (Non-Medical): No   Physical Activity:  Sufficiently Active    Days of Exercise per Week: 3 days    Minutes of Exercise per Session: 60 min   Stress: Stress Concern Present    Feeling of Stress : To some extent   Social Connections: Unknown    Frequency of Communication with Friends and Family: More than three times a week    Frequency of Social Gatherings with Friends and Family: Once a week    Active Member of Clubs or Organizations: No    Attends Club or Organization Meetings: Patient refused    Marital Status:    Housing Stability: Low Risk     Unable to Pay for Housing in the Last Year: No    Number of Places Lived in the Last Year: 1    Unstable Housing in the Last Year: No       Review of patient's allergies indicates:  No Known Allergies    Current Outpatient Medications on File Prior to Visit   Medication Sig Dispense Refill    (Magic mouthwash) 1:1:1 Benadryl 12.5mg/5ml liq, aluminum & magnesium hydroxide-simehticone (Maalox), lidocaine viscous 2% Swish and spit 10 mLs every 4 (four) hours as needed. for sore throat 360 mL 0    albuterol (PROVENTIL/VENTOLIN HFA) 90 mcg/actuation inhaler Inhale 2 puffs into the lungs every 4 (four) hours as needed for Wheezing or Shortness of Breath. Rescue 6.7 g 0    amLODIPine (NORVASC) 10 MG tablet TAKE 1 TABLET BY MOUTH EVERY DAY 90 tablet 1    amlodipine-atorvastatin (CADUET) 10-10 mg per tablet Take 1 tablet by mouth.      atorvastatin (LIPITOR) 20 MG tablet TAKE 1 TABLET BY MOUTH EVERY DAY 90 tablet 3    blood sugar diagnostic Strp 1 strip by Misc.(Non-Drug; Combo Route) route 2 (two) times daily with meals. 100 strip 11    cetirizine (ZYRTEC) 10 MG tablet Take 1 tablet (10 mg total) by mouth once daily. 90 tablet 1    ergocalciferol (ERGOCALCIFEROL) 50,000 unit Cap TAKE 2 CAPSULE (50,000 UNITS TOTAL)BY MOUTH TWICE A WEEK 24 capsule 2    estradioL (ESTRACE) 1 MG tablet Take 1 tablet (1 mg total) by mouth once daily. 30 tablet 3    fluticasone propionate (FLONASE) 50  "mcg/actuation nasal spray INSTILL 1 SPRAY (50 MCG TOTAL) IN EACH NOSTRIL ONCE DAILY. 16 mL 2    gabapentin (NEURONTIN) 300 MG capsule Take 300 mg by mouth.      lancets Misc Use to test blood sugar once daily. 300 each 0    meloxicam (MOBIC) 15 MG tablet TAKE 1 TABLET BY MOUTH EVERY DAY 30 tablet 2    metFORMIN (GLUCOPHAGE-XR) 500 MG ER 24hr tablet TAKE 1 TABLET BY MOUTH TWICE A DAY WITH MEALS 180 tablet 1    neomycin-polymyxin-dexamethasone (MAXITROL) 3.5mg/mL-10,000 unit/mL-0.1 % DrpS       nystatin (MYCOSTATIN) ointment Apply topically 2 (two) times daily.      omeprazole (PRILOSEC) 40 MG capsule TAKE 1 CAPSULE BY MOUTH EVERY DAY 90 capsule 1    ondansetron (ZOFRAN) 4 MG tablet Take 1 tablet (4 mg total) by mouth every 8 (eight) hours as needed. 90 tablet 1    OZEMPIC 1 mg/dose (2 mg/1.5 mL) PnIj Inject 1 mg into the skin every 7 days.      OZEMPIC 1 mg/dose (4 mg/3 mL) INJECT 1 MG INTO THE SKIN EVERY 7 DAYS. 3 pen 2    pen needle, diabetic (PEN NEEDLE) 32 gauge x 5/32" Ndle 1 each by Misc.(Non-Drug; Combo Route) route once a week. 50 each 1    prednisoLONE acetate (PRED FORTE) 1 % DrpS       tobramycin-dexamethasone 0.3-0.1% (TOBRADEX) 0.3-0.1 % DrpS INSTILL 1 DROP INTO BOTH EYES 4 TIMES A DAY  2    triamcinolone acetonide 0.1% (KENALOG) 0.1 % cream Apply topically 2 (two) times daily. Apply topically 2 (two) times daily. 135 g 4    valsartan-hydrochlorothiazide (DIOVAN-HCT) 160-12.5 mg per tablet Take 1 tablet by mouth once daily. 90 tablet 1    blood-glucose meter kit Use as instructed 1 each 0    lancing device Misc 1 Device by Misc.(Non-Drug; Combo Route) route 2 (two) times daily with meals. 100 each 11     No current facility-administered medications on file prior to visit.       Objective:      BP (!) 206/122 (BP Location: Right arm, Patient Position: Sitting, BP Method: Small (Automatic))   Pulse 71   Resp 17   Ht 5' 1" (1.549 m)   Wt 104.8 kg (231 lb 0.7 oz)   LMP  (LMP Unknown)   " SpO2 98%   BMI 43.65 kg/m²     Exam:  PHYSICAL EXAMINATION:  Voice normal.  Normal affect without evidence of distress.  Back: +facet loading on left, no facet loading on Right. (-) STR bilaterally  Musculoskeletal: Mild PSIS TTP to left, no PSIS TTP on right. (-) JOCELIN. 5/5 BLE strength except for 4/5 EHL on right.   Gait: Normal         Imaging:  Narrative     EXAMINATION:  MRI LUMBAR SPINE WITHOUT CONTRAST    CLINICAL HISTORY:  lumbar radiculopathy; Other intervertebral disc degeneration, lumbar region    TECHNIQUE:  Multiplanar, multisequence MR images were acquired from the thoracolumbar junction to the sacrum without the administration of contrast.    COMPARISON:  MRI of the lumbar spine 09/21/2011.    FINDINGS:  There is again normal lumbar lordosis.  There is no spondylolisthesis or spondylolysis or signs for acute fractures.  The tip of the conus is at L1.  No definite signs for intradural masses.  Paraspinal soft tissues are unremarkable.    L5-S1: There is disc dehydration.  Mild circumferential annular disc bulge.  No significant disc protrusions or spinal stenosis.  There is bilateral mild foraminal narrowing associated with disc bulge.  There is degenerative hypertrophic facet arthropathy.    L4-5: Disc dehydration.  There is a left L4-5 neural foraminal disc protrusion without definite signs for compression of the exiting L4 root in the foramen.  These findings have improved when compared to the previous study.  Within the central canal there is a broad-based mild disc protrusion or disc bulge greater on the left associated with left lateral recess stenosis.  Findings suspicious for compression of the descending left L5 root in the lateral recess (image 21 series 6.  Clinical correlation for left L5 radiculopathy suggested.  Hypertrophic changes of the facet joints and the ligamentum flava resulting in at least a mild central canal spinal stenosis.    L3-4: There is dehydration of the nucleus  pulposis.  Mild circumferential annular disc bulge.  No significant central canal spinal stenosis or foraminal stenosis.  There is facet arthropathy bilaterally.    L2-3: No disc protrusions or spinal stenosis or foraminal stenosis.    L1-2: No disc herniations or spinal stenosis or foraminal stenosis.    T12-L1: No disc herniations or spinal stenosis or foraminal stenosis.    T11-T12: No axial images performed through this region.  However on the sagittal images there is dehydration of nucleus pulposis associated with a circumferential annular disc bulge with mild compression of the thecal sac.  No cord compression however noted.   Impression       1. Multifactorial central canal spinal stenosis L4-5 disc space as above.  In addition there is a left paracentral disc bulge or protrusion associated with left lateral recess stenosis and compression of the descending left L5 root as above.  The left foraminal disc protrusion seen on the previous examination less pronounced when compared to the previous study.  2. Multilevel facet arthropathy as above.  3. Milder spondylotic changes at the rest of the disc spaces as above.      Electronically signed by: Gideon Webber MD  Date: 02/11/2020  Time: 07:42         Assessment:       Encounter Diagnoses   Name Primary?    Lumbar radiculopathy Yes    Lumbar spondylosis          Plan:       Diagnoses and all orders for this visit:    Lumbar radiculopathy  -     Procedure Order to Pain Management; Future    Lumbar spondylosis          - Prior records reviewed  - MRI reviewed  - Pain is more left sided and MRI with L4/5 left sided NF narrowing but having L4/5 radicular symptoms bilaterally  - She has been doing HEP learned through PT 3 times a day for over 6 months and on NSAID and neurontin with minimal improvement   - Will s/f L4/5 ILESI. Pending results may consider targeted TFESI vs MBB (there is facet loading to left)  - Also consider updated Imaging but pain is not new  complaint.   - Can continue Neurontin 300mg TID, advised to take Motrin 800mg for acute pain but to stop mobic  - Advised to continue HEP.   - RTC 2 weeks after procedure for re-evaluation.

## 2022-01-11 ENCOUNTER — HOSPITAL ENCOUNTER (OUTPATIENT)
Facility: OTHER | Age: 52
Discharge: HOME | End: 2022-01-11
Attending: ANESTHESIOLOGY | Admitting: ANESTHESIOLOGY
Payer: COMMERCIAL

## 2022-01-11 VITALS
OXYGEN SATURATION: 99 % | SYSTOLIC BLOOD PRESSURE: 133 MMHG | DIASTOLIC BLOOD PRESSURE: 87 MMHG | HEART RATE: 78 BPM | BODY MASS INDEX: 40.75 KG/M2 | RESPIRATION RATE: 16 BRPM | HEIGHT: 63 IN | WEIGHT: 230 LBS | TEMPERATURE: 98 F

## 2022-01-11 DIAGNOSIS — G89.29 CHRONIC PAIN: ICD-10-CM

## 2022-01-11 DIAGNOSIS — M54.16 LUMBAR RADICULOPATHY: Primary | ICD-10-CM

## 2022-01-11 LAB — POCT GLUCOSE: 95 MG/DL (ref 70–110)

## 2022-01-11 PROCEDURE — 25000003 PHARM REV CODE 250: Performed by: ANESTHESIOLOGY

## 2022-01-11 PROCEDURE — 62323 NJX INTERLAMINAR LMBR/SAC: CPT | Mod: ,,, | Performed by: ANESTHESIOLOGY

## 2022-01-11 PROCEDURE — 62323 NJX INTERLAMINAR LMBR/SAC: CPT | Performed by: ANESTHESIOLOGY

## 2022-01-11 PROCEDURE — 25500020 PHARM REV CODE 255: Performed by: ANESTHESIOLOGY

## 2022-01-11 PROCEDURE — 62323 PR INJ LUMBAR/SACRAL, W/IMAGING GUIDANCE: ICD-10-PCS | Mod: ,,, | Performed by: ANESTHESIOLOGY

## 2022-01-11 PROCEDURE — 82947 ASSAY GLUCOSE BLOOD QUANT: CPT | Performed by: ANESTHESIOLOGY

## 2022-01-11 PROCEDURE — 63600175 PHARM REV CODE 636 W HCPCS: Performed by: ANESTHESIOLOGY

## 2022-01-11 RX ORDER — SODIUM CHLORIDE 9 MG/ML
INJECTION, SOLUTION INTRAVENOUS CONTINUOUS
Status: DISCONTINUED | OUTPATIENT
Start: 2022-01-11 | End: 2022-01-11 | Stop reason: HOSPADM

## 2022-01-11 RX ORDER — LIDOCAINE HYDROCHLORIDE 20 MG/ML
INJECTION, SOLUTION INFILTRATION; PERINEURAL
Status: DISCONTINUED | OUTPATIENT
Start: 2022-01-11 | End: 2022-01-11 | Stop reason: HOSPADM

## 2022-01-11 RX ORDER — LIDOCAINE HYDROCHLORIDE 10 MG/ML
INJECTION, SOLUTION EPIDURAL; INFILTRATION; INTRACAUDAL; PERINEURAL
Status: DISCONTINUED | OUTPATIENT
Start: 2022-01-11 | End: 2022-01-11 | Stop reason: HOSPADM

## 2022-01-11 RX ORDER — DEXAMETHASONE SODIUM PHOSPHATE 10 MG/ML
INJECTION INTRAMUSCULAR; INTRAVENOUS
Status: DISCONTINUED | OUTPATIENT
Start: 2022-01-11 | End: 2022-01-11 | Stop reason: HOSPADM

## 2022-01-11 NOTE — OP NOTE
Lumbar Interlaminar Epidural Steroid Injection under Fluoroscopic Guidance    The procedure, risks, benefits, and options were discussed with the patient. There are no contraindications to the procedure. The patent expressed understanding and agreed to the procedure. Informed written consent was obtained prior to the start of the procedure and can be found in the patient's chart.    PATIENT NAME: Mrs. Africa Jennings   MRN: [id number]     DATE OF PROCEDURE: 01/11/2022    PROCEDURE: Lumbar Interlaminar Epidural Steroid Injection L4/L5 under Fluoroscopic Guidance    PRE-OP DIAGNOSIS: Lumbar radiculopathy [M54.16]    POST-OP DIAGNOSIS: Same    PHYSICIAN: Britt Calix MD    ASSISTANTS: Dr Mims     MEDICATIONS INJECTED: Preservative-free Decadron 10mg with 4cc of Lidocaine 1% MPF and preservative free normal saline    LOCAL ANESTHETIC INJECTED: Xylocaine 2%     SEDATION: None    ESTIMATED BLOOD LOSS: None    COMPLICATIONS: None    TECHNIQUE: Time-out was performed to identify the patient and procedure to be performed. With the patient laying in a prone position, the surgical area was prepped and draped in the usual sterile fashion using ChloraPrep and a fenestrated drape. The level was determined under fluoroscopy guidance. Skin anesthesia was achieved by injecting Lidocaine 2% over the injection site. The interlaminar space was then approached with a 20 gauge,  3.5 inch Tuohy needle that was introduced under fluoroscopic guidance in the AP, lateral and/or contralateral oblique imaging. Once the Ligamentum flavum was encountered loss of resistance to air was used to enter the epidural space. With positive loss of resistance and negative aspiration for CSF or Blood, contrast dye Omnipaque (300mg/ml) was injected to confirm placement and there was no vascular runoff. 4 mL of the medication mixture listed above was injected slowly. Displacement of the radio opaque contrast after injection of the medication  confirmed that the medication went into the area of the epidural space. The needles were removed and bleeding was nil. A sterile dressing was applied. No specimens collected. The patient tolerated the procedure well.       The patient was monitored after the procedure in the recovery area. They were given post-procedure and discharge instructions to follow at home. The patient was discharged in a stable condition.    Britt Calix MD

## 2022-01-11 NOTE — DISCHARGE INSTRUCTIONS

## 2022-01-12 ENCOUNTER — PATIENT MESSAGE (OUTPATIENT)
Dept: FAMILY MEDICINE | Facility: CLINIC | Age: 52
End: 2022-01-12
Payer: COMMERCIAL

## 2022-01-24 ENCOUNTER — TELEPHONE (OUTPATIENT)
Dept: PAIN MEDICINE | Facility: CLINIC | Age: 52
End: 2022-01-24
Payer: COMMERCIAL

## 2022-01-24 NOTE — TELEPHONE ENCOUNTER
This message is for patient in regards to his/her appointment 01/25/22 at 08:20a       [facility] Policy: For the safety of all patients and staff members.     Patient Visitor policy: Due to social distancing and limited seating staff are requesting patient to arrive to their schedule appointments on time. During this visit we're asking all patients to only have one visitor over the age of 18yrs old to accompany to be seen by Stoney Klein NP. If patient do not required assistance with their visit, we're asking all visitors to remain outside the waiting area.    Upon arriving to your schedule appointment; patients are required to wear a face mask, if patient do not have a face mask one will be provided entering the facility. We ask patients to contact clinical staff at this number [phone] to notify staff that they have arrived or they may do so by utilizing the Mobile checked in [place](if patient have patient portal; clinical staff will send a message through there letting them know it's okay to proceed to their visit). If you have any questions or concerns please contact [phone]    Patient verbalized understanding

## 2022-01-25 ENCOUNTER — OFFICE VISIT (OUTPATIENT)
Dept: PAIN MEDICINE | Facility: CLINIC | Age: 52
End: 2022-01-25
Payer: COMMERCIAL

## 2022-01-25 VITALS
DIASTOLIC BLOOD PRESSURE: 109 MMHG | HEART RATE: 94 BPM | HEIGHT: 63 IN | BODY MASS INDEX: 39.69 KG/M2 | SYSTOLIC BLOOD PRESSURE: 153 MMHG | WEIGHT: 224 LBS | TEMPERATURE: 98 F | RESPIRATION RATE: 18 BRPM

## 2022-01-25 DIAGNOSIS — G89.4 CHRONIC PAIN SYNDROME: ICD-10-CM

## 2022-01-25 DIAGNOSIS — M47.816 LUMBAR SPONDYLOSIS: ICD-10-CM

## 2022-01-25 DIAGNOSIS — M54.16 LUMBAR RADICULOPATHY: ICD-10-CM

## 2022-01-25 DIAGNOSIS — M46.1 SACROILIITIS: Primary | ICD-10-CM

## 2022-01-25 PROCEDURE — 3077F PR MOST RECENT SYSTOLIC BLOOD PRESSURE >= 140 MM HG: ICD-10-PCS | Mod: CPTII,S$GLB,, | Performed by: NURSE PRACTITIONER

## 2022-01-25 PROCEDURE — 3008F BODY MASS INDEX DOCD: CPT | Mod: CPTII,S$GLB,, | Performed by: NURSE PRACTITIONER

## 2022-01-25 PROCEDURE — 3008F PR BODY MASS INDEX (BMI) DOCUMENTED: ICD-10-PCS | Mod: CPTII,S$GLB,, | Performed by: NURSE PRACTITIONER

## 2022-01-25 PROCEDURE — 3080F PR MOST RECENT DIASTOLIC BLOOD PRESSURE >= 90 MM HG: ICD-10-PCS | Mod: CPTII,S$GLB,, | Performed by: NURSE PRACTITIONER

## 2022-01-25 PROCEDURE — 1159F MED LIST DOCD IN RCRD: CPT | Mod: CPTII,S$GLB,, | Performed by: NURSE PRACTITIONER

## 2022-01-25 PROCEDURE — 1159F PR MEDICATION LIST DOCUMENTED IN MEDICAL RECORD: ICD-10-PCS | Mod: CPTII,S$GLB,, | Performed by: NURSE PRACTITIONER

## 2022-01-25 PROCEDURE — 99999 PR PBB SHADOW E&M-EST. PATIENT-LVL V: CPT | Mod: PBBFAC,,, | Performed by: NURSE PRACTITIONER

## 2022-01-25 PROCEDURE — 1160F RVW MEDS BY RX/DR IN RCRD: CPT | Mod: CPTII,S$GLB,, | Performed by: NURSE PRACTITIONER

## 2022-01-25 PROCEDURE — 99213 OFFICE O/P EST LOW 20 MIN: CPT | Mod: S$GLB,,, | Performed by: NURSE PRACTITIONER

## 2022-01-25 PROCEDURE — 99213 PR OFFICE/OUTPT VISIT, EST, LEVL III, 20-29 MIN: ICD-10-PCS | Mod: S$GLB,,, | Performed by: NURSE PRACTITIONER

## 2022-01-25 PROCEDURE — 99999 PR PBB SHADOW E&M-EST. PATIENT-LVL V: ICD-10-PCS | Mod: PBBFAC,,, | Performed by: NURSE PRACTITIONER

## 2022-01-25 PROCEDURE — 1160F PR REVIEW ALL MEDS BY PRESCRIBER/CLIN PHARMACIST DOCUMENTED: ICD-10-PCS | Mod: CPTII,S$GLB,, | Performed by: NURSE PRACTITIONER

## 2022-01-25 PROCEDURE — 3077F SYST BP >= 140 MM HG: CPT | Mod: CPTII,S$GLB,, | Performed by: NURSE PRACTITIONER

## 2022-01-25 PROCEDURE — 3080F DIAST BP >= 90 MM HG: CPT | Mod: CPTII,S$GLB,, | Performed by: NURSE PRACTITIONER

## 2022-01-25 NOTE — PROGRESS NOTES
Subjective:     Patient ID: Africa Jennings is a 51 y.o. female    Chief Complaint: Rectal Pain and Low-back Pain (Right thigh/buttocks)      Referred by: No ref. provider found      HPI:    Interval History 01/25/2022:  Mrs Jennings presents for follow up of lower back pain. She is s/p L4/5 ILESI with 90% benefit from lower back pain. She has lingering lower buttock/back pain to left side. Increased pain when sitting or stairs. Denies radiculopathy. Denies any loss of b/b or saddle paresthesias.     Interval History 12/23/2021:  Mrs Jennings presents to establish care at [facility]. She states continued lower back pain to left side but leg pain/paresthesias=back pain. She states left lower back pain and radiation in L4/5 pattern but also on right side. She has MRI findings to left L4/5 NF disc protrusion. She has been doing HEP she learned through [facility] for over 6 months straight and continues with no lasting benefit. She is currently taking Mobic 15mg and Neurontin 300mg TID. There is no complaint of loss of b/b or saddle paresthesias.     Interval History (4/13/20):    The patient location is: home  The chief complaint leading to consultation is: follow up  Visit type: Virtual visit with audio.  Patient verbally consented for audio visit.  Total time spent with patient: 15 minutes  Each patient to whom he or she provides medical services by telemedicine is:  (1) informed of the relationship between the physician and patient and the respective role of any other health care provider with respect to management of the patient; and (2) notified that he or she may decline to receive medical services by telemedicine and may withdraw from such care at any time.      She returns today for follow up.  She reports that her right-sided low back and lower extremity pain has returned since last encounter.  She denies any changes in the quality or location of pain.  She continues to report associated  numbness.  She denies any associated weakness or bowel bladder dysfunction.  She has attempted gabapentin 600 mg t.i.d..  She states this medication did not provide any relief.  She has also tried goody's powder, Motrin, Tylenol without any relief.        Interval History (2/17/20):  She returns today for follow up and MRI review.  She reports that her pain is unchanged in quality and location since last encounter.  She does state that the pain has significantly improved and is overall not very bothersome for the time being.        Initial Encounter (2/3/20):  Africa Jennings is a 51 y.o. female who presents today with chronic right-sided low back and lower extremity pain. This pain has been present for 6-7 months.  No specific inciting event or injury noted.  Patient localizes the pain to the right lumbar region.  Pain radiates to the right posterior and anterior thigh.  She reports associated numbness in this area as well. She also reports weakness of the right lower extremity when ambulating.  She denies any associated bowel or bladder dysfunction.  The pain is constant and worsened with activity.  She states that the pain interrupt her sleep.  Patient does have a history of a similar problem roughly 8 years ago.  She underwent epidural steroid injections with good relief.  This pain is described in detail below.    Physical Therapy:  Yes    Non-pharmacologic Treatment:  Rest helps         · TENS?  No    Pain Medications:         · Currently taking:  Aleve, gabapentin    · Has tried in the past:  Tylenol, Motrin    · Has not tried:  Opioids, Tylenol, Muscle relaxants, TCAs, SNRIs, anticonvulsants, topical creams    Blood thinners:  None    Interventional Therapies:   L4-5 interlaminar epidural steroid injection x2  1/11/2022 L4/5 ILESI 90% benefit      Relevant Surgeries:  None    Affecting sleep?  Yes    Affecting daily activities? yes    Depressive symptoms? no          · SI/HI? No    Work status:  Employed    Pain Scores:    Best:       6/10  Worst:     10/10  Usually:   8/10  Today:    0/10    Review of Systems   Constitutional: Negative for activity change, appetite change, chills, fatigue, fever and unexpected weight change.   HENT: Negative for hearing loss.    Eyes: Negative for visual disturbance.   Respiratory: Negative for chest tightness and shortness of breath.    Cardiovascular: Negative for chest pain.   Gastrointestinal: Negative for abdominal pain, constipation, diarrhea, nausea and vomiting.   Genitourinary: Negative for difficulty urinating.   Musculoskeletal: Positive for back pain, gait problem and myalgias. Negative for neck pain.   Skin: Negative for rash.   Neurological: Positive for weakness and numbness. Negative for dizziness, light-headedness and headaches.   Psychiatric/Behavioral: Positive for sleep disturbance. Negative for hallucinations and suicidal ideas. The patient is not nervous/anxious.        Past Medical History:   Diagnosis Date    Diabetes mellitus     Diabetes mellitus, type 2     Eczema     GERD (gastroesophageal reflux disease)     Hypertension     Impaired fasting blood sugar     YSABEL on CPAP        Past Surgical History:   Procedure Laterality Date    breast reduction      CHOLECYSTECTOMY      laprascopic    EPIDURAL STEROID INJECTION N/A 1/11/2022    Procedure: INJECTION, STEROID, EPIDURAL IL L4/5 NEEDS CONSENT;  Surgeon: Britt Calix MD;  Location: [facility];  Service: Pain Management;  Laterality: N/A;    ESOPHAGOGASTRODUODENOSCOPY  8/18/14    HYSTERECTOMY  2007    laprascopic, total for fibroids.  Dr Polo    OOPHORECTOMY      TOTAL REDUCTION MAMMOPLASTY         Social History     Socioeconomic History    Marital status:    Tobacco Use    Smoking status: Never Smoker    Smokeless tobacco: Never Used   Substance and Sexual Activity    Alcohol use: No     Comment: socially    Drug use: No    Sexual activity: Yes      Partners: Male     Birth control/protection: Surgical   Social History Narrative    Was  since 2000.      Has a friend since 2016.    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     Social Determinants of Health     Financial Resource Strain: Medium Risk    Difficulty of Paying Living Expenses: Somewhat hard   Food Insecurity: No Food Insecurity    Worried About Running Out of Food in the Last Year: Never true    Ran Out of Food in the Last Year: Never true   Transportation Needs: Unknown    Lack of Transportation (Non-Medical): No   Physical Activity: Sufficiently Active    Days of Exercise per Week: 3 days    Minutes of Exercise per Session: 60 min   Stress: Stress Concern Present    Feeling of Stress : To some extent   Social Connections: Unknown    Frequency of Communication with Friends and Family: More than three times a week    Frequency of Social Gatherings with Friends and Family: Once a week    Active Member of Clubs or Organizations: No    Attends Club or Organization Meetings: Patient refused    Marital Status:    Housing Stability: Low Risk     Unable to Pay for Housing in the Last Year: No    Number of Places Lived in the Last Year: 1    Unstable Housing in the Last Year: No       Review of patient's allergies indicates:  No Known Allergies    Current Outpatient Medications on File Prior to Visit   Medication Sig Dispense Refill    (Magic mouthwash) 1:1:1 Benadryl 12.5mg/5ml liq, aluminum & magnesium hydroxide-simehticone (Maalox), lidocaine viscous 2% Swish and spit 10 mLs every 4 (four) hours as needed. for sore throat 360 mL 0    albuterol (PROVENTIL/VENTOLIN HFA) 90 mcg/actuation inhaler Inhale 2 puffs into the lungs every 4 (four) hours as needed for Wheezing or Shortness of Breath. Rescue 6.7 g 0    amLODIPine (NORVASC) 10 MG tablet TAKE 1 TABLET BY MOUTH EVERY DAY 90 tablet 1    atorvastatin (LIPITOR) 20 MG tablet TAKE 1 TABLET BY MOUTH  "EVERY DAY 90 tablet 3    blood sugar diagnostic Strp 1 strip by Misc.(Non-Drug; Combo Route) route 2 (two) times daily with meals. 100 strip 11    blood-glucose meter kit Use as instructed 1 each 0    cetirizine (ZYRTEC) 10 MG tablet Take 1 tablet (10 mg total) by mouth once daily. 90 tablet 1    ergocalciferol (ERGOCALCIFEROL) 50,000 unit Cap TAKE 2 CAPSULE (50,000 UNITS TOTAL)BY MOUTH TWICE A WEEK 24 capsule 2    estradioL (ESTRACE) 1 MG tablet Take 1 tablet (1 mg total) by mouth once daily. 30 tablet 3    fluticasone propionate (FLONASE) 50 mcg/actuation nasal spray INSTILL 1 SPRAY (50 MCG TOTAL) IN EACH NOSTRIL ONCE DAILY. 16 mL 2    gabapentin (NEURONTIN) 300 MG capsule Take 300 mg by mouth.      lancets Misc Use to test blood sugar once daily. 300 each 0    meloxicam (MOBIC) 15 MG tablet TAKE 1 TABLET BY MOUTH EVERY DAY 30 tablet 2    metFORMIN (GLUCOPHAGE-XR) 500 MG ER 24hr tablet TAKE 1 TABLET BY MOUTH TWICE A DAY WITH MEALS 180 tablet 1    neomycin-polymyxin-dexamethasone (MAXITROL) 3.5mg/mL-10,000 unit/mL-0.1 % DrpS       nystatin (MYCOSTATIN) ointment Apply topically 2 (two) times daily.      omeprazole (PRILOSEC) 40 MG capsule TAKE 1 CAPSULE BY MOUTH EVERY DAY 90 capsule 1    ondansetron (ZOFRAN) 4 MG tablet Take 1 tablet (4 mg total) by mouth every 8 (eight) hours as needed. 90 tablet 1    OZEMPIC 1 mg/dose (4 mg/3 mL) INJECT 1 MG INTO THE SKIN EVERY 7 DAYS. 3 pen 2    pen needle, diabetic (PEN NEEDLE) 32 gauge x 5/32" Ndle 1 each by Misc.(Non-Drug; Combo Route) route once a week. 50 each 1    prednisoLONE acetate (PRED FORTE) 1 % DrpS       tobramycin-dexamethasone 0.3-0.1% (TOBRADEX) 0.3-0.1 % DrpS INSTILL 1 DROP INTO BOTH EYES 4 TIMES A DAY  2    triamcinolone acetonide 0.1% (KENALOG) 0.1 % cream Apply topically 2 (two) times daily. Apply topically 2 (two) times daily. 135 g 4    valsartan-hydrochlorothiazide (DIOVAN-HCT) 160-12.5 mg per tablet Take 1 tablet by mouth once daily. " "90 tablet 1    lancing device Misc 1 Device by Misc.(Non-Drug; Combo Route) route 2 (two) times daily with meals. 100 each 11    [DISCONTINUED] amlodipine-atorvastatin (CADUET) 10-10 mg per tablet Take 1 tablet by mouth.      [DISCONTINUED] OZEMPIC 1 mg/dose (2 mg/1.5 mL) PnIj Inject 1 mg into the skin every 7 days.       No current facility-administered medications on file prior to visit.       Objective:      BP (!) 153/109   Pulse 94   Temp 98.1 °F (36.7 °C)   Resp 18   Ht 5' 3" (1.6 m)   Wt 101.6 kg (224 lb)   LMP  (LMP Unknown)   BMI 39.68 kg/m²     Exam:  PHYSICAL EXAMINATION:  Voice normal.  Normal affect without evidence of distress.  Back: +facet loading on left, no facet loading on Right. (-) STR bilaterally  Musculoskeletal: PSIS TTP more pronounced today on left, +gaenslen, +JOCELIN, +compression and distraction. Lumbosacral spring >Lumbar spring.  no PSIS TTP on right.  (-) JOCELIN on right. 5/5 BLE strength except for 4/5 EHL on right.   Gait: Normal         Imaging:  Narrative     EXAMINATION:  MRI LUMBAR SPINE WITHOUT CONTRAST    CLINICAL HISTORY:  lumbar radiculopathy; Other intervertebral disc degeneration, lumbar region    TECHNIQUE:  Multiplanar, multisequence MR images were acquired from the thoracolumbar junction to the sacrum without the administration of contrast.    COMPARISON:  MRI of the lumbar spine 09/21/2011.    FINDINGS:  There is again normal lumbar lordosis.  There is no spondylolisthesis or spondylolysis or signs for acute fractures.  The tip of the conus is at L1.  No definite signs for intradural masses.  Paraspinal soft tissues are unremarkable.    L5-S1: There is disc dehydration.  Mild circumferential annular disc bulge.  No significant disc protrusions or spinal stenosis.  There is bilateral mild foraminal narrowing associated with disc bulge.  There is degenerative hypertrophic facet arthropathy.    L4-5: Disc dehydration.  There is a left L4-5 neural foraminal disc " protrusion without definite signs for compression of the exiting L4 root in the foramen.  These findings have improved when compared to the previous study.  Within the central canal there is a broad-based mild disc protrusion or disc bulge greater on the left associated with left lateral recess stenosis.  Findings suspicious for compression of the descending left L5 root in the lateral recess (image 21 series 6.  Clinical correlation for left L5 radiculopathy suggested.  Hypertrophic changes of the facet joints and the ligamentum flava resulting in at least a mild central canal spinal stenosis.    L3-4: There is dehydration of the nucleus pulposis.  Mild circumferential annular disc bulge.  No significant central canal spinal stenosis or foraminal stenosis.  There is facet arthropathy bilaterally.    L2-3: No disc protrusions or spinal stenosis or foraminal stenosis.    L1-2: No disc herniations or spinal stenosis or foraminal stenosis.    T12-L1: No disc herniations or spinal stenosis or foraminal stenosis.    T11-T12: No axial images performed through this region.  However on the sagittal images there is dehydration of nucleus pulposis associated with a circumferential annular disc bulge with mild compression of the thecal sac.  No cord compression however noted.   Impression       1. Multifactorial central canal spinal stenosis L4-5 disc space as above.  In addition there is a left paracentral disc bulge or protrusion associated with left lateral recess stenosis and compression of the descending left L5 root as above.  The left foraminal disc protrusion seen on the previous examination less pronounced when compared to the previous study.  2. Multilevel facet arthropathy as above.  3. Milder spondylotic changes at the rest of the disc spaces as above.      Electronically signed by: Gideon Webber MD  Date: 02/11/2020  Time: 07:42         Assessment:       Encounter Diagnoses   Name Primary?    Sacroiliitis Yes     Lumbar radiculopathy     Lumbar spondylosis     Chronic pain syndrome          Plan:       Africa was seen today for rectal pain and low-back pain.    Diagnoses and all orders for this visit:    Sacroiliitis  -     Procedure Order to Pain Management; Future    Lumbar radiculopathy    Lumbar spondylosis    Chronic pain syndrome          - Prior records reviewed  - MRI reviewed  - She is s/p B L4/5 ILESI with 90% benefit  - Left sided lower buttock pain lingering 5/5 SI findings, s/f Left SIJ injection  - There is facet loading as well but SI pain >Lumbar. Consider Left sided MBB   - Can continue Neurontin 300mg TID, advised to take Motrin 800mg for acute pain but to stop mobic  - Advised to continue HEP.   - RTC 2 weeks after procedure for re-evaluation.

## 2022-01-25 NOTE — H&P (VIEW-ONLY)
Subjective:     Patient ID: Africa Jennings is a 51 y.o. female    Chief Complaint: Rectal Pain and Low-back Pain (Right thigh/buttocks)      Referred by: No ref. provider found      HPI:    Interval History 01/25/2022:  Mrs Jennings presents for follow up of lower back pain. She is s/p L4/5 ILESI with 90% benefit from lower back pain. She has lingering lower buttock/back pain to left side. Increased pain when sitting or stairs. Denies radiculopathy. Denies any loss of b/b or saddle paresthesias.     Interval History 12/23/2021:  Mrs Jennings presents to establish care at [facility]. She states continued lower back pain to left side but leg pain/paresthesias=back pain. She states left lower back pain and radiation in L4/5 pattern but also on right side. She has MRI findings to left L4/5 NF disc protrusion. She has been doing HEP she learned through [facility] for over 6 months straight and continues with no lasting benefit. She is currently taking Mobic 15mg and Neurontin 300mg TID. There is no complaint of loss of b/b or saddle paresthesias.     Interval History (4/13/20):    The patient location is: home  The chief complaint leading to consultation is: follow up  Visit type: Virtual visit with audio.  Patient verbally consented for audio visit.  Total time spent with patient: 15 minutes  Each patient to whom he or she provides medical services by telemedicine is:  (1) informed of the relationship between the physician and patient and the respective role of any other health care provider with respect to management of the patient; and (2) notified that he or she may decline to receive medical services by telemedicine and may withdraw from such care at any time.      She returns today for follow up.  She reports that her right-sided low back and lower extremity pain has returned since last encounter.  She denies any changes in the quality or location of pain.  She continues to report associated  numbness.  She denies any associated weakness or bowel bladder dysfunction.  She has attempted gabapentin 600 mg t.i.d..  She states this medication did not provide any relief.  She has also tried goody's powder, Motrin, Tylenol without any relief.        Interval History (2/17/20):  She returns today for follow up and MRI review.  She reports that her pain is unchanged in quality and location since last encounter.  She does state that the pain has significantly improved and is overall not very bothersome for the time being.        Initial Encounter (2/3/20):  Africa Jennings is a 51 y.o. female who presents today with chronic right-sided low back and lower extremity pain. This pain has been present for 6-7 months.  No specific inciting event or injury noted.  Patient localizes the pain to the right lumbar region.  Pain radiates to the right posterior and anterior thigh.  She reports associated numbness in this area as well. She also reports weakness of the right lower extremity when ambulating.  She denies any associated bowel or bladder dysfunction.  The pain is constant and worsened with activity.  She states that the pain interrupt her sleep.  Patient does have a history of a similar problem roughly 8 years ago.  She underwent epidural steroid injections with good relief.  This pain is described in detail below.    Physical Therapy:  Yes    Non-pharmacologic Treatment:  Rest helps         · TENS?  No    Pain Medications:         · Currently taking:  Aleve, gabapentin    · Has tried in the past:  Tylenol, Motrin    · Has not tried:  Opioids, Tylenol, Muscle relaxants, TCAs, SNRIs, anticonvulsants, topical creams    Blood thinners:  None    Interventional Therapies:   L4-5 interlaminar epidural steroid injection x2  1/11/2022 L4/5 ILESI 90% benefit      Relevant Surgeries:  None    Affecting sleep?  Yes    Affecting daily activities? yes    Depressive symptoms? no          · SI/HI? No    Work status:  Employed    Pain Scores:    Best:       6/10  Worst:     10/10  Usually:   8/10  Today:    0/10    Review of Systems   Constitutional: Negative for activity change, appetite change, chills, fatigue, fever and unexpected weight change.   HENT: Negative for hearing loss.    Eyes: Negative for visual disturbance.   Respiratory: Negative for chest tightness and shortness of breath.    Cardiovascular: Negative for chest pain.   Gastrointestinal: Negative for abdominal pain, constipation, diarrhea, nausea and vomiting.   Genitourinary: Negative for difficulty urinating.   Musculoskeletal: Positive for back pain, gait problem and myalgias. Negative for neck pain.   Skin: Negative for rash.   Neurological: Positive for weakness and numbness. Negative for dizziness, light-headedness and headaches.   Psychiatric/Behavioral: Positive for sleep disturbance. Negative for hallucinations and suicidal ideas. The patient is not nervous/anxious.        Past Medical History:   Diagnosis Date    Diabetes mellitus     Diabetes mellitus, type 2     Eczema     GERD (gastroesophageal reflux disease)     Hypertension     Impaired fasting blood sugar     YSABEL on CPAP        Past Surgical History:   Procedure Laterality Date    breast reduction      CHOLECYSTECTOMY      laprascopic    EPIDURAL STEROID INJECTION N/A 1/11/2022    Procedure: INJECTION, STEROID, EPIDURAL IL L4/5 NEEDS CONSENT;  Surgeon: Britt Calix MD;  Location: [facility], [place], operated by [facility];  Service: Pain Management;  Laterality: N/A;    ESOPHAGOGASTRODUODENOSCOPY  8/18/14    HYSTERECTOMY  2007    laprascopic, total for fibroids.  Dr Polo    OOPHORECTOMY      TOTAL REDUCTION MAMMOPLASTY         Social History     Socioeconomic History    Marital status:    Tobacco Use    Smoking status: Never Smoker    Smokeless tobacco: Never Used   Substance and Sexual Activity    Alcohol use: No     Comment: socially    Drug use: No    Sexual activity: Yes      Partners: Male     Birth control/protection: Surgical   Social History Narrative    Was  since 2000.      Has a friend since 2016.    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     Social Determinants of Health     Financial Resource Strain: Medium Risk    Difficulty of Paying Living Expenses: Somewhat hard   Food Insecurity: No Food Insecurity    Worried About Running Out of Food in the Last Year: Never true    Ran Out of Food in the Last Year: Never true   Transportation Needs: Unknown    Lack of Transportation (Non-Medical): No   Physical Activity: Sufficiently Active    Days of Exercise per Week: 3 days    Minutes of Exercise per Session: 60 min   Stress: Stress Concern Present    Feeling of Stress : To some extent   Social Connections: Unknown    Frequency of Communication with Friends and Family: More than three times a week    Frequency of Social Gatherings with Friends and Family: Once a week    Active Member of Clubs or Organizations: No    Attends Club or Organization Meetings: Patient refused    Marital Status:    Housing Stability: Low Risk     Unable to Pay for Housing in the Last Year: No    Number of Places Lived in the Last Year: 1    Unstable Housing in the Last Year: No       Review of patient's allergies indicates:  No Known Allergies    Current Outpatient Medications on File Prior to Visit   Medication Sig Dispense Refill    (Magic mouthwash) 1:1:1 Benadryl 12.5mg/5ml liq, aluminum & magnesium hydroxide-simehticone (Maalox), lidocaine viscous 2% Swish and spit 10 mLs every 4 (four) hours as needed. for sore throat 360 mL 0    albuterol (PROVENTIL/VENTOLIN HFA) 90 mcg/actuation inhaler Inhale 2 puffs into the lungs every 4 (four) hours as needed for Wheezing or Shortness of Breath. Rescue 6.7 g 0    amLODIPine (NORVASC) 10 MG tablet TAKE 1 TABLET BY MOUTH EVERY DAY 90 tablet 1    atorvastatin (LIPITOR) 20 MG tablet TAKE 1 TABLET BY MOUTH  "EVERY DAY 90 tablet 3    blood sugar diagnostic Strp 1 strip by Misc.(Non-Drug; Combo Route) route 2 (two) times daily with meals. 100 strip 11    blood-glucose meter kit Use as instructed 1 each 0    cetirizine (ZYRTEC) 10 MG tablet Take 1 tablet (10 mg total) by mouth once daily. 90 tablet 1    ergocalciferol (ERGOCALCIFEROL) 50,000 unit Cap TAKE 2 CAPSULE (50,000 UNITS TOTAL)BY MOUTH TWICE A WEEK 24 capsule 2    estradioL (ESTRACE) 1 MG tablet Take 1 tablet (1 mg total) by mouth once daily. 30 tablet 3    fluticasone propionate (FLONASE) 50 mcg/actuation nasal spray INSTILL 1 SPRAY (50 MCG TOTAL) IN EACH NOSTRIL ONCE DAILY. 16 mL 2    gabapentin (NEURONTIN) 300 MG capsule Take 300 mg by mouth.      lancets Misc Use to test blood sugar once daily. 300 each 0    meloxicam (MOBIC) 15 MG tablet TAKE 1 TABLET BY MOUTH EVERY DAY 30 tablet 2    metFORMIN (GLUCOPHAGE-XR) 500 MG ER 24hr tablet TAKE 1 TABLET BY MOUTH TWICE A DAY WITH MEALS 180 tablet 1    neomycin-polymyxin-dexamethasone (MAXITROL) 3.5mg/mL-10,000 unit/mL-0.1 % DrpS       nystatin (MYCOSTATIN) ointment Apply topically 2 (two) times daily.      omeprazole (PRILOSEC) 40 MG capsule TAKE 1 CAPSULE BY MOUTH EVERY DAY 90 capsule 1    ondansetron (ZOFRAN) 4 MG tablet Take 1 tablet (4 mg total) by mouth every 8 (eight) hours as needed. 90 tablet 1    OZEMPIC 1 mg/dose (4 mg/3 mL) INJECT 1 MG INTO THE SKIN EVERY 7 DAYS. 3 pen 2    pen needle, diabetic (PEN NEEDLE) 32 gauge x 5/32" Ndle 1 each by Misc.(Non-Drug; Combo Route) route once a week. 50 each 1    prednisoLONE acetate (PRED FORTE) 1 % DrpS       tobramycin-dexamethasone 0.3-0.1% (TOBRADEX) 0.3-0.1 % DrpS INSTILL 1 DROP INTO BOTH EYES 4 TIMES A DAY  2    triamcinolone acetonide 0.1% (KENALOG) 0.1 % cream Apply topically 2 (two) times daily. Apply topically 2 (two) times daily. 135 g 4    valsartan-hydrochlorothiazide (DIOVAN-HCT) 160-12.5 mg per tablet Take 1 tablet by mouth once daily. " "90 tablet 1    lancing device Misc 1 Device by Misc.(Non-Drug; Combo Route) route 2 (two) times daily with meals. 100 each 11    [DISCONTINUED] amlodipine-atorvastatin (CADUET) 10-10 mg per tablet Take 1 tablet by mouth.      [DISCONTINUED] OZEMPIC 1 mg/dose (2 mg/1.5 mL) PnIj Inject 1 mg into the skin every 7 days.       No current facility-administered medications on file prior to visit.       Objective:      BP (!) 153/109   Pulse 94   Temp 98.1 °F (36.7 °C)   Resp 18   Ht 5' 3" (1.6 m)   Wt 101.6 kg (224 lb)   LMP  (LMP Unknown)   BMI 39.68 kg/m²     Exam:  PHYSICAL EXAMINATION:  Voice normal.  Normal affect without evidence of distress.  Back: +facet loading on left, no facet loading on Right. (-) STR bilaterally  Musculoskeletal: PSIS TTP more pronounced today on left, +gaenslen, +JOCELIN, +compression and distraction. Lumbosacral spring >Lumbar spring.  no PSIS TTP on right.  (-) JOCELIN on right. 5/5 BLE strength except for 4/5 EHL on right.   Gait: Normal         Imaging:  Narrative     EXAMINATION:  MRI LUMBAR SPINE WITHOUT CONTRAST    CLINICAL HISTORY:  lumbar radiculopathy; Other intervertebral disc degeneration, lumbar region    TECHNIQUE:  Multiplanar, multisequence MR images were acquired from the thoracolumbar junction to the sacrum without the administration of contrast.    COMPARISON:  MRI of the lumbar spine 09/21/2011.    FINDINGS:  There is again normal lumbar lordosis.  There is no spondylolisthesis or spondylolysis or signs for acute fractures.  The tip of the conus is at L1.  No definite signs for intradural masses.  Paraspinal soft tissues are unremarkable.    L5-S1: There is disc dehydration.  Mild circumferential annular disc bulge.  No significant disc protrusions or spinal stenosis.  There is bilateral mild foraminal narrowing associated with disc bulge.  There is degenerative hypertrophic facet arthropathy.    L4-5: Disc dehydration.  There is a left L4-5 neural foraminal disc " protrusion without definite signs for compression of the exiting L4 root in the foramen.  These findings have improved when compared to the previous study.  Within the central canal there is a broad-based mild disc protrusion or disc bulge greater on the left associated with left lateral recess stenosis.  Findings suspicious for compression of the descending left L5 root in the lateral recess (image 21 series 6.  Clinical correlation for left L5 radiculopathy suggested.  Hypertrophic changes of the facet joints and the ligamentum flava resulting in at least a mild central canal spinal stenosis.    L3-4: There is dehydration of the nucleus pulposis.  Mild circumferential annular disc bulge.  No significant central canal spinal stenosis or foraminal stenosis.  There is facet arthropathy bilaterally.    L2-3: No disc protrusions or spinal stenosis or foraminal stenosis.    L1-2: No disc herniations or spinal stenosis or foraminal stenosis.    T12-L1: No disc herniations or spinal stenosis or foraminal stenosis.    T11-T12: No axial images performed through this region.  However on the sagittal images there is dehydration of nucleus pulposis associated with a circumferential annular disc bulge with mild compression of the thecal sac.  No cord compression however noted.   Impression       1. Multifactorial central canal spinal stenosis L4-5 disc space as above.  In addition there is a left paracentral disc bulge or protrusion associated with left lateral recess stenosis and compression of the descending left L5 root as above.  The left foraminal disc protrusion seen on the previous examination less pronounced when compared to the previous study.  2. Multilevel facet arthropathy as above.  3. Milder spondylotic changes at the rest of the disc spaces as above.      Electronically signed by: Gideon Webber MD  Date: 02/11/2020  Time: 07:42         Assessment:       Encounter Diagnoses   Name Primary?    Sacroiliitis Yes     Lumbar radiculopathy     Lumbar spondylosis     Chronic pain syndrome          Plan:       Africa was seen today for rectal pain and low-back pain.    Diagnoses and all orders for this visit:    Sacroiliitis  -     Procedure Order to Pain Management; Future    Lumbar radiculopathy    Lumbar spondylosis    Chronic pain syndrome          - Prior records reviewed  - MRI reviewed  - She is s/p B L4/5 ILESI with 90% benefit  - Left sided lower buttock pain lingering 5/5 SI findings, s/f Left SIJ injection  - There is facet loading as well but SI pain >Lumbar. Consider Left sided MBB   - Can continue Neurontin 300mg TID, advised to take Motrin 800mg for acute pain but to stop mobic  - Advised to continue HEP.   - RTC 2 weeks after procedure for re-evaluation.

## 2022-01-26 ENCOUNTER — PATIENT MESSAGE (OUTPATIENT)
Dept: FAMILY MEDICINE | Facility: CLINIC | Age: 52
End: 2022-01-26
Payer: COMMERCIAL

## 2022-01-26 DIAGNOSIS — E11.9 TYPE 2 DIABETES MELLITUS WITHOUT COMPLICATION, WITHOUT LONG-TERM CURRENT USE OF INSULIN: ICD-10-CM

## 2022-01-26 NOTE — TELEPHONE ENCOUNTER
No new care gaps identified.  Powered by [employer]. Reference number: [id number].   1/26/2022 12:28:03 AM CST

## 2022-01-29 ENCOUNTER — PATIENT MESSAGE (OUTPATIENT)
Dept: ADMINISTRATIVE | Facility: OTHER | Age: 52
End: 2022-01-29
Payer: COMMERCIAL

## 2022-02-01 ENCOUNTER — OFFICE VISIT (OUTPATIENT)
Dept: SLEEP MEDICINE | Facility: CLINIC | Age: 52
End: 2022-02-01
Payer: COMMERCIAL

## 2022-02-01 VITALS
BODY MASS INDEX: 39.77 KG/M2 | DIASTOLIC BLOOD PRESSURE: 97 MMHG | HEART RATE: 80 BPM | HEIGHT: 63 IN | SYSTOLIC BLOOD PRESSURE: 159 MMHG | WEIGHT: 224.44 LBS

## 2022-02-01 DIAGNOSIS — G47.33 OBSTRUCTIVE SLEEP APNEA (ADULT) (PEDIATRIC): Primary | ICD-10-CM

## 2022-02-01 DIAGNOSIS — I10 ESSENTIAL HYPERTENSION: Chronic | ICD-10-CM

## 2022-02-01 PROCEDURE — 3080F PR MOST RECENT DIASTOLIC BLOOD PRESSURE >= 90 MM HG: ICD-10-PCS | Mod: CPTII,S$GLB,, | Performed by: NURSE PRACTITIONER

## 2022-02-01 PROCEDURE — 99204 PR OFFICE/OUTPT VISIT, NEW, LEVL IV, 45-59 MIN: ICD-10-PCS | Mod: S$GLB,,, | Performed by: NURSE PRACTITIONER

## 2022-02-01 PROCEDURE — 1159F MED LIST DOCD IN RCRD: CPT | Mod: CPTII,S$GLB,, | Performed by: NURSE PRACTITIONER

## 2022-02-01 PROCEDURE — 99204 OFFICE O/P NEW MOD 45 MIN: CPT | Mod: S$GLB,,, | Performed by: NURSE PRACTITIONER

## 2022-02-01 PROCEDURE — 1159F PR MEDICATION LIST DOCUMENTED IN MEDICAL RECORD: ICD-10-PCS | Mod: CPTII,S$GLB,, | Performed by: NURSE PRACTITIONER

## 2022-02-01 PROCEDURE — 3077F SYST BP >= 140 MM HG: CPT | Mod: CPTII,S$GLB,, | Performed by: NURSE PRACTITIONER

## 2022-02-01 PROCEDURE — 3008F BODY MASS INDEX DOCD: CPT | Mod: CPTII,S$GLB,, | Performed by: NURSE PRACTITIONER

## 2022-02-01 PROCEDURE — 3008F PR BODY MASS INDEX (BMI) DOCUMENTED: ICD-10-PCS | Mod: CPTII,S$GLB,, | Performed by: NURSE PRACTITIONER

## 2022-02-01 PROCEDURE — 99999 PR PBB SHADOW E&M-EST. PATIENT-LVL IV: CPT | Mod: PBBFAC,,, | Performed by: NURSE PRACTITIONER

## 2022-02-01 PROCEDURE — 99999 PR PBB SHADOW E&M-EST. PATIENT-LVL IV: ICD-10-PCS | Mod: PBBFAC,,, | Performed by: NURSE PRACTITIONER

## 2022-02-01 PROCEDURE — 3077F PR MOST RECENT SYSTOLIC BLOOD PRESSURE >= 140 MM HG: ICD-10-PCS | Mod: CPTII,S$GLB,, | Performed by: NURSE PRACTITIONER

## 2022-02-01 PROCEDURE — 3080F DIAST BP >= 90 MM HG: CPT | Mod: CPTII,S$GLB,, | Performed by: NURSE PRACTITIONER

## 2022-02-01 NOTE — PROGRESS NOTES
"Self-referred    CHIEF COMPLAINT: YSABEL , last seen 2015  HISTORY OF PRESENT ILLNESS:She was [facility] with [facility]. Adherent with cpap 8cmsince this time mostly. Her machine was outdated and had no ahi capability. Snoring resolved but still had some disrupted sleep. Found FFM cumbersome. Wants new machine. Did not take bp meds yet today  Ozempic, HgBA1c 6.6  BP suboptimal today      On todays [place] Sleepiness Scale the patient scores a 12/24.       2013 PSG: (245#) AHI was 12.8 with an oxygen charisse of 90.0%, effectively titrated to CPAP 8cm (201#)    PHYSICAL EXAM:   BP (!) 159/97 (BP Location: Left arm, Patient Position: Sitting, BP Method: Large (Automatic))   Pulse 80   Ht 5' 3" (1.6 m)   Wt 101.8 kg (224 lb 6.9 oz)   LMP  (LMP Unknown)   BMI 39.76 kg/m²   GENERAL: Obese body habitus, well groomed         ASSESSMENT:   YSABEL, mild. Adherent with pap, benefits mostly but finds maskcumbersome/disrupted sleep. ELIGIBLE new machine that has AHI capability.   She has medical comorbidities of obesity, hypertension, DM2    PLAN:   1. NEW machine apap 8-12cm. [facility] D[facility]. RTC b/t 31-90d after setup adherence  2. Abstain from driving should she feel sleepy or drowsy.   3 eat breakfast and take bp meds today. See pcp re: HTN /DM mgt /continue meds    "

## 2022-02-04 RX ORDER — METFORMIN HYDROCHLORIDE 500 MG/1
TABLET, EXTENDED RELEASE ORAL
Qty: 180 TABLET | Refills: 1 | Status: SHIPPED | OUTPATIENT
Start: 2022-02-04 | End: 2022-09-12

## 2022-02-08 ENCOUNTER — HOSPITAL ENCOUNTER (OUTPATIENT)
Facility: OTHER | Age: 52
Discharge: HOME | End: 2022-02-08
Attending: ANESTHESIOLOGY | Admitting: ANESTHESIOLOGY
Payer: COMMERCIAL

## 2022-02-08 VITALS
TEMPERATURE: 98 F | RESPIRATION RATE: 14 BRPM | WEIGHT: 220 LBS | SYSTOLIC BLOOD PRESSURE: 135 MMHG | BODY MASS INDEX: 38.98 KG/M2 | DIASTOLIC BLOOD PRESSURE: 81 MMHG | OXYGEN SATURATION: 96 % | HEART RATE: 78 BPM | HEIGHT: 63 IN

## 2022-02-08 DIAGNOSIS — M46.1 SACROILIITIS: Primary | ICD-10-CM

## 2022-02-08 DIAGNOSIS — G89.29 CHRONIC PAIN: ICD-10-CM

## 2022-02-08 LAB — POCT GLUCOSE: 175 MG/DL (ref 70–110)

## 2022-02-08 PROCEDURE — 63600175 PHARM REV CODE 636 W HCPCS: Performed by: ANESTHESIOLOGY

## 2022-02-08 PROCEDURE — 27096 INJECT SACROILIAC JOINT: CPT | Mod: LT,,, | Performed by: ANESTHESIOLOGY

## 2022-02-08 PROCEDURE — 25500020 PHARM REV CODE 255: Performed by: ANESTHESIOLOGY

## 2022-02-08 PROCEDURE — 27096 INJECT SACROILIAC JOINT: CPT | Mod: LT | Performed by: ANESTHESIOLOGY

## 2022-02-08 PROCEDURE — 27096 PR INJECTION,SACROILIAC JOINT: ICD-10-PCS | Mod: LT,,, | Performed by: ANESTHESIOLOGY

## 2022-02-08 PROCEDURE — 25000003 PHARM REV CODE 250: Performed by: ANESTHESIOLOGY

## 2022-02-08 RX ORDER — TRIAMCINOLONE ACETONIDE 40 MG/ML
INJECTION, SUSPENSION INTRA-ARTICULAR; INTRAMUSCULAR
Status: DISCONTINUED | OUTPATIENT
Start: 2022-02-08 | End: 2022-02-08 | Stop reason: HOSPADM

## 2022-02-08 RX ORDER — SODIUM CHLORIDE 9 MG/ML
INJECTION, SOLUTION INTRAVENOUS CONTINUOUS
Status: DISCONTINUED | OUTPATIENT
Start: 2022-02-08 | End: 2022-02-08 | Stop reason: HOSPADM

## 2022-02-08 RX ORDER — BUPIVACAINE HYDROCHLORIDE 2.5 MG/ML
INJECTION, SOLUTION EPIDURAL; INFILTRATION; INTRACAUDAL
Status: DISCONTINUED | OUTPATIENT
Start: 2022-02-08 | End: 2022-02-08 | Stop reason: HOSPADM

## 2022-02-08 RX ORDER — ALPRAZOLAM 0.5 MG/1
1 TABLET ORAL ONCE
Status: COMPLETED | OUTPATIENT
Start: 2022-02-08 | End: 2022-02-08

## 2022-02-08 RX ORDER — LIDOCAINE HYDROCHLORIDE 20 MG/ML
INJECTION, SOLUTION INFILTRATION; PERINEURAL
Status: DISCONTINUED | OUTPATIENT
Start: 2022-02-08 | End: 2022-02-08 | Stop reason: HOSPADM

## 2022-02-08 RX ADMIN — ALPRAZOLAM 1 MG: 0.5 TABLET ORAL at 08:02

## 2022-02-08 NOTE — OP NOTE
Sacroiliac Joint Injection under Fluoroscopic Guidance    The procedure, risks, benefits, and options were discussed with the patient. There are no contraindications to the procedure. The patent expressed understanding and agreed to the procedure. Informed written consent was obtained prior to the start of the procedure and can be found in the patient's chart.    PATIENT NAME: Mrs. Africa Jennings   MRN: [id number]     DATE OF PROCEDURE: 02/08/2022    PROCEDURE: Left Sacroiliac Joint Injection under Fluoroscopic Guidance    PRE-OP DIAGNOSIS: Sacroiliitis [M46.1]    POST-OP DIAGNOSIS: Same    PHYSICIAN: Britt Calix MD    ASSISTANTS: none     MEDICATIONS INJECTED: Preservative-free Kenalog 40mg with 3cc of Bupivacine 0.25%     LOCAL ANESTHETIC INJECTED: Xylocaine 2%     SEDATION: None    ESTIMATED BLOOD LOSS: None    COMPLICATIONS: None    TECHNIQUE: Time-out was performed to identify the patient and procedure to be performed. With the patient laying in a prone position, the surgical area was prepped and draped in the usual sterile fashion using ChloraPrep and a fenestrated drape. The sacroiliac joint was determined under fluoroscopy guidance. Skin anesthesia was achieved by injecting Lidocaine 2% over the injection site. The sacroiliac joint was  then approached with a 22 gauge, 3.5 inch spinal quinke needle that was introduced under fluoroscopic guidance in the AP and Lateral views. Once the needle tip was in the area of the joint, and there was no blood, contrast dye Omnipaque (300mg/ml) was injected to confirm placement and there was no vascular runoff. Fluoroscopic imaging in the AP and lateral views revealed a clear outline of the joint space. 4 mL of the medication mixture listed above was injected slowly intraarticular and sarah-articular. Displacement of the radio opaque contrast after injection of the medication confirmed that the medication went into the area of the joint. The needles were removed  and bleeding was nil. A sterile dressing was applied. No specimens collected. The patient tolerated the procedure well.       The patient was monitored after the procedure in the recovery area. They were given post-procedure and discharge instructions to follow at home. The patient was discharged in a stable condition.    Britt Calix MD  02/08/2022

## 2022-02-08 NOTE — DISCHARGE INSTRUCTIONS

## 2022-02-08 NOTE — DISCHARGE SUMMARY
[facility] - [facility] Management ([facility])  Discharge Note  Short Stay    Procedure(s) (LRB):  INJECTION, JOINT, SI LEFT NEEDS CONSENT (Left)    OUTCOME: Patient tolerated treatment/procedure well without complication and is now ready for discharge.    DISPOSITION: Home or Self Care    FINAL DIAGNOSIS:  Sacroiliitis    FOLLOWUP: In clinic    DISCHARGE INSTRUCTIONS:  No discharge procedures on file.     TIME SPENT ON DISCHARGE: 2 minutes

## 2022-02-21 ENCOUNTER — TELEPHONE (OUTPATIENT)
Dept: PAIN MEDICINE | Facility: CLINIC | Age: 52
End: 2022-02-21
Payer: COMMERCIAL

## 2022-02-21 NOTE — TELEPHONE ENCOUNTER
Good morning      This is an appointment reminder about your schedule Virtual Visit with Pain Management Provider MIHAI Mckay.   Your appointment is for 02 22, 2022 at 8:40am.     Please log into your [facility] 15 min's prior to your appointment time to e-precheck, verify all corresponding pages, and troubleshoot any problems that may occur. Once your device has been verify as compatible, and you receive the green check,  you must hit/ select the start visit button, This will notify your provider that you are ready to start the Virtual Video Visit.     As [facility] is a teaching hospital, your visit may be started with a resident or a fellow that is rounding in clinic, then proceeded by your physician.    Once logged on, please allow the provider 20 -30 mins to check-in, in case running behind.       If you experience any technical issue please contact [phone]        Thank You for entrusting [facility] to handle your care     lvm

## 2022-02-22 ENCOUNTER — TELEPHONE (OUTPATIENT)
Dept: PAIN MEDICINE | Facility: CLINIC | Age: 52
End: 2022-02-22
Payer: COMMERCIAL

## 2022-02-22 ENCOUNTER — OFFICE VISIT (OUTPATIENT)
Dept: PAIN MEDICINE | Facility: CLINIC | Age: 52
End: 2022-02-22
Payer: COMMERCIAL

## 2022-02-22 DIAGNOSIS — G89.4 CHRONIC PAIN SYNDROME: ICD-10-CM

## 2022-02-22 DIAGNOSIS — K21.9 GASTROESOPHAGEAL REFLUX DISEASE WITHOUT ESOPHAGITIS: ICD-10-CM

## 2022-02-22 DIAGNOSIS — M51.36 DDD (DEGENERATIVE DISC DISEASE), LUMBAR: ICD-10-CM

## 2022-02-22 DIAGNOSIS — M46.1 SACROILIITIS: Primary | ICD-10-CM

## 2022-02-22 DIAGNOSIS — N95.1 HOT FLASH, MENOPAUSAL: ICD-10-CM

## 2022-02-22 DIAGNOSIS — M47.816 LUMBAR SPONDYLOSIS: ICD-10-CM

## 2022-02-22 DIAGNOSIS — N95.1 MENOPAUSE SYNDROME: ICD-10-CM

## 2022-02-22 PROCEDURE — 99213 OFFICE O/P EST LOW 20 MIN: CPT | Mod: 95,,, | Performed by: NURSE PRACTITIONER

## 2022-02-22 PROCEDURE — 99213 PR OFFICE/OUTPT VISIT, EST, LEVL III, 20-29 MIN: ICD-10-PCS | Mod: 95,,, | Performed by: NURSE PRACTITIONER

## 2022-02-22 RX ORDER — ESTRADIOL 1 MG/1
1 TABLET ORAL DAILY
Qty: 30 TABLET | Refills: 3 | Status: SHIPPED | OUTPATIENT
Start: 2022-02-22 | End: 2022-03-28

## 2022-02-22 RX ORDER — ONDANSETRON 4 MG/1
4 TABLET, FILM COATED ORAL EVERY 8 HOURS PRN
Qty: 90 TABLET | Refills: 1 | Status: SHIPPED | OUTPATIENT
Start: 2022-02-22 | End: 2022-09-22

## 2022-02-22 NOTE — TELEPHONE ENCOUNTER
Staff contact patient to get schedule for 2 week follow up exam with Stoney Locke    Patient didn't answer, voicemail was left.

## 2022-02-22 NOTE — PROGRESS NOTES
Chronic Pain-Tele-Medicine-Established Note (Follow up visit)        The patient location is: Home  The chief complaint leading to consultation is: pain  Visit type: Virtual visit with synchronous audio and video  Total time spent with patient: 25 min  Each patient to whom he or she provides medical services by telemedicine is:  (1) informed of the relationship between the physician and patient and the respective role of any other health care provider with respect to management of the patient; and (2) notified that he or she may decline to receive medical services by telemedicine and may withdraw from such care at any time.     Subjective:     Patient ID: Africa Jennings is a 52 y.o. female    Chief Complaint: No chief complaint on file.      Referred by: No ref. provider found      HPI:    Interval History  2/22/2022:   Mrs Jennings presents for virtual follow up of lower back and left sided buttock pain. She is now s/p Left SIJ and states 90% improved pain and feels her relief was more significant than MARILEE. She states pain more noticeable to right side now without radicular complaint. There is no focal voicing of loss of b/b or saddle paresthesias.     Interval History 1/25/2021:  Mrs Jennings presents for follow up of lower back pain. She is s/p L4/5 ILESI with 90% benefit from lower back pain. She has lingering lower buttock/back pain to left side. Increased pain when sitting or stairs. Denies radiculopathy. Denies any loss of b/b or saddle paresthesias.     Interval History 12/23/2021:  Mrs Jennings presents to establish care at [facility]. She states continued lower back pain to left side but leg pain/paresthesias=back pain. She states left lower back pain and radiation in L4/5 pattern but also on right side. She has MRI findings to left L4/5 NF disc protrusion. She has been doing HEP she learned through [facility] for over 6 months straight and continues with no lasting benefit. She is  currently taking Mobic 15mg and Neurontin 300mg TID. There is no complaint of loss of b/b or saddle paresthesias.     Interval History (4/13/20):    The patient location is: home  The chief complaint leading to consultation is: follow up  Visit type: Virtual visit with audio.  Patient verbally consented for audio visit.  Total time spent with patient: 15 minutes  Each patient to whom he or she provides medical services by telemedicine is:  (1) informed of the relationship between the physician and patient and the respective role of any other health care provider with respect to management of the patient; and (2) notified that he or she may decline to receive medical services by telemedicine and may withdraw from such care at any time.      She returns today for follow up.  She reports that her right-sided low back and lower extremity pain has returned since last encounter.  She denies any changes in the quality or location of pain.  She continues to report associated numbness.  She denies any associated weakness or bowel bladder dysfunction.  She has attempted gabapentin 600 mg t.i.d..  She states this medication did not provide any relief.  She has also tried goody's powder, Motrin, Tylenol without any relief.        Interval History (2/17/20):  She returns today for follow up and MRI review.  She reports that her pain is unchanged in quality and location since last encounter.  She does state that the pain has significantly improved and is overall not very bothersome for the time being.        Initial Encounter (2/3/20):  Africa Jennings is a 52 y.o. female who presents today with chronic right-sided low back and lower extremity pain. This pain has been present for 6-7 months.  No specific inciting event or injury noted.  Patient localizes the pain to the right lumbar region.  Pain radiates to the right posterior and anterior thigh.  She reports associated numbness in this area as well. She also  reports weakness of the right lower extremity when ambulating.  She denies any associated bowel or bladder dysfunction.  The pain is constant and worsened with activity.  She states that the pain interrupt her sleep.  Patient does have a history of a similar problem roughly 8 years ago.  She underwent epidural steroid injections with good relief.  This pain is described in detail below.    Physical Therapy:  Yes    Non-pharmacologic Treatment:  Rest helps         · TENS?  No    Pain Medications:         · Currently taking:  Aleve, gabapentin    · Has tried in the past:  Tylenol, Motrin    · Has not tried:  Opioids, Tylenol, Muscle relaxants, TCAs, SNRIs, anticonvulsants, topical creams    Blood thinners:  None    Interventional Therapies:   L4-5 interlaminar epidural steroid injection x2  1/11/2022 L4/5 ILESI 90% benefit      Relevant Surgeries:  None    Affecting sleep?  Yes    Affecting daily activities? yes    Depressive symptoms? no          · SI/HI? No    Work status: Employed    Pain Scores:    Best:       6/10  Worst:     10/10  Usually:   8/10  Today:    0/10    Review of Systems   Constitutional: Negative for activity change, appetite change, chills, fatigue, fever and unexpected weight change.   HENT: Negative for hearing loss.    Eyes: Negative for visual disturbance.   Respiratory: Negative for chest tightness and shortness of breath.    Cardiovascular: Negative for chest pain.   Gastrointestinal: Negative for abdominal pain, constipation, diarrhea, nausea and vomiting.   Genitourinary: Negative for difficulty urinating.   Musculoskeletal: Positive for back pain, gait problem and myalgias. Negative for neck pain.   Skin: Negative for rash.   Neurological: Positive for weakness and numbness. Negative for dizziness, light-headedness and headaches.   Psychiatric/Behavioral: Positive for sleep disturbance. Negative for hallucinations and suicidal ideas. The patient is not nervous/anxious.        Past Medical  History:   Diagnosis Date    Diabetes mellitus     Diabetes mellitus, type 2     Eczema     GERD (gastroesophageal reflux disease)     Hypertension     Impaired fasting blood sugar     YSABEL on CPAP        Past Surgical History:   Procedure Laterality Date    breast reduction      CHOLECYSTECTOMY      laprascopic    EPIDURAL STEROID INJECTION N/A 1/11/2022    Procedure: INJECTION, STEROID, EPIDURAL IL L4/5 NEEDS CONSENT;  Surgeon: Britt Calix MD;  Location: [facility];  Service: Pain Management;  Laterality: N/A;    ESOPHAGOGASTRODUODENOSCOPY  8/18/14    HYSTERECTOMY  2007    laprascopic, total for fibroids.  Dr Polo    INJECTION OF JOINT Left 2/8/2022    Procedure: INJECTION, JOINT, SI LEFT NEEDS CONSENT;  Surgeon: Britt Calix MD;  Location: [facility];  Service: Pain Management;  Laterality: Left;    OOPHORECTOMY      TOTAL REDUCTION MAMMOPLASTY         Social History     Socioeconomic History    Marital status:    Tobacco Use    Smoking status: Never Smoker    Smokeless tobacco: Never Used   Substance and Sexual Activity    Alcohol use: No     Comment: socially    Drug use: No    Sexual activity: Yes     Partners: Male     Birth control/protection: Surgical   Social History Narrative    Was  since 2000.      Has a friend since 2016.    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     Social Determinants of Health     Financial Resource Strain: Medium Risk    Difficulty of Paying Living Expenses: Somewhat hard   Food Insecurity: No Food Insecurity    Worried About Running Out of Food in the Last Year: Never true    Ran Out of Food in the Last Year: Never true   Transportation Needs: Unknown    Lack of Transportation (Non-Medical): No   Physical Activity: Sufficiently Active    Days of Exercise per Week: 3 days    Minutes of Exercise per Session: 60 min   Stress: Stress Concern Present    Feeling of Stress : To some extent    Social Connections: Unknown    Frequency of Communication with Friends and Family: More than three times a week    Frequency of Social Gatherings with Friends and Family: Once a week    Active Member of Clubs or Organizations: No    Attends Club or Organization Meetings: Patient refused    Marital Status:    Housing Stability: Low Risk     Unable to Pay for Housing in the Last Year: No    Number of Places Lived in the Last Year: 1    Unstable Housing in the Last Year: No       Review of patient's allergies indicates:  No Known Allergies    Current Outpatient Medications on File Prior to Visit   Medication Sig Dispense Refill    (Magic mouthwash) 1:1:1 Benadryl 12.5mg/5ml liq, aluminum & magnesium hydroxide-simehticone (Maalox), lidocaine viscous 2% Swish and spit 10 mLs every 4 (four) hours as needed. for sore throat 360 mL 0    albuterol (PROVENTIL/VENTOLIN HFA) 90 mcg/actuation inhaler Inhale 2 puffs into the lungs every 4 (four) hours as needed for Wheezing or Shortness of Breath. Rescue 6.7 g 0    amLODIPine (NORVASC) 10 MG tablet TAKE 1 TABLET BY MOUTH EVERY DAY 90 tablet 1    atorvastatin (LIPITOR) 20 MG tablet TAKE 1 TABLET BY MOUTH EVERY DAY 90 tablet 3    blood sugar diagnostic Strp 1 strip by Misc.(Non-Drug; Combo Route) route 2 (two) times daily with meals. 100 strip 11    blood-glucose meter kit Use as instructed 1 each 0    cetirizine (ZYRTEC) 10 MG tablet Take 1 tablet (10 mg total) by mouth once daily. 90 tablet 1    ergocalciferol (ERGOCALCIFEROL) 50,000 unit Cap TAKE 2 CAPSULE (50,000 UNITS TOTAL)BY MOUTH TWICE A WEEK 24 capsule 2    estradioL (ESTRACE) 1 MG tablet Take 1 tablet (1 mg total) by mouth once daily. 30 tablet 3    fluticasone propionate (FLONASE) 50 mcg/actuation nasal spray INSTILL 1 SPRAY (50 MCG TOTAL) IN EACH NOSTRIL ONCE DAILY. 16 mL 2    gabapentin (NEURONTIN) 300 MG capsule Take 300 mg by mouth.      lancets Misc Use to test blood sugar once daily.  "300 each 0    lancing device Misc 1 Device by Misc.(Non-Drug; Combo Route) route 2 (two) times daily with meals. 100 each 11    meloxicam (MOBIC) 15 MG tablet TAKE 1 TABLET BY MOUTH EVERY DAY 30 tablet 2    metFORMIN (GLUCOPHAGE-XR) 500 MG ER 24hr tablet TAKE 1 TABLET BY MOUTH TWICE A DAY WITH MEALS 180 tablet 1    neomycin-polymyxin-dexamethasone (MAXITROL) 3.5mg/mL-10,000 unit/mL-0.1 % DrpS       nystatin (MYCOSTATIN) ointment Apply topically 2 (two) times daily.      omeprazole (PRILOSEC) 40 MG capsule TAKE 1 CAPSULE BY MOUTH EVERY DAY 90 capsule 1    OZEMPIC 1 mg/dose (4 mg/3 mL) INJECT 1 MG INTO THE SKIN EVERY 7 DAYS. 3 pen 2    pen needle, diabetic (PEN NEEDLE) 32 gauge x 5/32" Ndle 1 each by Misc.(Non-Drug; Combo Route) route once a week. 50 each 1    prednisoLONE acetate (PRED FORTE) 1 % DrpS       tobramycin-dexamethasone 0.3-0.1% (TOBRADEX) 0.3-0.1 % DrpS INSTILL 1 DROP INTO BOTH EYES 4 TIMES A DAY  2    triamcinolone acetonide 0.1% (KENALOG) 0.1 % cream Apply topically 2 (two) times daily. Apply topically 2 (two) times daily. 135 g 4    valsartan-hydrochlorothiazide (DIOVAN-HCT) 160-12.5 mg per tablet Take 1 tablet by mouth once daily. 90 tablet 1    [DISCONTINUED] ondansetron (ZOFRAN) 4 MG tablet Take 1 tablet (4 mg total) by mouth every 8 (eight) hours as needed. 90 tablet 1     No current facility-administered medications on file prior to visit.       Objective:      LMP  (LMP Unknown)     Exam:  PHYSICAL EXAMINATION:  Voice normal.  Normal affect without evidence of distress.  Back: +facet loading on left, no facet loading on Right. (-) STR bilaterally  Musculoskeletal: PSIS TTP more pronounced today on left, +gaenslen, +JOECLIN, +compression and distraction. Lumbosacral spring >Lumbar spring.  no PSIS TTP on right.  (-) JOCELIN on right. 5/5 BLE strength except for 4/5 EHL on right.   Gait: Normal         Imaging:  Narrative     EXAMINATION:  MRI LUMBAR SPINE WITHOUT CONTRAST    CLINICAL " HISTORY:  lumbar radiculopathy; Other intervertebral disc degeneration, lumbar region    TECHNIQUE:  Multiplanar, multisequence MR images were acquired from the thoracolumbar junction to the sacrum without the administration of contrast.    COMPARISON:  MRI of the lumbar spine 09/21/2011.    FINDINGS:  There is again normal lumbar lordosis.  There is no spondylolisthesis or spondylolysis or signs for acute fractures.  The tip of the conus is at L1.  No definite signs for intradural masses.  Paraspinal soft tissues are unremarkable.    L5-S1: There is disc dehydration.  Mild circumferential annular disc bulge.  No significant disc protrusions or spinal stenosis.  There is bilateral mild foraminal narrowing associated with disc bulge.  There is degenerative hypertrophic facet arthropathy.    L4-5: Disc dehydration.  There is a left L4-5 neural foraminal disc protrusion without definite signs for compression of the exiting L4 root in the foramen.  These findings have improved when compared to the previous study.  Within the central canal there is a broad-based mild disc protrusion or disc bulge greater on the left associated with left lateral recess stenosis.  Findings suspicious for compression of the descending left L5 root in the lateral recess (image 21 series 6.  Clinical correlation for left L5 radiculopathy suggested.  Hypertrophic changes of the facet joints and the ligamentum flava resulting in at least a mild central canal spinal stenosis.    L3-4: There is dehydration of the nucleus pulposis.  Mild circumferential annular disc bulge.  No significant central canal spinal stenosis or foraminal stenosis.  There is facet arthropathy bilaterally.    L2-3: No disc protrusions or spinal stenosis or foraminal stenosis.    L1-2: No disc herniations or spinal stenosis or foraminal stenosis.    T12-L1: No disc herniations or spinal stenosis or foraminal stenosis.    T11-T12: No axial images performed through this  region.  However on the sagittal images there is dehydration of nucleus pulposis associated with a circumferential annular disc bulge with mild compression of the thecal sac.  No cord compression however noted.   Impression       1. Multifactorial central canal spinal stenosis L4-5 disc space as above.  In addition there is a left paracentral disc bulge or protrusion associated with left lateral recess stenosis and compression of the descending left L5 root as above.  The left foraminal disc protrusion seen on the previous examination less pronounced when compared to the previous study.  2. Multilevel facet arthropathy as above.  3. Milder spondylotic changes at the rest of the disc spaces as above.      Electronically signed by: Gideon Webber MD  Date: 02/11/2020  Time: 07:42         Assessment:       Encounter Diagnoses   Name Primary?    Sacroiliitis Yes    Lumbar spondylosis     Chronic pain syndrome     DDD (degenerative disc disease), lumbar          Plan:       Diagnoses and all orders for this visit:    Sacroiliitis    Lumbar spondylosis    Chronic pain syndrome    DDD (degenerative disc disease), lumbar          - Prior records reviewed  - MRI reviewed  - She is s/p B L4/5 ILESI with 90% benefit  - Most recently s/p Left SIJ and also 90% improved  - She is having more focal Right sided pain and this was a virtual visit. No focal SI findings to right at prior visit  - Discussed and will have her RTC for inperson visit to re-examine.   - Can continue Neurontin 300mg TID, advised to take Motrin 800mg for acute pain but to stop mobic  - Advised to continue HEP.   - RTC in person for exam.

## 2022-02-22 NOTE — TELEPHONE ENCOUNTER
----- Message from Stoney Klein NP sent at 2/22/2022  2:46 PM CST -----  Please call and schedule Pt in about 2 weeks for inperson exam.

## 2022-02-23 ENCOUNTER — TELEPHONE (OUTPATIENT)
Dept: PAIN MEDICINE | Facility: CLINIC | Age: 52
End: 2022-02-23
Payer: COMMERCIAL

## 2022-02-28 ENCOUNTER — TELEPHONE (OUTPATIENT)
Dept: BARIATRICS | Facility: CLINIC | Age: 52
End: 2022-02-28
Payer: COMMERCIAL

## 2022-02-28 NOTE — TELEPHONE ENCOUNTER
Phoned patient from [facility] to introduce myself and go over her insurance requirements as well as our program requirements and get her a consult appt with AKBAR and LIANA OQUENDO to RC.

## 2022-03-08 ENCOUNTER — PATIENT MESSAGE (OUTPATIENT)
Dept: ADMINISTRATIVE | Facility: OTHER | Age: 52
End: 2022-03-08
Payer: COMMERCIAL

## 2022-03-15 ENCOUNTER — TELEPHONE (OUTPATIENT)
Dept: PAIN MEDICINE | Facility: CLINIC | Age: 52
End: 2022-03-15
Payer: COMMERCIAL

## 2022-03-15 NOTE — TELEPHONE ENCOUNTER
"This message is for patient in regards to his/her appointment 03/16/22 at 8:00a       [facility] Policy: For the safety of all patients and staff members.     Patient Visitor policy: Due to social distancing and limited seating staff are requesting patient to arrive to their schedule appointments on time.     Upon arriving to facility; patients are required to wear a face mask, if patient do not have a face mask one will be provided. Upon arriving patient we ask patients to contact clinic at this number [phone] to notify staff that they have arrived or they may do so by utilizing the Mobile [employer] in [place](if patient have patient portal; clinical staff will send a message through there letting them know it's okay to proceed to their visit). Staff will give the patient the "okay" to come up or wait inside their vehicle until clinic is ready for patient to be seen by MIHAI Mckay. If you have any questions or concerns please contact [phone]    [facility]  "

## 2022-03-16 ENCOUNTER — OFFICE VISIT (OUTPATIENT)
Dept: PAIN MEDICINE | Facility: CLINIC | Age: 52
End: 2022-03-16
Payer: COMMERCIAL

## 2022-03-16 VITALS
WEIGHT: 227 LBS | SYSTOLIC BLOOD PRESSURE: 160 MMHG | HEART RATE: 80 BPM | DIASTOLIC BLOOD PRESSURE: 96 MMHG | BODY MASS INDEX: 40.22 KG/M2 | TEMPERATURE: 98 F | HEIGHT: 63 IN | RESPIRATION RATE: 18 BRPM

## 2022-03-16 DIAGNOSIS — M51.36 DDD (DEGENERATIVE DISC DISEASE), LUMBAR: Primary | ICD-10-CM

## 2022-03-16 DIAGNOSIS — M54.16 LUMBAR RADICULOPATHY: ICD-10-CM

## 2022-03-16 DIAGNOSIS — M54.9 DORSALGIA, UNSPECIFIED: ICD-10-CM

## 2022-03-16 PROCEDURE — 99999 PR PBB SHADOW E&M-EST. PATIENT-LVL V: ICD-10-PCS | Mod: PBBFAC,,, | Performed by: NURSE PRACTITIONER

## 2022-03-16 PROCEDURE — 3080F PR MOST RECENT DIASTOLIC BLOOD PRESSURE >= 90 MM HG: ICD-10-PCS | Mod: CPTII,S$GLB,, | Performed by: NURSE PRACTITIONER

## 2022-03-16 PROCEDURE — 3008F PR BODY MASS INDEX (BMI) DOCUMENTED: ICD-10-PCS | Mod: CPTII,S$GLB,, | Performed by: NURSE PRACTITIONER

## 2022-03-16 PROCEDURE — 3008F BODY MASS INDEX DOCD: CPT | Mod: CPTII,S$GLB,, | Performed by: NURSE PRACTITIONER

## 2022-03-16 PROCEDURE — 3077F PR MOST RECENT SYSTOLIC BLOOD PRESSURE >= 140 MM HG: ICD-10-PCS | Mod: CPTII,S$GLB,, | Performed by: NURSE PRACTITIONER

## 2022-03-16 PROCEDURE — 3077F SYST BP >= 140 MM HG: CPT | Mod: CPTII,S$GLB,, | Performed by: NURSE PRACTITIONER

## 2022-03-16 PROCEDURE — 1159F MED LIST DOCD IN RCRD: CPT | Mod: CPTII,S$GLB,, | Performed by: NURSE PRACTITIONER

## 2022-03-16 PROCEDURE — 99999 PR PBB SHADOW E&M-EST. PATIENT-LVL V: CPT | Mod: PBBFAC,,, | Performed by: NURSE PRACTITIONER

## 2022-03-16 PROCEDURE — 99213 OFFICE O/P EST LOW 20 MIN: CPT | Mod: S$GLB,,, | Performed by: NURSE PRACTITIONER

## 2022-03-16 PROCEDURE — 1160F RVW MEDS BY RX/DR IN RCRD: CPT | Mod: CPTII,S$GLB,, | Performed by: NURSE PRACTITIONER

## 2022-03-16 PROCEDURE — 1159F PR MEDICATION LIST DOCUMENTED IN MEDICAL RECORD: ICD-10-PCS | Mod: CPTII,S$GLB,, | Performed by: NURSE PRACTITIONER

## 2022-03-16 PROCEDURE — 1160F PR REVIEW ALL MEDS BY PRESCRIBER/CLIN PHARMACIST DOCUMENTED: ICD-10-PCS | Mod: CPTII,S$GLB,, | Performed by: NURSE PRACTITIONER

## 2022-03-16 PROCEDURE — 3080F DIAST BP >= 90 MM HG: CPT | Mod: CPTII,S$GLB,, | Performed by: NURSE PRACTITIONER

## 2022-03-16 PROCEDURE — 99213 PR OFFICE/OUTPT VISIT, EST, LEVL III, 20-29 MIN: ICD-10-PCS | Mod: S$GLB,,, | Performed by: NURSE PRACTITIONER

## 2022-03-16 RX ORDER — TIZANIDINE 4 MG/1
4 TABLET ORAL NIGHTLY PRN
Qty: 90 TABLET | Refills: 2 | Status: SHIPPED | OUTPATIENT
Start: 2022-03-16 | End: 2022-09-21

## 2022-03-16 NOTE — PROGRESS NOTES
Chronic Pain-Established Note (Follow up visit)          Subjective:     Patient ID: Africa Jennings is a 52 y.o. female    Chief Complaint: Low-back Pain      Referred by: No ref. provider found      HPI:    Interval History 3/16/2022:  Pt presents for follow up of. She has had L4/5 ILESI and left SI injection in past with benefit. She has more vocal pain to right and associated radiculopathy down right leg in L4/5 pattern. She had no recent MRI in past 2 years and known lumbar discogenic issues but this was more focal to left. She has no complaint of loss of b/b or saddle paresthesias.     Interval History  2/22/2022:   Mrs Jennings presents for virtual follow up of lower back and left sided buttock pain. She is now s/p Left SIJ and states 90% improved pain and feels her relief was more significant than MARILEE. She states pain more noticeable to right side now without radicular complaint. There is no focal voicing of loss of b/b or saddle paresthesias.     Interval History 1/25/2021:  Mrs Jennings presents for follow up of lower back pain. She is s/p L4/5 ILESI with 90% benefit from lower back pain. She has lingering lower buttock/back pain to left side. Increased pain when sitting or stairs. Denies radiculopathy. Denies any loss of b/b or saddle paresthesias.     Interval History 12/23/2021:  Mrs Jennings presents to establish care at [facility]. She states continued lower back pain to left side but leg pain/paresthesias=back pain. She states left lower back pain and radiation in L4/5 pattern but also on right side. She has MRI findings to left L4/5 NF disc protrusion. She has been doing HEP she learned through [facility] for over 6 months straight and continues with no lasting benefit. She is currently taking Mobic 15mg and Neurontin 300mg TID. There is no complaint of loss of b/b or saddle paresthesias.     Interval History (4/13/20):    The patient location is: home  The chief complaint  leading to consultation is: follow up  Visit type: Virtual visit with audio.  Patient verbally consented for audio visit.  Total time spent with patient: 15 minutes  Each patient to whom he or she provides medical services by telemedicine is:  (1) informed of the relationship between the physician and patient and the respective role of any other health care provider with respect to management of the patient; and (2) notified that he or she may decline to receive medical services by telemedicine and may withdraw from such care at any time.      She returns today for follow up.  She reports that her right-sided low back and lower extremity pain has returned since last encounter.  She denies any changes in the quality or location of pain.  She continues to report associated numbness.  She denies any associated weakness or bowel bladder dysfunction.  She has attempted gabapentin 600 mg t.i.d..  She states this medication did not provide any relief.  She has also tried goody's powder, Motrin, Tylenol without any relief.        Interval History (2/17/20):  She returns today for follow up and MRI review.  She reports that her pain is unchanged in quality and location since last encounter.  She does state that the pain has significantly improved and is overall not very bothersome for the time being.        Initial Encounter (2/3/20):  Africa Jennings is a 52 y.o. female who presents today with chronic right-sided low back and lower extremity pain. This pain has been present for 6-7 months.  No specific inciting event or injury noted.  Patient localizes the pain to the right lumbar region.  Pain radiates to the right posterior and anterior thigh.  She reports associated numbness in this area as well. She also reports weakness of the right lower extremity when ambulating.  She denies any associated bowel or bladder dysfunction.  The pain is constant and worsened with activity.  She states that the pain interrupt  her sleep.  Patient does have a history of a similar problem roughly 8 years ago.  She underwent epidural steroid injections with good relief.  This pain is described in detail below.    Physical Therapy:  Yes    Non-pharmacologic Treatment:  Rest helps         · TENS?  No    Pain Medications:         · Currently taking:  Aleve, gabapentin    · Has tried in the past:  Tylenol, Motrin    · Has not tried:  Opioids, Tylenol, Muscle relaxants, TCAs, SNRIs, anticonvulsants, topical creams    Blood thinners:  None    Interventional Therapies:   L4-5 interlaminar epidural steroid injection x2  1/11/2022 L4/5 ILESI 90% benefit    2/8/2022 Left SIJ injection    Relevant Surgeries:  None    Affecting sleep?  Yes    Affecting daily activities? yes    Depressive symptoms? no          · SI/HI? No    Work status: Employed    Pain Scores:    Best:       6/10  Worst:     10/10  Usually:   8/10  Today:    0/10    Review of Systems   Constitutional: Negative for activity change, appetite change, chills, fatigue, fever and unexpected weight change.   HENT: Negative for hearing loss.    Eyes: Negative for visual disturbance.   Respiratory: Negative for chest tightness and shortness of breath.    Cardiovascular: Negative for chest pain.   Gastrointestinal: Negative for abdominal pain, constipation, diarrhea, nausea and vomiting.   Genitourinary: Negative for difficulty urinating.   Musculoskeletal: Positive for back pain, gait problem and myalgias. Negative for neck pain.   Skin: Negative for rash.   Neurological: Positive for weakness and numbness. Negative for dizziness, light-headedness and headaches.   Psychiatric/Behavioral: Positive for sleep disturbance. Negative for hallucinations and suicidal ideas. The patient is not nervous/anxious.        Past Medical History:   Diagnosis Date    Diabetes mellitus     Diabetes mellitus, type 2     Eczema     GERD (gastroesophageal reflux disease)     Hypertension     Impaired fasting  blood sugar     YSABEL on CPAP        Past Surgical History:   Procedure Laterality Date    breast reduction      CHOLECYSTECTOMY      laprascopic    EPIDURAL STEROID INJECTION N/A 1/11/2022    Procedure: INJECTION, STEROID, EPIDURAL IL L4/5 NEEDS CONSENT;  Surgeon: Britt Calix MD;  Location: [facility];  Service: Pain Management;  Laterality: N/A;    ESOPHAGOGASTRODUODENOSCOPY  8/18/14    HYSTERECTOMY  2007    laprascopic, total for fibroids.  Dr Ploo    INJECTION OF JOINT Left 2/8/2022    Procedure: INJECTION, JOINT, SI LEFT NEEDS CONSENT;  Surgeon: Britt Calix MD;  Location: [facility];  Service: Pain Management;  Laterality: Left;    OOPHORECTOMY      TOTAL REDUCTION MAMMOPLASTY         Social History     Socioeconomic History    Marital status:    Tobacco Use    Smoking status: Never Smoker    Smokeless tobacco: Never Used   Substance and Sexual Activity    Alcohol use: No     Comment: socially    Drug use: No    Sexual activity: Yes     Partners: Male     Birth control/protection: Surgical   Social History Narrative    Was  since 2000.      Has a friend since 2016.    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     Social Determinants of Health     Financial Resource Strain: Medium Risk    Difficulty of Paying Living Expenses: Somewhat hard   Food Insecurity: No Food Insecurity    Worried About Running Out of Food in the Last Year: Never true    Ran Out of Food in the Last Year: Never true   Transportation Needs: Unknown    Lack of Transportation (Non-Medical): No   Physical Activity: Sufficiently Active    Days of Exercise per Week: 3 days    Minutes of Exercise per Session: 60 min   Stress: Stress Concern Present    Feeling of Stress : To some extent   Social Connections: Unknown    Frequency of Communication with Friends and Family: More than three times a week    Frequency of Social Gatherings with Friends and Family: Once  a week    Active Member of Clubs or Organizations: No    Attends Club or Organization Meetings: Patient refused    Marital Status:    Housing Stability: Low Risk     Unable to Pay for Housing in the Last Year: No    Number of Places Lived in the Last Year: 1    Unstable Housing in the Last Year: No       Review of patient's allergies indicates:  No Known Allergies    Current Outpatient Medications on File Prior to Visit   Medication Sig Dispense Refill    amLODIPine (NORVASC) 10 MG tablet TAKE 1 TABLET BY MOUTH EVERY DAY 90 tablet 1    atorvastatin (LIPITOR) 20 MG tablet TAKE 1 TABLET BY MOUTH EVERY DAY 90 tablet 3    blood sugar diagnostic Strp 1 strip by Misc.(Non-Drug; Combo Route) route 2 (two) times daily with meals. 100 strip 11    blood-glucose meter kit Use as instructed 1 each 0    cetirizine (ZYRTEC) 10 MG tablet Take 1 tablet (10 mg total) by mouth once daily. 90 tablet 1    ergocalciferol (ERGOCALCIFEROL) 50,000 unit Cap TAKE 2 CAPSULE (50,000 UNITS TOTAL)BY MOUTH TWICE A WEEK 24 capsule 2    estradioL (ESTRACE) 1 MG tablet Take 1 tablet (1 mg total) by mouth once daily. 30 tablet 3    fluticasone propionate (FLONASE) 50 mcg/actuation nasal spray INSTILL 1 SPRAY (50 MCG TOTAL) IN EACH NOSTRIL ONCE DAILY. 16 mL 2    lancets Misc Use to test blood sugar once daily. 300 each 0    lancing device Misc 1 Device by Misc.(Non-Drug; Combo Route) route 2 (two) times daily with meals. 100 each 11    meloxicam (MOBIC) 15 MG tablet TAKE 1 TABLET BY MOUTH EVERY DAY 30 tablet 2    metFORMIN (GLUCOPHAGE-XR) 500 MG ER 24hr tablet TAKE 1 TABLET BY MOUTH TWICE A DAY WITH MEALS 180 tablet 1    neomycin-polymyxin-dexamethasone (MAXITROL) 3.5mg/mL-10,000 unit/mL-0.1 % DrpS       nystatin (MYCOSTATIN) ointment Apply topically 2 (two) times daily.      omeprazole (PRILOSEC) 40 MG capsule TAKE 1 CAPSULE BY MOUTH EVERY DAY 90 capsule 1    ondansetron (ZOFRAN) 4 MG tablet Take 1 tablet (4 mg total) by  "mouth every 8 (eight) hours as needed. 90 tablet 1    OZEMPIC 1 mg/dose (4 mg/3 mL) INJECT 1 MG INTO THE SKIN EVERY 7 DAYS. 3 pen 2    pen needle, diabetic (PEN NEEDLE) 32 gauge x 5/32" Ndle 1 each by Misc.(Non-Drug; Combo Route) route once a week. 50 each 1    tobramycin-dexamethasone 0.3-0.1% (TOBRADEX) 0.3-0.1 % DrpS INSTILL 1 DROP INTO BOTH EYES 4 TIMES A DAY  2    triamcinolone acetonide 0.1% (KENALOG) 0.1 % cream Apply topically 2 (two) times daily. Apply topically 2 (two) times daily. 135 g 4    valsartan-hydrochlorothiazide (DIOVAN-HCT) 160-12.5 mg per tablet Take 1 tablet by mouth once daily. 90 tablet 1    prednisoLONE acetate (PRED FORTE) 1 % DrpS        No current facility-administered medications on file prior to visit.       Objective:      BP (!) 160/96 (BP Location: Left arm, Patient Position: Sitting, BP Method: Large (Automatic))   Pulse 80   Temp 98 °F (36.7 °C) (Temporal)   Resp 18   Ht 5' 3" (1.6 m)   Wt 103 kg (227 lb)   LMP  (LMP Unknown)   BMI 40.21 kg/m²     Exam:  PHYSICAL EXAMINATION:  Voice normal.  Normal affect without evidence of distress.  Back: +facet loading on left, no facet loading on Right. (-) STR bilaterally  Musculoskeletal: PSIS TTP more pronounced today on left, +gaenslen, +JOCELIN, +compression and distraction. Lumbosacral spring >Lumbar spring.  no PSIS TTP on right.  (-) JOCELIN on right. 5/5 BLE strength except for 4/5 EHL on right.   Gait: Normal         Imaging:  Narrative     EXAMINATION:  MRI LUMBAR SPINE WITHOUT CONTRAST    CLINICAL HISTORY:  lumbar radiculopathy; Other intervertebral disc degeneration, lumbar region    TECHNIQUE:  Multiplanar, multisequence MR images were acquired from the thoracolumbar junction to the sacrum without the administration of contrast.    COMPARISON:  MRI of the lumbar spine 09/21/2011.    FINDINGS:  There is again normal lumbar lordosis.  There is no spondylolisthesis or spondylolysis or signs for acute fractures.  The tip of " the conus is at L1.  No definite signs for intradural masses.  Paraspinal soft tissues are unremarkable.    L5-S1: There is disc dehydration.  Mild circumferential annular disc bulge.  No significant disc protrusions or spinal stenosis.  There is bilateral mild foraminal narrowing associated with disc bulge.  There is degenerative hypertrophic facet arthropathy.    L4-5: Disc dehydration.  There is a left L4-5 neural foraminal disc protrusion without definite signs for compression of the exiting L4 root in the foramen.  These findings have improved when compared to the previous study.  Within the central canal there is a broad-based mild disc protrusion or disc bulge greater on the left associated with left lateral recess stenosis.  Findings suspicious for compression of the descending left L5 root in the lateral recess (image 21 series 6.  Clinical correlation for left L5 radiculopathy suggested.  Hypertrophic changes of the facet joints and the ligamentum flava resulting in at least a mild central canal spinal stenosis.    L3-4: There is dehydration of the nucleus pulposis.  Mild circumferential annular disc bulge.  No significant central canal spinal stenosis or foraminal stenosis.  There is facet arthropathy bilaterally.    L2-3: No disc protrusions or spinal stenosis or foraminal stenosis.    L1-2: No disc herniations or spinal stenosis or foraminal stenosis.    T12-L1: No disc herniations or spinal stenosis or foraminal stenosis.    T11-T12: No axial images performed through this region.  However on the sagittal images there is dehydration of nucleus pulposis associated with a circumferential annular disc bulge with mild compression of the thecal sac.  No cord compression however noted.   Impression       1. Multifactorial central canal spinal stenosis L4-5 disc space as above.  In addition there is a left paracentral disc bulge or protrusion associated with left lateral recess stenosis and compression of the  descending left L5 root as above.  The left foraminal disc protrusion seen on the previous examination less pronounced when compared to the previous study.  2. Multilevel facet arthropathy as above.  3. Milder spondylotic changes at the rest of the disc spaces as above.      Electronically signed by: Gideon Webber MD  Date: 02/11/2020  Time: 07:42         Assessment:       Encounter Diagnoses   Name Primary?    Dorsalgia, unspecified     DDD (degenerative disc disease), lumbar Yes    Lumbar radiculopathy          Plan:       Africa was seen today for low-back pain.    Diagnoses and all orders for this visit:    DDD (degenerative disc disease), lumbar    Dorsalgia, unspecified  -     MRI Lumbar Spine Without Contrast; Future    Lumbar radiculopathy    Other orders  -     tiZANidine (ZANAFLEX) 4 MG tablet; Take 1 tablet (4 mg total) by mouth nightly as needed (muscle spasm).          - Prior records reviewed  - MRI ordered with continued lower back pain, radicular pain and no imaging in past 2yrs   - Start Zanaflex 4mg qhs   - Continue HEP   - Advised to continue HEP.     - RTC after imaging for review and further interventional considerations.

## 2022-03-24 ENCOUNTER — HOSPITAL ENCOUNTER (OUTPATIENT)
Dept: RADIOLOGY | Facility: HOSPITAL | Age: 52
Discharge: HOME | End: 2022-03-24
Attending: NURSE PRACTITIONER
Payer: COMMERCIAL

## 2022-03-24 DIAGNOSIS — M54.9 DORSALGIA, UNSPECIFIED: ICD-10-CM

## 2022-03-24 PROCEDURE — 72148 MRI LUMBAR SPINE W/O DYE: CPT | Mod: 26,,, | Performed by: RADIOLOGY

## 2022-03-24 PROCEDURE — 72148 MRI LUMBAR SPINE WITHOUT CONTRAST: ICD-10-PCS | Mod: 26,,, | Performed by: RADIOLOGY

## 2022-03-24 PROCEDURE — 72148 MRI LUMBAR SPINE W/O DYE: CPT | Mod: TC

## 2022-03-25 ENCOUNTER — TELEPHONE (OUTPATIENT)
Dept: PAIN MEDICINE | Facility: CLINIC | Age: 52
End: 2022-03-25
Payer: COMMERCIAL

## 2022-03-25 ENCOUNTER — PATIENT MESSAGE (OUTPATIENT)
Dept: SLEEP MEDICINE | Facility: CLINIC | Age: 52
End: 2022-03-25
Payer: COMMERCIAL

## 2022-03-25 DIAGNOSIS — M54.6 PAIN IN THORACIC SPINE: ICD-10-CM

## 2022-03-25 NOTE — TELEPHONE ENCOUNTER
----- Message from Lise Myriam sent at 3/25/2022  3:20 PM CDT -----  Contact: SHAYE ADAME [[id number]]  Type: Call Back    Who called: AMILCARRENATASHAYE [[id number]]    What is the request in detail: Patient is requesting a call back. She states that she was speaking with St[facility] and the line disconnected. Please advise.     Can the clinic reply by [facility]? No    Would the patient rather a call back or a response via [facility]? Call back     Best call back number: [phone]    Additional Information:

## 2022-03-25 NOTE — TELEPHONE ENCOUNTER
Staff contacted and spoke with patient in regards provider message to be schedule for MRI.        Staff schedule pt for 3/31/22 6:30p      Pt verbalized understanding and accepted the appt

## 2022-03-28 ENCOUNTER — TELEPHONE (OUTPATIENT)
Dept: BARIATRICS | Facility: CLINIC | Age: 52
End: 2022-03-28
Payer: COMMERCIAL

## 2022-03-28 ENCOUNTER — PATIENT MESSAGE (OUTPATIENT)
Dept: BARIATRICS | Facility: CLINIC | Age: 52
End: 2022-03-28
Payer: COMMERCIAL

## 2022-03-28 NOTE — TELEPHONE ENCOUNTER
"Phoned patient and introduced myself as her RN coordinator who will be assisting her to navigate the bariatric protocol and meet all the insurance requirements to schedule her surgery.  Send the insurance requirements to her to review through the patient portal but also reviewed the highlights with her that she needed 6 consecutive months of medically supervised diet visits and 3 years of the diagnosis of morbid obesity.  She verbalized understanding.  Scheduled the consults with the [facility] and RD  Also sent her the directions to complete the bariatric seminar as it has been 5 years since she completed it. Researched chart thoroughly and there are 2 different heights in her chart 5'1" and 5'3".  Patient states that she is 5'3". Informed her that at her initial consult back in December 2016, she was measured at 5'1".  Confirmed with her that we will get an accurate height on her on 4/19/2022.  She verbalized understanding.  "

## 2022-03-31 ENCOUNTER — HOSPITAL ENCOUNTER (OUTPATIENT)
Dept: RADIOLOGY | Facility: HOSPITAL | Age: 52
Discharge: HOME | End: 2022-03-31
Attending: NURSE PRACTITIONER
Payer: COMMERCIAL

## 2022-03-31 DIAGNOSIS — M54.6 PAIN IN THORACIC SPINE: ICD-10-CM

## 2022-03-31 PROCEDURE — 72146 MRI THORACIC SPINE WITHOUT CONTRAST: ICD-10-PCS | Mod: 26,,, | Performed by: STUDENT IN AN ORGANIZED HEALTH CARE EDUCATION/TRAINING PROGRAM

## 2022-03-31 PROCEDURE — 72146 MRI CHEST SPINE W/O DYE: CPT | Mod: TC

## 2022-03-31 PROCEDURE — 72146 MRI CHEST SPINE W/O DYE: CPT | Mod: 26,,, | Performed by: STUDENT IN AN ORGANIZED HEALTH CARE EDUCATION/TRAINING PROGRAM

## 2022-04-01 ENCOUNTER — PATIENT MESSAGE (OUTPATIENT)
Dept: PAIN MEDICINE | Facility: CLINIC | Age: 52
End: 2022-04-01
Payer: COMMERCIAL

## 2022-04-01 DIAGNOSIS — G95.89 MYELOMALACIA: Primary | ICD-10-CM

## 2022-04-01 DIAGNOSIS — M48.04 THORACIC SPINAL STENOSIS: ICD-10-CM

## 2022-04-05 DIAGNOSIS — E11.9 TYPE 2 DIABETES MELLITUS WITHOUT COMPLICATION, WITHOUT LONG-TERM CURRENT USE OF INSULIN: ICD-10-CM

## 2022-04-05 RX ORDER — SEMAGLUTIDE 1.34 MG/ML
INJECTION, SOLUTION SUBCUTANEOUS
Qty: 3 PEN | Refills: 2 | Status: SHIPPED | OUTPATIENT
Start: 2022-04-05 | End: 2022-07-26 | Stop reason: SDUPTHER

## 2022-04-05 NOTE — TELEPHONE ENCOUNTER
This Rx Request does not qualify for assessment with the [facility]   Please review protocol details and the Care Due Message for extra clinical information    Reasons Rx Request may be deferred:   Patient has been seen in the ED/Hospital since the last PCP visit    Note composed:7:58 AM 04/05/2022

## 2022-04-05 NOTE — TELEPHONE ENCOUNTER
Care Due:                  Date            Visit Type   Department     Provider  --------------------------------------------------------------------------------                                             [facility]                              VIRTUAL      MED/ INTERNAL  Last Visit: 11-      AUDIO ONLY   MED/ PEDS      Brandi Thompson  Next Visit: None Scheduled  None         None Found                                                            Last  Test          Frequency    Reason                     Performed    Due Date  --------------------------------------------------------------------------------    HBA1C.......  6 months...  OZEMPIC, metFORMIN.......  11- 05-    Powered by [employer]. Reference number: [id number].   4/05/2022 12:35:08 AM CDT

## 2022-04-19 ENCOUNTER — OFFICE VISIT (OUTPATIENT)
Dept: BARIATRICS | Facility: CLINIC | Age: 52
End: 2022-04-19
Payer: COMMERCIAL

## 2022-04-19 ENCOUNTER — HOSPITAL ENCOUNTER (OUTPATIENT)
Dept: CARDIOLOGY | Facility: CLINIC | Age: 52
Discharge: HOME | End: 2022-04-19
Payer: COMMERCIAL

## 2022-04-19 ENCOUNTER — HOSPITAL ENCOUNTER (OUTPATIENT)
Dept: RADIOLOGY | Facility: HOSPITAL | Age: 52
Discharge: HOME | End: 2022-04-19
Attending: NURSE PRACTITIONER
Payer: COMMERCIAL

## 2022-04-19 ENCOUNTER — CLINICAL SUPPORT (OUTPATIENT)
Dept: BARIATRICS | Facility: CLINIC | Age: 52
End: 2022-04-19
Payer: COMMERCIAL

## 2022-04-19 VITALS
WEIGHT: 224.44 LBS | SYSTOLIC BLOOD PRESSURE: 144 MMHG | HEIGHT: 61 IN | HEART RATE: 82 BPM | OXYGEN SATURATION: 97 % | BODY MASS INDEX: 42.37 KG/M2 | DIASTOLIC BLOOD PRESSURE: 80 MMHG

## 2022-04-19 DIAGNOSIS — E11.9 TYPE 2 DIABETES MELLITUS WITHOUT COMPLICATION, WITHOUT LONG-TERM CURRENT USE OF INSULIN: ICD-10-CM

## 2022-04-19 DIAGNOSIS — I10 ESSENTIAL HYPERTENSION: ICD-10-CM

## 2022-04-19 DIAGNOSIS — G47.33 OBSTRUCTIVE SLEEP APNEA (ADULT) (PEDIATRIC): ICD-10-CM

## 2022-04-19 DIAGNOSIS — E66.01 MORBID OBESITY WITH BMI OF 40.0-44.9, ADULT: ICD-10-CM

## 2022-04-19 DIAGNOSIS — E11.9 TYPE 2 DIABETES MELLITUS WITHOUT COMPLICATION, WITHOUT LONG-TERM CURRENT USE OF INSULIN: Primary | ICD-10-CM

## 2022-04-19 DIAGNOSIS — K21.9 GASTROESOPHAGEAL REFLUX DISEASE WITHOUT ESOPHAGITIS: ICD-10-CM

## 2022-04-19 DIAGNOSIS — Z71.3 DIETARY COUNSELING AND SURVEILLANCE: ICD-10-CM

## 2022-04-19 PROCEDURE — 93010 EKG 12-LEAD: ICD-10-PCS | Mod: S$GLB,,, | Performed by: INTERNAL MEDICINE

## 2022-04-19 PROCEDURE — 99999 PR PBB SHADOW E&M-EST. PATIENT-LVL V: CPT | Mod: PBBFAC,,, | Performed by: NURSE PRACTITIONER

## 2022-04-19 PROCEDURE — 99205 PR OFFICE/OUTPT VISIT, NEW, LEVL V, 60-74 MIN: ICD-10-PCS | Mod: S$GLB,,, | Performed by: NURSE PRACTITIONER

## 2022-04-19 PROCEDURE — 71046 X-RAY EXAM CHEST 2 VIEWS: CPT | Mod: 26,,, | Performed by: RADIOLOGY

## 2022-04-19 PROCEDURE — 93005 ELECTROCARDIOGRAM TRACING: CPT | Mod: S$GLB,,, | Performed by: NURSE PRACTITIONER

## 2022-04-19 PROCEDURE — 1159F MED LIST DOCD IN RCRD: CPT | Mod: CPTII,S$GLB,, | Performed by: NURSE PRACTITIONER

## 2022-04-19 PROCEDURE — 3008F PR BODY MASS INDEX (BMI) DOCUMENTED: ICD-10-PCS | Mod: CPTII,S$GLB,, | Performed by: NURSE PRACTITIONER

## 2022-04-19 PROCEDURE — 99499 NO LOS: ICD-10-PCS | Mod: S$GLB,,, | Performed by: DIETITIAN, REGISTERED

## 2022-04-19 PROCEDURE — 99499 UNLISTED E&M SERVICE: CPT | Mod: S$GLB,,, | Performed by: DIETITIAN, REGISTERED

## 2022-04-19 PROCEDURE — 1160F PR REVIEW ALL MEDS BY PRESCRIBER/CLIN PHARMACIST DOCUMENTED: ICD-10-PCS | Mod: CPTII,S$GLB,, | Performed by: NURSE PRACTITIONER

## 2022-04-19 PROCEDURE — 93005 EKG 12-LEAD: ICD-10-PCS | Mod: S$GLB,,, | Performed by: NURSE PRACTITIONER

## 2022-04-19 PROCEDURE — 3008F BODY MASS INDEX DOCD: CPT | Mod: CPTII,S$GLB,, | Performed by: NURSE PRACTITIONER

## 2022-04-19 PROCEDURE — 99999 PR PBB SHADOW E&M-EST. PATIENT-LVL II: ICD-10-PCS | Mod: PBBFAC,,, | Performed by: DIETITIAN, REGISTERED

## 2022-04-19 PROCEDURE — 93010 ELECTROCARDIOGRAM REPORT: CPT | Mod: S$GLB,,, | Performed by: INTERNAL MEDICINE

## 2022-04-19 PROCEDURE — 3077F PR MOST RECENT SYSTOLIC BLOOD PRESSURE >= 140 MM HG: ICD-10-PCS | Mod: CPTII,S$GLB,, | Performed by: NURSE PRACTITIONER

## 2022-04-19 PROCEDURE — 99999 PR PBB SHADOW E&M-EST. PATIENT-LVL V: ICD-10-PCS | Mod: PBBFAC,,, | Performed by: NURSE PRACTITIONER

## 2022-04-19 PROCEDURE — 1160F RVW MEDS BY RX/DR IN RCRD: CPT | Mod: CPTII,S$GLB,, | Performed by: NURSE PRACTITIONER

## 2022-04-19 PROCEDURE — 71046 X-RAY EXAM CHEST 2 VIEWS: CPT | Mod: TC,FY

## 2022-04-19 PROCEDURE — 3079F PR MOST RECENT DIASTOLIC BLOOD PRESSURE 80-89 MM HG: ICD-10-PCS | Mod: CPTII,S$GLB,, | Performed by: NURSE PRACTITIONER

## 2022-04-19 PROCEDURE — 71046 XR CHEST PA AND LATERAL: ICD-10-PCS | Mod: 26,,, | Performed by: RADIOLOGY

## 2022-04-19 PROCEDURE — 1159F PR MEDICATION LIST DOCUMENTED IN MEDICAL RECORD: ICD-10-PCS | Mod: CPTII,S$GLB,, | Performed by: NURSE PRACTITIONER

## 2022-04-19 PROCEDURE — 3079F DIAST BP 80-89 MM HG: CPT | Mod: CPTII,S$GLB,, | Performed by: NURSE PRACTITIONER

## 2022-04-19 PROCEDURE — 99205 OFFICE O/P NEW HI 60 MIN: CPT | Mod: S$GLB,,, | Performed by: NURSE PRACTITIONER

## 2022-04-19 PROCEDURE — 99999 PR PBB SHADOW E&M-EST. PATIENT-LVL II: CPT | Mod: PBBFAC,,, | Performed by: DIETITIAN, REGISTERED

## 2022-04-19 PROCEDURE — 3077F SYST BP >= 140 MM HG: CPT | Mod: CPTII,S$GLB,, | Performed by: NURSE PRACTITIONER

## 2022-04-19 NOTE — PROGRESS NOTES
"NUTRITIONAL CONSULT    Referring Physician: Roge Rich M.D.   Reason for MNT Referral: Initial assessment for laparoscopic Fausto-en-Y work-up    PAST MEDICAL HISTORY:   52 y.o. female  Body mass index is 42.41 kg/m²..  Weight history includes She has struggled with excess weight since 34.  Her highest adult weight was 235lbs at age 52, and her lowest adult weight was 180lbs at age 44.   Dieting attempts include  The patient has tried calorie counting and exercise and diet change.  The patient was most successful with walking with a weight loss of 10 lbs    Past Medical History:   Diagnosis Date    Diabetes mellitus     Diabetes mellitus, type 2     Eczema     GERD (gastroesophageal reflux disease)     Hypertension     Impaired fasting blood sugar     YSABEL on CPAP        CLINICAL DATA:  52 y.o.-year-old Black or  female.  Height: 5'1"  Weight:224lbs  IBW:129 lbs  BMI: 224  The patient's goal weight (50% EBW): 176 lbs  Personal goal weight: 185 lbs    Goal for Bariatric Surgery: to improve health, to improve quality of life and get off meds and get better mobility    DAILY NUTRITIONAL NEEDS: pre-op nutritional bariatric guidelines to promote weight loss  [id number] Calories    Grams Protein    NUTRITION & HEALTH HISTORY:  Greatest challenge: irregular meal patterns    Current diet recall:   B: greek yogurt   L: greek yogurt and honey smoothie with whey protein powder  D: fish and chicken and veggies   S: Sf jello cups and fruit cups or pb3     Current Diet:  Meal pattern: 3 meals and healthy snacks  Protein supplements: whey protein and greek yogurt and almond milk and ice  Snackin-3 / day  Vegetables: Likes a variety. Eats daily.  Fruits: Likes a variety. Eats 2-3 times per week.oranges and banana  Beverages: water and herbal tea, no milk or juices  Dining out: Weekly. on saturday Mostly restaurants.  Cooking at home: Daily. Mostly air fryer meat, fish, starchy CHO and " vegetables. Bread at times    Exercise:  Past exercise: Adequate    Current exercise: Fair  Was walking and water aerobics, stretching daily  Restrictions to exercise: pain from back herniated disc    Vitamins / Minerals / Herbs:   Alive   Eldberry  Probotics  Vit C  CoQ10    Labs:   None available at time of visit    Food Allergies:   None reported    Social:  Works regular daytime shifts. 5 to 4 pm 6 days a week  Lives with fiancee and dog  Grocery shopping- pt and food prep  pt  Patient believes the household will be supportive after surgery.  Alcohol: None.  Smoking: None.    ASSESSMENT:  · Patient reports attempts at weight loss, only to regain lost weight.  · Patient demonstrated knowledge of healthy eating behaviors and exercise patterns; admits to not eating healthy and not exercising at this point.  · Patient states willingness and demonstrates willingness to change lifestyle and make behavior modifications as evidenced by dietary changes, including protein drinks, increased vegetables, healthier cooking at home, bringing snacks to work, healthier snacking at work and healthier snacking at home.        Barriers to Education: none    Stage of change: action    NUTRITION DIAGNOSIS:     Morbid Obesity related to Physical inactivity as evidence by BMI.    BARIATRIC DIET DISCUSSION/PLAN:  Discussed diet after surgery and related to patient's food record.  Reviewed nutrition guidelines for before and after surgery.  Answered all questions.  Work on Bariatric Nutrition Checklist.  Work on expanding variety of vegetables.  Work on gradually cutting back on starchy CHO in the diet.  1200-calorie diet.  1500-calorie diet.  5-6 meals per day.  Start including protein supplements in the diet plan daily.  Return to clinic.    RECOMMENDATIONS:  Patient is a potential candidate for bariatric surgery.    Needs additional visit(s) with RD.    Patient verbalized understanding.    Expect good  compliance after surgery at this  time.    Communicated nutrition plan with bariatric team.    SESSION TIME:  60 minutes

## 2022-04-19 NOTE — PATIENT INSTRUCTIONS
Bariatric Diet Grocery List      High in Protein:   Canned tuna or chicken (packed in water)  Lean ground turkey breast or ground round  Turkey or chicken (no skin)  Lean pork or beef   Scrambled, poached, or boiled eggs  Baked or broiled fish and seafood (not fried!)  Beans, edamame and lentils  Low fat deli meats: turkey, chicken, ham, roast beef  1% or Skim Milk, Lactaid, or Soymilk  Low-fat cheese, cottage cheese, mozzarella string cheese, ricotta  Light yogurt, FF/SF frozen yogurt, custard, SF pudding  Protein drinks and protein bars with 0-4 grams sugar     Fruits, Vegetables and Snacks   Green beans, broccoli, cauliflower, spinach, asparagus, carrots, lima beans, yellow squash, zucchini, etc.  Apple, pineapple, peach, grapes, banana, watermelon, oranges, etc.  Fruit canned in its own juice or in water (not in syrup)  Raw veggies  Lettuce: dark greens like spinach and Misael  Unsalted Nuts  Barrie Links beef jerky     Fluids:   Skim/1% milk, Lactaid, Soymilk  Sugar-free beverages  (decaf and non-carbonated)  Decaf coffee & decaf tea   Water      1200- 1500 calorie Sample meal plan   80-120g protein per day.   Protein drinks and bars: 0-4 grams sugar   Drink lots of water throughout the day and exercise!   MENU # 1   Breakfast: 2 eggs, 1 turkey sausage alejandro, 1 apple   Snack: P3 protein pack  Lunch: 2 roll-ups (sliced turkey, low-fat sliced cheese, mustard), 12 baby carrots dipped in 1 Tbsp natural peanut butter   Mid-Day Snack: ¼ cup unsalted almonds, ½ cup fruit   Dinner: 1 chicken thigh simmered in tomato sauce + 2 Tbsp mozzarella cheese, ½ cup black beans, 1/2 cup steamed carrots   Evening Snack: 1/2 cup grapes with 4 cubes low-fat cheddar cheese   ___________________________________________________   MENU # 2   Breakfast: 200 calorie protein drink   Mid-morning snack : ¼ cup unsalted nuts, medium banana   Lunch: 3oz tuna or chicken salad made with 2 tbsp light holland, over salad with tomatoes and cucumbers.    Snack: low-fat cheese stick   Dinner: 3oz hamburger alejandro, slice of low-fat cheese, 1 cup yellow squash and zucchini sauteed in nonstick cookspray  Snack: 6oz light yogurt   _______________________________________________________   Menu #3   Breakfast: 6oz plain Greek yogurt + fruit (½ banana OR ½ cup fruit - pineapple, blueberries, strawberries, peach), may add Splenda to kashif.   Lunch: Grilled chicken breast w/ slice low-fat pepper khushboo cheese, 1/2 cup grilled/baked asparagus and small salad with Salad Spritzer.   Mid-Day snack: 200 calorie protein drink   Dinner: 4oz Grilled fish, ½ cup white beans, ½ cup cooked spinach   Evening Snack: fudgsicle no-sugar-added   Menu # 4   Breakfast: 1 scoop vanilla protein powder + 4oz skim milk + 4oz coffee   Snack: Pure protein bar   Lunch: 2 Lettuce tacos: 3oz seasoned ground turkey wrapped in a Misael lettuce leaves with 1 Tbsp shredded cheese and dollop low-fat sour cream   Snack: ½ cup cottage cheese, ½ cup pineapple chunks   Dinner: Shrimp omelet: 2 eggs, ½ cup shrimp, green onions, and shredded cheese   ______________________________________________________   Menu #5   Breakfast: 1 cup low-fat cottage cheese, ½ cup peaches (no sugar added)   Snack: 1 apple, 4 cubes cheese   Lunch: 3oz baked pork chop, 1 cup okra and tomato stew   Snack: 1/2 cup black beans + salsa + dollop light sour cream   Dinner: Caprese chicken salad: 3 oz chicken breast, 1oz fresh mozzarella, sliced tomato, 1 Tbsp olive oil, basil   Snack: sugar-free popsicle   _______________________________________________________  Menu #6   Breakfast: ½ cup part-skim ricotta cheese, 2 Tbsp sugar-free strawberry preserves, sprinkle of slivered almonds   Snack: 1 orange + string cheese  Lunch: 3 oz canned chicken, 1oz shredded cheddar cheese, ½ cup black beans, 2 Tbsp salsa   Snack: 200 calorie Protein drink   Dinner: 4 oz grilled salmon steak, over mixed green salad with 2 tbsp light dressing    _______________________________________________________  Menu #7  Breakfast: crust-less quiche (bake 1 egg mixed w/ low fat cheese, broccoli and low sodium ham in a muffin cup until cooked inside)  Snack: 1 light yogurt  Lunch: protein shake (1 scoop powder + 8 oz skim milk, blended w/ ice)  Snack: small apple w/ 2 tbsp PB2 (powdered peanut butter)  Dinner: 2 Turkey meatballs w/ low sugar marinara sauce, 1/2 cup spiralized zucchini (sauteed on stove top w/ nonstick cookspray)   Healthy Eating on the go ~ Pre and Post-Surgery     (*) Means this meal is appropriate for pre-surgery consumption because it is >30g of protein per meal. Post-surgery, may want to split meal in half or save a small portion for a snack.     Susans Kitchen  Item  Calories  Protein (g)   Mediterranean Lamb [place]  350 22   *Chicken Kabobs 290 41   *Falun Kabobs 330 40   Shrimp Kabobs 170 23   *Steak Kabobs 490 42   *Cauliflower Rice Bowl- chicken (salmon, lamb)  490 41   Mediterranean Chicken 260 34   *Protein Power Plate 520 41   Side items: Greek side salad, fruit salad, roasted vegetables, baked falafel       Robless   Item  Calories Protein (g)   Artisan Grilled Chicken [place], no bun  <380 36   *[place] Grilled Chicken Salad, no dressing <320 42   Double Hamburger, no bun <440 25   Side items: apple slices, side salad       Jennifers   Item Calories Protein (g)   Grilled Chicken [place], no bun  <370 34   [place] Chicken Salad, half size, no dressing 320  22   Apple Pecan Chicken Salad, half size, no dressing  340 20   Large Chili 250 21   Side items: garden or caesar side salad (no croutons), apple bites       Burger Trung   Item Calories Protein (g)   Grilled Chicken [place], no bun <470 37   Whopper Jr., no bun <310 13   Double Hamburger, no bun  <390 23   *Chicken Garden Salad, no croutons, use ½ packet of dressing  <520 40   Side items: garden side salad,         Chick-mateusz-A  Item Calories Protein (g)   Grilled Chicken  Ord, no bun  <310 29   Grilled Nuggets, 8 count 140 25   Grilled Chicken Market Salad with light balsamic dressing 330 28   Small Chicken Tortilla Soup, no tortilla strips 340 23   Side items: side salad, superfood side salad, carrot raisin salad, fruit cup,          Sonic  Item Calories Protein (g)   [place]r. [place], no bun  <330 15   Classic Grilled Chicken [place], no bun <490 31   Hearty [place], no fritos 140 10       Els   Item Calories Protein (g)   Blackened Chicken Tenders, 3 piece 170 26   Side items: green beans             [facility]   Item Calories Protein (g)   Unwich: any sandwich made wrapped in lettuce instead of bread. Ask for no holland.      Original Roast Beef [place] 260 16   [place] Breast Unwich 230 14   Perfect Serbian Unwich 340 22   Ham and Provolone Un[place] 350 19   Tuna Salad [place] 280 11   The Veggie [place] 410 17   Side items: dill pickle          Chipotle  Item Calories Protein (g)   *Salad with your choice of meat, beans, fresh tomato salsa, fajita veggies, and guacamole (NO rice) ~375 ~40        Applebees  Item Calories Protein (g)   Grilled Chicken Breast 290 38   Barbadian Shrimp Salad, no wonton strips, use ½ dressing <390 26   Blackened Shrimp Caesar Salad, no croutons, use ½ dressing  <660 25   *Grilled Chicken Caesar Salad, no croutons, use ½ dressing <770 47   [place] with Maple Mustard Glaze 350 37   Side Grilled Shrimp Skewer 110 27   Side items: steamed broccoli, garlicky green beans,               [employer]  Item Calories Protein (g)   *Spinach & Mushroom Omelette, no hollandaise sauce  <890 46   *Garden Omelette 840 46   Egg White Vegetable Omelette 380 29   *Grilled Chicken & Veggie Salad, use ½ dressing  <680 38     Olive Garden   Item Calories Protein (g)   *Herb-Grilled [place] 460 45   House salad with, no croutons. (Add grilled chicken or shrimp) <400 25   Side items: steamed broccoli       Walk Ons   Tuna Tini 410 30   Venison [place]- Bowl 330 14   Chicken Berry Pecan  Salad      Side items: seasonal fruit, broccoli, green beans side salad

## 2022-04-19 NOTE — PATIENT INSTRUCTIONS
Prior to surgery you will need to complete:  - Dietitian consult and follow up appointments as needed  - Labs  - Chest X-ray  - EKG  - Psychological evaluation, Please call psychiatry [phone] to schedule  - Stress ECHO    In preparation for bariatric surgery, please complete the following:   Discuss your current medications with your primary care provider, remember your medications will need to be crushed, chewable, or in liquid form for the first 2-4 weeks after a gastric bypass or sleeve.  For a gastric band, your medications will need to be crushed indefinitely.    If you take a blood thinner such as: Coumadin (warfarin), Pradaxa (dabigatran), or Plavix (clopidogrel), you will need to speak with your prescribing provider on how or if this medication can be stopped before surgery.   If you take a medication for depression or anxiety, you will need to begin crushing or opening the capsule 1-3 months prior to surgery.  Remember to discuss this with the psychologist or psychiatrist that you see.   If you take medication for arthritis on a daily basis that is considered a non-steroidal anti-inflammatory (NSAID), please discuss with your prescribing physician an alternative medication.  After having gastric bypass or gastric sleeve, this group of medications is not appropriate to take due to increased risk of bleeding stomach ulcers.      DEFINITIONS  Appointments: Pre-scheduled meetings or consultations with any physician, advanced practice provider, dietitian, or psychologist, and labs, imaging studies, sleep studies, etc.   Late cancellation: Cancelling an appointment 24-48 hours prior to scheduled time.  No-Show appointment:  is when   You do NOT arrive to your appointment at the time its scheduled.  You call to cancel or cancel via [employer] less than 24 hours in advance of your scheduled appointment  You show up 15 minutes AFTER your scheduled appointment time without any notification of being late.      POLICY  You are allowed up to 3 cancellations for appointments.   After 3 cancellations your case will be placed on hold for 2 months and after that time you can resume the program.   You are allowed only 1 no-show for an appointment.   You will be re-scheduled one time and if there is a 2nd no-show at any point, your case will be placed on hold for 3 months.  After 3 months you can resume the program.     Upon resuming the program after being placed on hold for either above mentioned reasons, if you have a late cancel or no show for any appointment, the bariatric team will review if youre an appropriate candidate for surgery at the monthly meeting.

## 2022-04-19 NOTE — PROGRESS NOTES
BARIATRIC NEW PATIENT EVALUATION    CHIEF COMPLAINT:   Morbid obesity, body mass index is 42.41 kg/m². and inability to lose weight.    HPI:  Africa Jennings is a 52 y.o. morbidly obese female. Her current body mass index is 42.41 kg/m². She has multiple associated comorbidities including diabetes mellitus, hypertension, YSABEL on CPAP and GERD.  She has struggled with excess weight since 34.  Her highest adult weight was 235lbs at age 52, and her lowest adult weight was 180lbs at age 44.  The patient has tried calorie counting and exercise and diet change.  The patient was most successful with walking with a weight loss of 10 lbs.  Her current exercise includes none  She denies any history of eating disorder such as anorexia, bulimia, or taking laxatives for weight loss, and denies any addiction including illicit substances, alcohol, or gambling.  Patient states she has a good  support system.  She lives with alone.  She is currently employed [employer].  She  endorses a 10 year history of GERD.  The patient's goal is 185  lbs.    ESS: Score of 5, reviewed 04/19/2022.  Does not need Sleep Study.      Pre op weight-224  IBW-129      PAST MEDICAL HISTORY:  Past Medical History:   Diagnosis Date    Diabetes mellitus     Diabetes mellitus, type 2     Eczema     GERD (gastroesophageal reflux disease)     Hypertension     Impaired fasting blood sugar     YSABEL on CPAP        PAST SURGICAL HISTORY:  Past Surgical History:   Procedure Laterality Date    breast reduction      CHOLECYSTECTOMY      laprascopic    EPIDURAL STEROID INJECTION N/A 1/11/2022    Procedure: INJECTION, STEROID, EPIDURAL IL L4/5 NEEDS CONSENT;  Surgeon: Britt Calix MD;  Location: [facility];  Service: Pain Management;  Laterality: N/A;    ESOPHAGOGASTRODUODENOSCOPY  8/18/14    HYSTERECTOMY  2007    laprascopic, total for fibroids.  Dr Polo    INJECTION OF JOINT Left 2/8/2022    Procedure: INJECTION, JOINT, SI  LEFT NEEDS CONSENT;  Surgeon: Britt Calix MD;  [facility];  Service: Pain Management;  Laterality: Left;    OOPHORECTOMY      TOTAL REDUCTION MAMMOPLASTY         FAMILY HISTORY:  Family History   Problem Relation Age of Onset    Diabetes Maternal Grandmother     Heart disease Maternal Grandmother     Hypertension Maternal Grandmother     Crohn's disease Sister     Diabetes Mother     Hypertension Mother     Heart disease Mother     Cancer Father 58        Prostate    Breast cancer Sister     Amblyopia Neg Hx     Blindness Neg Hx     Cataracts Neg Hx     Glaucoma Neg Hx     Macular degeneration Neg Hx     Retinal detachment Neg Hx     Strabismus Neg Hx         SOCIAL HISTORY:  Social History     Socioeconomic History    Marital status:    Tobacco Use    Smoking status: Never Smoker    Smokeless tobacco: Never Used   Substance and Sexual Activity    Alcohol use: No     Comment: socially    Drug use: No    Sexual activity: Yes     Partners: Male     Birth control/protection: Surgical   Social History Narrative    Was  since 2000.      Has a friend since 2016.    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     Social Determinants of Health     Financial Resource Strain: Medium Risk    Difficulty of Paying Living Expenses: Somewhat hard   Food Insecurity: No Food Insecurity    Worried About Running Out of Food in the Last Year: Never true    Ran Out of Food in the Last Year: Never true   Transportation Needs: Unknown    Lack of Transportation (Non-Medical): No   Physical Activity: Sufficiently Active    Days of Exercise per Week: 3 days    Minutes of Exercise per Session: 60 min   Stress: Stress Concern Present    Feeling of Stress : To some extent   Social Connections: Unknown    Frequency of Communication with Friends and Family: More than three times a week    Frequency of Social Gatherings with Friends and Family: Once a week  "   Active Member of Clubs or Organizations: No    Attends Club or Organization Meetings: Patient refused    Marital Status:    Housing Stability: Low Risk     Unable to Pay for Housing in the Last Year: No    Number of Places Lived in the Last Year: 1    Unstable Housing in the Last Year: No       MEDICATIONS:  Medications have been reviewed.    ALLERGIES:  Allergies have been reviewed.    Review of Systems   Constitutional: Negative for chills and fever.   HENT: Negative for ear pain, nosebleeds and sore throat.    Eyes: Negative for blurred vision and double vision.   Respiratory: Negative for cough and shortness of breath.    Cardiovascular: Negative for chest pain, palpitations, orthopnea, claudication and leg swelling.   Gastrointestinal: Negative for abdominal pain, constipation, diarrhea, heartburn, nausea and vomiting.   Genitourinary: Negative for dysuria and urgency.   Musculoskeletal: Negative for back pain and joint pain.   Skin: Negative for rash.   Neurological: Negative for dizziness, tingling, focal weakness and headaches.   Endo/Heme/Allergies: Does not bruise/bleed easily.   Psychiatric/Behavioral: Negative for depression and suicidal ideas.       Vitals:    04/19/22 1426   BP: (!) 144/80   Pulse: 82   SpO2: 97%   Weight: 101.8 kg (224 lb 6.9 oz)   Height: 5' 1" (1.549 m)   PainSc: 0-No pain       Physical Exam  Vitals and nursing note reviewed.   Constitutional:       Appearance: She is well-developed. She is morbidly obese.   HENT:      Head: Normocephalic.      Nose: Nose normal.      Mouth/Throat:      Mouth: Mucous membranes are moist.   Eyes:      Extraocular Movements: Extraocular movements intact.   Cardiovascular:      Rate and Rhythm: Normal rate and regular rhythm.      Heart sounds: Normal heart sounds.   Pulmonary:      Effort: Pulmonary effort is normal.      Breath sounds: Normal breath sounds.   Abdominal:      General: Bowel sounds are normal.      Palpations: Abdomen " is soft.   Musculoskeletal:         General: Normal range of motion.      Cervical back: Normal range of motion.   Skin:     General: Skin is warm and dry.      Capillary Refill: Capillary refill takes less than 2 seconds.   Neurological:      Mental Status: She is alert and oriented to person, place, and time.   Psychiatric:         Mood and Affect: Mood normal.          DIAGNOSIS:  1. Morbid obesity, body mass index is 42.41 kg/m². and inability to lose weight.  2. Co-morbidities: diabetes mellitus, YSABEL on CPAP and GERD, HTN    PLAN:  The patient is a good candidate for Bariatric Surgery. The patient is interested in laparoscopic neeta-en-y gastric bypass with Dr. Rich. The surgery and post-op care was discussed in detail with the patient. All questions were answered.    The patient understands that bariatric surgery is a tool to aid in weight loss and that they need to be committed to the diet and exercise post-operatively for successful weight loss. Discussed with patient that bariatric surgery is not the easy way out and that it will take plenty of dedication on the patient's part to be successful. Also discussed the possibility of weight regain if the patient strays from the diet guidelines or exercise requirements. Patient verbalized understanding and wishes to proceed with the work-up.    Estimated Goal weight is 170-180 lbs.    ORDERS:  1. Stress Test, Chest X-Ray and EKG  2. Psychological Consult and Bariatric Dietician Consult  3. Bariatric Labs: Per orders.    PCP: Brandi Thompson MD  RTC: As scheduled.    60 minute visit, over 50% of time spent counseling patient face to face on surgical options, risks, benefits, expected diet, recommended exercise regimen, and expected weight loss

## 2022-04-20 VITALS — HEIGHT: 61 IN | BODY MASS INDEX: 42.37 KG/M2 | WEIGHT: 224.44 LBS

## 2022-04-21 ENCOUNTER — PATIENT MESSAGE (OUTPATIENT)
Dept: FAMILY MEDICINE | Facility: CLINIC | Age: 52
End: 2022-04-21
Payer: COMMERCIAL

## 2022-04-21 ENCOUNTER — PATIENT MESSAGE (OUTPATIENT)
Dept: BARIATRICS | Facility: CLINIC | Age: 52
End: 2022-04-21
Payer: COMMERCIAL

## 2022-04-21 DIAGNOSIS — L65.9 ALOPECIA: Primary | ICD-10-CM

## 2022-04-25 ENCOUNTER — HOSPITAL ENCOUNTER (OUTPATIENT)
Dept: CARDIOLOGY | Facility: HOSPITAL | Age: 52
Discharge: HOME | End: 2022-04-25
Attending: NURSE PRACTITIONER
Payer: COMMERCIAL

## 2022-04-25 ENCOUNTER — PATIENT MESSAGE (OUTPATIENT)
Dept: PAIN MEDICINE | Facility: CLINIC | Age: 52
End: 2022-04-25
Payer: COMMERCIAL

## 2022-04-25 ENCOUNTER — OFFICE VISIT (OUTPATIENT)
Dept: NEUROSURGERY | Facility: CLINIC | Age: 52
End: 2022-04-25
Payer: COMMERCIAL

## 2022-04-25 VITALS — SYSTOLIC BLOOD PRESSURE: 128 MMHG | HEART RATE: 80 BPM | DIASTOLIC BLOOD PRESSURE: 85 MMHG

## 2022-04-25 VITALS
BODY MASS INDEX: 42.29 KG/M2 | WEIGHT: 224 LBS | RESPIRATION RATE: 16 BRPM | HEIGHT: 61 IN | SYSTOLIC BLOOD PRESSURE: 132 MMHG | DIASTOLIC BLOOD PRESSURE: 84 MMHG | HEART RATE: 72 BPM

## 2022-04-25 DIAGNOSIS — G95.89 MYELOMALACIA: ICD-10-CM

## 2022-04-25 DIAGNOSIS — M48.04 THORACIC SPINAL STENOSIS: Primary | ICD-10-CM

## 2022-04-25 DIAGNOSIS — E11.9 TYPE 2 DIABETES MELLITUS WITHOUT COMPLICATION, WITHOUT LONG-TERM CURRENT USE OF INSULIN: ICD-10-CM

## 2022-04-25 DIAGNOSIS — G47.33 OBSTRUCTIVE SLEEP APNEA (ADULT) (PEDIATRIC): ICD-10-CM

## 2022-04-25 DIAGNOSIS — I10 ESSENTIAL HYPERTENSION: ICD-10-CM

## 2022-04-25 DIAGNOSIS — K21.9 GASTROESOPHAGEAL REFLUX DISEASE WITHOUT ESOPHAGITIS: ICD-10-CM

## 2022-04-25 DIAGNOSIS — E66.01 MORBID OBESITY WITH BMI OF 40.0-44.9, ADULT: ICD-10-CM

## 2022-04-25 LAB
ASCENDING AORTA: 3.06 CM
BSA FOR ECHO PROCEDURE: 2.09 M2
CV ECHO LV RWT: 0.38 CM
CV STRESS BASE HR: 67 BPM
DIASTOLIC BLOOD PRESSURE: 75 MMHG
DOP CALC LVOT AREA: 3 CM2
DOP CALC LVOT DIAMETER: 1.96 CM
DOP CALC LVOT PEAK VEL: 0.8 M/S
DOP CALC LVOT STROKE VOLUME: 54.88 CM3
DOP CALCLVOT PEAK VEL VTI: 18.2 CM
E WAVE DECELERATION TIME: 196.06 MSEC
E/A RATIO: 1.03
E/E' RATIO: 10.67 M/S
ECHO LV POSTERIOR WALL: 0.94 CM (ref 0.6–1.1)
EJECTION FRACTION: 55 %
FRACTIONAL SHORTENING: 35 % (ref 28–44)
INTERVENTRICULAR SEPTUM: 1.01 CM (ref 0.6–1.1)
LA MAJOR: 4.66 CM
LA MINOR: 4.39 CM
LA WIDTH: 2.78 CM
LEFT ATRIUM SIZE: 3.71 CM
LEFT ATRIUM VOLUME INDEX MOD: 19.2 ML/M2
LEFT ATRIUM VOLUME INDEX: 20 ML/M2
LEFT ATRIUM VOLUME MOD: 37.92 CM3
LEFT ATRIUM VOLUME: 39.63 CM3
LEFT INTERNAL DIMENSION IN SYSTOLE: 3.2 CM (ref 2.1–4)
LEFT VENTRICLE DIASTOLIC VOLUME INDEX: 58.02 ML/M2
LEFT VENTRICLE DIASTOLIC VOLUME: 114.87 ML
LEFT VENTRICLE MASS INDEX: 87 G/M2
LEFT VENTRICLE SYSTOLIC VOLUME INDEX: 20.7 ML/M2
LEFT VENTRICLE SYSTOLIC VOLUME: 41.02 ML
LEFT VENTRICULAR INTERNAL DIMENSION IN DIASTOLE: 4.94 CM (ref 3.5–6)
LEFT VENTRICULAR MASS: 172.43 G
LV LATERAL E/E' RATIO: 10 M/S
LV SEPTAL E/E' RATIO: 11.43 M/S
MV A" WAVE DURATION": 9.13 MSEC
MV PEAK A VEL: 0.78 M/S
MV PEAK E VEL: 0.8 M/S
MV STENOSIS PRESSURE HALF TIME: 56.86 MS
MV VALVE AREA P 1/2 METHOD: 3.87 CM2
OHS CV CPX 1 MINUTE RECOVERY HEART RATE: 70 BPM
OHS CV CPX 85 PERCENT MAX PREDICTED HEART RATE MALE: 135
OHS CV CPX MAX PREDICTED HEART RATE: 159
OHS CV CPX PATIENT IS FEMALE: 1
OHS CV CPX PATIENT IS MALE: 0
OHS CV CPX PEAK DIASTOLIC BLOOD PRESSURE: 55 MMHG
OHS CV CPX PEAK HEAR RATE: 139 BPM
OHS CV CPX PEAK RATE PRESSURE PRODUCT: NORMAL
OHS CV CPX PEAK SYSTOLIC BLOOD PRESSURE: 191 MMHG
OHS CV CPX PERCENT MAX PREDICTED HEART RATE ACHIEVED: 87
OHS CV CPX RATE PRESSURE PRODUCT PRESENTING: 8844
PULM VEIN S/D RATIO: 1.31
PV PEAK D VEL: 0.35 M/S
PV PEAK S VEL: 0.46 M/S
RA MAJOR: 4.17 CM
RA PRESSURE: 3 MMHG
RA WIDTH: 2.51 CM
RIGHT VENTRICULAR END-DIASTOLIC DIMENSION: 3.25 CM
RV TISSUE DOPPLER FREE WALL SYSTOLIC VELOCITY 1 (APICAL 4 CHAMBER VIEW): 12.04 CM/S
SINUS: 2.58 CM
STJ: 2.3 CM
SYSTOLIC BLOOD PRESSURE: 132 MMHG
TDI LATERAL: 0.08 M/S
TDI SEPTAL: 0.07 M/S
TDI: 0.08 M/S
TRICUSPID ANNULAR PLANE SYSTOLIC EXCURSION: 2.21 CM

## 2022-04-25 PROCEDURE — 93351 STRESS TTE COMPLETE: CPT

## 2022-04-25 PROCEDURE — 3079F DIAST BP 80-89 MM HG: CPT | Mod: CPTII,S$GLB,, | Performed by: STUDENT IN AN ORGANIZED HEALTH CARE EDUCATION/TRAINING PROGRAM

## 2022-04-25 PROCEDURE — 3074F SYST BP LT 130 MM HG: CPT | Mod: CPTII,S$GLB,, | Performed by: STUDENT IN AN ORGANIZED HEALTH CARE EDUCATION/TRAINING PROGRAM

## 2022-04-25 PROCEDURE — 99203 PR OFFICE/OUTPT VISIT, NEW, LEVL III, 30-44 MIN: ICD-10-PCS | Mod: S$GLB,,, | Performed by: STUDENT IN AN ORGANIZED HEALTH CARE EDUCATION/TRAINING PROGRAM

## 2022-04-25 PROCEDURE — 93351 STRESS ECHO (CUPID ONLY): ICD-10-PCS | Mod: 26,,, | Performed by: INTERNAL MEDICINE

## 2022-04-25 PROCEDURE — 3044F HG A1C LEVEL LT 7.0%: CPT | Mod: CPTII,S$GLB,, | Performed by: STUDENT IN AN ORGANIZED HEALTH CARE EDUCATION/TRAINING PROGRAM

## 2022-04-25 PROCEDURE — 3074F PR MOST RECENT SYSTOLIC BLOOD PRESSURE < 130 MM HG: ICD-10-PCS | Mod: CPTII,S$GLB,, | Performed by: STUDENT IN AN ORGANIZED HEALTH CARE EDUCATION/TRAINING PROGRAM

## 2022-04-25 PROCEDURE — 99999 PR PBB SHADOW E&M-EST. PATIENT-LVL IV: CPT | Mod: PBBFAC,,, | Performed by: STUDENT IN AN ORGANIZED HEALTH CARE EDUCATION/TRAINING PROGRAM

## 2022-04-25 PROCEDURE — 99999 PR PBB SHADOW E&M-EST. PATIENT-LVL IV: ICD-10-PCS | Mod: PBBFAC,,, | Performed by: STUDENT IN AN ORGANIZED HEALTH CARE EDUCATION/TRAINING PROGRAM

## 2022-04-25 PROCEDURE — 1159F PR MEDICATION LIST DOCUMENTED IN MEDICAL RECORD: ICD-10-PCS | Mod: CPTII,S$GLB,, | Performed by: STUDENT IN AN ORGANIZED HEALTH CARE EDUCATION/TRAINING PROGRAM

## 2022-04-25 PROCEDURE — 99203 OFFICE O/P NEW LOW 30 MIN: CPT | Mod: S$GLB,,, | Performed by: STUDENT IN AN ORGANIZED HEALTH CARE EDUCATION/TRAINING PROGRAM

## 2022-04-25 PROCEDURE — 3079F PR MOST RECENT DIASTOLIC BLOOD PRESSURE 80-89 MM HG: ICD-10-PCS | Mod: CPTII,S$GLB,, | Performed by: STUDENT IN AN ORGANIZED HEALTH CARE EDUCATION/TRAINING PROGRAM

## 2022-04-25 PROCEDURE — 3044F PR MOST RECENT HEMOGLOBIN A1C LEVEL <7.0%: ICD-10-PCS | Mod: CPTII,S$GLB,, | Performed by: STUDENT IN AN ORGANIZED HEALTH CARE EDUCATION/TRAINING PROGRAM

## 2022-04-25 PROCEDURE — 1159F MED LIST DOCD IN RCRD: CPT | Mod: CPTII,S$GLB,, | Performed by: STUDENT IN AN ORGANIZED HEALTH CARE EDUCATION/TRAINING PROGRAM

## 2022-04-25 PROCEDURE — 93351 STRESS TTE COMPLETE: CPT | Mod: 26,,, | Performed by: INTERNAL MEDICINE

## 2022-04-25 PROCEDURE — 63600175 PHARM REV CODE 636 W HCPCS: Performed by: NURSE PRACTITIONER

## 2022-04-25 RX ORDER — DOBUTAMINE HYDROCHLORIDE 400 MG/100ML
10 INJECTION INTRAVENOUS
Status: COMPLETED | OUTPATIENT
Start: 2022-04-25 | End: 2022-04-25

## 2022-04-25 RX ADMIN — DOBUTAMINE HYDROCHLORIDE 10 MCG/KG/MIN: 400 INJECTION INTRAVENOUS at 04:04

## 2022-04-25 NOTE — PROGRESS NOTES
Neurosurgery  History & Physical    SUBJECTIVE:     Chief Complaint: Right leg weakness, RLE radicular pain    History of Present Illness:  52 F presents for eval of worsening RLE radicular pain and right leg weakness.  She states that pain starts in right buttocks and then radiates down posterior thigh and sometimes into her calf.  She has no LLE radiculopathy.  She also states that her right leg feels weaker than her left leg.  She feels unsteady when she walks but has had no falls.  Bending forward, stretching, and twisting to the left can improve the RLE pain.  She hasn't done PT recently.    Review of patient's allergies indicates:  No Known Allergies    Current Outpatient Medications   Medication Sig Dispense Refill    atorvastatin (LIPITOR) 20 MG tablet TAKE 1 TABLET BY MOUTH EVERY DAY 90 tablet 3    blood sugar diagnostic Strp 1 strip by Misc.(Non-Drug; Combo Route) route 2 (two) times daily with meals. 100 strip 11    cetirizine (ZYRTEC) 10 MG tablet Take 1 tablet (10 mg total) by mouth once daily. 90 tablet 1    ergocalciferol (ERGOCALCIFEROL) 50,000 unit Cap TAKE 2 CAPSULE (50,000 UNITS TOTAL)BY MOUTH TWICE A WEEK 24 capsule 2    estradioL (ESTRACE) 1 MG tablet TAKE 1 TABLET BY MOUTH EVERY DAY 90 tablet 0    fluticasone propionate (FLONASE) 50 mcg/actuation nasal spray INSTILL 1 SPRAY (50 MCG TOTAL) IN EACH NOSTRIL ONCE DAILY. 16 mL 2    lancets Misc Use to test blood sugar once daily. 300 each 0    meloxicam (MOBIC) 15 MG tablet TAKE 1 TABLET BY MOUTH EVERY DAY 30 tablet 2    metFORMIN (GLUCOPHAGE-XR) 500 MG ER 24hr tablet TAKE 1 TABLET BY MOUTH TWICE A DAY WITH MEALS 180 tablet 1    neomycin-polymyxin-dexamethasone (MAXITROL) 3.5mg/mL-10,000 unit/mL-0.1 % DrpS       nystatin (MYCOSTATIN) ointment Apply topically 2 (two) times daily.      omeprazole (PRILOSEC) 40 MG capsule TAKE 1 CAPSULE BY MOUTH EVERY DAY 90 capsule 1    ondansetron (ZOFRAN) 4 MG tablet Take 1 tablet (4 mg total) by mouth  "every 8 (eight) hours as needed. 90 tablet 1    OZEMPIC 1 mg/dose (4 mg/3 mL) INJECT 1 MG INTO THE SKIN EVERY 7 DAYS. 3 pen 2    pen needle, diabetic (PEN NEEDLE) 32 gauge x 5/32" Ndle 1 each by Misc.(Non-Drug; Combo Route) route once a week. 50 each 1    prednisoLONE acetate (PRED FORTE) 1 % DrpS       tobramycin-dexamethasone 0.3-0.1% (TOBRADEX) 0.3-0.1 % DrpS INSTILL 1 DROP INTO BOTH EYES 4 TIMES A DAY  2    triamcinolone acetonide 0.1% (KENALOG) 0.1 % cream Apply topically 2 (two) times daily. Apply topically 2 (two) times daily. 135 g 4    valsartan-hydrochlorothiazide (DIOVAN-HCT) 160-12.5 mg per tablet Take 1 tablet by mouth once daily. 90 tablet 1    blood-glucose meter kit Use as instructed 1 each 0    lancing device Misc 1 Device by Misc.(Non-Drug; Combo Route) route 2 (two) times daily with meals. 100 each 11     No current facility-administered medications for this visit.       Past Medical History:   Diagnosis Date    Diabetes mellitus     Diabetes mellitus, type 2     Eczema     GERD (gastroesophageal reflux disease)     Hypertension     Impaired fasting blood sugar     YSABEL on CPAP      Past Surgical History:   Procedure Laterality Date    breast reduction      CHOLECYSTECTOMY      laprascopic    EPIDURAL STEROID INJECTION N/A 1/11/2022    Procedure: INJECTION, STEROID, EPIDURAL IL L4/5 NEEDS CONSENT;  Surgeon: Britt Calix MD;  Location: [facility];  Service: Pain Management;  Laterality: N/A;    ESOPHAGOGASTRODUODENOSCOPY  8/18/14    HYSTERECTOMY  2007    laprascopic, total for fibroids.  Dr Polo    INJECTION OF JOINT Left 2/8/2022    Procedure: INJECTION, JOINT, SI LEFT NEEDS CONSENT;  Surgeon: Britt Calix MD;  [facility]: [facility];  Service: Pain Management;  Laterality: Left;    OOPHORECTOMY      TOTAL REDUCTION MAMMOPLASTY       Family History     Problem Relation (Age of Onset)    Breast cancer Sister    Cancer Father (58)    Crohn's disease Sister "    Diabetes Maternal Grandmother, Mother    Heart disease Maternal Grandmother, Mother    Hypertension Maternal Grandmother, Mother        Social History     Socioeconomic History    Marital status:    Tobacco Use    Smoking status: Never Smoker    Smokeless tobacco: Never Used   Substance and Sexual Activity    Alcohol use: No     Comment: socially    Drug use: No    Sexual activity: Yes     Partners: Male     Birth control/protection: Surgical   Social History Narrative    Was  since 2000.      Has a friend since 2016.    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     Social Determinants of Health     Financial Resource Strain: Medium Risk    Difficulty of Paying Living Expenses: Somewhat hard   Food Insecurity: No Food Insecurity    Worried About Running Out of Food in the Last Year: Never true    Ran Out of Food in the Last Year: Never true   Transportation Needs: Unknown    Lack of Transportation (Non-Medical): No   Physical Activity: Sufficiently Active    Days of Exercise per Week: 3 days    Minutes of Exercise per Session: 60 min   Stress: Stress Concern Present    Feeling of Stress : To some extent   Social Connections: Unknown    Frequency of Communication with Friends and Family: More than three times a week    Frequency of Social Gatherings with Friends and Family: Once a week    Active Member of Clubs or Organizations: No    Attends Club or Organization Meetings: Patient refused    Marital Status:    Housing Stability: Low Risk     Unable to Pay for Housing in the Last Year: No    Number of Places Lived in the Last Year: 1    Unstable Housing in the Last Year: No       Review of Systems   14 point ROS was negative    OBJECTIVE:     Vital Signs  Pulse: 80  BP: 128/85  Pain Score: 0-No pain  There is no height or weight on file to calculate BMI.      Physical Exam:    Constitutional: She appears well-developed and well-nourished.      Eyes: Pupils are equal, round, and reactive to light.     Cardiovascular: Normal rate and regular rhythm.     Abdominal: Soft.     Psych/Behavior: She is alert. She is oriented to person, place, and time. She has a normal mood and affect.     Musculoskeletal: Gait is normal.        Neck: Range of motion is full.        Back: Range of motion is full.     Neurological:        Coordination: She has a normal Romberg Test and normal tandem walking coordination.        Sensory: There is no sensory deficit in the trunk. There is no sensory deficit in the extremities.        DTRs: DTRs are DTRS NORMAL AND SYMMETRICnormal and symmetric.        Cranial nerves: Cranial nerve(s) II, III, IV, V, VI, VII, VIII, IX, X, XI and XII are intact.     5/5 in BUE  4/5 in right HF otherwise 5/5 throughout    No laurie's    No clonus, no babinski    Diagnostic Results:  MRI T spine: moderate stenosis at T10/11 with cord signal change in right posterior column.  Stenosis best seen on sag and not well visualized on axial.  MRI L spine: moderate central canal stenosis at L4/5 secondary to ligamentous hypertrophy and posterior disc bulge.  Severe left lateral recess and NF stenosis secondary to lateral disc bulge.  Reviewed    ASSESSMENT/PLAN:     52 F with moderate thoracic stenosis at T10/11 resulting in cord signal change.  She has definite right HF weakness which is likely d/t to her thoracic stenosis.  She has minimal myelopathic symptoms.  She is not symptomatic from the lateral disc at L4/5 on the left.  -Flex/ex L spine  -Repeat MRI T spine with better axials thru the stenotic levels  -CT T spine  -Fu after imaging.

## 2022-04-26 ENCOUNTER — HOSPITAL ENCOUNTER (OUTPATIENT)
Dept: RADIOLOGY | Facility: HOSPITAL | Age: 52
Discharge: HOME | End: 2022-04-26
Attending: STUDENT IN AN ORGANIZED HEALTH CARE EDUCATION/TRAINING PROGRAM
Payer: COMMERCIAL

## 2022-04-26 DIAGNOSIS — M48.04 THORACIC SPINAL STENOSIS: ICD-10-CM

## 2022-04-26 PROCEDURE — 72114 X-RAY EXAM L-S SPINE BENDING: CPT | Mod: 26,,, | Performed by: RADIOLOGY

## 2022-04-26 PROCEDURE — 72114 XR LUMBAR SPINE 5 VIEW WITH FLEX AND EXT: ICD-10-PCS | Mod: 26,,, | Performed by: RADIOLOGY

## 2022-04-26 PROCEDURE — 72114 X-RAY EXAM L-S SPINE BENDING: CPT | Mod: TC,FY

## 2022-04-26 NOTE — TELEPHONE ENCOUNTER
Patient is requesting Lyrica she stated that she saw  on yesterday who informed her that Stoney Klein will prescribe this medication. Patient also stated that she will contact imaging to schedule her mri.

## 2022-05-05 ENCOUNTER — PATIENT MESSAGE (OUTPATIENT)
Dept: BARIATRICS | Facility: CLINIC | Age: 52
End: 2022-05-05
Payer: COMMERCIAL

## 2022-05-10 ENCOUNTER — PATIENT MESSAGE (OUTPATIENT)
Dept: BARIATRICS | Facility: CLINIC | Age: 52
End: 2022-05-10
Payer: COMMERCIAL

## 2022-05-19 ENCOUNTER — CLINICAL SUPPORT (OUTPATIENT)
Dept: BARIATRICS | Facility: CLINIC | Age: 52
End: 2022-05-19
Payer: COMMERCIAL

## 2022-05-19 DIAGNOSIS — Z71.3 DIETARY COUNSELING AND SURVEILLANCE: ICD-10-CM

## 2022-05-19 DIAGNOSIS — I10 ESSENTIAL HYPERTENSION: Chronic | ICD-10-CM

## 2022-05-19 DIAGNOSIS — G47.33 OBSTRUCTIVE SLEEP APNEA (ADULT) (PEDIATRIC): ICD-10-CM

## 2022-05-19 DIAGNOSIS — E66.01 MORBID OBESITY WITH BODY MASS INDEX (BMI) OF 40.0 OR HIGHER: ICD-10-CM

## 2022-05-19 DIAGNOSIS — E11.9 TYPE 2 DIABETES MELLITUS WITHOUT COMPLICATION, WITHOUT LONG-TERM CURRENT USE OF INSULIN: Chronic | ICD-10-CM

## 2022-05-19 PROCEDURE — 97803 MED NUTRITION INDIV SUBSEQ: CPT | Mod: 95,,, | Performed by: DIETITIAN, REGISTERED

## 2022-05-19 PROCEDURE — 99499 NO LOS: ICD-10-PCS | Mod: 95,,, | Performed by: DIETITIAN, REGISTERED

## 2022-05-19 PROCEDURE — 99499 UNLISTED E&M SERVICE: CPT | Mod: 95,,, | Performed by: DIETITIAN, REGISTERED

## 2022-05-19 PROCEDURE — 97803 PR MED NUTR THER, SUBSQ, INDIV, EA 15 MIN: ICD-10-PCS | Mod: 95,,, | Performed by: DIETITIAN, REGISTERED

## 2022-05-19 NOTE — PROGRESS NOTES
The patient location is:  Patient Home   The chief complaint leading to consultation is: morbid obesity in work up for bariatric surgery  Visit type: Virtual visit with synchronous audio and video  Total time spent with patient: 15 minutes  Each patient to whom he or she provides medical services by telemedicine is:  (1) informed of the relationship between the physician and patient and the respective role of any other health care provider with respect to management of the patient; and (2) notified that he or she may decline to receive medical services by telemedicine and may withdraw from such care at any time.  Nutrition Handout located in AVS section of pt's [facility] and/or sent as a message via [facility].  Pt informed of handout and how to access this information in [facility].    NUTRITION NOTE    Referring Physician: Roge Rich M.D.   Reason for MNT Referral: 6 months Medically Supervised Diet pending Gastric Sleeve    Patient presents for 2nd visit for MSD.  Pt was unable to provide current wt Decreased salt intake and getting away from bread using amy lettuce as bread    CLINICAL DATA:  52 y.o. female.    Current Weight:  Pt unable to weight    Ideal Body Weight: 129 lbs  There is no height or weight on file to calculate BMI.   Last WtL 224 lbs    DAILY NUTRITIONAL NEEDS: pre-op nutritional bariatric guidelines to promote weight loss  9550-4258 Calories    Grams Protein    CURRENT DIET:  Reduced-calorie diet.  Diet Recall: Food records are not present.  B: greek yogurt and fruit  S: fruit  L: smoothie with whey protein powder plain greek yogurt 1 tsp honey and pineapple or banana  D: stir stockton with chicken breast   S: Sf jello cups and fruit cups or pb3     Diet Includes: bariatric appropriate meals  Meal Pattern: Improved.  Protein Supplements: 0-1 per day.  Snacking: Adequate. Snacks include healthy choices.  Vegetables: Likes a variety. Eats daily.  Fruits: Likes a variety.  Eats daily.  Beverages: water, sugar-free beverages and herbal tea with honey and on occ cranberry juice  Dining Out:  Monthly. Mostly restaurants.  Cooking at home: Daily. Mostly grilled and air fryer meat, fish, vegetables and decreased starchy carbs ie sweet potatoes or potato salad on mother's day.    CURRENT EXERCISE:  None Stretching daily  Sciatic nerve and bulging disc seeing neurosurgeon  Enrolled in water aerobics    Vitamins / Minerals / Herbs:   [employer]  [place]  Probotics  Vit C  CoQ10    LABS:    None available at time of visit      Food Allergies:   None reported    Social:  Works regular daytime shifts.  Alcohol: once wine cooler on Mother's Day.  Smoking: None.    ASSESSMENT:  Patient demonstrates some willingness to change lifestyle habits as evidenced by dietary changes, including protein drinks, increased fruits, increased vegetables, reduced dining out, healthier cooking at home, bringing snacks to work, healthier snacking at work and healthier snacking at home.    Doing well with working on greatest challenges (irregular meal patterns).    Barriers to Education:  none  Stage of Change:  action    NUTRITION DIAGNOSIS:  Morbid Obesity related to Physical inactivity as evidence by BMI.  Status: Unknown    PLAN:  Pt is potential candidate for surgery.    Diet: Adjust diet plan.  Work on Bariatric Nutrition Checklist.  Work on expanding variety of vegetables.  Work on gradually cutting back on starchy CHO in the diet.  1200-calorie diet.  1500-calorie diet.  5-6 meals per day.  Return to clinic.   Cut out honey  Stop eating potatoes use cauliflower rice instead    Exercise: Increase.    Behavior Modification: Begin to document food & activity logs daily.        Return to clinic in one month.  Needs additional visit(s) with RD.    Communicated nutrition plan with bariatric team.    SESSION TIME:  15 minutes

## 2022-05-20 ENCOUNTER — OFFICE VISIT (OUTPATIENT)
Dept: DERMATOLOGY | Facility: CLINIC | Age: 52
End: 2022-05-20
Payer: COMMERCIAL

## 2022-05-20 DIAGNOSIS — L21.9 SEBORRHEIC DERMATITIS: ICD-10-CM

## 2022-05-20 DIAGNOSIS — L66.9 CICATRICIAL ALOPECIA: Primary | ICD-10-CM

## 2022-05-20 PROCEDURE — 99204 OFFICE O/P NEW MOD 45 MIN: CPT | Mod: 95,,, | Performed by: DERMATOLOGY

## 2022-05-20 PROCEDURE — 1159F MED LIST DOCD IN RCRD: CPT | Mod: CPTII,95,, | Performed by: DERMATOLOGY

## 2022-05-20 PROCEDURE — 1160F PR REVIEW ALL MEDS BY PRESCRIBER/CLIN PHARMACIST DOCUMENTED: ICD-10-PCS | Mod: CPTII,95,, | Performed by: DERMATOLOGY

## 2022-05-20 PROCEDURE — 1160F RVW MEDS BY RX/DR IN RCRD: CPT | Mod: CPTII,95,, | Performed by: DERMATOLOGY

## 2022-05-20 PROCEDURE — 99204 PR OFFICE/OUTPT VISIT, NEW, LEVL IV, 45-59 MIN: ICD-10-PCS | Mod: 95,,, | Performed by: DERMATOLOGY

## 2022-05-20 PROCEDURE — 1159F PR MEDICATION LIST DOCUMENTED IN MEDICAL RECORD: ICD-10-PCS | Mod: CPTII,95,, | Performed by: DERMATOLOGY

## 2022-05-20 PROCEDURE — 3044F PR MOST RECENT HEMOGLOBIN A1C LEVEL <7.0%: ICD-10-PCS | Mod: CPTII,95,, | Performed by: DERMATOLOGY

## 2022-05-20 PROCEDURE — 3044F HG A1C LEVEL LT 7.0%: CPT | Mod: CPTII,95,, | Performed by: DERMATOLOGY

## 2022-05-20 RX ORDER — KETOCONAZOLE 20 MG/G
CREAM TOPICAL
Qty: 60 G | Refills: 5 | Status: SHIPPED | OUTPATIENT
Start: 2022-05-20 | End: 2023-10-05 | Stop reason: SDUPTHER

## 2022-05-20 RX ORDER — FLUOCINONIDE 0.5 MG/G
OINTMENT TOPICAL
Qty: 60 G | Refills: 3 | Status: SHIPPED | OUTPATIENT
Start: 2022-05-20 | End: 2023-10-05 | Stop reason: SDUPTHER

## 2022-05-20 RX ORDER — DOXYCYCLINE 100 MG/1
TABLET ORAL
Qty: 60 TABLET | Refills: 2 | Status: SHIPPED | OUTPATIENT
Start: 2022-05-20 | End: 2022-09-20

## 2022-05-20 NOTE — PROGRESS NOTES
The patient location is: home  The chief complaint leading to consultation is: hair loss  Visit type: virtual visit with synchronous audio and video  Total time spent with patient: 19 minutes  Each patient to whom I provide medical services by telemedicine is:  (1) informed of the relationship between the physician and patient and the respective role of any other health care provider with respect to management of the patient; and (2) notified that he or she may decline to receive medical services by telemedicine and may withdraw from such care at any time.    Patient Information  Name: Africa Jennings  : 1970  MRN: [id number]     Referring Physician:  Jay   Primary Care Physician:  Brandi Thompson MD   Date of Visit: 2022      Subjective:     History of Present lllness:    Africa Jennings is a 52 y.o. female who presents with a chief complaint of hair loss.  Location: scalp (hairline and top of scalp)  Duration: 2 years  Signs/Symptoms: balding, hair loss, worsening; no itching or flaking; no tenderness; + breaks more easily at the top  Relieving factors/Prior treatments: tried OTC minoxidil x 6 mo; no change  Stopped using chemicals 8 years ago; now wears hair in plaits    Clinical documentation obtained by nursing staff reviewed.    Review of Systems   Skin: Negative for itching and rash.       Objective:   Physical Exam   Constitutional: She appears well-developed and well-nourished. No distress.   Neurological: She is alert and oriented to person, place, and time. She is not disoriented.   Psychiatric: She has a normal mood and affect.   Skin:   Areas Examined (abnormalities noted in diagram):   Scalp / Hair Palpated and Inspected  Head / Face Inspection Performed     unable to perform dermatoscopic exam due to virtual visit            Assessment / Plan:        Cicatricial alopecia  Seborrheic dermatitis  - chronic problem, not at treatment goal  Differential diagnosis includes  CCCA +/- traction alopecia.  -     Ambulatory referral/consult to Dermatology  -     doxycycline monohydrate 100 mg Tab; Take 1 po BID pc. Take with food (no dairy) and with plenty water.  Dispense: 60 tablet; Refill: 2  Discussed benefits and risks of doxycyline therapy including but not limited to GI discomfort, esophageal irritation/ulceration, and increased sun sensitivity. Patient was counseled to take medicine with meals and at least 1 hour before lying down.  -     ketoconazole (NIZORAL) 2 % cream; Apply to affected areas of scalp BID.  Dispense: 60 g; Refill: 5  -     fluocinonide (LIDEX) 0.05 % ointment; Apply to affected areas of scalp qhs  Dispense: 60 g; Refill: 3     Patient was referred to hair specialist Becka Case MD, for further evaluation and management.    Follow up in about 3 months (around 8/20/2022) for follow up with Dr. Case.      Arpita Sommers MD, FAAD  [facility]

## 2022-05-20 NOTE — LETTER
May 20, 2022        Becka Case MD  [address] [facility]  [address]  [facility] [address]             [facility]  [address] [place] [id number]  Phone: [phone]  Fax: [phone]   Patient: Africa Jennings   MR Number: [id number]   YOB: 1970   Date of Visit: 2022     Dear Dr. Case,     I'd like to refer Ms. Africa Jennings (: 1970) to you for further evaluation and treatment of her hair loss. I suspect CCCA +/- traction alopecia.    She can be reached at [phone].      Sincerely,      Arpita Sommers MD            CC  No Recipients    Enclosure

## 2022-06-03 ENCOUNTER — HOSPITAL ENCOUNTER (OUTPATIENT)
Dept: RADIOLOGY | Facility: HOSPITAL | Age: 52
Discharge: HOME | End: 2022-06-03
Attending: STUDENT IN AN ORGANIZED HEALTH CARE EDUCATION/TRAINING PROGRAM
Payer: COMMERCIAL

## 2022-06-03 DIAGNOSIS — M48.04 THORACIC SPINAL STENOSIS: ICD-10-CM

## 2022-06-03 PROCEDURE — 72128 CT CHEST SPINE W/O DYE: CPT | Mod: 26,,, | Performed by: STUDENT IN AN ORGANIZED HEALTH CARE EDUCATION/TRAINING PROGRAM

## 2022-06-03 PROCEDURE — 72146 MRI CHEST SPINE W/O DYE: CPT | Mod: 26,,, | Performed by: RADIOLOGY

## 2022-06-03 PROCEDURE — 72146 MRI CHEST SPINE W/O DYE: CPT | Mod: TC

## 2022-06-03 PROCEDURE — 72128 CT THORACIC SPINE WITHOUT CONTRAST: ICD-10-PCS | Mod: 26,,, | Performed by: STUDENT IN AN ORGANIZED HEALTH CARE EDUCATION/TRAINING PROGRAM

## 2022-06-03 PROCEDURE — 72128 CT CHEST SPINE W/O DYE: CPT | Mod: TC

## 2022-06-03 PROCEDURE — 72146 MRI THORACIC SPINE WITHOUT CONTRAST: ICD-10-PCS | Mod: 26,,, | Performed by: RADIOLOGY

## 2022-06-10 ENCOUNTER — PATIENT MESSAGE (OUTPATIENT)
Dept: BARIATRICS | Facility: CLINIC | Age: 52
End: 2022-06-10
Payer: COMMERCIAL

## 2022-06-13 ENCOUNTER — OFFICE VISIT (OUTPATIENT)
Dept: NEUROSURGERY | Facility: CLINIC | Age: 52
End: 2022-06-13
Payer: COMMERCIAL

## 2022-06-13 DIAGNOSIS — K21.9 GASTROESOPHAGEAL REFLUX DISEASE WITHOUT ESOPHAGITIS: ICD-10-CM

## 2022-06-13 DIAGNOSIS — M48.04 THORACIC SPINAL STENOSIS: Primary | ICD-10-CM

## 2022-06-13 PROCEDURE — 99212 PR OFFICE/OUTPT VISIT, EST, LEVL II, 10-19 MIN: ICD-10-PCS | Mod: 95,,, | Performed by: STUDENT IN AN ORGANIZED HEALTH CARE EDUCATION/TRAINING PROGRAM

## 2022-06-13 PROCEDURE — 3044F HG A1C LEVEL LT 7.0%: CPT | Mod: CPTII,95,, | Performed by: STUDENT IN AN ORGANIZED HEALTH CARE EDUCATION/TRAINING PROGRAM

## 2022-06-13 PROCEDURE — 99212 OFFICE O/P EST SF 10 MIN: CPT | Mod: 95,,, | Performed by: STUDENT IN AN ORGANIZED HEALTH CARE EDUCATION/TRAINING PROGRAM

## 2022-06-13 PROCEDURE — 3044F PR MOST RECENT HEMOGLOBIN A1C LEVEL <7.0%: ICD-10-PCS | Mod: CPTII,95,, | Performed by: STUDENT IN AN ORGANIZED HEALTH CARE EDUCATION/TRAINING PROGRAM

## 2022-06-13 NOTE — PROGRESS NOTES
Neurosurgery  Established Patient    SUBJECTIVE:     History of Present Illness:  52 F presents for eval of worsening RLE radicular pain and right leg weakness.  She states that pain starts in right buttocks and then radiates down posterior thigh and sometimes into her calf.  She has no LLE radiculopathy.  She also states that her right leg feels weaker than her left leg.  She feels unsteady when she walks but has had no falls.  Bending forward, stretching, and twisting to the left can improve the RLE pain.  She hasn't done PT recently.    Interval fu 6/13/2022:  Pt presents for audio appt after updated imaging.  She continues to endorse right leg pain which can start in her buttocks and radiated down posterior thigh into her calf.  She also endorses right leg weakness.    Review of patient's allergies indicates:  No Known Allergies    Current Outpatient Medications   Medication Sig Dispense Refill    atorvastatin (LIPITOR) 20 MG tablet TAKE 1 TABLET BY MOUTH EVERY DAY 90 tablet 3    blood sugar diagnostic Strp 1 strip by Misc.(Non-Drug; Combo Route) route 2 (two) times daily with meals. 100 strip 11    blood-glucose meter kit Use as instructed 1 each 0    cetirizine (ZYRTEC) 10 MG tablet Take 1 tablet (10 mg total) by mouth once daily. 90 tablet 1    doxycycline monohydrate 100 mg Tab Take 1 po BID pc. Take with food (no dairy) and with plenty water. 60 tablet 2    ergocalciferol (ERGOCALCIFEROL) 50,000 unit Cap TAKE 2 CAPSULE (50,000 UNITS TOTAL)BY MOUTH TWICE A WEEK 24 capsule 2    estradioL (ESTRACE) 1 MG tablet TAKE 1 TABLET BY MOUTH EVERY DAY 90 tablet 0    fluocinonide (LIDEX) 0.05 % ointment Apply to affected areas of scalp qhs 60 g 3    fluticasone propionate (FLONASE) 50 mcg/actuation nasal spray INSTILL 1 SPRAY (50 MCG TOTAL) IN EACH NOSTRIL ONCE DAILY. 16 mL 2    ketoconazole (NIZORAL) 2 % cream Apply to affected areas of scalp BID. 60 g 5    lancets Misc Use to test blood sugar once daily.  "300 each 0    lancing device Misc 1 Device by Misc.(Non-Drug; Combo Route) route 2 (two) times daily with meals. 100 each 11    meloxicam (MOBIC) 15 MG tablet TAKE 1 TABLET BY MOUTH EVERY DAY 30 tablet 2    metFORMIN (GLUCOPHAGE-XR) 500 MG ER 24hr tablet TAKE 1 TABLET BY MOUTH TWICE A DAY WITH MEALS 180 tablet 1    neomycin-polymyxin-dexamethasone (MAXITROL) 3.5mg/mL-10,000 unit/mL-0.1 % DrpS       nystatin (MYCOSTATIN) ointment Apply topically 2 (two) times daily.      omeprazole (PRILOSEC) 40 MG capsule TAKE 1 CAPSULE BY MOUTH EVERY DAY 90 capsule 1    ondansetron (ZOFRAN) 4 MG tablet Take 1 tablet (4 mg total) by mouth every 8 (eight) hours as needed. 90 tablet 1    OZEMPIC 1 mg/dose (4 mg/3 mL) INJECT 1 MG INTO THE SKIN EVERY 7 DAYS. 3 pen 2    pen needle, diabetic (PEN NEEDLE) 32 gauge x 5/32" Ndle 1 each by Misc.(Non-Drug; Combo Route) route once a week. 50 each 1    prednisoLONE acetate (PRED FORTE) 1 % DrpS       tobramycin-dexamethasone 0.3-0.1% (TOBRADEX) 0.3-0.1 % DrpS INSTILL 1 DROP INTO BOTH EYES 4 TIMES A DAY  2    triamcinolone acetonide 0.1% (KENALOG) 0.1 % cream Apply topically 2 (two) times daily. Apply topically 2 (two) times daily. 135 g 4    valsartan-hydrochlorothiazide (DIOVAN-HCT) 160-12.5 mg per tablet Take 1 tablet by mouth once daily. 90 tablet 1     No current facility-administered medications for this visit.       Past Medical History:   Diagnosis Date    Diabetes mellitus     Diabetes mellitus, type 2     Eczema     GERD (gastroesophageal reflux disease)     Hypertension     Impaired fasting blood sugar     YSABEL on CPAP      Past Surgical History:   Procedure Laterality Date    breast reduction      CHOLECYSTECTOMY      laprascopic    EPIDURAL STEROID INJECTION N/A 1/11/2022    Procedure: INJECTION, STEROID, EPIDURAL IL L4/5 NEEDS CONSENT;  Surgeon: Britt Calix MD;  Location: [facility];  Service: Pain Management;  Laterality: N/A;    " ESOPHAGOGASTRODUODENOSCOPY  8/18/14    HYSTERECTOMY  2007    laprascopic, total for fibroids.  Dr Polo    INJECTION OF JOINT Left 2/8/2022    Procedure: INJECTION, JOINT, SI LEFT NEEDS CONSENT;  Surgeon: Britt Calix MD;  Location: [facility];  Service: Pain Management;  Laterality: Left;    OOPHORECTOMY      TOTAL REDUCTION MAMMOPLASTY       Family History     Problem Relation (Age of Onset)    Breast cancer Sister    Cancer Father (58)    Crohn's disease Sister    Diabetes Maternal Grandmother, Mother    Heart disease Maternal Grandmother, Mother    Hypertension Maternal Grandmother, Mother        Social History     Socioeconomic History    Marital status:    Tobacco Use    Smoking status: Never Smoker    Smokeless tobacco: Never Used   Substance and Sexual Activity    Alcohol use: No     Comment: socially    Drug use: No    Sexual activity: Yes     Partners: Male     Birth control/protection: Surgical   Social History Narrative    Was  since 2000.      Has a friend since 2016.    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     Social Determinants of Health     Financial Resource Strain: Medium Risk    Difficulty of Paying Living Expenses: Somewhat hard   Food Insecurity: No Food Insecurity    Worried About Running Out of Food in the Last Year: Never true    Ran Out of Food in the Last Year: Never true   Transportation Needs: Unknown    Lack of Transportation (Non-Medical): No   Physical Activity: Sufficiently Active    Days of Exercise per Week: 3 days    Minutes of Exercise per Session: 60 min   Stress: Stress Concern Present    Feeling of Stress : To some extent   Social Connections: Unknown    Frequency of Communication with Friends and Family: More than three times a week    Frequency of Social Gatherings with Friends and Family: Once a week    Active Member of Clubs or Organizations: No    Attends Club or Organization Meetings: Patient  refused    Marital Status:    Housing Stability: Low Risk     Unable to Pay for Housing in the Last Year: No    Number of Places Lived in the Last Year: 1    Unstable Housing in the Last Year: No       Review of Systems   14 point ROS was negative    OBJECTIVE:     Vital Signs     There is no height or weight on file to calculate BMI.    Neurosurgery Physical Exam  Audio appt    Diagnostic Results:  MRI T spine: severe stenosis at T10/11 with cord signal change secondary to bilateral facet arthropathy  CT T spine: severe stenosis at T10/11 secondary to facet arthropathy and ossification of the ligamentum flavum.  Flex/ex L spine: minimal grade I L4/5 spondy.  No dynamic instability.  Reviewed    ASSESSMENT/PLAN:     52 F with symptoms of thoracic myelopathy and severe stenosis at T10/11 secondary to facet hypertrophy and ossification of the ligamentum flavum.  In addition she has a component of right L5 radiculopathy secondary to the grade I L4/5 spondy resulting in moderate stenosis and left greater than right lateral recess stenosis.  -Fu in person to discuss surgical options.

## 2022-06-13 NOTE — TELEPHONE ENCOUNTER
No new care gaps identified.  [facility] Embedded Care Gaps. Reference number: [id number]. 6/13/2022   12:31:24 AM CDT

## 2022-06-14 ENCOUNTER — PATIENT MESSAGE (OUTPATIENT)
Dept: BARIATRICS | Facility: CLINIC | Age: 52
End: 2022-06-14
Payer: COMMERCIAL

## 2022-06-14 RX ORDER — OMEPRAZOLE 40 MG/1
CAPSULE, DELAYED RELEASE ORAL
Qty: 90 CAPSULE | Refills: 1 | Status: SHIPPED | OUTPATIENT
Start: 2022-06-14 | End: 2022-12-13

## 2022-06-14 NOTE — TELEPHONE ENCOUNTER
Refill Routing Note   Medication(s) are not appropriate for processing by [facility] for the following reason(s):      - Patient has been seen in the ED/Hospital since the last PCP visit    [facility] action(s):  Defer          Medication reconciliation completed: No     Appointments  past 12m or future 3m with PCP    Date Provider   Last Visit   11/12/2021 Brandi Thompson MD   Next Visit   Visit date not found Brandi Thompson MD   ED visits in past 90 days: 0        Note composed:6:58 AM 06/14/2022

## 2022-06-23 ENCOUNTER — CLINICAL SUPPORT (OUTPATIENT)
Dept: BARIATRICS | Facility: CLINIC | Age: 52
End: 2022-06-23
Payer: COMMERCIAL

## 2022-06-23 VITALS — WEIGHT: 219 LBS | BODY MASS INDEX: 41.38 KG/M2

## 2022-06-23 DIAGNOSIS — I10 ESSENTIAL HYPERTENSION: Chronic | ICD-10-CM

## 2022-06-23 DIAGNOSIS — E66.01 MORBID OBESITY WITH BMI OF 40.0-44.9, ADULT: Chronic | ICD-10-CM

## 2022-06-23 DIAGNOSIS — G47.33 OBSTRUCTIVE SLEEP APNEA (ADULT) (PEDIATRIC): ICD-10-CM

## 2022-06-23 DIAGNOSIS — E11.9 TYPE 2 DIABETES MELLITUS WITHOUT COMPLICATION, WITHOUT LONG-TERM CURRENT USE OF INSULIN: Chronic | ICD-10-CM

## 2022-06-23 DIAGNOSIS — Z71.3 DIETARY COUNSELING AND SURVEILLANCE: ICD-10-CM

## 2022-06-23 PROCEDURE — 97803 PR MED NUTR THER, SUBSQ, INDIV, EA 15 MIN: ICD-10-PCS | Mod: 95,,, | Performed by: DIETITIAN, REGISTERED

## 2022-06-23 PROCEDURE — 97803 MED NUTRITION INDIV SUBSEQ: CPT | Mod: 95,,, | Performed by: DIETITIAN, REGISTERED

## 2022-06-23 PROCEDURE — 99499 UNLISTED E&M SERVICE: CPT | Mod: 95,,, | Performed by: DIETITIAN, REGISTERED

## 2022-06-23 PROCEDURE — 99499 NO LOS: ICD-10-PCS | Mod: 95,,, | Performed by: DIETITIAN, REGISTERED

## 2022-06-23 NOTE — PROGRESS NOTES
The patient location is:  Patient Home   The chief complaint leading to consultation is: morbid obesity in work up for bariatric surgery  Visit type: Virtual visit with synchronous audio and video  Total time spent with patient: 15 minutes  Each patient to whom he or she provides medical services by telemedicine is:  (1) informed of the relationship between the physician and patient and the respective role of any other health care provider with respect to management of the patient; and (2) notified that he or she may decline to receive medical services by telemedicine and may withdraw from such care at any time.  Nutrition Handout located in AVS section of pt's [facility] and/or sent as a message via [facility].  Pt informed of handout and how to access this information in [facility].    NUTRITION NOTE    Referring Physician: Roge Rich M.D.   Reason for MNT Referral: 6 months Medically Supervised Diet pending Gastric Sleeve    Patient presents for f/u  visit for MSD with weight loss since last visit.  Loss of 5 lbs.  Pt has been eating more fruits and vegetables and eating bread only twice a week    CLINICAL DATA:  52 y.o. female.    Current Weight:  219 lbs self reported from Monday  Ideal Body Weight: 129 lbs  Body mass index is 41.38 kg/m².   Initial Wt:  224 lbs    DAILY NUTRITIONAL NEEDS: pre-op nutritional bariatric guidelines to promote weight loss  4417-8585 Calories    Grams Protein    CURRENT DIET:  Reduced-calorie diet.  Diet Recall: Food records are not present. More fruit and veggies and water.  Bread consumption down to twice a week  B: greek yogurt and fruit- peach and banana and boiled egg  S: fruit or baby carrots  L: smoothie with whey protein powder plain greek yogurt 1 tsp honey and pineapple or banana caesar salad and fruit  D: fish or chicken or turkey and cauliflower   S: Sf jello cups and fruit cups or pb3     Diet Includes: bariatric appropriate meals  Meal Pattern:  Improved.  Protein Supplements: 0-1 per day.  Snacking: Adequate. Snacks include healthy choices.  Vegetables: Likes a variety. Eats daily.  Fruits: Likes a variety. Eats daily.  Beverages: water, sugar-free beverages and herbal tea  and diet cranberry juice  Dining Out:  Never. Mostly restaurants.  Cooking at home: Daily. Mostly grilled and air fryer meat, fish, vegetables and decreased starchy carbs ie sweet potatoes or potato salad on mother's day.    CURRENT EXERCISE:  Adequate Stretching daily  [place]  Sciatic nerve and bulging disc seeing neurosurgeon  Enrolled in water aerobics but has not started    Vitamins / Minerals / Herbs:   Alive MV for Women  Eldberry  Probotics  Vit C  CoQ10  Flax seed oil    LABS:    None available at time of visit      Food Allergies:   None reported    Social:  Works regular daytime shifts.  Alcohol: None.  Smoking: None.    ASSESSMENT:  Patient demonstrates some willingness to change lifestyle habits as evidenced by dietary changes, including protein drinks, increased fruits, increased vegetables, reduced dining out, healthier cooking at home, bringing snacks to work, healthier snacking at work and healthier snacking at home.    Doing well with working on greatest challenges (irregular meal patterns).    Barriers to Education:  none  Stage of Change:  action    NUTRITION DIAGNOSIS:  Morbid Obesity related to Physical inactivity as evidence by BMI.  Status: Unknown    PLAN:  Pt is potential candidate for surgery.    Diet: Adjust diet plan.  Work on Bariatric Nutrition Checklist.  Work on expanding variety of vegetables.  Work on gradually cutting back on starchy CHO in the diet.  1200-calorie diet.  1500-calorie diet.  5-6 meals per day.  Return to clinic.   Limit fruit to 2 servings per day    Exercise: Increase.    Behavior Modification: Begin to document food & activity logs daily.        Return to clinic in one month.  Needs additional visit(s) with  DEVONTE.    Communicated nutrition plan with bariatric team.    SESSION TIME:  15 minutes

## 2022-07-05 ENCOUNTER — PATIENT MESSAGE (OUTPATIENT)
Dept: BARIATRICS | Facility: CLINIC | Age: 52
End: 2022-07-05
Payer: COMMERCIAL

## 2022-07-20 ENCOUNTER — PATIENT MESSAGE (OUTPATIENT)
Dept: FAMILY MEDICINE | Facility: CLINIC | Age: 52
End: 2022-07-20
Payer: COMMERCIAL

## 2022-07-21 DIAGNOSIS — E78.5 HYPERLIPIDEMIA, UNSPECIFIED HYPERLIPIDEMIA TYPE: ICD-10-CM

## 2022-07-21 RX ORDER — ATORVASTATIN CALCIUM 20 MG/1
TABLET, FILM COATED ORAL
Qty: 90 TABLET | Refills: 1 | Status: SHIPPED | OUTPATIENT
Start: 2022-07-21 | End: 2023-02-01

## 2022-07-21 NOTE — TELEPHONE ENCOUNTER
Refill Decision Note   Africahari Jennings  is requesting a refill authorization.  Brief Assessment and Rationale for Refill:  Approve     Medication Therapy Plan:       Medication Reconciliation Completed: No   Comments:     No Care Gaps recommended.     Note composed:1:33 PM 07/21/2022

## 2022-07-21 NOTE — TELEPHONE ENCOUNTER
No new care gaps identified.  [facility] Embedded Care Gaps. Reference number: [id number]. 7/21/2022   1:38:17 AM MARY ALICET

## 2022-07-26 DIAGNOSIS — E11.9 TYPE 2 DIABETES MELLITUS WITHOUT COMPLICATION, WITHOUT LONG-TERM CURRENT USE OF INSULIN: ICD-10-CM

## 2022-07-27 RX ORDER — SEMAGLUTIDE 1.34 MG/ML
1 INJECTION, SOLUTION SUBCUTANEOUS
Qty: 3 PEN | Refills: 2 | Status: SHIPPED | OUTPATIENT
Start: 2022-07-27 | End: 2022-12-29

## 2022-07-27 RX ORDER — SEMAGLUTIDE 1.34 MG/ML
INJECTION, SOLUTION SUBCUTANEOUS
Refills: 2 | OUTPATIENT
Start: 2022-07-27

## 2022-07-27 NOTE — TELEPHONE ENCOUNTER
Care Due:                  Date            Visit Type   Department     Provider  --------------------------------------------------------------------------------                                             [facility]                              V[facility]UAL      MED/ INTERNAL  Last Visit: 11-      AUDIO ONLY   MED/ PEDS      Brandi Thompson  Next Visit: None Scheduled  None         None Found                                                            Last  Test          Frequency    Reason                     Performed    Due Date  --------------------------------------------------------------------------------    HBA1C.......  6 months...  OZEMPIC, metFORMIN.......  04-   10-    [facility] Embedded Care Gaps. Reference number: [id number]. 7/26/2022   8:42:00 PM CDT

## 2022-07-27 NOTE — TELEPHONE ENCOUNTER
No new care gaps identified.  [facility] Embedded Care Gaps. Reference number: [id number]. 7/26/2022   8:42:35 PM CDT

## 2022-07-28 ENCOUNTER — CLINICAL SUPPORT (OUTPATIENT)
Dept: BARIATRICS | Facility: CLINIC | Age: 52
End: 2022-07-28
Payer: COMMERCIAL

## 2022-07-28 VITALS — WEIGHT: 219 LBS | BODY MASS INDEX: 41.38 KG/M2

## 2022-07-28 DIAGNOSIS — G47.33 OBSTRUCTIVE SLEEP APNEA (ADULT) (PEDIATRIC): ICD-10-CM

## 2022-07-28 DIAGNOSIS — E66.01 MORBID OBESITY WITH BMI OF 40.0-44.9, ADULT: Chronic | ICD-10-CM

## 2022-07-28 DIAGNOSIS — E11.9 TYPE 2 DIABETES MELLITUS WITHOUT COMPLICATION, WITHOUT LONG-TERM CURRENT USE OF INSULIN: Chronic | ICD-10-CM

## 2022-07-28 DIAGNOSIS — I10 ESSENTIAL HYPERTENSION: Chronic | ICD-10-CM

## 2022-07-28 DIAGNOSIS — Z71.3 DIETARY COUNSELING AND SURVEILLANCE: ICD-10-CM

## 2022-07-28 PROCEDURE — 99499 NO LOS: ICD-10-PCS | Mod: 95,,, | Performed by: DIETITIAN, REGISTERED

## 2022-07-28 PROCEDURE — 97803 MED NUTRITION INDIV SUBSEQ: CPT | Mod: 95,,, | Performed by: DIETITIAN, REGISTERED

## 2022-07-28 PROCEDURE — 99499 UNLISTED E&M SERVICE: CPT | Mod: 95,,, | Performed by: DIETITIAN, REGISTERED

## 2022-07-28 PROCEDURE — 97803 PR MED NUTR THER, SUBSQ, INDIV, EA 15 MIN: ICD-10-PCS | Mod: 95,,, | Performed by: DIETITIAN, REGISTERED

## 2022-07-28 NOTE — PROGRESS NOTES
The patient location is:  Patient Home   The chief complaint leading to consultation is: morbid obesity in work up for bariatric surgery  Visit type: Virtual visit with synchronous audio and video  Total time spent with patient: 15 minutes  Each patient to whom he or she provides medical services by telemedicine is:  (1) informed of the relationship between the physician and patient and the respective role of any other health care provider with respect to management of the patient; and (2) notified that he or she may decline to receive medical services by telemedicine and may withdraw from such care at any time.  Nutrition Handout located in AVS section of pt's [facility] and/or sent as a message via [facility].  Pt informed of handout and how to access this information in [facility].    NUTRITION NOTE    Referring Physician: Roge Rich M.D.   Reason for MNT Referral: 6 months Medically Supervised Diet pending Gastric Sleeve    Patient presents for f/u  visit for MSD with weight standstill since last visit. Pt had a sinus infection and decreased appetite recently    CLINICAL DATA:  52 y.o. female.    Current Weight:  219 lbs self reported   Ideal Body Weight: 129 lbs  Body mass index is 41.38 kg/m².   Last Wt: 219 lbs  Initial Wt:  224 lbs    DAILY NUTRITIONAL NEEDS: pre-op nutritional bariatric guidelines to promote weight loss  [id number] Calories    Grams Protein    CURRENT DIET:  Reduced-calorie diet.  Diet Recall: Food records are not present.   B:  boiled egg  S: fruit or baby carrots  L: caesar salad with chicken   D: broiled chicken with vegetable  S: Sf jello cups and fruit cups or pb3 eats on occasion- work has been busy    Diet Includes: bariatric appropriate meals  Meal Pattern: Improved.  Protein Supplements: 0-1 per day. Whey protein with almond milk  Snacking: Adequate. Snacks include healthy choices.  Vegetables: Likes a variety. Eats daily.  Fruits: Likes a variety. Eats  daily.  Beverages: water, sugar-free beverages and herbal tea  and diet cranberry juice  Dining Out:  Never. Mostly restaurants.  Cooking at home: Daily. Mostly grilled and air fryer meat, fish and vegetables.    CURRENT EXERCISE:  Adequate Stretching daily  Started walking  1 mile per day  Sciatic nerve and bulging disc seeing neurosurgeon      Vitamins / Minerals / Herbs:   Alive MV for Women  Eldberry  Probotics  Vit C  CoQ10  Flax seed oil    LABS:    None available at time of visit      Food Allergies:   None reported    Social:  Works regular daytime shifts.  Alcohol: None.  Smoking: None.    ASSESSMENT:  Patient demonstrates some willingness to change lifestyle habits as evidenced by dietary changes, including protein drinks, increased fruits, increased vegetables, reduced dining out, healthier cooking at home, bringing snacks to work, healthier snacking at work and healthier snacking at home.    Doing well with working on greatest challenges (irregular meal patterns).    Barriers to Education:  none  Stage of Change:  action    NUTRITION DIAGNOSIS:  Morbid Obesity related to Physical inactivity as evidence by BMI.  Status: Unknown    PLAN:  Pt is potential candidate for surgery.    Diet: Adjust diet plan.  Work on Bariatric Nutrition Checklist.  Work on expanding variety of vegetables.  Work on gradually cutting back on starchy CHO in the diet.  1200-calorie diet.  1500-calorie diet.  5-6 meals per day.  Return to clinic.   Limit fruit to 2 servings per day    Exercise: Increase.    Behavior Modification: Begin to document food & activity logs daily.        Return to clinic in one month.  Needs additional visit(s) with RD.    Communicated nutrition plan with bariatric team.    SESSION TIME:  15 minutes

## 2022-07-29 ENCOUNTER — PATIENT MESSAGE (OUTPATIENT)
Dept: SLEEP MEDICINE | Facility: CLINIC | Age: 52
End: 2022-07-29
Payer: COMMERCIAL

## 2022-08-02 ENCOUNTER — PATIENT MESSAGE (OUTPATIENT)
Dept: BARIATRICS | Facility: CLINIC | Age: 52
End: 2022-08-02
Payer: COMMERCIAL

## 2022-08-02 ENCOUNTER — OFFICE VISIT (OUTPATIENT)
Dept: NEUROSURGERY | Facility: CLINIC | Age: 52
End: 2022-08-02
Payer: COMMERCIAL

## 2022-08-02 VITALS — DIASTOLIC BLOOD PRESSURE: 100 MMHG | HEART RATE: 97 BPM | SYSTOLIC BLOOD PRESSURE: 161 MMHG

## 2022-08-02 DIAGNOSIS — M48.04 THORACIC SPINAL STENOSIS: Primary | ICD-10-CM

## 2022-08-02 PROCEDURE — 3080F PR MOST RECENT DIASTOLIC BLOOD PRESSURE >= 90 MM HG: ICD-10-PCS | Mod: CPTII,S$GLB,, | Performed by: STUDENT IN AN ORGANIZED HEALTH CARE EDUCATION/TRAINING PROGRAM

## 2022-08-02 PROCEDURE — 3080F DIAST BP >= 90 MM HG: CPT | Mod: CPTII,S$GLB,, | Performed by: STUDENT IN AN ORGANIZED HEALTH CARE EDUCATION/TRAINING PROGRAM

## 2022-08-02 PROCEDURE — 3044F HG A1C LEVEL LT 7.0%: CPT | Mod: CPTII,S$GLB,, | Performed by: STUDENT IN AN ORGANIZED HEALTH CARE EDUCATION/TRAINING PROGRAM

## 2022-08-02 PROCEDURE — 1159F MED LIST DOCD IN RCRD: CPT | Mod: CPTII,S$GLB,, | Performed by: STUDENT IN AN ORGANIZED HEALTH CARE EDUCATION/TRAINING PROGRAM

## 2022-08-02 PROCEDURE — 1159F PR MEDICATION LIST DOCUMENTED IN MEDICAL RECORD: ICD-10-PCS | Mod: CPTII,S$GLB,, | Performed by: STUDENT IN AN ORGANIZED HEALTH CARE EDUCATION/TRAINING PROGRAM

## 2022-08-02 PROCEDURE — 99999 PR PBB SHADOW E&M-EST. PATIENT-LVL III: ICD-10-PCS | Mod: PBBFAC,,, | Performed by: STUDENT IN AN ORGANIZED HEALTH CARE EDUCATION/TRAINING PROGRAM

## 2022-08-02 PROCEDURE — 99999 PR PBB SHADOW E&M-EST. PATIENT-LVL III: CPT | Mod: PBBFAC,,, | Performed by: STUDENT IN AN ORGANIZED HEALTH CARE EDUCATION/TRAINING PROGRAM

## 2022-08-02 PROCEDURE — 3077F PR MOST RECENT SYSTOLIC BLOOD PRESSURE >= 140 MM HG: ICD-10-PCS | Mod: CPTII,S$GLB,, | Performed by: STUDENT IN AN ORGANIZED HEALTH CARE EDUCATION/TRAINING PROGRAM

## 2022-08-02 PROCEDURE — 3044F PR MOST RECENT HEMOGLOBIN A1C LEVEL <7.0%: ICD-10-PCS | Mod: CPTII,S$GLB,, | Performed by: STUDENT IN AN ORGANIZED HEALTH CARE EDUCATION/TRAINING PROGRAM

## 2022-08-02 PROCEDURE — 99213 PR OFFICE/OUTPT VISIT, EST, LEVL III, 20-29 MIN: ICD-10-PCS | Mod: S$GLB,,, | Performed by: STUDENT IN AN ORGANIZED HEALTH CARE EDUCATION/TRAINING PROGRAM

## 2022-08-02 PROCEDURE — 3077F SYST BP >= 140 MM HG: CPT | Mod: CPTII,S$GLB,, | Performed by: STUDENT IN AN ORGANIZED HEALTH CARE EDUCATION/TRAINING PROGRAM

## 2022-08-02 PROCEDURE — 99213 OFFICE O/P EST LOW 20 MIN: CPT | Mod: S$GLB,,, | Performed by: STUDENT IN AN ORGANIZED HEALTH CARE EDUCATION/TRAINING PROGRAM

## 2022-08-02 NOTE — PROGRESS NOTES
Neurosurgery  Established Patient    SUBJECTIVE:     History of Present Illness:  52 F presents for eval of worsening RLE radicular pain and right leg weakness.  She states that pain starts in right buttocks and then radiates down posterior thigh and sometimes into her calf.  She has no LLE radiculopathy.  She also states that her right leg feels weaker than her left leg.  She feels unsteady when she walks but has had no falls.  Bending forward, stretching, and twisting to the left can improve the RLE pain.  She hasn't done PT recently.     Interval fu 6/13/2022:  Pt presents for audio appt after updated imaging.  She continues to endorse right leg pain which can start in her buttocks and radiated down posterior thigh into her calf.  She also endorses right leg weakness.    Interval fu 8/2/22:  Pt continues to endorse right leg weakness and RLE parasthesias down posterior right leg into her calf.  She has had no falls.  She has no bowel/bladder incontinence or saddle anesthesia.    Review of patient's allergies indicates:  No Known Allergies    Current Outpatient Medications   Medication Sig Dispense Refill    atorvastatin (LIPITOR) 20 MG tablet TAKE 1 TABLET BY MOUTH EVERY DAY 90 tablet 1    blood sugar diagnostic Strp 1 strip by Misc.(Non-Drug; Combo Route) route 2 (two) times daily with meals. 100 strip 11    cetirizine (ZYRTEC) 10 MG tablet Take 1 tablet (10 mg total) by mouth once daily. 90 tablet 1    doxycycline monohydrate 100 mg Tab Take 1 po BID pc. Take with food (no dairy) and with plenty water. 60 tablet 2    ergocalciferol (ERGOCALCIFEROL) 50,000 unit Cap TAKE 2 CAPSULE (50,000 UNITS TOTAL)BY MOUTH TWICE A WEEK 24 capsule 2    estradioL (ESTRACE) 1 MG tablet TAKE 1 TABLET BY MOUTH EVERY DAY 90 tablet 0    fluocinonide (LIDEX) 0.05 % ointment Apply to affected areas of scalp qhs 60 g 3    fluticasone propionate (FLONASE) 50 mcg/actuation nasal spray INSTILL 1 SPRAY (50 MCG TOTAL) IN EACH NOSTRIL  "ONCE DAILY. 16 mL 2    ketoconazole (NIZORAL) 2 % cream Apply to affected areas of scalp BID. 60 g 5    lancets Misc Use to test blood sugar once daily. 300 each 0    meloxicam (MOBIC) 15 MG tablet TAKE 1 TABLET BY MOUTH EVERY DAY 30 tablet 2    metFORMIN (GLUCOPHAGE-XR) 500 MG ER 24hr tablet TAKE 1 TABLET BY MOUTH TWICE A DAY WITH MEALS 180 tablet 1    neomycin-polymyxin-dexamethasone (MAXITROL) 3.5mg/mL-10,000 unit/mL-0.1 % DrpS       nystatin (MYCOSTATIN) ointment Apply topically 2 (two) times daily.      omeprazole (PRILOSEC) 40 MG capsule TAKE 1 CAPSULE BY MOUTH EVERY DAY 90 capsule 1    ondansetron (ZOFRAN) 4 MG tablet Take 1 tablet (4 mg total) by mouth every 8 (eight) hours as needed. 90 tablet 1    pen needle, diabetic (PEN NEEDLE) 32 gauge x 5/32" Ndle 1 each by Misc.(Non-Drug; Combo Route) route once a week. 50 each 1    prednisoLONE acetate (PRED FORTE) 1 % DrpS       semaglutide (OZEMPIC) 1 mg/dose (4 mg/3 mL) Inject 1 mg into the skin every 7 days. 3 pen 2    tobramycin-dexamethasone 0.3-0.1% (TOBRADEX) 0.3-0.1 % DrpS INSTILL 1 DROP INTO BOTH EYES 4 TIMES A DAY  2    triamcinolone acetonide 0.1% (KENALOG) 0.1 % cream Apply topically 2 (two) times daily. Apply topically 2 (two) times daily. 135 g 4    valsartan-hydrochlorothiazide (DIOVAN-HCT) 160-12.5 mg per tablet Take 1 tablet by mouth once daily. 90 tablet 1    blood-glucose meter kit Use as instructed 1 each 0    lancing device Misc 1 Device by Misc.(Non-Drug; Combo Route) route 2 (two) times daily with meals. 100 each 11     No current facility-administered medications for this visit.       Past Medical History:   Diagnosis Date    Diabetes mellitus     Diabetes mellitus, type 2     Eczema     GERD (gastroesophageal reflux disease)     Hypertension     Impaired fasting blood sugar     YSABEL on CPAP      Past Surgical History:   Procedure Laterality Date    breast reduction      CHOLECYSTECTOMY      laprascopic    EPIDURAL " STEROID INJECTION N/A 1/11/2022    Procedure: INJECTION, STEROID, EPIDURAL IL L4/5 NEEDS CONSENT;  Surgeon: Britt Calix MD;  Location: [facility]IN [facility];  Service: Pain Management;  Laterality: N/A;    ESOPHAGOGASTRODUODENOSCOPY  8/18/14    HYSTERECTOMY  2007    laprascopic, total for fibroids.  Dr Polo    INJECTION OF JOINT Left 2/8/2022    Procedure: INJECTION, JOINT, SI LEFT NEEDS CONSENT;  Surgeon: Britt Calix MD;  Location: [facility] PAIN [facility];  Service: Pain Management;  Laterality: Left;    OOPHORECTOMY      TOTAL REDUCTION MAMMOPLASTY       Family History     Problem Relation (Age of Onset)    Breast cancer Sister    Cancer Father (58)    Crohn's disease Sister    Diabetes Maternal Grandmother, Mother    Heart disease Maternal Grandmother, Mother    Hypertension Maternal Grandmother, Mother        Social History     Socioeconomic History    Marital status:    Tobacco Use    Smoking status: Never Smoker    Smokeless tobacco: Never Used   Substance and Sexual Activity    Alcohol use: No     Comment: socially    Drug use: No    Sexual activity: Yes     Partners: Male     Birth control/protection: Surgical   Social History Narrative    Was  since 2000.      Has a friend since 2016.    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     Social Determinants of Health     Financial Resource Strain: Medium Risk    Difficulty of Paying Living Expenses: Somewhat hard   Food Insecurity: No Food Insecurity    Worried About Running Out of Food in the Last Year: Never true    Ran Out of Food in the Last Year: Never true   Transportation Needs: Unknown    Lack of Transportation (Non-Medical): No   Physical Activity: Sufficiently Active    Days of Exercise per Week: 3 days    Minutes of Exercise per Session: 60 min   Stress: Stress Concern Present    Feeling of Stress : To some extent   Social Connections: Unknown    Frequency of Communication with Friends and  Family: More than three times a week    Frequency of Social Gatherings with Friends and Family: Once a week    Active Member of Clubs or Organizations: No    Attends Club or Organization Meetings: Patient refused    Marital Status:    Housing Stability: Low Risk     Unable to Pay for Housing in the Last Year: No    Number of Places Lived in the Last Year: 1    Unstable Housing in the Last Year: No       Review of Systems   14 point ROS was negative    OBJECTIVE:     Vital Signs  Pulse: 97  BP: (!) 161/100  Pain Score: 0-No pain  There is no height or weight on file to calculate BMI.    Physical Exam:    Constitutional: She appears well-developed and well-nourished.     Eyes: Pupils are equal, round, and reactive to light.     Cardiovascular: Normal rate and regular rhythm.     Abdominal: Soft.     Psych/Behavior: She is alert. She is oriented to person, place, and time. She has a normal mood and affect.     Musculoskeletal: Gait is normal.        Neck: Range of motion is full.        Back: Range of motion is limited.     Neurological:        Coordination: She has a normal Romberg Test and normal tandem walking coordination.        Sensory: There is no sensory deficit in the trunk. There is no sensory deficit in the extremities.        DTRs: DTRs are DTRS NORMAL AND SYMMETRICnormal and symmetric.        Cranial nerves: Cranial nerve(s) II, III, IV, V, VI, VII, VIII, IX, X, XI and XII are intact.     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  I have discussed surgical options and have recommended a robotic assisted T9-12 fusion with T9-11 decompression in order to treat her thoracic stenosis.  She would like to wait until October to schedule the procedure.  I have  discussed should she develop worsening right leg weakness, numbness, or incontinence she should seek prompt evaluation.  -Fu in September to schedule thoracic decompression/fusion.

## 2022-08-04 ENCOUNTER — PATIENT MESSAGE (OUTPATIENT)
Dept: BARIATRICS | Facility: CLINIC | Age: 52
End: 2022-08-04
Payer: COMMERCIAL

## 2022-08-21 ENCOUNTER — PATIENT MESSAGE (OUTPATIENT)
Dept: FAMILY MEDICINE | Facility: CLINIC | Age: 52
End: 2022-08-21
Payer: COMMERCIAL

## 2022-08-22 ENCOUNTER — PATIENT MESSAGE (OUTPATIENT)
Dept: FAMILY MEDICINE | Facility: CLINIC | Age: 52
End: 2022-08-22
Payer: COMMERCIAL

## 2022-08-22 RX ORDER — AMLODIPINE BESYLATE 10 MG/1
10 TABLET ORAL DAILY
Qty: 30 TABLET | Refills: 11 | Status: SHIPPED | OUTPATIENT
Start: 2022-08-22 | End: 2023-08-07

## 2022-08-23 ENCOUNTER — PATIENT MESSAGE (OUTPATIENT)
Dept: NEUROSURGERY | Facility: CLINIC | Age: 52
End: 2022-08-23
Payer: COMMERCIAL

## 2022-09-07 ENCOUNTER — PATIENT MESSAGE (OUTPATIENT)
Dept: BARIATRICS | Facility: CLINIC | Age: 52
End: 2022-09-07
Payer: COMMERCIAL

## 2022-09-12 DIAGNOSIS — E11.9 TYPE 2 DIABETES MELLITUS WITHOUT COMPLICATION, WITHOUT LONG-TERM CURRENT USE OF INSULIN: ICD-10-CM

## 2022-09-12 RX ORDER — METFORMIN HYDROCHLORIDE 500 MG/1
TABLET, EXTENDED RELEASE ORAL
Qty: 180 TABLET | Refills: 0 | Status: SHIPPED | OUTPATIENT
Start: 2022-09-12 | End: 2022-12-07

## 2022-09-12 NOTE — TELEPHONE ENCOUNTER
Care Due:                  Date            Visit Type   Department     Provider  --------------------------------------------------------------------------------                                             [facility]                              V[facility]UAL      MED/ INTERNAL  Last Visit: 11-      AUDIO ONLY   MED/ PEDS      Brandi Thompson  Next Visit: None Scheduled  None         None Found                                                            Last  Test          Frequency    Reason                     Performed    Due Date  --------------------------------------------------------------------------------    Office Visit  12 months..  amLODIPine, atorvastatin,   11- 11-                             metFORMIN, omeprazole,                             semaglutide,                             valsartan-hydrochlorothia                             will.....................    [employer] Embedded Care Gaps. Reference number: [id number]. 9/12/2022   12:29:38 AM CDT

## 2022-09-12 NOTE — TELEPHONE ENCOUNTER
Refill Routing Note   Medication(s) are not appropriate for processing by [facility] for the following reason(s):      - Drug-Disease Interaction (metFORMIN and Acute respiratory failure with hypoxemia)    [facility] action(s):  Defer Medication-related problems identified: Drug-disease interaction        Medication reconciliation completed: No     Appointments  past 12m or future 3m with PCP    Date Provider   Last Visit   11/12/2021 Brandi Thompson MD   Next Visit   Visit date not found Brandi Thompson MD   ED visits in past 90 days: 0        Note composed:12:27 PM 09/12/2022

## 2022-09-14 ENCOUNTER — PATIENT MESSAGE (OUTPATIENT)
Dept: FAMILY MEDICINE | Facility: CLINIC | Age: 52
End: 2022-09-14
Payer: COMMERCIAL

## 2022-09-16 DIAGNOSIS — Z12.39 ENCOUNTER FOR SCREENING FOR MALIGNANT NEOPLASM OF BREAST, UNSPECIFIED SCREENING MODALITY: Primary | ICD-10-CM

## 2022-09-19 ENCOUNTER — OFFICE VISIT (OUTPATIENT)
Dept: NEUROSURGERY | Facility: CLINIC | Age: 52
End: 2022-09-19
Payer: COMMERCIAL

## 2022-09-19 ENCOUNTER — TELEPHONE (OUTPATIENT)
Dept: NEUROSURGERY | Facility: CLINIC | Age: 52
End: 2022-09-19

## 2022-09-19 DIAGNOSIS — M51.34 DDD (DEGENERATIVE DISC DISEASE), THORACIC: ICD-10-CM

## 2022-09-19 DIAGNOSIS — M48.04 THORACIC SPINAL STENOSIS: Primary | ICD-10-CM

## 2022-09-19 DIAGNOSIS — M47.14 SPONDYLOSIS OF THORACIC SPINE WITH MYELOPATHY: ICD-10-CM

## 2022-09-19 DIAGNOSIS — M48.04 THORACIC STENOSIS: Primary | ICD-10-CM

## 2022-09-19 PROCEDURE — 99999 PR PBB SHADOW E&M-EST. PATIENT-LVL III: CPT | Mod: PBBFAC,,, | Performed by: STUDENT IN AN ORGANIZED HEALTH CARE EDUCATION/TRAINING PROGRAM

## 2022-09-19 PROCEDURE — 99999 PR PBB SHADOW E&M-EST. PATIENT-LVL III: ICD-10-PCS | Mod: PBBFAC,,, | Performed by: STUDENT IN AN ORGANIZED HEALTH CARE EDUCATION/TRAINING PROGRAM

## 2022-09-19 PROCEDURE — 1159F MED LIST DOCD IN RCRD: CPT | Mod: CPTII,S$GLB,, | Performed by: STUDENT IN AN ORGANIZED HEALTH CARE EDUCATION/TRAINING PROGRAM

## 2022-09-19 PROCEDURE — 1159F PR MEDICATION LIST DOCUMENTED IN MEDICAL RECORD: ICD-10-PCS | Mod: CPTII,S$GLB,, | Performed by: STUDENT IN AN ORGANIZED HEALTH CARE EDUCATION/TRAINING PROGRAM

## 2022-09-19 PROCEDURE — 99213 PR OFFICE/OUTPT VISIT, EST, LEVL III, 20-29 MIN: ICD-10-PCS | Mod: S$GLB,,, | Performed by: STUDENT IN AN ORGANIZED HEALTH CARE EDUCATION/TRAINING PROGRAM

## 2022-09-19 PROCEDURE — 3044F PR MOST RECENT HEMOGLOBIN A1C LEVEL <7.0%: ICD-10-PCS | Mod: CPTII,S$GLB,, | Performed by: STUDENT IN AN ORGANIZED HEALTH CARE EDUCATION/TRAINING PROGRAM

## 2022-09-19 PROCEDURE — 3044F HG A1C LEVEL LT 7.0%: CPT | Mod: CPTII,S$GLB,, | Performed by: STUDENT IN AN ORGANIZED HEALTH CARE EDUCATION/TRAINING PROGRAM

## 2022-09-19 PROCEDURE — 99213 OFFICE O/P EST LOW 20 MIN: CPT | Mod: S$GLB,,, | Performed by: STUDENT IN AN ORGANIZED HEALTH CARE EDUCATION/TRAINING PROGRAM

## 2022-09-19 NOTE — PROGRESS NOTES
Neurosurgery  Established Patient    SUBJECTIVE:     History of Present Illness:  52 F presents for eval of worsening RLE radicular pain and right leg weakness.  She states that pain starts in right buttocks and then radiates down posterior thigh and sometimes into her calf.  She has no LLE radiculopathy.  She also states that her right leg feels weaker than her left leg.  She feels unsteady when she walks but has had no falls.  Bending forward, stretching, and twisting to the left can improve the RLE pain.  She hasn't done PT recently.     Interval fu 6/13/2022:  Pt presents for audio appt after updated imaging.  She continues to endorse right leg pain which can start in her buttocks and radiated down posterior thigh into her calf.  She also endorses right leg weakness.     Interval fu 8/2/22:  Pt continues to endorse right leg weakness and RLE parasthesias down posterior right leg into her calf.  She has had no falls.  She has no bowel/bladder incontinence or saddle anesthesia.    Interval fu 9/19/22:  Pt presents to schedule surgery.  She has no new complaints.    Review of patient's allergies indicates:  No Active Allergies    Current Outpatient Medications   Medication Sig Dispense Refill    amLODIPine (NORVASC) 10 MG tablet Take 1 tablet (10 mg total) by mouth once daily. 30 tablet 11    atorvastatin (LIPITOR) 20 MG tablet TAKE 1 TABLET BY MOUTH EVERY DAY 90 tablet 1    blood sugar diagnostic Strp 1 strip by Misc.(Non-Drug; Combo Route) route 2 (two) times daily with meals. 100 strip 11    cetirizine (ZYRTEC) 10 MG tablet Take 1 tablet (10 mg total) by mouth once daily. 90 tablet 1    doxycycline monohydrate 100 mg Tab Take 1 po BID pc. Take with food (no dairy) and with plenty water. 60 tablet 2    ergocalciferol (ERGOCALCIFEROL) 50,000 unit Cap TAKE 2 CAPSULE (50,000 UNITS TOTAL)BY MOUTH TWICE A WEEK 48 capsule 1    estradioL (ESTRACE) 1 MG tablet TAKE 1 TABLET BY MOUTH EVERY DAY 90 tablet 0    fluocinonide  "(LIDEX) 0.05 % ointment Apply to affected areas of scalp qhs 60 g 3    fluticasone propionate (FLONASE) 50 mcg/actuation nasal spray INSTILL 1 SPRAY (50 MCG TOTAL) IN EACH NOSTRIL ONCE DAILY. 16 mL 2    ketoconazole (NIZORAL) 2 % cream Apply to affected areas of scalp BID. 60 g 5    lancets Misc Use to test blood sugar once daily. 300 each 0    meloxicam (MOBIC) 15 MG tablet TAKE 1 TABLET BY MOUTH EVERY DAY 30 tablet 2    metFORMIN (GLUCOPHAGE-XR) 500 MG ER 24hr tablet TAKE 1 TABLET BY MOUTH TWICE A DAY WITH MEALS 180 tablet 0    neomycin-polymyxin-dexamethasone (MAXITROL) 3.5mg/mL-10,000 unit/mL-0.1 % DrpS       nystatin (MYCOSTATIN) ointment Apply topically 2 (two) times daily.      omeprazole (PRILOSEC) 40 MG capsule TAKE 1 CAPSULE BY MOUTH EVERY DAY 90 capsule 1    ondansetron (ZOFRAN) 4 MG tablet Take 1 tablet (4 mg total) by mouth every 8 (eight) hours as needed. 90 tablet 1    pen needle, diabetic (PEN NEEDLE) 32 gauge x 5/32" Ndle 1 each by Misc.(Non-Drug; Combo Route) route once a week. 50 each 1    prednisoLONE acetate (PRED FORTE) 1 % DrpS       semaglutide (OZEMPIC) 1 mg/dose (4 mg/3 mL) Inject 1 mg into the skin every 7 days. 3 pen 2    tobramycin-dexamethasone 0.3-0.1% (TOBRADEX) 0.3-0.1 % DrpS INSTILL 1 DROP INTO BOTH EYES 4 TIMES A DAY  2    triamcinolone acetonide 0.1% (KENALOG) 0.1 % cream Apply topically 2 (two) times daily. Apply topically 2 (two) times daily. 135 g 4    valsartan-hydrochlorothiazide (DIOVAN-HCT) 160-12.5 mg per tablet Take 1 tablet by mouth once daily. 90 tablet 1    blood-glucose meter kit Use as instructed 1 each 0    lancing device Misc 1 Device by Misc.(Non-Drug; Combo Route) route 2 (two) times daily with meals. 100 each 11     No current facility-administered medications for this visit.       Past Medical History:   Diagnosis Date    Diabetes mellitus     Diabetes mellitus, type 2     Eczema     GERD (gastroesophageal reflux disease)     Hypertension     Impaired fasting " blood sugar     YSABEL on CPAP      Past Surgical History:   Procedure Laterality Date    breast reduction      CHOLECYSTECTOMY      laprascopic    EPIDURAL STEROID INJECTION N/A 1/11/2022    Procedure: INJECTION, STEROID, EPIDURAL IL L4/5 NEEDS CONSENT;  Surgeon: Britt Calix MD;  [place] [facility] [place] [facility] [place]  Service: Pain Management;  Laterality: N/A;    ESOPHAGOGASTRODUODENOSCOPY  8/18/14    HYSTERECTOMY  2007    laprascopic, total for fibroids.  Dr Polo    INJECTION OF JOINT Left 2/8/2022    Procedure: INJECTION, JOINT, SI LEFT NEEDS CONSENT;  Surgeon: Britt Calix MD;  [place] [facility] [place] [facility] [place]  Service: Pain Management;  Laterality: Left;    OOPHORECTOMY      TOTAL REDUCTION MAMMOPLASTY       Family History       Problem Relation (Age of Onset)    Breast cancer Sister    Cancer Father (58)    Crohn's disease Sister    Diabetes Maternal Grandmother, Mother    Heart disease Maternal Grandmother, Mother    Hypertension Maternal Grandmother, Mother          Social History     Socioeconomic History    Marital status:    Tobacco Use    Smoking status: Never    Smokeless tobacco: Never   Substance and Sexual Activity    Alcohol use: No     Comment: socially    Drug use: No    Sexual activity: Yes     Partners: Male     Birth control/protection: Surgical   Social History Narrative    Was  since 2000.      Has a friend since 2016.    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     Social Determinants of Health     Financial Resource Strain: Medium Risk    Difficulty of Paying Living Expenses: Somewhat hard   Food Insecurity: No Food Insecurity    Worried About Running Out of Food in the Last Year: Never true    Ran Out of Food in the Last Year: Never true   Transportation Needs: Unknown    Lack of Transportation (Non-Medical): No   Physical Activity: Sufficiently Active    Days of Exercise per Week: 3 days    Minutes of Exercise per Session: 60 min   Stress: Stress  Concern Present    Feeling of Stress : To some extent   Social Connections: Unknown    Frequency of Communication with Friends and Family: More than three times a week    Frequency of Social Gatherings with Friends and Family: Once a week    Active Member of Clubs or Organizations: No    Attends Club or Organization Meetings: Patient refused    Marital Status:    Housing Stability: Low Risk     Unable to Pay for Housing in the Last Year: No    Number of Places Lived in the Last Year: 1    Unstable Housing in the Last Year: No       Review of Systems  14 point ROS was negative  OBJECTIVE:     Vital Signs  Pain Score:   3  There is no height or weight on file to calculate BMI.    Neurosurgery Physical Exam  Constitutional: She appears well-developed and well-nourished.      Eyes: Pupils are equal, round, and reactive to light.      Cardiovascular: Normal rate and regular rhythm.      Abdominal: Soft.     Psych/Behavior: She is alert. She is oriented to person, place, and time. She has a normal mood and affect.     Musculoskeletal: Gait is normal.        Neck: Range of motion is full.        Back: Range of motion is limited.     Neurological:        Coordination: She has a normal Romberg Test and normal tandem walking coordination.        Sensory: There is no sensory deficit in the trunk. There is no sensory deficit in the extremities.        DTRs: DTRs are DTRS NORMAL AND SYMMETRICnormal and symmetric.        Cranial nerves: Cranial nerve(s) II, III, IV, V, VI, VII, VIII, IX, X, XI and XII are intact.      5/5 in BUE  4/5 in bilateral HF, otherwise 5/5 distally     SILT    Diagnostic Results:  None to review    ASSESSMENT/PLAN:     52 F with symptoms of thoracic myelopathy and severe stenosis at T10/11 secondary to facet hypertrophy and ossification of the ligamentum flavum.  I have discussed surgical options and have recommended a robotic assisted T9-12 fusion with T9-11 decompression in order to treat her  thoracic stenosis.  -Will plan for surgery on 10/26/22

## 2022-10-04 ENCOUNTER — TELEPHONE (OUTPATIENT)
Dept: FAMILY MEDICINE | Facility: CLINIC | Age: 52
End: 2022-10-04
Payer: COMMERCIAL

## 2022-10-04 ENCOUNTER — TELEPHONE (OUTPATIENT)
Dept: PREADMISSION TESTING | Facility: HOSPITAL | Age: 52
End: 2022-10-04
Payer: COMMERCIAL

## 2022-10-04 ENCOUNTER — PATIENT MESSAGE (OUTPATIENT)
Dept: FAMILY MEDICINE | Facility: CLINIC | Age: 52
End: 2022-10-04
Payer: COMMERCIAL

## 2022-10-04 DIAGNOSIS — Z01.818 PREOPERATIVE TESTING: Primary | ICD-10-CM

## 2022-10-04 NOTE — ANESTHESIA PAT ROS NOTE
10/04/2022  Africa Jennings is a 52 y.o., female.      Pre-op Assessment          Review of Systems  Anesthesia Hx:  No problems with previous Anesthesia   History of prior surgery of interest to airway management or planning:  Previous anesthesia: MAC       6/21/2018: COLONSCOPY with MAC.  Procedure performed at an [facility].  Denies Family Hx of Anesthesia complications.    Denies Personal Hx of Anesthesia complications.                    Social:  Non-Smoker, No Alcohol Use       Hematology/Oncology:    Oncology Normal    -- Anemia:                                  EENT/Dental:  EENT/Dental Normal           Cardiovascular:     Hypertension       Denies Angina.          Functional Capacity good / => 4 METS                         Pulmonary:       Denies Shortness of breath.  Denies Recent URI. Sleep Apnea, CPAP                Renal/:  Renal/ Normal                 Hepatic/GI:     GERD             Musculoskeletal:     Joint Disease:  Arthritis, Osteoarthritis       Thoracic Spine Disorders, Thoracic Disc Disease THORACIC STENOSIS    SPONDYLOSIS OF THORACIC SPINE WITH MYELOPATHY Lumbar Spine Disorders, Lumbar Disc Disease   Neurological:           LUMBAR RADICULOPATHY    Osteoarthritis                           Endocrine:   Diabetes, Type 2 Diabetes   , controlled by diet, oral hypoglycemics, non-insulin injectables.  , most recent HgA1c value was 6.6 on 4/25/2022.              Morbid Obesity / BMI > 40  Dermatological:  ECZEMA   Psych:  Psychiatric Normal                  Physical Exam  General:  Morbid Obesity       Airway/Jaw/Neck:  Airway Findings: Mouth Opening: Normal     Tongue: Normal              Jaw/Neck Findings:     Neck ROM: Normal ROM          Dental:  Dental Findings: In tact      Chest/Lungs:  Chest/Lungs Findings:  Clear to auscultation       Heart/Vascular:  Heart  Findings: Rate: Normal  Rhythm: Regular Rhythm  Sounds: Normal                       Mental Status:  Mental Status Findings:  Cooperative, Alert and Oriented       Anesthesia Assessment: Preoperative EQUATION    Planned Procedure: Procedure(s) (LRB):  T9-12 ROBOTIC DECOMPRESSION, THORACIC FUSION (N/A)  Requested Anesthesia Type:General  Surgeon: Edouard Whitt DO  Service: Neurosurgery  Known or anticipated Date of Surgery:10/26/2022    Surgeon notes: reviewed    Electronic QUestionnaire Assessment completed via nurse interview with patient.        Triage considerations:       Previous anesthesia records:MAC and No problems  6/21/2018 COLONOSCOPY  Airway/Jaw/Neck:  Airway Findings: Mouth Opening: Normal Tongue: Normal  Mallampati: II  TM Distance: Normal, at least 6 cm  Jaw/Neck Findings:  Neck ROM: Normal ROM       Last PCP note: 6-12 months ago , within [facility]   Subspecialty notes: Dermatology, Neurosurgery, Pain Management, BARIATRICS, SLEEP MEDICINE PODIATRY    Other important co-morbidities:PER Epic:  DM2, GERD, HTN, Morbid Obesity, YSABEL, and THORACIC DDD, THORACIC STENOSIS       Tests already available:  Available tests,  3-6 months ago , 6-12 months ago , within [facility] .   6/3/2022 MRI THORACIC SPINE W/O CONTRAST, CT THORACIC SPINE W/O CONTRAST, 4/19/2022 EKG          Instructions given. (See in Nurse's note)    Optimization:  Anesthesia Preop Clinic Assessment  Indicated    Medical Opinion Indicated           Plan:    Testing:  A1C, BMP, CBC, EKG, PT/INR, PTT, T&S, and UA   Pre-anesthesia  visit       Visit focus: concerns in complex and/or prolonged anesthesia, position other than supine, CXOMORBIDITIES     Consultation:Patient's PCP for re-evaluation     Patient  has previously scheduled Medical Appointment:NONE    Navigation: Tests Scheduled. TBD             Consults scheduled.TBD             Results will be tracked by Preop Clinic.  Gayle Croft RN BSN      10/11/2022: PCP CLEARANCE- DR. BARRETO  BHAT  Pre-op examination  Patient has no acute symptoms or health problems. Her T2DM and HTN are managed well under current medication regimen. She does not have issues with her medications and is taking all as directed.   Patient exam normal. Patient otherwise healthy and medically optimized for surgery on 10/26/22.

## 2022-10-04 NOTE — TELEPHONE ENCOUNTER
----- Message from Gayle Croft RN sent at 10/4/2022 12:00 PM CDT -----  Patient is scheduled for T9-12 robotic decompression, thoracic fusion on 10/26 with .( approximately 210 minutes of general anesthesia) She will need medical clearance. Please schedule a preop clearance appt.  Thanks!

## 2022-10-04 NOTE — PRE-PROCEDURE INSTRUCTIONS
Patient stated has not had any problem with anesthesia in the past. Will need medical clearance from your PCP,Brandi Tracey.  She will make an appt. Will need poc appt, labs, ua, and ekg.  Our  will call to set up these appts.    Preop instructions given. Hold aspirin, aspirin containing products, nsaids(aleve, advil, motrin, ibuprofen, naprosyn, naproxen, voltaren, diclofenac, Mobic, Meloxicam), vitamins ( ergocalciferol,     Vitamin D)and supplements one week prior to surgery.     May takeTylenol.  (sent to [facility])  Verbalizes understanding.

## 2022-10-06 ENCOUNTER — PATIENT MESSAGE (OUTPATIENT)
Dept: BARIATRICS | Facility: CLINIC | Age: 52
End: 2022-10-06
Payer: COMMERCIAL

## 2022-10-11 ENCOUNTER — OFFICE VISIT (OUTPATIENT)
Dept: FAMILY MEDICINE | Facility: CLINIC | Age: 52
End: 2022-10-11
Payer: COMMERCIAL

## 2022-10-11 VITALS
SYSTOLIC BLOOD PRESSURE: 126 MMHG | WEIGHT: 222.25 LBS | TEMPERATURE: 98 F | HEART RATE: 69 BPM | HEIGHT: 61 IN | BODY MASS INDEX: 41.96 KG/M2 | OXYGEN SATURATION: 98 % | DIASTOLIC BLOOD PRESSURE: 70 MMHG

## 2022-10-11 DIAGNOSIS — Z23 NEED FOR PROPHYLACTIC VACCINATION AGAINST STREPTOCOCCUS PNEUMONIAE (PNEUMOCOCCUS): ICD-10-CM

## 2022-10-11 DIAGNOSIS — E11.9 TYPE 2 DIABETES MELLITUS WITHOUT COMPLICATION, WITHOUT LONG-TERM CURRENT USE OF INSULIN: Chronic | ICD-10-CM

## 2022-10-11 DIAGNOSIS — I10 ESSENTIAL HYPERTENSION: Chronic | ICD-10-CM

## 2022-10-11 DIAGNOSIS — Z23 NEEDS FLU SHOT: ICD-10-CM

## 2022-10-11 DIAGNOSIS — Z01.818 PRE-OP EXAMINATION: Primary | ICD-10-CM

## 2022-10-11 DIAGNOSIS — E66.01 MORBID OBESITY WITH BMI OF 40.0-44.9, ADULT: Chronic | ICD-10-CM

## 2022-10-11 PROCEDURE — 99999 PR PBB SHADOW E&M-EST. PATIENT-LVL V: ICD-10-PCS | Mod: PBBFAC,,, | Performed by: FAMILY MEDICINE

## 2022-10-11 PROCEDURE — 90686 FLU VACCINE (QUAD) GREATER THAN OR EQUAL TO 3YO PRESERVATIVE FREE IM: ICD-10-PCS | Mod: S$GLB,,, | Performed by: FAMILY MEDICINE

## 2022-10-11 PROCEDURE — 3078F DIAST BP <80 MM HG: CPT | Mod: CPTII,S$GLB,, | Performed by: FAMILY MEDICINE

## 2022-10-11 PROCEDURE — 1160F PR REVIEW ALL MEDS BY PRESCRIBER/CLIN PHARMACIST DOCUMENTED: ICD-10-PCS | Mod: CPTII,S$GLB,, | Performed by: FAMILY MEDICINE

## 2022-10-11 PROCEDURE — 99214 PR OFFICE/OUTPT VISIT, EST, LEVL IV, 30-39 MIN: ICD-10-PCS | Mod: 25,S$GLB,, | Performed by: FAMILY MEDICINE

## 2022-10-11 PROCEDURE — 99999 PR PBB SHADOW E&M-EST. PATIENT-LVL V: CPT | Mod: PBBFAC,,, | Performed by: FAMILY MEDICINE

## 2022-10-11 PROCEDURE — 3008F PR BODY MASS INDEX (BMI) DOCUMENTED: ICD-10-PCS | Mod: CPTII,S$GLB,, | Performed by: FAMILY MEDICINE

## 2022-10-11 PROCEDURE — 1159F MED LIST DOCD IN RCRD: CPT | Mod: CPTII,S$GLB,, | Performed by: FAMILY MEDICINE

## 2022-10-11 PROCEDURE — 90471 PNEUMOCOCCAL POLYSACCHARIDE VACCINE 23-VALENT =>2YO SQ IM: ICD-10-PCS | Mod: S$GLB,,, | Performed by: FAMILY MEDICINE

## 2022-10-11 PROCEDURE — 3074F PR MOST RECENT SYSTOLIC BLOOD PRESSURE < 130 MM HG: ICD-10-PCS | Mod: CPTII,S$GLB,, | Performed by: FAMILY MEDICINE

## 2022-10-11 PROCEDURE — 90472 FLU VACCINE (QUAD) GREATER THAN OR EQUAL TO 3YO PRESERVATIVE FREE IM: ICD-10-PCS | Mod: S$GLB,,, | Performed by: FAMILY MEDICINE

## 2022-10-11 PROCEDURE — 3044F HG A1C LEVEL LT 7.0%: CPT | Mod: CPTII,S$GLB,, | Performed by: FAMILY MEDICINE

## 2022-10-11 PROCEDURE — 90732 PPSV23 VACC 2 YRS+ SUBQ/IM: CPT | Mod: S$GLB,,, | Performed by: FAMILY MEDICINE

## 2022-10-11 PROCEDURE — 1159F PR MEDICATION LIST DOCUMENTED IN MEDICAL RECORD: ICD-10-PCS | Mod: CPTII,S$GLB,, | Performed by: FAMILY MEDICINE

## 2022-10-11 PROCEDURE — 3044F PR MOST RECENT HEMOGLOBIN A1C LEVEL <7.0%: ICD-10-PCS | Mod: CPTII,S$GLB,, | Performed by: FAMILY MEDICINE

## 2022-10-11 PROCEDURE — 90732 PNEUMOCOCCAL POLYSACCHARIDE VACCINE 23-VALENT =>2YO SQ IM: ICD-10-PCS | Mod: S$GLB,,, | Performed by: FAMILY MEDICINE

## 2022-10-11 PROCEDURE — 3074F SYST BP LT 130 MM HG: CPT | Mod: CPTII,S$GLB,, | Performed by: FAMILY MEDICINE

## 2022-10-11 PROCEDURE — 1160F RVW MEDS BY RX/DR IN RCRD: CPT | Mod: CPTII,S$GLB,, | Performed by: FAMILY MEDICINE

## 2022-10-11 PROCEDURE — 90686 IIV4 VACC NO PRSV 0.5 ML IM: CPT | Mod: S$GLB,,, | Performed by: FAMILY MEDICINE

## 2022-10-11 PROCEDURE — 99214 OFFICE O/P EST MOD 30 MIN: CPT | Mod: 25,S$GLB,, | Performed by: FAMILY MEDICINE

## 2022-10-11 PROCEDURE — 90471 IMMUNIZATION ADMIN: CPT | Mod: S$GLB,,, | Performed by: FAMILY MEDICINE

## 2022-10-11 PROCEDURE — 3078F PR MOST RECENT DIASTOLIC BLOOD PRESSURE < 80 MM HG: ICD-10-PCS | Mod: CPTII,S$GLB,, | Performed by: FAMILY MEDICINE

## 2022-10-11 PROCEDURE — 90472 IMMUNIZATION ADMIN EACH ADD: CPT | Mod: S$GLB,,, | Performed by: FAMILY MEDICINE

## 2022-10-11 PROCEDURE — 3008F BODY MASS INDEX DOCD: CPT | Mod: CPTII,S$GLB,, | Performed by: FAMILY MEDICINE

## 2022-10-11 NOTE — PROGRESS NOTES
Chief Complaint  Chief Complaint   Patient presents with    clearance       HPI  Africa Ayaka Jennings is a 52 y.o. female with multiple medical diagnoses as listed in the medical history and problem list that presents for pre-op clearance.  Their last appointment with primary care was Visit date not found.      Patient states she is feeling well and has no acute complaints. She reports she will be getting surgery for chronic low back pain that radiates into her R leg.   She measures her BP at home with readings in 120-130s/70-80s. She measure BG at home daily with values <100. Patient uses CPAP at home for YSABEL. She states she tries to eat a balanced diet everyday and tries to go on walks and attend Bariatric support group. She has good family support and will be staying with her sister after surgery.     No chest pain or shortness of breath. She is able to complete her ADLs. She has help after surgery.     Patient is otherwise healthy and has no concerns regarding her health.       PAST MEDICAL HISTORY:  Past Medical History:   Diagnosis Date    Diabetes mellitus     Diabetes mellitus, type 2     Eczema     GERD (gastroesophageal reflux disease)     Hypertension     Impaired fasting blood sugar     YSABEL on CPAP        PAST SURGICAL HISTORY:  Past Surgical History:   Procedure Laterality Date    breast reduction      CHOLECYSTECTOMY      laprascopic    EPIDURAL STEROID INJECTION N/A 1/11/2022    Procedure: INJECTION, STEROID, EPIDURAL IL L4/5 NEEDS CONSENT;  Surgeon: Britt Calix MD;  Location: [facility];  Service: Pain Management;  Laterality: N/A;    ESOPHAGOGASTRODUODENOSCOPY  8/18/14    HYSTERECTOMY  2007    laprascopic, total for fibroids.  Dr Polo    INJECTION OF JOINT Left 2/8/2022    Procedure: INJECTION, JOINT, SI LEFT NEEDS CONSENT;  Surgeon: Britt Calix MD;  Location: [facility];  Service: Pain Management;  Laterality: Left;    OOPHORECTOMY      TOTAL REDUCTION MAMMOPLASTY          SOCIAL HISTORY:  Social History     Socioeconomic History    Marital status:    Tobacco Use    Smoking status: Never    Smokeless tobacco: Never   Substance and Sexual Activity    Alcohol use: No     Comment: socially    Drug use: No    Sexual activity: Yes     Partners: Male     Birth control/protection: Surgical   Social History Narrative    Was  since 2000.      Has a friend since 2016.    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     Social Determinants of Health     Financial Resource Strain: Medium Risk    Difficulty of Paying Living Expenses: Somewhat hard   Food Insecurity: No Food Insecurity    Worried About Running Out of Food in the Last Year: Never true    Ran Out of Food in the Last Year: Never true   Transportation Needs: No Transportation Needs    Lack of Transportation (Medical): No    Lack of Transportation (Non-Medical): No   Physical Activity: Insufficiently Active    Days of Exercise per Week: 2 days    Minutes of Exercise per Session: 70 min   Stress: No Stress Concern Present    Feeling of Stress : Not at all   Social Connections: Unknown    Frequency of Communication with Friends and Family: More than three times a week    Frequency of Social Gatherings with Friends and Family: Once a week    Active Member of Clubs or Organizations: No    Attends Club or Organization Meetings: 1 to 4 times per year    Marital Status:    Housing Stability: Low Risk     Unable to Pay for Housing in the Last Year: No    Number of Places Lived in the Last Year: 1    Unstable Housing in the Last Year: No       FAMILY HISTORY:  Family History   Problem Relation Age of Onset    Diabetes Maternal Grandmother     Heart disease Maternal Grandmother     Hypertension Maternal Grandmother     Crohn's disease Sister     Diabetes Mother     Hypertension Mother     Heart disease Mother     Cancer Father 58        Prostate    Breast cancer Sister     Amblyopia Neg Hx      "Blindness Neg Hx     Cataracts Neg Hx     Glaucoma Neg Hx     Macular degeneration Neg Hx     Retinal detachment Neg Hx     Strabismus Neg Hx        ALLERGIES AND MEDICATIONS: updated and reviewed.  Review of patient's allergies indicates:  No Known Allergies  Current Outpatient Medications   Medication Sig Dispense Refill    amLODIPine (NORVASC) 10 MG tablet Take 1 tablet (10 mg total) by mouth once daily. 30 tablet 11    atorvastatin (LIPITOR) 20 MG tablet TAKE 1 TABLET BY MOUTH EVERY DAY 90 tablet 1    blood sugar diagnostic Strp 1 strip by Misc.(Non-Drug; Combo Route) route 2 (two) times daily with meals. 100 strip 11    cetirizine (ZYRTEC) 10 MG tablet Take 1 tablet (10 mg total) by mouth once daily. 90 tablet 1    ergocalciferol (ERGOCALCIFEROL) 50,000 unit Cap TAKE 2 CAPSULE (50,000 UNITS TOTAL)BY MOUTH TWICE A WEEK 48 capsule 1    estradioL (ESTRACE) 1 MG tablet TAKE 1 TABLET BY MOUTH EVERY DAY 90 tablet 1    fluocinonide (LIDEX) 0.05 % ointment Apply to affected areas of scalp qhs 60 g 3    fluticasone propionate (FLONASE) 50 mcg/actuation nasal spray INSTILL 1 SPRAY (50 MCG TOTAL) IN EACH NOSTRIL ONCE DAILY. (Patient taking differently: USES PRN) 16 mL 2    ketoconazole (NIZORAL) 2 % cream Apply to affected areas of scalp BID. 60 g 5    lancets Misc Use to test blood sugar once daily. 300 each 0    meloxicam (MOBIC) 15 MG tablet TAKE 1 TABLET BY MOUTH EVERY DAY 30 tablet 2    metFORMIN (GLUCOPHAGE-XR) 500 MG ER 24hr tablet TAKE 1 TABLET BY MOUTH TWICE A DAY WITH MEALS 180 tablet 0    neomycin-polymyxin-dexamethasone (MAXITROL) 3.5mg/mL-10,000 unit/mL-0.1 % DrpS as needed.      nystatin (MYCOSTATIN) ointment Apply topically 2 (two) times daily. USES PRN      omeprazole (PRILOSEC) 40 MG capsule TAKE 1 CAPSULE BY MOUTH EVERY DAY 90 capsule 1    ondansetron (ZOFRAN) 4 MG tablet TAKE 1 TABLET BY MOUTH EVERY 8 HOURS AS NEEDED 90 tablet 1    pen needle, diabetic (PEN NEEDLE) 32 gauge x 5/32" Ndle 1 each by " "Misc.(Non-Drug; Combo Route) route once a week. 50 each 1    prednisoLONE acetate (PRED FORTE) 1 % DrpS USES PRN      semaglutide (OZEMPIC) 1 mg/dose (4 mg/3 mL) Inject 1 mg into the skin every 7 days. (Patient taking differently: Inject into the skin every 7 days. WEDNESDAYS) 3 pen 2    tiZANidine (ZANAFLEX) 4 MG tablet TAKE 1 TABLET (4 MG TOTAL) BY MOUTH NIGHTLY AS NEEDED (MUSCLE SPASM). 90 tablet 2    triamcinolone acetonide 0.1% (KENALOG) 0.1 % cream Apply topically 2 (two) times daily. Apply topically 2 (two) times daily. 135 g 4    valsartan-hydrochlorothiazide (DIOVAN-HCT) 160-12.5 mg per tablet Take 1 tablet by mouth once daily. 90 tablet 1    blood-glucose meter kit Use as instructed 1 each 0    lancing device Misc 1 Device by Misc.(Non-Drug; Combo Route) route 2 (two) times daily with meals. 100 each 11     No current facility-administered medications for this visit.         ROS  Review of Systems   Constitutional: Negative.    HENT: Negative.     Eyes: Negative.    Respiratory: Negative.     Cardiovascular: Negative.    Gastrointestinal: Negative.    Endocrine: Negative.    Genitourinary: Negative.    Musculoskeletal:  Positive for back pain.   Skin: Negative.    Allergic/Immunologic: Negative.    Neurological: Negative.    Hematological: Negative.    Psychiatric/Behavioral: Negative.           Physical Exam  Vitals:    10/11/22 1252   BP: 126/70   Pulse: 69   Temp: 98.3 °F (36.8 °C)   TempSrc: Oral   SpO2: 98%   Weight: 100.8 kg (222 lb 3.6 oz)   Height: 5' 1" (1.549 m)    Body mass index is 41.99 kg/m².  Weight: 100.8 kg (222 lb 3.6 oz)   Height: 5' 1" (154.9 cm)     Physical Exam  Constitutional:       Appearance: Normal appearance. She is obese.   HENT:      Head: Normocephalic and atraumatic.      Mouth/Throat:      Mouth: Mucous membranes are moist.      Pharynx: Oropharynx is clear.   Eyes:      Extraocular Movements: Extraocular movements intact.      Conjunctiva/sclera: Conjunctivae normal.      " Pupils: Pupils are equal, round, and reactive to light.   Cardiovascular:      Rate and Rhythm: Normal rate and regular rhythm.      Pulses: Normal pulses.      Heart sounds: Normal heart sounds.   Pulmonary:      Effort: Pulmonary effort is normal.      Breath sounds: Normal breath sounds.   Abdominal:      General: Abdomen is flat.      Palpations: Abdomen is soft.   Skin:     General: Skin is warm and dry.      Capillary Refill: Capillary refill takes less than 2 seconds.   Neurological:      General: No focal deficit present.      Mental Status: She is alert and oriented to person, place, and time.   Psychiatric:         Mood and Affect: Mood normal.         Behavior: Behavior normal.         Thought Content: Thought content normal.         Health Maintenance         Date Due Completion Date    Pneumococcal Vaccines (Age 0-64) (2 - PCV) 01/11/2020 1/11/2019    COVID-19 Vaccine (4 - Booster for Pfizer series) 12/22/2021 10/27/2021    Diabetes Urine Screening 05/06/2022 5/6/2021    Eye Exam 05/19/2022 5/19/2021    Influenza Vaccine (1) 09/01/2022 11/13/2020    Mammogram 09/20/2022 9/20/2021    Foot Exam 10/12/2022 10/12/2021 (Done)    Override on 10/12/2021: Done    Override on 9/29/2020: Done    Override on 2/13/2020: Done    Hemoglobin A1c 10/25/2022 4/25/2022    Lipid Panel 04/25/2023 4/25/2022    Low Dose Statin 09/19/2023 9/19/2022    Colorectal Cancer Screening 06/21/2028 6/21/2018    TETANUS VACCINE 05/13/2031 5/13/2021              Assessment and Plan:  Pre-op examination  Patient has no acute symptoms or health problems. Her T2DM and HTN are managed well under current medication regimen. She does not have issues with her medications and is taking all as directed.   Patient exam normal. Patient otherwise healthy and medically optimized for surgery on 10/26/22.     Needs flu shot  -     Influenza - Quadrivalent (PF)    Essential hypertension  Patient condition managed well on current medication regimen.      Need for prophylactic vaccination against Streptococcus pneumoniae (pneumococcus)  -     (In Office Administered) Pneumococcal Polysaccharide Vaccine (23 Valent) (SQ/IM)    Morbid obesity with BMI of 40.0-44.9, adult  The patient is asked to make an attempt to improve diet and exercise patterns to aid in medical management of this problem.    Type 2 diabetes mellitus without complication, without long-term current use of insulin  The current medical regimen is effective;  continue present plan and medications.        I attest that I have reviewed the student note and that the components of the history of the present illness, the physical exam, and the assessment and plan documented were performed by me or were performed in my presence by the student where I verified the documentation and performed (or re-performed) the exam and medical decision making.

## 2022-10-12 ENCOUNTER — PATIENT MESSAGE (OUTPATIENT)
Dept: ADMINISTRATIVE | Facility: OTHER | Age: 52
End: 2022-10-12
Payer: COMMERCIAL

## 2022-10-12 ENCOUNTER — TELEPHONE (OUTPATIENT)
Dept: BARIATRICS | Facility: CLINIC | Age: 52
End: 2022-10-12
Payer: COMMERCIAL

## 2022-10-12 NOTE — TELEPHONE ENCOUNTER
----- Message from Dalia Dunn sent at 10/12/2022  1:55 PM CDT -----  Contact: pt  Pt requesting a callback to get her Miarian rescheduled           Confirmed contact below:  Contact Name:Afrcia Jennings  Phone Number: [phone]

## 2022-10-13 ENCOUNTER — CLINICAL SUPPORT (OUTPATIENT)
Dept: BARIATRICS | Facility: CLINIC | Age: 52
End: 2022-10-13
Payer: COMMERCIAL

## 2022-10-13 VITALS — WEIGHT: 222.25 LBS | BODY MASS INDEX: 41.99 KG/M2

## 2022-10-13 DIAGNOSIS — E66.01 MORBID OBESITY WITH BODY MASS INDEX (BMI) OF 40.0 OR HIGHER: ICD-10-CM

## 2022-10-13 DIAGNOSIS — E11.9 TYPE 2 DIABETES MELLITUS WITHOUT COMPLICATION, WITHOUT LONG-TERM CURRENT USE OF INSULIN: Primary | Chronic | ICD-10-CM

## 2022-10-13 DIAGNOSIS — Z71.3 DIETARY COUNSELING AND SURVEILLANCE: ICD-10-CM

## 2022-10-13 DIAGNOSIS — G47.33 OBSTRUCTIVE SLEEP APNEA (ADULT) (PEDIATRIC): ICD-10-CM

## 2022-10-13 DIAGNOSIS — I10 ESSENTIAL HYPERTENSION: Chronic | ICD-10-CM

## 2022-10-13 PROCEDURE — 97803 MED NUTRITION INDIV SUBSEQ: CPT | Mod: 95,,, | Performed by: DIETITIAN, REGISTERED

## 2022-10-13 PROCEDURE — 99499 UNLISTED E&M SERVICE: CPT | Mod: 95,,, | Performed by: DIETITIAN, REGISTERED

## 2022-10-13 PROCEDURE — 97803 PR MED NUTR THER, SUBSQ, INDIV, EA 15 MIN: ICD-10-PCS | Mod: 95,,, | Performed by: DIETITIAN, REGISTERED

## 2022-10-13 PROCEDURE — 99499 NO LOS: ICD-10-PCS | Mod: 95,,, | Performed by: DIETITIAN, REGISTERED

## 2022-10-13 NOTE — PROGRESS NOTES
The patient location is:  Patient Home   The chief complaint leading to consultation is: morbid obesity in work up for bariatric surgery  Visit type: Virtual visit with synchronous audio and video  Total time spent with patient: 15 minutes  Each patient to whom he or she provides medical services by telemedicine is:  (1) informed of the relationship between the physician and patient and the respective role of any other health care provider with respect to management of the patient; and (2) notified that he or she may decline to receive medical services by telemedicine and may withdraw from such care at any time.  Nutrition Handout located in AVS section of pt's [facility] and/or sent as a message via [facility].  Pt informed of handout and how to access this information in [employer] [facility].    NUTRITION NOTE    Referring Physician: Roge Rich M.D.   Reason for MNT Referral: 6 months Medically Supervised Diet pending Gastric Sleeve    Patient presents for f/u  visit for MSD with weight gain since last visit.     CLINICAL DATA:  52 y.o. female.    Current Weight:  222 lbs self reported   Ideal Body Weight: 129 lbs  Body mass index is 41.99 kg/m².   Last Wt: 219 lbs  Initial Wt:  224 lbs    DAILY NUTRITIONAL NEEDS: pre-op nutritional bariatric guidelines to promote weight loss  5071-1722 Calories    Grams Protein    CURRENT DIET:  Reduced-calorie diet.  Diet Recall: Food records are not present.   B:  boiled egg whole grain bread  S: fruit or baby carrots  L: caesar salad with chicken   D: salmon and vegetable  S: apples  Diet Includes: bariatric appropriate meals  Meal Pattern: Improved.  Protein Supplements: 2 per day. Whey protein with almond milk  Snacking: Adequate. Snacks include healthy choices.  Vegetables: Likes a variety. Eats daily.  Fruits: Likes a variety. Eats daily.  Beverages: water, sugar-free beverages and herbal tea   and diet cranberry juice  Dining Out:  Never. Mostly  restaurants.  Cooking at home: Daily. Mostly grilled and air fryer  meat, fish and vegetables.    CURRENT EXERCISE:  Adequate Stretching daily  Started walking  1 mile per day        Vitamins / Minerals / Herbs:   [employer]  [place]berry  Probotics  Vit C  CoQ10  Flax seed oil    LABS:    None available at time of visit      Food Allergies:   None reported    Social:  Works regular daytime shifts.  Alcohol: None.  Smoking: None.    ASSESSMENT:  Patient demonstrates some willingness to change lifestyle habits as evidenced by dietary changes, including protein drinks, increased fruits, increased vegetables, reduced dining out, healthier cooking at home, bringing snacks to work, healthier snacking at work and healthier snacking at home.    Doing well with working on greatest challenges (irregular meal patterns).    Barriers to Education:  none  Stage of Change:  action    NUTRITION DIAGNOSIS:  Morbid Obesity related to Physical inactivity as evidence by BMI.  Status: Improved    PLAN:  Pt is potential candidate for surgery.    Diet: Adjust diet plan.  Work on Bariatric Nutrition Checklist.  Work on expanding variety of vegetables.  Work on gradually cutting back on starchy CHO in the diet.  1200-calorie diet.  1500-calorie diet.  5-6 meals per day.  Return to clinic.       Exercise: Maintain.    Behavior Modification: Begin to document food & activity logs daily.        Return to clinic in one month.  Needs additional visit(s) with RD.    Communicated nutrition plan with bariatric team.    SESSION TIME:  15 minutes

## 2022-10-17 ENCOUNTER — HOSPITAL ENCOUNTER (OUTPATIENT)
Dept: PREADMISSION TESTING | Facility: HOSPITAL | Age: 52
Discharge: HOME | End: 2022-10-17
Attending: STUDENT IN AN ORGANIZED HEALTH CARE EDUCATION/TRAINING PROGRAM | Admitting: STUDENT IN AN ORGANIZED HEALTH CARE EDUCATION/TRAINING PROGRAM
Payer: COMMERCIAL

## 2022-10-17 ENCOUNTER — HOSPITAL ENCOUNTER (OUTPATIENT)
Dept: CARDIOLOGY | Facility: CLINIC | Age: 52
Discharge: HOME | End: 2022-10-17
Payer: COMMERCIAL

## 2022-10-17 VITALS
HEART RATE: 77 BPM | OXYGEN SATURATION: 96 % | BODY MASS INDEX: 40.48 KG/M2 | TEMPERATURE: 99 F | WEIGHT: 220 LBS | SYSTOLIC BLOOD PRESSURE: 148 MMHG | HEIGHT: 62 IN | DIASTOLIC BLOOD PRESSURE: 83 MMHG

## 2022-10-17 DIAGNOSIS — Z01.818 PREOPERATIVE TESTING: ICD-10-CM

## 2022-10-17 PROCEDURE — 93005 ELECTROCARDIOGRAM TRACING: CPT | Mod: S$GLB,,, | Performed by: ANESTHESIOLOGY

## 2022-10-17 PROCEDURE — 93010 EKG 12-LEAD: ICD-10-PCS | Mod: S$GLB,,, | Performed by: INTERNAL MEDICINE

## 2022-10-17 PROCEDURE — 93010 ELECTROCARDIOGRAM REPORT: CPT | Mod: S$GLB,,, | Performed by: INTERNAL MEDICINE

## 2022-10-17 PROCEDURE — 93005 EKG 12-LEAD: ICD-10-PCS | Mod: S$GLB,,, | Performed by: ANESTHESIOLOGY

## 2022-10-17 NOTE — DISCHARGE INSTRUCTIONS
Your surgery has been scheduled for:__________10/26/2022________________________________    You should report to:  ____[facility], located on the [place] side of the first floor of the           [facility] ([phone])  _X___[facility], located on the [facility] side of the            Second floor of the [facility] ([phone])  ____3rd Floor [facility] located on the [facility] side of the [facility] [phone]  ____[facility]: located at [address] [place] [address]. [address].     Please Note   Tell your doctor if you take Aspirin, products containing Aspirin, herbal medications  or blood thinners, such as Coumadin, Ticlid, or Plavix.  (Consult your provider regarding holding or stopping before surgery).  Arrange for someone to drive you home following surgery.  You will not be allowed to leave the surgical facility alone or drive yourself home following sedation and anesthesia.    Before Surgery  Stop taking all herbal medications, vitamins, and supplements 7 days prior to surgery  No Motrin/Advil (Ibuprofen) 7 days before surgery  No Aleve (Naproxen) 7 days before surgery  Stop Taking Asprin, products containing Aspirin _N/A____days before surgery  Stop taking blood thinners__N/A_____days before surgery  No Goody's/BC  Powder 7 days before surgery  Refrain from drinking alcoholic beverages for 24hours before and after surgery  Stop or limit smoking ___7______days before surgery  You may take Tylenol for pain    Night before Surgery  Do not eat or drink after midnight  Take a shower or bath (shower is recommended).  Bathe with Hibiclens soap or an antibacterial soap from the neck down.  If not supplied by your surgeon, hibiclens soap will need to be purchased over the counter in pharmacy.  Rinse soap off thoroughly.  Shampoo your hair with your regular shampoo    The Day  of Surgery  Take another bath or shower with hibiclens or any antibacterial soap, to reduce the chance of infection.  Take heart and blood pressure medications with a small sip of water, as advised by the perioperative team.  Do not take fluid pills  You may brush your teeth and rinse your mouth, but do not swall any additional water.   Do not apply perfumes, powder, body lotions or deodorant on the day of surgery.  Nail polish should be removed.  Do not wear makeup or moisturizer  Wear comfortable clothes, such as a button front shirt and loose fitting pants.  Leave all jewelry, including body piercings, and valuables at home.    Bring any devices you will neeed after surgery such as crutches or canes.  If you have sleep apnea, please bring your CPAP machine  In the event that your physical condition changes including the onset of a cold or respiratory illness, or if you have to delay or cancel your surgery, please notify your surgeon.         Anesthesia: General Anesthesia     You are watched continuously during your procedure by your anesthesia provider.     Youre due to have surgery. During surgery, youll be given medicine called anesthesia or anesthetic. This will keep you comfortable and pain-free. Your anesthesia provider will use general anesthesia.  What is general anesthesia?  General anesthesia puts you into a state like deep sleep. It goes into the bloodstream (IV anesthetics), into the lungs (gas anesthetics), or both. You feel nothing during the procedure. You will not remember it. During the procedure, the anesthesia provider monitors you continuously. He or she checks your heart rate and rhythm, blood pressure, breathing, and blood oxygen.  IV anesthetics. IV anesthetics are given through an IV line in your arm. Theyre often given first. This is so you are asleep before a gas anesthetic is started. Some kinds of IV anesthetics relieve pain. Others relax you. Your doctor will decide which kind is  best in your case.  Gas anesthetics. Gas anesthetics are breathed into the lungs. They are often used to keep you asleep. They can be given through a facemask or a tube placed in your larynx or trachea (breathing tube).  If you have a facemask, your anesthesia provider will most likely place it over your nose and mouth while youre still awake. Youll breathe oxygen through the mask as your IV anesthetic is started. Gas anesthetic may be added through the mask.  If you have a tube in the larynx or trachea, it will be inserted into your throat after youre asleep.  Anesthesia tools and medicines  You will likely have:  IV anesthetics. These are put into an IV line into your bloodstream.  Gas anesthetics. You breathe these anesthetics into your lungs, where they pass into your bloodstream.  Pulse oximeter. This is a small clip that is attached to the end of your finger. This measures your blood oxygen level.  Electrocardiography leads (electrodes). These are small sticky pads that are placed on your chest. They record your heart rate and rhythm.  Blood pressure cuff. This reads your blood pressure.  Risks and possible complications  General anesthesia has some risks. These include:  Breathing problems  Nausea and vomiting  Sore throat or hoarseness (usually temporary)  Allergic reaction to the anesthetic  Irregular heartbeat (rare)  Cardiac arrest (rare)   Anesthesia safety  Follow all instructions you are given for how long not to eat or drink before your procedure.  Be sure your doctor knows what medicines and drugs you take. This includes over-the-counter medicines, herbs, supplements, alcohol or other drugs. You will be asked when those were last taken.  Have an adult family member or friend drive you home after the procedure.  For the first 24 hours after your surgery:  Do not drive or use heavy equipment.  Do not make important decisions or sign legal documents. If important decisions or signing legal documents  is necessary during the first 24 hours after surgery, have a trusted family member or spouse act on your behalf.  Avoid alcohol.  Have a responsible adult stay with you. He or she can watch for problems and help keep you safe.  Date Last Reviewed: 12/1/2016  © 9758-3356 [employer]. [address] [place] [address], [place] [address]. All rights reserved. This information is not intended as a substitute for professional medical care. Always follow your healthcare professional's instructions.

## 2022-10-19 ENCOUNTER — PATIENT MESSAGE (OUTPATIENT)
Dept: SURGERY | Facility: HOSPITAL | Age: 52
End: 2022-10-19
Payer: COMMERCIAL

## 2022-10-24 ENCOUNTER — PATIENT MESSAGE (OUTPATIENT)
Dept: SURGERY | Facility: HOSPITAL | Age: 52
End: 2022-10-24
Payer: COMMERCIAL

## 2022-10-27 ENCOUNTER — TELEPHONE (OUTPATIENT)
Dept: SPINE | Facility: CLINIC | Age: 52
End: 2022-10-27
Payer: COMMERCIAL

## 2022-10-27 ENCOUNTER — PATIENT MESSAGE (OUTPATIENT)
Dept: SURGERY | Facility: HOSPITAL | Age: 52
End: 2022-10-27
Payer: COMMERCIAL

## 2022-10-27 NOTE — TELEPHONE ENCOUNTER
Called patient with arrival time for surgery anwered all of patient questions and concerns patient vervalized understanding

## 2022-10-27 NOTE — TELEPHONE ENCOUNTER
Called patient with arrival time for surgery answered all of patient questions along with concerns patient verbalized understanding

## 2022-10-28 ENCOUNTER — ANESTHESIA (OUTPATIENT)
Dept: SURGERY | Facility: HOSPITAL | Age: 52
DRG: 460 | End: 2022-10-28
Payer: COMMERCIAL

## 2022-10-28 ENCOUNTER — HOSPITAL ENCOUNTER (INPATIENT)
Facility: HOSPITAL | Age: 52
LOS: 5 days | Discharge: HOME HEALTH | DRG: 460 | End: 2022-11-02
Attending: STUDENT IN AN ORGANIZED HEALTH CARE EDUCATION/TRAINING PROGRAM | Admitting: STUDENT IN AN ORGANIZED HEALTH CARE EDUCATION/TRAINING PROGRAM
Payer: COMMERCIAL

## 2022-10-28 ENCOUNTER — ANESTHESIA EVENT (OUTPATIENT)
Dept: SURGERY | Facility: HOSPITAL | Age: 52
DRG: 460 | End: 2022-10-28
Payer: COMMERCIAL

## 2022-10-28 DIAGNOSIS — M47.14 THORACIC MYELOPATHY: Primary | ICD-10-CM

## 2022-10-28 LAB
ABO + RH BLD: NORMAL
ANION GAP SERPL CALC-SCNC: 10 MMOL/L (ref 8–16)
BASOPHILS # BLD AUTO: 0.04 K/UL (ref 0–0.2)
BASOPHILS NFR BLD: 0.4 % (ref 0–1.9)
BLD GP AB SCN CELLS X3 SERPL QL: NORMAL
BUN SERPL-MCNC: 10 MG/DL (ref 6–20)
CALCIUM SERPL-MCNC: 8.2 MG/DL (ref 8.7–10.5)
CHLORIDE SERPL-SCNC: 106 MMOL/L (ref 95–110)
CO2 SERPL-SCNC: 24 MMOL/L (ref 23–29)
CREAT SERPL-MCNC: 0.7 MG/DL (ref 0.5–1.4)
DIFFERENTIAL METHOD: ABNORMAL
EOSINOPHIL # BLD AUTO: 0 K/UL (ref 0–0.5)
EOSINOPHIL NFR BLD: 0.4 % (ref 0–8)
ERYTHROCYTE [DISTWIDTH] IN BLOOD BY AUTOMATED COUNT: 14.6 % (ref 11.5–14.5)
EST. GFR  (NO RACE VARIABLE): >60 ML/MIN/1.73 M^2
GLUCOSE SERPL-MCNC: 166 MG/DL (ref 70–110)
HCT VFR BLD AUTO: 32.7 % (ref 37–48.5)
HGB BLD-MCNC: 10.5 G/DL (ref 12–16)
IMM GRANULOCYTES # BLD AUTO: 0.07 K/UL (ref 0–0.04)
IMM GRANULOCYTES NFR BLD AUTO: 0.7 % (ref 0–0.5)
LYMPHOCYTES # BLD AUTO: 3.1 K/UL (ref 1–4.8)
LYMPHOCYTES NFR BLD: 32.9 % (ref 18–48)
MCH RBC QN AUTO: 27.6 PG (ref 27–31)
MCHC RBC AUTO-ENTMCNC: 32.1 G/DL (ref 32–36)
MCV RBC AUTO: 86 FL (ref 82–98)
MONOCYTES # BLD AUTO: 0.3 K/UL (ref 0.3–1)
MONOCYTES NFR BLD: 3.1 % (ref 4–15)
NEUTROPHILS # BLD AUTO: 5.9 K/UL (ref 1.8–7.7)
NEUTROPHILS NFR BLD: 62.5 % (ref 38–73)
NRBC BLD-RTO: 0 /100 WBC
PLATELET # BLD AUTO: 295 K/UL (ref 150–450)
PMV BLD AUTO: 10.4 FL (ref 9.2–12.9)
POCT GLUCOSE: 100 MG/DL (ref 70–110)
POCT GLUCOSE: 150 MG/DL (ref 70–110)
POTASSIUM SERPL-SCNC: 2.9 MMOL/L (ref 3.5–5.1)
RBC # BLD AUTO: 3.8 M/UL (ref 4–5.4)
SODIUM SERPL-SCNC: 140 MMOL/L (ref 136–145)
WBC # BLD AUTO: 9.42 K/UL (ref 3.9–12.7)

## 2022-10-28 PROCEDURE — D9220A PRA ANESTHESIA: Mod: CRNA,,, | Performed by: NURSE ANESTHETIST, CERTIFIED REGISTERED

## 2022-10-28 PROCEDURE — 63048 LAM FACETEC &FORAMOT EA ADDL: CPT | Mod: AS,,,

## 2022-10-28 PROCEDURE — 27800903 OPTIME MED/SURG SUP & DEVICES OTHER IMPLANTS: Performed by: STUDENT IN AN ORGANIZED HEALTH CARE EDUCATION/TRAINING PROGRAM

## 2022-10-28 PROCEDURE — 63600175 PHARM REV CODE 636 W HCPCS

## 2022-10-28 PROCEDURE — 22842 PR POSTERIOR SEGMENTAL INSTRUMENTATION 3-6 VRT SEG: ICD-10-PCS | Mod: AS,,,

## 2022-10-28 PROCEDURE — 71000033 HC RECOVERY, INTIAL HOUR: Performed by: STUDENT IN AN ORGANIZED HEALTH CARE EDUCATION/TRAINING PROGRAM

## 2022-10-28 PROCEDURE — 25000003 PHARM REV CODE 250

## 2022-10-28 PROCEDURE — 22842 PR POSTERIOR SEGMENTAL INSTRUMENTATION 3-6 VRT SEG: ICD-10-PCS | Mod: ,,, | Performed by: STUDENT IN AN ORGANIZED HEALTH CARE EDUCATION/TRAINING PROGRAM

## 2022-10-28 PROCEDURE — 82962 GLUCOSE BLOOD TEST: CPT | Performed by: STUDENT IN AN ORGANIZED HEALTH CARE EDUCATION/TRAINING PROGRAM

## 2022-10-28 PROCEDURE — 22614 ARTHRD PST TQ 1NTRSPC EA ADD: CPT | Mod: ,,, | Performed by: STUDENT IN AN ORGANIZED HEALTH CARE EDUCATION/TRAINING PROGRAM

## 2022-10-28 PROCEDURE — 25000003 PHARM REV CODE 250: Performed by: STUDENT IN AN ORGANIZED HEALTH CARE EDUCATION/TRAINING PROGRAM

## 2022-10-28 PROCEDURE — 22610 PR ARTHRODESIS, POST/POSTLAT, SNGL INTERSPACE, THORACIC: ICD-10-PCS | Mod: AS,,,

## 2022-10-28 PROCEDURE — 22610 ARTHRD PST TQ 1NTRSPC THRC: CPT | Mod: ,,, | Performed by: STUDENT IN AN ORGANIZED HEALTH CARE EDUCATION/TRAINING PROGRAM

## 2022-10-28 PROCEDURE — 27201037 HC PRESSURE MONITORING SET UP

## 2022-10-28 PROCEDURE — 20936 SP BONE AGRFT LOCAL ADD-ON: CPT | Mod: ,,, | Performed by: STUDENT IN AN ORGANIZED HEALTH CARE EDUCATION/TRAINING PROGRAM

## 2022-10-28 PROCEDURE — 20930 SP BONE ALGRFT MORSEL ADD-ON: CPT | Mod: ,,, | Performed by: STUDENT IN AN ORGANIZED HEALTH CARE EDUCATION/TRAINING PROGRAM

## 2022-10-28 PROCEDURE — 61783 SCAN PROC SPINAL: CPT | Mod: 59,,, | Performed by: STUDENT IN AN ORGANIZED HEALTH CARE EDUCATION/TRAINING PROGRAM

## 2022-10-28 PROCEDURE — 80048 BASIC METABOLIC PNL TOTAL CA: CPT

## 2022-10-28 PROCEDURE — 63600175 PHARM REV CODE 636 W HCPCS: Performed by: ANESTHESIOLOGY

## 2022-10-28 PROCEDURE — D9220A PRA ANESTHESIA: ICD-10-PCS | Mod: CRNA,,, | Performed by: NURSE ANESTHETIST, CERTIFIED REGISTERED

## 2022-10-28 PROCEDURE — D9220A PRA ANESTHESIA: ICD-10-PCS | Mod: ANES,,, | Performed by: ANESTHESIOLOGY

## 2022-10-28 PROCEDURE — 63048 PR LAMINECT/FACETECT/FORAMINOT, EA ADDTL VERTEBRAL SEGM: ICD-10-PCS | Mod: ,,, | Performed by: STUDENT IN AN ORGANIZED HEALTH CARE EDUCATION/TRAINING PROGRAM

## 2022-10-28 PROCEDURE — 22614 ARTHRD PST TQ 1NTRSPC EA ADD: CPT | Mod: AS,,,

## 2022-10-28 PROCEDURE — 22842 INSERT SPINE FIXATION DEVICE: CPT | Mod: AS,,,

## 2022-10-28 PROCEDURE — 22610 ARTHRD PST TQ 1NTRSPC THRC: CPT | Mod: AS,,,

## 2022-10-28 PROCEDURE — 63048 LAM FACETEC &FORAMOT EA ADDL: CPT | Mod: ,,, | Performed by: STUDENT IN AN ORGANIZED HEALTH CARE EDUCATION/TRAINING PROGRAM

## 2022-10-28 PROCEDURE — 61783 PR STEREOTACTIC COMP ASSIST PROC,SPINAL: ICD-10-PCS | Mod: 59,,, | Performed by: STUDENT IN AN ORGANIZED HEALTH CARE EDUCATION/TRAINING PROGRAM

## 2022-10-28 PROCEDURE — 25000003 PHARM REV CODE 250: Performed by: NURSE ANESTHETIST, CERTIFIED REGISTERED

## 2022-10-28 PROCEDURE — 63600175 PHARM REV CODE 636 W HCPCS: Performed by: NURSE ANESTHETIST, CERTIFIED REGISTERED

## 2022-10-28 PROCEDURE — 27201423 OPTIME MED/SURG SUP & DEVICES STERILE SUPPLY: Performed by: STUDENT IN AN ORGANIZED HEALTH CARE EDUCATION/TRAINING PROGRAM

## 2022-10-28 PROCEDURE — 85025 COMPLETE CBC W/AUTO DIFF WBC: CPT

## 2022-10-28 PROCEDURE — 71000039 HC RECOVERY, EACH ADD'L HOUR: Performed by: STUDENT IN AN ORGANIZED HEALTH CARE EDUCATION/TRAINING PROGRAM

## 2022-10-28 PROCEDURE — 36000710: Performed by: STUDENT IN AN ORGANIZED HEALTH CARE EDUCATION/TRAINING PROGRAM

## 2022-10-28 PROCEDURE — C1729 CATH, DRAINAGE: HCPCS | Performed by: STUDENT IN AN ORGANIZED HEALTH CARE EDUCATION/TRAINING PROGRAM

## 2022-10-28 PROCEDURE — D9220A PRA ANESTHESIA: Mod: ANES,,, | Performed by: ANESTHESIOLOGY

## 2022-10-28 PROCEDURE — 71000015 HC POSTOP RECOV 1ST HR: Performed by: STUDENT IN AN ORGANIZED HEALTH CARE EDUCATION/TRAINING PROGRAM

## 2022-10-28 PROCEDURE — 63046 PR LAMINEC/FACETECT/FORAMIN,THORACIC 1 SEG: ICD-10-PCS | Mod: 51,,, | Performed by: STUDENT IN AN ORGANIZED HEALTH CARE EDUCATION/TRAINING PROGRAM

## 2022-10-28 PROCEDURE — 63600175 PHARM REV CODE 636 W HCPCS: Performed by: STUDENT IN AN ORGANIZED HEALTH CARE EDUCATION/TRAINING PROGRAM

## 2022-10-28 PROCEDURE — 37000008 HC ANESTHESIA 1ST 15 MINUTES: Performed by: STUDENT IN AN ORGANIZED HEALTH CARE EDUCATION/TRAINING PROGRAM

## 2022-10-28 PROCEDURE — 71000016 HC POSTOP RECOV ADDL HR: Performed by: STUDENT IN AN ORGANIZED HEALTH CARE EDUCATION/TRAINING PROGRAM

## 2022-10-28 PROCEDURE — 63046 PR LAMINEC/FACETECT/FORAMIN,THORACIC 1 SEG: ICD-10-PCS | Mod: AS,51,,

## 2022-10-28 PROCEDURE — 63046 LAM FACETEC & FORAMOT THRC: CPT | Mod: 51,,, | Performed by: STUDENT IN AN ORGANIZED HEALTH CARE EDUCATION/TRAINING PROGRAM

## 2022-10-28 PROCEDURE — 20936 PR AUTOGRAFT SPINE SURGERY LOCAL FROM SAME INCISION: ICD-10-PCS | Mod: ,,, | Performed by: STUDENT IN AN ORGANIZED HEALTH CARE EDUCATION/TRAINING PROGRAM

## 2022-10-28 PROCEDURE — 20930 PR ALLOGRAFT FOR SPINE SURGERY ONLY MORSELIZED: ICD-10-PCS | Mod: ,,, | Performed by: STUDENT IN AN ORGANIZED HEALTH CARE EDUCATION/TRAINING PROGRAM

## 2022-10-28 PROCEDURE — 11000001 HC ACUTE MED/SURG PRIVATE ROOM

## 2022-10-28 PROCEDURE — 37000009 HC ANESTHESIA EA ADD 15 MINS: Performed by: STUDENT IN AN ORGANIZED HEALTH CARE EDUCATION/TRAINING PROGRAM

## 2022-10-28 PROCEDURE — 22614 PR ARTHRODESIS, POST/POSTLAT, SNGL INTERSPACE, EA ADDTL: ICD-10-PCS | Mod: AS,,,

## 2022-10-28 PROCEDURE — 36000711: Performed by: STUDENT IN AN ORGANIZED HEALTH CARE EDUCATION/TRAINING PROGRAM

## 2022-10-28 PROCEDURE — 86901 BLOOD TYPING SEROLOGIC RH(D): CPT | Performed by: STUDENT IN AN ORGANIZED HEALTH CARE EDUCATION/TRAINING PROGRAM

## 2022-10-28 PROCEDURE — 63046 LAM FACETEC & FORAMOT THRC: CPT | Mod: AS,51,,

## 2022-10-28 PROCEDURE — 22842 INSERT SPINE FIXATION DEVICE: CPT | Mod: ,,, | Performed by: STUDENT IN AN ORGANIZED HEALTH CARE EDUCATION/TRAINING PROGRAM

## 2022-10-28 PROCEDURE — C1713 ANCHOR/SCREW BN/BN,TIS/BN: HCPCS | Performed by: STUDENT IN AN ORGANIZED HEALTH CARE EDUCATION/TRAINING PROGRAM

## 2022-10-28 PROCEDURE — 22614 PR ARTHRODESIS, POST/POSTLAT, SNGL INTERSPACE, EA ADDTL: ICD-10-PCS | Mod: ,,, | Performed by: STUDENT IN AN ORGANIZED HEALTH CARE EDUCATION/TRAINING PROGRAM

## 2022-10-28 PROCEDURE — 63048 PR LAMINECT/FACETECT/FORAMINOT, EA ADDTL VERTEBRAL SEGM: ICD-10-PCS | Mod: AS,,,

## 2022-10-28 PROCEDURE — 22610 PR ARTHRODESIS, POST/POSTLAT, SNGL INTERSPACE, THORACIC: ICD-10-PCS | Mod: ,,, | Performed by: STUDENT IN AN ORGANIZED HEALTH CARE EDUCATION/TRAINING PROGRAM

## 2022-10-28 DEVICE — IMPLANTABLE DEVICE: Type: IMPLANTABLE DEVICE | Site: THORACIC | Status: FUNCTIONAL

## 2022-10-28 DEVICE — GRAFT ALTAPORE LARGE CYL 8ML: Type: IMPLANTABLE DEVICE | Site: THORACIC | Status: FUNCTIONAL

## 2022-10-28 DEVICE — CAP SPINAL LOCK THRD CREO 5.5: Type: IMPLANTABLE DEVICE | Site: THORACIC | Status: FUNCTIONAL

## 2022-10-28 RX ORDER — HEPARIN SODIUM 5000 [USP'U]/ML
5000 INJECTION, SOLUTION INTRAVENOUS; SUBCUTANEOUS EVERY 8 HOURS
Status: DISCONTINUED | OUTPATIENT
Start: 2022-10-29 | End: 2022-11-02 | Stop reason: HOSPADM

## 2022-10-28 RX ORDER — METHOCARBAMOL 750 MG/1
750 TABLET, FILM COATED ORAL 4 TIMES DAILY
Status: DISCONTINUED | OUTPATIENT
Start: 2022-10-28 | End: 2022-11-02 | Stop reason: HOSPADM

## 2022-10-28 RX ORDER — AMOXICILLIN 250 MG
2 CAPSULE ORAL DAILY
Status: DISCONTINUED | OUTPATIENT
Start: 2022-10-29 | End: 2022-11-02 | Stop reason: HOSPADM

## 2022-10-28 RX ORDER — ATORVASTATIN CALCIUM 20 MG/1
20 TABLET, FILM COATED ORAL DAILY
Status: DISCONTINUED | OUTPATIENT
Start: 2022-10-29 | End: 2022-11-02 | Stop reason: HOSPADM

## 2022-10-28 RX ORDER — SODIUM CHLORIDE 9 MG/ML
INJECTION, SOLUTION INTRAVENOUS CONTINUOUS
Status: DISCONTINUED | OUTPATIENT
Start: 2022-10-28 | End: 2022-10-31

## 2022-10-28 RX ORDER — MUPIROCIN 20 MG/G
OINTMENT TOPICAL
Status: DISCONTINUED | OUTPATIENT
Start: 2022-10-28 | End: 2023-01-09

## 2022-10-28 RX ORDER — GLUCAGON 1 MG
1 KIT INJECTION
Status: DISCONTINUED | OUTPATIENT
Start: 2022-10-28 | End: 2022-11-02 | Stop reason: HOSPADM

## 2022-10-28 RX ORDER — HYDROMORPHONE HYDROCHLORIDE 1 MG/ML
0.2 INJECTION, SOLUTION INTRAMUSCULAR; INTRAVENOUS; SUBCUTANEOUS EVERY 5 MIN PRN
Status: DISCONTINUED | OUTPATIENT
Start: 2022-10-28 | End: 2022-10-28 | Stop reason: HOSPADM

## 2022-10-28 RX ORDER — MIDAZOLAM HYDROCHLORIDE 1 MG/ML
INJECTION INTRAMUSCULAR; INTRAVENOUS
Status: DISCONTINUED | OUTPATIENT
Start: 2022-10-28 | End: 2022-10-28

## 2022-10-28 RX ORDER — OXYCODONE HYDROCHLORIDE 5 MG/1
5 TABLET ORAL EVERY 4 HOURS PRN
Status: DISCONTINUED | OUTPATIENT
Start: 2022-10-28 | End: 2022-11-02 | Stop reason: HOSPADM

## 2022-10-28 RX ORDER — MUPIROCIN 20 MG/G
OINTMENT TOPICAL 2 TIMES DAILY
Status: DISCONTINUED | OUTPATIENT
Start: 2022-10-28 | End: 2022-11-02 | Stop reason: HOSPADM

## 2022-10-28 RX ORDER — ONDANSETRON 8 MG/1
8 TABLET, ORALLY DISINTEGRATING ORAL EVERY 6 HOURS PRN
Status: DISCONTINUED | OUTPATIENT
Start: 2022-10-28 | End: 2022-11-02 | Stop reason: HOSPADM

## 2022-10-28 RX ORDER — CEFAZOLIN SODIUM/D5W 2 G/100 ML
2 PLASTIC BAG, INJECTION (ML) INTRAVENOUS
Status: DISCONTINUED | OUTPATIENT
Start: 2022-10-28 | End: 2022-11-01

## 2022-10-28 RX ORDER — ACETAMINOPHEN 500 MG
1000 TABLET ORAL EVERY 6 HOURS
Status: DISCONTINUED | OUTPATIENT
Start: 2022-10-29 | End: 2022-11-02 | Stop reason: HOSPADM

## 2022-10-28 RX ORDER — LOSARTAN POTASSIUM AND HYDROCHLOROTHIAZIDE 12.5; 1 MG/1; MG/1
1 TABLET ORAL DAILY
Status: DISCONTINUED | OUTPATIENT
Start: 2022-10-29 | End: 2022-11-02 | Stop reason: HOSPADM

## 2022-10-28 RX ORDER — FENTANYL CITRATE 50 UG/ML
INJECTION, SOLUTION INTRAMUSCULAR; INTRAVENOUS
Status: DISCONTINUED | OUTPATIENT
Start: 2022-10-28 | End: 2022-10-28

## 2022-10-28 RX ORDER — OXYCODONE HYDROCHLORIDE 10 MG/1
10 TABLET ORAL EVERY 4 HOURS PRN
Status: DISCONTINUED | OUTPATIENT
Start: 2022-10-28 | End: 2022-11-02 | Stop reason: HOSPADM

## 2022-10-28 RX ORDER — IBUPROFEN 200 MG
24 TABLET ORAL
Status: DISCONTINUED | OUTPATIENT
Start: 2022-10-28 | End: 2022-11-02 | Stop reason: HOSPADM

## 2022-10-28 RX ORDER — MUPIROCIN 20 MG/G
1 OINTMENT TOPICAL 2 TIMES DAILY
Status: DISPENSED | OUTPATIENT
Start: 2022-10-28 | End: 2022-10-29

## 2022-10-28 RX ORDER — FENTANYL CITRATE 50 UG/ML
25 INJECTION, SOLUTION INTRAMUSCULAR; INTRAVENOUS EVERY 5 MIN PRN
Status: DISCONTINUED | OUTPATIENT
Start: 2022-10-28 | End: 2022-10-28 | Stop reason: HOSPADM

## 2022-10-28 RX ORDER — LIDOCAINE HCL/PF 100 MG/5ML
SYRINGE (ML) INTRAVENOUS
Status: DISCONTINUED | OUTPATIENT
Start: 2022-10-28 | End: 2022-10-28

## 2022-10-28 RX ORDER — PANTOPRAZOLE SODIUM 20 MG/1
40 TABLET, DELAYED RELEASE ORAL DAILY
Status: DISCONTINUED | OUTPATIENT
Start: 2022-10-29 | End: 2022-11-02 | Stop reason: HOSPADM

## 2022-10-28 RX ORDER — PROCHLORPERAZINE EDISYLATE 5 MG/ML
5 INJECTION INTRAMUSCULAR; INTRAVENOUS EVERY 6 HOURS PRN
Status: DISCONTINUED | OUTPATIENT
Start: 2022-10-28 | End: 2022-11-02 | Stop reason: HOSPADM

## 2022-10-28 RX ORDER — SUCCINYLCHOLINE CHLORIDE 20 MG/ML
INJECTION INTRAMUSCULAR; INTRAVENOUS
Status: DISCONTINUED | OUTPATIENT
Start: 2022-10-28 | End: 2022-10-28

## 2022-10-28 RX ORDER — INSULIN ASPART 100 [IU]/ML
0-5 INJECTION, SOLUTION INTRAVENOUS; SUBCUTANEOUS
Status: DISCONTINUED | OUTPATIENT
Start: 2022-10-28 | End: 2022-11-02 | Stop reason: HOSPADM

## 2022-10-28 RX ORDER — IBUPROFEN 200 MG
16 TABLET ORAL
Status: DISCONTINUED | OUTPATIENT
Start: 2022-10-28 | End: 2022-11-02 | Stop reason: HOSPADM

## 2022-10-28 RX ORDER — PROPOFOL 10 MG/ML
VIAL (ML) INTRAVENOUS CONTINUOUS PRN
Status: DISCONTINUED | OUTPATIENT
Start: 2022-10-28 | End: 2022-10-28

## 2022-10-28 RX ORDER — CEFAZOLIN SODIUM/WATER 2 G/20 ML
2 SYRINGE (ML) INTRAVENOUS
Status: COMPLETED | OUTPATIENT
Start: 2022-10-28 | End: 2022-10-28

## 2022-10-28 RX ORDER — ONDANSETRON 2 MG/ML
INJECTION INTRAMUSCULAR; INTRAVENOUS
Status: DISCONTINUED | OUTPATIENT
Start: 2022-10-28 | End: 2022-10-28

## 2022-10-28 RX ORDER — ONDANSETRON 2 MG/ML
4 INJECTION INTRAMUSCULAR; INTRAVENOUS ONCE AS NEEDED
Status: DISCONTINUED | OUTPATIENT
Start: 2022-10-28 | End: 2022-10-28 | Stop reason: HOSPADM

## 2022-10-28 RX ORDER — KETAMINE HCL IN 0.9 % NACL 50 MG/5 ML
SYRINGE (ML) INTRAVENOUS
Status: DISCONTINUED | OUTPATIENT
Start: 2022-10-28 | End: 2022-10-28

## 2022-10-28 RX ORDER — HYDROMORPHONE HYDROCHLORIDE 1 MG/ML
1 INJECTION, SOLUTION INTRAMUSCULAR; INTRAVENOUS; SUBCUTANEOUS
Status: DISCONTINUED | OUTPATIENT
Start: 2022-10-28 | End: 2022-11-02 | Stop reason: HOSPADM

## 2022-10-28 RX ORDER — PROPOFOL 10 MG/ML
VIAL (ML) INTRAVENOUS
Status: DISCONTINUED | OUTPATIENT
Start: 2022-10-28 | End: 2022-10-28

## 2022-10-28 RX ORDER — DEXAMETHASONE SODIUM PHOSPHATE 4 MG/ML
INJECTION, SOLUTION INTRA-ARTICULAR; INTRALESIONAL; INTRAMUSCULAR; INTRAVENOUS; SOFT TISSUE
Status: DISCONTINUED | OUTPATIENT
Start: 2022-10-28 | End: 2022-10-28

## 2022-10-28 RX ORDER — SODIUM CHLORIDE 9 MG/ML
INJECTION, SOLUTION INTRAVENOUS CONTINUOUS PRN
Status: DISCONTINUED | OUTPATIENT
Start: 2022-10-28 | End: 2022-10-28

## 2022-10-28 RX ORDER — ROCURONIUM BROMIDE 10 MG/ML
INJECTION, SOLUTION INTRAVENOUS
Status: DISCONTINUED | OUTPATIENT
Start: 2022-10-28 | End: 2022-10-28

## 2022-10-28 RX ORDER — AMLODIPINE BESYLATE 10 MG/1
10 TABLET ORAL DAILY
Status: DISCONTINUED | OUTPATIENT
Start: 2022-10-29 | End: 2022-11-02 | Stop reason: HOSPADM

## 2022-10-28 RX ADMIN — MUPIROCIN: 20 OINTMENT TOPICAL at 11:10

## 2022-10-28 RX ADMIN — REMIFENTANIL HYDROCHLORIDE 0.2 MCG/KG/MIN: 1 INJECTION, POWDER, LYOPHILIZED, FOR SOLUTION INTRAVENOUS at 02:10

## 2022-10-28 RX ADMIN — HYDROMORPHONE HYDROCHLORIDE 1 MG: 1 INJECTION, SOLUTION INTRAMUSCULAR; INTRAVENOUS; SUBCUTANEOUS at 11:10

## 2022-10-28 RX ADMIN — SODIUM CHLORIDE, SODIUM GLUCONATE, SODIUM ACETATE, POTASSIUM CHLORIDE, MAGNESIUM CHLORIDE, SODIUM PHOSPHATE, DIBASIC, AND POTASSIUM PHOSPHATE: .53; .5; .37; .037; .03; .012; .00082 INJECTION, SOLUTION INTRAVENOUS at 02:10

## 2022-10-28 RX ADMIN — Medication 5 MG: at 03:10

## 2022-10-28 RX ADMIN — Medication 2 G: at 11:10

## 2022-10-28 RX ADMIN — ROCURONIUM BROMIDE 25 MG: 10 INJECTION INTRAVENOUS at 03:10

## 2022-10-28 RX ADMIN — SUCCINYLCHOLINE CHLORIDE 120 MG: 20 INJECTION, SOLUTION INTRAMUSCULAR; INTRAVENOUS at 02:10

## 2022-10-28 RX ADMIN — Medication 10 MG: at 03:10

## 2022-10-28 RX ADMIN — Medication 25 MG: at 02:10

## 2022-10-28 RX ADMIN — SODIUM CHLORIDE, SODIUM GLUCONATE, SODIUM ACETATE, POTASSIUM CHLORIDE, MAGNESIUM CHLORIDE, SODIUM PHOSPHATE, DIBASIC, AND POTASSIUM PHOSPHATE: .53; .5; .37; .037; .03; .012; .00082 INJECTION, SOLUTION INTRAVENOUS at 05:10

## 2022-10-28 RX ADMIN — DEXAMETHASONE SODIUM PHOSPHATE 12 MG: 4 INJECTION, SOLUTION INTRAMUSCULAR; INTRAVENOUS at 03:10

## 2022-10-28 RX ADMIN — SODIUM CHLORIDE 0.3 MCG/KG/MIN: 9 INJECTION, SOLUTION INTRAVENOUS at 02:10

## 2022-10-28 RX ADMIN — SODIUM CHLORIDE: 0.9 INJECTION, SOLUTION INTRAVENOUS at 05:10

## 2022-10-28 RX ADMIN — ONDANSETRON 8 MG: 8 TABLET, ORALLY DISINTEGRATING ORAL at 11:10

## 2022-10-28 RX ADMIN — HYDROMORPHONE HYDROCHLORIDE 0.2 MG: 1 INJECTION, SOLUTION INTRAMUSCULAR; INTRAVENOUS; SUBCUTANEOUS at 08:10

## 2022-10-28 RX ADMIN — LIDOCAINE HYDROCHLORIDE 50 MG: 20 INJECTION, SOLUTION INTRAVENOUS at 02:10

## 2022-10-28 RX ADMIN — SODIUM CHLORIDE: 0.9 INJECTION, SOLUTION INTRAVENOUS at 02:10

## 2022-10-28 RX ADMIN — HYDROMORPHONE HYDROCHLORIDE 0.2 MG: 1 INJECTION, SOLUTION INTRAMUSCULAR; INTRAVENOUS; SUBCUTANEOUS at 07:10

## 2022-10-28 RX ADMIN — PROPOFOL 200 MG: 10 INJECTION, EMULSION INTRAVENOUS at 02:10

## 2022-10-28 RX ADMIN — ACETAMINOPHEN 1000 MG: 500 TABLET ORAL at 11:10

## 2022-10-28 RX ADMIN — ROCURONIUM BROMIDE 5 MG: 10 INJECTION INTRAVENOUS at 02:10

## 2022-10-28 RX ADMIN — METHOCARBAMOL 750 MG: 750 TABLET ORAL at 08:10

## 2022-10-28 RX ADMIN — Medication 10 MG: at 04:10

## 2022-10-28 RX ADMIN — FENTANYL CITRATE 100 MCG: 50 INJECTION, SOLUTION INTRAMUSCULAR; INTRAVENOUS at 02:10

## 2022-10-28 RX ADMIN — Medication 2 G: at 02:10

## 2022-10-28 RX ADMIN — ONDANSETRON 4 MG: 2 INJECTION INTRAMUSCULAR; INTRAVENOUS at 05:10

## 2022-10-28 RX ADMIN — OXYCODONE 5 MG: 5 TABLET ORAL at 07:10

## 2022-10-28 RX ADMIN — REMIFENTANIL HYDROCHLORIDE 0.71 MG: 1 INJECTION, POWDER, LYOPHILIZED, FOR SOLUTION INTRAVENOUS at 05:10

## 2022-10-28 RX ADMIN — PROPOFOL 150 MCG/KG/MIN: 10 INJECTION, EMULSION INTRAVENOUS at 02:10

## 2022-10-28 RX ADMIN — MIDAZOLAM HYDROCHLORIDE 2 MG: 1 INJECTION, SOLUTION INTRAMUSCULAR; INTRAVENOUS at 02:10

## 2022-10-28 NOTE — ANESTHESIA PREPROCEDURE EVALUATION
10/28/2022  Africa Jennings is a 52 y.o., female.      Pre-op Assessment          Review of Systems  Anesthesia Hx:  No problems with previous Anesthesia    Cardiovascular:   Hypertension Denies CAD.     Functional Capacity Can you climb two flights of stairs? ==> Yes    Pulmonary:   Denies Asthma. Sleep Apnea, CPAP    Renal/:   Denies Chronic Renal Disease.     Hepatic/GI:   Denies PUD. GERD Denies Liver Disease.    Neurological:   Denies CVA. Denies Seizures.    Endocrine:   Denies Diabetes. Denies Hypothyroidism.        Physical Exam  General: Alert    Airway:  Mallampati: I   Mouth Opening: Normal  TM Distance: Normal  Tongue: Normal  Neck ROM: Normal ROM    Dental:  Intact        Anesthesia Plan  Type of Anesthesia, risks & benefits discussed:    Anesthesia Type: Gen ETT  Intra-op Monitoring Plan: Standard ASA Monitors  Post Op Pain Control Plan: multimodal analgesia and IV/PO Opioids PRN  Induction:  IV  Airway Plan: Direct  Informed Consent: Informed consent signed with the Patient and all parties understand the risks and agree with anesthesia plan.  All questions answered.   ASA Score: 2    Ready For Surgery From Anesthesia Perspective.     .

## 2022-10-28 NOTE — H&P
53 yo female with RLE paresthesia and weakness found to have thoracic spinal compression presenting for posterior spinal instrumented fusion and decompression T9-12 for thoracic disc herniation and ligament hypertrophy.     Consented, all questions answered  No blood thinning agents  NPO since midnight  Proceed with surgery as planned  Further orders to follow procedure                 SUBJECTIVE:      History of Present Illness:  52 F presents for eval of worsening RLE radicular pain and right leg weakness.  She states that pain starts in right buttocks and then radiates down posterior thigh and sometimes into her calf.  She has no LLE radiculopathy.  She also states that her right leg feels weaker than her left leg.  She feels unsteady when she walks but has had no falls.  Bending forward, stretching, and twisting to the left can improve the RLE pain.  She hasn't done PT recently.     Interval fu 6/13/2022:  Pt presents for audio appt after updated imaging.  She continues to endorse right leg pain which can start in her buttocks and radiated down posterior thigh into her calf.  She also endorses right leg weakness.     Interval fu 8/2/22:  Pt continues to endorse right leg weakness and RLE parasthesias down posterior right leg into her calf.  She has had no falls.  She has no bowel/bladder incontinence or saddle anesthesia.     Interval fu 9/19/22:  Pt presents to schedule surgery.  She has no new complaints.     Review of patient's allergies indicates:  No Active Allergies     Current Medications          Current Outpatient Medications   Medication Sig Dispense Refill    amLODIPine (NORVASC) 10 MG tablet Take 1 tablet (10 mg total) by mouth once daily. 30 tablet 11    atorvastatin (LIPITOR) 20 MG tablet TAKE 1 TABLET BY MOUTH EVERY DAY 90 tablet 1    blood sugar diagnostic Strp 1 strip by Misc.(Non-Drug; Combo Route) route 2 (two) times daily with meals. 100 strip 11    cetirizine (ZYRTEC) 10 MG tablet Take 1  "tablet (10 mg total) by mouth once daily. 90 tablet 1    doxycycline monohydrate 100 mg Tab Take 1 po BID pc. Take with food (no dairy) and with plenty water. 60 tablet 2    ergocalciferol (ERGOCALCIFEROL) 50,000 unit Cap TAKE 2 CAPSULE (50,000 UNITS TOTAL)BY MOUTH TWICE A WEEK 48 capsule 1    estradioL (ESTRACE) 1 MG tablet TAKE 1 TABLET BY MOUTH EVERY DAY 90 tablet 0    fluocinonide (LIDEX) 0.05 % ointment Apply to affected areas of scalp qhs 60 g 3    fluticasone propionate (FLONASE) 50 mcg/actuation nasal spray INSTILL 1 SPRAY (50 MCG TOTAL) IN EACH NOSTRIL ONCE DAILY. 16 mL 2    ketoconazole (NIZORAL) 2 % cream Apply to affected areas of scalp BID. 60 g 5    lancets Misc Use to test blood sugar once daily. 300 each 0    meloxicam (MOBIC) 15 MG tablet TAKE 1 TABLET BY MOUTH EVERY DAY 30 tablet 2    metFORMIN (GLUCOPHAGE-XR) 500 MG ER 24hr tablet TAKE 1 TABLET BY MOUTH TWICE A DAY WITH MEALS 180 tablet 0    neomycin-polymyxin-dexamethasone (MAXITROL) 3.5mg/mL-10,000 unit/mL-0.1 % DrpS          nystatin (MYCOSTATIN) ointment Apply topically 2 (two) times daily.        omeprazole (PRILOSEC) 40 MG capsule TAKE 1 CAPSULE BY MOUTH EVERY DAY 90 capsule 1    ondansetron (ZOFRAN) 4 MG tablet Take 1 tablet (4 mg total) by mouth every 8 (eight) hours as needed. 90 tablet 1    pen needle, diabetic (PEN NEEDLE) 32 gauge x 5/32" Ndle 1 each by Misc.(Non-Drug; Combo Route) route once a week. 50 each 1    prednisoLONE acetate (PRED FORTE) 1 % DrpS          semaglutide (OZEMPIC) 1 mg/dose (4 mg/3 mL) Inject 1 mg into the skin every 7 days. 3 pen 2    tobramycin-dexamethasone 0.3-0.1% (TOBRADEX) 0.3-0.1 % DrpS INSTILL 1 DROP INTO BOTH EYES 4 TIMES A DAY   2    triamcinolone acetonide 0.1% (KENALOG) 0.1 % cream Apply topically 2 (two) times daily. Apply topically 2 (two) times daily. 135 g 4    valsartan-hydrochlorothiazide (DIOVAN-HCT) 160-12.5 mg per tablet Take 1 tablet by mouth once daily. 90 tablet 1    blood-glucose meter " kit Use as instructed 1 each 0    lancing device Misc 1 Device by Misc.(Non-Drug; Combo Route) route 2 (two) times daily with meals. 100 each 11      No current facility-administered medications for this visit.                 Past Medical History:   Diagnosis Date    Diabetes mellitus      Diabetes mellitus, type 2      Eczema      GERD (gastroesophageal reflux disease)      Hypertension      Impaired fasting blood sugar      YSABEL on CPAP              Past Surgical History:   Procedure Laterality Date    breast reduction        CHOLECYSTECTOMY         laprascopic    EPIDURAL STEROID INJECTION N/A 1/11/2022     Procedure: INJECTION, STEROID, EPIDURAL IL L4/5 NEEDS CONSENT;  Surgeon: Britt Calix MD;  Location: [facility];  Service: Pain Management;  Laterality: N/A;    ESOPHAGOGASTRODUODENOSCOPY   8/18/14    HYSTERECTOMY   2007     laprascopic, total for fibroids.  Dr Polo    INJECTION OF JOINT Left 2/8/2022     Procedure: INJECTION, JOINT, SI LEFT NEEDS CONSENT;  Surgeon: Britt Calix MD;  Location: [facility];  Service: Pain Management;  Laterality: Left;    OOPHORECTOMY        TOTAL REDUCTION MAMMOPLASTY          Family History         Problem Relation (Age of Onset)     Breast cancer Sister     Cancer Father (58)     Crohn's disease Sister     Diabetes Maternal Grandmother, Mother     Heart disease Maternal Grandmother, Mother     Hypertension Maternal Grandmother, Mother             Social History            Socioeconomic History    Marital status:    Tobacco Use    Smoking status: Never    Smokeless tobacco: Never   Substance and Sexual Activity    Alcohol use: No       Comment: socially    Drug use: No    Sexual activity: Yes       Partners: Male       Birth control/protection: Surgical   Social History Narrative     Was  since 2000.       Has a friend since 2016.     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      Social Determinants of Health           Financial Resource Strain: Medium Risk    Difficulty of Paying Living Expenses: Somewhat hard   Food Insecurity: No Food Insecurity    Worried About Running Out of Food in the Last Year: Never true    Ran Out of Food in the Last Year: Never true   Transportation Needs: Unknown    Lack of Transportation (Non-Medical): No   Physical Activity: Sufficiently Active    Days of Exercise per Week: 3 days    Minutes of Exercise per Session: 60 min   Stress: Stress Concern Present    Feeling of Stress : To some extent   Social Connections: Unknown    Frequency of Communication with Friends and Family: More than three times a week    Frequency of Social Gatherings with Friends and Family: Once a week    Active Member of Clubs or Organizations: No    Attends Club or Organization Meetings: Patient refused    Marital Status:    Housing Stability: Low Risk     Unable to Pay for Housing in the Last Year: No    Number of Places Lived in the Last Year: 1    Unstable Housing in the Last Year: No         Review of Systems  14 point ROS was negative  OBJECTIVE:      Vital Signs  Pain Score:   3  There is no height or weight on file to calculate BMI.     Neurosurgery Physical Exam  Constitutional: She appears well-developed and well-nourished.      Eyes: Pupils are equal, round, and reactive to light.      Cardiovascular: Normal rate and regular rhythm.      Abdominal: Soft.     Psych/Behavior: She is alert. She is oriented to person, place, and time. She has a normal mood and affect.     Musculoskeletal: Gait is normal.        Neck: Range of motion is full.        Back: Range of motion is limited.     Neurological:        Coordination: She has a normal Romberg Test and normal tandem walking coordination.        Sensory: There is no sensory deficit in the trunk. There is no sensory deficit in the extremities.        DTRs: DTRs are DTRS NORMAL AND SYMMETRICnormal and symmetric.        Cranial nerves: Cranial nerve(s) II,  III, IV, V, VI, VII, VIII, IX, X, XI and XII are intact.      5/5 in BUE  4/5 in bilateral HF, otherwise 5/5 distally     SILT     Diagnostic Results:  None to review     ASSESSMENT/PLAN:      52 F with symptoms of thoracic myelopathy and severe stenosis at T10/11 secondary to facet hypertrophy and ossification of the ligamentum flavum.  I have discussed surgical options and have recommended a robotic assisted T9-12 fusion with T9-11 decompression in order to treat her thoracic stenosis.  -Will plan for surgery on 10/26/22

## 2022-10-28 NOTE — TRANSFER OF CARE
Anesthesia Transfer of Care Note    Patient: Africa Jennings    Procedure(s) Performed: Procedure(s) (LRB):  T9-12 ROBOTIC DECOMPRESSION, THORACIC FUSION (N/A)    Patient location: PACU    Anesthesia Type: other: TIVA    Transport from OR: Transported from OR on 6-10 L/min O2 by face mask with adequate spontaneous ventilation    Post pain: adequate analgesia    Post assessment: no apparent anesthetic complications    Post vital signs: stable    Level of consciousness: awake and alert    Nausea/Vomiting: no nausea/vomiting    Complications: none    Transfer of care protocol was followed      Last vitals:   Visit Vitals  BP (!) 157/92 (BP Location: Right arm, Patient Position: Lying)   Pulse 73   Temp 37 °C (98.6 °F) (Oral)   Resp 18   LMP  (LMP Unknown)   SpO2 100%   Breastfeeding No

## 2022-10-28 NOTE — OP NOTE
DATE OF PROCEDURE: 10/28/22     PREOPERATIVE DIAGNOSES:   Thoracic stenosis T9/10, 10/11  Thoracic myelopathy     POSTOPERATIVE DIAGNOSES:   Same as above     PROCEDURES PERFORMED:   Posterior spinal instrumented fusion T9-12 Globus  T9/10-11/12 laminectomies and medial facetectomies  Use of robotic navigation Globus  Use of intraop fluoro  Use of neuromonitoring  Use autologous and synthetic bone grafting     Surgeon(s) and Role:     * Edouard Whitt DO     Assistant:  ALEJANDRA Dennis     ANESTHESIA: General     ESTIMATED BLOOD LOSS: 100 ml     CLINICAL HISTORY AND INDICATION FOR SURGERY:   52 F presents for eval of worsening RLE radicular pain and right leg weakness.  She states that pain starts in right buttocks and then radiates down posterior thigh and sometimes into her calf.  She has no LLE radiculopathy.  She also states that her right leg feels weaker than her left leg.  She feels unsteady when she walks but has had no falls.  Bending forward, stretching, and twisting to the left can improve the RLE pain.  She hasn't done PT recently.    MRI thoracic spine showed moderate right sided stenosis at T9/10 and severe stenosis at T10/11 with cord signal change secondary to facet hypertrophy and ossification of the ligamentum flavum.  She was thus offered a T9-12 decompression/fusion in order to fully decompress her neural elements and to stabilize her spine.    All risks, benefits, and alternatives were discussed and the patient consented to the procedure.     OPERATIVE PROCEDURE:   The patient was brought to the OR where she was intubated and all lines were placed by anesthesia.  She was then transferred prone onto the Alvaro 4 post with appropriate padding of all pressure points.  Fluoro was used to localize our incision from T9-12.  The patient was then prepped and draped in the usual sterile fashion.  Incision was made with 10 blade and suprafascial and subperiosteal dissection was carried out with  bovie and luke elevators to expose the posterior elements from T9-12.  A pedicle marker was placed into the presumed left T9 pedicle and confirmed with AP/lateral fluoro.  The navigation array was then placed just lateral to the right PSIS.  AP/lateral fluoro was taken of T9-12 to merge onto the robotic navigation platform where we had planned our screws.  The merge was satisfactory.  The robotic arm was then brought into the field.  We then placed bilateral T9-12 pedicle screws.  The left T9 and 10 pedicle screws seemed slightly medial after placement although there were no changes in motors.  The left T9,10 pedicle screws were then removed and the tracts were palpated and there was a medial breech at both tracts.  We then placed a new pedicle screw at left T9 using anatomic landmarks and free hand technique.  No breeches were palpated at this level and the screw had solid purchase.  We attempted to place another left T10 pedicle screw using freehand technique however this pedicle was very narrow and didn't have good purchase.  We chose to leave the left T10 pedicle screw out.  The remaining hardware had excellent placement on AP/lateral fluoro and motors were consistent with baseline.  We then removed the navigation array from the right PSIS.    We then turned our attention to performing our decompression from T9/10 to 11/12.  We performed isaac laminectomies at T9/10, 10/11, 11/12 with high speed my, kerrisons, and rongeurs.  The ligamentum was resected with kerrison punches and the ossified ligamentum was carefully mobilized and resected with upgoing curettes and kerrison punches.  The thecal sac was well decompressed rostrally and caudally and we confirmed adequate decompression with shannan probe and penfield 4.  Hemostasis was achieved with bipolar and surgiflo.  Motors following the decompression were stable.    The wound was then copiously irrigated with dilute betadine and saline.  Rods were then sized  and reduced into the tulip heads.  Set screws were tightened and finally locked.  The posterior elements from T9-12 were then decorticated with high speed my and a mixture of autologous and synthetic bone was placed bilaterally in the gutters for arthrodesis.  A deep HV drain was then tunneled out of the wound superiorly.    The wound was then closed in layers with 0 vicryls, 0 stratafix for the fascia, 2/0 vicryls and staples for the skin.  The array incision was closed with 2/0 vicryls and staples.  The drain was secured with vicryl suture.  A prevena wound vac was then placed on the incision.    The patient was then transferred supine onto her hospital bed where she was extubated and sent to PACU for recovery.  The patient appeared to tolerate the procedure well from a hemodynamic standpoint and motors remained consistent with baseline throughout the case.  I was present for all portions of the procedure and all counts were correct.

## 2022-10-28 NOTE — PLAN OF CARE
Pt prepped for surgery per orders. Significant other at bedside and to take belonging while patient is in surgery. H&P update, surgery consent, blood consent, and anesthesia consent needed for procedure today.

## 2022-10-29 PROBLEM — M47.14 THORACIC MYELOPATHY: Status: ACTIVE | Noted: 2022-10-29

## 2022-10-29 LAB
POCT GLUCOSE: 130 MG/DL (ref 70–110)
POCT GLUCOSE: 147 MG/DL (ref 70–110)
POCT GLUCOSE: 153 MG/DL (ref 70–110)
POCT GLUCOSE: 173 MG/DL (ref 70–110)

## 2022-10-29 PROCEDURE — 25000003 PHARM REV CODE 250

## 2022-10-29 PROCEDURE — 25000003 PHARM REV CODE 250: Performed by: STUDENT IN AN ORGANIZED HEALTH CARE EDUCATION/TRAINING PROGRAM

## 2022-10-29 PROCEDURE — 97535 SELF CARE MNGMENT TRAINING: CPT

## 2022-10-29 PROCEDURE — 11000001 HC ACUTE MED/SURG PRIVATE ROOM

## 2022-10-29 PROCEDURE — 97162 PT EVAL MOD COMPLEX 30 MIN: CPT

## 2022-10-29 PROCEDURE — 97530 THERAPEUTIC ACTIVITIES: CPT

## 2022-10-29 PROCEDURE — 97165 OT EVAL LOW COMPLEX 30 MIN: CPT

## 2022-10-29 PROCEDURE — 97112 NEUROMUSCULAR REEDUCATION: CPT

## 2022-10-29 PROCEDURE — 63600175 PHARM REV CODE 636 W HCPCS

## 2022-10-29 RX ADMIN — OXYCODONE HYDROCHLORIDE 10 MG: 10 TABLET ORAL at 10:10

## 2022-10-29 RX ADMIN — PANTOPRAZOLE SODIUM 40 MG: 20 TABLET, DELAYED RELEASE ORAL at 10:10

## 2022-10-29 RX ADMIN — METHOCARBAMOL 750 MG: 750 TABLET ORAL at 10:10

## 2022-10-29 RX ADMIN — HEPARIN SODIUM 5000 UNITS: 5000 INJECTION INTRAVENOUS; SUBCUTANEOUS at 01:10

## 2022-10-29 RX ADMIN — OXYCODONE 5 MG: 5 TABLET ORAL at 06:10

## 2022-10-29 RX ADMIN — SENNOSIDES AND DOCUSATE SODIUM 2 TABLET: 50; 8.6 TABLET ORAL at 10:10

## 2022-10-29 RX ADMIN — METHOCARBAMOL 750 MG: 750 TABLET ORAL at 01:10

## 2022-10-29 RX ADMIN — ONDANSETRON 8 MG: 8 TABLET, ORALLY DISINTEGRATING ORAL at 10:10

## 2022-10-29 RX ADMIN — Medication 2 G: at 06:10

## 2022-10-29 RX ADMIN — PROCHLORPERAZINE EDISYLATE 5 MG: 5 INJECTION INTRAMUSCULAR; INTRAVENOUS at 01:10

## 2022-10-29 RX ADMIN — ACETAMINOPHEN 1000 MG: 500 TABLET ORAL at 05:10

## 2022-10-29 RX ADMIN — OXYCODONE HYDROCHLORIDE 10 MG: 10 TABLET ORAL at 09:10

## 2022-10-29 RX ADMIN — HEPARIN SODIUM 5000 UNITS: 5000 INJECTION INTRAVENOUS; SUBCUTANEOUS at 05:10

## 2022-10-29 RX ADMIN — SODIUM CHLORIDE: 0.9 INJECTION, SOLUTION INTRAVENOUS at 10:10

## 2022-10-29 RX ADMIN — ATORVASTATIN CALCIUM 20 MG: 20 TABLET, FILM COATED ORAL at 10:10

## 2022-10-29 RX ADMIN — HEPARIN SODIUM 5000 UNITS: 5000 INJECTION INTRAVENOUS; SUBCUTANEOUS at 09:10

## 2022-10-29 RX ADMIN — MUPIROCIN: 20 OINTMENT TOPICAL at 10:10

## 2022-10-29 RX ADMIN — OXYCODONE HYDROCHLORIDE 10 MG: 10 TABLET ORAL at 05:10

## 2022-10-29 RX ADMIN — Medication 2 G: at 10:10

## 2022-10-29 RX ADMIN — METHOCARBAMOL 750 MG: 750 TABLET ORAL at 06:10

## 2022-10-29 RX ADMIN — ACETAMINOPHEN 1000 MG: 500 TABLET ORAL at 01:10

## 2022-10-29 NOTE — ANESTHESIA POSTPROCEDURE EVALUATION
Anesthesia Post Evaluation    Patient: Africa Jennings    Procedure(s) Performed: Procedure(s) (LRB):  T9-12 ROBOTIC DECOMPRESSION, THORACIC FUSION (N/A)    Final Anesthesia Type: general      Patient location during evaluation: PACU  Patient participation: Yes- Able to Participate  Level of consciousness: awake  Post-procedure vital signs: reviewed and stable  Pain management: adequate  Airway patency: patent    PONV status at discharge: No PONV  Anesthetic complications: no      Cardiovascular status: blood pressure returned to baseline  Respiratory status: unassisted  Hydration status: euvolemic  Follow-up not needed.          Vitals Value Taken Time   /69 10/29/22 0425   Temp 36.8 °C (98.3 °F) 10/29/22 0425   Pulse 74 10/29/22 0425   Resp 16 10/29/22 0546   SpO2 97 % 10/29/22 0425         Event Time   Out of Recovery 19:45:00         Pain/Sophia Score: Pain Rating Prior to Med Admin: 8 (10/29/2022  5:47 AM)  Sophia Score: 9 (10/28/2022  7:45 PM)

## 2022-10-29 NOTE — NURSING TRANSFER
Nursing Transfer Note      10/28/2022     Reason patient is being transferred: post-procedure    Transfer To: 956    Transfer via bed    Transfer with 1L NC to O2    Transported by PCT    Medicines sent: Abx IVPB    Any special needs or follow-up needed: routine    Chart send with patient: Yes    Notified: spouse    Patient reassessed at: 2130, 10/28

## 2022-10-29 NOTE — HOSPITAL COURSE
10/29: POD 1 s/p T9-T12 PSIF with T9-T11 laminectomy. NAEON. AFVSS. Exam stable. Pain controlled. Tolerating PO. Drains remain in place.   10/30: reports leg pain much improved compared to before surgery. Drain output 130.   10/31: POD3. NAEON. AFVSS. Right leg pain resolved. Endorsing incisional back pain. Drain with 110 output after re-check with nurse. Voiding spontaneously. +BM. Walked well in halls with therapy. Potassium replaced.   11/1: NAEON. WV and HV drain removed today. Pt is progressing well with PT, now recommending HH. She denies any new complaints this am. Pt reports she will not have family support at home until tomorrow. Will plan for DC tomorrow am

## 2022-10-29 NOTE — SUBJECTIVE & OBJECTIVE
Interval History: 10/29: POD 1 s/p T9-T12 PSIF with T9-T11 laminectomy. NAEON. AFVSS. Exam stable. Pain controlled. Tolerating PO. Drains remain in place.     Medications:  Continuous Infusions:   sodium chloride 0.9%       Scheduled Meds:   acetaminophen  1,000 mg Oral Q6H    amLODIPine  10 mg Oral Daily    atorvastatin  20 mg Oral Daily    ceFAZolin (ANCEF) IVPB  2 g Intravenous Q8H    heparin (porcine)  5,000 Units Subcutaneous Q8H    losartan-hydrochlorothiazide 100-12.5 mg  1 tablet Oral Daily    methocarbamoL  750 mg Oral QID    mupirocin  1 g Nasal BID    mupirocin   Nasal BID    pantoprazole  40 mg Oral Daily    senna-docusate 8.6-50 mg  2 tablet Oral Daily     PRN Meds:dextrose 10%, dextrose 10%, glucagon (human recombinant), glucose, glucose, HYDROmorphone, insulin aspart U-100, mupirocin, ondansetron, oxyCODONE, oxyCODONE, prochlorperazine     Review of Systems  Objective:        There is no height or weight on file to calculate BMI.  Vital Signs (Most Recent):  Temp: 98.3 °F (36.8 °C) (10/29/22 0425)  Pulse: 74 (10/29/22 0425)  Resp: 16 (10/29/22 0546)  BP: 106/69 (10/29/22 0425)  SpO2: 97 % (10/29/22 0425) Vital Signs (24h Range):  Temp:  [97.5 °F (36.4 °C)-98.6 °F (37 °C)] 98.3 °F (36.8 °C)  Pulse:  [73-94] 74  Resp:  [16-22] 16  SpO2:  [93 %-100 %] 97 %  BP: ()/(51-92) 106/69                          Closed/Suction Drain 10/28/22 1745 Left;Superior Back Accordion 10 Fr. (Active)   Site Description Unable to view 10/28/22 2205   Dressing Type Transparent (Tegaderm) 10/28/22 2205   Dressing Status Clean;Dry;Intact 10/28/22 2205   Dressing Intervention Integrity maintained 10/28/22 2205   Drainage Bloody 10/28/22 2205   Status Other (Comment) 10/28/22 2130   Output (mL) 295 mL 10/29/22 0542       Physical Exam    Neurosurgery Physical Exam    GENERAL: resting comfortably  HEENT: NCAT, PERRL, mucous membranes moist  NECK: supple, trachea midline  CV: normal capillary refill  PULM: aerating well,  symmetric expansion, no distress  ABD: soft, NT, ND  EXT: no c/c/e    NEURO:    AAO x 3  CN II-XII grossly intact  BUE 5/5  LLE 5/5  RLE 4ip, o/w 5/5  SILT    WV in place holding suction    HV x 1 with SS output      Significant Labs:  Recent Labs   Lab 10/28/22  1840   *      K 2.9*      CO2 24   BUN 10   CREATININE 0.7   CALCIUM 8.2*     Recent Labs   Lab 10/28/22  1840   WBC 9.42   HGB 10.5*   HCT 32.7*        No results for input(s): LABPT, INR, APTT in the last 48 hours.  Microbiology Results (last 7 days)       ** No results found for the last 168 hours. **          All pertinent labs from the last 24 hours have been reviewed.    Significant Diagnostics:  I have reviewed all pertinent imaging results/findings within the past 24 hours.  No results found in the last 24 hours.

## 2022-10-29 NOTE — PLAN OF CARE
Problem: Occupational Therapy  Goal: Occupational Therapy Goal  Description: Goals to be met by: 11/12/22     Patient will increase functional independence with ADLs by performing:    LE Dressing with Modified [place].  Grooming while standing with [place].  Toileting from toilet with Supervision for hygiene and clothing management.   Bathing from  shower chair/bench with Stand-by Assistance.    Outcome: Ongoing, Progressing

## 2022-10-29 NOTE — NURSING
Pt. Arrived on the unit from PACU, alert and oriented times 4 . Pt. Has hemovac drain and portable wound vac, intact, vital signs stable... pt. Oriented to the unit and the current plan of care.

## 2022-10-29 NOTE — ASSESSMENT & PLAN NOTE
51 yo female with RLE paresthesia and weakness found to have thoracic spinal compression now s/p posterior spinal instrumented fusion and decompression T9-12 for thoracic disc herniation and ligament hypertrophy.     --Patient admitted to NPU on telemetry      -q1h neurochecks in ICU, q2h neurochecks in stepdown, q4h neurochecks on floor  --All labs and diagnostics reviewed  --Follow-up post-op XRs  --Maintain TLSO when upright or OOB  --HV Drain to remain at this time on full suction; please record hourly outputs  --PT/OT/OOB  --ADAT  --Pain control with multimodal pain regimen   --TEDs/SCDs/SQH      -Reccomend weekly BLEUS for DVT surveillance  --Continue to monitor clinically, notify NSGY immediately with any changes in neuro status    Dispo: Ongoing

## 2022-10-29 NOTE — PROGRESS NOTES
[facility] - Neuro[facility] ([facility])  Neurosurgery  Progress Note    Subjective:     History of Present Illness: 51 yo female with RLE paresthesia and weakness found to have thoracic spinal compression presenting for posterior spinal instrumented fusion and decompression T9-12 for thoracic disc herniation and ligament hypertrophy.       Post-Op Info:  Procedure(s) (LRB):  T9-12 ROBOTIC DECOMPRESSION, THORACIC FUSION (N/A)   1 Day Post-Op     Interval History: 10/29: POD 1 s/p T9-T12 PSIF with T9-T11 laminectomy. NAEON. AFVSS. Exam stable. Pain controlled. Tolerating PO. Drains remain in place.     Medications:  Continuous Infusions:   sodium chloride 0.9%       Scheduled Meds:   acetaminophen  1,000 mg Oral Q6H    amLODIPine  10 mg Oral Daily    atorvastatin  20 mg Oral Daily    ceFAZolin (ANCEF) IVPB  2 g Intravenous Q8H    heparin (porcine)  5,000 Units Subcutaneous Q8H    losartan-hydrochlorothiazide 100-12.5 mg  1 tablet Oral Daily    methocarbamoL  750 mg Oral QID    mupirocin  1 g Nasal BID    mupirocin   Nasal BID    pantoprazole  40 mg Oral Daily    senna-docusate 8.6-50 mg  2 tablet Oral Daily     PRN Meds:dextrose 10%, dextrose 10%, glucagon (human recombinant), glucose, glucose, HYDROmorphone, insulin aspart U-100, mupirocin, ondansetron, oxyCODONE, oxyCODONE, prochlorperazine     Review of Systems  Objective:        There is no height or weight on file to calculate BMI.  Vital Signs (Most Recent):  Temp: 98.3 °F (36.8 °C) (10/29/22 0425)  Pulse: 74 (10/29/22 0425)  Resp: 16 (10/29/22 0546)  BP: 106/69 (10/29/22 0425)  SpO2: 97 % (10/29/22 0425) Vital Signs (24h Range):  Temp:  [97.5 °F (36.4 °C)-98.6 °F (37 °C)] 98.3 °F (36.8 °C)  Pulse:  [73-94] 74  Resp:  [16-22] 16  SpO2:  [93 %-100 %] 97 %  BP: ()/(51-92) 106/69                          Closed/Suction Drain 10/28/22 1745 Left;Superior Back Accordion 10 Fr. (Active)   Site Description Unable to view 10/28/22 9429   Dressing Type  Transparent (Tegaderm) 10/28/22 2205   Dressing Status Clean;Dry;Intact 10/28/22 2205   Dressing Intervention Integrity maintained 10/28/22 2205   Drainage Bloody 10/28/22 2205   Status Other (Comment) 10/28/22 2130   Output (mL) 295 mL 10/29/22 0542       Physical Exam    Neurosurgery Physical Exam    GENERAL: resting comfortably  HEENT: NCAT, PERRL, mucous membranes moist  NECK: supple, trachea midline  CV: normal capillary refill  PULM: aerating well, symmetric expansion, no distress  ABD: soft, NT, ND  EXT: no c/c/e    NEURO:    AAO x 3  CN II-XII grossly intact  BUE 5/5  LLE 5/5  RLE 4ip, o/w 5/5  SILT    WV in place holding suction    HV x 1 with SS output      Significant Labs:  Recent Labs   Lab 10/28/22  1840   *      K 2.9*      CO2 24   BUN 10   CREATININE 0.7   CALCIUM 8.2*     Recent Labs   Lab 10/28/22  1840   WBC 9.42   HGB 10.5*   HCT 32.7*        No results for input(s): LABPT, INR, APTT in the last 48 hours.  Microbiology Results (last 7 days)       ** No results found for the last 168 hours. **          All pertinent labs from the last 24 hours have been reviewed.    Significant Diagnostics:  I have reviewed all pertinent imaging results/findings within the past 24 hours.  No results found in the last 24 hours.    Assessment/Plan:     Thoracic myelopathy  53 yo female with RLE paresthesia and weakness found to have thoracic spinal compression now s/p posterior spinal instrumented fusion and decompression T9-12 for thoracic disc herniation and ligament hypertrophy.     --Patient admitted to NPU on telemetry      -q1h neurochecks in ICU, q2h neurochecks in stepdown, q4h neurochecks on floor  --All labs and diagnostics reviewed  --Follow-up post-op XRs  --Maintain TLSO when upright or OOB  --HV Drain to remain at this time on full suction; please record hourly outputs  --PT/OT/OOB  --ADAT  --Pain control with multimodal pain regimen   --TEDs/SCDs/SQH      -Reccomend weekly  BLEUS for DVT surveillance  --Continue to monitor clinically, notify NSGY immediately with any changes in neuro status    Dispo: Ongoing          Howard Handy MD  Neurosurgery  [facility] - Neurosurgery ([facility])

## 2022-10-29 NOTE — PT/OT/SLP EVAL
Occupational Therapy   Co-Evaluation and Treatment    Patient Name: Africa Jennings   MRN: [id number]  Admit Date: 10/28/2022  Admitting Diagnosis: <principal problem not specified>   Recent Surgery: Procedure(s) (LRB):  T9-12 ROBOTIC DECOMPRESSION, THORACIC FUSION (N/A) 1 Day Post-Op    Co-treatment performed for this visit due to patient need for two skilled therapists to ensure patient and staff safety and to accommodate for patient activity tolerance/pain management   Recommendations:     Discharge Recommendations: rehabilitation facility ((will likely progress))  Discharge Equipment Recommendations: bedside commode, bath bench  Barriers to discharge: None    Assessment:     Africa Jennings is a 52 y.o. female with a medical diagnosis of <principal problem not specified>. She presents with performance deficits affecting function including weakness, impaired self care skills, impaired balance, decreased coordination, impaired endurance, impaired functional mobility, decreased safety awareness, impaired cardiopulmonary response to activity.     Pt was agreeable to OT/PT co-eval and tolerated session well. Pt req'd moderate assist w/ fxnl bed mobility while maintaining spinal precautions, maximal assist x 2 for fxnl transfers, and stand-by assist-Max A w/ ADLs on this date as further detailed below. At this time, pt would benefit from skilled acute OT services to INC independence and safety w/ ADLs/IADLS, fxnl mobility, and fxnl tasks of choice so that pt may D/C to least restrictive environment w/ DEC risk of falls or readmissions. D/C rec to  rehab facility (most likely will progress) .       Rehab Prognosis: Good; patient would benefit from acute skilled OT services to address these deficits and reach maximum level of function.     Plan:     Patient to be seen 3 x/week to address the above listed problems via self-care/home management, therapeutic activities, therapeutic exercises,  "neuromuscular re-education  Plan of Care Expires: 11/26/22  Plan of Care Reviewed with: patient, significant other    Subjective     Communicated with RN prior to session. Patient found supine upon OT entry to room, agreeable to evaluation.     Chief Complaint: No chief complaint on file.    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    Pain/Comfort:  Pain Rating 1: 0/10 (reported pain w/ transitional movement for transfers)    Objective:   Patient found with: bed alarm, peripheral IV, SCD, wound vac, hemovac, oxygen     General Precautions: Standard, fall   Orthopedic Precautions:spinal precautions   Braces: TLSO   Respiratory Status:   Nasal cannula, flow 3 L/min  Vitals: /78 (BP Location: Right arm, Patient Position: Sitting)   Pulse 92   Temp 98.7 °F (37.1 °C) (Oral)   Resp 16   Ht 5' 2" (1.575 m)   Wt 99.8 kg (220 lb 0.3 oz)   LMP  (LMP Unknown)   SpO2 (!) 91%   Breastfeeding No   BMI 40.24 kg/m²     Cognitive and Psychosocial Function:   AxOx4 -- Person, Place, Time, and Situation   Follows Commands/attention: follows multi-step commands  Communication: clear/fluent  Memory: No Deficits noted  Safety awareness/insight to disability: intact   Mood/Affect/Coping skills/emotional control: Appropriate to situation    Hearing: Intact    Vision: Intact visual fields    Physical Exam:     Left UE Right UE   UE Edema absent absent   UE ROM AROM WFL AROM WFL   UE Strength 5/5 5/5    Strength grasp WFL grasp WFL   Sensation LUE INTACT:light/touch and sharp/dull  RUE INTACT: light/touch and " sharp/dull    Fine Motor Coordination:  LUE INTACT: hand, finger to nose, hand thumb/finger opposition skills , and manipulation of objects RUE INTACT: hand, finger to nose, hand thumb/finger opposition skills , and manipulation of objects   Gross Motor Coordination: LUE INTACT: WFL RUE INTACT:WFL       Bed Mobility:   Supine to Sit with moderate assistance using bed rail  Cues for log rolling technique  Assist for trunk support     Functional Mobility/Transfers:  Sit <> Stand Transfer with maximal assistance x 2 persons with hand-held assist  Standing <> chair: Max A x 2 w/ HHA on elevated surface on prepared chair   Pt engaged in functional mobility to simulate household/community distances for ~15 ft with CGA w/ HHA  in order to maximize functional activity tolerance required for engagement in occupations of choice.  Static Sitting EOB: Good  Dynamic Sitting EOB: Good  Static Standing Balance: Good  Dynamic Standing Balance: Good    Activities of Daily Living:  Grooming: contact guard assistance for safety while standing at sink  Stood at sink for ~10 minutes to brush teeth & wash face w/o LOB or seated rest break  Upper Body Dressing: minimum assistance seated EOB to soo back gown  Lower Body Dressing: maximal assistance seated EOB to soo socks  Toileting: maximal assistance ; CTA w/ bedside commode toileting     AMPAC 6 Click ADL:  AMPAC Total Score: 19    Treatment & Education:  Therapist provided facilitation and instruction of proper body mechanics, energy conservation, and fall prevention strategies during tasks listed above.  Pt educated on role of OT, POC and goals for therapy  Pt educated on importance of OOB activities with staff member assistance and sitting OOB majority of the day.       Patient left up in chair with all lines intact and call button in reach.    GOALS:   Multidisciplinary Problems       Occupational Therapy Goals          Problem: Occupational Therapy    Goal Priority Disciplines  Outcome Interventions   Occupational Therapy Goal     OT, PT/OT Ongoing, Progressing    Description: Goals to be met by: 11/12/22     Patient will increase functional independence with ADLs by performing:    LE Dressing with Modified [place].  Grooming while standing with [place].  Toileting from toilet with Supervision for hygiene and clothing management.   Bathing from  shower chair/bench with Stand-by Assistance.                         History:     Past Medical History:   Diagnosis Date    Diabetes mellitus     Diabetes mellitus, type 2     Eczema     GERD (gastroesophageal reflux disease)     Hypertension     Impaired fasting blood sugar     YSABEL on CPAP          Past Surgical History:   Procedure Laterality Date    breast reduction      CHOLECYSTECTOMY      laprascopic    EPIDURAL STEROID INJECTION N/A 1/11/2022    Procedure: INJECTION, STEROID, EPIDURAL IL L4/5 NEEDS CONSENT;  Surgeon: Britt Calix MD;  Location: [facility];  Service: Pain Management;  Laterality: N/A;    ESOPHAGOGASTRODUODENOSCOPY  8/18/14    HYSTERECTOMY  2007    laprascopic, total for fibroids.  Dr Polo    INJECTION OF JOINT Left 2/8/2022    Procedure: INJECTION, JOINT, SI LEFT NEEDS CONSENT;  Surgeon: Britt Calix MD;  [facility];  Service: Pain Management;  Laterality: Left;    OOPHORECTOMY      TOTAL REDUCTION MAMMOPLASTY         Time Tracking:     OT Date of Treatment: 10/29/22  OT Start Time: 0946  OT Stop Time: 1030  OT Total Time (min): 44 min  Additional staff present: PT      Billable Minutes: Evaluation 20 Self Care/Home Management 24    10/29/2022

## 2022-10-29 NOTE — PT/OT/SLP EVAL
Physical Therapy Co-Evaluation/Treatment    Patient Name:  Africa Jennings   MRN:  [id number]    Recommendations:     Discharge Recommendations:  rehabilitation facility   Discharge Equipment Recommendations: bedside commode, bath bench   Barriers to discharge: Decreased caregiver support at current functional level    Assessment:     Africa Jennings is a 52 y.o. female admitted with a medical diagnosis of Thoracic myelopathy.  She presents with the following impairments/functional limitations:  weakness, impaired endurance, impaired self care skills, impaired functional mobility, gait instability, impaired balance, decreased lower extremity function, decreased coordination, decreased safety awareness, pain. Pt presents with increased pain in L lower back during mobility. Pt requiring increased assist to complete transitional movements, supine>sit and sit>stand. Increased difficulty especially noted during transfer to stand. Once in standing, pt able to maintain balance and ambulate short household distances without physical assist. Currently recommending IP rehab due to level of assist needed during functional mobility; however, pt may progress during hospital admission. Pt would continue to benefit from skilled acute PT in order to address current deficits and progress functional mobility.     Rehab Prognosis: Good; patient would benefit from acute skilled PT services to address these deficits and reach maximum level of function.    Recent Surgery: Procedure(s) (LRB):  T9-12 ROBOTIC DECOMPRESSION, THORACIC FUSION (N/A) 1 Day Post-Op    Plan:     During this hospitalization, patient to be seen 4 x/week to address the identified rehab impairments via gait training, therapeutic activities, therapeutic exercises, neuromuscular re-education and progress toward the following goals:    Plan of Care Expires:  11/27/22    Subjective     Chief Complaint: L lower back pain, vahid during  mobility  Patient/Family Comments/goals: Pt agreeable to therapy evaluation.   Pain/Comfort:  Pain Rating 1:  (reported no pain at rest, but reporting increased pain in L lower back with transitional movements, vahid sit<>stand)  Pain Addressed 1: Reposition, Distraction, Cessation of Activity    Patients cultural, spiritual, Religion conflicts given the current situation: no    Living Environment:  Pt lives with with her S.O. in a 2SH with 0 LORETTA; pt able to stay on 1st floor upon return home.  Prior to admission, patients level of function was independent, including driving. Works in administration.  Equipment used at home: none.  DME owned (not currently used): none.  Upon discharge, patient will have assistance from her S.O..    Objective:     Communicated with RN prior to session.  Patient found right sidelying with bed alarm, wound vac, peripheral IV, hemovac, oxygen  upon PT entry to room.    General Precautions: Standard, fall   Orthopedic Precautions:spinal precautions   Braces: TLSO  Respiratory Status: Nasal cannula, flow 2 L/min    Exams:  Cognitive Exam:  Patient is oriented to Person, Place, Time, and Situation  Sensation:    -       Intact  RLE ROM: WFL  RLE Strength: WFL  LLE ROM: WFL  LLE Strength: WFL    Functional Mobility:  Bed Mobility:     Supine to Sit: moderate assistance using bed rail   Cues provided for sequencing and technique   A for elevating trunk   Transfers:     Sit to Stand:  maximal assistance and of 2 persons with hand-held assist from elevated EOB and from elevated chair  BLE blocked throughout with t/c at knee for increased ext  Multiple attempts with max cues prior to successful transfer to stand, ultimately requiring elevated surface and increased assistance   Cues provided for ant weight-shift, hand placement, and transfer technique   Gait: ~15 ft (bed>bathrooom) + ~8 ft (bathroom>chair) with CGA with HHA  Demo'd decreased step length, impaired weight-shifting ability,  decreased toe-floor clearance, mild instability  Cues provided for self-pacing, safety awareness, and symptom assessment   Balance:   standing at bathroom sink to complete self-care tasks with CGA      AM-PAC 6 CLICK MOBILITY  Total Score:14       Treatment & Education:  Pt educated on role of PT and PT POC. Pt verbalized understanding.   Pt educated on spinal precautions, log-roll technique, and TLSO wear. Pt verbalized and demo'd understanding. TLSO donned while seated EOB with edu throughout on proper technique.   Pt educated on the effects of bed rest and the importance of OOB activity. Pt encouraged to sit UIC majority of day as tolerated and continue daily ambulation with nursing assist. Pt verbalized understanding.  Pt oriented to call bell and instructed to call for staff assist with standing tasks/transfers. Pt verbalized understanding.   Bedside chair modified for increased ease of transfer    Patient left up in chair with all lines intact, call button in reach, chair alarm on, and pt's S.O. present.    GOALS:   Multidisciplinary Problems       Physical Therapy Goals          Problem: Physical Therapy    Goal Priority Disciplines Outcome Goal Variances Interventions   Physical Therapy Goal     PT, PT/OT Ongoing, Progressing     Description: Goals to be met by: 2022     Patient will increase functional independence with mobility by performin. Supine to sit with Contact Guard Assistance  2. Sit to stand transfer with Contact Guard Assistance  3. Bed to chair transfer with Contact Guard Assistance using LRAD as needed  4. Gait  x 150 feet with Contact Guard Assistance using LRAD as needed.  5. Lower extremity exercise program x15 reps, with supervision, in order to increase LE strength and (I) with functional mobility.                           History:     Past Medical History:   Diagnosis Date    Diabetes mellitus     Diabetes mellitus, type 2     Eczema     GERD (gastroesophageal reflux  disease)     Hypertension     Impaired fasting blood sugar     YSABEL on CPAP        Past Surgical History:   Procedure Laterality Date    breast reduction      CHOLECYSTECTOMY      laprascopic    EPIDURAL STEROID INJECTION N/A 1/11/2022    Procedure: INJECTION, STEROID, EPIDURAL IL L4/5 NEEDS CONSENT;  Surgeon: Britt Calix MD;  Location: [facility];  Service: Pain Management;  Laterality: N/A;    ESOPHAGOGASTRODUODENOSCOPY  8/18/14    HYSTERECTOMY  2007    laprascopic, total for fibroids.  Dr Polo    INJECTION OF JOINT Left 2/8/2022    Procedure: INJECTION, JOINT, SI LEFT NEEDS CONSENT;  Surgeon: Britt Calix MD;  Location: [facility];  Service: Pain Management;  Laterality: Left;    OOPHORECTOMY      TOTAL REDUCTION MAMMOPLASTY         Time Tracking:     PT Received On: 10/29/22  PT Start Time: 0946     PT Stop Time: 1030  PT Total Time (min): 44 min     Billable Minutes: Evaluation 20, Therapeutic Activity 14, and Neuromuscular Re-education 10  (co-eval performed this date due to need for 2 skilled therapists in order to ensure pt safety, accomodate for pt activity tolerance, and maximize functional potential)     10/29/2022

## 2022-10-29 NOTE — PLAN OF CARE
PT evaluation complete and appropriate goals established.    10/29/2022    Problem: Physical Therapy  Goal: Physical Therapy Goal  Description: Goals to be met by: 2022     Patient will increase functional independence with mobility by performin. Supine to sit with Contact Guard Assistance  2. Sit to stand transfer with Contact Guard Assistance  3. Bed to chair transfer with Contact Guard Assistance using LRAD as needed  4. Gait  x 150 feet with Contact Guard Assistance using LRAD as needed.  5. Lower extremity exercise program x15 reps, with supervision, in order to increase LE strength and (I) with functional mobility.      Outcome: Ongoing, Progressing

## 2022-10-29 NOTE — HPI
51 yo female with RLE paresthesia and weakness found to have thoracic spinal compression presenting for posterior spinal instrumented fusion and decompression T9-12 for thoracic disc herniation and ligament hypertrophy.

## 2022-10-30 LAB
ANION GAP SERPL CALC-SCNC: 8 MMOL/L (ref 8–16)
BASOPHILS # BLD AUTO: 0.04 K/UL (ref 0–0.2)
BASOPHILS NFR BLD: 0.2 % (ref 0–1.9)
BUN SERPL-MCNC: 10 MG/DL (ref 6–20)
CALCIUM SERPL-MCNC: 6.9 MG/DL (ref 8.7–10.5)
CHLORIDE SERPL-SCNC: 111 MMOL/L (ref 95–110)
CO2 SERPL-SCNC: 19 MMOL/L (ref 23–29)
CREAT SERPL-MCNC: 0.5 MG/DL (ref 0.5–1.4)
DIFFERENTIAL METHOD: ABNORMAL
EOSINOPHIL # BLD AUTO: 0 K/UL (ref 0–0.5)
EOSINOPHIL NFR BLD: 0.1 % (ref 0–8)
ERYTHROCYTE [DISTWIDTH] IN BLOOD BY AUTOMATED COUNT: 15 % (ref 11.5–14.5)
EST. GFR  (NO RACE VARIABLE): >60 ML/MIN/1.73 M^2
GLUCOSE SERPL-MCNC: 111 MG/DL (ref 70–110)
HCT VFR BLD AUTO: 28.5 % (ref 37–48.5)
HGB BLD-MCNC: 9.1 G/DL (ref 12–16)
IMM GRANULOCYTES # BLD AUTO: 0.11 K/UL (ref 0–0.04)
IMM GRANULOCYTES NFR BLD AUTO: 0.7 % (ref 0–0.5)
LYMPHOCYTES # BLD AUTO: 2.3 K/UL (ref 1–4.8)
LYMPHOCYTES NFR BLD: 13.9 % (ref 18–48)
MCH RBC QN AUTO: 27.5 PG (ref 27–31)
MCHC RBC AUTO-ENTMCNC: 31.9 G/DL (ref 32–36)
MCV RBC AUTO: 86 FL (ref 82–98)
MONOCYTES # BLD AUTO: 1.3 K/UL (ref 0.3–1)
MONOCYTES NFR BLD: 8.1 % (ref 4–15)
NEUTROPHILS # BLD AUTO: 12.8 K/UL (ref 1.8–7.7)
NEUTROPHILS NFR BLD: 77 % (ref 38–73)
NRBC BLD-RTO: 0 /100 WBC
PLATELET # BLD AUTO: 281 K/UL (ref 150–450)
PMV BLD AUTO: 11 FL (ref 9.2–12.9)
POCT GLUCOSE: 116 MG/DL (ref 70–110)
POCT GLUCOSE: 118 MG/DL (ref 70–110)
POCT GLUCOSE: 158 MG/DL (ref 70–110)
POTASSIUM SERPL-SCNC: 2.9 MMOL/L (ref 3.5–5.1)
RBC # BLD AUTO: 3.31 M/UL (ref 4–5.4)
SODIUM SERPL-SCNC: 138 MMOL/L (ref 136–145)
WBC # BLD AUTO: 16.57 K/UL (ref 3.9–12.7)

## 2022-10-30 PROCEDURE — 25000003 PHARM REV CODE 250: Performed by: STUDENT IN AN ORGANIZED HEALTH CARE EDUCATION/TRAINING PROGRAM

## 2022-10-30 PROCEDURE — 36415 COLL VENOUS BLD VENIPUNCTURE: CPT | Performed by: STUDENT IN AN ORGANIZED HEALTH CARE EDUCATION/TRAINING PROGRAM

## 2022-10-30 PROCEDURE — 80048 BASIC METABOLIC PNL TOTAL CA: CPT | Performed by: STUDENT IN AN ORGANIZED HEALTH CARE EDUCATION/TRAINING PROGRAM

## 2022-10-30 PROCEDURE — 63600175 PHARM REV CODE 636 W HCPCS

## 2022-10-30 PROCEDURE — 11000001 HC ACUTE MED/SURG PRIVATE ROOM

## 2022-10-30 PROCEDURE — 85025 COMPLETE CBC W/AUTO DIFF WBC: CPT | Performed by: STUDENT IN AN ORGANIZED HEALTH CARE EDUCATION/TRAINING PROGRAM

## 2022-10-30 PROCEDURE — 25000003 PHARM REV CODE 250

## 2022-10-30 RX ORDER — CALCIUM CARBONATE 200(500)MG
1000 TABLET,CHEWABLE ORAL ONCE
Status: COMPLETED | OUTPATIENT
Start: 2022-10-30 | End: 2022-10-30

## 2022-10-30 RX ADMIN — OXYCODONE HYDROCHLORIDE 10 MG: 10 TABLET ORAL at 10:10

## 2022-10-30 RX ADMIN — Medication 2 G: at 10:10

## 2022-10-30 RX ADMIN — METHOCARBAMOL 750 MG: 750 TABLET ORAL at 09:10

## 2022-10-30 RX ADMIN — ACETAMINOPHEN 1000 MG: 500 TABLET ORAL at 12:10

## 2022-10-30 RX ADMIN — CALCIUM CARBONATE (ANTACID) CHEW TAB 500 MG 1000 MG: 500 CHEW TAB at 10:10

## 2022-10-30 RX ADMIN — HEPARIN SODIUM 5000 UNITS: 5000 INJECTION INTRAVENOUS; SUBCUTANEOUS at 03:10

## 2022-10-30 RX ADMIN — MUPIROCIN: 20 OINTMENT TOPICAL at 10:10

## 2022-10-30 RX ADMIN — ACETAMINOPHEN 1000 MG: 500 TABLET ORAL at 06:10

## 2022-10-30 RX ADMIN — Medication 2 G: at 03:10

## 2022-10-30 RX ADMIN — ATORVASTATIN CALCIUM 20 MG: 20 TABLET, FILM COATED ORAL at 10:10

## 2022-10-30 RX ADMIN — METHOCARBAMOL 750 MG: 750 TABLET ORAL at 04:10

## 2022-10-30 RX ADMIN — OXYCODONE HYDROCHLORIDE 10 MG: 10 TABLET ORAL at 04:10

## 2022-10-30 RX ADMIN — ACETAMINOPHEN 1000 MG: 500 TABLET ORAL at 05:10

## 2022-10-30 RX ADMIN — METHOCARBAMOL 750 MG: 750 TABLET ORAL at 12:10

## 2022-10-30 RX ADMIN — Medication 2 G: at 05:10

## 2022-10-30 RX ADMIN — HYDROMORPHONE HYDROCHLORIDE 1 MG: 1 INJECTION, SOLUTION INTRAMUSCULAR; INTRAVENOUS; SUBCUTANEOUS at 07:10

## 2022-10-30 RX ADMIN — HYDROMORPHONE HYDROCHLORIDE 1 MG: 1 INJECTION, SOLUTION INTRAMUSCULAR; INTRAVENOUS; SUBCUTANEOUS at 12:10

## 2022-10-30 RX ADMIN — METHOCARBAMOL 750 MG: 750 TABLET ORAL at 10:10

## 2022-10-30 RX ADMIN — PANTOPRAZOLE SODIUM 40 MG: 20 TABLET, DELAYED RELEASE ORAL at 10:10

## 2022-10-30 RX ADMIN — ONDANSETRON 8 MG: 8 TABLET, ORALLY DISINTEGRATING ORAL at 06:10

## 2022-10-30 RX ADMIN — HEPARIN SODIUM 5000 UNITS: 5000 INJECTION INTRAVENOUS; SUBCUTANEOUS at 09:10

## 2022-10-30 RX ADMIN — ONDANSETRON 8 MG: 8 TABLET, ORALLY DISINTEGRATING ORAL at 07:10

## 2022-10-30 RX ADMIN — HEPARIN SODIUM 5000 UNITS: 5000 INJECTION INTRAVENOUS; SUBCUTANEOUS at 06:10

## 2022-10-30 RX ADMIN — SENNOSIDES AND DOCUSATE SODIUM 2 TABLET: 50; 8.6 TABLET ORAL at 10:10

## 2022-10-30 NOTE — PLAN OF CARE
Problem: Adult Inpatient Plan of Care  Goal: Plan of Care Review  Outcome: Ongoing, Progressing  Flowsheets (Taken 10/30/2022 1722)  Plan of Care Reviewed With: patient  Goal: Patient-Specific Goal (Individualized)  Outcome: Ongoing, Progressing  Flowsheets (Taken 10/30/2022 1722)  Individualized Care Needs: up in chair all day  Patient-Specific Goals (Include Timeframe): Increase strength     Problem: Fall Injury Risk  Goal: Absence of Fall and Fall-Related Injury  Outcome: Ongoing, Progressing     POC reviewed with the patient and they verbalized understanding. All comments and concerns addressed. Bed locked in lowest position with bed alarm set, call light within reach. Safety precautions maintained. LSO brace on when OOB; hemovac x 1 and portable wound vac in place. VSS, see flowsheets. No events this shift. Will continue to monitor for changes to POC and clinical condition.

## 2022-10-30 NOTE — PROGRESS NOTES
[facility] - Neurosurgery ([facility]  Neurosurgery  Progress Note    Subjective:     History of Present Illness: 53 yo female with RLE paresthesia and weakness found to have thoracic spinal compression presenting for posterior spinal instrumented fusion and decompression T9-12 for thoracic disc herniation and ligament hypertrophy.       Post-Op Info:  Procedure(s) (LRB):  T9-12 ROBOTIC DECOMPRESSION, THORACIC FUSION (N/A)   2 Days Post-Op     Interval History: 10/30: reports leg pain much improved compared to before surgery. Drain output 130.     Medications:  Continuous Infusions:   sodium chloride 0.9% 100 mL/hr at 10/29/22 1137     Scheduled Meds:   acetaminophen  1,000 mg Oral Q6H    amLODIPine  10 mg Oral Daily    atorvastatin  20 mg Oral Daily    ceFAZolin (ANCEF) IVPB  2 g Intravenous Q8H    heparin (porcine)  5,000 Units Subcutaneous Q8H    losartan-hydrochlorothiazide 100-12.5 mg  1 tablet Oral Daily    methocarbamoL  750 mg Oral QID    mupirocin   Nasal BID    pantoprazole  40 mg Oral Daily    senna-docusate 8.6-50 mg  2 tablet Oral Daily     PRN Meds:dextrose 10%, dextrose 10%, glucagon (human recombinant), glucose, glucose, HYDROmorphone, insulin aspart U-100, mupirocin, ondansetron, oxyCODONE, oxyCODONE, prochlorperazine     Review of Systems  Objective:     Weight: 99.8 kg (220 lb 0.3 oz)  Body mass index is 40.24 kg/m².  Vital Signs (Most Recent):  Temp: 98.1 °F (36.7 °C) (10/30/22 0409)  Pulse: 86 (10/30/22 0409)  Resp: 16 (10/30/22 0409)  BP: 115/63 (10/30/22 0409)  SpO2: 96 % (10/30/22 0409) Vital Signs (24h Range):  Temp:  [97.5 °F (36.4 °C)-98.7 °F (37.1 °C)] 98.1 °F (36.7 °C)  Pulse:  [] 86  Resp:  [16-18] 16  SpO2:  [91 %-96 %] 96 %  BP: ()/(54-78) 115/63                          Closed/Suction Drain 10/28/22 1745 Left;Superior Back Accordion 10 Fr. (Active)   Site Description Unable to view 10/28/22 2205   Dressing Type Transparent (Tegaderm) 10/28/22 2205   Dressing  Status Clean;Dry;Intact 10/28/22 2205   Dressing Intervention Integrity maintained 10/28/22 2205   Drainage Bloody 10/28/22 2205   Status Other (Comment) 10/28/22 2130   Output (mL) 295 mL 10/29/22 0542       Physical Exam    Neurosurgery Physical Exam    GENERAL: resting comfortably  HEENT: NCAT, PERRL, mucous membranes moist  NECK: supple, trachea midline  CV: normal capillary refill  PULM: aerating well, symmetric expansion, no distress  ABD: soft, NT, ND  EXT: no c/c/e    NEURO:    AAO x 3  CN II-XII grossly intact  BUE 5/5  LLE 5/5  RLE 4ip, o/w 5/5  SILT    WV in place holding suction    HV x 1 with SS output      Significant Labs:  Recent Labs   Lab 10/28/22  1840   *      K 2.9*      CO2 24   BUN 10   CREATININE 0.7   CALCIUM 8.2*       Recent Labs   Lab 10/28/22  1840   WBC 9.42   HGB 10.5*   HCT 32.7*          No results for input(s): LABPT, INR, APTT in the last 48 hours.  Microbiology Results (last 7 days)       ** No results found for the last 168 hours. **          All pertinent labs from the last 24 hours have been reviewed.    Significant Diagnostics:  I have reviewed all pertinent imaging results/findings within the past 24 hours.  No results found in the last 24 hours.    Assessment/Plan:     Thoracic myelopathy  53 yo female with RLE paresthesia and weakness found to have thoracic spinal compression now s/p posterior spinal instrumented fusion and decompression T9-12 for thoracic disc herniation and ligament hypertrophy.     --Patient admitted to NPU on telemetry      -q1h neurochecks in ICU, q2h neurochecks in stepdown, q4h neurochecks on floor  --All labs and diagnostics reviewed  --Follow-up post-op XRs  --Maintain TLSO when upright or OOB  --HV Drain to remain at this time on full suction; please record hourly outputs  --PT/OT/OOB: recs for IPR  --ADAT  --Pain control with multimodal pain regimen   --TEDs/SCDs/SQH      -Reccomend weekly BLEUS for DVT  surveillance  --Continue to monitor clinically, notify NSGY immediately with any changes in neuro status    Dispo: Pending drain removal. Plan for IPR          Jayro Lozada MD  Neurosurgery  [facility] - Neurosurgery ([facility])

## 2022-10-30 NOTE — ASSESSMENT & PLAN NOTE
53 yo female with RLE paresthesia and weakness found to have thoracic spinal compression now s/p posterior spinal instrumented fusion and decompression T9-12 for thoracic disc herniation and ligament hypertrophy.     --Patient admitted to NPU on telemetry      -q1h neurochecks in ICU, q2h neurochecks in stepdown, q4h neurochecks on floor  --All labs and diagnostics reviewed  --Follow-up post-op XRs  --Maintain TLSO when upright or OOB  --HV Drain to remain at this time on full suction; please record hourly outputs  --PT/OT/OOB: recs for IPR  --ADAT  --Pain control with multimodal pain regimen   --TEDs/SCDs/SQH      -Reccomend weekly BLEUS for DVT surveillance  --Continue to monitor clinically, notify NSGY immediately with any changes in neuro status    Dispo: Pending drain removal. Plan for IPR

## 2022-10-30 NOTE — NURSING
Called to room per patient approximately 0240 stating left arm is swollen. In to check, patient left arm swollen, denies pain no warmth noted. IV changed to right sided iv line and iv to left arm removed and arm elevated. Back into room to check left arm, edema has decreased significantly, still denies pain or discomfort and again no warmth noted.

## 2022-10-30 NOTE — SUBJECTIVE & OBJECTIVE
Interval History: 10/30: reports leg pain much improved compared to before surgery. Drain output 130.     Medications:  Continuous Infusions:   sodium chloride 0.9% 100 mL/hr at 10/29/22 1137     Scheduled Meds:   acetaminophen  1,000 mg Oral Q6H    amLODIPine  10 mg Oral Daily    atorvastatin  20 mg Oral Daily    ceFAZolin (ANCEF) IVPB  2 g Intravenous Q8H    heparin (porcine)  5,000 Units Subcutaneous Q8H    losartan-hydrochlorothiazide 100-12.5 mg  1 tablet Oral Daily    methocarbamoL  750 mg Oral QID    mupirocin   Nasal BID    pantoprazole  40 mg Oral Daily    senna-docusate 8.6-50 mg  2 tablet Oral Daily     PRN Meds:dextrose 10%, dextrose 10%, glucagon (human recombinant), glucose, glucose, HYDROmorphone, insulin aspart U-100, mupirocin, ondansetron, oxyCODONE, oxyCODONE, prochlorperazine     Review of Systems  Objective:     Weight: 99.8 kg (220 lb 0.3 oz)  Body mass index is 40.24 kg/m².  Vital Signs (Most Recent):  Temp: 98.1 °F (36.7 °C) (10/30/22 0409)  Pulse: 86 (10/30/22 0409)  Resp: 16 (10/30/22 0409)  BP: 115/63 (10/30/22 0409)  SpO2: 96 % (10/30/22 0409) Vital Signs (24h Range):  Temp:  [97.5 °F (36.4 °C)-98.7 °F (37.1 °C)] 98.1 °F (36.7 °C)  Pulse:  [] 86  Resp:  [16-18] 16  SpO2:  [91 %-96 %] 96 %  BP: ()/(54-78) 115/63                          Closed/Suction Drain 10/28/22 1745 Left;Superior Back Accordion 10 Fr. (Active)   Site Description Unable to view 10/28/22 2205   Dressing Type Transparent (Tegaderm) 10/28/22 2205   Dressing Status Clean;Dry;Intact 10/28/22 2205   Dressing Intervention Integrity maintained 10/28/22 2205   Drainage Bloody 10/28/22 2205   Status Other (Comment) 10/28/22 2130   Output (mL) 295 mL 10/29/22 0542       Physical Exam    Neurosurgery Physical Exam    GENERAL: resting comfortably  HEENT: NCAT, PERRL, mucous membranes moist  NECK: supple, trachea midline  CV: normal capillary refill  PULM: aerating well, symmetric expansion, no distress  ABD: soft, NT,  ND  EXT: no c/c/e    NEURO:    AAO x 3  CN II-XII grossly intact  BUE 5/5  LLE 5/5  RLE 4ip, o/w 5/5  SILT    WV in place holding suction    HV x 1 with SS output      Significant Labs:  Recent Labs   Lab 10/28/22  1840   *      K 2.9*      CO2 24   BUN 10   CREATININE 0.7   CALCIUM 8.2*       Recent Labs   Lab 10/28/22  1840   WBC 9.42   HGB 10.5*   HCT 32.7*          No results for input(s): LABPT, INR, APTT in the last 48 hours.  Microbiology Results (last 7 days)       ** No results found for the last 168 hours. **          All pertinent labs from the last 24 hours have been reviewed.    Significant Diagnostics:  I have reviewed all pertinent imaging results/findings within the past 24 hours.  No results found in the last 24 hours.

## 2022-10-30 NOTE — PLAN OF CARE
"  Problem: Adult Inpatient Plan of Care  Goal: Plan of Care Review  Outcome: Ongoing, Progressing     Problem: Adult Inpatient Plan of Care  Goal: Optimal Comfort and Wellbeing  Outcome: Ongoing, Progressing     Problem: Adult Inpatient Plan of Care  Goal: Readiness for Transition of Care  Outcome: Ongoing, Progressing     Problem: Diabetes Comorbidity  Goal: Blood Glucose Level Within Targeted Range  Outcome: Ongoing, Progressing     Problem: Infection  Goal: Absence of Infection Signs and Symptoms  Outcome: Ongoing, Progressing   Patient has slept well tonight, uses call light for assistance and uses BSC.  Did states "it's harder then she thought back sx would be" does well going from bed to BSC but struggles from BSC back into bed especially assistance with getting her legs back in the bed. Bed in low position with call light in reach, bed alarm in place and SR's up times 3 for safety purposes. SCD's in place. VSS, flow sheets completed, see for assessments. Pain meds per prn as needed.   "

## 2022-10-31 LAB
ANION GAP SERPL CALC-SCNC: 11 MMOL/L (ref 8–16)
BASOPHILS # BLD AUTO: 0.05 K/UL (ref 0–0.2)
BASOPHILS NFR BLD: 0.5 % (ref 0–1.9)
BUN SERPL-MCNC: 7 MG/DL (ref 6–20)
CALCIUM SERPL-MCNC: 8.4 MG/DL (ref 8.7–10.5)
CHLORIDE SERPL-SCNC: 102 MMOL/L (ref 95–110)
CO2 SERPL-SCNC: 25 MMOL/L (ref 23–29)
CREAT SERPL-MCNC: 0.7 MG/DL (ref 0.5–1.4)
DIFFERENTIAL METHOD: ABNORMAL
EOSINOPHIL # BLD AUTO: 0.2 K/UL (ref 0–0.5)
EOSINOPHIL NFR BLD: 1.7 % (ref 0–8)
ERYTHROCYTE [DISTWIDTH] IN BLOOD BY AUTOMATED COUNT: 14.9 % (ref 11.5–14.5)
EST. GFR  (NO RACE VARIABLE): >60 ML/MIN/1.73 M^2
GLUCOSE SERPL-MCNC: 181 MG/DL (ref 70–110)
HCT VFR BLD AUTO: 27.9 % (ref 37–48.5)
HGB BLD-MCNC: 8.9 G/DL (ref 12–16)
IMM GRANULOCYTES # BLD AUTO: 0.04 K/UL (ref 0–0.04)
IMM GRANULOCYTES NFR BLD AUTO: 0.4 % (ref 0–0.5)
LYMPHOCYTES # BLD AUTO: 2.9 K/UL (ref 1–4.8)
LYMPHOCYTES NFR BLD: 29 % (ref 18–48)
MCH RBC QN AUTO: 27.9 PG (ref 27–31)
MCHC RBC AUTO-ENTMCNC: 31.9 G/DL (ref 32–36)
MCV RBC AUTO: 88 FL (ref 82–98)
MONOCYTES # BLD AUTO: 0.9 K/UL (ref 0.3–1)
MONOCYTES NFR BLD: 8.7 % (ref 4–15)
NEUTROPHILS # BLD AUTO: 6 K/UL (ref 1.8–7.7)
NEUTROPHILS NFR BLD: 59.7 % (ref 38–73)
NRBC BLD-RTO: 0 /100 WBC
PLATELET # BLD AUTO: 282 K/UL (ref 150–450)
PMV BLD AUTO: 11.4 FL (ref 9.2–12.9)
POCT GLUCOSE: 169 MG/DL (ref 70–110)
POCT GLUCOSE: 175 MG/DL (ref 70–110)
POCT GLUCOSE: >500 MG/DL (ref 70–110)
POTASSIUM SERPL-SCNC: 2.9 MMOL/L (ref 3.5–5.1)
RBC # BLD AUTO: 3.19 M/UL (ref 4–5.4)
SODIUM SERPL-SCNC: 138 MMOL/L (ref 136–145)
WBC # BLD AUTO: 10.07 K/UL (ref 3.9–12.7)

## 2022-10-31 PROCEDURE — 36415 COLL VENOUS BLD VENIPUNCTURE: CPT

## 2022-10-31 PROCEDURE — 11000001 HC ACUTE MED/SURG PRIVATE ROOM

## 2022-10-31 PROCEDURE — 80048 BASIC METABOLIC PNL TOTAL CA: CPT

## 2022-10-31 PROCEDURE — 25000003 PHARM REV CODE 250

## 2022-10-31 PROCEDURE — 85025 COMPLETE CBC W/AUTO DIFF WBC: CPT

## 2022-10-31 PROCEDURE — 99024 POSTOP FOLLOW-UP VISIT: CPT | Mod: ,,, | Performed by: PHYSICIAN ASSISTANT

## 2022-10-31 PROCEDURE — 63600175 PHARM REV CODE 636 W HCPCS

## 2022-10-31 PROCEDURE — 25000003 PHARM REV CODE 250: Performed by: PHYSICIAN ASSISTANT

## 2022-10-31 PROCEDURE — 97530 THERAPEUTIC ACTIVITIES: CPT

## 2022-10-31 PROCEDURE — 99024 PR POST-OP FOLLOW-UP VISIT: ICD-10-PCS | Mod: ,,, | Performed by: PHYSICIAN ASSISTANT

## 2022-10-31 RX ORDER — CALCIUM CARBONATE 200(500)MG
1000 TABLET,CHEWABLE ORAL ONCE
Status: COMPLETED | OUTPATIENT
Start: 2022-10-31 | End: 2022-10-31

## 2022-10-31 RX ORDER — POTASSIUM CHLORIDE 7.45 MG/ML
10 INJECTION INTRAVENOUS
Status: DISCONTINUED | OUTPATIENT
Start: 2022-10-31 | End: 2022-10-31

## 2022-10-31 RX ADMIN — ACETAMINOPHEN 1000 MG: 500 TABLET ORAL at 12:10

## 2022-10-31 RX ADMIN — ACETAMINOPHEN 1000 MG: 500 TABLET ORAL at 11:10

## 2022-10-31 RX ADMIN — ACETAMINOPHEN 1000 MG: 500 TABLET ORAL at 05:10

## 2022-10-31 RX ADMIN — CALCIUM CARBONATE (ANTACID) CHEW TAB 500 MG 1000 MG: 500 CHEW TAB at 03:10

## 2022-10-31 RX ADMIN — HEPARIN SODIUM 5000 UNITS: 5000 INJECTION INTRAVENOUS; SUBCUTANEOUS at 09:10

## 2022-10-31 RX ADMIN — AMLODIPINE BESYLATE 10 MG: 10 TABLET ORAL at 09:10

## 2022-10-31 RX ADMIN — POTASSIUM BICARBONATE 25 MEQ: 977.5 TABLET, EFFERVESCENT ORAL at 12:10

## 2022-10-31 RX ADMIN — Medication 2 G: at 05:10

## 2022-10-31 RX ADMIN — METHOCARBAMOL 750 MG: 750 TABLET ORAL at 09:10

## 2022-10-31 RX ADMIN — METHOCARBAMOL 750 MG: 750 TABLET ORAL at 12:10

## 2022-10-31 RX ADMIN — METHOCARBAMOL 750 MG: 750 TABLET ORAL at 05:10

## 2022-10-31 RX ADMIN — ATORVASTATIN CALCIUM 20 MG: 20 TABLET, FILM COATED ORAL at 09:10

## 2022-10-31 RX ADMIN — POTASSIUM BICARBONATE 25 MEQ: 977.5 TABLET, EFFERVESCENT ORAL at 11:10

## 2022-10-31 RX ADMIN — LOSARTAN POTASSIUM AND HYDROCHLOROTHIAZIDE 1 TABLET: 100; 12.5 TABLET, FILM COATED ORAL at 09:10

## 2022-10-31 RX ADMIN — OXYCODONE HYDROCHLORIDE 10 MG: 10 TABLET ORAL at 03:10

## 2022-10-31 RX ADMIN — Medication 2 G: at 10:10

## 2022-10-31 RX ADMIN — HEPARIN SODIUM 5000 UNITS: 5000 INJECTION INTRAVENOUS; SUBCUTANEOUS at 05:10

## 2022-10-31 RX ADMIN — HYDROMORPHONE HYDROCHLORIDE 1 MG: 1 INJECTION, SOLUTION INTRAMUSCULAR; INTRAVENOUS; SUBCUTANEOUS at 08:10

## 2022-10-31 RX ADMIN — SENNOSIDES AND DOCUSATE SODIUM 2 TABLET: 50; 8.6 TABLET ORAL at 09:10

## 2022-10-31 RX ADMIN — ONDANSETRON 8 MG: 8 TABLET, ORALLY DISINTEGRATING ORAL at 06:10

## 2022-10-31 RX ADMIN — MUPIROCIN: 20 OINTMENT TOPICAL at 09:10

## 2022-10-31 RX ADMIN — HYDROMORPHONE HYDROCHLORIDE 1 MG: 1 INJECTION, SOLUTION INTRAMUSCULAR; INTRAVENOUS; SUBCUTANEOUS at 09:10

## 2022-10-31 RX ADMIN — HEPARIN SODIUM 5000 UNITS: 5000 INJECTION INTRAVENOUS; SUBCUTANEOUS at 03:10

## 2022-10-31 RX ADMIN — OXYCODONE HYDROCHLORIDE 10 MG: 10 TABLET ORAL at 09:10

## 2022-10-31 RX ADMIN — Medication 2 G: at 02:10

## 2022-10-31 RX ADMIN — PANTOPRAZOLE SODIUM 40 MG: 20 TABLET, DELAYED RELEASE ORAL at 09:10

## 2022-10-31 NOTE — PT/OT/SLP PROGRESS
Physical Therapy Treatment    Patient Name:  Africa Jennings   MRN:  [id number]    Recommendations:     Discharge Recommendations:  rehabilitation facility   Discharge Equipment Recommendations: bedside commode, bath bench   Barriers to discharge:  below functional baseline    Assessment:     Africa Jennings is a 52 y.o. female admitted with a medical diagnosis of Thoracic myelopathy.  She presents with the following impairments/functional limitations:  weakness, impaired endurance, impaired self care skills, impaired functional mobility, gait instability, impaired balance, decreased lower extremity function, decreased safety awareness, pain, impaired cardiopulmonary response to activity. Pt progressing functional mobility since initial evaluation, as pt now able to complete sit>stand transfer without physical assist and ambulated increased distance this date. Pt continues to demo increased difficulty with transitional movements and also demo'd instability throughout gait trial. Further progression of gait distance limited 2* impaired endurance and weakness. Continuing to recommend IP rehab at this time pending improvement during hospital admission. Pt would continue to benefit from skilled acute PT in order to address current deficits and progress functional mobility.     Rehab Prognosis: Good; patient would benefit from acute skilled PT services to address these deficits and reach maximum level of function.    Recent Surgery: Procedure(s) (LRB):  T9-12 ROBOTIC DECOMPRESSION, THORACIC FUSION (N/A) 3 Days Post-Op    Plan:     During this hospitalization, patient to be seen 4 x/week to address the identified rehab impairments via gait training, therapeutic activities, therapeutic exercises, neuromuscular re-education and progress toward the following goals:    Plan of Care Expires:  11/27/22    Subjective     Chief Complaint: back pain  Patient/Family Comments/goals: Pt agreeable to therapy.    Pain/Comfort:  Pain Rating 1:  (reported post-op back pain; did not rate)  Pain Addressed 1: Reposition, Distraction, Nurse notified      Objective:     Communicated with RN prior to session.  Patient found up in chair with wound vac, TLSO, hemovac, oxygen upon PT entry to room.     General Precautions: Standard, fall   Orthopedic Precautions:spinal precautions   Braces: TLSO  Respiratory Status: Nasal cannula, flow 2 L/min   Pt found on 2L O2. Discussed supplemental O2 needs with RN at beginning of session. RN advised for mobility trial on RA. SpO2 monitored throughout session. SpO2 mostly 90-93% on RA throughout mobility with brief decrease to 89% during ambulation. SpO2 93% following ambulation on RA. RN notified following. Remained on RA at end of session.      Functional Mobility:  Transfers:    Sit to Stand:  contact guard assistance with no AD from elevated bedside chair   Cues provided for ant weight-shift and transfer technique   Gait: ~48 ft x2 with Louann and HHA  Demo'd decreased step length, decreased brant, impaired weight-shifting ability, decreased toe-floor clearance, decreased heel strike, mild instability   Cues provided for self-pacing, safety awareness, symptom assessment, appropriate weight-shifts, upright posture, forward gaze      AM-PAC 6 CLICK MOBILITY  Turning over in bed (including adjusting bedclothes, sheets and blankets)?: 3  Sitting down on and standing up from a chair with arms (e.g., wheelchair, bedside commode, etc.): 3  Moving from lying on back to sitting on the side of the bed?: 3  Moving to and from a bed to a chair (including a wheelchair)?: 3  Need to walk in hospital room?: 3  Climbing 3-5 steps with a railing?: 2  Basic Mobility Total Score: 17       Treatment & Education:  Pt educated on role of PT and PT POC. Pt verbalized understanding.   Pt educated on spinal precautions, log-roll technique, and TLSO wear. Pt found with TLSO donned; adjusted for improved pt fit.   Pt  educated on the effects of bed rest and the importance of OOB activity. Pt encouraged to sit UIC majority of day as tolerated and continue daily ambulation with nursing assist. Pt verbalized understanding.  Pt oriented to call bell and instructed to call for staff assist with standing tasks/transfers. Pt verbalized understanding.   Discussed d/c recs and DME needs. Pt verbalized agreement to recs as listed above.    Patient left up in chair with all lines intact, call button in reach, chair alarm on, and RN notified..    GOALS:   Multidisciplinary Problems       Physical Therapy Goals          Problem: Physical Therapy    Goal Priority Disciplines Outcome Goal Variances Interventions   Physical Therapy Goal     PT, PT/OT Ongoing, Progressing     Description: Goals to be met by: 2022     Patient will increase functional independence with mobility by performin. Supine to sit with Contact Guard Assistance  2. Sit to stand transfer with Contact Guard Assistance - met 10/31  2a. Sit to stand transfer with Supervision   3. Bed to chair transfer with Contact Guard Assistance using LRAD as needed  4. Gait  x 150 feet with Contact Guard Assistance using LRAD as needed.  5. Lower extremity exercise program x15 reps, with supervision, in order to increase LE strength and (I) with functional mobility.                           Time Tracking:     PT Received On: 10/31/22  PT Start Time: 854     PT Stop Time: 915  PT Total Time (min): 21 min     Billable Minutes: Therapeutic Activity 21    Treatment Type: Treatment  PT/PTA: PT     PTA Visit Number: 0     10/31/2022

## 2022-10-31 NOTE — PROGRESS NOTES
Michael Oviedo - Neurosurgery ([facility])  Neurosurgery  Progress Note    Subjective:     History of Present Illness: 51 yo female with RLE paresthesia and weakness found to have thoracic spinal compression presenting for posterior spinal instrumented fusion and decompression T9-12 for thoracic disc herniation and ligament hypertrophy.       Post-Op Info:  Procedure(s) (LRB):  T9-12 ROBOTIC DECOMPRESSION, THORACIC FUSION (N/A)   3 Days Post-Op     Interval History: POD3. NAEON. AFVSS. Right leg pain resolved. Endorsing incisional back pain. Drain with 110 output after re-check with nurse. Voiding spontaneously. +BM. Walked well in halls with therapy. Potassium replaced.     Medications:  Continuous Infusions:  Scheduled Meds:   acetaminophen  1,000 mg Oral Q6H    amLODIPine  10 mg Oral Daily    atorvastatin  20 mg Oral Daily    ceFAZolin (ANCEF) IVPB  2 g Intravenous Q8H    heparin (porcine)  5,000 Units Subcutaneous Q8H    losartan-hydrochlorothiazide 100-12.5 mg  1 tablet Oral Daily    methocarbamoL  750 mg Oral QID    mupirocin   Nasal BID    pantoprazole  40 mg Oral Daily    potassium bicarbonate  25 mEq Oral Daily    senna-docusate 8.6-50 mg  2 tablet Oral Daily     PRN Meds:dextrose 10%, dextrose 10%, glucagon (human recombinant), glucose, glucose, HYDROmorphone, insulin aspart U-100, mupirocin, ondansetron, oxyCODONE, oxyCODONE, prochlorperazine     Review of Systems  Objective:     Weight: 99.8 kg (220 lb 0.3 oz)  Body mass index is 40.24 kg/m².  Vital Signs (Most Recent):  Temp: 98.5 °F (36.9 °C) (10/31/22 1131)  Pulse: 96 (10/31/22 1131)  Resp: 16 (10/31/22 1131)  BP: (!) 150/76 (10/31/22 1131)  SpO2: (!) 90 % (10/31/22 1131)   Vital Signs (24h Range):  Temp:  [97.5 °F (36.4 °C)-99.1 °F (37.3 °C)] 98.5 °F (36.9 °C)  Pulse:  [] 96  Resp:  [16-20] 16  SpO2:  [84 %-95 %] 90 %  BP: (118-150)/(58-77) 150/76                          Closed/Suction Drain 10/28/22 1745 Left;Superior Back Accordion 10 Fr.  (Active)   Site Description Healing 10/30/22 2000   Dressing Type Transparent (Tegaderm) 10/31/22 0805   Dressing Status Clean;Dry;Intact 10/31/22 0805   Dressing Intervention Integrity maintained 10/31/22 0805   Drainage Sanguineous 10/31/22 0805   Status To bulb suction 10/31/22 0805   Output (mL) 110 mL 10/31/22 0549       Neurosurgery Physical Exam  General: well developed, well nourished, no distress.   Head: normocephalic, atraumatic  Neck: No tracheal deviation. No palpable masses. Full ROM.   Neurologic: Alert and oriented. Thought content appropriate.  GCS: E4 V5 M6. GCS Total: 15  Mental Status: Awake, Alert, Oriented x 4  Language: No aphasia  Speech: No dysarthria  Cranial nerves: face symmetric, tongue midline, CN II-XII grossly intact.   Eyes: pupils equal, round, reactive to light with accomodation, EOMI.   Pulmonary: normal respirations, no signs of respiratory distress  Abdomen: soft, non-distended, not tender to palpation    Sensory: intact to light touch throughout  Motor Strength: Moves all extremities spontaneously with good tone.   No abnormal movements seen.     Strength  Deltoids Triceps Biceps Wrist Extension Wrist Flexion Hand    Upper: R 5/5 5/5 5/5 5/5 5/5 5/5    L 5/5 5/5 5/5 5/5 5/5 5/5     Strength  Iliopsoas Quadriceps Knee  Flexion Tibialis  anterior Gastro- cnemius EHL   Lower: R 5/5 5/5 5/5 5/5 5/5 5/5    L 5/5 5/5 5/5 5/5 5/5 5/5     Vascular: No LE edema.   Skin: Skin is warm, dry and intact.      Thoracic incision: Prevena incisional wound vac in place holding suction.      Significant Labs:  Recent Labs   Lab 10/30/22  0525 10/31/22  0849   * 181*    138   K 2.9* 2.9*   * 102   CO2 19* 25   BUN 10 7   CREATININE 0.5 0.7   CALCIUM 6.9* 8.4*     Recent Labs   Lab 10/30/22  1020 10/31/22  0849   WBC 16.57* 10.07   HGB 9.1* 8.9*   HCT 28.5* 27.9*    282     No results for input(s): LABPT, INR, APTT in the last 48 hours.  Microbiology Results (last 7  days)       ** No results found for the last 168 hours. **          All pertinent labs from the last 24 hours have been reviewed.    Significant Diagnostics:  I have reviewed all pertinent imaging results/findings within the past 24 hours.    Assessment/Plan:     Thoracic myelopathy  51 yo female with RLE paresthesia and weakness found to have thoracic spinal compression now s/p posterior spinal instrumented fusion and decompression T9-12 for thoracic disc herniation and ligament hypertrophy on 10/28.     --Floor care   -q4h neuro checks  --All pertinent labs and diagnostics personally reviewed.  --Imaging: Post op xrays obtained with appropriate hardware placement.   --Pain control: Continue scheduled Tylenol 1000 mg q8h. QID Robaxin. Oxy 5/Oxy 10 PRN.    --HTN: SBP goal <170. Continue home meds  --DVT ppx: TEDs/SCDs/SQH  --Activity: PT/OT, OOB. Brace when up and out of bed.   --Hypokalemia: Replaced. Consider holding HCTZ if hypokalemia persists.   --Hypocalcemia: replaced  --Diet: Diabetic  --Bowel regimen: Senna daily. +BM  --Urinary: Voiding spontaneously  --DM: LDSSI  --Atelectasis ppx: Encourage IS hourly    Dispo: Pending rehab  Discussed with Dr. Jose Eduardo Zavala PA-C  Neurosurgery  Michael Oviedo - Neurosurgery ([facility])

## 2022-10-31 NOTE — PLAN OF CARE
Certification of Assistant at Surgery       Surgery Date: 10/28/2022     Participating Surgeons:  Surgeon(s) and Role:     * Edouard Whitt, DO - Primary    Assisting:     * Lissett Cruz PA-C    Procedures:  Procedure(s) (LRB):  T9-12 ROBOTIC DECOMPRESSION, THORACIC FUSION (N/A)    Assistant Surgeon's Certification of Necessity:  I understand that section 1842 (b) (6) (d) of the Social Security Act generally prohibits Medicare Part B reasonable charge payment for the services of assistants at surgery in teaching hospitals when qualified residents are available to furnish such services. I certify that the services for which payment is claimed were medically necessary, and that no qualified resident was available to perform the services. I further understand that these services are subject to post-payment review by the Medicare carrier.      Lissett Cruz PA-C    10/31/2022  8:24 AM

## 2022-10-31 NOTE — ASSESSMENT & PLAN NOTE
51 yo female with RLE paresthesia and weakness found to have thoracic spinal compression now s/p posterior spinal instrumented fusion and decompression T9-12 for thoracic disc herniation and ligament hypertrophy on 10/28.     --Floor care   -q4h neuro checks  --All pertinent labs and diagnostics personally reviewed.  --Imaging: Post op xrays obtained with appropriate hardware placement.   --Pain control: Continue scheduled Tylenol 1000 mg q8h. QID Robaxin. Oxy 5/Oxy 10 PRN.    --HTN: SBP goal <170. Continue home meds  --DVT ppx: TEDs/SCDs/SQH  --Activity: PT/OT, OOB. Brace when up and out of bed.   --Hypokalemia: Replaced. Consider holding HCTZ if hypokalemia persists.   --Hypocalcemia: replaced  --Diet: Diabetic  --Bowel regimen: Senna daily. +BM  --Urinary: Voiding spontaneously  --DM: LDSSI  --Atelectasis ppx: Encourage IS hourly    Dispo: Pending rehab  Discussed with Dr. Whitt

## 2022-10-31 NOTE — PLAN OF CARE
Michael Oviedo - Neurosurgery ([facility])  Brief Operative Note    SUMMARY     Surgery Date: 10/28/2022     Surgeon(s) and Role:     * Edouard Whitt, DO - Primary    Assisting:     * Lissett Cruz PA-C    Pre-op Diagnosis:  DDD (degenerative disc disease), thoracic [M51.34]  Spondylosis of thoracic spine with myelopathy [M47.14]  Thoracic stenosis [M48.04]    Post-op Diagnosis:  Post-Op Diagnosis Codes:     * DDD (degenerative disc disease), thoracic [M51.34]     * Spondylosis of thoracic spine with myelopathy [M47.14]     * Thoracic stenosis [M48.04]    Procedure(s) (LRB):  T9-12 ROBOTIC DECOMPRESSION, THORACIC FUSION (N/A)    Anesthesia: General    Operative Findings: T9-12 fusion with T9-11 decompression    Estimated Blood Loss: * No values recorded between 10/28/2022  3:04 PM and 10/28/2022  6:21 PM *    Estimated Blood Loss has been documented.         Specimens:   Specimen (24h ago, onward)      None            [id number]

## 2022-10-31 NOTE — SUBJECTIVE & OBJECTIVE
Interval History: POD3. NAEON. AFVSS. Right leg pain resolved. Endorsing incisional back pain. Drain with 110 output after re-check with nurse. Voiding spontaneously. +BM. Walked well in halls with therapy. Potassium replaced.     Medications:  Continuous Infusions:  Scheduled Meds:   acetaminophen  1,000 mg Oral Q6H    amLODIPine  10 mg Oral Daily    atorvastatin  20 mg Oral Daily    ceFAZolin (ANCEF) IVPB  2 g Intravenous Q8H    heparin (porcine)  5,000 Units Subcutaneous Q8H    losartan-hydrochlorothiazide 100-12.5 mg  1 tablet Oral Daily    methocarbamoL  750 mg Oral QID    mupirocin   Nasal BID    pantoprazole  40 mg Oral Daily    potassium bicarbonate  25 mEq Oral Daily    senna-docusate 8.6-50 mg  2 tablet Oral Daily     PRN Meds:dextrose 10%, dextrose 10%, glucagon (human recombinant), glucose, glucose, HYDROmorphone, insulin aspart U-100, mupirocin, ondansetron, oxyCODONE, oxyCODONE, prochlorperazine     Review of Systems  Objective:     Weight: 99.8 kg (220 lb 0.3 oz)  Body mass index is 40.24 kg/m².  Vital Signs (Most Recent):  Temp: 98.5 °F (36.9 °C) (10/31/22 1131)  Pulse: 96 (10/31/22 1131)  Resp: 16 (10/31/22 1131)  BP: (!) 150/76 (10/31/22 1131)  SpO2: (!) 90 % (10/31/22 1131)   Vital Signs (24h Range):  Temp:  [97.5 °F (36.4 °C)-99.1 °F (37.3 °C)] 98.5 °F (36.9 °C)  Pulse:  [] 96  Resp:  [16-20] 16  SpO2:  [84 %-95 %] 90 %  BP: (118-150)/(58-77) 150/76                          Closed/Suction Drain 10/28/22 1745 Left;Superior Back Accordion 10 Fr. (Active)   Site Description Healing 10/30/22 2000   Dressing Type Transparent (Tegaderm) 10/31/22 0805   Dressing Status Clean;Dry;Intact 10/31/22 0805   Dressing Intervention Integrity maintained 10/31/22 0805   Drainage Sanguineous 10/31/22 0805   Status To bulb suction 10/31/22 0805   Output (mL) 110 mL 10/31/22 0549       Neurosurgery Physical Exam  General: well developed, well nourished, no distress.   Head: normocephalic, atraumatic  Neck: No  tracheal deviation. No palpable masses. Full ROM.   Neurologic: Alert and oriented. Thought content appropriate.  GCS: E4 V5 M6. GCS Total: 15  Mental Status: Awake, Alert, Oriented x 4  Language: No aphasia  Speech: No dysarthria  Cranial nerves: face symmetric, tongue midline, CN II-XII grossly intact.   Eyes: pupils equal, round, reactive to light with accomodation, EOMI.   Pulmonary: normal respirations, no signs of respiratory distress  Abdomen: soft, non-distended, not tender to palpation    Sensory: intact to light touch throughout  Motor Strength: Moves all extremities spontaneously with good tone.   No abnormal movements seen.     Strength  Deltoids Triceps Biceps Wrist Extension Wrist Flexion Hand    Upper: R 5/5 5/5 5/5 5/5 5/5 5/5    L 5/5 5/5 5/5 5/5 5/5 5/5     Strength  Iliopsoas Quadriceps Knee  Flexion Tibialis  anterior Gastro- cnemius EHL   Lower: R 5/5 5/5 5/5 5/5 5/5 5/5    L 5/5 5/5 5/5 5/5 5/5 5/5     Vascular: No LE edema.   Skin: Skin is warm, dry and intact.      Thoracic incision: Prevena incisional wound vac in place holding suction.      Significant Labs:  Recent Labs   Lab 10/30/22  0525 10/31/22  0849   * 181*    138   K 2.9* 2.9*   * 102   CO2 19* 25   BUN 10 7   CREATININE 0.5 0.7   CALCIUM 6.9* 8.4*     Recent Labs   Lab 10/30/22  1020 10/31/22  0849   WBC 16.57* 10.07   HGB 9.1* 8.9*   HCT 28.5* 27.9*    282     No results for input(s): LABPT, INR, APTT in the last 48 hours.  Microbiology Results (last 7 days)       ** No results found for the last 168 hours. **          All pertinent labs from the last 24 hours have been reviewed.    Significant Diagnostics:  I have reviewed all pertinent imaging results/findings within the past 24 hours.

## 2022-10-31 NOTE — PT/OT/SLP PROGRESS
Occupational Therapy      Patient Name:  Africa Jennings   MRN:  [id number]    Patient not seen today secondary to declining due to just worked with PT recently and was fatigued. Reports that she has been moving around well and getting to the bathroom. Will follow-up.    10/31/2022

## 2022-10-31 NOTE — PLAN OF CARE
Goals reviewed and remain appropriate. Pt progressing towards goals.    10/31/2022    Problem: Physical Therapy  Goal: Physical Therapy Goal  Description: Goals to be met by: 2022     Patient will increase functional independence with mobility by performin. Supine to sit with Contact Guard Assistance  2. Sit to stand transfer with Contact Guard Assistance - met 10/31  2a. Sit to stand transfer with Supervision   3. Bed to chair transfer with Contact Guard Assistance using LRAD as needed  4. Gait  x 150 feet with Contact Guard Assistance using LRAD as needed.  5. Lower extremity exercise program x15 reps, with supervision, in order to increase LE strength and (I) with functional mobility.      Outcome: Ongoing, Progressing

## 2022-10-31 NOTE — PLAN OF CARE
Michael Oviedo - Neurosurgery (Hospital)  Initial Discharge Assessment       Primary Care Provider: Brandi Thompson MD    Admission Diagnosis: DDD (degenerative disc disease), thoracic [M51.34]  Spondylosis of thoracic spine with myelopathy [M47.14]  Thoracic stenosis [M48.04]  Thoracic myelopathy [M47.14]    Admission Date: 10/28/2022  Expected Discharge Date: 11/3/2022    Discharge Barriers Identified: None    Payor: BLUE CROSS BLUE SHIELD / Plan: BCBS ALL OUT OF STATE / Product Type: PPO /     Extended Emergency Contact Information  Primary Emergency Contact: Bud Marley           [facility] [place] [facility] [place]  Home Phone: [phone]  Mobile Phone: [phone]  Relation: Son  Secondary Emergency Contact: Howard Tolbert  Address: [address]  Home Phone: [phone]  Mobile Phone: [phone]  Relation: Significant other    Discharge Plan A: Rehab  Discharge Plan B: Home with family, Home Health      [id number] [place] - [address]  [address] [address]  Phone: [phone] Fax: [phone]    CM obtained discharge planning assessment with patient.  Patient provided with discharge planning booklet.    Initial Assessment (most recent)       Adult Discharge Assessment - 10/31/22 1218          Discharge Assessment    Assessment Type Discharge Planning Assessment     Confirmed/corrected address, phone number and insurance Yes     Confirmed Demographics Correct on Facesheet     Source of Information patient     Communicated MIKE with patient/caregiver Yes     Reason For Admission robotic decompression     Lives With significant other     Do you expect to return to your current living situation? Yes     Do you have help at home or someone to help you manage your care at home? Yes     Who are your caregiver(s) and their phone number(s)? Howard Tolbert - S/O [phone]     Prior to hospitilization cognitive status:  Alert/Oriented     Current cognitive status: Alert/Oriented     Walking or Climbing Stairs Difficulty none     Dressing/Bathing Difficulty none     Equipment Currently Used at Home none     Readmission within 30 days? No     Patient currently being followed by outpatient case management? No     Do you currently have service(s) that help you manage your care at home? No     Do you take prescription medications? Yes     Do you have prescription coverage? Yes     Coverage [facility] BLUE SHIELD - BCBS ALL OUT OF STATE     Do you have any problems affording any of your prescribed medications? No     Is the patient taking medications as prescribed? yes     Who is going to help you get home at discharge? family     How do you get to doctors appointments? family or friend will provide;car, drives self     Are you on dialysis? No     Do you take coumadin? No     Discharge Plan A Rehab     Discharge Plan B Home with family;Home Health     DME Needed Upon Discharge  other (see comments)   tbd    Discharge Plan discussed with: Patient     Discharge Barriers Identified None        Relationship/Environment    Name(s) of Who Lives With Patient Howard

## 2022-10-31 NOTE — PLAN OF CARE
CM met with patient to discuss discharge plan.  Rec is Rehab.  CM discussed rehab options and patient chose [facility] as 1st choice and [facility] as 2nd.  Referral sent in [facility].   10/31/22 1312   Post-Acute Status   Post-Acute Authorization Placement   Post-Acute Placement Status Referrals Sent

## 2022-10-31 NOTE — PLAN OF CARE
Problem: Adult Inpatient Plan of Care  Goal: Plan of Care Review  Outcome: Ongoing, Progressing     Problem: Adult Inpatient Plan of Care  Goal: Optimal Comfort and Wellbeing  Outcome: Ongoing, Progressing     Problem: Adult Inpatient Plan of Care  Goal: Readiness for Transition of Care  Outcome: Ongoing, Progressing     Problem: Diabetes Comorbidity  Goal: Blood Glucose Level Within Targeted Range  Outcome: Ongoing, Progressing     Problem: Infection  Goal: Absence of Infection Signs and Symptoms  Outcome: Ongoing, Progressing     Problem: Fall Injury Risk  Goal: Absence of Fall and Fall-Related Injury  Outcome: Ongoing, Progressing   Patient has slept well tonight, no s/sx of distress and is going longer between asking for pain meds. Moves independently in bed, gets up from bed to ambulate in room with min to sba. Gait steady, reminded to take her time, so she doesn't fall. Wound vac in place with no output in cannister noted. Hemovac drain has approximately 70cc of dark red drainage noted. VSS, flow sheets completed, see for assessments. SCD's in place.

## 2022-11-01 LAB
MAGNESIUM SERPL-MCNC: 1.6 MG/DL (ref 1.6–2.6)
POCT GLUCOSE: 108 MG/DL (ref 70–110)
POCT GLUCOSE: 130 MG/DL (ref 70–110)
POCT GLUCOSE: 141 MG/DL (ref 70–110)
POCT GLUCOSE: 145 MG/DL (ref 70–110)
POTASSIUM SERPL-SCNC: 3.3 MMOL/L (ref 3.5–5.1)

## 2022-11-01 PROCEDURE — 25000003 PHARM REV CODE 250: Performed by: PHYSICIAN ASSISTANT

## 2022-11-01 PROCEDURE — 63600175 PHARM REV CODE 636 W HCPCS

## 2022-11-01 PROCEDURE — 97530 THERAPEUTIC ACTIVITIES: CPT

## 2022-11-01 PROCEDURE — 11000001 HC ACUTE MED/SURG PRIVATE ROOM

## 2022-11-01 PROCEDURE — 83735 ASSAY OF MAGNESIUM: CPT | Performed by: PHYSICIAN ASSISTANT

## 2022-11-01 PROCEDURE — 25000003 PHARM REV CODE 250

## 2022-11-01 PROCEDURE — 99024 PR POST-OP FOLLOW-UP VISIT: ICD-10-PCS | Mod: ,,, | Performed by: PHYSICIAN ASSISTANT

## 2022-11-01 PROCEDURE — 99024 POSTOP FOLLOW-UP VISIT: CPT | Mod: ,,, | Performed by: PHYSICIAN ASSISTANT

## 2022-11-01 PROCEDURE — 84132 ASSAY OF SERUM POTASSIUM: CPT | Performed by: PHYSICIAN ASSISTANT

## 2022-11-01 PROCEDURE — 36415 COLL VENOUS BLD VENIPUNCTURE: CPT | Performed by: PHYSICIAN ASSISTANT

## 2022-11-01 RX ORDER — POTASSIUM CHLORIDE 20 MEQ/1
40 TABLET, EXTENDED RELEASE ORAL ONCE
Status: COMPLETED | OUTPATIENT
Start: 2022-11-01 | End: 2022-11-01

## 2022-11-01 RX ORDER — METHOCARBAMOL 750 MG/1
750 TABLET, FILM COATED ORAL 4 TIMES DAILY
Qty: 40 TABLET | Refills: 0 | Status: SHIPPED | OUTPATIENT
Start: 2022-11-01 | End: 2022-11-12

## 2022-11-01 RX ADMIN — POTASSIUM CHLORIDE 40 MEQ: 1500 TABLET, EXTENDED RELEASE ORAL at 05:11

## 2022-11-01 RX ADMIN — METHOCARBAMOL 750 MG: 750 TABLET ORAL at 09:11

## 2022-11-01 RX ADMIN — OXYCODONE HYDROCHLORIDE 10 MG: 10 TABLET ORAL at 03:11

## 2022-11-01 RX ADMIN — Medication 2 G: at 12:11

## 2022-11-01 RX ADMIN — POTASSIUM BICARBONATE 25 MEQ: 977.5 TABLET, EFFERVESCENT ORAL at 10:11

## 2022-11-01 RX ADMIN — OXYCODONE HYDROCHLORIDE 10 MG: 10 TABLET ORAL at 09:11

## 2022-11-01 RX ADMIN — HEPARIN SODIUM 5000 UNITS: 5000 INJECTION INTRAVENOUS; SUBCUTANEOUS at 09:11

## 2022-11-01 RX ADMIN — PROCHLORPERAZINE EDISYLATE 5 MG: 5 INJECTION INTRAMUSCULAR; INTRAVENOUS at 08:11

## 2022-11-01 RX ADMIN — HYDROMORPHONE HYDROCHLORIDE 1 MG: 1 INJECTION, SOLUTION INTRAMUSCULAR; INTRAVENOUS; SUBCUTANEOUS at 12:11

## 2022-11-01 RX ADMIN — METHOCARBAMOL 750 MG: 750 TABLET ORAL at 10:11

## 2022-11-01 RX ADMIN — ATORVASTATIN CALCIUM 20 MG: 20 TABLET, FILM COATED ORAL at 10:11

## 2022-11-01 RX ADMIN — ACETAMINOPHEN 1000 MG: 500 TABLET ORAL at 06:11

## 2022-11-01 RX ADMIN — AMLODIPINE BESYLATE 10 MG: 10 TABLET ORAL at 10:11

## 2022-11-01 RX ADMIN — HEPARIN SODIUM 5000 UNITS: 5000 INJECTION INTRAVENOUS; SUBCUTANEOUS at 06:11

## 2022-11-01 RX ADMIN — LOSARTAN POTASSIUM AND HYDROCHLOROTHIAZIDE 1 TABLET: 100; 12.5 TABLET, FILM COATED ORAL at 10:11

## 2022-11-01 RX ADMIN — SENNOSIDES AND DOCUSATE SODIUM 2 TABLET: 50; 8.6 TABLET ORAL at 10:11

## 2022-11-01 RX ADMIN — ONDANSETRON 8 MG: 8 TABLET, ORALLY DISINTEGRATING ORAL at 03:11

## 2022-11-01 RX ADMIN — HYDROMORPHONE HYDROCHLORIDE 1 MG: 1 INJECTION, SOLUTION INTRAMUSCULAR; INTRAVENOUS; SUBCUTANEOUS at 06:11

## 2022-11-01 RX ADMIN — METHOCARBAMOL 750 MG: 750 TABLET ORAL at 01:11

## 2022-11-01 RX ADMIN — ACETAMINOPHEN 1000 MG: 500 TABLET ORAL at 12:11

## 2022-11-01 RX ADMIN — METHOCARBAMOL 750 MG: 750 TABLET ORAL at 06:11

## 2022-11-01 RX ADMIN — Medication 2 G: at 03:11

## 2022-11-01 RX ADMIN — MUPIROCIN: 20 OINTMENT TOPICAL at 09:11

## 2022-11-01 RX ADMIN — PANTOPRAZOLE SODIUM 40 MG: 20 TABLET, DELAYED RELEASE ORAL at 10:11

## 2022-11-01 RX ADMIN — HEPARIN SODIUM 5000 UNITS: 5000 INJECTION INTRAVENOUS; SUBCUTANEOUS at 01:11

## 2022-11-01 RX ADMIN — OXYCODONE 5 MG: 5 TABLET ORAL at 05:11

## 2022-11-01 RX ADMIN — ACETAMINOPHEN 1000 MG: 500 TABLET ORAL at 11:11

## 2022-11-01 NOTE — PROGRESS NOTES
Michael Oviedo - Neurosurgery ([facility])  Neurosurgery  Progress Note    Subjective:     History of Present Illness: 51 yo female with RLE paresthesia and weakness found to have thoracic spinal compression presenting for posterior spinal instrumented fusion and decompression T9-12 for thoracic disc herniation and ligament hypertrophy.       Post-Op Info:  Procedure(s) (LRB):  T9-12 ROBOTIC DECOMPRESSION, THORACIC FUSION (N/A)   4 Days Post-Op     Interval History: NAEON. WV and HV drain removed today. Pt is progressing well with PT, now recommending HH. She denies any new complaints this am. Pt reports she will not have family support at home until tomorrow. Will plan for DC tomorrow am    Medications:  Continuous Infusions:  Scheduled Meds:   acetaminophen  1,000 mg Oral Q6H    amLODIPine  10 mg Oral Daily    atorvastatin  20 mg Oral Daily    ceFAZolin (ANCEF) IVPB  2 g Intravenous Q8H    heparin (porcine)  5,000 Units Subcutaneous Q8H    losartan-hydrochlorothiazide 100-12.5 mg  1 tablet Oral Daily    methocarbamoL  750 mg Oral QID    mupirocin   Nasal BID    pantoprazole  40 mg Oral Daily    potassium chloride  40 mEq Oral Once    senna-docusate 8.6-50 mg  2 tablet Oral Daily     PRN Meds:dextrose 10%, dextrose 10%, glucagon (human recombinant), glucose, glucose, HYDROmorphone, insulin aspart U-100, mupirocin, ondansetron, oxyCODONE, oxyCODONE, prochlorperazine     Review of Systems  Objective:     Weight: 99.8 kg (220 lb 0.3 oz)  Body mass index is 40.24 kg/m².  Vital Signs (Most Recent):  Temp: 98 °F (36.7 °C) (11/01/22 1148)  Pulse: 95 (11/01/22 1148)  Resp: 16 (11/01/22 1223)  BP: (!) 125/59 (11/01/22 1148)  SpO2: (!) 93 % (11/01/22 1148)   Vital Signs (24h Range):  Temp:  [96.4 °F (35.8 °C)-98.2 °F (36.8 °C)] 98 °F (36.7 °C)  Pulse:  [68-95] 95  Resp:  [16-18] 16  SpO2:  [90 %-95 %] 93 %  BP: (123-153)/(59-83) 125/59     Date 11/01/22 0700 - 11/02/22 0659   Shift 2623-9718 3466-0277 0170-4210 24 Hour Total    INTAKE   Shift Total(mL/kg)       OUTPUT   Drains 30   30   Shift Total(mL/kg) 30(0.3)   30(0.3)   Weight (kg) 99.8 99.8 99.8 99.8                        Closed/Suction Drain 10/28/22 1745 Left;Superior Back Accordion 10 Fr. (Active)   Site Description Healing 10/30/22 2000   Dressing Type Transparent (Tegaderm) 10/31/22 0805   Dressing Status Clean;Dry;Intact 10/31/22 0805   Dressing Intervention Integrity maintained 10/31/22 0805   Drainage Sanguineous 10/31/22 0805   Status To bulb suction 10/31/22 0805   Output (mL) 110 mL 10/31/22 0549       Neurosurgery Physical Exam  General: well developed, well nourished, no distress.   Head: normocephalic, atraumatic  Neck: No tracheal deviation. No palpable masses. Full ROM.   Neurologic: Alert and oriented. Thought content appropriate.  GCS: E4 V5 M6. GCS Total: 15  Mental Status: Awake, Alert, Oriented x 4  Language: No aphasia  Speech: No dysarthria  Cranial nerves: face symmetric, tongue midline, CN II-XII grossly intact.   Eyes: pupils equal, round, reactive to light with accomodation, EOMI.   Pulmonary: normal respirations, no signs of respiratory distress  Abdomen: soft, non-distended, not tender to palpation  Sensory: intact to light touch throughout  Motor Strength: Moves all extremities spontaneously with good tone.   No abnormal movements seen.     Strength  Deltoids Triceps Biceps Wrist Extension Wrist Flexion Hand    Upper: R 5/5 5/5 5/5 5/5 5/5 5/5    L 5/5 5/5 5/5 5/5 5/5 5/5     Strength  Iliopsoas Quadriceps Knee  Flexion Tibialis  anterior Gastro- cnemius EHL   Lower: R 5/5 5/5 5/5 5/5 5/5 5/5    L 5/5 5/5 5/5 5/5 5/5 5/5     Vascular: No LE edema.   Skin: Skin is warm, dry and intact.    Thoracic incision: Incision c/d/I with skin edges well approximated with staples, HV drain with serosanguinous output      Significant Labs:  Recent Labs   Lab 10/31/22  0849 11/01/22  1019   *  --      --    K 2.9* 3.3*     --    CO2 25  --     BUN 7  --    CREATININE 0.7  --    CALCIUM 8.4*  --    MG  --  1.6       Recent Labs   Lab 10/31/22  0849   WBC 10.07   HGB 8.9*   HCT 27.9*          No results for input(s): LABPT, INR, APTT in the last 48 hours.  Microbiology Results (last 7 days)       ** No results found for the last 168 hours. **          All pertinent labs from the last 24 hours have been reviewed.    Significant Diagnostics:  I have reviewed all pertinent imaging results/findings within the past 24 hours.      Assessment/Plan:     Thoracic myelopathy  53 yo female with RLE paresthesia and weakness found to have thoracic spinal compression now s/p posterior spinal instrumented fusion and decompression T9-12 for thoracic disc herniation and ligament hypertrophy on 10/28.     --Continue q4h neuro checks  --All pertinent labs and diagnostics personally reviewed.  --Imaging: Post op xrays obtained with appropriate hardware placement.   --Pain control: Continue scheduled Tylenol 1000 mg q8h. QID Robaxin. Oxy 5/Oxy 10 PRN.    --HV drain and WV removed today without complication  --HTN: SBP goal <170. Continue home meds  --DVT ppx: TEDs/SCDs/SQH  --Activity: PT/OT, OOB. Brace when up and out of bed.   --Hypokalemia: Replaced. Consider holding HCTZ if hypokalemia persists.   --Hypocalcemia: Replaced  --Diet: Diabetic   --Bowel regimen: Senna daily. +BM  --SQH for DVT prophylaxis   --Urinary: Voiding spontaneously   --DM: LDSSI  --Atelectasis ppx: Encourage IS hourly    Dispo: home with home helath tomorrow once pt has family support   Discussed with Dr. Jose Eduardo Ledesma, PAYobanyC  Neurosurgery  [facility]o - Neurosurgery ([facility])

## 2022-11-01 NOTE — SUBJECTIVE & OBJECTIVE
Interval History: NAEON. WV and HV drain removed today. Pt is progressing well with PT, now recommending HH. She denies any new complaints this am. Pt reports she will not have family support at home until tomorrow. Will plan for DC tomorrow am    Medications:  Continuous Infusions:  Scheduled Meds:   acetaminophen  1,000 mg Oral Q6H    amLODIPine  10 mg Oral Daily    atorvastatin  20 mg Oral Daily    ceFAZolin (ANCEF) IVPB  2 g Intravenous Q8H    heparin (porcine)  5,000 Units Subcutaneous Q8H    losartan-hydrochlorothiazide 100-12.5 mg  1 tablet Oral Daily    methocarbamoL  750 mg Oral QID    mupirocin   Nasal BID    pantoprazole  40 mg Oral Daily    potassium chloride  40 mEq Oral Once    senna-docusate 8.6-50 mg  2 tablet Oral Daily     PRN Meds:dextrose 10%, dextrose 10%, glucagon (human recombinant), glucose, glucose, HYDROmorphone, insulin aspart U-100, mupirocin, ondansetron, oxyCODONE, oxyCODONE, prochlorperazine     Review of Systems  Objective:     Weight: 99.8 kg (220 lb 0.3 oz)  Body mass index is 40.24 kg/m².  Vital Signs (Most Recent):  Temp: 98 °F (36.7 °C) (11/01/22 1148)  Pulse: 95 (11/01/22 1148)  Resp: 16 (11/01/22 1223)  BP: (!) 125/59 (11/01/22 1148)  SpO2: (!) 93 % (11/01/22 1148)   Vital Signs (24h Range):  Temp:  [96.4 °F (35.8 °C)-98.2 °F (36.8 °C)] 98 °F (36.7 °C)  Pulse:  [68-95] 95  Resp:  [16-18] 16  SpO2:  [90 %-95 %] 93 %  BP: (123-153)/(59-83) 125/59     Date 11/01/22 0700 - 11/02/22 0659   Shift 4477-5536 1673-9984 3057-5159 24 Hour Total   INTAKE   Shift Total(mL/kg)       OUTPUT   Drains 30   30   Shift Total(mL/kg) 30(0.3)   30(0.3)   Weight (kg) 99.8 99.8 99.8 99.8                        Closed/Suction Drain 10/28/22 1745 Left;Superior Back Accordion 10 Fr. (Active)   Site Description Healing 10/30/22 2000   Dressing Type Transparent (Tegaderm) 10/31/22 0805   Dressing Status Clean;Dry;Intact 10/31/22 0805   Dressing Intervention Integrity maintained 10/31/22 0805   Drainage  Progress Notes by Kevon Au MD at 4/24/2017  1:30 PM     Author: Kevon Au MD Service: -- Author Type: Physician    Filed: 4/27/2017  9:54 PM Encounter Date: 4/24/2017 Status: Signed    : Kevon Au MD (Physician)       [facility] Well Child Check 4-5 Years    ASSESSMENT & PLAN  Kirby Roberts is a 4  y.o. 2  m.o. who has normal growth and normal development.    There are no diagnoses linked to this encounter.    Follow up in 6 months to check his height.  Return in 1 year (on 4/24/2018) for Well Child Check.     IMMUNIZATIONS  Appropriate vaccinations were ordered.    REFERRALS  Dental:  Recommend routine dental care as appropriate.  Other:  No additional referrals were made at this time.    ANTICIPATORY GUIDANCE  I have reviewed age appropriate anticipatory guidance.    HEALTH HISTORY  Do you have any concerns that you'd like to discuss today?: No concerns      Recently had follow up with Dr. Guzman      Roomed by: anam    Accompanied by Parents    Refills needed? No    Do you have any forms that need to be filled out? No        Do you have any significant health concerns in your family history?: No  No family history on file.  Since your last visit, have there been any major changes in your family, such as a move, job change, separation, divorce, or death in the family?: No    Who lives in your home?:  Mom, dad, brother  Social History     Social History Narrative     Who provides care for your child?:  at home    What does your child do for exercise?:  Run around, draw  What activities is your child involved with?:  na  How many hours per day is your child viewing a screen (phone, TV, laptop, tablet, computer)?: a lot    What school does your child attend?:  na  What grade is your child in?:  na  Do you have any concerns with school for your child (social, academic, behavioral)?: None    Nutrition:  What is your child drinking (cow's milk, water, soda, juice, sports drinks, energy drinks,  Sanguineous 10/31/22 0805   Status To bulb suction 10/31/22 0805   Output (mL) 110 mL 10/31/22 0549       Neurosurgery Physical Exam  General: well developed, well nourished, no distress.   Head: normocephalic, atraumatic  Neck: No tracheal deviation. No palpable masses. Full ROM.   Neurologic: Alert and oriented. Thought content appropriate.  GCS: E4 V5 M6. GCS Total: 15  Mental Status: Awake, Alert, Oriented x 4  Language: No aphasia  Speech: No dysarthria  Cranial nerves: face symmetric, tongue midline, CN II-XII grossly intact.   Eyes: pupils equal, round, reactive to light with accomodation, EOMI.   Pulmonary: normal respirations, no signs of respiratory distress  Abdomen: soft, non-distended, not tender to palpation  Sensory: intact to light touch throughout  Motor Strength: Moves all extremities spontaneously with good tone.   No abnormal movements seen.     Strength  Deltoids Triceps Biceps Wrist Extension Wrist Flexion Hand    Upper: R 5/5 5/5 5/5 5/5 5/5 5/5    L 5/5 5/5 5/5 5/5 5/5 5/5     Strength  Iliopsoas Quadriceps Knee  Flexion Tibialis  anterior Gastro- cnemius EHL   Lower: R 5/5 5/5 5/5 5/5 5/5 5/5    L 5/5 5/5 5/5 5/5 5/5 5/5     Vascular: No LE edema.   Skin: Skin is warm, dry and intact.    Thoracic incision: Incision c/d/I with skin edges well approximated with staples, HV drain with serosanguinous output      Significant Labs:  Recent Labs   Lab 10/31/22  0849 11/01/22  1019   *  --      --    K 2.9* 3.3*     --    CO2 25  --    BUN 7  --    CREATININE 0.7  --    CALCIUM 8.4*  --    MG  --  1.6       Recent Labs   Lab 10/31/22  0849   WBC 10.07   HGB 8.9*   HCT 27.9*          No results for input(s): LABPT, INR, APTT in the last 48 hours.  Microbiology Results (last 7 days)       ** No results found for the last 168 hours. **          All pertinent labs from the last 24 hours have been reviewed.    Significant Diagnostics:  I have reviewed all pertinent imaging  etc)?: cow's milk- 2%, water and juice  What type of water does your child drink?:  bottle  Do you have any questions about feeding your child?:  Yes: does not eat as much    Sleep:  What time does your child go to bed?: 10   What time does your child wake up?: 7-8   How many naps does your child take during the day?: 0-1     Elimination:  Do you have any concerns with your child's bowels or bladder (peeing, pooping, constipation?):  No    TB Risk Assessment:  The patient and/or parent/guardian answer positive to:  patient and/or parent/guardian answer 'no' to all screening TB questions    No results found for: LEADBLOOD    Lead Screening  During the past six months has the child lived in or regularly visited a home, childcare, or  other building built before 1950? No    During the past six months has the child lived in or regularly visited a home, childcare, or  other building built before 1978 with recent or ongoing repair, remodeling or damage  (such as water damage or chipped paint)? No    Has the child or his/her sibling, playmate, or housemate had an elevated blood lead level?  No    Flouride Varnish Application Screening  Is child seen by dentist?     Yes    DEVELOPMENT  Do parents have any concerns regarding development?  No  Do parents have any concerns regarding hearing?  No  Do parents have any concerns regarding vision?  No  Developmental Tool Used: PEDS : Pass  Early Childhood Screening: Done/Passed    VISION/HEARING  Vision: Not done: Performed elsewhere: [facility] eye  Hearing:  Completed. See Results    No exam data present    Patient Active Problem List   Diagnosis   ? Amblyopia of both eyes- farsightedness and astigmatism. Followed by Dr. Guzman       MEASUREMENTS    Height:     Weight:    BMI: There is no height or weight on file to calculate BMI.  Blood Pressure:    No blood pressure reading on file for this encounter.        4 to 5 Year Physical Exam      Physical Exam:    Gen: Awake, Alert and  Cooperative  Head: Normocephalic  Eyes: PERRLA and EOM, RR++, symmetric light reflex  ENT: Right TM clear   Left TM clear    and oropharynx clear  Neck: supple  Lungs: Clear to auscultation bilaterally  CV: Normal S1 & S2 with regular rate and rhythm, no murmur present; femoral pulses 2+ bilaterally  Abd: Soft, nontender, non distended, no masses or hepatosplenomegaly  Anus: Normal  Spine:    Spine straight without curvature noted  : Normal male genitalia, testes descended  MSK: Moving all extremities and normal tone      Neuro:    DTRs 2+/4+  Skin: No rashes or lesions; no jaundice     Hearing Screening    125Hz 250Hz 500Hz 1000Hz 2000Hz 3000Hz 4000Hz 6000Hz 8000Hz   Right ear:   20 20 20  20     Left ear:   20 20 20  20       Vision followed by Dr. Guzman    IMMUNIZATIONS  Immunizations reviewed and ordered as appropriate    REFERRALS  Dental:  Recommend routine dental care as appropriate.  Other:  No additional referrals were made at this time.      ANTICIPATORY GUIDANCE      Nutrition: Balanced diet, skim milk   Play & Communication: Read Books  Health: Dental Care  Safety: Bike Helmet and car seat.    PLAN:    Patient Instructions       4/24/2017  Wt Readings from Last 1 Encounters:   04/13/16 39 lb 14.4 oz (18.1 kg) (75 %, Z= 0.67)*     * Growth percentiles are based on CDC 2-20 Years data.       Acetaminophen Dosing Instructions  (May take every 4-6 hours)      WEIGHT   AGE Infant/Children's  160mg/5ml Children's   Chewable Tabs  80 mg each Darren Strength  Chewable Tabs  160 mg     Milliliter (ml) Soft Chew Tabs Chewable Tabs   6-11 lbs 0-3 months 1.25 ml     12-17 lbs 4-11 months 2.5 ml     18-23 lbs 12-23 months 3.75 ml     24-35 lbs 2-3 years 5 ml 2 tabs    36-47 lbs 4-5 years 7.5 ml 3 tabs    48-59 lbs 6-8 years 10 ml 4 tabs 2 tabs   60-71 lbs 9-10 years 12.5 ml 5 tabs 2.5 tabs   72-95 lbs 11 years 15 ml 6 tabs 3 tabs   96 lbs and over 12 years   4 tabs     Ibuprofen Dosing Instructions- Liquid  (May  results/findings within the past 24 hours.     take every 6-8 hours)      WEIGHT   AGE Concentrated Drops   50 mg/1.25 ml Infant/Children's   100 mg/5ml     Dropperful Milliliter (ml)   12-17 lbs 6- 11 months 1 (1.25 ml)    18-23 lbs 12-23 months 1 1/2 (1.875 ml)    24-35 lbs 2-3 years  5 ml   36-47 lbs 4-5 years  7.5 ml   48-59 lbs 6-8 years  10 ml   60-71 lbs 9-10 years  12.5 ml   72-95 lbs 11 years  15 ml       Ibuprofen Dosing Instructions- Tablets/Caplets  (May take every 6-8 hours)    WEIGHT AGE Children's   Chewable Tabs   50 mg Darren Strength   Chewable Tabs   100 mg Darren Strength   Caplets    100 mg     Tablet Tablet Caplet   24-35 lbs 2-3 years 2 tabs     36-47 lbs 4-5 years 3 tabs     48-59 lbs 6-8 years 4 tabs 2 tabs 2 caps   60-71 lbs 9-10 years 5 tabs 2.5 tabs 2.5 caps   72-95 lbs 11 years 6 tabs 3 tabs 3 caps           Well-Child Checkup: 5 Years     Learning to swim helps ensure your chau lifelong safety. Teach your child to swim, or enroll your child in a swim class.     Even if your child is healthy, keep taking him or her for yearly checkups. This ensures your chau health is protected with scheduled vaccines and health screenings. Your healthcare provider can make sure your chau growth and development are progressing well. This sheet describes some of what you can expect.  Development and milestones  Your healthcare provider will ask questions and observe your chau behavior to get an idea of his or her development. By this visit, your child is likely doing some of the following:    Showing concern for others    Knowing what is real and what is make believe    Talking clearly    Saying his or her name and address    Counting to 10 or higher    Copying shapes, such as triangle or square    Hopping or skipping    Using a fork and spoon  School and social issues  Your 4-year-old is likely in  or . The healthcare provider will ask about your chau experience at school and how he or she is getting along with other  kids. The healthcare provider may ask about:    Behavior and participation at school. How does your child act at school? Does he or she follow the classroom routine and take part in group activities? Does your child enjoy school? Has he or she shown an interest in reading? What do teachers say about the chau behavior?    Behavior at home. How does the child act at home? Is behavior at home better or worse than at school? (Be aware that its common for kids to be better behaved at school than at home.)    Friendships. Has your child made friends with other children? What are the kids like? How does your child get along with these friends?    Play. How does the child like to play? For example, does he or she play make believe? Does the child interact with others during playtime?  Nutrition and exercise tips  Healthy eating and activity are two important keys to a healthy future. Its not too early to start teaching your child healthy habits that will last a lifetime. Here are some things you can do:    Limit juice and sports drinks. These drinks have a lot of sugar, which leads to unhealthy weight gain and tooth decay. Water and low-fat or nonfat milk are best for your child. Limit juice to a small glass of 100% juice no more than once a day.     Dont serve soda. Its healthiest not to let your child have soda. If you do allow soda, save it for very special occasions.     Offer nutritious foods. Keep a variety of healthy foods on hand for snacks, such as fresh fruits and vegetables, lean meats, and whole grains. Foods like french fries, candy, and snack foods should only be served once in a while.     Serve child-sized portions. Children dont need as much food as adults. Serve your child portions that make sense for his or her age and size. Let your child stop eating when he or she is full. If the child is still hungry after a meal, offer more vegetables or fruit. Its OK to place limits on how much your child  eats.     Encourage at least 30 to 60 minutes of active play per day. Moving around helps keep your child healthy. Take your child to the park, ride bikes, or play active games like tag or ball.    Limit screen time to 1 to 2 hours each day. This includes TV watching, computer use, and video games.     Ask the healthcare provider about your chau weight. At this age, your child should gain about 4 to 5 pounds each year. If he or she is gaining more than that, talk with the healthcare provider about healthy eating habits and exercise guidelines.    Take your child to the dentist at least twice a year for teeth cleaning and a checkup.  Safety tips    When riding a bike, your child should wear a helmet with the strap fastened. While roller-skating or using a scooter or skateboard, its safest to wear wrist guards, elbow pads, and knee pads, and a helmet.    Teach your child his or her phone number, address, and parents names. These are important to know in an emergency.    Keep using a car seat until your child outgrows it. Ask the health care provider if there are state laws regarding car seat use that you need to know about.    Once your child outgrows the car seat, use a high-backed booster seat in the car. This allows the seat belt to fit properly. A booster should be used until a child is 4 feet 9 inches tall and between 8 and 12 years of age. All children younger than 13 should sit in the back seat.    Teach your child not to talk to or go anywhere with a stranger.    Teach your child to swim. Many communities offer low-cost swimming lessons.    If you have a swimming pool, it should be fenced on all sides. Drake or doors leading to the pool should be closed and locked. Do not let your child play in or around the pool unattended, even if he or she knows how to swim.  Vaccines  Based on recommendations from the CDC, at this visit your child may get the following vaccines:    Diphtheria, tetanus, and  pertussis    Influenza (flu), annually    Measles, mumps, and rubella    Polio    Varicella (chickenpox)  Is it time for ?  You may be wondering if your 5-year-old is ready for . Here are some things he or she should be able to do:    Hold a pen or pencil the right way    Write his or her name    Know how to say the alphabet, count to 10, and identify colors and shapes    Sit quietly for short periods of time (about 5 minutes)    Pay attention to a teacher and follow instructions    Play nicely with other children the same age  Your school district should be able to answer any questions you have about starting . If youre still not sure your child is ready, talk to the healthcare provider during this checkup.       Next checkup at: _______________________________     PARENT NOTES:  Date Last Reviewed: 10/1/2014    5881-8343 [employer]. [address] [place] [address]. All rights reserved. This information is not intended as a substitute for professional medical care. Always follow your healthcare professional's instructions.            Kevon Au MD

## 2022-11-01 NOTE — PT/OT/SLP PROGRESS
Physical Therapy Treatment    Patient Name:  Africa Jennings   MRN:  [id number]    Recommendations:     Discharge Recommendations:  home health PT, home health OT   Discharge Equipment Recommendations: bedside commode, bath bench, walker, rolling   Barriers to discharge:  below functional baseline    Assessment:     Africa Jennings is a 52 y.o. female admitted with a medical diagnosis of Thoracic myelopathy.  She presents with the following impairments/functional limitations:  weakness, impaired endurance, impaired self care skills, impaired functional mobility, gait instability, impaired balance, decreased coordination, pain, decreased lower extremity function. Pt continuing to progress functional mobility with increased ambulation distance and improved gait stability this date. Pt continues to demo mild instability during gait trial, but no overt LOB. Pt completing functional mobility without physical assist or use of DME. However, discussed RW for home use for improved safety and stability with pt verbalizing agreement. Pt would continue to benefit from skilled acute PT in order to address current deficits and progress functional mobility.     Rehab Prognosis: Good; patient would benefit from acute skilled PT services to address these deficits and reach maximum level of function.    Recent Surgery: Procedure(s) (LRB):  T9-12 ROBOTIC DECOMPRESSION, THORACIC FUSION (N/A) 4 Days Post-Op    Plan:     During this hospitalization, patient to be seen 4 x/week to address the identified rehab impairments via gait training, therapeutic activities, therapeutic exercises, neuromuscular re-education and progress toward the following goals:    Plan of Care Expires:  11/27/22    Subjective     Chief Complaint: back pain  Patient/Family Comments/goals: Pt agreeable to therapy.   Pain/Comfort:  Pain Rating 1:  (did not rate)  Location - Orientation 1: lower  Location 1: back  Pain Addressed 1: Reposition,  Distraction, Pre-medicate for activity      Objective:     Communicated with RN prior to session.  Patient found up in chair with wound vac, TLSO, hemovac upon PT entry to room.     General Precautions: Standard, fall   Orthopedic Precautions:spinal precautions   Braces: TLSO  Respiratory Status: Room air     Functional Mobility:  Transfers:    Sit to Stand: stand by assistance with no AD from elevated bedside chair   Cues provided for ant weight-shift and transfer technique   Gait: ~108 ft with CGA without AD  Demo'd decreased step length, decreased brant, impaired weight-shifting ability, decreased toe-floor clearance, decreased heel strike, mild instability   Cues provided for self-pacing, safety awareness, symptom assessment, appropriate weight-shifts, upright posture, forward gaze      AM-PAC 6 CLICK MOBILITY  Turning over in bed (including adjusting bedclothes, sheets and blankets)?: 3  Sitting down on and standing up from a chair with arms (e.g., wheelchair, bedside commode, etc.): 3  Moving from lying on back to sitting on the side of the bed?: 3  Moving to and from a bed to a chair (including a wheelchair)?: 3  Need to walk in hospital room?: 3  Climbing 3-5 steps with a railing?: 2  Basic Mobility Total Score: 17       Treatment & Education:  Pt educated on role of PT and PT POC. Pt verbalized understanding.   Pt educated on spinal precautions, log-roll technique, and TLSO wear. Pt found with TLSO donned; adjusted for improved pt fit.   Pt educated on the effects of bed rest and the importance of OOB activity. Pt encouraged to sit UIC majority of day as tolerated and continue daily ambulation with nursing assist. Pt verbalized understanding.  Pt oriented to call bell and instructed to call for staff assist with standing tasks/transfers. Pt verbalized understanding.   Discussed d/c recs and DME needs. Pt verbalized agreement to recs as listed above. Pt educated on proper RW use and technique with pt  verbalizing understanding. PA notified of change in d/c recs following session.     Patient left up in chair with all lines intact, call button in reach, chair alarm on, and RN notified..    GOALS:   Multidisciplinary Problems       Physical Therapy Goals          Problem: Physical Therapy    Goal Priority Disciplines Outcome Goal Variances Interventions   Physical Therapy Goal     PT, PT/OT Ongoing, Progressing     Description: Goals to be met by: 2022     Patient will increase functional independence with mobility by performin. Supine to sit with Contact Guard Assistance  2. Sit to stand transfer with Contact Guard Assistance - met 10/31  2a. Sit to stand transfer with Supervision   3. Bed to chair transfer with Contact Guard Assistance using LRAD as needed  4. Gait  x 150 feet with Contact Guard Assistance using LRAD as needed.  5. Lower extremity exercise program x15 reps, with supervision, in order to increase LE strength and (I) with functional mobility.                           Time Tracking:     PT Received On: 22  PT Start Time: 1005     PT Stop Time: 1034  PT Total Time (min): 29 min     Billable Minutes: Therapeutic Activity 29    Treatment Type: Treatment  PT/PTA: PT     PTA Visit Number: 0     2022

## 2022-11-01 NOTE — ASSESSMENT & PLAN NOTE
51 yo female with RLE paresthesia and weakness found to have thoracic spinal compression now s/p posterior spinal instrumented fusion and decompression T9-12 for thoracic disc herniation and ligament hypertrophy on 10/28.     --Continue q4h neuro checks  --All pertinent labs and diagnostics personally reviewed.  --Imaging: Post op xrays obtained with appropriate hardware placement.   --Pain control: Continue scheduled Tylenol 1000 mg q8h. QID Robaxin. Oxy 5/Oxy 10 PRN.    --HV drain and WV removed today without complication  --HTN: SBP goal <170. Continue home meds  --DVT ppx: TEDs/SCDs/SQH  --Activity: PT/OT, OOB. Brace when up and out of bed.   --Hypokalemia: Replaced. Consider holding HCTZ if hypokalemia persists.   --Hypocalcemia: Replaced  --Diet: Diabetic   --Bowel regimen: Senna daily. +BM  --SQH for DVT prophylaxis   --Urinary: Voiding spontaneously   --DM: LDSSI  --Atelectasis ppx: Encourage IS hourly    Dispo: home with home [facility] tomorrow once pt has family support   Discussed with Dr. Whitt

## 2022-11-01 NOTE — PLAN OF CARE
Problem: Adult Inpatient Plan of Care  Goal: Plan of Care Review  Outcome: Ongoing, Progressing  Flowsheets (Taken 11/1/2022 0233)  Plan of Care Reviewed With: patient     Problem: Diabetes Comorbidity  Goal: Blood Glucose Level Within Targeted Range  Outcome: Ongoing, Progressing  Intervention: Monitor and Manage Glycemia  Flowsheets (Taken 11/1/2022 0233)  Glycemic Management: blood glucose monitored     Problem: Infection  Goal: Absence of Infection Signs and Symptoms  Outcome: Ongoing, Progressing     Problem: Fall Injury Risk  Goal: Absence of Fall and Fall-Related Injury  Outcome: Ongoing, Progressing  Intervention: Promote Injury-Free Environment  Flowsheets (Taken 11/1/2022 0233)  Safety Promotion/Fall Prevention:   assistive device/personal item within reach   bed alarm set   Fall Risk reviewed with patient/family   medications reviewed   nonskid shoes/socks when out of bed   side rails raised x 2   instructed to call staff for mobility  POC reviewed with patient, verbalized understanding. Pt AAOX4, VSS. Pain managed with current pain med regimen. Wound vac with continuous suction and hemovac to full suction. TLSO brace when OOB. No acute events overnight. Fall precautions maintained. Bed locked and in lowest position, bed alarm set and audible. Call light and personal belongings within reach. All questions and concerns addressed, see flow sheet for full assessment and V/S info. Will continue to monitor.

## 2022-11-02 VITALS
DIASTOLIC BLOOD PRESSURE: 74 MMHG | SYSTOLIC BLOOD PRESSURE: 123 MMHG | TEMPERATURE: 99 F | HEIGHT: 62 IN | RESPIRATION RATE: 16 BRPM | OXYGEN SATURATION: 93 % | BODY MASS INDEX: 40.48 KG/M2 | HEART RATE: 104 BPM | WEIGHT: 220 LBS

## 2022-11-02 LAB
ANION GAP SERPL CALC-SCNC: 11 MMOL/L (ref 8–16)
BASOPHILS # BLD AUTO: 0.03 K/UL (ref 0–0.2)
BASOPHILS NFR BLD: 0.2 % (ref 0–1.9)
BUN SERPL-MCNC: 9 MG/DL (ref 6–20)
CALCIUM SERPL-MCNC: 9.3 MG/DL (ref 8.7–10.5)
CHLORIDE SERPL-SCNC: 93 MMOL/L (ref 95–110)
CO2 SERPL-SCNC: 31 MMOL/L (ref 23–29)
CREAT SERPL-MCNC: 0.7 MG/DL (ref 0.5–1.4)
DIFFERENTIAL METHOD: ABNORMAL
EOSINOPHIL # BLD AUTO: 0.3 K/UL (ref 0–0.5)
EOSINOPHIL NFR BLD: 2.3 % (ref 0–8)
ERYTHROCYTE [DISTWIDTH] IN BLOOD BY AUTOMATED COUNT: 14.6 % (ref 11.5–14.5)
EST. GFR  (NO RACE VARIABLE): >60 ML/MIN/1.73 M^2
GLUCOSE SERPL-MCNC: 189 MG/DL (ref 70–110)
HCT VFR BLD AUTO: 29.4 % (ref 37–48.5)
HGB BLD-MCNC: 9.4 G/DL (ref 12–16)
IMM GRANULOCYTES # BLD AUTO: 0.08 K/UL (ref 0–0.04)
IMM GRANULOCYTES NFR BLD AUTO: 0.7 % (ref 0–0.5)
LYMPHOCYTES # BLD AUTO: 2.5 K/UL (ref 1–4.8)
LYMPHOCYTES NFR BLD: 20.9 % (ref 18–48)
MCH RBC QN AUTO: 27.6 PG (ref 27–31)
MCHC RBC AUTO-ENTMCNC: 32 G/DL (ref 32–36)
MCV RBC AUTO: 86 FL (ref 82–98)
MONOCYTES # BLD AUTO: 1 K/UL (ref 0.3–1)
MONOCYTES NFR BLD: 8.1 % (ref 4–15)
NEUTROPHILS # BLD AUTO: 8.3 K/UL (ref 1.8–7.7)
NEUTROPHILS NFR BLD: 67.8 % (ref 38–73)
NRBC BLD-RTO: 0 /100 WBC
PLATELET # BLD AUTO: 300 K/UL (ref 150–450)
PMV BLD AUTO: 10.8 FL (ref 9.2–12.9)
POCT GLUCOSE: 137 MG/DL (ref 70–110)
POCT GLUCOSE: 142 MG/DL (ref 70–110)
POCT GLUCOSE: 187 MG/DL (ref 70–110)
POTASSIUM SERPL-SCNC: 3.6 MMOL/L (ref 3.5–5.1)
RBC # BLD AUTO: 3.41 M/UL (ref 4–5.4)
SODIUM SERPL-SCNC: 135 MMOL/L (ref 136–145)
WBC # BLD AUTO: 12.17 K/UL (ref 3.9–12.7)

## 2022-11-02 PROCEDURE — 85025 COMPLETE CBC W/AUTO DIFF WBC: CPT

## 2022-11-02 PROCEDURE — 94761 N-INVAS EAR/PLS OXIMETRY MLT: CPT

## 2022-11-02 PROCEDURE — 99024 PR POST-OP FOLLOW-UP VISIT: ICD-10-PCS | Mod: ,,,

## 2022-11-02 PROCEDURE — 36415 COLL VENOUS BLD VENIPUNCTURE: CPT

## 2022-11-02 PROCEDURE — 99024 POSTOP FOLLOW-UP VISIT: CPT | Mod: ,,,

## 2022-11-02 PROCEDURE — 99900035 HC TECH TIME PER 15 MIN (STAT)

## 2022-11-02 PROCEDURE — 63600175 PHARM REV CODE 636 W HCPCS

## 2022-11-02 PROCEDURE — 80048 BASIC METABOLIC PNL TOTAL CA: CPT

## 2022-11-02 PROCEDURE — 25000003 PHARM REV CODE 250

## 2022-11-02 RX ORDER — ACETAMINOPHEN 500 MG
1000 TABLET ORAL EVERY 8 HOURS PRN
Qty: 30 TABLET | Refills: 0 | Status: SHIPPED | OUTPATIENT
Start: 2022-11-02

## 2022-11-02 RX ORDER — OXYCODONE HYDROCHLORIDE 5 MG/1
5 TABLET ORAL EVERY 8 HOURS PRN
Qty: 28 TABLET | Refills: 0 | Status: ON HOLD | OUTPATIENT
Start: 2022-11-02 | End: 2023-07-27 | Stop reason: HOSPADM

## 2022-11-02 RX ADMIN — LOSARTAN POTASSIUM AND HYDROCHLOROTHIAZIDE 1 TABLET: 100; 12.5 TABLET, FILM COATED ORAL at 09:11

## 2022-11-02 RX ADMIN — METHOCARBAMOL 750 MG: 750 TABLET ORAL at 09:11

## 2022-11-02 RX ADMIN — OXYCODONE HYDROCHLORIDE 10 MG: 10 TABLET ORAL at 07:11

## 2022-11-02 RX ADMIN — OXYCODONE HYDROCHLORIDE 10 MG: 10 TABLET ORAL at 11:11

## 2022-11-02 RX ADMIN — PANTOPRAZOLE SODIUM 40 MG: 20 TABLET, DELAYED RELEASE ORAL at 09:11

## 2022-11-02 RX ADMIN — OXYCODONE HYDROCHLORIDE 10 MG: 10 TABLET ORAL at 03:11

## 2022-11-02 RX ADMIN — ATORVASTATIN CALCIUM 20 MG: 20 TABLET, FILM COATED ORAL at 09:11

## 2022-11-02 RX ADMIN — AMLODIPINE BESYLATE 10 MG: 10 TABLET ORAL at 09:11

## 2022-11-02 RX ADMIN — HYDROMORPHONE HYDROCHLORIDE 1 MG: 1 INJECTION, SOLUTION INTRAMUSCULAR; INTRAVENOUS; SUBCUTANEOUS at 06:11

## 2022-11-02 RX ADMIN — HEPARIN SODIUM 5000 UNITS: 5000 INJECTION INTRAVENOUS; SUBCUTANEOUS at 06:11

## 2022-11-02 NOTE — DISCHARGE SUMMARY
[facility] - Neurosurgery ([facility])  Neurosurgery  Discharge Summary      Patient Name: Africa Jennings  MRN: [id number]  Admission Date: 10/28/2022  Hospital Length of Stay: 5 days  Discharge Date and Time: 11/2/2022  2:30 PM  Attending Physician: No att. providers found   Discharging Provider: Lissett Cruz PA-C  Primary Care Provider: Brandi Thompson MD    HPI:   51 yo female with RLE paresthesia and weakness found to have thoracic spinal compression presenting for posterior spinal instrumented fusion and decompression T9-12 for thoracic disc herniation and ligament hypertrophy.       Procedure(s) (LRB):  T9-12 ROBOTIC DECOMPRESSION, THORACIC FUSION (N/A)     Hospital Course: 10/29: POD 1 s/p T9-T12 PSIF with T9-T11 laminectomy. NAEON. AFVSS. Exam stable. Pain controlled. Tolerating PO. Drains remain in place.   10/30: reports leg pain much improved compared to before surgery. Drain output 130.   10/31: POD3. NAEON. AFVSS. Right leg pain resolved. Endorsing incisional back pain. Drain with 110 output after re-check with nurse. Voiding spontaneously. +BM. Walked well in halls with therapy. Potassium replaced.   11/1: NAEON. WV and HV drain removed today. Pt is progressing well with PT, now recommending HH. She denies any new complaints this am. Pt reports she will not have family support at home until tomorrow. Will plan for DC tomorrow am  11/2: NAEON. Neuro stable. Patient is medically stable for discharge to home with HH. Incision care and activity recommendations reviewed. Plan of care discussed with patient and she voiced understanding. All her questions were answered. Follow-up in Neurosurgery clinic arranged.       Goals of Care Treatment Preferences:  Code Status: Full Code      Consults:     Significant Diagnostic Studies: Labs:   CMP   Recent Labs   Lab 11/01/22  1019 11/02/22  0836   NA  --  135*   K 3.3* 3.6   CL  --  93*   CO2  --  31*   GLU  --  189*   BUN  --  9   CREATININE  --  0.7  "  CALCIUM  --  9.3   ANIONGAP  --  11   , CBC   Recent Labs   Lab 11/02/22  0836   WBC 12.17   HGB 9.4*   HCT 29.4*       and All labs within the past 24 hours have been reviewed  Radiology: X-Ray: thoracic spine    Pending Diagnostic Studies:     None        Final Active Diagnoses:    Diagnosis Date Noted POA    PRINCIPAL PROBLEM:  Thoracic myelopathy [M47.14] 10/29/2022 Yes      Problems Resolved During this Admission:      Discharged Condition: good     Disposition: [place]Health Care [facility]    Follow Up:    Patient Instructions:      WALKER FOR HOME USE     Order Specific Question Answer Comments   Type of Walker: Adult (5'4"-6'6")    With wheels? Yes    Height: 5' 2" (1.575 m)    Weight: 99.8 kg (220 lb 0.3 oz)    Length of need (1-99 months): 99    Does patient have medical equipment at home? none    Please check all that apply: Patient is unable to safely ambulate without equipment.      COMMODE FOR HOME USE     Order Specific Question Answer Comments   Type: Standard    Height: 5' 2" (1.575 m)    Weight: 99.8 kg (220 lb 0.3 oz)    Does patient have medical equipment at home? none    Length of need (1-99 months): 99      Ambulatory referral/consult to Home Health   Standing Status: Future   Referral Priority: Routine Referral Type: Home Health   Referral Reason: Specialty Services Required   Requested Specialty: Home Health Services   Number of Visits Requested: 1     Notify your health care provider if you experience any of the following:  temperature >100.4     Notify your health care provider if you experience any of the following:  persistent nausea and vomiting or diarrhea     Notify your health care provider if you experience any of the following:  severe uncontrolled pain     Notify your health care provider if you experience any of the following:  redness, tenderness, or signs of infection (pain, swelling, redness, odor or green/yellow discharge around incision site)     Notify your health care " provider if you experience any of the following:  difficulty breathing or increased cough     Notify your health care provider if you experience any of the following:  severe persistent headache     Notify your health care provider if you experience any of the following:  worsening rash     Notify your health care provider if you experience any of the following:  persistent dizziness, light-headedness, or visual disturbances     Notify your health care provider if you experience any of the following:  increased confusion or weakness     Activity as tolerated     Medications:  Reconciled Home Medications:      Medication List      START taking these medications    acetaminophen 500 MG tablet  Commonly known as: TYLENOL  Take 2 tablets (1,000 mg total) by mouth every 8 (eight) hours as needed for Pain.     methocarbamoL 750 MG Tab  Commonly known as: ROBAXIN  Take 1 tablet (750 mg total) by mouth 4 (four) times daily. for 10 days     oxyCODONE 5 MG immediate release tablet  Commonly known as: ROXICODONE  Take 1 tablet (5 mg total) by mouth every 8 (eight) hours as needed for Pain.        CHANGE how you take these medications    fluticasone propionate 50 mcg/actuation nasal spray  Commonly known as: FLONASE  INSTILL 1 SPRAY (50 MCG TOTAL) IN EACH NOSTRIL ONCE DAILY.  What changed: See the new instructions.        CONTINUE taking these medications    amLODIPine 10 MG tablet  Commonly known as: NORVASC  Take 1 tablet (10 mg total) by mouth once daily.     atorvastatin 20 MG tablet  Commonly known as: LIPITOR  TAKE 1 TABLET BY MOUTH EVERY DAY     blood sugar diagnostic Strp  1 strip by Misc.(Non-Drug; Combo Route) route 2 (two) times daily with meals.     blood-glucose meter kit  Use as instructed     cetirizine 10 MG tablet  Commonly known as: ZYRTEC  Take 1 tablet (10 mg total) by mouth once daily.     ergocalciferol 50,000 unit Cap  Commonly known as: ERGOCALCIFEROL  TAKE 2 CAPSULE (50,000 UNITS TOTAL)BY MOUTH TWICE A  "WEEK     estradioL 1 MG tablet  Commonly known as: ESTRACE  TAKE 1 TABLET BY MOUTH EVERY DAY     fluocinonide 0.05 % ointment  Commonly known as: LIDEX  Apply to affected areas of scalp qhs     ketoconazole 2 % cream  Commonly known as: NIZORAL  Apply to affected areas of scalp BID.     lancets Misc  Use to test blood sugar once daily.     lancing device Misc  1 Device by Misc.(Non-Drug; Combo Route) route 2 (two) times daily with meals.     meloxicam 15 MG tablet  Commonly known as: MOBIC  TAKE 1 TABLET BY MOUTH EVERY DAY     metFORMIN 500 MG ER 24hr tablet  Commonly known as: GLUCOPHAGE-XR  TAKE 1 TABLET BY MOUTH TWICE A DAY WITH MEALS     neomycin-polymyxin-dexamethasone 3.5mg/mL-10,000 unit/mL-0.1 % Drps  Commonly known as: MAXITROL  as needed.     nystatin ointment  Commonly known as: MYCOSTATIN  Apply topically 2 (two) times daily. USES PRN     omeprazole 40 MG capsule  Commonly known as: PRILOSEC  TAKE 1 CAPSULE BY MOUTH EVERY DAY     ondansetron 4 MG tablet  Commonly known as: ZOFRAN  TAKE 1 TABLET BY MOUTH EVERY 8 HOURS AS NEEDED     pen needle, diabetic 32 gauge x 5/32" Ndle  Commonly known as: PEN NEEDLE  1 each by Misc.(Non-Drug; Combo Route) route once a week.     prednisoLONE acetate 1 % Drps  Commonly known as: PRED FORTE  USES PRN     triamcinolone acetonide 0.1% 0.1 % cream  Commonly known as: KENALOG  Apply topically 2 (two) times daily. Apply topically 2 (two) times daily.     valsartan-hydrochlorothiazide 160-12.5 mg per tablet  Commonly known as: DIOVAN-HCT  Take 1 tablet by mouth once daily.        ASK your doctor about these medications    OZEMPIC 1 mg/dose (4 mg/3 mL)  Generic drug: semaglutide  Inject 1 mg into the skin every 7 days.            Lissett Cruz PA-C  Neurosurgery  [facility] - Neurosurgery ([facility])  "

## 2022-11-02 NOTE — PLAN OF CARE
[facility] - Neurosurgery (Hospital)  Discharge Final Note    Primary Care Provider: Brandi Thompson MD    Expected Discharge Date: 11/2/2022    Patient to be discharged home.  The patient will have home health upon discharge with [facility].  Patient will go to her sisters house for recovery at [address] [place] [address].  The patient will be provided with a rw and bsc.  Family to provide transportation home.  Neurosurgery clinic to schedule follow up appointment.    Future Appointments   Date Time Provider Department Center   11/11/2022  1:00 PM Munira Crystal RN [facility] NEUROS8 [facility]   11/17/2022 11:30 AM Yanelis Warren RD [facility] BARIAT [facility]   12/1/2022  7:20 AM [facility] MAMMO1 [facility] MAMMO [place]   12/12/2022  9:30 AM [facility] OIC EOS [facility] EOS [facility]   12/12/2022 10:30 AM Edouard Whitt DO [facility] NEUROS8 [facility]   1/9/2023 10:20 AM Brandi Thompson MD [facility] FAM MED [facility]        Final Discharge Note (most recent)       Final Note - 11/02/22 1217          Final Note    Assessment Type Final Discharge Note     Anticipated Discharge Disposition Home-Health Care Svc        Post-Acute Status    Post-Acute Authorization Placement;Home Health     Post-Acute Placement Status Discharge Plan Changed     Home Health Status Referrals Sent     Discharge Delays None known at this time                     Important Message from Medicare

## 2022-11-02 NOTE — PLAN OF CARE
Patient accepted by [facility].   11/02/22 7417   Post-Acute Status   Post-Acute Authorization Home Health   Home Health Status Set-up Complete/Auth obtained

## 2022-11-02 NOTE — PLAN OF CARE
Problem: Adult Inpatient Plan of Care  Goal: Plan of Care Review  Outcome: Ongoing, Progressing  Flowsheets (Taken 11/2/2022 1658)  Plan of Care Reviewed With: patient     Problem: Diabetes Comorbidity  Goal: Blood Glucose Level Within Targeted Range  Outcome: Ongoing, Progressing     Problem: Infection  Goal: Absence of Infection Signs and Symptoms  Outcome: Ongoing, Progressing     Problem: Fall Injury Risk  Goal: Absence of Fall and Fall-Related Injury  Outcome: Ongoing, Progressing  POC reviewed with patient, verbalized understanding. Pt AAOX4, VSS. Pain managed with current pain med regimen. Wound vac and hemovac d'fritz yesterday. TLSO brace when OOB. No acute events overnight. Fall precautions maintained. Bed locked and in lowest position, bed alarm set and audible. Call light and personal belongings within reach. All questions and concerns addressed, see flow sheet for full assessment and V/S info. Will continue to monitor. Plan to discharge home with HH.

## 2022-11-03 ENCOUNTER — PATIENT OUTREACH (OUTPATIENT)
Dept: ADMINISTRATIVE | Facility: CLINIC | Age: 52
End: 2022-11-03
Payer: COMMERCIAL

## 2022-11-03 NOTE — PROGRESS NOTES
C3 nurse spoke with Africa Jennings  for a TCC post hospital discharge follow up call. The patient has a scheduled HOSFU appointment with Brandi Thompson MD on 11/08/2022 @ 2166 ( Virtual)     Message routed to PCP staff

## 2022-11-04 ENCOUNTER — PATIENT MESSAGE (OUTPATIENT)
Dept: BARIATRICS | Facility: CLINIC | Age: 52
End: 2022-11-04
Payer: COMMERCIAL

## 2022-11-04 PROCEDURE — G0180 MD CERTIFICATION HHA PATIENT: HCPCS | Mod: ,,, | Performed by: STUDENT IN AN ORGANIZED HEALTH CARE EDUCATION/TRAINING PROGRAM

## 2022-11-04 PROCEDURE — G0180 PR HOME HEALTH MD CERTIFICATION: ICD-10-PCS | Mod: ,,, | Performed by: STUDENT IN AN ORGANIZED HEALTH CARE EDUCATION/TRAINING PROGRAM

## 2022-11-08 ENCOUNTER — OFFICE VISIT (OUTPATIENT)
Dept: FAMILY MEDICINE | Facility: CLINIC | Age: 52
End: 2022-11-08
Payer: COMMERCIAL

## 2022-11-08 VITALS — SYSTOLIC BLOOD PRESSURE: 134 MMHG | DIASTOLIC BLOOD PRESSURE: 74 MMHG

## 2022-11-08 DIAGNOSIS — E11.9 TYPE 2 DIABETES MELLITUS WITHOUT COMPLICATION, WITHOUT LONG-TERM CURRENT USE OF INSULIN: Chronic | ICD-10-CM

## 2022-11-08 DIAGNOSIS — I10 ESSENTIAL HYPERTENSION: Primary | Chronic | ICD-10-CM

## 2022-11-08 DIAGNOSIS — E66.01 MORBID OBESITY WITH BMI OF 40.0-44.9, ADULT: Chronic | ICD-10-CM

## 2022-11-08 DIAGNOSIS — K21.9 GASTROESOPHAGEAL REFLUX DISEASE WITHOUT ESOPHAGITIS: Chronic | ICD-10-CM

## 2022-11-08 PROCEDURE — 1159F MED LIST DOCD IN RCRD: CPT | Mod: CPTII,95,, | Performed by: FAMILY MEDICINE

## 2022-11-08 PROCEDURE — 1111F DSCHRG MED/CURRENT MED MERGE: CPT | Mod: CPTII,95,, | Performed by: FAMILY MEDICINE

## 2022-11-08 PROCEDURE — 3075F SYST BP GE 130 - 139MM HG: CPT | Mod: CPTII,95,, | Performed by: FAMILY MEDICINE

## 2022-11-08 PROCEDURE — 3075F PR MOST RECENT SYSTOLIC BLOOD PRESS GE 130-139MM HG: ICD-10-PCS | Mod: CPTII,95,, | Performed by: FAMILY MEDICINE

## 2022-11-08 PROCEDURE — 1160F PR REVIEW ALL MEDS BY PRESCRIBER/CLIN PHARMACIST DOCUMENTED: ICD-10-PCS | Mod: CPTII,95,, | Performed by: FAMILY MEDICINE

## 2022-11-08 PROCEDURE — 3044F HG A1C LEVEL LT 7.0%: CPT | Mod: CPTII,95,, | Performed by: FAMILY MEDICINE

## 2022-11-08 PROCEDURE — 99213 OFFICE O/P EST LOW 20 MIN: CPT | Mod: 95,,, | Performed by: FAMILY MEDICINE

## 2022-11-08 PROCEDURE — 3044F PR MOST RECENT HEMOGLOBIN A1C LEVEL <7.0%: ICD-10-PCS | Mod: CPTII,95,, | Performed by: FAMILY MEDICINE

## 2022-11-08 PROCEDURE — 1160F RVW MEDS BY RX/DR IN RCRD: CPT | Mod: CPTII,95,, | Performed by: FAMILY MEDICINE

## 2022-11-08 PROCEDURE — 99213 PR OFFICE/OUTPT VISIT, EST, LEVL III, 20-29 MIN: ICD-10-PCS | Mod: 95,,, | Performed by: FAMILY MEDICINE

## 2022-11-08 PROCEDURE — 1111F PR DISCHARGE MEDS RECONCILED W/ CURRENT OUTPATIENT MED LIST: ICD-10-PCS | Mod: CPTII,95,, | Performed by: FAMILY MEDICINE

## 2022-11-08 PROCEDURE — 3078F DIAST BP <80 MM HG: CPT | Mod: CPTII,95,, | Performed by: FAMILY MEDICINE

## 2022-11-08 PROCEDURE — 1159F PR MEDICATION LIST DOCUMENTED IN MEDICAL RECORD: ICD-10-PCS | Mod: CPTII,95,, | Performed by: FAMILY MEDICINE

## 2022-11-08 PROCEDURE — 3078F PR MOST RECENT DIASTOLIC BLOOD PRESSURE < 80 MM HG: ICD-10-PCS | Mod: CPTII,95,, | Performed by: FAMILY MEDICINE

## 2022-11-08 NOTE — PROGRESS NOTES
Telemedicine Office Visit    Africa Jennings    1970  [id number]    Subjective     The patient location is: home   The chief complaint leading to consultation is: s/p robotic decompression surgery   Visit type: Virtual visit with synchronous audio and video  Total time spent with patient: 15 minutes   Each patient to whom he or she provides medical services by telemedicine is:  (1) informed of the relationship between the physician and patient and the respective role of any other health care provider with respect to management of the patient; and (2) notified that he or she may decline to receive medical services by telemedicine and may withdraw from such care at any time.    Africa is a 52 y.o. female who presents through telemedicine for:     S/p robotic decompression surgery, thoracic fusion - Patient is currently staying with her sister. She is recovering well and has almost completed her physical therapy. She has follow up this week and will be moving back home. Pain has improved. She has no urinary or bowel complaints  Hypertension - blood pressure is well controlled. Her sister is a nurse and has been checking her blood pressure   Diabetes - Patient continues to do well on ozempic   Weight - Patient has been visiting with bariatric team. She is interested in weight loss surgery options       Objective     Answers submitted by the patient for this visit:  Review of Systems Questionnaire (Submitted on 11/4/2022)  activity change: Yes  unexpected weight change: No  rhinorrhea: No  trouble swallowing: No  visual disturbance: No  chest tightness: No  polyuria: No  difficulty urinating: No  menstrual problem: No  joint swelling: No  arthralgias: No  confusion: No  dysphoric mood: No    Review of Systems   Constitutional:  Negative for chills and fever.   HENT:  Negative for congestion.    Eyes:  Negative for blurred vision.   Respiratory:  Negative for cough.    Cardiovascular:  Negative for  chest pain.   Gastrointestinal:  Negative for abdominal pain, constipation, diarrhea, heartburn, nausea and vomiting.   Genitourinary:  Negative for dysuria.   Musculoskeletal:  Negative for myalgias.   Skin:  Negative for itching and rash.   Neurological:  Negative for dizziness and headaches.   Psychiatric/Behavioral:  Negative for depression.      /74   LMP  (LMP Unknown)   Physical Exam  Constitutional:       Appearance: She is well-developed.   HENT:      Head: Normocephalic and atraumatic.      Right Ear: External ear normal.      Left Ear: External ear normal.   Eyes:      Conjunctiva/sclera: Conjunctivae normal.   Pulmonary:      Effort: Pulmonary effort is normal.   Musculoskeletal:      Cervical back: Normal range of motion.   Skin:     Coloration: Skin is not pale.   Neurological:      Mental Status: She is alert and oriented to person, place, and time.   Psychiatric:         Behavior: Behavior normal.         Thought Content: Thought content normal.         Judgment: Judgment normal.         Plan     Problem List Items Addressed This Visit          Cardiac/Vascular    Essential hypertension - Primary (Chronic)  The current medical regimen is effective;  continue present plan and medications.         Endocrine    Morbid obesity with BMI of 40.0-44.9, adult (Chronic)  The patient is asked to make an attempt to improve diet and exercise patterns to aid in medical management of this problem.      Type 2 diabetes mellitus without complication, without long-term current use of insulin (Chronic)  The current medical regimen is effective;  continue present plan and medications.         GI    Gastroesophageal reflux disease without esophagitis (Chronic)  The current medical regimen is effective;  continue present plan and medications.           Follow up in about 2 months (around 1/8/2023), or if symptoms worsen or fail to improve.

## 2022-11-11 ENCOUNTER — CLINICAL SUPPORT (OUTPATIENT)
Dept: NEUROSURGERY | Facility: CLINIC | Age: 52
End: 2022-11-11
Payer: COMMERCIAL

## 2022-11-11 VITALS — DIASTOLIC BLOOD PRESSURE: 76 MMHG | SYSTOLIC BLOOD PRESSURE: 117 MMHG | HEART RATE: 99 BPM | TEMPERATURE: 98 F

## 2022-11-11 PROCEDURE — 99999 PR PBB SHADOW E&M-EST. PATIENT-LVL II: CPT | Mod: PBBFAC,,,

## 2022-11-11 PROCEDURE — 99999 PR PBB SHADOW E&M-EST. PATIENT-LVL II: ICD-10-PCS | Mod: PBBFAC,,,

## 2022-11-11 NOTE — PROGRESS NOTES
Africa Jennings is a 52 y.o. female here for 2 week post op wound check.     Surgery:     Symptoms:     DME:    Brace: In use    Pain:     Medication Refills:        Incision with staples, JAY, well approximated, no redness, swelling or purulent drainage. Area painted with betadine, staples removed. Instructed patient to keep incision JAY, no lotions, creams or bandages. OK to shower without water pressure to area. PostOP written instructions given to patient.     Patient verbalizes understanding. Patient to followup with Dr. Tineo for 6 week followup with/without imaging.

## 2022-11-17 ENCOUNTER — CLINICAL SUPPORT (OUTPATIENT)
Dept: BARIATRICS | Facility: CLINIC | Age: 52
End: 2022-11-17
Payer: COMMERCIAL

## 2022-11-17 DIAGNOSIS — E66.01 SEVERE OBESITY (BMI 35.0-39.9) WITH COMORBIDITY: ICD-10-CM

## 2022-11-17 DIAGNOSIS — G47.33 OBSTRUCTIVE SLEEP APNEA (ADULT) (PEDIATRIC): ICD-10-CM

## 2022-11-17 DIAGNOSIS — E11.9 TYPE 2 DIABETES MELLITUS WITHOUT COMPLICATION, WITHOUT LONG-TERM CURRENT USE OF INSULIN: Primary | Chronic | ICD-10-CM

## 2022-11-17 DIAGNOSIS — Z71.3 DIETARY COUNSELING AND SURVEILLANCE: ICD-10-CM

## 2022-11-17 DIAGNOSIS — I10 ESSENTIAL HYPERTENSION: Chronic | ICD-10-CM

## 2022-11-17 PROCEDURE — 97803 MED NUTRITION INDIV SUBSEQ: CPT | Mod: 95,,, | Performed by: DIETITIAN, REGISTERED

## 2022-11-17 PROCEDURE — 99499 UNLISTED E&M SERVICE: CPT | Mod: 95,,, | Performed by: DIETITIAN, REGISTERED

## 2022-11-17 PROCEDURE — 99499 NO LOS: ICD-10-PCS | Mod: 95,,, | Performed by: DIETITIAN, REGISTERED

## 2022-11-17 PROCEDURE — 97803 PR MED NUTR THER, SUBSQ, INDIV, EA 15 MIN: ICD-10-PCS | Mod: 95,,, | Performed by: DIETITIAN, REGISTERED

## 2022-11-17 NOTE — PROGRESS NOTES
The patient location is:  Patient Home   The chief complaint leading to consultation is: morbid obesity in work up for bariatric surgery  Visit type: Virtual visit with synchronous audio and video  Total time spent with patient: 15 minutes  Each patient to whom he or she provides medical services by telemedicine is:  (1) informed of the relationship between the physician and patient and the respective role of any other health care provider with respect to management of the patient; and (2) notified that he or she may decline to receive medical services by telemedicine and may withdraw from such care at any time.  Nutrition Handout located in AVS section of pt's [facility] and/or sent as a message via [facility] portal.  Pt informed of handout and how to access this information in [facility].    NUTRITION NOTE    Referring Physician: Roge Rich M.D.   Reason for MNT Referral: 6 months Medically Supervised Diet pending Gastric Sleeve    Patient presents for f/u  visit for MSD with weight gain since last visit.  Just had surgery and eating and drinking fruit and juice and smoothies with no protein powder.      CLINICAL DATA:  52 y.o. female.    Current Weight:  218 lbs self reported   Ideal Body Weight: 129 lbs  Body mass index is 39.87 kg/m².   Last Wt: 220 lbs  Initial Wt:  224 lbs    DAILY NUTRITIONAL NEEDS: pre-op nutritional bariatric guidelines to promote weight loss  [id number] Calories    Grams Protein    CURRENT DIET:  Reduced-calorie diet.  Diet Recall: Food records are not present.   B:  fruit and smoothie no protein powder added  L: hot tea baked chicken no potato string beans and broccoli  D: grilled salmon and vegetable    Diet Includes: bariatric appropriate meals  Meal Pattern: Improved.  Protein Supplements: 2 per day. Only smoothies with no protein   Snacking: Adequate. Snacks include healthy choices.  Vegetables: Likes a variety. Eats daily.  Fruits: Likes a variety. Eats  daily.  Beverages: water, sugar-free beverages and herbal tea   and diet cranberry juice  Dining Out:  Never. Mostly restaurants.  Cooking at home: Daily. Mostly grilled and air fryer  meat, fish and vegetables.    CURRENT EXERCISE:  Adequate Discharged yesterday from PT  Doing leg and arm exercises          Vitamins / Minerals / Herbs: was hold for surgery but will start back on them  [employer]  [place]  Probotics  Vit C  CoQ10  Flax seed oil    LABS:    None available at time of visit      Food Allergies:   None reported    Social:  Works regular daytime shifts.  Alcohol: None.  Smoking: None.    ASSESSMENT:  Patient demonstrates some willingness to change lifestyle habits as evidenced by dietary changes, including protein drinks, increased fruits, increased vegetables, reduced dining out, healthier cooking at home, bringing snacks to work, healthier snacking at work and healthier snacking at home.    Doing well with working on greatest challenges (irregular meal patterns).    Barriers to Education:  none  Stage of Change:  action    NUTRITION DIAGNOSIS:  Morbid Obesity related to Physical inactivity as evidence by BMI.  Status: Improved    PLAN:  Pt is potential candidate for surgery.    Diet: Adjust diet plan.  Work on Bariatric Nutrition Checklist.  Work on expanding variety of vegetables.  Work on gradually cutting back on starchy CHO in the diet.  1200-calorie diet.  1500-calorie diet.  5-6 meals per day.  Start including protein supplements in the diet plan daily.  Return to clinic.       Exercise: Maintain.    Behavior Modification: Begin to document food & activity logs daily.        Return to clinic in one month.  Needs additional visit(s) with RD.    Communicated nutrition plan with bariatric team.    SESSION TIME:  15 minutes

## 2022-11-18 VITALS — WEIGHT: 218 LBS | BODY MASS INDEX: 39.87 KG/M2

## 2022-11-24 ENCOUNTER — PATIENT MESSAGE (OUTPATIENT)
Dept: ADMINISTRATIVE | Facility: OTHER | Age: 52
End: 2022-11-24
Payer: COMMERCIAL

## 2022-11-30 ENCOUNTER — EXTERNAL HOME HEALTH (OUTPATIENT)
Dept: HOME HEALTH SERVICES | Facility: HOSPITAL | Age: 52
End: 2022-11-30
Payer: COMMERCIAL

## 2022-12-01 ENCOUNTER — HOSPITAL ENCOUNTER (OUTPATIENT)
Dept: RADIOLOGY | Facility: HOSPITAL | Age: 52
Discharge: HOME | End: 2022-12-01
Attending: FAMILY MEDICINE
Payer: COMMERCIAL

## 2022-12-01 DIAGNOSIS — Z12.39 ENCOUNTER FOR SCREENING FOR MALIGNANT NEOPLASM OF BREAST, UNSPECIFIED SCREENING MODALITY: ICD-10-CM

## 2022-12-01 PROCEDURE — 77063 BREAST TOMOSYNTHESIS BI: CPT | Mod: TC,PO

## 2022-12-01 PROCEDURE — 77063 MAMMO DIGITAL SCREENING BILAT WITH TOMO: ICD-10-PCS | Mod: 26,,, | Performed by: RADIOLOGY

## 2022-12-01 PROCEDURE — 77067 SCR MAMMO BI INCL CAD: CPT | Mod: TC,PO

## 2022-12-01 PROCEDURE — 77067 MAMMO DIGITAL SCREENING BILAT WITH TOMO: ICD-10-PCS | Mod: 26,,, | Performed by: RADIOLOGY

## 2022-12-01 PROCEDURE — 77063 BREAST TOMOSYNTHESIS BI: CPT | Mod: 26,,, | Performed by: RADIOLOGY

## 2022-12-01 PROCEDURE — 77067 SCR MAMMO BI INCL CAD: CPT | Mod: 26,,, | Performed by: RADIOLOGY

## 2022-12-07 ENCOUNTER — PATIENT MESSAGE (OUTPATIENT)
Dept: BARIATRICS | Facility: CLINIC | Age: 52
End: 2022-12-07
Payer: COMMERCIAL

## 2022-12-12 ENCOUNTER — OFFICE VISIT (OUTPATIENT)
Dept: NEUROSURGERY | Facility: CLINIC | Age: 52
End: 2022-12-12
Payer: COMMERCIAL

## 2022-12-12 ENCOUNTER — PATIENT MESSAGE (OUTPATIENT)
Dept: NEUROSURGERY | Facility: CLINIC | Age: 52
End: 2022-12-12

## 2022-12-12 ENCOUNTER — HOSPITAL ENCOUNTER (OUTPATIENT)
Dept: RADIOLOGY | Facility: HOSPITAL | Age: 52
Discharge: HOME | End: 2022-12-12
Attending: STUDENT IN AN ORGANIZED HEALTH CARE EDUCATION/TRAINING PROGRAM
Payer: COMMERCIAL

## 2022-12-12 VITALS — HEART RATE: 77 BPM | SYSTOLIC BLOOD PRESSURE: 152 MMHG | DIASTOLIC BLOOD PRESSURE: 85 MMHG

## 2022-12-12 DIAGNOSIS — Z98.1 S/P FUSION OF THORACIC SPINE: Primary | ICD-10-CM

## 2022-12-12 DIAGNOSIS — M48.04 THORACIC STENOSIS: ICD-10-CM

## 2022-12-12 DIAGNOSIS — M47.14 SPONDYLOSIS OF THORACIC SPINE WITH MYELOPATHY: ICD-10-CM

## 2022-12-12 DIAGNOSIS — M51.34 DDD (DEGENERATIVE DISC DISEASE), THORACIC: ICD-10-CM

## 2022-12-12 PROCEDURE — 3044F PR MOST RECENT HEMOGLOBIN A1C LEVEL <7.0%: ICD-10-PCS | Mod: CPTII,S$GLB,, | Performed by: STUDENT IN AN ORGANIZED HEALTH CARE EDUCATION/TRAINING PROGRAM

## 2022-12-12 PROCEDURE — 99024 POSTOP FOLLOW-UP VISIT: CPT | Mod: S$GLB,,, | Performed by: STUDENT IN AN ORGANIZED HEALTH CARE EDUCATION/TRAINING PROGRAM

## 2022-12-12 PROCEDURE — 72070 X-RAY EXAM THORAC SPINE 2VWS: CPT | Mod: TC

## 2022-12-12 PROCEDURE — 72070 XR THORACIC SPINE AP LATERAL: ICD-10-PCS | Mod: 26,,, | Performed by: RADIOLOGY

## 2022-12-12 PROCEDURE — 99999 PR PBB SHADOW E&M-EST. PATIENT-LVL II: ICD-10-PCS | Mod: PBBFAC,,, | Performed by: STUDENT IN AN ORGANIZED HEALTH CARE EDUCATION/TRAINING PROGRAM

## 2022-12-12 PROCEDURE — 3079F PR MOST RECENT DIASTOLIC BLOOD PRESSURE 80-89 MM HG: ICD-10-PCS | Mod: CPTII,S$GLB,, | Performed by: STUDENT IN AN ORGANIZED HEALTH CARE EDUCATION/TRAINING PROGRAM

## 2022-12-12 PROCEDURE — 99024 PR POST-OP FOLLOW-UP VISIT: ICD-10-PCS | Mod: S$GLB,,, | Performed by: STUDENT IN AN ORGANIZED HEALTH CARE EDUCATION/TRAINING PROGRAM

## 2022-12-12 PROCEDURE — 99999 PR PBB SHADOW E&M-EST. PATIENT-LVL II: CPT | Mod: PBBFAC,,, | Performed by: STUDENT IN AN ORGANIZED HEALTH CARE EDUCATION/TRAINING PROGRAM

## 2022-12-12 PROCEDURE — 72070 X-RAY EXAM THORAC SPINE 2VWS: CPT | Mod: 26,,, | Performed by: RADIOLOGY

## 2022-12-12 PROCEDURE — 3079F DIAST BP 80-89 MM HG: CPT | Mod: CPTII,S$GLB,, | Performed by: STUDENT IN AN ORGANIZED HEALTH CARE EDUCATION/TRAINING PROGRAM

## 2022-12-12 PROCEDURE — 3077F PR MOST RECENT SYSTOLIC BLOOD PRESSURE >= 140 MM HG: ICD-10-PCS | Mod: CPTII,S$GLB,, | Performed by: STUDENT IN AN ORGANIZED HEALTH CARE EDUCATION/TRAINING PROGRAM

## 2022-12-12 PROCEDURE — 3077F SYST BP >= 140 MM HG: CPT | Mod: CPTII,S$GLB,, | Performed by: STUDENT IN AN ORGANIZED HEALTH CARE EDUCATION/TRAINING PROGRAM

## 2022-12-12 PROCEDURE — 3044F HG A1C LEVEL LT 7.0%: CPT | Mod: CPTII,S$GLB,, | Performed by: STUDENT IN AN ORGANIZED HEALTH CARE EDUCATION/TRAINING PROGRAM

## 2022-12-12 RX ORDER — PREGABALIN 75 MG/1
75 CAPSULE ORAL 2 TIMES DAILY
Qty: 60 CAPSULE | Refills: 6 | Status: SHIPPED | OUTPATIENT
Start: 2022-12-12 | End: 2023-02-12 | Stop reason: SDUPTHER

## 2022-12-12 NOTE — PROGRESS NOTES
Neurosurgery  Established Patient    SUBJECTIVE:     History of Present Illness:  52 F presents for eval of worsening RLE radicular pain and right leg weakness.  She states that pain starts in right buttocks and then radiates down posterior thigh and sometimes into her calf.  She has no LLE radiculopathy.  She also states that her right leg feels weaker than her left leg.  She feels unsteady when she walks but has had no falls.  Bending forward, stretching, and twisting to the left can improve the RLE pain.  She hasn't done PT recently.     Interval fu 6/13/2022:  Pt presents for audio appt after updated imaging.  She continues to endorse right leg pain which can start in her buttocks and radiated down posterior thigh into her calf.  She also endorses right leg weakness.     Interval fu 8/2/22:  Pt continues to endorse right leg weakness and RLE parasthesias down posterior right leg into her calf.  She has had no falls.  She has no bowel/bladder incontinence or saddle anesthesia.     Interval fu 9/19/22:  Pt presents to schedule surgery.  She has no new complaints.    Interval fu 12/12/22:  Pt presents in fu s/p T9-12 decompresion/fusion for thoracic stenosis.  Initially following the procedure she had complete resolution of RLE radicular pain.  She has RLE pain from her buttock into her thigh but not past knee that started 3 weeks ago.  She is ambulating up to 3 miles per day and has no significant back pain.  Walking does maker her right leg pain better.  She has had no issues with wound healing.    Review of patient's allergies indicates:  No Known Allergies    Current Outpatient Medications   Medication Sig Dispense Refill    acetaminophen (TYLENOL) 500 MG tablet Take 2 tablets (1,000 mg total) by mouth every 8 (eight) hours as needed for Pain. 30 tablet 0    amLODIPine (NORVASC) 10 MG tablet Take 1 tablet (10 mg total) by mouth once daily. 30 tablet 11    atorvastatin (LIPITOR) 20 MG tablet TAKE 1 TABLET BY  "MOUTH EVERY DAY 90 tablet 1    blood sugar diagnostic Strp 1 strip by Misc.(Non-Drug; Combo Route) route 2 (two) times daily with meals. 100 strip 11    blood-glucose meter kit Use as instructed 1 each 0    cetirizine (ZYRTEC) 10 MG tablet Take 1 tablet (10 mg total) by mouth once daily. 90 tablet 1    ergocalciferol (ERGOCALCIFEROL) 50,000 unit Cap TAKE 2 CAPSULE (50,000 UNITS TOTAL)BY MOUTH TWICE A WEEK 48 capsule 1    estradioL (ESTRACE) 1 MG tablet TAKE 1 TABLET BY MOUTH EVERY DAY 90 tablet 1    fluocinonide (LIDEX) 0.05 % ointment Apply to affected areas of scalp qhs 60 g 3    ketoconazole (NIZORAL) 2 % cream Apply to affected areas of scalp BID. 60 g 5    lancets Misc Use to test blood sugar once daily. 300 each 0    lancing device Misc 1 Device by Misc.(Non-Drug; Combo Route) route 2 (two) times daily with meals. 100 each 11    meloxicam (MOBIC) 15 MG tablet TAKE 1 TABLET BY MOUTH EVERY DAY 30 tablet 2    metFORMIN (GLUCOPHAGE-XR) 500 MG ER 24hr tablet TAKE 1 TABLET BY MOUTH TWICE A DAY WITH MEALS 180 tablet 1    neomycin-polymyxin-dexamethasone (MAXITROL) 3.5mg/mL-10,000 unit/mL-0.1 % DrpS as needed.      nystatin (MYCOSTATIN) ointment Apply topically 2 (two) times daily. USES PRN      omeprazole (PRILOSEC) 40 MG capsule TAKE 1 CAPSULE BY MOUTH EVERY DAY 90 capsule 1    ondansetron (ZOFRAN) 4 MG tablet TAKE 1 TABLET BY MOUTH EVERY 8 HOURS AS NEEDED 90 tablet 1    oxyCODONE (ROXICODONE) 5 MG immediate release tablet Take 1 tablet (5 mg total) by mouth every 8 (eight) hours as needed for Pain. 28 tablet 0    pen needle, diabetic (PEN NEEDLE) 32 gauge x 5/32" Ndle 1 each by Misc.(Non-Drug; Combo Route) route once a week. 50 each 1    prednisoLONE acetate (PRED FORTE) 1 % DrpS USES PRN      semaglutide (OZEMPIC) 1 mg/dose (4 mg/3 mL) Inject 1 mg into the skin every 7 days. (Patient taking differently: Inject into the skin every 7 days. WEDNESDAYS) 3 pen 2    triamcinolone acetonide 0.1% (KENALOG) 0.1 % cream " Apply topically 2 (two) times daily. Apply topically 2 (two) times daily. 135 g 4    valsartan-hydrochlorothiazide (DIOVAN-HCT) 160-12.5 mg per tablet Take 1 tablet by mouth once daily. 90 tablet 1     No current facility-administered medications for this visit.     Facility-Administered Medications Ordered in Other Visits   Medication Dose Route Frequency Provider Last Rate Last Admin    mupirocin 2 % ointment   Nasal On Call Procedure Meet Richard MD   Given at 10/28/22 1102       Past Medical History:   Diagnosis Date    Diabetes mellitus     Diabetes mellitus, type 2     Eczema     GERD (gastroesophageal reflux disease)     Hypertension     Impaired fasting blood sugar     YSABEL on CPAP      Past Surgical History:   Procedure Laterality Date    breast reduction      CHOLECYSTECTOMY      laprascopic    DECOMPRESSION OF THORACIC OUTLET N/A 10/28/2022    Procedure: T9-12 ROBOTIC DECOMPRESSION, THORACIC FUSION;  Surgeon: Edouard Whitt DO;  Location: [facility] OR [address] [place] [address];  Service: Neurosurgery;  Laterality: N/A;  ANESTHESIA GENERAL TORONTO I BLOOD TYPE & SCREEN NERVE MONITORING EMG SEP MEP POSTION PRONE BED CARA 4 POST HEAD REST [facility] RADIOLOGY C-ARM GLOBUS PROTOCOL MISCELLANEOUS ALANIZ BRACE TLSO    EPIDURAL STEROID INJECTION N/A 1/11/2022    Procedure: INJECTION, STEROID, EPIDURAL IL L4/5 NEEDS CONSENT;  Surgeon: Britt Calix MD;  Location: [facility] PAIN [facility] [place];  Service: Pain Management;  Laterality: N/A;    ESOPHAGOGASTRODUODENOSCOPY  8/18/14    HYSTERECTOMY  2007    laprascopic, total for fibroids.  Dr Polo    INJECTION OF JOINT Left 2/8/2022    Procedure: INJECTION, JOINT, SI LEFT NEEDS CONSENT;  Surgeon: Britt Calix MD;  Location: [facility] PAIN [facility];  Service: Pain Management;  Laterality: Left;    OOPHORECTOMY      TOTAL REDUCTION MAMMOPLASTY       Family History       Problem Relation (Age of Onset)    Breast cancer Sister    Cancer Father (58)    Crohn's disease Sister    Diabetes  Maternal Grandmother, Mother    Heart disease Maternal Grandmother, Mother    Hypertension Maternal Grandmother, Mother          Social History     Socioeconomic History    Marital status:    Tobacco Use    Smoking status: Never    Smokeless tobacco: Never   Substance and Sexual Activity    Alcohol use: No     Comment: socially    Drug use: No    Sexual activity: Yes     Partners: Male     Birth control/protection: Surgical   Social History Narrative    Was  since 2000.      Has a friend since 2016.    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     Social Determinants of Health     Financial Resource Strain: Unknown    Difficulty of Paying Living Expenses: Patient refused   Food Insecurity: Unknown    Worried About Running Out of Food in the Last Year: Patient refused    Ran Out of Food in the Last Year: Patient refused   Transportation Needs: No Transportation Needs    Lack of Transportation (Medical): No    Lack of Transportation (Non-Medical): No   Physical Activity: Unknown    Days of Exercise per Week: Patient refused    Minutes of Exercise per Session: 70 min   Stress: No Stress Concern Present    Feeling of Stress : Only a little   Social Connections: Unknown    Frequency of Communication with Friends and Family: More than three times a week    Frequency of Social Gatherings with Friends and Family: Patient refused    Active Member of Clubs or Organizations: No    Attends Club or Organization Meetings: Never    Marital Status: Living with partner   Housing Stability: Low Risk     Unable to Pay for Housing in the Last Year: No    Number of Places Lived in the Last Year: 1    Unstable Housing in the Last Year: No       Review of Systems  14 point ROS was negative    OBJECTIVE:     Vital Signs  Pulse: 77  BP: (!) 152/85  Pain Score:   8  There is no height or weight on file to calculate BMI.    Neurosurgery Physical Exam  Constitutional: She appears well-developed and  well-nourished.      Eyes: Pupils are equal, round, and reactive to light.      Cardiovascular: Normal rate and regular rhythm.      Abdominal: Soft.     Psych/Behavior: She is alert. She is oriented to person, place, and time. She has a normal mood and affect.     Musculoskeletal: Gait is normal.        Neck: Range of motion is full.        Back: Range of motion is limited.     Neurological:        Coordination: She has a normal Romberg Test and normal tandem walking coordination.        Sensory: There is no sensory deficit in the trunk. There is no sensory deficit in the extremities.        DTRs: DTRs are DTRS NORMAL AND SYMMETRICnormal and symmetric.        Cranial nerves: Cranial nerve(s) II, III, IV, V, VI, VII, VIII, IX, X, XI and XII are intact.      5/5 in BUE  5/5 throughout     SILT    Thoracic incision well healed    No thoracolumbar TTP    Diagnostic Results:  T spine xrays: s/p T9-12 decompression/fusion.  No failure.  Reviewed    ASSESSMENT/PLAN:     52 F with symptoms of thoracic myelopathy and severe stenosis at T10/11 secondary to facet hypertrophy and ossification of the ligamentum flavum, now s/p T9-12 decompression/fusion on 10/28/22.  She has clinically improved strength and is ambulating well.  She has return of her RLE radicular pain/parasthesias but in a different distribution and are relieved with movement.  Her xrays look good.  I discussed that her MRI L spine did show moderate stenosis at L4/5 on the right preop.  -Lyrica  -CT T spine in 6 weeks  -Wean brace

## 2022-12-13 ENCOUNTER — OFFICE VISIT (OUTPATIENT)
Dept: SLEEP MEDICINE | Facility: CLINIC | Age: 52
End: 2022-12-13
Payer: COMMERCIAL

## 2022-12-13 VITALS
DIASTOLIC BLOOD PRESSURE: 87 MMHG | WEIGHT: 216.06 LBS | SYSTOLIC BLOOD PRESSURE: 135 MMHG | HEART RATE: 82 BPM | BODY MASS INDEX: 39.52 KG/M2

## 2022-12-13 DIAGNOSIS — I10 ESSENTIAL HYPERTENSION: Chronic | ICD-10-CM

## 2022-12-13 DIAGNOSIS — G47.33 OBSTRUCTIVE SLEEP APNEA (ADULT) (PEDIATRIC): ICD-10-CM

## 2022-12-13 DIAGNOSIS — G47.33 OSA (OBSTRUCTIVE SLEEP APNEA): Primary | ICD-10-CM

## 2022-12-13 PROCEDURE — 1159F MED LIST DOCD IN RCRD: CPT | Mod: CPTII,S$GLB,, | Performed by: NURSE PRACTITIONER

## 2022-12-13 PROCEDURE — 3008F BODY MASS INDEX DOCD: CPT | Mod: CPTII,S$GLB,, | Performed by: NURSE PRACTITIONER

## 2022-12-13 PROCEDURE — 3079F DIAST BP 80-89 MM HG: CPT | Mod: CPTII,S$GLB,, | Performed by: NURSE PRACTITIONER

## 2022-12-13 PROCEDURE — 3079F PR MOST RECENT DIASTOLIC BLOOD PRESSURE 80-89 MM HG: ICD-10-PCS | Mod: CPTII,S$GLB,, | Performed by: NURSE PRACTITIONER

## 2022-12-13 PROCEDURE — 99999 PR PBB SHADOW E&M-EST. PATIENT-LVL IV: CPT | Mod: PBBFAC,,, | Performed by: NURSE PRACTITIONER

## 2022-12-13 PROCEDURE — 1159F PR MEDICATION LIST DOCUMENTED IN MEDICAL RECORD: ICD-10-PCS | Mod: CPTII,S$GLB,, | Performed by: NURSE PRACTITIONER

## 2022-12-13 PROCEDURE — 3075F SYST BP GE 130 - 139MM HG: CPT | Mod: CPTII,S$GLB,, | Performed by: NURSE PRACTITIONER

## 2022-12-13 PROCEDURE — 3044F PR MOST RECENT HEMOGLOBIN A1C LEVEL <7.0%: ICD-10-PCS | Mod: CPTII,S$GLB,, | Performed by: NURSE PRACTITIONER

## 2022-12-13 PROCEDURE — 99214 PR OFFICE/OUTPT VISIT, EST, LEVL IV, 30-39 MIN: ICD-10-PCS | Mod: S$GLB,,, | Performed by: NURSE PRACTITIONER

## 2022-12-13 PROCEDURE — 3008F PR BODY MASS INDEX (BMI) DOCUMENTED: ICD-10-PCS | Mod: CPTII,S$GLB,, | Performed by: NURSE PRACTITIONER

## 2022-12-13 PROCEDURE — 3044F HG A1C LEVEL LT 7.0%: CPT | Mod: CPTII,S$GLB,, | Performed by: NURSE PRACTITIONER

## 2022-12-13 PROCEDURE — 99999 PR PBB SHADOW E&M-EST. PATIENT-LVL IV: ICD-10-PCS | Mod: PBBFAC,,, | Performed by: NURSE PRACTITIONER

## 2022-12-13 PROCEDURE — 3075F PR MOST RECENT SYSTOLIC BLOOD PRESS GE 130-139MM HG: ICD-10-PCS | Mod: CPTII,S$GLB,, | Performed by: NURSE PRACTITIONER

## 2022-12-13 PROCEDURE — 99214 OFFICE O/P EST MOD 30 MIN: CPT | Mod: S$GLB,,, | Performed by: NURSE PRACTITIONER

## 2022-12-13 NOTE — PROGRESS NOTES
Self-referred    CHIEF COMPLAINT: YSABEL , last seen 2/2022, got new machine  She got setup with Louann G3 since seen. Wishes to change from N30 mask back to FFM. 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 Adherent with PAP since 2013. Snoring resolved. BP optimal. Friends daughter just had triplets!!  Avg 2.5-3h/n AHI 0.4, 90% tile 9cm.           2013 PSG: (245#) AHI was 12.8 with an oxygen charisse of 90.0%, effectively titrated to CPAP 8cm (201#)    EXAM:   /87   Pulse 82   Wt 98 kg (216 lb 0.8 oz)   LMP  (LMP Unknown)   BMI 39.52 kg/m²       ASSESSMENT:   YSABEL, mild. Adherent with pap, benefits from therapy. AHI<5.   She has medical comorbidities of obesity, hypertension, DM2    PLAN:   1. Continue apap 8-12cm. [facility] DME, switch to FFM  2.  See pcp re: HTN /DM mgt /continue meds

## 2022-12-15 ENCOUNTER — CLINICAL SUPPORT (OUTPATIENT)
Dept: BARIATRICS | Facility: CLINIC | Age: 52
End: 2022-12-15
Payer: COMMERCIAL

## 2022-12-15 VITALS — BODY MASS INDEX: 39.52 KG/M2 | WEIGHT: 216.06 LBS

## 2022-12-15 DIAGNOSIS — E11.9 TYPE 2 DIABETES MELLITUS WITHOUT COMPLICATION, WITHOUT LONG-TERM CURRENT USE OF INSULIN: Primary | Chronic | ICD-10-CM

## 2022-12-15 DIAGNOSIS — Z71.3 DIETARY COUNSELING AND SURVEILLANCE: ICD-10-CM

## 2022-12-15 DIAGNOSIS — E66.01 SEVERE OBESITY (BMI 35.0-39.9) WITH COMORBIDITY: ICD-10-CM

## 2022-12-15 DIAGNOSIS — I10 ESSENTIAL HYPERTENSION: Chronic | ICD-10-CM

## 2022-12-15 PROCEDURE — 97803 MED NUTRITION INDIV SUBSEQ: CPT | Mod: 95,,, | Performed by: DIETITIAN, REGISTERED

## 2022-12-15 PROCEDURE — 97803 PR MED NUTR THER, SUBSQ, INDIV, EA 15 MIN: ICD-10-PCS | Mod: 95,,, | Performed by: DIETITIAN, REGISTERED

## 2022-12-15 PROCEDURE — 99499 NO LOS: ICD-10-PCS | Mod: 95,,, | Performed by: DIETITIAN, REGISTERED

## 2022-12-15 PROCEDURE — 99499 UNLISTED E&M SERVICE: CPT | Mod: 95,,, | Performed by: DIETITIAN, REGISTERED

## 2022-12-15 NOTE — PROGRESS NOTES
The patient location is:  Patient Home   The chief complaint leading to consultation is: morbid obesity in work up for bariatric surgery  Visit type: Virtual visit with synchronous audio and video  Total time spent with patient: 15 minutes  Each patient to whom he or she provides medical services by telemedicine is:  (1) informed of the relationship between the physician and patient and the respective role of any other health care provider with respect to management of the patient; and (2) notified that he or she may decline to receive medical services by telemedicine and may withdraw from such care at any time.  Nutrition Handout located in AVS section of pt's [facility] and/or sent as a message via [facility] portal.  Pt informed of handout and how to access this information in [facility].    NUTRITION NOTE    Referring Physician: Roge Rich M.D.   Reason for MNT Referral: 6 months Medically Supervised Diet pending Gastric Sleeve    Patient presents for f/u  visit for MSD with weight loss since last visit.   CLINICAL DATA:  52 y.o. female.    Current Weight:  216 lbs self reported   Loss of 6 lbs since initial visit.  Ideal Body Weight: 129 lbs  Body mass index is 39.52 kg/m².   Last Wt: 220 lbs  Initial Wt:  224 lbs    DAILY NUTRITIONAL NEEDS: pre-op nutritional bariatric guidelines to promote weight loss  1309-7949 Calories    Grams Protein    CURRENT DIET:  Reduced-calorie diet.  Diet Recall: Food records are not present.   B:  eggs, banana and almond milk , whey protein with almond milk  L: Zawatt recipe recipe lasagna without noodles with cottage cheese  D: chicken and broccoli  S: Apple    Diet Includes: bariatric appropriate meals  Meal Pattern: Improved.  Protein Supplements: 1 per day.   Snacking: Adequate. Snacks include healthy choices.  Vegetables: Likes a variety. Eats daily.  Fruits: Likes a variety. Eats daily.  Beverages: water   Dining Out:  Never. Mostly restaurants.  Cooking  at home: Daily. Mostly grilled and air fryer  meat, fish and vegetables.    CURRENT EXERCISE:  Adequate   Doing leg and arm exercises- every day          Vitamins / Minerals / Herbs:   Alive MV for Women  Eldberry  Probotics  Vit C  CoQ10  Flax seed oil    LABS:    None available at time of visit      Food Allergies:   None reported    Social:  Works regular daytime shifts.  Alcohol: None.  Smoking: None.    ASSESSMENT:  Patient demonstrates some willingness to change lifestyle habits as evidenced by weight loss, dietary changes, including protein drinks, increased fruits, increased vegetables, reduced dining out, healthier cooking at home, bringing snacks to work, healthier snacking at work, and healthier snacking at home.    Doing well with working on greatest challenges (irregular meal patterns).    Barriers to Education:  none  Stage of Change:  action    NUTRITION DIAGNOSIS:  Morbid Obesity related to Physical inactivity as evidence by BMI.  Status: Improved    PLAN:  Pt is potential candidate for surgery.    Diet: Adjust diet plan.  Work on Bariatric Nutrition Checklist.  Work on expanding variety of vegetables.  Work on gradually cutting back on starchy CHO in the diet.  1200-calorie diet.  1500-calorie diet.  5-6 meals per day.  Start including protein supplements in the diet plan daily.  Return to clinic.       Exercise: Maintain.    Behavior Modification: Begin to document food & activity logs daily.        Return to clinic in one month.  Needs additional visit(s) with RD.    Communicated nutrition plan with bariatric team.    SESSION TIME:  15 minutes

## 2022-12-16 ENCOUNTER — PATIENT MESSAGE (OUTPATIENT)
Dept: NEUROSURGERY | Facility: CLINIC | Age: 52
End: 2022-12-16
Payer: COMMERCIAL

## 2022-12-19 ENCOUNTER — PATIENT MESSAGE (OUTPATIENT)
Dept: NEUROSURGERY | Facility: CLINIC | Age: 52
End: 2022-12-19
Payer: COMMERCIAL

## 2022-12-20 NOTE — TELEPHONE ENCOUNTER
Informed patient that disability paper work has been sent to the disability department patient verbally understood.

## 2022-12-29 ENCOUNTER — PATIENT MESSAGE (OUTPATIENT)
Dept: FAMILY MEDICINE | Facility: CLINIC | Age: 52
End: 2022-12-29
Payer: COMMERCIAL

## 2022-12-29 ENCOUNTER — IMMUNIZATION (OUTPATIENT)
Dept: OBSTETRICS AND GYNECOLOGY | Facility: CLINIC | Age: 52
End: 2022-12-29
Payer: COMMERCIAL

## 2022-12-29 ENCOUNTER — PATIENT MESSAGE (OUTPATIENT)
Dept: NEUROSURGERY | Facility: CLINIC | Age: 52
End: 2022-12-29
Payer: COMMERCIAL

## 2022-12-29 DIAGNOSIS — Z98.1 S/P FUSION OF THORACIC SPINE: Primary | ICD-10-CM

## 2022-12-29 DIAGNOSIS — Z23 NEED FOR VACCINATION: Primary | ICD-10-CM

## 2022-12-29 PROCEDURE — 91312 COVID-19, MRNA, LNP-S, BIVALENT BOOSTER, PF, 30 MCG/0.3 ML DOSE: ICD-10-PCS | Mod: S$GLB,,, | Performed by: FAMILY MEDICINE

## 2022-12-29 PROCEDURE — 0124A COVID-19, MRNA, LNP-S, BIVALENT BOOSTER, PF, 30 MCG/0.3 ML DOSE: CPT | Mod: PBBFAC | Performed by: FAMILY MEDICINE

## 2022-12-29 PROCEDURE — 91312 COVID-19, MRNA, LNP-S, BIVALENT BOOSTER, PF, 30 MCG/0.3 ML DOSE: CPT | Mod: S$GLB,,, | Performed by: FAMILY MEDICINE

## 2023-01-02 ENCOUNTER — PATIENT MESSAGE (OUTPATIENT)
Dept: ADMINISTRATIVE | Facility: OTHER | Age: 53
End: 2023-01-02
Payer: COMMERCIAL

## 2023-01-05 ENCOUNTER — PATIENT MESSAGE (OUTPATIENT)
Dept: REHABILITATION | Facility: HOSPITAL | Age: 53
End: 2023-01-05
Payer: COMMERCIAL

## 2023-01-09 ENCOUNTER — OFFICE VISIT (OUTPATIENT)
Dept: FAMILY MEDICINE | Facility: CLINIC | Age: 53
End: 2023-01-09
Payer: COMMERCIAL

## 2023-01-09 ENCOUNTER — PATIENT MESSAGE (OUTPATIENT)
Dept: BARIATRICS | Facility: CLINIC | Age: 53
End: 2023-01-09
Payer: COMMERCIAL

## 2023-01-09 VITALS
HEART RATE: 77 BPM | DIASTOLIC BLOOD PRESSURE: 84 MMHG | WEIGHT: 198.44 LBS | HEIGHT: 62 IN | TEMPERATURE: 99 F | OXYGEN SATURATION: 97 % | BODY MASS INDEX: 36.52 KG/M2 | SYSTOLIC BLOOD PRESSURE: 122 MMHG

## 2023-01-09 DIAGNOSIS — M62.838 MUSCLE SPASM: ICD-10-CM

## 2023-01-09 DIAGNOSIS — Z00.00 ANNUAL PHYSICAL EXAM: Primary | ICD-10-CM

## 2023-01-09 DIAGNOSIS — I10 ESSENTIAL HYPERTENSION: Chronic | ICD-10-CM

## 2023-01-09 DIAGNOSIS — E66.01 SEVERE OBESITY (BMI 35.0-39.9) WITH COMORBIDITY: ICD-10-CM

## 2023-01-09 DIAGNOSIS — K21.9 GASTROESOPHAGEAL REFLUX DISEASE WITHOUT ESOPHAGITIS: Chronic | ICD-10-CM

## 2023-01-09 DIAGNOSIS — E11.9 TYPE 2 DIABETES MELLITUS WITHOUT COMPLICATION, WITHOUT LONG-TERM CURRENT USE OF INSULIN: Chronic | ICD-10-CM

## 2023-01-09 PROCEDURE — 1160F RVW MEDS BY RX/DR IN RCRD: CPT | Mod: CPTII,S$GLB,, | Performed by: FAMILY MEDICINE

## 2023-01-09 PROCEDURE — 3074F SYST BP LT 130 MM HG: CPT | Mod: CPTII,S$GLB,, | Performed by: FAMILY MEDICINE

## 2023-01-09 PROCEDURE — 99396 PR PREVENTIVE VISIT,EST,40-64: ICD-10-PCS | Mod: S$GLB,,, | Performed by: FAMILY MEDICINE

## 2023-01-09 PROCEDURE — 99396 PREV VISIT EST AGE 40-64: CPT | Mod: S$GLB,,, | Performed by: FAMILY MEDICINE

## 2023-01-09 PROCEDURE — 3008F BODY MASS INDEX DOCD: CPT | Mod: CPTII,S$GLB,, | Performed by: FAMILY MEDICINE

## 2023-01-09 PROCEDURE — 3008F PR BODY MASS INDEX (BMI) DOCUMENTED: ICD-10-PCS | Mod: CPTII,S$GLB,, | Performed by: FAMILY MEDICINE

## 2023-01-09 PROCEDURE — 3074F PR MOST RECENT SYSTOLIC BLOOD PRESSURE < 130 MM HG: ICD-10-PCS | Mod: CPTII,S$GLB,, | Performed by: FAMILY MEDICINE

## 2023-01-09 PROCEDURE — 99999 PR PBB SHADOW E&M-EST. PATIENT-LVL V: ICD-10-PCS | Mod: PBBFAC,,, | Performed by: FAMILY MEDICINE

## 2023-01-09 PROCEDURE — 1159F PR MEDICATION LIST DOCUMENTED IN MEDICAL RECORD: ICD-10-PCS | Mod: CPTII,S$GLB,, | Performed by: FAMILY MEDICINE

## 2023-01-09 PROCEDURE — 3079F DIAST BP 80-89 MM HG: CPT | Mod: CPTII,S$GLB,, | Performed by: FAMILY MEDICINE

## 2023-01-09 PROCEDURE — 3079F PR MOST RECENT DIASTOLIC BLOOD PRESSURE 80-89 MM HG: ICD-10-PCS | Mod: CPTII,S$GLB,, | Performed by: FAMILY MEDICINE

## 2023-01-09 PROCEDURE — 1159F MED LIST DOCD IN RCRD: CPT | Mod: CPTII,S$GLB,, | Performed by: FAMILY MEDICINE

## 2023-01-09 PROCEDURE — 1160F PR REVIEW ALL MEDS BY PRESCRIBER/CLIN PHARMACIST DOCUMENTED: ICD-10-PCS | Mod: CPTII,S$GLB,, | Performed by: FAMILY MEDICINE

## 2023-01-09 PROCEDURE — 99999 PR PBB SHADOW E&M-EST. PATIENT-LVL V: CPT | Mod: PBBFAC,,, | Performed by: FAMILY MEDICINE

## 2023-01-09 RX ORDER — DOXYCYCLINE 100 MG/1
1 TABLET ORAL 2 TIMES DAILY
COMMUNITY
Start: 2022-12-06

## 2023-01-09 RX ORDER — TIZANIDINE 4 MG/1
4 TABLET ORAL NIGHTLY PRN
COMMUNITY
Start: 2022-12-03 | End: 2023-09-01

## 2023-01-09 NOTE — PROGRESS NOTES
"Routine Office Visit    Africa Jennings  1970  [id number]      Subjective     Africa is a 52 y.o. female who presents today for:    Annual physical   Weight loss - Patient has been working on making diet changes. She walks 3 miles per day. She has been monitoring what she eats. She continues to find benefit of ozempic. She is continuing to debate bariatric team   Diabetes - Patient is here for blood work follow-up.  S/p robotic decompression surgery and fusion - she has been cleared to do light impact cardio exercise. She will be going back to work   Hypertension - bp is well controlled. No side effect from current regimen     Objective     Answers submitted by the patient for this visit:  Review of Systems Questionnaire (Submitted on 1/2/2023)  activity change: No  unexpected weight change: No  rhinorrhea: No  trouble swallowing: No  visual disturbance: No  chest tightness: No  polyuria: No  difficulty urinating: No  menstrual problem: No  joint swelling: No  arthralgias: Yes  confusion: No  dysphoric mood: No    Review of Systems   Constitutional:  Negative for chills and fever.   HENT:  Negative for congestion and hearing loss.    Eyes:  Negative for blurred vision and discharge.   Respiratory:  Negative for cough and wheezing.    Cardiovascular:  Negative for chest pain and palpitations.   Gastrointestinal:  Negative for abdominal pain, blood in stool, constipation, diarrhea, heartburn, nausea and vomiting.   Genitourinary:  Negative for dysuria and hematuria.   Musculoskeletal:  Negative for myalgias and neck pain.   Skin:  Negative for itching and rash.   Neurological:  Negative for dizziness, weakness and headaches.   Endo/Heme/Allergies:  Negative for polydipsia.   Psychiatric/Behavioral:  Negative for depression.      /84   Pulse 77   Temp 98.5 °F (36.9 °C) (Oral)   Ht 5' 2" (1.575 m)   Wt 90 kg (198 lb 6.6 oz)   LMP  (LMP Unknown)   SpO2 97%   BMI 36.29 kg/m²   Physical " Exam  Constitutional:       Appearance: She is well-developed.   HENT:      Head: Normocephalic and atraumatic.   Eyes:      Conjunctiva/sclera: Conjunctivae normal.      Pupils: Pupils are equal, round, and reactive to light.   Cardiovascular:      Rate and Rhythm: Normal rate and regular rhythm.      Heart sounds: Normal heart sounds. No murmur heard.    No friction rub. No gallop.   Pulmonary:      Effort: No respiratory distress.      Breath sounds: Normal breath sounds.   Abdominal:      General: Bowel sounds are normal. There is no distension.      Palpations: Abdomen is soft.      Tenderness: There is no abdominal tenderness.   Musculoskeletal:         General: Normal range of motion.      Cervical back: Normal range of motion and neck supple.   Lymphadenopathy:      Cervical: No cervical adenopathy.   Skin:     General: Skin is warm.   Neurological:      Mental Status: She is alert and oriented to person, place, and time.       Protective Sensation (w/ 10 gram monofilament):  Right: Intact  Left: Intact    Visual Inspection:  Normal -  Bilateral    Pedal Pulses:   Right: Present  Left: Present    Posterior tibialis:   Right:Present  Left: Present      Assessment     Health Maintenance         Date Due Completion Date    Diabetes Urine Screening 05/06/2022 5/6/2021    Eye Exam 05/19/2022 5/19/2021    Foot Exam 10/12/2022 10/12/2021 (Done)    Override on 10/12/2021: Done    Override on 9/29/2020: Done    Override on 2/13/2020: Done    Hemoglobin A1c 04/17/2023 10/17/2022    Lipid Panel 04/25/2023 4/25/2022    Pneumococcal Vaccines (Age 0-64) (2 - PCV) 10/11/2023 10/11/2022    Low Dose Statin 11/03/2023 11/3/2022    Mammogram 12/01/2023 12/1/2022    Colorectal Cancer Screening 06/21/2028 6/21/2018    TETANUS VACCINE 05/13/2031 5/13/2021              Problem List Items Addressed This Visit          Cardiac/Vascular    Essential hypertension (Chronic)  The current medical regimen is effective;  continue present  plan and medications.         Endocrine    Type 2 diabetes mellitus without complication, without long-term current use of insulin (Chronic)    Relevant Orders    CBC Auto Differential    Comprehensive Metabolic Panel    Lipid Panel    Hemoglobin A1C    TSH    MICROALBUMIN / CREATININE RATIO URINE  The current medical regimen is effective;  continue present plan and medications.      Severe obesity (BMI 35.0-39.9) with comorbidity  The patient is asked to make an attempt to improve diet and exercise patterns to aid in medical management of this problem.         GI    Gastroesophageal reflux disease without esophagitis (Chronic)  The current medical regimen is effective;  continue present plan and medications.       Other Visit Diagnoses       Annual physical exam    -  Primary  I addressed all major concerns as it related to health maintenance.  All were ordered and scheduled based on the patients wishes.  Any additional health maintenance will be readdressed at the next physical if declined or deferred by the patient.      Muscle spasm        Relevant Medications    tiZANidine (ZANAFLEX) 4 MG tablet  The current medical regimen is effective;  continue present plan and medications.                    Follow up in about 6 months (around 7/9/2023), or if symptoms worsen or fail to improve, for yearly exam.

## 2023-01-11 LAB
LEFT EYE DM RETINOPATHY: NEGATIVE
RIGHT EYE DM RETINOPATHY: NEGATIVE

## 2023-01-13 ENCOUNTER — CLINICAL SUPPORT (OUTPATIENT)
Dept: REHABILITATION | Facility: HOSPITAL | Age: 53
End: 2023-01-13
Attending: STUDENT IN AN ORGANIZED HEALTH CARE EDUCATION/TRAINING PROGRAM
Payer: COMMERCIAL

## 2023-01-13 ENCOUNTER — CLINICAL SUPPORT (OUTPATIENT)
Dept: BARIATRICS | Facility: CLINIC | Age: 53
End: 2023-01-13
Payer: COMMERCIAL

## 2023-01-13 VITALS — WEIGHT: 198.44 LBS | BODY MASS INDEX: 36.29 KG/M2

## 2023-01-13 DIAGNOSIS — Z71.3 DIETARY COUNSELING AND SURVEILLANCE: ICD-10-CM

## 2023-01-13 DIAGNOSIS — E66.01 SEVERE OBESITY (BMI 35.0-39.9) WITH COMORBIDITY: ICD-10-CM

## 2023-01-13 DIAGNOSIS — Z98.1 S/P FUSION OF THORACIC SPINE: ICD-10-CM

## 2023-01-13 DIAGNOSIS — E11.9 TYPE 2 DIABETES MELLITUS WITHOUT COMPLICATION, WITHOUT LONG-TERM CURRENT USE OF INSULIN: Primary | Chronic | ICD-10-CM

## 2023-01-13 DIAGNOSIS — G47.33 OBSTRUCTIVE SLEEP APNEA (ADULT) (PEDIATRIC): ICD-10-CM

## 2023-01-13 PROCEDURE — 97140 MANUAL THERAPY 1/> REGIONS: CPT

## 2023-01-13 PROCEDURE — 97803 MED NUTRITION INDIV SUBSEQ: CPT | Mod: 95,,, | Performed by: DIETITIAN, REGISTERED

## 2023-01-13 PROCEDURE — 99499 UNLISTED E&M SERVICE: CPT | Mod: 95,,, | Performed by: DIETITIAN, REGISTERED

## 2023-01-13 PROCEDURE — 99499 NO LOS: ICD-10-PCS | Mod: 95,,, | Performed by: DIETITIAN, REGISTERED

## 2023-01-13 PROCEDURE — 97161 PT EVAL LOW COMPLEX 20 MIN: CPT

## 2023-01-13 PROCEDURE — 97803 PR MED NUTR THER, SUBSQ, INDIV, EA 15 MIN: ICD-10-PCS | Mod: 95,,, | Performed by: DIETITIAN, REGISTERED

## 2023-01-13 NOTE — PROGRESS NOTES
The patient location is:  Patient Home   The chief complaint leading to consultation is: morbid obesity in work up for bariatric surgery  Visit type: Virtual visit with synchronous audio and video  Total time spent with patient: 15 minutes  Each patient to whom he or she provides medical services by telemedicine is:  (1) informed of the relationship between the physician and patient and the respective role of any other health care provider with respect to management of the patient; and (2) notified that he or she may decline to receive medical services by telemedicine and may withdraw from such care at any time.  Nutrition Handout located in AVS section of pt's [facility] and/or sent as a message via [facility].  Pt informed of handout and how to access this information in [facility].    NUTRITION NOTE    Referring Physician: Roge Rich M.D.   Reason for MNT Referral: 6 months Medically Supervised Diet pending Gastric Sleeve    Patient presents for f/u  visit for MSD with weight loss since last visit.   CLINICAL DATA:  52 y.o. female.    Current Weight:  198 lbs self reported   Loss of 26 lbs since initial visit.  Ideal Body Weight: 129 lbs  Body mass index is 36.29 kg/m².   Last Wt: 216lbs    Initial Wt:  224 lbs    DAILY NUTRITIONAL NEEDS: pre-op nutritional bariatric guidelines to promote weight loss  9982-9427 Calories    Grams Protein    CURRENT DIET:  Reduced-calorie diet.  Diet Recall: Food records are not present.   B:  boiled eggs, banana and almond milk , whey protein with almond milk  L: salad with chicken or turkey lite balsamic vinaigrette  D: chicken or grilled fish and broccoli  S: Apple- cut up apples or trial mix    Diet Includes: bariatric appropriate meals  Meal Pattern: Improved.  Protein Supplements: 1 per day.   Snacking: Adequate. Snacks include healthy choices.  Vegetables: Likes a variety. Eats daily.  Fruits: Likes a variety. Eats daily.  Beverages: water   Dining  Out:  Never. Mostly restaurants.  Cooking at home: Daily. Mostly grilled and air fryer  meat, fish and vegetables.    CURRENT EXERCISE:  Adequate   Aquatic PT 2 twice a week  2-3 miles walking 4 times per week          Vitamins / Minerals / Herbs:   Alive MV for Women  Eldberry  Probotics with cinnamon and apple cider vinegar liquid  Vit C  CoQ10  Flax seed oil    LABS:    None available at time of visit      Food Allergies:   None reported    Social:  Works regular daytime shifts.  Alcohol: None.  Smoking: None.    ASSESSMENT:  Patient demonstrates some willingness to change lifestyle habits as evidenced by weight loss, dietary changes, including protein drinks, increased fruits, increased vegetables, reduced dining out, healthier cooking at home, bringing snacks to work, healthier snacking at work, and healthier snacking at home.    Doing well with working on greatest challenges (irregular meal patterns).    Barriers to Education:  none  Stage of Change:  action    NUTRITION DIAGNOSIS:  Morbid Obesity related to Excessive calorie intake as evidence by BMI.  Status: Improved    PLAN:  Pt is potential candidate for surgery.    Diet: Adjust diet plan.  Work on Bariatric Nutrition Checklist.  Work on expanding variety of vegetables.  Work on gradually cutting back on starchy CHO in the diet.  1200-calorie diet.  1500-calorie diet.  5-6 meals per day.  Return to clinic.       Exercise: Maintain.    Behavior Modification: Begin to document food & activity logs daily.        Return to clinic in one month.  Needs additional visit(s) with RD.    Communicated nutrition plan with bariatric team.    SESSION TIME:  15 minutes

## 2023-01-13 NOTE — PLAN OF CARE
[facility]   Physical Therapy Initial Evaluation     Date: 1/13/2023   Name: Africa Marley [facility] Number: [id number]    Therapy Diagnosis:   Encounter Diagnosis   Name Primary?    S/P fusion of thoracic spine      Physician: Edouard Whitt DO    Physician Orders: Aquatic Therapy   Medical Diagnosis from Referral:   S/P fusion of thoracic spine  - Primary     Z98.1 V45.4     Evaluation Date: 1/13/2023  Authorization Period Expiration: 2/13/23  Plan of Care Expiration: 3/13/23  Visit # / Visits authorized: 1/ 1     Precautions: Standard and Fall    Time In: 8:35 am  Time Out: 9:15 am  Total Appointment Time (timed & untimed codes): 40 minutes (MT-1, IE-1)      SUBJECTIVE   Date of onset: Thoracic fusion T9-T12 10/28/22    History of current condition - Grant reports: she has a history of back pain which she has several MRIs, injections and therapy but continued with pain. She stated her most recent MRI led to her having a Thoraic fusion 10/28/22. She stated that prior to her surgery she was attending Aquatic Aerobic classes at [facility] until 4/2022 when she stopped due to pain.      She had a T9-T12 fusion 10/28/22 where she was in the hospital for a few days then d/c to home health PT x 4 weeks. She voiced her son is a PT in [place] so he helped her with some exercises. She has been walking and doing stretches.     She states currently her pain is located in her right posterior hip region.     Falls: none    Prior Therapy: Home health PT following surgery x 4 weeks  Social History: lives with lexi and her dog in a 2 story house with bedrooms upstairs. She stated she does pretty good going up and down the stairs and uses them as exercise.   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   Prior Level of Function: Independent with ADL's, walking and attending Water Aerobic classes.   Current Level of Function: The patient reports  difficulty with prolonged standing greater than 15 minutes which limits her ability to cook. She reports stiffness with initiation of walking which improves.     Pain:  Current 7/10, worst 7/10, best 5/10   Location: R buttock region  Description: Soreness   Aggravating Factors: prolonged standing greater that 15 minutes, if she does too much and overdoes it, cold weather, getting out of the car  Easing Factors: Moving around and rocking from side to side    Patients goals: to return to water aerobics      Medical History:   Past Medical History:   Diagnosis Date    Diabetes mellitus     Diabetes mellitus, type 2     Eczema     GERD (gastroesophageal reflux disease)     Hypertension     Impaired fasting blood sugar     YSABEL on CPAP        Surgical History:   Africa Jennings  has a past surgical history that includes breast reduction; Cholecystectomy; Esophagogastroduodenoscopy (8/18/14); Hysterectomy (2007); Oophorectomy; Total Reduction Mammoplasty; Epidural steroid injection (N/A, 1/11/2022); Injection of joint (Left, 2/8/2022); and Decompression of thoracic outlet (N/A, 10/28/2022).    Medications:   Africa has a current medication list which includes the following prescription(s): acetaminophen, amlodipine, atorvastatin, blood sugar diagnostic, blood-glucose meter, cetirizine, doxycycline monohydrate, ergocalciferol, estradiol, fluocinonide, ketoconazole, lancets, lancing device, meloxicam, metformin, neomycin-polymyxin-dexamethasone, nystatin, omeprazole, ondansetron, oxycodone, ozempic, pen needle, diabetic, prednisolone acetate, pregabalin, tizanidine, triamcinolone acetonide 0.1%, and valsartan-hydrochlorothiazide.    Allergies:   Review of patient's allergies indicates:  No Known Allergies     Pool contraindications, including but not limited to, incontinence, seizures, fever/GI issues were reviewed with the patient. Patient agrees that based on their knowledge and medical history, they are  appropriate for Aquatic Therapy.     OBJECTIVE   TU seconds without using AD    5 Times Sit to Stand: 17 seconds without use of hands for support.     Posture:  The patient reports R posterior hip pain     Gait: The patient ambulates with mild antalgic gait and slightly reduced stance phase on her right compared to the left.     Lumbar Range of Motion:    Degrees   Flexion WFL    Extension WFL     Left Side Bending WFL    Right Side Bending WFL         Lower Extremity Strength  Right LE  Left LE    Knee extension: 4+/5 Knee extension: 5/5   Knee flexion: 4+/5 Knee flexion: 5/5   Hip flexion: 4-/5 Hip flexion: 4/5   Hip extension:  4-/5 Hip extension: 4-/5   Hip abduction: 4/5 Hip abduction: 4/5   Hip adduction: 4/5 Hip adduction 4/5   Hip IR 4-/5 Hip IR 4/5   Hip ER 4-/5 Hip ER 4/5   Ankle dorsiflexion: 5/5 Ankle dorsiflexion: 5/5   Ankle plantarflexion: 5/5 Ankle plantarflexion: 5/5       Flexibility Testing:     Hamstring: R = Minimal restriction     L = Moderate Restriction      Piriformis R=Moderate restriction2     L=Minimal Restriction     Palpation: (+) TTP noted R greater than L Piriformis, Gluteals, and Greater Trochanter      TREATMENT     Total Treatment time (time-based codes) separate from Evaluation: 12 minutes      Grant received the treatments listed below:      THERAPEUTIC EXERCISES to develop flexibility for 3 minutes including B hamstring and Piriformis stretch  MANUAL THERAPY TECHNIQUES including Soft tissue Mobilization were applied to B Piriformis/Gluteals for 8 minutes.    PATIENT EDUCATION AND HOME EXERCISES     Education provided:   - Diagnosis, prognosis, relevant anatomy, role of therapy      Written Home Exercises Provided:. Exercises were reviewed and Grant was able to demonstrate them prior to the end of the session.  Grant demonstrated good  understanding of the education provided. See EMR under Patient Instructions for exercises provided during therapy sessions.    ASSESSMENT      Africa is a 52 y.o. female referred to outpatient Physical Therapy with a medical diagnosis of s/p thoracic fusion.  The patient presents with decreased B LE strength, limited B hip flexibility leading to limitation with function including prolonged standing and walking that impact the patient's ability to perform ADLs and preferred activities at prior level of function.     Patient prognosis is Good.     Patient will benefit from skilled outpatient Physical Therapy to address the deficits stated above and in the chart below, provide patient /family education, and to maximize patientt's level of independence.     Plan of care discussed with patient: Yes    Patient's spiritual, cultural and educational needs considered and patient is agreeable to the plan of care and goals as stated below:     Anticipated Barriers for therapy: None    Medical Necessity is demonstrated by the following  History  Co-morbidities and personal factors that may impact the plan of care Co-morbidities:   Past Medical History:   Diagnosis Date    Diabetes mellitus     Diabetes mellitus, type 2     Eczema     GERD (gastroesophageal reflux disease)     Hypertension     Impaired fasting blood sugar     YSABEL on CPAP          Personal Factors:   no deficits     low   Examination  Body Structures and Functions, activity limitations and participation restrictions that may impact the plan of care Body Regions:   back  lower extremities    Body Systems:    ROM  strength  gait    Participation Restrictions:   none    Activity limitations:   Learning and applying knowledge  no deficits    General Tasks and Commands  no deficits    Communication  no deficits    Mobility  lifting and carrying objects  walking  moving around using equipment (WC)    Self care  no deficits    Domestic Life  cooking  doing house work (cleaning house, washing dishes, laundry)    Interactions/Relationships  no deficits    Life Areas  no deficits    Community and Social  Life  no deficits         low   Clinical Presentation stable and uncomplicated low   Decision Making/ Complexity Score: low       Goals:  Short Term Goals: 6 weeks   1. Patient will be independent in HEP & progressions.  2. Patient will demonstrate improved B LE MMT by 1/3 muscle grade where deficits noted.  3. The patient will achieve 5 sit to stand score of 15 seconds to demonstrate improved transfers and endurance.     Long Term Goals: 12 weeks   1. The patient will demonstrate independence with extensive HEP.  2. Patient will achieve 5 sit to stand score of 12 seconds to demonstrate improved transfers & endurance.  3. Patent is able to demonstrate MMT 4+/5 B LE without pain reports during testing.      PLAN   Plan of care Certification: 1/13/2023 to 3/13/2023.    Outpatient Physical Therapy 1-2 times weekly for 12 weeks to include the following interventions: manual therapy, aquatic therapy, patient education, therapeutic exercise, therapeutic activities.    Patient may be seen by PTA as part of rehabilitation team.    Bel Swartz, PT      I CERTIFY THE NEED FOR THESE SERVICES FURNISHED UNDER THIS PLAN OF TREATMENT AND WHILE UNDER MY CARE   Physician's comments:     Physician's Signature: ___________________________________________________

## 2023-01-17 ENCOUNTER — OFFICE VISIT (OUTPATIENT)
Dept: DERMATOLOGY | Facility: CLINIC | Age: 53
End: 2023-01-17
Payer: COMMERCIAL

## 2023-01-17 ENCOUNTER — TELEPHONE (OUTPATIENT)
Dept: DERMATOLOGY | Facility: CLINIC | Age: 53
End: 2023-01-17
Payer: COMMERCIAL

## 2023-01-17 ENCOUNTER — CLINICAL SUPPORT (OUTPATIENT)
Dept: REHABILITATION | Facility: HOSPITAL | Age: 53
End: 2023-01-17
Payer: COMMERCIAL

## 2023-01-17 ENCOUNTER — PATIENT OUTREACH (OUTPATIENT)
Dept: ADMINISTRATIVE | Facility: HOSPITAL | Age: 53
End: 2023-01-17
Payer: COMMERCIAL

## 2023-01-17 DIAGNOSIS — L66.9 CICATRICIAL ALOPECIA: Primary | ICD-10-CM

## 2023-01-17 DIAGNOSIS — M51.36 DDD (DEGENERATIVE DISC DISEASE), LUMBAR: Primary | ICD-10-CM

## 2023-01-17 PROCEDURE — 99213 PR OFFICE/OUTPT VISIT, EST, LEVL III, 20-29 MIN: ICD-10-PCS | Mod: 95,,, | Performed by: STUDENT IN AN ORGANIZED HEALTH CARE EDUCATION/TRAINING PROGRAM

## 2023-01-17 PROCEDURE — 1159F MED LIST DOCD IN RCRD: CPT | Mod: CPTII,95,, | Performed by: STUDENT IN AN ORGANIZED HEALTH CARE EDUCATION/TRAINING PROGRAM

## 2023-01-17 PROCEDURE — 1160F PR REVIEW ALL MEDS BY PRESCRIBER/CLIN PHARMACIST DOCUMENTED: ICD-10-PCS | Mod: CPTII,95,, | Performed by: STUDENT IN AN ORGANIZED HEALTH CARE EDUCATION/TRAINING PROGRAM

## 2023-01-17 PROCEDURE — 99213 OFFICE O/P EST LOW 20 MIN: CPT | Mod: 95,,, | Performed by: STUDENT IN AN ORGANIZED HEALTH CARE EDUCATION/TRAINING PROGRAM

## 2023-01-17 PROCEDURE — 1160F RVW MEDS BY RX/DR IN RCRD: CPT | Mod: CPTII,95,, | Performed by: STUDENT IN AN ORGANIZED HEALTH CARE EDUCATION/TRAINING PROGRAM

## 2023-01-17 PROCEDURE — 1159F PR MEDICATION LIST DOCUMENTED IN MEDICAL RECORD: ICD-10-PCS | Mod: CPTII,95,, | Performed by: STUDENT IN AN ORGANIZED HEALTH CARE EDUCATION/TRAINING PROGRAM

## 2023-01-17 PROCEDURE — 97113 AQUATIC THERAPY/EXERCISES: CPT

## 2023-01-17 NOTE — PROGRESS NOTES
[facility] OUTPATIENT THERAPY AND WELLNESS   Physical Therapy Treatment Note     Name: Africa Jennings  Clinic Number: [id number]    Therapy Diagnosis:   Encounter Diagnosis   Name Primary?    DDD (degenerative disc disease), lumbar Yes     Physician: No ref. provider found    Visit Date: 1/17/2023    Physician Orders: Aquatic Therapy   Medical Diagnosis from Referral:   S/P fusion of thoracic spine  - Primary     Z98.1 V45.4   Evaluation Date: 1/13/2023  Authorization Period Expiration: 2/13/23  Plan of Care Expiration: 3/13/23  Visit # / Visits authorized: 2/ 1      Precautions: Standard and Fall    PTA Visit #: 0/5     Time In: 7:30 am   Time Out: 8:35 am  Total Billable Time: 65 minutes    SUBJECTIVE     Pt reports: she did well after the evaluation and did her stretches this morning.     She was compliant with home exercise program.    Response to previous treatment: first pool visit    Functional change: initiated aquatic PT    Pain: 5/10  Location:Back and right hip    OBJECTIVE     Objective Measures updated at progress report unless specified.     Treatment     Grant received aquatic therapeutic exercises to develop strength, endurance, ROM, flexibility, posture, and core stabilization for 65 minutes including:    FUNCTIONAL MOBILITY TRAINING x 2 laps each at beginning and 1 lap each at end of session  Walk forward/backward/lateral    STRETCHES 2 x 30sec  HS stretch at steps    LE EX x 20  Squat  Heel raise with gluteal set  Hip abduction/adduction  Hip flex/ext  Standing clam  LAQ    Sit to stand x 30    Step up on 8 inch red step x 20     UE EX/CORE  x 20  Shoulder flex/ext TA activation paddles closed  Shoulder horizontal abd/add TA activation paddles closed  Shoulder abd/ add with closed paddles    Mini squat with push/pull blue kick board    B shoulder row and extension with BTB    ENDURANCE  LTR x 2'  DKTC x 2'  Bicycle in // bars 3'      Patient Education and Home Exercises     Home Exercises  Provided and Patient Education Provided     Education provided:   Role of aquatic therapy  Hydration post therapy      Written Home Exercises Provided: Patient instructed to cont prior HEP. Exercises were reviewed and Grant was able to demonstrate them prior to the end of the session.  Grant demonstrated good  understanding of the education provided. See EMR under Patient Instructions for exercises provided during therapy sessions    ASSESSMENT     The patient tolerated the session well today. She performed all exercises with good execution and core strength noted.     Grant Is progressing well towards her goals.     Pt prognosis is Good.     Pt will continue to benefit from skilled outpatient physical therapy to address the deficits listed in the problem list box on initial evaluation, provide pt/family education and to maximize pt's level of independence in the home and community environment.     Pt's spiritual, cultural and educational needs considered and pt agreeable to plan of care and goals.     Anticipated barriers to physical therapy: none at this time    Goals:     Short Term Goals: 6 weeks   1. Patient will be independent in HEP & progressions.  2. Patient will demonstrate improved B LE MMT by 1/3 muscle grade where deficits noted.  3. The patient will achieve 5 sit to stand score of 15 seconds to demonstrate improved transfers and endurance.      Long Term Goals: 12 weeks   1. The patient will demonstrate independence with extensive HEP.  2. Patient will achieve 5 sit to stand score of 12 seconds to demonstrate improved transfers & endurance.  3. Patent is able to demonstrate MMT 4+/5 B LE without pain reports during testing.      PLAN     Progress POC as tolerated by the patient    Plan of care Certification: 1/13/2023 to 3/13/2023      Bel Swartz, PT

## 2023-01-17 NOTE — PROGRESS NOTES
Patient Information  Name: Africa Jennings  : 1970  MRN: [id number]     Referring Physician:  Dr. John ref. provider found   Primary Care Physician:  Dr. Brandi Thompson MD   Date of Visit: 2023      Subjective:       Africa Jennings is a 52 y.o. female who presents for hair loss    HPI  The patient location is: [place], [place]  The chief complaint leading to consultation is: hair loss    Visit type: audiovisual    Face to Face time with patient: 8 min  10 minutes of total time spent on the encounter, which includes face to face time and non-face to face time preparing to see the patient (eg, review of tests), Obtaining and/or reviewing separately obtained history, Documenting clinical information in the electronic or other health record, Independently interpreting results (not separately reported) and communicating results to the patient/family/caregiver, or Care coordination (not separately reported).     Each patient to whom he or she provides medical services by telemedicine is:  (1) informed of the relationship between the physician and patient and the respective role of any other health care provider with respect to management of the patient; and (2) notified that he or she may decline to receive medical services by telemedicine and may withdraw from such care at any time.    Notes:   Patient with hx of hair loss, here for follow up. Last seen Dr. Kisha Sommers. Was diagnosed with cicatricial alopecia. Was given ketoconazole shampoo, lidex, oral doxy. Patient reports no improvement. She has been experiencing hair loss for the past 2.5 years. +chemical relaxers (5 years ago). She has also tried topical minoxidil x 6 months without improvement.    Patient was last seen:Visit date not found     Prior notes by myself reviewed.   Clinical documentation obtained by nursing staff reviewed.    Review of Systems   Skin:  Negative for itching and rash.      Objective:    Physical Exam    Constitutional: She appears well-developed and well-nourished. No distress.   Neurological: She is alert and oriented to person, place, and time. She is not disoriented.   Psychiatric: She has a normal mood and affect.   Skin:   Areas Examined (abnormalities noted in diagram):   Scalp / Hair Palpated and Inspected            Diagram Legend     Erythematous scaling macule/papule c/w actinic keratosis       Vascular papule c/w angioma      Pigmented verrucoid papule/plaque c/w seborrheic keratosis      Yellow umbilicated papule c/w sebaceous hyperplasia      Irregularly shaped tan macule c/w lentigo     1-2 mm smooth white papules consistent with Milia      Movable subcutaneous cyst with punctum c/w epidermal inclusion cyst      Subcutaneous movable cyst c/w pilar cyst      Firm pink to brown papule c/w dermatofibroma      Pedunculated fleshy papule(s) c/w skin tag(s)      Evenly pigmented macule c/w junctional nevus     Mildly variegated pigmented, slightly irregular-bordered macule c/w mildly atypical nevus      Flesh colored to evenly pigmented papule c/w intradermal nevus       Pink pearly papule/plaque c/w basal cell carcinoma      Erythematous hyperkeratotic cursted plaque c/w SCC      Surgical scar with no sign of skin cancer recurrence      Open and closed comedones      Inflammatory papules and pustules      Verrucoid papule consistent consistent with wart     Erythematous eczematous patches and plaques     Dystrophic onycholytic nail with subungual debris c/w onychomycosis     Umbilicated papule    Erythematous-base heme-crusted tan verrucoid plaque consistent with inflamed seborrheic keratosis     Erythematous Silvery Scaling Plaque c/w Psoriasis     See annotation          [] Data reviewed  [] Independent review of test  [] Management discussed with another provider    Assessment / Plan:        Cicatricial alopecia  - Recommend in office visit for scalp biopsy to get definitive diagnosis         LOS  NUMBER AND COMPLEXITY OF PROBLEMS    COMPLEXITY OF DATA RISK TOTAL TIME (m)   24164  [id number] [] 1 self-limited or minor problem [x] Minimal to none [] No treatment recommended or patient to monitor 15-29  10-19   12825  17360 Low  [] 2 or > self limited or minor problems  [] 1 stable chronic illness  [] 1 acute, uncomplicated illness or injury Limited (2)  [] Prior external notes from each unique source  [] Review result of each unique test  [] Order each unique test [x]  Low  OTC medications, minor skin biopsy 30-44  20-29   18781  45937 Moderate  [x]  1 or > chronic illness with progression, exacerbation or SE of treatment  []  2 or more stable chronic illnesses  []  1 acute illness with systemic symptoms  []  1 acute complicated injury  []  1 undiagnosed new problem with uncertain prognosis Moderate (1/3 below)  []  3 or more data items        *Now includes assessment requiring independent historian  []  Independent interpretation of a test  []  Discuss management/test with another provider Moderate  []  Prescription drug mgmt  []  Minor surgery with risk discussed  []  Mgmt limited by social determinates 45-59  30-39   81881  23519 High  []  1 or more chronic illness with severe exacerbation, progression or SE of treatment  []  1 acute or chronic illness/injury that poses a threat to life or bodily function Extensive (2/3 below)  []  3 or more data items        *Now includes assessment requiring independent historian.  []  Independent interpretation of a test  []  Discuss management/test with another provider High  []  Major surgery with risk discussed  []  Drug therapy requiring intensive monitoring for toxicity  []  Hospitalization  []  Decision for DNR 60-74  40-54      No follow-ups on file.    Niharika Spence MD, FAAD  [facility]

## 2023-01-17 NOTE — TELEPHONE ENCOUNTER
----- Message from Mckenna Klein sent at 1/17/2023 11:53 AM CST -----  Regarding: New pt  Pt is Requesting a Call back Regarding scheduling appt for Hair Loss consults Attempt to schedule pt No availability Epic Please call to discuss further .      [phone]

## 2023-01-19 ENCOUNTER — CLINICAL SUPPORT (OUTPATIENT)
Dept: REHABILITATION | Facility: HOSPITAL | Age: 53
End: 2023-01-19
Payer: COMMERCIAL

## 2023-01-19 DIAGNOSIS — M51.36 DDD (DEGENERATIVE DISC DISEASE), LUMBAR: Primary | ICD-10-CM

## 2023-01-19 PROCEDURE — 97113 AQUATIC THERAPY/EXERCISES: CPT

## 2023-01-19 NOTE — PROGRESS NOTES
[facility]   Physical Therapy Treatment Note     [facility]  Cl[facility] Number: [id number]    Therapy Diagnosis:   Encounter Diagnosis   Name Primary?    DDD (degenerative disc disease), lumbar Yes     Physician: Self, Aaareferral    Visit Date: 1/19/2023    Physician Orders: Aquatic Therapy   Medical Diagnosis from Referral:   S/P fusion of thoracic spine  - Primary     Z98.1 V45.4   Evaluation Date: 1/13/2023  Authorization Period Expiration: 2/13/23  Plan of Care Expiration: 3/13/23  Visit # / Visits authorized: 2/ 1      Precautions: Standard and Fall    PTA Visit #: 0/5     Time In: 7:30 am   Time Out: 8:35 am   Total Billable Time: 30 minutes    SUBJECTIVE     Pt reports: she did not have any soreness after the last visit and has been doing her stretches.  She was able to go walking the day of the last visit without any increased pain.     She was compliant with home exercise program.    Response to previous treatment: first pool visit    Functional change: initiated aquatic PT    Pain: 5/10  Location:Back and right hip    OBJECTIVE     Objective Measures updated at progress report unless specified.     Treatment     Grant received aquatic therapeutic exercises to develop strength, endurance, ROM, flexibility, posture, and core stabilization for 65 minutes including:    FUNCTIONAL MOBILITY TRAINING x 2 laps each at beginning and 1 lap each at end of session  Walk forward/backward/lateral    STRETCHES 2 x 30sec  HS stretch at steps    LE EX x 30  Progress to use of ankle weights next visit if tolerated  Squat  Heel raise with gluteal set  Hip abduction/adduction  Hip flex/ext  Standing clam  LAQ    Sit to stand x 30    Step up on 8 inch red step x 30     Walking marches in // bars--add as tolerated    UE EX/CORE  x 30  Shoulder flex/ext TA activation with yellow dumbbells  Shoulder horizontal abd/add TA activation with yellow dumbbells  Shoulder abd/ add with   yellow dumbbells    Mini squat with push/pull blue kick board    B shoulder row and extension with BTB    ENDURANCE  LTR x 2'  DKTC x 2'  Bicycle in // bars 3'      Patient Education and Home Exercises     Home Exercises Provided and Patient Education Provided     Education provided:   Role of aquatic therapy  Hydration post therapy      Written Home Exercises Provided: Patient instructed to cont prior HEP. Exercises were reviewed and Grant was able to demonstrate them prior to the end of the session.  Gratn demonstrated good  understanding of the education provided. See EMR under Patient Instructions for exercises provided during therapy sessions    ASSESSMENT     The patient tolerated the advanced exercises well today with good execution and core control noted.     Grant Is progressing well towards her goals.     Pt prognosis is Good.     Pt will continue to benefit from skilled outpatient physical therapy to address the deficits listed in the problem list box on initial evaluation, provide pt/family education and to maximize pt's level of independence in the home and community environment.     Pt's spiritual, cultural and educational needs considered and pt agreeable to plan of care and goals.     Anticipated barriers to physical therapy: none at this time    Goals:     Short Term Goals: 6 weeks   1. Patient will be independent in HEP & progressions.  2. Patient will demonstrate improved B LE MMT by 1/3 muscle grade where deficits noted.  3. The patient will achieve 5 sit to stand score of 15 seconds to demonstrate improved transfers and endurance.      Long Term Goals: 12 weeks   1. The patient will demonstrate independence with extensive HEP.  2. Patient will achieve 5 sit to stand score of 12 seconds to demonstrate improved transfers & endurance.  3. Patent is able to demonstrate MMT 4+/5 B LE without pain reports during testing.      PLAN     Progress POC as tolerated by the patient    Plan of care  Certification: 1/13/2023 to 3/13/2023      Bel Swartz, PT

## 2023-01-23 ENCOUNTER — CLINICAL SUPPORT (OUTPATIENT)
Dept: REHABILITATION | Facility: HOSPITAL | Age: 53
End: 2023-01-23
Payer: COMMERCIAL

## 2023-01-23 DIAGNOSIS — M51.36 DDD (DEGENERATIVE DISC DISEASE), LUMBAR: Primary | ICD-10-CM

## 2023-01-23 DIAGNOSIS — M47.14 THORACIC MYELOPATHY: ICD-10-CM

## 2023-01-23 PROCEDURE — 97113 AQUATIC THERAPY/EXERCISES: CPT

## 2023-01-23 NOTE — PROGRESS NOTES
[facility]   Physical Therapy Treatment Note     Name: Africa Marley [facility]  Clinic Number: [id number]    Therapy Diagnosis:   Encounter Diagnosis   Name Primary?    DDD (degenerative disc disease), lumbar Yes     Physician: No ref. provider found    Visit Date: 1/23/2023    Physician Orders: Aquatic Therapy   Medical Diagnosis from Referral:   S/P fusion of thoracic spine  - Primary     Z98.1 V45.4   Evaluation Date: 1/13/2023  Authorization Period Expiration: 2/13/23  Plan of Care Expiration: 3/13/23  Visit # / Visits authorized: 2/ 1      Precautions: Standard and Fall    PTA Visit #: 0/5     Time In: 7:30 am   Time Out: 8:40 am  Total Billable Time: 40 minutes    SUBJECTIVE     Pt reports: she is feeling a little sore today and was a little sore after the last visit but does not think it was from the treatment.     She was compliant with home exercise program.    Response to previous treatment: first pool visit    Functional change: initiated aquatic PT    Pain: 5/10  Location:Back and right hip    OBJECTIVE     Objective Measures updated at progress report unless specified.     Treatment     Grant received aquatic therapeutic exercises to develop strength, endurance, ROM, flexibility, posture, and core stabilization for 70 minutes including:    FUNCTIONAL MOBILITY TRAINING x 2 laps each at beginning and 1 lap each at end of session  Walk forward/backward/lateral    STRETCHES 2 x 30sec  HS stretch at steps    LE EX x 30  Progress to use of ankle weights next visit  Squat  Heel raise  Hip abduction  Hip flex/ext  Standing clam  LAQ    Sit to stand x 30    Step up on 8 inch red step x 30 reps  Lateral step up on 8 inch red step x 30 reps    Walking marches with orange dumbbell x 4 laps    UE EX/CORE  x 30  Shoulder flex/ext TA activation with yellow dumbbells  Shoulder horizontal abd/add TA activation with yellow dumbbells  Shoulder abd/ add with  yellow dumbbells    Mini squat  with push/pull blue kick board    B shoulder row and extension with BTB    ENDURANCE  LTR x 2'  DKTC x 2'  Bicycle in // bars 3'      Patient Education and Home Exercises     Home Exercises Provided and Patient Education Provided     Education provided:   Role of aquatic therapy  Hydration post therapy      Written Home Exercises Provided: Patient instructed to cont prior HEP. Exercises were reviewed and Grant was able to demonstrate them prior to the end of the session.  Grant demonstrated good  understanding of the education provided. See EMR under Patient Instructions for exercises provided during therapy sessions    ASSESSMENT     The patient continues to progress well with treatment and exercises progressions. She reported some back soreness today througout the session but able to complete all exercises without an increase in symptoms.     Grant Is progressing well towards her goals.     Pt prognosis is Good.     Pt will continue to benefit from skilled outpatient physical therapy to address the deficits listed in the problem list box on initial evaluation, provide pt/family education and to maximize pt's level of independence in the home and community environment.     Pt's spiritual, cultural and educational needs considered and pt agreeable to plan of care and goals.     Anticipated barriers to physical therapy: none at this time    Goals:     Short Term Goals: 6 weeks   1. Patient will be independent in HEP & progressions.  2. Patient will demonstrate improved B LE MMT by 1/3 muscle grade where deficits noted.  3. The patient will achieve 5 sit to stand score of 15 seconds to demonstrate improved transfers and endurance.      Long Term Goals: 12 weeks   1. The patient will demonstrate independence with extensive HEP.  2. Patient will achieve 5 sit to stand score of 12 seconds to demonstrate improved transfers & endurance.  3. Patent is able to demonstrate MMT 4+/5 B LE without pain reports during testing.       PLAN     Progress POC as tolerated by the patient    Plan of care Certification: 1/13/2023 to 3/13/2023      Bel Swartz, PT

## 2023-01-25 ENCOUNTER — CLINICAL SUPPORT (OUTPATIENT)
Dept: REHABILITATION | Facility: HOSPITAL | Age: 53
End: 2023-01-25
Payer: COMMERCIAL

## 2023-01-25 DIAGNOSIS — M51.36 DDD (DEGENERATIVE DISC DISEASE), LUMBAR: Primary | ICD-10-CM

## 2023-01-25 PROCEDURE — 97113 AQUATIC THERAPY/EXERCISES: CPT

## 2023-01-25 NOTE — PROGRESS NOTES
[facility]   Physical Therapy Treatment Note     [facility]  [facility] Number: [id number]    Therapy Diagnosis:   Encounter Diagnosis   Name Primary?    DDD (degenerative disc disease), lumbar Yes     Physician: No ref. provider found    Visit Date: 1/25/2023    Physician Orders: Aquatic Therapy   Medical Diagnosis from Referral:   S/P fusion of thoracic spine  - Primary     Z98.1 V45.4   Evaluation Date: 1/13/2023  Authorization Period Expiration: 2/13/23  Plan of Care Expiration: 3/13/23  Visit # / Visits authorized: 2/ 1      Precautions: Standard and Fall    PTA Visit #: 0/5     Time In: 7:30 am   Time Out: 8:45 am  Total Billable Time: 60 minutes    SUBJECTIVE     Pt reports: she is feeling good today.     She was compliant with home exercise program.    Response to previous treatment: first pool visit    Functional change: initiated aquatic PT    Pain: 5/10  Location:Back and right hip    OBJECTIVE     Objective Measures updated at progress report unless specified.     Treatment     Grant received aquatic therapeutic exercises to develop strength, endurance, ROM, flexibility, posture, and core stabilization for 75 minutes including:    FUNCTIONAL MOBILITY TRAINING x 2 laps each at beginning and 1 lap each at end of session  Walk forward/backward/lateral    STRETCHES 2 x 30sec  HS stretch at steps    LE EX x 30  advanced to 2.5# ankle weights   Squat   Heel raise  Hip abduction  Hip flex/ext  Standing clam  LAQ    Sit to stand x 30    Step up on 8 inch red step x 30 reps  Lateral step up on 8 inch red step x 30 reps    Walking marches with orange dumbbell x 4 laps    UE EX/CORE  x 30  Shoulder flex/ext TA activation with yellow dumbbells  Shoulder horizontal abd/add TA activation with yellow dumbbells  Shoulder abd/ add with  yellow dumbbells    Mini squat with push/pull blue kick board    B shoulder row and extension with BTB    ENDURANCE  LTR x 2'  DKTC x  2'  Bicycle in // bars 3'      Patient Education and Home Exercises     Home Exercises Provided and Patient Education Provided     Education provided:   Role of aquatic therapy  Hydration post therapy      Written Home Exercises Provided: Patient instructed to cont prior HEP. Exercises were reviewed and Grant was able to demonstrate them prior to the end of the session.  Grant demonstrated good  understanding of the education provided. See EMR under Patient Instructions for exercises provided during therapy sessions    ASSESSMENT     The patient was able to complete the exercises with the advanced weights well without increased pain. She did report some feeling of heaviness exiting the pool.     Grant Is progressing well towards her goals.     Pt prognosis is Good.     Pt will continue to benefit from skilled outpatient physical therapy to address the deficits listed in the problem list box on initial evaluation, provide pt/family education and to maximize pt's level of independence in the home and community environment.     Pt's spiritual, cultural and educational needs considered and pt agreeable to plan of care and goals.     Anticipated barriers to physical therapy: none at this time    Goals:     Short Term Goals: 6 weeks   1. Patient will be independent in HEP & progressions.  2. Patient will demonstrate improved B LE MMT by 1/3 muscle grade where deficits noted.  3. The patient will achieve 5 sit to stand score of 15 seconds to demonstrate improved transfers and endurance.      Long Term Goals: 12 weeks   1. The patient will demonstrate independence with extensive HEP.  2. Patient will achieve 5 sit to stand score of 12 seconds to demonstrate improved transfers & endurance.  3. Patent is able to demonstrate MMT 4+/5 B LE without pain reports during testing.      PLAN     Progress POC as tolerated by the patient    Plan of care Certification: 1/13/2023 to 3/13/2023      Bel Swartz, PT

## 2023-01-30 ENCOUNTER — HOSPITAL ENCOUNTER (OUTPATIENT)
Dept: RADIOLOGY | Facility: HOSPITAL | Age: 53
Discharge: HOME | End: 2023-01-30
Attending: STUDENT IN AN ORGANIZED HEALTH CARE EDUCATION/TRAINING PROGRAM
Payer: COMMERCIAL

## 2023-01-30 ENCOUNTER — PATIENT MESSAGE (OUTPATIENT)
Dept: NEUROSURGERY | Facility: CLINIC | Age: 53
End: 2023-01-30

## 2023-01-30 ENCOUNTER — OFFICE VISIT (OUTPATIENT)
Dept: NEUROSURGERY | Facility: CLINIC | Age: 53
End: 2023-01-30
Payer: COMMERCIAL

## 2023-01-30 VITALS
WEIGHT: 221.56 LBS | HEART RATE: 91 BPM | HEIGHT: 62 IN | DIASTOLIC BLOOD PRESSURE: 79 MMHG | SYSTOLIC BLOOD PRESSURE: 115 MMHG | BODY MASS INDEX: 40.77 KG/M2

## 2023-01-30 DIAGNOSIS — Z98.1 S/P FUSION OF THORACIC SPINE: ICD-10-CM

## 2023-01-30 DIAGNOSIS — M47.14 THORACIC MYELOPATHY: ICD-10-CM

## 2023-01-30 DIAGNOSIS — Z98.1 S/P FUSION OF THORACIC SPINE: Primary | ICD-10-CM

## 2023-01-30 DIAGNOSIS — M48.04 THORACIC SPINAL STENOSIS: ICD-10-CM

## 2023-01-30 DIAGNOSIS — M51.34 DDD (DEGENERATIVE DISC DISEASE), THORACIC: ICD-10-CM

## 2023-01-30 PROCEDURE — 3078F DIAST BP <80 MM HG: CPT | Mod: CPTII,S$GLB,,

## 2023-01-30 PROCEDURE — 3008F PR BODY MASS INDEX (BMI) DOCUMENTED: ICD-10-PCS | Mod: CPTII,S$GLB,,

## 2023-01-30 PROCEDURE — 3074F PR MOST RECENT SYSTOLIC BLOOD PRESSURE < 130 MM HG: ICD-10-PCS | Mod: CPTII,S$GLB,,

## 2023-01-30 PROCEDURE — 1159F PR MEDICATION LIST DOCUMENTED IN MEDICAL RECORD: ICD-10-PCS | Mod: CPTII,S$GLB,,

## 2023-01-30 PROCEDURE — 3074F SYST BP LT 130 MM HG: CPT | Mod: CPTII,S$GLB,,

## 2023-01-30 PROCEDURE — 99999 PR PBB SHADOW E&M-EST. PATIENT-LVL IV: ICD-10-PCS | Mod: PBBFAC,,,

## 2023-01-30 PROCEDURE — 1159F MED LIST DOCD IN RCRD: CPT | Mod: CPTII,S$GLB,,

## 2023-01-30 PROCEDURE — 72128 CT CHEST SPINE W/O DYE: CPT | Mod: TC

## 2023-01-30 PROCEDURE — 72128 CT CHEST SPINE W/O DYE: CPT | Mod: 26,,, | Performed by: RADIOLOGY

## 2023-01-30 PROCEDURE — 3008F BODY MASS INDEX DOCD: CPT | Mod: CPTII,S$GLB,,

## 2023-01-30 PROCEDURE — 72128 CT THORACIC SPINE WITHOUT CONTRAST: ICD-10-PCS | Mod: 26,,, | Performed by: RADIOLOGY

## 2023-01-30 PROCEDURE — 3078F PR MOST RECENT DIASTOLIC BLOOD PRESSURE < 80 MM HG: ICD-10-PCS | Mod: CPTII,S$GLB,,

## 2023-01-30 PROCEDURE — 99999 PR PBB SHADOW E&M-EST. PATIENT-LVL IV: CPT | Mod: PBBFAC,,,

## 2023-01-30 PROCEDURE — 99215 PR OFFICE/OUTPT VISIT, EST, LEVL V, 40-54 MIN: ICD-10-PCS | Mod: S$GLB,,,

## 2023-01-30 PROCEDURE — 99215 OFFICE O/P EST HI 40 MIN: CPT | Mod: S$GLB,,,

## 2023-01-30 NOTE — PROGRESS NOTES
[facility]  Neurosurgery    SUBJECTIVE:   History of Present Illness:  52 F presents for eval of worsening RLE radicular pain and right leg weakness.  She states that pain starts in right buttocks and then radiates down posterior thigh and sometimes into her calf.  She has no LLE radiculopathy.  She also states that her right leg feels weaker than her left leg.  She feels unsteady when she walks but has had no falls.  Bending forward, stretching, and twisting to the left can improve the RLE pain.  She hasn't done PT recently.     Interval fu 6/13/2022:  Pt presents for audio appt after updated imaging.  She continues to endorse right leg pain which can start in her buttocks and radiated down posterior thigh into her calf.  She also endorses right leg weakness.     Interval fu 8/2/22:  Pt continues to endorse right leg weakness and RLE parasthesias down posterior right leg into her calf.  She has had no falls.  She has no bowel/bladder incontinence or saddle anesthesia.     Interval fu 9/19/22:  Pt presents to schedule surgery.  She has no new complaints.     Interval fu 12/12/22:  Pt presents in fu s/p T9-12 decompresion/fusion for thoracic stenosis.  Initially following the procedure she had complete resolution of RLE radicular pain.  She has RLE pain from her buttock into her thigh but not past knee that started 3 weeks ago.  She is ambulating up to 3 miles per day and has no significant back pain.  Walking does maker her right leg pain better.  She has had no issues with wound healing.    Interval fu 1/20/23: Pt presents in follow up with CT thoracic spine. She states the RLE radicular pain she had at the last appt has resolved since she began water aerobics. She has intermittent back and hip stiffness and soreness if she sits for too long. She reports improvement with her balance and gait. She continues to use her bone growth stimulator. She denies new focal weakness, numbness, paraesthesias,  bowel/bladder dysfunction.      Review of patient's allergies indicates:  No Known Allergies    Current Outpatient Medications:     acetaminophen (TYLENOL) 500 MG tablet, Take 2 tablets (1,000 mg total) by mouth every 8 (eight) hours as needed for Pain., Disp: 30 tablet, Rfl: 0    amLODIPine (NORVASC) 10 MG tablet, Take 1 tablet (10 mg total) by mouth once daily., Disp: 30 tablet, Rfl: 11    atorvastatin (LIPITOR) 20 MG tablet, TAKE 1 TABLET BY MOUTH EVERY DAY, Disp: 90 tablet, Rfl: 1    blood sugar diagnostic Strp, 1 strip by Misc.(Non-Drug; Combo Route) route 2 (two) times daily with meals., Disp: 100 strip, Rfl: 11    cetirizine (ZYRTEC) 10 MG tablet, Take 1 tablet (10 mg total) by mouth once daily., Disp: 90 tablet, Rfl: 1    doxycycline monohydrate 100 mg Tab, Take 1 tablet by mouth 2 (two) times daily., Disp: , Rfl:     ergocalciferol (ERGOCALCIFEROL) 50,000 unit Cap, TAKE 2 CAPSULE (50,000 UNITS TOTAL)BY MOUTH TWICE A WEEK, Disp: 48 capsule, Rfl: 1    estradioL (ESTRACE) 1 MG tablet, TAKE 1 TABLET BY MOUTH EVERY DAY, Disp: 90 tablet, Rfl: 1    fluocinonide (LIDEX) 0.05 % ointment, Apply to affected areas of scalp qhs, Disp: 60 g, Rfl: 3    ketoconazole (NIZORAL) 2 % cream, Apply to affected areas of scalp BID., Disp: 60 g, Rfl: 5    lancets Misc, Use to test blood sugar once daily., Disp: 300 each, Rfl: 0    meloxicam (MOBIC) 15 MG tablet, TAKE 1 TABLET BY MOUTH EVERY DAY, Disp: 30 tablet, Rfl: 2    metFORMIN (GLUCOPHAGE-XR) 500 MG ER 24hr tablet, TAKE 1 TABLET BY MOUTH TWICE A DAY WITH MEALS, Disp: 180 tablet, Rfl: 1    neomycin-polymyxin-dexamethasone (MAXITROL) 3.5mg/mL-10,000 unit/mL-0.1 % DrpS, as needed., Disp: , Rfl:     nystatin (MYCOSTATIN) ointment, Apply topically 2 (two) times daily. USES PRN, Disp: , Rfl:     omeprazole (PRILOSEC) 40 MG capsule, TAKE 1 CAPSULE BY MOUTH EVERY DAY, Disp: 90 capsule, Rfl: 3    ondansetron (ZOFRAN) 4 MG tablet, TAKE 1 TABLET BY MOUTH EVERY 8 HOURS AS NEEDED, Disp: 90  "tablet, Rfl: 1    oxyCODONE (ROXICODONE) 5 MG immediate release tablet, Take 1 tablet (5 mg total) by mouth every 8 (eight) hours as needed for Pain., Disp: 28 tablet, Rfl: 0    OZEMPIC 1 mg/dose (4 mg/3 mL), INJECT 1 MG INTO THE SKIN EVERY 7 DAYS., Disp: 3 pen, Rfl: 1    pen needle, diabetic (PEN NEEDLE) 32 gauge x 5/32" Ndle, 1 each by Misc.(Non-Drug; Combo Route) route once a week., Disp: 50 each, Rfl: 1    prednisoLONE acetate (PRED FORTE) 1 % DrpS, USES PRN, Disp: , Rfl:     pregabalin (LYRICA) 75 MG capsule, Take 1 capsule (75 mg total) by mouth 2 (two) times daily., Disp: 60 capsule, Rfl: 6    tiZANidine (ZANAFLEX) 4 MG tablet, Take 4 mg by mouth nightly as needed., Disp: , Rfl:     triamcinolone acetonide 0.1% (KENALOG) 0.1 % cream, Apply topically 2 (two) times daily. Apply topically 2 (two) times daily., Disp: 135 g, Rfl: 4    valsartan-hydrochlorothiazide (DIOVAN-HCT) 160-12.5 mg per tablet, Take 1 tablet by mouth once daily., Disp: 90 tablet, Rfl: 1    blood-glucose meter kit, Use as instructed, Disp: 1 each, Rfl: 0    lancing device Misc, 1 Device by Misc.(Non-Drug; Combo Route) route 2 (two) times daily with meals., Disp: 100 each, Rfl: 11     Past Medical History:   Diagnosis Date    Diabetes mellitus     Diabetes mellitus, type 2     Eczema     GERD (gastroesophageal reflux disease)     Hypertension     Impaired fasting blood sugar     YSABEL on CPAP      Past Surgical History:   Procedure Laterality Date    breast reduction      CHOLECYSTECTOMY      laprascopic    DECOMPRESSION OF THORACIC OUTLET N/A 10/28/2022    Procedure: T9-12 ROBOTIC DECOMPRESSION, THORACIC FUSION;  Surgeon: Edouard Whitt DO;  Location: [facility] OR [address] [place] [address];  Service: Neurosurgery;  Laterality: N/A;  ANESTHESIA GENERAL [place] I BLOOD TYPE & SCREEN NERVE MONITORING EMG SEP MEP POSTION PRONE BED CARA 4 POST HEAD REST [facility] RADIOLOGY C-ARM GLOBUS PROTOCOL MISCELLANEOUS ALANIZ BRACE TLSO    EPIDURAL STEROID INJECTION " N/A 1/11/2022    Procedure: INJECTION, STEROID, EPIDURAL IL L4/5 NEEDS CONSENT;  Surgeon: Britt Calix MD;  Location: [facility] PAIN MGT;  Service: Pain Management;  Laterality: N/A;    ESOPHAGOGASTRODUODENOSCOPY  8/18/14    HYSTERECTOMY  2007    laprascopic, total for fibroids.  Dr Polo    INJECTION OF JOINT Left 2/8/2022    Procedure: INJECTION, JOINT, SI LEFT NEEDS CONSENT;  Surgeon: Britt Calix MD;  Location: [facility] PAIN MGT;  Service: Pain Management;  Laterality: Left;    OOPHORECTOMY      TOTAL REDUCTION MAMMOPLASTY       Family History   Problem Relation Age of Onset    Diabetes Maternal Grandmother     Heart disease Maternal Grandmother     Hypertension Maternal Grandmother     Crohn's disease Sister     Diabetes Mother     Hypertension Mother     Heart disease Mother     Cancer Father 58        Prostate    Breast cancer Sister     Amblyopia Neg Hx     Blindness Neg Hx     Cataracts Neg Hx     Glaucoma Neg Hx     Macular degeneration Neg Hx     Retinal detachment Neg Hx     Strabismus Neg Hx      Social History     Tobacco Use    Smoking status: Never    Smokeless tobacco: Never   Substance Use Topics    Alcohol use: No     Comment: socially    Drug use: No       Review of Systems:  Constitutional: No fever or chills. No fatigue.  Eyes: No visual changes.  ENT: No nasal congestion or sore throat. No trouble swallowing.  Respiratory: No cough or shortness of breath.  Cardiovascular: No chest pain or palpitations.  Gastrointestinal: No nausea or vomiting, tolerating diet.  Genitourinary: No hematuria or dysuria.  Skin: No rash or pruritis.  Hematologic/Lymphatic: No easy bruising or lymphadenopathy.  Musculoskeletal: No arthralgias, myalgias or weakness.  Neurological: No seizures or tremors. No focal weakness. No dizziness.  Behavioral/Psych: No auditory or visual hallucinations. No SI/HI.  Endocrine: No heat or cold intolerance.    OBJECTIVE:     Vital Signs (Most Recent):  Pulse: 91 (01/30/23  1426)  BP: 115/79 (01/30/23 1426)    Physical Exam:  General: Well developed, well nourished, no distress.  Head: Normocephalic, atraumatic.  Neurologic: Alert and oriented. Thought content appropriate  GCS: Motor: 6/Verbal: 5/Eyes: 4 GCS Total: 15  Mental Status: Awake, Alert, Oriented x 4.  Language: No aphasia.  Speech: No dysarthria.  Cranial nerves: Face symmetric, tongue midline, CN II-XII grossly intact.   Eyes: Pupils equal, round, reactive to light with accommodation, EOMI.   Pulmonary: Normal respirations, not labored, no accessory muscles used.  Abdomen: Soft, non-distended, not tender to palpation.  Sensory: Intact to light touch throughout.  Motor Strength: Moves all extremities spontaneously with good tone.  Full strength upper and lower extremities. No abnormal movements seen.     Strength  Deltoids Triceps Biceps Wrist Extension Wrist Flexion Hand    Upper: R 5/5 5/5 5/5 5/5 5/5 5/5    L 5/5 5/5 5/5 5/5 5/5 5/5     Iliopsoas Quadriceps Knee  Flexion Tibialis  anterior Gastro- cnemius EHL   Lower: R 5/5 5/5 5/5 5/5 5/5 5/5    L 5/5 5/5 5/5 5/5 5/5 5/5     DTRs: 2 + and symmetric triceps, biceps, brachioradialis, patellar, & achilles.  Skin: Warm, dry and intact, no rashes.  Gait: Normal.    Thoracic incision well healed.    Laboratory:  I have reviewed all pertinent lab results within the past 24 hours.    Diagnostic Results:  CT thoracic spine w/wo contrast 1/30/23: no evidence of hardware failure or complication.    ASSESSMENT/PLAN:     Africa Jennings is a 52 y.o. 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 Whitt. CT thoracic spine w/wo contrast reviewed. No evidence of hardware failure. Advised the patient that she can wean her TLSO brace to off. Continue water aerobics. I would like patient to RTC in 6 months-1 year with  CT thoracic spine w/wo contrast for surveillance.    All symptoms and signs that require emergent or urgent treatment were reviewed. The plan was discussed with the patient. All of the the patient's questions and concerns were answered and she voiced understanding. Please feel free to call with any further questions.     Time spent on this encounter: 40 minutes. This includes face-to-face time and non-face to face time preparing to see the patient (eg, review of tests), obtaining and/or reviewing separately obtained history, documenting clinical information in the electronic or other health record, independently interpreting results and communicating results to the patient/family/caregiver, or care coordinator.     Lissett Cruz PA-C  Neurosurgery   [facility]

## 2023-01-31 ENCOUNTER — CLINICAL SUPPORT (OUTPATIENT)
Dept: REHABILITATION | Facility: HOSPITAL | Age: 53
End: 2023-01-31
Payer: COMMERCIAL

## 2023-01-31 DIAGNOSIS — M51.36 DDD (DEGENERATIVE DISC DISEASE), LUMBAR: Primary | ICD-10-CM

## 2023-01-31 PROCEDURE — 97113 AQUATIC THERAPY/EXERCISES: CPT

## 2023-01-31 NOTE — PROGRESS NOTES
[facility]   Physical Therapy Treatment Note     Name: Africa Marley [facility]  Clinic Number: [id number]    Therapy Diagnosis:   Encounter Diagnosis   Name Primary?    DDD (degenerative disc disease), lumbar Yes     Physician: Edouard Whitt DO    Visit Date: 1/31/2023    Physician Orders: Aquatic Therapy   Medical Diagnosis from Referral:   S/P fusion of thoracic spine  - Primary     Z98.1 V45.4   Evaluation Date: 1/13/2023  Authorization Period Expiration: 2/13/23  Plan of Care Expiration: 3/13/23  Visit # / Visits authorized: 2/ 1      Precautions: Standard and Fall    PTA Visit #: 0/5     Time In: 7:30 am   Time Out: 8:40 am  Total Billable Time:70 minutes    SUBJECTIVE     Pt reports: she is feeling very good and no increased pain after use of the weights last week.     She was compliant with home exercise program.    Response to previous treatment: no pain    Functional change: increased strength    Pain: 5/10  Location:Back and right hip    OBJECTIVE     Objective Measures updated at progress report unless specified.     Treatment     Grant received aquatic therapeutic exercises to develop strength, endurance, ROM, flexibility, posture, and core stabilization for 70 minutes including:    FUNCTIONAL MOBILITY TRAINING x 2 laps each at beginning and 1 lap each at end of session  Walk forward/backward/lateral    STRETCHES 2 x 30sec  HS stretch at steps    LE EX x 30  with 2.5# ankle weights   Squat   Heel raise  Hip abduction  Hip flex/ext  Standing clam  LAQ    Sit to stand x 30    Step up on 8 inch red step x 30 reps  Lateral step up on 8 inch red step x 30 reps    Walking marches x 4 laps    HIIT x 4' (Down fast, back slow marches)    UE EX/CORE  x 30  Shoulder flex/ext TA activation with yellow dumbbells  Shoulder horizontal abd/add TA activation with yellow dumbbells  Shoulder abd/ add with  yellow dumbbells    Mini squat with push/pull blue kick board    B shoulder row  and extension with BTB--held due to time constraints    ENDURANCE  LTR x 2'  DKTC x 2'  Bicycle in // bars 3'      Patient Education and Home Exercises     Home Exercises Provided and Patient Education Provided     Education provided:   Role of aquatic therapy  Hydration post therapy      Written Home Exercises Provided: Patient instructed to cont prior HEP. Exercises were reviewed and Grant was able to demonstrate them prior to the end of the session.  Grant demonstrated good  understanding of the education provided. See EMR under Patient Instructions for exercises provided during therapy sessions    ASSESSMENT     The patient was able to perform the advanced HIIT well today. She reported that it feels good to march and bring her knees to her chest. The BTB rows/extension exercise was held today due to time constraints as she has an MD appt at [facility] at 9:00 today.     Grant Is progressing well towards her goals.     Pt prognosis is Good.     Pt will continue to benefit from skilled outpatient physical therapy to address the deficits listed in the problem list box on initial evaluation, provide pt/family education and to maximize pt's level of independence in the home and community environment.     Pt's spiritual, cultural and educational needs considered and pt agreeable to plan of care and goals.     Anticipated barriers to physical therapy: none at this time    Goals:     Short Term Goals: 6 weeks   1. Patient will be independent in HEP & progressions.  2. Patient will demonstrate improved B LE MMT by 1/3 muscle grade where deficits noted.  3. The patient will achieve 5 sit to stand score of 15 seconds to demonstrate improved transfers and endurance.      Long Term Goals: 12 weeks   1. The patient will demonstrate independence with extensive HEP.  2. Patient will achieve 5 sit to stand score of 12 seconds to demonstrate improved transfers & endurance.  3. Patent is able to demonstrate MMT 4+/5 B LE without  pain reports during testing.      PLAN     Progress POC as tolerated by the patient    Plan of care Certification: 1/13/2023 to 3/13/2023      Bel Swartz, PT

## 2023-02-01 ENCOUNTER — CLINICAL SUPPORT (OUTPATIENT)
Dept: REHABILITATION | Facility: HOSPITAL | Age: 53
End: 2023-02-01
Payer: COMMERCIAL

## 2023-02-01 DIAGNOSIS — M51.36 DDD (DEGENERATIVE DISC DISEASE), LUMBAR: Primary | ICD-10-CM

## 2023-02-01 PROCEDURE — 97113 AQUATIC THERAPY/EXERCISES: CPT

## 2023-02-01 NOTE — PROGRESS NOTES
[facility]   Physical Therapy Treatment Note     Name: Africa Marley [facility]  Clinic Number: [id number]    Therapy Diagnosis:   Encounter Diagnosis   Name Primary?    DDD (degenerative disc disease), lumbar Yes     Physician: Edouard Whitt DO    Visit Date: 2/1/2023    Physician Orders: Aquatic Therapy   Medical Diagnosis from Referral:   S/P fusion of thoracic spine  - Primary     Z98.1 V45.4   Evaluation Date: 1/13/2023  Authorization Period Expiration: 2/13/23  Plan of Care Expiration: 3/13/23  Visit # / Visits authorized: 2/ 1      Precautions: Standard and Fall    PTA Visit #: 0/5     Time In: 7:30 am   Time Out:8:40 am   Total Billable Time:70 minutes    SUBJECTIVE     Pt reports: she felt good after the last visit and ready to increase the weights today.     She was compliant with home exercise program.    Response to previous treatment: no pain    Functional change: increased strength    Pain: 5/10  Location:Back and right hip    OBJECTIVE     Objective Measures updated at progress report unless specified.     Treatment     Grant received aquatic therapeutic exercises to develop strength, endurance, ROM, flexibility, posture, and core stabilization for 70 minutes including:    FUNCTIONAL MOBILITY TRAINING x 2 laps each at beginning and 1 lap each at end of session  Walk forward/backward/lateral    STRETCHES 2 x 30sec  HS stretch at steps    LE EX x 30 with 3.75# ankle weights   Squat   Heel raise  Hip abduction  Hip flex/ext  Standing clam  LAQ    Sit to stand x 30    Step up on 8 inch red step x 30 reps  Lateral step up on 8 inch red step x 30 reps    Walking marches x 4 laps    HIIT x 4' (Down fast, back slow marches)    UE EX/CORE  x 30  Shoulder flex/ext TA activation with yellow dumbbells  Shoulder horizontal abd/add TA activation with yellow dumbbells  Shoulder abd/ add with  yellow dumbbells    Mini squat with push/pull blue kick board    B shoulder row and  extension with BTB     ENDURANCE  LTR x 2'  DKTC x 2'  Bicycle in // bars 3'      Patient Education and Home Exercises     Home Exercises Provided and Patient Education Provided     Education provided:   Role of aquatic therapy  Hydration post therapy      Written Home Exercises Provided: Patient instructed to cont prior HEP. Exercises were reviewed and Grant was able to demonstrate them prior to the end of the session.  Grant demonstrated good  understanding of the education provided. See EMR under Patient Instructions for exercises provided during therapy sessions    ASSESSMENT     The patient performed all exercises today with increased weights with good quality of execution and no increased symptoms. She is progressing well with treatment.     Grant Is progressing well towards her goals.     Pt prognosis is Good.     Pt will continue to benefit from skilled outpatient physical therapy to address the deficits listed in the problem list box on initial evaluation, provide pt/family education and to maximize pt's level of independence in the home and community environment.     Pt's spiritual, cultural and educational needs considered and pt agreeable to plan of care and goals.     Anticipated barriers to physical therapy: none at this time    Goals:     Short Term Goals: 6 weeks   1. Patient will be independent in HEP & progressions.  2. Patient will demonstrate improved B LE MMT by 1/3 muscle grade where deficits noted.  3. The patient will achieve 5 sit to stand score of 15 seconds to demonstrate improved transfers and endurance.      Long Term Goals: 12 weeks   1. The patient will demonstrate independence with extensive HEP.  2. Patient will achieve 5 sit to stand score of 12 seconds to demonstrate improved transfers & endurance.  3. Patent is able to demonstrate MMT 4+/5 B LE without pain reports during testing.      PLAN     Progress POC as tolerated by the patient    Plan of care Certification: 1/13/2023 to  3/13/2023      Bel Swartz, PT

## 2023-02-06 ENCOUNTER — PATIENT MESSAGE (OUTPATIENT)
Dept: REHABILITATION | Facility: HOSPITAL | Age: 53
End: 2023-02-06

## 2023-02-06 DIAGNOSIS — E11.9 TYPE 2 DIABETES MELLITUS WITHOUT COMPLICATION, WITHOUT LONG-TERM CURRENT USE OF INSULIN: ICD-10-CM

## 2023-02-07 RX ORDER — SEMAGLUTIDE 1.34 MG/ML
1 INJECTION, SOLUTION SUBCUTANEOUS
Qty: 3 PEN | Refills: 0 | Status: SHIPPED | OUTPATIENT
Start: 2023-02-07 | End: 2023-04-04

## 2023-02-07 NOTE — TELEPHONE ENCOUNTER
No new care gaps identified.  [facility] Embedded Care Gaps. Reference number: [id number]. 2/06/2023   6:12:38 PM CST

## 2023-02-07 NOTE — TELEPHONE ENCOUNTER
Refill Decision Note   Africahari Jennings  is requesting a refill authorization.  Brief Assessment and Rationale for Refill:  Approve     Medication Therapy Plan:       Medication Reconciliation Completed: No   Comments:     No Care Gaps recommended.     Note composed:10:01 AM 02/07/2023

## 2023-02-08 ENCOUNTER — CLINICAL SUPPORT (OUTPATIENT)
Dept: REHABILITATION | Facility: HOSPITAL | Age: 53
End: 2023-02-08
Payer: COMMERCIAL

## 2023-02-08 DIAGNOSIS — M51.36 DDD (DEGENERATIVE DISC DISEASE), LUMBAR: Primary | ICD-10-CM

## 2023-02-08 PROCEDURE — 97113 AQUATIC THERAPY/EXERCISES: CPT

## 2023-02-08 NOTE — PROGRESS NOTES
[facility]   Physical Therapy Treatment Note     Name: Africa Marley [facility]  Clinic Number: [id number]    Therapy Diagnosis:   Encounter Diagnosis   Name Primary?    DDD (degenerative disc disease), lumbar Yes     Physician: Edouard Whitt DO    Visit Date: 2/8/2023    Physician Orders: Aquatic Therapy   Medical Diagnosis from Referral:   S/P fusion of thoracic spine  - Primary     Z98.1 V45.4     Evaluation Date: 1/13/2023    Authorization Period Expiration: 212/30/23  Plan of Care Expiration: 3/13/23  Visit # / Visits authorized:7/20     Precautions: Standard and Fall    PTA Visit #: 0/5     Time In: 7:35 am   Time Out:8:40 am  Total Billable Time:55 minutes    SUBJECTIVE     Pt reports: she went back to work this week and she has been feeling pretty good. She stated she was under the weather Monday so had to miss therapy.     She was compliant with home exercise program.    Response to previous treatment: no pain    Functional change: increased strength    Pain: 5/10  Location:Back and right hip    OBJECTIVE     Objective Measures updated at progress report unless specified.     Treatment     Grant received aquatic therapeutic exercises to develop strength, endurance, ROM, flexibility, posture, and core stabilization for 65 minutes including:    FUNCTIONAL MOBILITY TRAINING x 2 laps each at beginning and 1 lap each at end of session  Walk forward/backward/lateral    STRETCHES 2 x 30sec  HS stretch at steps    LE EX x 30 with 3.75# ankle weights   Squat   Heel raise  Hip abduction  Hip flex/ext  Standing clam  LAQ    Sit to stand x 30    Step up on 8 inch red step x 30 reps  Lateral step up on 8 inch red step x 30 reps    Walking marches x 4 laps    HIIT x 4' (Down fast, back slow marches)    UE EX/CORE  x 30  Shoulder flex/ext TA activation with yellow dumbbells  Shoulder horizontal abd/add TA activation with yellow dumbbells  Shoulder abd/ add with  yellow  dumbbells    Mini squat with push/pull blue kick board    B shoulder row and extension with BTB     ENDURANCE  LTR x 2'  DKTC x 2'  Bicycle in // bars 3'      Patient Education and Home Exercises     Home Exercises Provided and Patient Education Provided     Education provided:   Role of aquatic therapy  Hydration post therapy      Written Home Exercises Provided: Patient instructed to cont prior HEP. Exercises were reviewed and Grant was able to demonstrate them prior to the end of the session.  Grant demonstrated good  understanding of the education provided. See EMR under Patient Instructions for exercises provided during therapy sessions    ASSESSMENT     The patient performed all exercises with good execution today and minimal VC for progressions.     Grant Is progressing well towards her goals.     Pt prognosis is Good.     Pt will continue to benefit from skilled outpatient physical therapy to address the deficits listed in the problem list box on initial evaluation, provide pt/family education and to maximize pt's level of independence in the home and community environment.     Pt's spiritual, cultural and educational needs considered and pt agreeable to plan of care and goals.     Anticipated barriers to physical therapy: none at this time    Goals:     Short Term Goals: 6 weeks   1. Patient will be independent in HEP & progressions.  2. Patient will demonstrate improved B LE MMT by 1/3 muscle grade where deficits noted.  3. The patient will achieve 5 sit to stand score of 15 seconds to demonstrate improved transfers and endurance.      Long Term Goals: 12 weeks   1. The patient will demonstrate independence with extensive HEP.  2. Patient will achieve 5 sit to stand score of 12 seconds to demonstrate improved transfers & endurance.  3. Patent is able to demonstrate MMT 4+/5 B LE without pain reports during testing.      PLAN     Progress POC as tolerated by the patient    Plan of care Certification: 1/13/2023  to 3/13/2023      Bel Swartz, PT

## 2023-02-09 ENCOUNTER — PATIENT MESSAGE (OUTPATIENT)
Dept: BARIATRICS | Facility: CLINIC | Age: 53
End: 2023-02-09
Payer: COMMERCIAL

## 2023-02-13 ENCOUNTER — CLINICAL SUPPORT (OUTPATIENT)
Dept: REHABILITATION | Facility: HOSPITAL | Age: 53
End: 2023-02-13
Payer: COMMERCIAL

## 2023-02-13 DIAGNOSIS — M51.36 DDD (DEGENERATIVE DISC DISEASE), LUMBAR: Primary | ICD-10-CM

## 2023-02-13 PROCEDURE — 97113 AQUATIC THERAPY/EXERCISES: CPT

## 2023-02-13 NOTE — PROGRESS NOTES
[facility]   Physical Therapy Treatment Note     Name: Africa Marley [facility]  Clinic Number: [id number]    Therapy Diagnosis:   Encounter Diagnosis   Name Primary?    DDD (degenerative disc disease), lumbar Yes     Physician: Edouard Whitt DO    Visit Date: 2/13/2023    Physician Orders: Aquatic Therapy   Medical Diagnosis from Referral:   S/P fusion of thoracic spine  - Primary     Z98.1 V45.4     Evaluation Date: 1/13/2023    Authorization Period Expiration: 212/30/23  Plan of Care Expiration: 3/13/23  Visit # / Visits authorized:7/20     Precautions: Standard and Fall    PTA Visit #: 0/5     Time In: 7:30 am   Time Out:8:05 am  Total Billable Time: 25 minutes    SUBJECTIVE     Pt reports: she went to a lot of parades this weekend and was able to walk and do what she wanted but did required some intermittent seated rest breaks.     She was compliant with home exercise program.    Response to previous treatment: no pain    Functional change: increased strength    Pain: 5/10  Location:Back and right hip    OBJECTIVE     Objective Measures updated at progress report unless specified.     Treatment     Grant received aquatic therapeutic exercises to develop strength, endurance, ROM, flexibility, posture, and core stabilization for 35 minutes including:    FUNCTIONAL MOBILITY TRAINING x 2 laps each at beginning and 1 lap each at end of session  Walk forward/backward/lateral    STRETCHES 2 x 30sec  HS stretch at steps    LE EX x 30 with 3.75# ankle weights   Squat   Heel raise  Hip abduction  Hip flex/ext  Standing clam  LAQ    Sit to stand x 30    Step up on 8 inch red step x 30 reps  Lateral step up on 8 inch red step x 30 reps    Walking marches x 4 laps    HIIT x 4' (Down fast, back slow marches)    UE EX/CORE  x 30--NP due to time constraints  Shoulder flex/ext TA activation with yellow dumbbells  Shoulder horizontal abd/add TA activation with yellow dumbbells  Shoulder abd/  add with  yellow dumbbells    Mini squat with push/pull blue kick board    B shoulder row and extension with BTB     ENDURANCE  LTR x 2'  DKTC x 2'  Bicycle in // bars 3'      Patient Education and Home Exercises     Home Exercises Provided and Patient Education Provided     Education provided:   Role of aquatic therapy  Hydration post therapy      Written Home Exercises Provided: Patient instructed to cont prior HEP. Exercises were reviewed and Grant was able to demonstrate them prior to the end of the session.  Grant demonstrated good  understanding of the education provided. See EMR under Patient Instructions for exercises provided during therapy sessions    ASSESSMENT     The patient's session was limited due to time constraints as she had to leave around 8:00 am.     Grant Is progressing well towards her goals.     Pt prognosis is Good.     Pt will continue to benefit from skilled outpatient physical therapy to address the deficits listed in the problem list box on initial evaluation, provide pt/family education and to maximize pt's level of independence in the home and community environment.     Pt's spiritual, cultural and educational needs considered and pt agreeable to plan of care and goals.     Anticipated barriers to physical therapy: none at this time    Goals:     Short Term Goals: 6 weeks   1. Patient will be independent in HEP & progressions.  2. Patient will demonstrate improved B LE MMT by 1/3 muscle grade where deficits noted.  3. The patient will achieve 5 sit to stand score of 15 seconds to demonstrate improved transfers and endurance.      Long Term Goals: 12 weeks   1. The patient will demonstrate independence with extensive HEP.  2. Patient will achieve 5 sit to stand score of 12 seconds to demonstrate improved transfers & endurance.  3. Patent is able to demonstrate MMT 4+/5 B LE without pain reports during testing.      PLAN     Progress POC as tolerated by the patient    Plan of care  Certification: 1/13/2023 to 3/13/2023      Bel Swartz, PT

## 2023-02-14 ENCOUNTER — PATIENT MESSAGE (OUTPATIENT)
Dept: BARIATRICS | Facility: CLINIC | Age: 53
End: 2023-02-14
Payer: COMMERCIAL

## 2023-02-15 ENCOUNTER — CLINICAL SUPPORT (OUTPATIENT)
Dept: REHABILITATION | Facility: HOSPITAL | Age: 53
End: 2023-02-15
Payer: COMMERCIAL

## 2023-02-15 DIAGNOSIS — M51.36 DDD (DEGENERATIVE DISC DISEASE), LUMBAR: Primary | ICD-10-CM

## 2023-02-15 PROCEDURE — 97113 AQUATIC THERAPY/EXERCISES: CPT

## 2023-02-15 NOTE — PROGRESS NOTES
[facility]   Physical Therapy Treatment Note     Name: Africa Marley [facility]  Clinic Number: [id number]    Therapy Diagnosis:   Encounter Diagnosis   Name Primary?    DDD (degenerative disc disease), lumbar Yes     Physician: Edouard Whitt DO    Visit Date: 2/15/2023    Physician Orders: Aquatic Therapy   Medical Diagnosis from Referral:   S/P fusion of thoracic spine  - Primary     Z98.1 V45.4     Evaluation Date: 1/13/2023    Authorization Period Expiration: 212/30/23  Plan of Care Expiration: 3/13/23  Visit # / Visits authorized:7/20     Precautions: Standard and Fall    PTA Visit #: 0/5     Time In: 7:25 am   Time Out:8:45 am  Total Billable Time: 55  minutes    SUBJECTIVE     Pt reports: she feels like she pulled a muscle in her back which she attributes to 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    Response to previous treatment: no pain    Functional change: increased strength    Pain: 5/10  Location:Back and right hip    OBJECTIVE     Objective Measures updated at progress report unless specified.     Treatment     Grant received aquatic therapeutic exercises to develop strength, endurance, ROM, flexibility, posture, and core stabilization for 80 minutes including:    FUNCTIONAL MOBILITY TRAINING x 2 laps each at beginning and 1 lap each at end of session  Walk forward/backward/lateral    STRETCHES 2 x 30sec  HS stretch at steps    LE EX x 30 with 3.75# ankle weights   Squat   Heel raise  Hip abduction  Hip flex/ext  Standing clam  LAQ    Sit to stand x 30    Step up on 8 inch red step x 30 reps  Lateral step up on 8 inch red step x 30 reps    Walking marches x 4 laps    HIIT x 4' (Down fast, back slow marches)    UE EX/CORE  x 30  Shoulder flex/ext TA activation with yellow dumbbells  Shoulder horizontal abd/add TA activation with yellow dumbbells  Shoulder abd/ add with  yellow dumbbells    Mini squat with push/pull  blue kick board--HELD due to pain    B shoulder row and extension with BTB     ENDURANCE  LTR x 2'  DKTC x 2'  Bicycle in // bars 3'      Patient Education and Home Exercises     Home Exercises Provided and Patient Education Provided     Education provided:   Role of aquatic therapy  Hydration post therapy      Written Home Exercises Provided: Patient instructed to cont prior HEP. Exercises were reviewed and Grant was able to demonstrate them prior to the end of the session.  Grant demonstrated good  understanding of the education provided. See EMR under Patient Instructions for exercises provided during therapy sessions    ASSESSMENT     Despite some initial reports of feeling like she pulled a muscle in her back she was able to complete all reps and sets for dumbbell exercises but did report some pain with the kick board exercise so that was held today.     Grant Is progressing well towards her goals.     Pt prognosis is Good.     Pt will continue to benefit from skilled outpatient physical therapy to address the deficits listed in the problem list box on initial evaluation, provide pt/family education and to maximize pt's level of independence in the home and community environment.     Pt's spiritual, cultural and educational needs considered and pt agreeable to plan of care and goals.     Anticipated barriers to physical therapy: none at this time    Goals:     Short Term Goals: 6 weeks   1. Patient will be independent in HEP & progressions.  2. Patient will demonstrate improved B LE MMT by 1/3 muscle grade where deficits noted.  3. The patient will achieve 5 sit to stand score of 15 seconds to demonstrate improved transfers and endurance.      Long Term Goals: 12 weeks   1. The patient will demonstrate independence with extensive HEP.  2. Patient will achieve 5 sit to stand score of 12 seconds to demonstrate improved transfers & endurance.  3. Patent is able to demonstrate MMT 4+/5 B LE without pain reports during  testing.      PLAN     Progress POC as tolerated by the patient    Plan of care Certification: 1/13/2023 to 3/13/2023      Bel Swartz, PT

## 2023-02-16 NOTE — PROGRESS NOTES
The patient location is:  Patient Home   The chief complaint leading to consultation is: morbid obesity in work up for bariatric surgery  Visit type: Virtual visit with synchronous audio and video  Total time spent with patient: 15 minutes  Each patient to whom he or she provides medical services by telemedicine is:  (1) informed of the relationship between the physician and patient and the respective role of any other health care provider with respect to management of the patient; and (2) notified that he or she may decline to receive medical services by telemedicine and may withdraw from such care at any time.  Nutrition Handout located in AVS section of pt's [facility] and/or sent as a message via [facility] portal.  Pt informed of handout and how to access this information in [facility].    NUTRITION NOTE    Referring Physician: Roge Rich M.D.   Reason for MNT Referral: 6 months Medically Supervised Diet pending Gastric Sleeve    Patient presents for f/u  visit for MSD with weight maintenance  since last visit.   CLINICAL DATA:  53 y.o. female.    Current Weight:  198 lbs self reported   Loss of 26 lbs since initial visit.  Ideal Body Weight: 129 lbs  Body mass index is 36.21 kg/m².   Last Wt: 198 lbs  Initial Wt:  224 lbs    DAILY NUTRITIONAL NEEDS: pre-op nutritional bariatric guidelines to promote weight loss  [id number] Calories    Grams Protein    CURRENT DIET:  Reduced-calorie diet.  Diet Recall: Food records are not present.   B:  boiled or scrambled  eggs,  S:  banana and almond milk , whey protein with almond milk  L: blackened redfish and asparagus  D: chicken caesar salad with lf vinaigrette  S: Apple- cut up apples     Diet Includes: bariatric appropriate meals  Meal Pattern: Improved.  Protein Supplements: 1 per day.   Snacking: Adequate. Snacks include healthy choices.  Vegetables: Likes a variety. Eats daily.  Fruits: Likes a variety. Eats daily.  Beverages: water   Dining Out:   Never. Mostly restaurants.  Cooking at home: Daily. Mostly grilled and air fryer  meat, fish and vegetables.    CURRENT EXERCISE:  Adequate   Aquatic PT 2 twice a week  2-3 miles walking 3 times per week  Uses resistance bands at work          Vitamins / Minerals / Herbs:   Alive MV for Women  Probotics with cinnamon and apple cider vinegar liquid  Vit C  CoQ10  Flax seed oil    LABS:    None available at time of visit      Food Allergies:   None reported    Social:  Works regular daytime shifts.  Alcohol: None.  Smoking: None.    ASSESSMENT:  Patient demonstrates some willingness to change lifestyle habits as evidenced by dietary changes, including protein drinks, increased fruits, increased vegetables, reduced dining out, healthier cooking at home, bringing snacks to work, healthier snacking at work, and healthier snacking at home.    Doing well with working on greatest challenges (irregular meal patterns).    Barriers to Education:  none  Stage of Change:  action    NUTRITION DIAGNOSIS:  Morbid Obesity related to Excessive calorie intake as evidence by BMI.  Status: Improved    PLAN:  Pt is potential candidate for surgery.    Diet: Adjust diet plan.  Work on Bariatric Nutrition Checklist.  Work on expanding variety of vegetables.  Work on gradually cutting back on starchy CHO in the diet.  1200-calorie diet.  1500-calorie diet.  5-6 meals per day.  Return to clinic.       Exercise: Maintain.    Behavior Modification: Begin to document food & activity logs daily.        Return to clinic in one month.  Needs additional visit(s) with RD.    Communicated nutrition plan with bariatric team.    SESSION TIME:  15 minutes

## 2023-02-17 ENCOUNTER — CLINICAL SUPPORT (OUTPATIENT)
Dept: BARIATRICS | Facility: CLINIC | Age: 53
End: 2023-02-17
Payer: COMMERCIAL

## 2023-02-17 DIAGNOSIS — I10 ESSENTIAL HYPERTENSION: Chronic | ICD-10-CM

## 2023-02-17 DIAGNOSIS — G47.33 OBSTRUCTIVE SLEEP APNEA (ADULT) (PEDIATRIC): ICD-10-CM

## 2023-02-17 DIAGNOSIS — E11.9 TYPE 2 DIABETES MELLITUS WITHOUT COMPLICATION, WITHOUT LONG-TERM CURRENT USE OF INSULIN: Primary | Chronic | ICD-10-CM

## 2023-02-17 DIAGNOSIS — E66.01 SEVERE OBESITY WITH BODY MASS INDEX (BMI) OF 35.0 TO 39.9 WITH SERIOUS COMORBIDITY: ICD-10-CM

## 2023-02-17 DIAGNOSIS — Z71.3 DIETARY COUNSELING AND SURVEILLANCE: ICD-10-CM

## 2023-02-17 PROCEDURE — 97803 MED NUTRITION INDIV SUBSEQ: CPT | Mod: 95,,, | Performed by: DIETITIAN, REGISTERED

## 2023-02-17 PROCEDURE — 97803 PR MED NUTR THER, SUBSQ, INDIV, EA 15 MIN: ICD-10-PCS | Mod: 95,,, | Performed by: DIETITIAN, REGISTERED

## 2023-02-17 PROCEDURE — 99499 UNLISTED E&M SERVICE: CPT | Mod: 95,,, | Performed by: DIETITIAN, REGISTERED

## 2023-02-17 PROCEDURE — 99499 NO LOS: ICD-10-PCS | Mod: 95,,, | Performed by: DIETITIAN, REGISTERED

## 2023-02-20 ENCOUNTER — PATIENT MESSAGE (OUTPATIENT)
Dept: DERMATOLOGY | Facility: CLINIC | Age: 53
End: 2023-02-20
Payer: COMMERCIAL

## 2023-02-22 ENCOUNTER — CLINICAL SUPPORT (OUTPATIENT)
Dept: REHABILITATION | Facility: HOSPITAL | Age: 53
End: 2023-02-22
Payer: COMMERCIAL

## 2023-02-22 ENCOUNTER — PATIENT MESSAGE (OUTPATIENT)
Dept: BARIATRICS | Facility: CLINIC | Age: 53
End: 2023-02-22
Payer: COMMERCIAL

## 2023-02-22 VITALS — BODY MASS INDEX: 36.21 KG/M2 | WEIGHT: 198 LBS

## 2023-02-22 DIAGNOSIS — M51.36 DDD (DEGENERATIVE DISC DISEASE), LUMBAR: Primary | ICD-10-CM

## 2023-02-22 PROCEDURE — 97113 AQUATIC THERAPY/EXERCISES: CPT

## 2023-02-22 NOTE — PROGRESS NOTES
[facility]   Physical Therapy Treatment Note     Name: Africa Marley [facility]  Clinic Number: [id number]    Therapy Diagnosis:   Encounter Diagnosis   Name Primary?    DDD (degenerative disc disease), lumbar Yes     Physician: Edouard Whitt DO    Visit Date: 2/22/2023    Physician Orders: Aquatic Therapy   Medical Diagnosis from Referral:   S/P fusion of thoracic spine  - Primary     Z98.1 V45.4     Evaluation Date: 1/13/2023    Authorization Period Expiration: 212/30/23  Plan of Care Expiration: 3/13/23  Visit # / Visits authorized:7/20     Precautions: Standard and Fall    PTA Visit #: 0/5     Time In: 7:30 am   Time Out:8:45 am   Total Billable Time: 30  minutes    SUBJECTIVE     Pt reports: she had a good birthday weekend and went [place] a lot of the [employer].    She was compliant with home exercise program.    Response to previous treatment: no pain    Functional change: increased strength    Pain: 5/10  Location:Back and right hip    OBJECTIVE     Objective Measures updated at progress report unless specified.     Treatment     Grant received aquatic therapeutic exercises to develop strength, endurance, ROM, flexibility, posture, and core stabilization for 75 minutes including:    FUNCTIONAL MOBILITY TRAINING x 2 laps each at beginning and 1 lap each at end of session  Walk forward/backward/lateral    STRETCHES 2 x 30sec  HS stretch at steps    LE EX x 30 with 3.75# ankle weights   Squat   Heel raise  Hip flex/abd/ext combo  Standing clam  LAQ    Sit to stand x 30    Step up on 8 inch red step x 30 reps  Lateral step up on 8 inch red step x 30 reps    Walking marches x 4 laps    HIIT x 4' (Down fast, back slow marches)    UE EX/CORE  x 30  Shoulder flex/ext TA activation with yellow dumbbells  Shoulder horizontal abd/add TA activation with yellow dumbbells  Shoulder abd/ add with  yellow dumbbells    Mini squat with push/pull blue kick board    B shoulder row and extension  with Maroon TB     ENDURANCE  LTR x 2'  DKTC x 2'  Bicycle in // bars 3'      Patient Education and Home Exercises     Home Exercises Provided and Patient Education Provided     Education provided:   Role of aquatic therapy  Hydration post therapy      Written Home Exercises Provided: Patient instructed to cont prior HEP. Exercises were reviewed and Grant was able to demonstrate them prior to the end of the session.  Grant demonstrated good  understanding of the education provided. See EMR under Patient Instructions for exercises provided during therapy sessions    ASSESSMENT     The patient tolerated the session well today including the advanced combo of flex/abd/ext with good quality and no increased pain.     Grant Is progressing well towards her goals.     Pt prognosis is Good.     Pt will continue to benefit from skilled outpatient physical therapy to address the deficits listed in the problem list box on initial evaluation, provide pt/family education and to maximize pt's level of independence in the home and community environment.     Pt's spiritual, cultural and educational needs considered and pt agreeable to plan of care and goals.     Anticipated barriers to physical therapy: none at this time    Goals:     Short Term Goals: 6 weeks   1. Patient will be independent in HEP & progressions.  2. Patient will demonstrate improved B LE MMT by 1/3 muscle grade where deficits noted.  3. The patient will achieve 5 sit to stand score of 15 seconds to demonstrate improved transfers and endurance.      Long Term Goals: 12 weeks   1. The patient will demonstrate independence with extensive HEP.  2. Patient will achieve 5 sit to stand score of 12 seconds to demonstrate improved transfers & endurance.  3. Patent is able to demonstrate MMT 4+/5 B LE without pain reports during testing.      PLAN     Progress POC as tolerated by the patient    Plan of care Certification: 1/13/2023 to 3/13/2023      Bel Swartz, PT

## 2023-02-26 ENCOUNTER — PATIENT MESSAGE (OUTPATIENT)
Dept: ADMINISTRATIVE | Facility: OTHER | Age: 53
End: 2023-02-26
Payer: COMMERCIAL

## 2023-02-27 ENCOUNTER — TELEPHONE (OUTPATIENT)
Dept: DERMATOLOGY | Facility: CLINIC | Age: 53
End: 2023-02-27
Payer: COMMERCIAL

## 2023-02-27 ENCOUNTER — CLINICAL SUPPORT (OUTPATIENT)
Dept: REHABILITATION | Facility: HOSPITAL | Age: 53
End: 2023-02-27
Payer: COMMERCIAL

## 2023-02-27 DIAGNOSIS — M51.36 DDD (DEGENERATIVE DISC DISEASE), LUMBAR: Primary | ICD-10-CM

## 2023-02-27 PROCEDURE — 97113 AQUATIC THERAPY/EXERCISES: CPT

## 2023-02-27 NOTE — PROGRESS NOTES
[facility]   Physical Therapy Treatment Note     Name: Africa Marley [facility]  Clinic Number: [id number]    Therapy Diagnosis:   Encounter Diagnosis   Name Primary?    DDD (degenerative disc disease), lumbar Yes     Physician: Edouard Whitt DO    Visit Date: 2/27/2023    Physician Orders: Aquatic Therapy   Medical Diagnosis from Referral:   S/P fusion of thoracic spine  - Primary     Z98.1 V45.4     Evaluation Date: 1/13/2023    Authorization Period Expiration: 212/30/23  Plan of Care Expiration: 3/13/23  Visit # / Visits authorized:11/20 +Eval     Precautions: Standard and Fall    PTA Visit #: 0/5     Time In: 7:30 am   Time Out:8:50 am   Total Billable Time: 45 minutes    SUBJECTIVE     Pt reports: she does not have any pain today.     She was compliant with home exercise program.    Response to previous treatment: no pain    Functional change: increased strength    Pain: 5/10  Location:Back and right hip    OBJECTIVE     Objective Measures updated at progress report unless specified.     Treatment     Grant received aquatic therapeutic exercises to develop strength, endurance, ROM, flexibility, posture, and core stabilization for 80 minutes including:    FUNCTIONAL MOBILITY TRAINING x 2 laps each at beginning and 1 lap each at end of session  Walk forward/backward/lateral    STRETCHES 2 x 30sec  HS stretch at steps    LE EX x 30 with 3.75# ankle weights, advance to 5# next visit   Heel raise into squat  Hip flex/abd/ext combo  Standing clam  LAQ    Sit to stand x 30    Step up on 8 inch red step x 30 reps  Lateral step up on 8 inch red step x 30 reps    Side stepping with GTB x 2 laps  Monster walks with GTB x 2 laps    HIIT x 4' (Down fast, back slow marches)    UE EX/CORE  x 30  Shoulder flex/ext TA activation with yellow dumbbells  Shoulder horizontal abd/add TA activation with yellow dumbbells  Shoulder abd/ add with  yellow dumbbells    Mini squat with push/pull blue  kick board    B shoulder row and extension with Maroon TB     ENDURANCE  LTR x 2'  DKTC x 2'  Bicycle in // bars 3'      Patient Education and Home Exercises     Home Exercises Provided and Patient Education Provided     Education provided:   Role of aquatic therapy  Hydration post therapy      Written Home Exercises Provided: Patient instructed to cont prior HEP. Exercises were reviewed and Grant was able to demonstrate them prior to the end of the session.  Grant demonstrated good  understanding of the education provided. See EMR under Patient Instructions for exercises provided during therapy sessions    ASSESSMENT     The patient was able to perform advanced LE exercises well today with some reports of feeling muscle burn but no increased pain.     Grant Is progressing well towards her goals.     Pt prognosis is Good.     Pt will continue to benefit from skilled outpatient physical therapy to address the deficits listed in the problem list box on initial evaluation, provide pt/family education and to maximize pt's level of independence in the home and community environment.     Pt's spiritual, cultural and educational needs considered and pt agreeable to plan of care and goals.     Anticipated barriers to physical therapy: none at this time    Goals:     Short Term Goals: 6 weeks   1. Patient will be independent in HEP & progressions.  2. Patient will demonstrate improved B LE MMT by 1/3 muscle grade where deficits noted.  3. The patient will achieve 5 sit to stand score of 15 seconds to demonstrate improved transfers and endurance.      Long Term Goals: 12 weeks   1. The patient will demonstrate independence with extensive HEP.  2. Patient will achieve 5 sit to stand score of 12 seconds to demonstrate improved transfers & endurance.  3. Patent is able to demonstrate MMT 4+/5 B LE without pain reports during testing.      PLAN     Progress POC as tolerated by the patient    Plan of care Certification: 1/13/2023 to  3/13/2023      Bel Swartz, PT

## 2023-02-27 NOTE — TELEPHONE ENCOUNTER
Called pt to schedule appt. Pt was referred to [facility]. She is awaiting a call from her staff.

## 2023-02-28 ENCOUNTER — OFFICE VISIT (OUTPATIENT)
Dept: DERMATOLOGY | Facility: CLINIC | Age: 53
End: 2023-02-28
Payer: COMMERCIAL

## 2023-02-28 DIAGNOSIS — L65.8 TRACTION ALOPECIA: ICD-10-CM

## 2023-02-28 DIAGNOSIS — L66.9 SCARRING ALOPECIA: Primary | ICD-10-CM

## 2023-02-28 DIAGNOSIS — L64.9 ANDROGENETIC ALOPECIA: ICD-10-CM

## 2023-02-28 PROCEDURE — 1159F PR MEDICATION LIST DOCUMENTED IN MEDICAL RECORD: ICD-10-PCS | Mod: CPTII,S$GLB,, | Performed by: DERMATOLOGY

## 2023-02-28 PROCEDURE — 99214 PR OFFICE/OUTPT VISIT, EST, LEVL IV, 30-39 MIN: ICD-10-PCS | Mod: 25,S$GLB,, | Performed by: DERMATOLOGY

## 2023-02-28 PROCEDURE — 1159F MED LIST DOCD IN RCRD: CPT | Mod: CPTII,S$GLB,, | Performed by: DERMATOLOGY

## 2023-02-28 PROCEDURE — 99999 PR PBB SHADOW E&M-EST. PATIENT-LVL IV: CPT | Mod: PBBFAC,,, | Performed by: DERMATOLOGY

## 2023-02-28 PROCEDURE — 99999 PR PBB SHADOW E&M-EST. PATIENT-LVL IV: ICD-10-PCS | Mod: PBBFAC,,, | Performed by: DERMATOLOGY

## 2023-02-28 PROCEDURE — 11901 INJECT SKIN LESIONS >7: CPT | Mod: S$GLB,,, | Performed by: DERMATOLOGY

## 2023-02-28 PROCEDURE — 11901 PR INJECTION, SKIN LESIONS, 8 OR MORE: ICD-10-PCS | Mod: S$GLB,,, | Performed by: DERMATOLOGY

## 2023-02-28 PROCEDURE — 99214 OFFICE O/P EST MOD 30 MIN: CPT | Mod: 25,S$GLB,, | Performed by: DERMATOLOGY

## 2023-02-28 RX ORDER — FINASTERIDE 5 MG/1
5 TABLET, FILM COATED ORAL DAILY
Qty: 30 TABLET | Refills: 11 | Status: SHIPPED | OUTPATIENT
Start: 2023-02-28 | End: 2024-01-31

## 2023-02-28 RX ORDER — MOMETASONE FUROATE 1 MG/ML
SOLUTION TOPICAL DAILY
Qty: 60 ML | Refills: 5 | Status: SHIPPED | OUTPATIENT
Start: 2023-02-28 | End: 2023-10-12

## 2023-02-28 RX ORDER — KETOCONAZOLE 20 MG/ML
SHAMPOO, SUSPENSION TOPICAL
Qty: 120 ML | Refills: 5 | Status: SHIPPED | OUTPATIENT
Start: 2023-02-28

## 2023-02-28 NOTE — PROGRESS NOTES
Subjective:       Patient ID:  Africa Jennings is a 53 y.o. female who presents for   Chief Complaint   Patient presents with    Hair/Scalp Problem     Hair loss     Hair/Scalp Problem - Follow-up  Symptom course: unchanged  Affected locations: scalp  Signs / symptoms: asymptomatic  Severity: mild      Review of Systems   Constitutional:  Negative for fever, chills and fatigue.   Hematologic/Lymphatic: Does not bruise/bleed easily.      Objective:    Physical Exam   Constitutional: She appears well-developed and well-nourished. No distress.   Neurological: She is alert and oriented to person, place, and time. She is not disoriented.   Psychiatric: She has a normal mood and affect.   Skin:   Areas Examined (abnormalities noted in diagram):   Scalp / Hair Palpated and Inspected  Head / Face Inspection Performed            Diagram Legend     Erythematous scaling macule/papule c/w actinic keratosis       Vascular papule c/w angioma      Pigmented verrucoid papule/plaque c/w seborrheic keratosis      Yellow umbilicated papule c/w sebaceous hyperplasia      Irregularly shaped tan macule c/w lentigo     1-2 mm smooth white papules consistent with Milia      Movable subcutaneous cyst with punctum c/w epidermal inclusion cyst      Subcutaneous movable cyst c/w pilar cyst      Firm pink to brown papule c/w dermatofibroma      Pedunculated fleshy papule(s) c/w skin tag(s)      Evenly pigmented macule c/w junctional nevus     Mildly variegated pigmented, slightly irregular-bordered macule c/w mildly atypical nevus      Flesh colored to evenly pigmented papule c/w intradermal nevus       Pink pearly papule/plaque c/w basal cell carcinoma      Erythematous hyperkeratotic cursted plaque c/w SCC      Surgical scar with no sign of skin cancer recurrence      Open and closed comedones      Inflammatory papules and pustules      Verrucoid papule consistent consistent with wart     Erythematous eczematous patches and  plaques     Dystrophic onycholytic nail with subungual debris c/w onychomycosis     Umbilicated papule    Erythematous-base heme-crusted tan verrucoid plaque consistent with inflamed seborrheic keratosis     Erythematous Silvery Scaling Plaque c/w Psoriasis     See annotation                    Assessment / Plan:        Scarring alopecia  -     NC BSOGBNY1ULAL ACETONIDE INJ PER 10MG  -     triamcinolone acetonide injection 10 mg  -     NC INJECTION, SKIN LESIONS, 8 OR MORE    Traction alopecia  -     mometasone (ELOCON) 0.1 % solution; Apply topically once daily. To thinning area of scalp  Dispense: 60 mL; Refill: 5  Counseling on topical steroids:  Patient counseled that the prolonged use of topical steroids can result in the increased appearance superficial blood vessels (telangiectasias) lightening (hypopigmentation), and   thinning of the skin ( atrophy).  Patient understands to avoid using high potency steroids in skin folds, the groin or the face.  The patient verbalized understanding of proper use and possible adverse effects of topical steroids.  All patient's questions and concerns were addressed.    Androgenetic alopecia  -     ketoconazole (NIZORAL) 2 % shampoo; Wash hair with medicated shampoo at least 2x/week - let sit on scalp at least 5 minutes prior to rinsing  Dispense: 120 mL; Refill: 5  -     finasteride (PROSCAR) 5 mg tablet; Take 1 tablet (5 mg total) by mouth once daily.  Dispense: 30 tablet; Refill: 11  Start Men's otc Rogaine (minoxidil) once daily to scalp 5% solution or foam  Use vaseline around hairline to prevent excessive hair growth  Encourage natural (chemical and heat-free) hair styles and limited styling products.  Counseling done on chronic nature of hair loss and that at least a 6 month trial must be done before stopping minoxidil  Pictures taken of hair loss today will compare through visit hx     Discussed benefits and risk of Propecia including but not limited sexual dysfunction  (approx 1%). There is also a not-well understood syndrome that is very rare: Post-finasteride syndrome that can result in irreversible sexual dysfunction including low libido.                No follow-ups on file.  Intralesional Kenalog 5mg/cc (2 cc total) injected into 8 lesions on the scalp today after obtaining verbal consent including risk of surrounding hypopigmentation. Patient tolerated procedure well.  Units: 1.0  NDC for Kenalog 10mg/cc:  4975-1551-43

## 2023-02-28 NOTE — PATIENT INSTRUCTIONS
Start Men's otc Rogaine (minoxidil) once daily to scalp 5% solution or foam  Use vaseline around hairline to prevent excessive hair growth  Encourage natural (chemical and heat-free) hair styles and limited styling products.  Counseling done on chronic nature of hair loss and that at least a 6 month trial must be done before stopping minoxidil  Pictures taken of hair loss today will compare through visit hx      Discussed benefits and risk of Propecia including but not limited sexual dysfunction (approx 1%). There is also a not-well understood syndrome that is very rare: Post-finasteride syndrome that can result in irreversible sexual dysfunction including low libido.

## 2023-03-01 ENCOUNTER — CLINICAL SUPPORT (OUTPATIENT)
Dept: REHABILITATION | Facility: HOSPITAL | Age: 53
End: 2023-03-01
Payer: COMMERCIAL

## 2023-03-01 DIAGNOSIS — M51.36 DDD (DEGENERATIVE DISC DISEASE), LUMBAR: Primary | ICD-10-CM

## 2023-03-01 PROCEDURE — 97113 AQUATIC THERAPY/EXERCISES: CPT

## 2023-03-01 NOTE — PROGRESS NOTES
[facility]   Physical Therapy Treatment Note     Name: Africa Marley [facility]  Clinic Number: [id number]    Therapy Diagnosis:   Encounter Diagnosis   Name Primary?    DDD (degenerative disc disease), lumbar Yes     Physician: Edouard Whitt DO    Visit Date: 3/1/2023    Physician Orders: Aquatic Therapy   Medical Diagnosis from Referral:   S/P fusion of thoracic spine  - Primary     Z98.1 V45.4     Evaluation Date: 1/13/2023    Authorization Period Expiration: 212/30/23  Plan of Care Expiration: 3/13/23  Visit # / Visits authorized:11/20 +Eval     Precautions: Standard and Fall    PTA Visit #: 0/5     Time In: 7:25 am   Time Out:8:40 am  Total Billable Time: 70 minutes    SUBJECTIVE     Pt reports: she does not have any pain again today.     She was compliant with home exercise program.    Response to previous treatment: no pain    Functional change: increased strength    Pain: 5/10  Location:Back and right hip    OBJECTIVE     Objective Measures updated at progress report unless specified.     Treatment     Grant received aquatic therapeutic exercises to develop strength, endurance, ROM, flexibility, posture, and core stabilization for 75 minutes including:    FUNCTIONAL MOBILITY TRAINING x 2 laps each at beginning and 1 lap each at end of session  Walk forward/backward/lateral    STRETCHES 2 x 30sec  HS stretch at steps    LE EX x 25 advanced to 5# next visit   Heel raise into squat  Hip flex/abd/ext combo  Standing clam  LAQ x 20 reps    Sit to stand x 30    Step up on 8 inch red step x 30 reps  Lateral step up on 8 inch red step x 30 reps    Side stepping with GTB x 2 laps  Monster walks with GTB x 2 laps    HIIT x 4' (Down fast, back slow marches)    UE EX/CORE  x 30  Shoulder flex/ext TA activation with yellow dumbbells  Shoulder horizontal abd/add TA activation with yellow dumbbells  Shoulder abd/ add with  yellow dumbbells    Mini squat with push/pull blue kick  board    B shoulder row and extension with Maroon TB     ENDURANCE  LTR x 2'  DKTC x 2'  Bicycle in // bars 3'      Patient Education and Home Exercises     Home Exercises Provided and Patient Education Provided     Education provided:   Role of aquatic therapy  Hydration post therapy      Written Home Exercises Provided: Patient instructed to cont prior HEP. Exercises were reviewed and Grant was able to demonstrate them prior to the end of the session.  Grant demonstrated good  understanding of the education provided. See EMR under Patient Instructions for exercises provided during therapy sessions    ASSESSMENT     The patient tolerated the advanced weights well today. She was able to complete 25 reps for all LE exercises with the exception of LAQ which she completed 20 reps.     Grant Is progressing well towards her goals.     Pt prognosis is Good.     Pt will continue to benefit from skilled outpatient physical therapy to address the deficits listed in the problem list box on initial evaluation, provide pt/family education and to maximize pt's level of independence in the home and community environment.     Pt's spiritual, cultural and educational needs considered and pt agreeable to plan of care and goals.     Anticipated barriers to physical therapy: none at this time    Goals:     Short Term Goals: 6 weeks   1. Patient will be independent in HEP & progressions.  2. Patient will demonstrate improved B LE MMT by 1/3 muscle grade where deficits noted.  3. The patient will achieve 5 sit to stand score of 15 seconds to demonstrate improved transfers and endurance.      Long Term Goals: 12 weeks   1. The patient will demonstrate independence with extensive HEP.  2. Patient will achieve 5 sit to stand score of 12 seconds to demonstrate improved transfers & endurance.  3. Patent is able to demonstrate MMT 4+/5 B LE without pain reports during testing.      PLAN     Progress POC as tolerated by the patient    Plan of  care Certification: 1/13/2023 to 3/13/2023      Bel Swartz, PT

## 2023-03-08 ENCOUNTER — PATIENT MESSAGE (OUTPATIENT)
Dept: REHABILITATION | Facility: HOSPITAL | Age: 53
End: 2023-03-08
Payer: COMMERCIAL

## 2023-03-08 ENCOUNTER — PATIENT MESSAGE (OUTPATIENT)
Dept: BARIATRICS | Facility: CLINIC | Age: 53
End: 2023-03-08
Payer: COMMERCIAL

## 2023-03-10 ENCOUNTER — CLINICAL SUPPORT (OUTPATIENT)
Dept: REHABILITATION | Facility: HOSPITAL | Age: 53
End: 2023-03-10
Payer: COMMERCIAL

## 2023-03-10 ENCOUNTER — PATIENT MESSAGE (OUTPATIENT)
Dept: NEUROSURGERY | Facility: CLINIC | Age: 53
End: 2023-03-10
Payer: COMMERCIAL

## 2023-03-10 DIAGNOSIS — M51.36 DDD (DEGENERATIVE DISC DISEASE), LUMBAR: Primary | ICD-10-CM

## 2023-03-10 PROCEDURE — 97113 AQUATIC THERAPY/EXERCISES: CPT

## 2023-03-10 NOTE — PROGRESS NOTES
[facility] OUTPATIENT THERAPY AND WELLNESS   Physical Therapy Treatment Note     Name: Africa Marley [facility]  Clinic Number: [id number]    Therapy Diagnosis:   Encounter Diagnosis   Name Primary?    DDD (degenerative disc disease), lumbar Yes       Physician: Edouard Whitt DO    Visit Date: 3/10/2023    Physician Orders: Aquatic Therapy   Medical Diagnosis from Referral:   S/P fusion of thoracic spine  - Primary     Z98.1 V45.4     Evaluation Date: 1/13/2023    Authorization Period Expiration: 212/30/23  Plan of Care Expiration: 3/13/23  Visit # / Visits authorized:13/20 +Eval     Precautions: Standard and Fall    PTA Visit #: 0/5     Time In: 7:30 am   Time Out:8:45 am  Total Billable Time: 60 minutes    SUBJECTIVE     Pt reports: she wishes she didn't have to miss her last appt but overall not having any pain today.    She was compliant with home exercise program.    Response to previous treatment: no pain    Functional change: increased strength    Pain: 5/10  Location:Back and right hip    OBJECTIVE     Objective Measures updated at progress report unless specified.     Treatment     Grant received aquatic therapeutic exercises to develop strength, endurance, ROM, flexibility, posture, and core stabilization for 75 minutes including:    FUNCTIONAL MOBILITY TRAINING x 2 laps each at beginning and 1 lap each at end of session  Walk forward/backward/lateral    STRETCHES 2 x 30sec  HS stretch at steps    LE EX x 25 advanced to 5#   Heel raise into squat  Hip flex/abd/ext combo  Standing clam  LAQ     Sit to stand x 30    Step up on 8 inch red step x 30 reps  Lateral step up on 8 inch red step x 30 reps    Side stepping with GTB x 2 laps  Monster walks with GTB x 2 laps    HIIT x 4' (Down fast, back slow marches)    UE EX/CORE  x 30  Shoulder flex/ext TA activation with yellow dumbbells  Shoulder horizontal abd/add TA activation with yellow dumbbells  Shoulder abd/ add with  yellow dumbbells    Mini squat with  push/pull blue kick board    B shoulder row and extension with Maroon TB     ENDURANCE  LTR x 2'  DKTC x 2'  Bicycle in // bars 3'      Patient Education and Home Exercises     Home Exercises Provided and Patient Education Provided     Education provided:   Role of aquatic therapy  Hydration post therapy      Written Home Exercises Provided: Patient instructed to cont prior HEP. Exercises were reviewed and Grant was able to demonstrate them prior to the end of the session.  Grant demonstrated good  understanding of the education provided. See EMR under Patient Instructions for exercises provided during therapy sessions    ASSESSMENT   The patient was able to perform all exercises today with good quality of execution and without increased symptoms.     Grant Is progressing well towards her goals.     Pt prognosis is Good.     Pt will continue to benefit from skilled outpatient physical therapy to address the deficits listed in the problem list box on initial evaluation, provide pt/family education and to maximize pt's level of independence in the home and community environment.     Pt's spiritual, cultural and educational needs considered and pt agreeable to plan of care and goals.     Anticipated barriers to physical therapy: none at this time    Goals:     Short Term Goals: 6 weeks   1. Patient will be independent in HEP & progressions.  2. Patient will demonstrate improved B LE MMT by 1/3 muscle grade where deficits noted.  3. The patient will achieve 5 sit to stand score of 15 seconds to demonstrate improved transfers and endurance.      Long Term Goals: 12 weeks   1. The patient will demonstrate independence with extensive HEP.  2. Patient will achieve 5 sit to stand score of 12 seconds to demonstrate improved transfers & endurance.  3. Patent is able to demonstrate MMT 4+/5 B LE without pain reports during testing.      PLAN     Progress POC as tolerated by the patient    Plan of care Certification: 1/13/2023 to  3/13/2023      Bel Swartz, PT

## 2023-03-14 ENCOUNTER — PATIENT MESSAGE (OUTPATIENT)
Dept: BARIATRICS | Facility: CLINIC | Age: 53
End: 2023-03-14
Payer: COMMERCIAL

## 2023-03-14 ENCOUNTER — CLINICAL SUPPORT (OUTPATIENT)
Dept: REHABILITATION | Facility: HOSPITAL | Age: 53
End: 2023-03-14
Payer: COMMERCIAL

## 2023-03-14 ENCOUNTER — PATIENT MESSAGE (OUTPATIENT)
Dept: REHABILITATION | Facility: HOSPITAL | Age: 53
End: 2023-03-14

## 2023-03-14 DIAGNOSIS — M51.36 DDD (DEGENERATIVE DISC DISEASE), LUMBAR: Primary | ICD-10-CM

## 2023-03-14 PROCEDURE — 97164 PT RE-EVAL EST PLAN CARE: CPT

## 2023-03-14 PROCEDURE — 97113 AQUATIC THERAPY/EXERCISES: CPT

## 2023-03-14 NOTE — PLAN OF CARE
[facility]  Physical Therapy Plan of Care Note    Name: Africa Marley [facility] Number: [id number]    Therapy Diagnosis:   Encounter Diagnosis   Name Primary?    DDD (degenerative disc disease), lumbar Yes     Physician: Edouard Whitt DO    Visit Date: 3/14/2023    Physician Orders: Aquatic Therapy   Medical Diagnosis from Referral:   S/P fusion of thoracic spine  - Primary     Z98.1 V45.4     Evaluation Date: 2023    Authorization Period Expiration:   Plan of Care Expiration: 4/15/23  Visit # / Visits authorized: +Eval     Precautions: Standard and Fall    PTA Visit #: 0/5     Time In: 7:45 am  Time Out: 8:50 am   Total Billable Time: 60 minutes      SUBJECTIVE     Update: The patient reports she has been feeling good since starting PT with regards to her pain levels but still gets some stiffness. She reports she has some good days and some bad days.          Pain:  Current 0/10, worst 8/10, best 0/10   Location: R buttock region  Description: Soreness   Aggravating Factors: prolonged standing greater that 15 minutes, if she does too much and overdoes it, cold weather, getting out of the car  Easing Factors: Moving around and rocking from side to side  OBJECTIVE     Update 3/14/23    TU seconds without using AD     5 Times Sit to Stand: 11 seconds without use of hands for support.       Gait: The patient ambulates with no significant gait deviations     Lumbar Range of Motion:     Degrees   Flexion WFL    Extension WFL      Left Side Bending WFL    Right Side Bending WFL          Lower Extremity Strength  Right LE   Left LE     Knee extension: 4+/5 Knee extension: 5/5   Knee flexion: 4+/5 Knee flexion: 5/5   Hip flexion: 4-/5 Hip flexion: 4/5   Hip extension:  4/5 Hip extension: 4/5   Hip abduction: 4/5 Hip abduction: 4/5   Hip IR 4/5 Hip IR 4/5   Hip ER 4/5 Hip ER 4/5   Ankle dorsiflexion: 5/5 Ankle dorsiflexion: 5/5   Ankle plantarflexion: 5/5  Ankle plantarflexion: 5/5        TREATMENT     Grant received aquatic therapeutic exercises to develop strength, endurance, ROM, flexibility, posture, and core stabilization for 70 minutes including:    FUNCTIONAL MOBILITY TRAINING x 2 laps each at beginning and 1 lap each at end of session  Walk forward/backward/lateral    STRETCHES 2 x 30sec  HS stretch at steps    LE EX x 30 advanced to 5#   Heel raise into squat  Hip flex/abd/ext combo  Standing clam  LAQ     Sit to stand x 30    Step up on 8 inch red step x 30 reps  Lateral step up on 8 inch red step x 30 reps  Step down on 8 inch red step x 20 reps B --added today    Side stepping with GTB x 2 laps  Monster walks with GTB x 2 laps    HIIT x 4' (Down fast, back slow marches)    UE EX/CORE  x 30  Shoulder flex/ext TA activation with yellow dumbbells  Shoulder horizontal abd/add TA activation with yellow dumbbells  Shoulder abd/ add with  yellow dumbbells    Mini squat with push/pull blue kick board    B shoulder row and extension with Maroon TB     ENDURANCE  LTR x 2'  DKTC x 2'  Bicycle in // bars 3'    ASSESSMENT     Update:   The patient has attended 15 PT visits since the start of care. Subjectively she has no pain currently and has only occasional twinge of 7/10 pain.  Objectively she has improved B LE strength, improved TUG and 5 times sit to stand times. She has tolerated all exercise progressions well and continues to be motivated throughout the treatment. At this time she could benefit from additional 4 weeks of skilled therapy to continue to improve strength and endurance and reduced pain levels at worst.       Previous Short Term Goals Status:       Short Term Goals: 6 weeks   1. Patient will be independent in HEP & progressions.--Met as of 3/14/23  2. Patient will demonstrate improved B LE MMT by 1/3 muscle grade where deficits noted.--Progressing as of 3/14/23  3. The patient will achieve 5 sit to stand score of 15 seconds to demonstrate improved  transfers and endurance. --MET as of 3/14/23     Long Term Goals: 12 weeks   1. The patient will demonstrate independence with extensive HEP.  2. Patient will achieve 5 sit to stand score of 12 seconds to demonstrate improved transfers & endurance.  3. Patent is able to demonstrate MMT 4+/5 B LE without pain reports during testing.      Long Term Goal Status: continue per initial plan of care.    Reasons for Recertification of Therapy:   Pt's POC  and to improve strength     PLAN     Updated Certification Period: 3/14/23 to 4/15/23   Recommended Treatment Plan: 2 times per week for 4 weeks:  Aquatic Therapy    Bel Swartz, PT    I CERTIFY THE NEED FOR THESE SERVICES FURNISHED UNDER THIS PLAN OF TREATMENT AND WHILE UNDER MY CARE  Physician's comments:      Physician's Signature: ___________________________________________________

## 2023-03-14 NOTE — PROGRESS NOTES
[facility]  Physical Therapy Plan of Care Note    Name: Africa Marlye [facility] Number: [id number]    Therapy Diagnosis:   Encounter Diagnosis   Name Primary?    DDD (degenerative disc disease), lumbar Yes     Physician: Edouard Whitt DO    Visit Date: 3/14/2023    Physician Orders: Aquatic Therapy   Medical Diagnosis from Referral:   S/P fusion of thoracic spine  - Primary     Z98.1 V45.4     Evaluation Date: 2023    Authorization Period Expiration:   Plan of Care Expiration: 4/15/23  Visit # / Visits authorized: +Eval     Precautions: Standard and Fall    PTA Visit #: 0/5     Time In: 7:45 am  Time Out: 8:50 am   Total Billable Time: 60 minutes      SUBJECTIVE     Update: The patient reports she has been feeling good since starting PT with regards to her pain levels but still gets some stiffness. She reports she has some good days and some bad days.          Pain:  Current 0/10, worst 8/10, best 0/10   Location: R buttock region  Description: Soreness   Aggravating Factors: prolonged standing greater that 15 minutes, if she does too much and overdoes it, cold weather, getting out of the car  Easing Factors: Moving around and rocking from side to side  OBJECTIVE     Update 3/14/23    TU seconds without using AD     5 Times Sit to Stand: 11 seconds without use of hands for support.       Gait: The patient ambulates with no significant gait deviations     Lumbar Range of Motion:     Degrees   Flexion WFL    Extension WFL      Left Side Bending WFL    Right Side Bending WFL          Lower Extremity Strength  Right LE   Left LE     Knee extension: 4+/5 Knee extension: 5/5   Knee flexion: 4+/5 Knee flexion: 5/5   Hip flexion: 4-/5 Hip flexion: 4/5   Hip extension:  4/5 Hip extension: 4/5   Hip abduction: 4/5 Hip abduction: 4/5   Hip IR 4/5 Hip IR 4/5   Hip ER 4/5 Hip ER 4/5   Ankle dorsiflexion: 5/5 Ankle dorsiflexion: 5/5   Ankle plantarflexion: 5/5  Ankle plantarflexion: 5/5        TREATMENT     Grant received aquatic therapeutic exercises to develop strength, endurance, ROM, flexibility, posture, and core stabilization for 70 minutes including:    FUNCTIONAL MOBILITY TRAINING x 2 laps each at beginning and 1 lap each at end of session  Walk forward/backward/lateral    STRETCHES 2 x 30sec  HS stretch at steps    LE EX x 30 advanced to 5#   Heel raise into squat  Hip flex/abd/ext combo  Standing clam  LAQ     Sit to stand x 30    Step up on 8 inch red step x 30 reps  Lateral step up on 8 inch red step x 30 reps  Step down on 8 inch red step x 20 reps B --added today    Side stepping with GTB x 2 laps  Monster walks with GTB x 2 laps    HIIT x 4' (Down fast, back slow marches)    UE EX/CORE  x 30  Shoulder flex/ext TA activation with yellow dumbbells  Shoulder horizontal abd/add TA activation with yellow dumbbells  Shoulder abd/ add with  yellow dumbbells    Mini squat with push/pull blue kick board    B shoulder row and extension with Maroon TB     ENDURANCE  LTR x 2'  DKTC x 2'  Bicycle in // bars 3'    ASSESSMENT     Update:   The patient has attended 15 PT visits since the start of care. Subjectively she has no pain currently and has only occasional twinge of 7/10 pain.  Objectively she has improved B LE strength, improved TUG and 5 times sit to stand times. She has tolerated all exercise progressions well and continues to be motivated throughout the treatment. At this time she could benefit from additional 4 weeks of skilled therapy to continue to improve strength and endurance and reduced pain levels at worst.       Previous Short Term Goals Status:       Short Term Goals: 6 weeks   1. Patient will be independent in HEP & progressions.--Met as of 3/14/23  2. Patient will demonstrate improved B LE MMT by 1/3 muscle grade where deficits noted.--Progressing as of 3/14/23  3. The patient will achieve 5 sit to stand score of 15 seconds to demonstrate improved  transfers and endurance. --MET as of 3/14/23     Long Term Goals: 12 weeks   1. The patient will demonstrate independence with extensive HEP.  2. Patient will achieve 5 sit to stand score of 12 seconds to demonstrate improved transfers & endurance.  3. Patent is able to demonstrate MMT 4+/5 B LE without pain reports during testing.      Long Term Goal Status: continue per initial plan of care.    Reasons for Recertification of Therapy:   Pt's POC  and to improve strength     PLAN     Updated Certification Period: 3/14/23 to 4/15/23   Recommended Treatment Plan: 2 times per week for 4 weeks:  Aquatic Therapy    Bel Swartz, PT    I CERTIFY THE NEED FOR THESE SERVICES FURNISHED UNDER THIS PLAN OF TREATMENT AND WHILE UNDER MY CARE  Physician's comments:      Physician's Signature: ___________________________________________________

## 2023-03-16 ENCOUNTER — CLINICAL SUPPORT (OUTPATIENT)
Dept: REHABILITATION | Facility: HOSPITAL | Age: 53
End: 2023-03-16
Payer: COMMERCIAL

## 2023-03-16 DIAGNOSIS — M51.36 DDD (DEGENERATIVE DISC DISEASE), LUMBAR: Primary | ICD-10-CM

## 2023-03-16 PROCEDURE — 97113 AQUATIC THERAPY/EXERCISES: CPT

## 2023-03-16 NOTE — PROGRESS NOTES
[facility]  Physical Therapy Daily Treatment Note    Name: Africa Marley [facility]  Clinic Number: [id number]    Therapy Diagnosis:   Encounter Diagnosis   Name Primary?    DDD (degenerative disc disease), lumbar Yes     Physician: Edouard Whitt DO    Visit Date: 3/16/2023    Physician Orders: Aquatic Therapy   Medical Diagnosis from Referral:   S/P fusion of thoracic spine  - Primary     Z98.1 V45.4     Evaluation Date: 1/13/2023    Authorization Period Expiration: 212/30/23  Plan of Care Expiration: 4/15/23  Visit # / Visits authorized:15/20 +Eval     Precautions: Standard and Fall    PTA Visit #: 0/5     Time In: 7:30  am  Time Out: 8:45 am   Total Billable Time:75 minutes      SUBJECTIVE     The patient reports: she is doing well minus the cold weather.       Pain:  Current 0/10, worst 8/10, best 0/10   Location: R buttock region  Description: Soreness   Aggravating Factors: prolonged standing greater that 15 minutes, if she does too much and overdoes it, cold weather, getting out of the car  Easing Factors: Moving around and rocking from side to side  OBJECTIVE     Update 3/14/23      TREATMENT     Grant received aquatic therapeutic exercises to develop strength, endurance, ROM, flexibility, posture, and core stabilization for 75 minutes including:    FUNCTIONAL MOBILITY TRAINING x 2 laps each at beginning and 1 lap each at end of session  Walk forward/backward/lateral    STRETCHES 2 x 30sec  HS stretch at steps    LE EX x 30 advanced to 5#   Heel raise into squat  Hip flex/abd/ext combo  Standing clam  LAQ     Leg press with Aqua noodle x 20 reps  B--added today    Sit to stand x 30    Step up on 8 inch red step x 30 reps  Lateral step up on 8 inch red step x 30 reps  Step down on 8 inch red step x 20 reps B     Side stepping with GTB x 2 laps  Monster walks with GTB x 2 laps    HIIT x 4' (Down fast, back slow marches)    UE EX/CORE  x 30  Shoulder flex/ext TA activation  with yellow dumbbells  Shoulder horizontal abd/add TA activation with yellow dumbbells  Shoulder abd/ add with  yellow dumbbells    B shoulder row and extension with Maroon TB     ENDURANCE  LTR x 2'  DKTC x 2'  Bicycle in // bars 3'    ASSESSMENT     The patient was able to perform the advanced LE exercise of the leg press with the noodle well today with reports the R LE seemed easier to perform than the L.       Previous Short Term Goals Status:       Short Term Goals: 6 weeks   1. Patient will be independent in HEP & progressions.--Met as of 3/14/23  2. Patient will demonstrate improved B LE MMT by 1/3 muscle grade where deficits noted.--Progressing as of 3/14/23  3. The patient will achieve 5 sit to stand score of 15 seconds to demonstrate improved transfers and endurance. --MET as of 3/14/23     Long Term Goals: 12 weeks   1. The patient will demonstrate independence with extensive HEP.  2. Patient will achieve 5 sit to stand score of 12 seconds to demonstrate improved transfers & endurance.  3. Patent is able to demonstrate MMT 4+/5 B LE without pain reports during testing.      PLAN   Progress POC as tolerated by the patient.      Updated Certification Period: 3/14/23 to 4/15/23     Bel Swartz, PT  _

## 2023-03-17 ENCOUNTER — PATIENT MESSAGE (OUTPATIENT)
Dept: BARIATRICS | Facility: CLINIC | Age: 53
End: 2023-03-17

## 2023-03-17 ENCOUNTER — CLINICAL SUPPORT (OUTPATIENT)
Dept: BARIATRICS | Facility: CLINIC | Age: 53
End: 2023-03-17
Payer: COMMERCIAL

## 2023-03-17 VITALS — WEIGHT: 198 LBS | BODY MASS INDEX: 36.21 KG/M2

## 2023-03-17 DIAGNOSIS — E66.01 SEVERE OBESITY WITH BODY MASS INDEX (BMI) OF 35.0 TO 39.9 WITH SERIOUS COMORBIDITY: ICD-10-CM

## 2023-03-17 DIAGNOSIS — I10 ESSENTIAL HYPERTENSION: ICD-10-CM

## 2023-03-17 DIAGNOSIS — G47.33 OBSTRUCTIVE SLEEP APNEA (ADULT) (PEDIATRIC): ICD-10-CM

## 2023-03-17 DIAGNOSIS — E11.9 TYPE 2 DIABETES MELLITUS WITHOUT COMPLICATION, WITHOUT LONG-TERM CURRENT USE OF INSULIN: Primary | ICD-10-CM

## 2023-03-17 DIAGNOSIS — E66.01 SEVERE OBESITY (BMI 35.0-39.9) WITH COMORBIDITY: ICD-10-CM

## 2023-03-17 DIAGNOSIS — Z71.3 DIETARY COUNSELING AND SURVEILLANCE: ICD-10-CM

## 2023-03-17 PROCEDURE — 97803 PR MED NUTR THER, SUBSQ, INDIV, EA 15 MIN: ICD-10-PCS | Mod: 95,,, | Performed by: DIETITIAN, REGISTERED

## 2023-03-17 PROCEDURE — 97803 MED NUTRITION INDIV SUBSEQ: CPT | Mod: 95,,, | Performed by: DIETITIAN, REGISTERED

## 2023-03-17 PROCEDURE — 99499 NO LOS: ICD-10-PCS | Mod: 95,,, | Performed by: DIETITIAN, REGISTERED

## 2023-03-17 PROCEDURE — 99499 UNLISTED E&M SERVICE: CPT | Mod: 95,,, | Performed by: DIETITIAN, REGISTERED

## 2023-03-17 NOTE — PROGRESS NOTES
The patient location is:  Patient Home   The chief complaint leading to consultation is: morbid obesity in work up for bariatric surgery  Visit type: Virtual visit with synchronous audio and video  Total time spent with patient: 15 minutes  Each patient to whom he or she provides medical services by telemedicine is:  (1) informed of the relationship between the physician and patient and the respective role of any other health care provider with respect to management of the patient; and (2) notified that he or she may decline to receive medical services by telemedicine and may withdraw from such care at any time.  Nutrition Handout located in AVS section of pt's [facility] and/or sent as a message via [facility] portal.  Pt informed of handout and how to access this information in [facility].    NUTRITION NOTE    Referring Physician: Roge Rich M.D.   Reason for MNT Referral: 6 months Medically Supervised Diet pending Gastric Sleeve    Patient presents for f/u  visit for MSD with weight maintenance  since last visit.   CLINICAL DATA:  53 y.o. female.    Current Weight:  198 lbs self reported   Loss of 26 lbs since initial visit.  Ideal Body Weight: 129 lbs  Body mass index is 36.21 kg/m².   Last Wt: 198 lbs  Initial Wt:  224 lbs    DAILY NUTRITIONAL NEEDS: pre-op nutritional bariatric guidelines to promote weight loss  [id number] Calories    Grams Protein    CURRENT DIET:  Reduced-calorie diet.  Diet Recall: Food records are not present.   B:  boiled or scrambled  eggs or eggs with grilled chicken  S:  banana and almond milk , whey protein with almond milk  L: eggs with grilled chicken on bed of spinach   D: air fried fish or grilled grilled zucchini or squash or eggplant caesar salad with lf vinaigrette  S: Apple- cut up apples     Diet Includes: bariatric appropriate meals  Meal Pattern: Improved.  Protein Supplements: 1 per day. Whey protein powder   Snacking: Adequate. Snacks include healthy  choices.  Vegetables: Likes a variety. Eats daily.  Fruits: Likes a variety. Eats daily.  Beverages: water   Dining Out:  Never. Mostly restaurants.  Cooking at home: Daily. Mostly grilled and air fryer  meat, fish and vegetables.    CURRENT EXERCISE:  Adequate   Aquatic PT 2 twice a week  2-3 miles walking 3 times per week  Uses resistance bands at work          Vitamins / Minerals / Herbs:   Alive MV for Women  Probotics with cinnamon and apple cider vinegar liquid  Vit C  CoQ10  Flax seed oil  Kirk seeds  LABS:    None available at time of visit      Food Allergies:   None reported    Social:  Works regular daytime shifts.  Alcohol: None.  Smoking: None.    ASSESSMENT:  Patient demonstrates some willingness to change lifestyle habits as evidenced by dietary changes, including protein drinks, increased fruits, increased vegetables, reduced dining out, healthier cooking at home, bringing snacks to work, healthier snacking at work, and healthier snacking at home.    Doing well with working on greatest challenges (irregular meal patterns).    Barriers to Education:  none  Stage of Change:  action    NUTRITION DIAGNOSIS:  Morbid Obesity related to Excessive calorie intake as evidence by BMI.  Status: Improved    PLAN:  Pt is good candidate for surgery.    Diet: Maintain diet plan.  Work on gradually cutting back on starchy CHO in the diet.  1200-calorie diet.  1500-calorie diet.  5-6 meals per day.  Start shopping for bariatric vitamins & minerals.       Exercise: Maintain.    Behavior Modification: Begin to document food & activity logs daily.    Completed 6 conseuctive MSDs with bariatric dietitian    Communicated nutrition plan with bariatric team.    SESSION TIME:  15 minutes

## 2023-03-22 ENCOUNTER — CLINICAL SUPPORT (OUTPATIENT)
Dept: REHABILITATION | Facility: HOSPITAL | Age: 53
End: 2023-03-22
Payer: COMMERCIAL

## 2023-03-22 DIAGNOSIS — M51.36 DDD (DEGENERATIVE DISC DISEASE), LUMBAR: Primary | ICD-10-CM

## 2023-03-22 PROCEDURE — 97113 AQUATIC THERAPY/EXERCISES: CPT

## 2023-03-22 NOTE — PROGRESS NOTES
[facility]  Physical Therapy Daily Treatment Note    Name: Africa Marley [facility]  Clinic Number: [id number]    Therapy Diagnosis:   Encounter Diagnosis   Name Primary?    DDD (degenerative disc disease), lumbar Yes     Physician: Edouard Whitt DO    Visit Date: 3/22/2023    Physician Orders: Aquatic Therapy   Medical Diagnosis from Referral:   S/P fusion of thoracic spine  - Primary     Z98.1 V45.4     Evaluation Date: 1/13/2023    Authorization Period Expiration: 212/30/23  Plan of Care Expiration: 4/15/23  Visit # / Visits authorized:15/20 +Eval     Precautions: Standard and Fall    PTA Visit #: 0/5     Time In: 7:30  am  Time Out: 8:45 am   Total Billable Time:75 minutes      SUBJECTIVE     The patient reports: she is feeling much better today than the last visit. She stated she is ready for her d/c 4/12/23.       Pain:  Current 0/10, worst 8/10, best 0/10   Location: R buttock region  Description: Soreness   Aggravating Factors: prolonged standing greater that 15 minutes, if she does too much and overdoes it, cold weather, getting out of the car  Easing Factors: Moving around and rocking from side to side  OBJECTIVE     Update 3/14/23      TREATMENT     Grant received aquatic therapeutic exercises to develop strength, endurance, ROM, flexibility, posture, and core stabilization for 75 minutes including:    FUNCTIONAL MOBILITY TRAINING x 2 laps each at beginning and 1 lap each at end of session  Walk forward/backward/lateral    STRETCHES 2 x 30sec  HS stretch at steps    LE EX x 30 advanced to 5#   Heel raise into squat  Hip flex/abd/ext combo  Standing clam  LAQ     Leg press with Aqua noodle x 20 reps  B    Sit to stand x 30    Step up on 8 inch red step x 30 reps  Lateral step up on 8 inch red step x 30 reps  Step down on 8 inch red step x 20 reps B     Side stepping with BTB x 2 laps--Trial advance to larger maroon loop next visit  Monster walks with BTB x 2  laps    HIIT x 4' (Down fast, back slow marches)    UE EX/CORE  x 30  Shoulder flex/ext TA activation with yellow dumbbells  Shoulder horizontal abd/add TA activation with yellow dumbbells  Shoulder abd/ add with  yellow dumbbells    B shoulder row and extension with Maroon TB     ENDURANCE  LTR x 2'  DKTC x 2'  Bicycle in // bars 3'    ASSESSMENT   The patient tolerated the advanced resistance of the side stepping and monster walks well with no significant change in difficulty therefore will attempt maroon tband next visit.       Previous Short Term Goals Status:       Short Term Goals: 6 weeks   1. Patient will be independent in HEP & progressions.--Met as of 3/14/23  2. Patient will demonstrate improved B LE MMT by 1/3 muscle grade where deficits noted.--Progressing as of 3/14/23  3. The patient will achieve 5 sit to stand score of 15 seconds to demonstrate improved transfers and endurance. --MET as of 3/14/23     Long Term Goals: 12 weeks   1. The patient will demonstrate independence with extensive HEP.  2. Patient will achieve 5 sit to stand score of 12 seconds to demonstrate improved transfers & endurance.  3. Patent is able to demonstrate MMT 4+/5 B LE without pain reports during testing.      PLAN   Progress POC as tolerated by the patient.      Updated Certification Period: 3/14/23 to 4/15/23     Bel Swartz, PT  _

## 2023-03-27 ENCOUNTER — CLINICAL SUPPORT (OUTPATIENT)
Dept: REHABILITATION | Facility: HOSPITAL | Age: 53
End: 2023-03-27
Payer: COMMERCIAL

## 2023-03-27 DIAGNOSIS — M51.36 DDD (DEGENERATIVE DISC DISEASE), LUMBAR: Primary | ICD-10-CM

## 2023-03-27 PROCEDURE — 97113 AQUATIC THERAPY/EXERCISES: CPT

## 2023-03-27 NOTE — PROGRESS NOTES
[facility]  Physical Therapy Daily Treatment Note    Name: Africa Marley [facility]  Clinic Number: [id number]    Therapy Diagnosis:   Encounter Diagnosis   Name Primary?    DDD (degenerative disc disease), lumbar Yes     Physician: Edouard Whitt DO    Visit Date: 3/27/2023    Physician Orders: Aquatic Therapy   Medical Diagnosis from Referral:   S/P fusion of thoracic spine  - Primary     Z98.1 V45.4     Evaluation Date: 1/13/2023    Authorization Period Expiration: 212/30/23  Plan of Care Expiration: 4/15/23  Visit # / Visits authorized:15/20 +Eval     Precautions: Standard and Fall    PTA Visit #: 0/5     Time In: 7:30  am  Time Out: 8:50 am  Total Billable Time:60 minutes      SUBJECTIVE     The patient reports: she was able to walk 10 miles yesterday and clean her house once she was finished without increased pain reports.        Pain:  Current 0/10, worst 8/10, best 0/10   Location: R buttock region  Description: Soreness   Aggravating Factors: prolonged standing greater that 15 minutes, if she does too much and overdoes it, cold weather, getting out of the car  Easing Factors: Moving around and rocking from side to side  OBJECTIVE     Update 3/14/23      TREATMENT     Grnat received aquatic therapeutic exercises to develop strength, endurance, ROM, flexibility, posture, and core stabilization for 80 minutes including:    FUNCTIONAL MOBILITY TRAINING x 2 laps each at beginning and 1 lap each at end of session  Walk forward/backward/lateral    STRETCHES 2 x 30sec  HS stretch at steps    LE EX x 30 advanced to 5#   Heel raise into squat  Hip flex/abd/ext combo  Standing clam  LAQ     Leg press with Aqua noodle x 20 reps  B    Sit to stand x 30    Step up on 8 inch red step x 30 reps  Lateral step up on 8 inch red step x 30 reps  Step down on 8 inch red step x 20 reps B     Side stepping x 2 laps--advanced to larger marCamStent loop   Monster walks x 2 laps -advanced to larger  toan loop     HIIT x 4' (Down fast, back slow marches)    UE EX/CORE  x 30  Shoulder flex/ext TA activation with yellow dumbbells  Shoulder horizontal abd/add TA activation with yellow dumbbells  Shoulder abd/ add with  yellow dumbbells    B shoulder row and extension with Toan TB     ENDURANCE  LTR x 2'  DKTC x 2'  Bicycle in // bars 3'    ASSESSMENT   The patient reported the toan tband had greater resistance but able to complete her 2 laps.        Previous Short Term Goals Status:       Short Term Goals: 6 weeks   1. Patient will be independent in HEP & progressions.--Met as of 3/14/23  2. Patient will demonstrate improved B LE MMT by 1/3 muscle grade where deficits noted.--Progressing as of 3/14/23  3. The patient will achieve 5 sit to stand score of 15 seconds to demonstrate improved transfers and endurance. --MET as of 3/14/23     Long Term Goals: 12 weeks   1. The patient will demonstrate independence with extensive HEP.  2. Patient will achieve 5 sit to stand score of 12 seconds to demonstrate improved transfers & endurance.  3. Patent is able to demonstrate MMT 4+/5 B LE without pain reports during testing.      PLAN   Progress POC as tolerated by the patient.      Updated Certification Period: 3/14/23 to 4/15/23     Bel Swartz, PT

## 2023-03-29 ENCOUNTER — CLINICAL SUPPORT (OUTPATIENT)
Dept: REHABILITATION | Facility: HOSPITAL | Age: 53
End: 2023-03-29
Payer: COMMERCIAL

## 2023-03-29 DIAGNOSIS — M51.36 DDD (DEGENERATIVE DISC DISEASE), LUMBAR: Primary | ICD-10-CM

## 2023-03-29 PROCEDURE — 97113 AQUATIC THERAPY/EXERCISES: CPT

## 2023-03-29 NOTE — PROGRESS NOTES
[facility]  Physical Therapy Daily Treatment Note    Name: Africa Marley [facility]  Clinic Number: [id number]    Therapy Diagnosis:   Encounter Diagnosis   Name Primary?    DDD (degenerative disc disease), lumbar Yes     Physician: Edouard Whitt DO    Visit Date: 3/29/2023    Physician Orders: Aquatic Therapy   Medical Diagnosis from Referral:   S/P fusion of thoracic spine  - Primary     Z98.1 V45.4     Evaluation Date: 1/13/2023    Authorization Period Expiration: 212/30/23  Plan of Care Expiration: 4/15/23  Visit # / Visits authorized:15/20 +Eval     Precautions: Standard and Fall    PTA Visit #: 0/5     Time In: 7:30  am  Time Out: 8:40 am  Total Billable Time:70 minutes        SUBJECTIVE     The patient reports: she is feeling pretty good today.        Pain:  Current 0/10, worst 8/10, best 0/10   Location: R buttock region  Description: Soreness   Aggravating Factors: prolonged standing greater that 15 minutes, if she does too much and overdoes it, cold weather, getting out of the car  Easing Factors: Moving around and rocking from side to side  OBJECTIVE     Update 3/14/23    TREATMENT     Grant received aquatic therapeutic exercises to develop strength, endurance, ROM, flexibility, posture, and core stabilization for 70 minutes including:    FUNCTIONAL MOBILITY TRAINING x 2 laps each at beginning and 1 lap each at end of session  Walk forward/backward/lateral    STRETCHES 2 x 30sec  HS stretch at steps    LE EX x 30 advanced to 5#   Heel raise into squat  Hip flex/abd/ext combo  Standing clam  LAQ     Leg press with Aqua noodle x 20 reps  B    Sit to stand x 30    Step up on 8 inch red step x 30 reps  Lateral step up on 8 inch red step x 30 reps  Step down on 8 inch red step x 20 reps B     Side stepping x 2 laps--advanced to larger maroon loop   Monster walks x 2 laps -advanced to larger maroon loop     HIIT x 4' (Down fast, back slow marches)    UE EX/CORE  x  30  Shoulder flex/ext TA activation with yellow dumbbells  Shoulder horizontal abd/add TA activation with yellow dumbbells  Shoulder abd/ add with  yellow dumbbells    B shoulder row and extension with Maroon TB     ENDURANCE  LTR x 3'  DKTC x 3'  Bicycle in // bars 3'    ASSESSMENT   The patient tolerated her session well today with good endurance noted with no increased pain reports.       Previous Short Term Goals Status:       Short Term Goals: 6 weeks   1. Patient will be independent in HEP & progressions.--Met as of 3/14/23  2. Patient will demonstrate improved B LE MMT by 1/3 muscle grade where deficits noted.--Progressing as of 3/14/23  3. The patient will achieve 5 sit to stand score of 15 seconds to demonstrate improved transfers and endurance. --MET as of 3/14/23     Long Term Goals: 12 weeks   1. The patient will demonstrate independence with extensive HEP.  2. Patient will achieve 5 sit to stand score of 12 seconds to demonstrate improved transfers & endurance.  3. Patent is able to demonstrate MMT 4+/5 B LE without pain reports during testing.      PLAN   Progress POC as tolerated by the patient.    Updated Certification Period: 3/14/23 to 4/15/23     Bel Swartz, PT

## 2023-04-03 DIAGNOSIS — E11.9 TYPE 2 DIABETES MELLITUS WITHOUT COMPLICATION, WITHOUT LONG-TERM CURRENT USE OF INSULIN: ICD-10-CM

## 2023-04-03 NOTE — TELEPHONE ENCOUNTER
No new care gaps identified.  [facility] Embedded Care Gaps. Reference number: [id number]. 4/03/2023   6:37:54 PM CDT

## 2023-04-03 NOTE — TELEPHONE ENCOUNTER
Care Due:                  Date            Visit Type   Department     Provider  --------------------------------------------------------------------------------                                MYCHART                              ANNUAL       [facility]                              CHECKUP/PHY  MED/ INTERNAL  Last Visit: 01-      S            MED/ PEDS      Brandi Thompson                              EP -         [facility]                              PRIMARY      MED/ INTERNAL  Next Visit: 07-      CARE ([facility])   MED/ PEDS      Brandi Thompson                                                            Last  Test          Frequency    Reason                     Performed    Due Date  --------------------------------------------------------------------------------    CMP.........  12 months..  atorvastatin.............  11- 04-    HBA1C.......  6 months...  metFORMIN, semaglutide...  10-   04-    Lipid Panel.  12 months..  atorvastatin.............  04- 04-    [employer] Embedded Care Gaps. Reference number: [id number]. 4/03/2023   6:35:54 PM CDT

## 2023-04-04 ENCOUNTER — CLINICAL SUPPORT (OUTPATIENT)
Dept: REHABILITATION | Facility: HOSPITAL | Age: 53
End: 2023-04-04
Payer: COMMERCIAL

## 2023-04-04 DIAGNOSIS — M51.36 DDD (DEGENERATIVE DISC DISEASE), LUMBAR: Primary | ICD-10-CM

## 2023-04-04 PROCEDURE — 97113 AQUATIC THERAPY/EXERCISES: CPT

## 2023-04-04 RX ORDER — SEMAGLUTIDE 1.34 MG/ML
1 INJECTION, SOLUTION SUBCUTANEOUS
Qty: 3 EACH | Refills: 0 | OUTPATIENT
Start: 2023-04-04

## 2023-04-04 RX ORDER — SEMAGLUTIDE 1.34 MG/ML
1 INJECTION, SOLUTION SUBCUTANEOUS
Qty: 3 EACH | Refills: 0 | Status: SHIPPED | OUTPATIENT
Start: 2023-04-04 | End: 2023-07-08 | Stop reason: SDUPTHER

## 2023-04-04 NOTE — TELEPHONE ENCOUNTER
Jennifer DC. Request already responded to by other means (e.g. phone or fax)   Refill Authorization Note   Africa Eldersantino  is requesting a refill authorization.  Brief Assessment and Rationale for Refill:  Quick Discontinue  Medication Therapy Plan:       Medication Reconciliation Completed:  No      Comments:     Note composed:10:41 AM 04/04/2023

## 2023-04-04 NOTE — TELEPHONE ENCOUNTER
Refill Decision Note   Africahari Jennings  is requesting a refill authorization.  Brief Assessment and Rationale for Refill:  Approve     Medication Therapy Plan:       Medication Reconciliation Completed: No   Comments:     No Care Gaps recommended.     Note composed:10:37 AM 04/04/2023

## 2023-04-04 NOTE — PROGRESS NOTES
[facility]  Physical Therapy Daily Treatment Note    Name: Africa Marley [facility]  Clinic Number: [id number]    Therapy Diagnosis:   Encounter Diagnosis   Name Primary?    DDD (degenerative disc disease), lumbar Yes     Physician: Edouard Whitt DO    Visit Date: 4/4/2023    Physician Orders: Aquatic Therapy   Medical Diagnosis from Referral:   S/P fusion of thoracic spine  - Primary     Z98.1 V45.4     Evaluation Date: 1/13/2023    Authorization Period Expiration: 212/30/23  Plan of Care Expiration: 4/15/23  Visit # / Visits authorized:19/20 +Eval     Precautions: Standard and Fall    PTA Visit #: 0/5     Time In: 7:30  am  Time Out: 8:40 am  Total Billable Time:70 minutes        SUBJECTIVE     The patient reports: she is feeling pretty good today.        Pain:  Current 0/10, worst 8/10, best 0/10   Location: R buttock region  Description: Soreness   Aggravating Factors: prolonged standing greater that 15 minutes, if she does too much and overdoes it, cold weather, getting out of the car  Easing Factors: Moving around and rocking from side to side  OBJECTIVE     Update 3/14/23    TREATMENT     Grant received aquatic therapeutic exercises to develop strength, endurance, ROM, flexibility, posture, and core stabilization for 70 minutes including:    FUNCTIONAL MOBILITY TRAINING x 2 laps each at beginning and 1 lap each at end of session  Walk forward/backward/lateral    STRETCHES 2 x 30sec  HS stretch at steps    LE EX x 30 advanced to 5#   Heel raise into squat  Hip flex/abd/ext combo  Standing clam  LAQ     Leg press with Aqua noodle x 20 reps  B    Sit to stand x 30    Step up on 8 inch red step x 30 reps  Lateral step up on 8 inch red step x 30 reps  Step down on 8 inch red step x 20 reps B     Side stepping x 2 laps--advanced to larger maroon loop   Monster walks x 2 laps -advanced to larger maroon loop     HIIT x 4' (Down fast, back slow marches)    UE EX/CORE  x  30  Shoulder flex/ext TA activation with yellow dumbbells  Shoulder horizontal abd/add TA activation with yellow dumbbells  Shoulder abd/ add with  yellow dumbbells    B shoulder row and extension with Maroon TB     ENDURANCE  LTR x 3'  DKTC x 3'  Bicycle in // bars 3'    ASSESSMENT   The patient performed all exercises well today with good quality of execution. Due to the nature of the clinical environment an extender was used throughout the session.      Previous Short Term Goals Status:       Short Term Goals: 6 weeks   1. Patient will be independent in HEP & progressions.--Met as of 3/14/23  2. Patient will demonstrate improved B LE MMT by 1/3 muscle grade where deficits noted.--Progressing as of 3/14/23  3. The patient will achieve 5 sit to stand score of 15 seconds to demonstrate improved transfers and endurance. --MET as of 3/14/23     Long Term Goals: 12 weeks   1. The patient will demonstrate independence with extensive HEP.  2. Patient will achieve 5 sit to stand score of 12 seconds to demonstrate improved transfers & endurance.  3. Patent is able to demonstrate MMT 4+/5 B LE without pain reports during testing.      PLAN   Progress POC as tolerated by the patient.    Updated Certification Period: 3/14/23 to 4/15/23     Bel Swartz, PT

## 2023-04-05 ENCOUNTER — PATIENT MESSAGE (OUTPATIENT)
Dept: BARIATRICS | Facility: CLINIC | Age: 53
End: 2023-04-05
Payer: COMMERCIAL

## 2023-04-06 ENCOUNTER — CLINICAL SUPPORT (OUTPATIENT)
Dept: REHABILITATION | Facility: HOSPITAL | Age: 53
End: 2023-04-06
Payer: COMMERCIAL

## 2023-04-06 DIAGNOSIS — M51.36 DDD (DEGENERATIVE DISC DISEASE), LUMBAR: Primary | ICD-10-CM

## 2023-04-06 PROCEDURE — 97113 AQUATIC THERAPY/EXERCISES: CPT

## 2023-04-06 NOTE — PROGRESS NOTES
[facility]  Physical Therapy Daily Treatment Note    Name: Africa Marley [facility]  Clinic Number: [id number]    Therapy Diagnosis:   Encounter Diagnosis   Name Primary?    DDD (degenerative disc disease), lumbar Yes     Physician: Edouard Whitt DO    Visit Date: 4/6/2023    Physician Orders: Aquatic Therapy   Medical Diagnosis from Referral:   S/P fusion of thoracic spine  - Primary     Z98.1 V45.4     Evaluation Date: 1/13/2023    Authorization Period Expiration: 212/30/23  Plan of Care Expiration: 4/15/23  Visit # / Visits authorized:20/20 +Eval     Precautions: Standard and Fall    PTA Visit #: 0/5     Time In: 7:25 am  Time Out:8:30 am   Total Billable Time:60 minutes        SUBJECTIVE     The patient reports: she is feeling pretty good today.        Pain:  Current 0/10, worst 8/10, best 0/10   Location: R buttock region  Description: Soreness   Aggravating Factors: prolonged standing greater that 15 minutes, if she does too much and overdoes it, cold weather, getting out of the car  Easing Factors: Moving around and rocking from side to side  OBJECTIVE     Update 3/14/23    TREATMENT     Grant received aquatic therapeutic exercises to develop strength, endurance, ROM, flexibility, posture, and core stabilization for 65 minutes including:    FUNCTIONAL MOBILITY TRAINING x 2 laps each at beginning and 1 lap each at end of session  Walk forward/backward/lateral    STRETCHES 2 x 30sec  HS stretch at steps    LE EX x 30 advanced to 5#   Heel raise into squat  Hip flex/abd/ext combo  Standing clam  LAQ     Leg press with Aqua noodle x 20 reps  B    Sit to stand x 30    Step up on 8 inch red step x 30 reps  Lateral step up on 8 inch red step x 30 reps  Step down on 8 inch red step x 20 reps B     Side stepping x 2 laps--advanced to larger maroon loop   Monster walks x 2 laps -advanced to larger maroon loop     HIIT x 4' (Down fast, back slow marches)--NP    UE EX/CORE  x  30  Shoulder flex/ext TA activation with yellow dumbbells  Shoulder horizontal abd/add TA activation with yellow dumbbells  Shoulder abd/ add with  yellow dumbbells    B shoulder row and extension with Maroon TB     ENDURANCE  LTR x 3'  DKTC x 3'  Bicycle in // bars 3'    ASSESSMENT   The patient continues to progress well with the treatment and able to perform all exercises without increased pain.       Previous Short Term Goals Status:       Short Term Goals: 6 weeks   1. Patient will be independent in HEP & progressions.--Met as of 3/14/23  2. Patient will demonstrate improved B LE MMT by 1/3 muscle grade where deficits noted.--Progressing as of 3/14/23  3. The patient will achieve 5 sit to stand score of 15 seconds to demonstrate improved transfers and endurance. --MET as of 3/14/23     Long Term Goals: 12 weeks   1. The patient will demonstrate independence with extensive HEP.  2. Patient will achieve 5 sit to stand score of 12 seconds to demonstrate improved transfers & endurance.  3. Patent is able to demonstrate MMT 4+/5 B LE without pain reports during testing.      PLAN   Progress POC as tolerated by the patient.    Updated Certification Period: 3/14/23 to 4/15/23     Bel Swartz, PT

## 2023-04-10 ENCOUNTER — PATIENT MESSAGE (OUTPATIENT)
Dept: NEUROSURGERY | Facility: CLINIC | Age: 53
End: 2023-04-10
Payer: COMMERCIAL

## 2023-04-10 ENCOUNTER — CLINICAL SUPPORT (OUTPATIENT)
Dept: REHABILITATION | Facility: HOSPITAL | Age: 53
End: 2023-04-10
Payer: COMMERCIAL

## 2023-04-10 DIAGNOSIS — M51.36 DDD (DEGENERATIVE DISC DISEASE), LUMBAR: Primary | ICD-10-CM

## 2023-04-10 PROCEDURE — 97113 AQUATIC THERAPY/EXERCISES: CPT

## 2023-04-10 NOTE — PROGRESS NOTES
[facility]  Physical Therapy Daily Treatment Note    Name: Africa Marley [facility]  Clinic Number: [id number]    Therapy Diagnosis:   Encounter Diagnosis   Name Primary?    DDD (degenerative disc disease), lumbar Yes     Physician: Edouard Whitt DO    Visit Date: 4/10/2023    Physician Orders: Aquatic Therapy   Medical Diagnosis from Referral:   S/P fusion of thoracic spine  - Primary     Z98.1 V45.4     Evaluation Date: 1/13/2023  Authorization Period Expiration: 212/30/23  Plan of Care Expiration: 4/15/23  Visit # / Visits authorized:20/20 +Eval     Precautions: Standard and Fall    PTA Visit #: 0/5     Time In: 7:25 am  Time Out:8:45 am   Total Billable Time:70 minutes        SUBJECTIVE     The patient reports: she is doing great today and would like today to be her last day. She stated she has to go out of town for work Wednesday which was originally her D/C date. She stated that she is ready for D/C and comfortable with her HEP.       Pain:  Current 0/10, worst 8/10, best 0/10   Location: R buttock region  Description: Soreness   Aggravating Factors: prolonged standing greater that 15 minutes, if she does too much and overdoes it, cold weather, getting out of the car  Easing Factors: Moving around and rocking from side to side  OBJECTIVE     Update 3/14/23    TREATMENT     Grant received aquatic therapeutic exercises to develop strength, endurance, ROM, flexibility, posture, and core stabilization for 75 minutes including:    FUNCTIONAL MOBILITY TRAINING x 2 laps each at beginning and 1 lap each at end of session  Walk forward/backward/lateral    STRETCHES 2 x 30sec  HS stretch at steps    LE EX x 30 advanced to 5#   Heel raise into squat  Hip flex/abd/ext combo  Standing clam  LAQ     Leg press with Aqua noodle x 20 reps  B    Sit to stand x 30    Step up on 8 inch red step x 30 reps  Lateral step up on 8 inch red step x 30 reps  Step down on 8 inch red step x 20 reps B      Side stepping x 2 laps--advanced to larger maroon loop   Monster walks x 2 laps -advanced to larger maroon loop     HIIT x 4' (Down fast, back slow marches)    UE EX/CORE  x 30  Shoulder flex/ext TA activation with yellow dumbbells  Shoulder horizontal abd/add TA activation with yellow dumbbells  Shoulder abd/ add with  yellow dumbbells    B shoulder row and extension with Maroon TB     ENDURANCE  LTR x 3'  DKTC x 3'  Bicycle in // bars 3'    ASSESSMENT   The patient at this time has reached max rehab potential and is independent with her HEP and reports compliance. She is d/c from PT with HEP today.        Short Term Goals: 6 weeks   1. Patient will be independent in HEP & progressions.--Met as of 3/14/23  2. Patient will demonstrate improved B LE MMT by 1/3 muscle grade where deficits noted.--Progressing as of 3/14/23  3. The patient will achieve 5 sit to stand score of 15 seconds to demonstrate improved transfers and endurance. --MET as of 3/14/23     Long Term Goals: 12 weeks   1. The patient will demonstrate independence with extensive HEP.--Met as of 4/10/23  2. Patient will achieve 5 sit to stand score of 12 seconds to demonstrate improved transfers & endurance.  3. Patent is able to demonstrate MMT 4+/5 B LE without pain reports during testing.      PLAN     The patient is d/c from PT today with HEP.     Updated Certification Period: 3/14/23 to 4/15/23     Bel Swartz, PT

## 2023-04-11 ENCOUNTER — TELEPHONE (OUTPATIENT)
Dept: NEUROSURGERY | Facility: CLINIC | Age: 53
End: 2023-04-11
Payer: COMMERCIAL

## 2023-04-11 ENCOUNTER — PATIENT MESSAGE (OUTPATIENT)
Dept: BARIATRICS | Facility: CLINIC | Age: 53
End: 2023-04-11
Payer: COMMERCIAL

## 2023-04-11 DIAGNOSIS — Z98.1 S/P FUSION OF THORACIC SPINE: Primary | ICD-10-CM

## 2023-04-14 DIAGNOSIS — L65.8 TRACTION ALOPECIA: ICD-10-CM

## 2023-04-14 RX ORDER — MOMETASONE FUROATE 1 MG/ML
SOLUTION TOPICAL DAILY
Qty: 60 ML | Refills: 5 | OUTPATIENT
Start: 2023-04-14

## 2023-04-25 ENCOUNTER — TELEPHONE (OUTPATIENT)
Dept: PAIN MEDICINE | Facility: CLINIC | Age: 53
End: 2023-04-25
Payer: COMMERCIAL

## 2023-04-26 ENCOUNTER — PATIENT MESSAGE (OUTPATIENT)
Dept: PAIN MEDICINE | Facility: OTHER | Age: 53
End: 2023-04-26
Payer: COMMERCIAL

## 2023-04-26 ENCOUNTER — OFFICE VISIT (OUTPATIENT)
Dept: PAIN MEDICINE | Facility: CLINIC | Age: 53
End: 2023-04-26
Payer: COMMERCIAL

## 2023-04-26 VITALS
WEIGHT: 218.25 LBS | BODY MASS INDEX: 40.16 KG/M2 | HEIGHT: 62 IN | RESPIRATION RATE: 19 BRPM | HEART RATE: 76 BPM | DIASTOLIC BLOOD PRESSURE: 82 MMHG | SYSTOLIC BLOOD PRESSURE: 124 MMHG | TEMPERATURE: 98 F

## 2023-04-26 DIAGNOSIS — M54.16 LUMBAR RADICULOPATHY, RIGHT: Primary | ICD-10-CM

## 2023-04-26 DIAGNOSIS — M51.36 DDD (DEGENERATIVE DISC DISEASE), LUMBAR: ICD-10-CM

## 2023-04-26 PROCEDURE — 99214 OFFICE O/P EST MOD 30 MIN: CPT | Mod: S$GLB,,, | Performed by: NURSE PRACTITIONER

## 2023-04-26 PROCEDURE — 1160F PR REVIEW ALL MEDS BY PRESCRIBER/CLIN PHARMACIST DOCUMENTED: ICD-10-PCS | Mod: CPTII,S$GLB,, | Performed by: NURSE PRACTITIONER

## 2023-04-26 PROCEDURE — 3079F DIAST BP 80-89 MM HG: CPT | Mod: CPTII,S$GLB,, | Performed by: NURSE PRACTITIONER

## 2023-04-26 PROCEDURE — 99999 PR PBB SHADOW E&M-EST. PATIENT-LVL V: ICD-10-PCS | Mod: PBBFAC,,, | Performed by: NURSE PRACTITIONER

## 2023-04-26 PROCEDURE — 3079F PR MOST RECENT DIASTOLIC BLOOD PRESSURE 80-89 MM HG: ICD-10-PCS | Mod: CPTII,S$GLB,, | Performed by: NURSE PRACTITIONER

## 2023-04-26 PROCEDURE — 1160F RVW MEDS BY RX/DR IN RCRD: CPT | Mod: CPTII,S$GLB,, | Performed by: NURSE PRACTITIONER

## 2023-04-26 PROCEDURE — 3008F BODY MASS INDEX DOCD: CPT | Mod: CPTII,S$GLB,, | Performed by: NURSE PRACTITIONER

## 2023-04-26 PROCEDURE — 1159F MED LIST DOCD IN RCRD: CPT | Mod: CPTII,S$GLB,, | Performed by: NURSE PRACTITIONER

## 2023-04-26 PROCEDURE — 99999 PR PBB SHADOW E&M-EST. PATIENT-LVL V: CPT | Mod: PBBFAC,,, | Performed by: NURSE PRACTITIONER

## 2023-04-26 PROCEDURE — 99214 PR OFFICE/OUTPT VISIT, EST, LEVL IV, 30-39 MIN: ICD-10-PCS | Mod: S$GLB,,, | Performed by: NURSE PRACTITIONER

## 2023-04-26 PROCEDURE — 3074F SYST BP LT 130 MM HG: CPT | Mod: CPTII,S$GLB,, | Performed by: NURSE PRACTITIONER

## 2023-04-26 PROCEDURE — 3074F PR MOST RECENT SYSTOLIC BLOOD PRESSURE < 130 MM HG: ICD-10-PCS | Mod: CPTII,S$GLB,, | Performed by: NURSE PRACTITIONER

## 2023-04-26 PROCEDURE — 3008F PR BODY MASS INDEX (BMI) DOCUMENTED: ICD-10-PCS | Mod: CPTII,S$GLB,, | Performed by: NURSE PRACTITIONER

## 2023-04-26 PROCEDURE — 1159F PR MEDICATION LIST DOCUMENTED IN MEDICAL RECORD: ICD-10-PCS | Mod: CPTII,S$GLB,, | Performed by: NURSE PRACTITIONER

## 2023-04-26 NOTE — PROGRESS NOTES
Chronic Pain-Established Note (Follow up visit)          Subjective:     Patient ID: Africa Jennings is a 53 y.o. female    Chief Complaint: Back Pain      Referred by: Edouard Whitt DO      HPI:    Interval History 4/26/2023:  Mrs Jennings presents for follow up. Over interval she has had T9-12 Thoracic decompression by Dr Whitt on 10/28/2022. She is doing well since then but continues to have R sided lower back and focal R L5 radicular pain. She states back pain = leg pain. She states the R leg is subjectively weaker than the left. Denies any s/s concerning for cauda equina. Pain has limited functioning and she has Completed PT/Aquatherapy for >6 weeks.     Interval History 3/16/2022:  Pt presents for follow up of. She has had L4/5 ILESI and left SI injection in past with benefit. She has more vocal pain to right and associated radiculopathy down right leg in L4/5 pattern. She had no recent MRI in past 2 years and known lumbar discogenic issues but this was more focal to left. She has no complaint of loss of b/b or saddle paresthesias.     Interval History  2/22/2022:   Mrs Jennings presents for virtual follow up of lower back and left sided buttock pain. She is now s/p Left SIJ and states 90% improved pain and feels her relief was more significant than MARILEE. She states pain more noticeable to right side now without radicular complaint. There is no focal voicing of loss of b/b or saddle paresthesias.     Interval History 1/25/2021:  Mrs Jennings presents for follow up of lower back pain. She is s/p L4/5 ILESI with 90% benefit from lower back pain. She has lingering lower buttock/back pain to left side. Increased pain when sitting or stairs. Denies radiculopathy. Denies any loss of b/b or saddle paresthesias.     Interval History 12/23/2021:  Mrs Jennings presents to establish care at [facility]. She states continued lower back pain to left side but leg pain/paresthesias=back pain. She  states left lower back pain and radiation in L4/5 pattern but also on right side. She has MRI findings to left L4/5 NF disc protrusion. She has been doing HEP she learned through [facility] for over 6 months straight and continues with no lasting benefit. She is currently taking Mobic 15mg and Neurontin 300mg TID. There is no complaint of loss of b/b or saddle paresthesias.     Interval History (4/13/20):    The patient location is: home  The chief complaint leading to consultation is: follow up  Visit type: Virtual visit with audio.  Patient verbally consented for audio visit.  Total time spent with patient: 15 minutes  Each patient to whom he or she provides medical services by telemedicine is:  (1) informed of the relationship between the physician and patient and the respective role of any other health care provider with respect to management of the patient; and (2) notified that he or she may decline to receive medical services by telemedicine and may withdraw from such care at any time.      She returns today for follow up.  She reports that her right-sided low back and lower extremity pain has returned since last encounter.  She denies any changes in the quality or location of pain.  She continues to report associated numbness.  She denies any associated weakness or bowel bladder dysfunction.  She has attempted gabapentin 600 mg t.i.d..  She states this medication did not provide any relief.  She has also tried goody's powder, Motrin, Tylenol without any relief.        Interval History (2/17/20):  She returns today for follow up and MRI review.  She reports that her pain is unchanged in quality and location since last encounter.  She does state that the pain has significantly improved and is overall not very bothersome for the time being.        Initial Encounter (2/3/20):  Africa Jennings is a 53 y.o. female who presents today with chronic right-sided low back and lower extremity pain. This pain  has been present for 6-7 months.  No specific inciting event or injury noted.  Patient localizes the pain to the right lumbar region.  Pain radiates to the right posterior and anterior thigh.  She reports associated numbness in this area as well. She also reports weakness of the right lower extremity when ambulating.  She denies any associated bowel or bladder dysfunction.  The pain is constant and worsened with activity.  She states that the pain interrupt her sleep.  Patient does have a history of a similar problem roughly 8 years ago.  She underwent epidural steroid injections with good relief.  This pain is described in detail below.    Physical Therapy:  Yes    Non-pharmacologic Treatment:  Rest helps         TENS?  No    Pain Medications:         Currently taking:  Aleve, gabapentin    Has tried in the past:  Tylenol, Motrin    Has not tried:  Opioids, Tylenol, Muscle relaxants, TCAs, SNRIs, anticonvulsants, topical creams    Blood thinners:  None    Interventional Therapies:   L4-5 interlaminar epidural steroid injection x2  1/11/2022 L4/5 ILESI 90% benefit    2/8/2022 Left SIJ injection    Relevant Surgeries:  None    Affecting sleep?  Yes    Affecting daily activities? yes    Depressive symptoms? no          SI/HI? No    Work status: Employed    Pain Scores:    Best:       6/10  Worst:     10/10  Usually:   8/10  Today:    0/10    Review of Systems   Constitutional: Negative for activity change, appetite change, chills, fatigue, fever and unexpected weight change.   HENT: Negative for hearing loss.    Eyes: Negative for visual disturbance.   Respiratory: Negative for chest tightness and shortness of breath.    Cardiovascular: Negative for chest pain.   Gastrointestinal: Negative for abdominal pain, constipation, diarrhea, nausea and vomiting.   Genitourinary: Negative for difficulty urinating.   Musculoskeletal: Positive for back pain, gait problem and myalgias. Negative for neck pain.   Skin: Negative for  rash.   Neurological: Positive for weakness and numbness. Negative for dizziness, light-headedness and headaches.   Psychiatric/Behavioral: Positive for sleep disturbance. Negative for hallucinations and suicidal ideas. The patient is not nervous/anxious.        Past Medical History:   Diagnosis Date    Diabetes mellitus     Diabetes mellitus, type 2     Eczema     GERD (gastroesophageal reflux disease)     Hypertension     Impaired fasting blood sugar     YSABEL on CPAP        Past Surgical History:   Procedure Laterality Date    breast reduction      CHOLECYSTECTOMY      laprascopic    DECOMPRESSION OF THORACIC OUTLET N/A 10/28/2022    Procedure: T9-12 ROBOTIC DECOMPRESSION, THORACIC FUSION;  Surgeon: Edouard Whitt DO;  Location: [facility] OR [address] [place] [address];  Service: Neurosurgery;  Laterality: N/A;  ANESTHESIA GENERAL TORONTO I BLOOD TYPE & SCREEN NERVE MONITORING EMG SEP MEP POSTION PRONE BED CARA 4 POST HEAD REST [facility] RADIOLOGY C-ARM GLOBUS PROTOCOL MISCELLANEOUS ALANIZ BRACE TLSO    EPIDURAL STEROID INJECTION N/A 1/11/2022    Procedure: INJECTION, STEROID, EPIDURAL IL L4/5 NEEDS CONSENT;  Surgeon: Britt Calix MD;  Location: [facility];  Service: Pain Management;  Laterality: N/A;    ESOPHAGOGASTRODUODENOSCOPY  8/18/14    HYSTERECTOMY  2007    laprascopic, total for fibroids.  Dr Polo    INJECTION OF JOINT Left 2/8/2022    Procedure: INJECTION, JOINT, SI LEFT NEEDS CONSENT;  Surgeon: Britt Calix MD;  Location: [facility];  Service: Pain Management;  Laterality: Left;    OOPHORECTOMY      TOTAL REDUCTION MAMMOPLASTY         Social History     Socioeconomic History    Marital status:    Tobacco Use    Smoking status: Never    Smokeless tobacco: Never   Substance and Sexual Activity    Alcohol use: No     Comment: socially    Drug use: No    Sexual activity: Yes     Partners: Male     Birth control/protection: Surgical   Social History Narrative    Was  since 2000.       Has a friend since 2016.    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     Social Determinants of Health     Financial Resource Strain: Medium Risk    Difficulty of Paying Living Expenses: Somewhat hard   Food Insecurity: Food Insecurity Present    Worried About Running Out of Food in the Last Year: Sometimes true    Ran Out of Food in the Last Year: Never true   Transportation Needs: No Transportation Needs    Lack of Transportation (Medical): No    Lack of Transportation (Non-Medical): No   Physical Activity: Sufficiently Active    Days of Exercise per Week: 5 days    Minutes of Exercise per Session: 80 min   Stress: No Stress Concern Present    Feeling of Stress : Not at all   Social Connections: Unknown    Frequency of Communication with Friends and Family: More than three times a week    Frequency of Social Gatherings with Friends and Family: Once a week    Active Member of Clubs or Organizations: No    Attends Club or Organization Meetings: Never    Marital Status: Living with partner   Housing Stability: Low Risk     Unable to Pay for Housing in the Last Year: No    Number of Places Lived in the Last Year: 1    Unstable Housing in the Last Year: No       Review of patient's allergies indicates:  No Known Allergies    Current Outpatient Medications on File Prior to Visit   Medication Sig Dispense Refill    acetaminophen (TYLENOL) 500 MG tablet Take 2 tablets (1,000 mg total) by mouth every 8 (eight) hours as needed for Pain. 30 tablet 0    amLODIPine (NORVASC) 10 MG tablet Take 1 tablet (10 mg total) by mouth once daily. 30 tablet 11    atorvastatin (LIPITOR) 20 MG tablet TAKE 1 TABLET BY MOUTH EVERY DAY 90 tablet 0    blood sugar diagnostic Strp 1 strip by Misc.(Non-Drug; Combo Route) route 2 (two) times daily with meals. 100 strip 11    cetirizine (ZYRTEC) 10 MG tablet Take 1 tablet (10 mg total) by mouth once daily. 90 tablet 1    doxycycline monohydrate 100 mg Tab Take 1 tablet  "by mouth 2 (two) times daily.      ergocalciferol (ERGOCALCIFEROL) 50,000 unit Cap TAKE 2 CAPSULE (50,000 UNITS TOTAL)BY MOUTH TWICE A WEEK 48 capsule 1    estradioL (ESTRACE) 1 MG tablet TAKE 1 TABLET BY MOUTH EVERY DAY 30 tablet 0    finasteride (PROSCAR) 5 mg tablet Take 1 tablet (5 mg total) by mouth once daily. 30 tablet 11    fluocinonide (LIDEX) 0.05 % ointment Apply to affected areas of scalp qhs 60 g 3    ketoconazole (NIZORAL) 2 % cream Apply to affected areas of scalp BID. 60 g 5    ketoconazole (NIZORAL) 2 % shampoo Wash hair with medicated shampoo at least 2x/week - let sit on scalp at least 5 minutes prior to rinsing 120 mL 5    lancets Misc Use to test blood sugar once daily. 300 each 0    meloxicam (MOBIC) 15 MG tablet TAKE 1 TABLET BY MOUTH EVERY DAY 30 tablet 2    metFORMIN (GLUCOPHAGE-XR) 500 MG ER 24hr tablet TAKE 1 TABLET BY MOUTH TWICE A DAY WITH MEALS 180 tablet 1    mometasone (ELOCON) 0.1 % solution Apply topically once daily. To thinning area of scalp 60 mL 5    neomycin-polymyxin-dexamethasone (MAXITROL) 3.5mg/mL-10,000 unit/mL-0.1 % DrpS as needed.      nystatin (MYCOSTATIN) ointment Apply topically 2 (two) times daily. USES PRN      omeprazole (PRILOSEC) 40 MG capsule TAKE 1 CAPSULE BY MOUTH EVERY DAY 90 capsule 3    ondansetron (ZOFRAN) 4 MG tablet TAKE 1 TABLET BY MOUTH EVERY 8 HOURS AS NEEDED 90 tablet 1    oxyCODONE (ROXICODONE) 5 MG immediate release tablet Take 1 tablet (5 mg total) by mouth every 8 (eight) hours as needed for Pain. 28 tablet 0    OZEMPIC 1 mg/dose (4 mg/3 mL) INJECT 1 MG INTO THE SKIN EVERY 7 DAYS. 3 each 0    pen needle, diabetic (PEN NEEDLE) 32 gauge x 5/32" Ndle 1 each by Misc.(Non-Drug; Combo Route) route once a week. 50 each 1    prednisoLONE acetate (PRED FORTE) 1 % DrpS USES PRN      pregabalin (LYRICA) 75 MG capsule Take 1 capsule (75 mg total) by mouth 2 (two) times daily. 60 capsule 6    tiZANidine (ZANAFLEX) 4 MG tablet Take 4 mg by mouth nightly as " "needed.      triamcinolone acetonide 0.1% (KENALOG) 0.1 % cream Apply topically 2 (two) times daily. Apply topically 2 (two) times daily. 135 g 4    valsartan-hydrochlorothiazide (DIOVAN-HCT) 160-12.5 mg per tablet Take 1 tablet by mouth once daily. 90 tablet 1    blood-glucose meter kit Use as instructed 1 each 0    lancing device Misc 1 Device by Misc.(Non-Drug; Combo Route) route 2 (two) times daily with meals. 100 each 11     Current Facility-Administered Medications on File Prior to Visit   Medication Dose Route Frequency Provider Last Rate Last Admin    triamcinolone acetonide injection 10 mg  10 mg Intradermal Once Basilia Valdez MD           Objective:      /82 (BP Location: Right arm, Patient Position: Sitting)   Pulse 76   Temp 98 °F (36.7 °C) (Oral)   Resp 19   Ht 5' 2" (1.575 m)   Wt 99 kg (218 lb 4.1 oz)   LMP  (LMP Unknown)   BMI 39.92 kg/m²     Exam:  PHYSICAL EXAMINATION:  Voice normal.  Normal affect without evidence of distress.  Back: +facet loading on left, no facet loading on Right. (-) STR bilaterally  Musculoskeletal: PSIS TTP more pronounced today on left, +gaenslen, +JOCELIN, +compression and distraction. Lumbosacral spring >Lumbar spring.  no PSIS TTP on right.  (-) JOCELIN on right. 5/5 BLE strength except for 4/5 EHL on right.   Gait: Normal         Imaging:  Narrative     EXAMINATION:  MRI LUMBAR SPINE WITHOUT CONTRAST    CLINICAL HISTORY:  lumbar radiculopathy; Other intervertebral disc degeneration, lumbar region    TECHNIQUE:  Multiplanar, multisequence MR images were acquired from the thoracolumbar junction to the sacrum without the administration of contrast.    COMPARISON:  MRI of the lumbar spine 09/21/2011.    FINDINGS:  There is again normal lumbar lordosis.  There is no spondylolisthesis or spondylolysis or signs for acute fractures.  The tip of the conus is at L1.  No definite signs for intradural masses.  Paraspinal soft tissues are unremarkable.    L5-S1: There " is disc dehydration.  Mild circumferential annular disc bulge.  No significant disc protrusions or spinal stenosis.  There is bilateral mild foraminal narrowing associated with disc bulge.  There is degenerative hypertrophic facet arthropathy.    L4-5: Disc dehydration.  There is a left L4-5 neural foraminal disc protrusion without definite signs for compression of the exiting L4 root in the foramen.  These findings have improved when compared to the previous study.  Within the central canal there is a broad-based mild disc protrusion or disc bulge greater on the left associated with left lateral recess stenosis.  Findings suspicious for compression of the descending left L5 root in the lateral recess (image 21 series 6.  Clinical correlation for left L5 radiculopathy suggested.  Hypertrophic changes of the facet joints and the ligamentum flava resulting in at least a mild central canal spinal stenosis.    L3-4: There is dehydration of the nucleus pulposis.  Mild circumferential annular disc bulge.  No significant central canal spinal stenosis or foraminal stenosis.  There is facet arthropathy bilaterally.    L2-3: No disc protrusions or spinal stenosis or foraminal stenosis.    L1-2: No disc herniations or spinal stenosis or foraminal stenosis.    T12-L1: No disc herniations or spinal stenosis or foraminal stenosis.    T11-T12: No axial images performed through this region.  However on the sagittal images there is dehydration of nucleus pulposis associated with a circumferential annular disc bulge with mild compression of the thecal sac.  No cord compression however noted.   Impression       1. Multifactorial central canal spinal stenosis L4-5 disc space as above.  In addition there is a left paracentral disc bulge or protrusion associated with left lateral recess stenosis and compression of the descending left L5 root as above.  The left foraminal disc protrusion seen on the previous examination less pronounced  when compared to the previous study.  2. Multilevel facet arthropathy as above.  3. Milder spondylotic changes at the rest of the disc spaces as above.      Electronically signed by: Gideon Webber MD  Date: 02/11/2020  Time: 07:42         Assessment:       Encounter Diagnoses   Name Primary?    DDD (degenerative disc disease), lumbar     Lumbar radiculopathy, right Yes         Plan:       Africa was seen today for back pain.    Diagnoses and all orders for this visit:    Lumbar radiculopathy, right  -     Procedure Order to Pain Management; Future    DDD (degenerative disc disease), lumbar  -     Ambulatory referral/consult to Pain Clinic  -     Procedure Order to Pain Management; Future          - Prior records reviewed  - Prior imaging reviewed  - Pain in a Right L5 radicular pattern and MRI consistent to explain symptoms  - S/f R L4/5&L5/S1 TFESI  - Consider MBB if pain becomes more lumbar and radicular pain eases.   - Continue HEP   - Advised to continue HEP.     - RTC 2 weeks after procedure .     MIHAI Mckay  04/26/2023    I spent a total of 30 minutes on the day of the visit.  This includes face to face time and non-face to face time preparing to see the patient by reviewing previous labs/imaging, obtaining and/or reviewing separately obtained history, documenting clinical information in the electronic or other health record, independently interpreting results and communicating results to the patient/family/caregiver.

## 2023-04-27 ENCOUNTER — PATIENT MESSAGE (OUTPATIENT)
Dept: BARIATRICS | Facility: CLINIC | Age: 53
End: 2023-04-27
Payer: COMMERCIAL

## 2023-04-28 DIAGNOSIS — E78.5 HYPERLIPIDEMIA, UNSPECIFIED HYPERLIPIDEMIA TYPE: ICD-10-CM

## 2023-04-28 RX ORDER — ATORVASTATIN CALCIUM 20 MG/1
TABLET, FILM COATED ORAL
Qty: 90 TABLET | Refills: 0 | Status: ON HOLD | OUTPATIENT
Start: 2023-04-28 | End: 2023-07-27

## 2023-04-28 NOTE — TELEPHONE ENCOUNTER
Refill Routing Note   Medication(s) are not appropriate for processing by [facility] for the following reason(s):      Required labs outdated    [facility] action(s):  Defer            Appointments  past 12m or future 3m with PCP    Date Provider   Last Visit   1/9/2023 Brandi Thompson MD   Next Visit   7/10/2023 Brandi Thompson MD   ED visits in past 90 days: 0        Note composed:7:58 AM 04/28/2023

## 2023-04-28 NOTE — TELEPHONE ENCOUNTER
No care due was identified.  [facility] Embedded Care Due Messages. Reference number: [id number].   4/28/2023 2:01:11 AM CDT

## 2023-05-01 DIAGNOSIS — E66.01 SEVERE OBESITY WITH BODY MASS INDEX (BMI) OF 35.0 TO 39.9 WITH SERIOUS COMORBIDITY: Primary | ICD-10-CM

## 2023-05-03 ENCOUNTER — PATIENT MESSAGE (OUTPATIENT)
Dept: BARIATRICS | Facility: CLINIC | Age: 53
End: 2023-05-03
Payer: COMMERCIAL

## 2023-05-09 ENCOUNTER — CLINICAL SUPPORT (OUTPATIENT)
Dept: PSYCHIATRY | Facility: CLINIC | Age: 53
End: 2023-05-09
Payer: COMMERCIAL

## 2023-05-09 ENCOUNTER — PATIENT MESSAGE (OUTPATIENT)
Dept: BARIATRICS | Facility: CLINIC | Age: 53
End: 2023-05-09
Payer: COMMERCIAL

## 2023-05-09 DIAGNOSIS — I10 ESSENTIAL HYPERTENSION: Chronic | ICD-10-CM

## 2023-05-09 DIAGNOSIS — E66.01 SEVERE OBESITY WITH BODY MASS INDEX (BMI) OF 35.0 TO 39.9 WITH SERIOUS COMORBIDITY: ICD-10-CM

## 2023-05-09 DIAGNOSIS — E11.9 TYPE 2 DIABETES MELLITUS WITHOUT COMPLICATION, WITHOUT LONG-TERM CURRENT USE OF INSULIN: Chronic | ICD-10-CM

## 2023-05-09 DIAGNOSIS — K21.9 GASTROESOPHAGEAL REFLUX DISEASE WITHOUT ESOPHAGITIS: Chronic | ICD-10-CM

## 2023-05-09 DIAGNOSIS — Z01.818 PREOPERATIVE EVALUATION TO RULE OUT SURGICAL CONTRAINDICATION: Primary | ICD-10-CM

## 2023-05-09 PROCEDURE — 99499 NO LOS: ICD-10-PCS | Mod: 95,,, | Performed by: PSYCHOLOGIST

## 2023-05-09 PROCEDURE — 99499 UNLISTED E&M SERVICE: CPT | Mod: 95,,, | Performed by: PSYCHOLOGIST

## 2023-05-09 NOTE — PROGRESS NOTES
Psychiatry Initial Visit (PhD/LCSW)   Diagnostic Interview - CPT 78106     Date: 05/09/2023    Site:  Telehealth   The patient location is: [place]  The chief complaint leading to consultation is: Preoperative evaluation to rule out psychiatric contraindication     Visit type: audiovisual    Face to Face time with patient: 30  55 minutes of total time spent on the encounter, which includes face to face time and non-face to face time preparing to see the patient (eg, review of tests), Obtaining and/or reviewing separately obtained history, Documenting clinical information in the electronic or other health record, Independently interpreting results (not separately reported) and communicating results to the patient/family/caregiver, or Care coordination (not separately reported).         Each patient to whom he or she provides medical services by telemedicine is:  (1) informed of the relationship between the physician and patient and the respective role of any other health care provider with respect to management of the patient; and (2) notified that he or she may decline to receive medical services by telemedicine and may withdraw from such care at any time.    Notes:       Referral source: Roge Rich M.D.      Clinical status of patient: Outpatient     Grant Barrera, a 53-year-old female, presented for initial evaluation visit. Before this evaluation was initiated, the purposes and process of the assessment and the limits of confidentiality were discussed with the patient who expressed understanding of these issues and orally consented to proceed with the evaluation.     Chief complaint/reason for encounter: Routine psychological evaluation prior to bariatric surgery.     Type of surgery sought: Sleeve gastrectomy     History of present illness:   Ms. Jennings  is a 53-year-old  female who is pursuing bariatric surgery to improve her health and quality of life. She has no history of  significant psychological difficulties. She has never taken psychotropic medication, has never been hospitalized for psychiatric reasons and denied any history of suicidality.  She has begun making positive lifestyle changes in anticipation for surgery, with good benefit. The patient has a Body Mass Index of 39.92 as documented by the referring provider.    Ms. Jennings has struggled with weight for the last twenty years ago. Factors that have contributed to her weight gain over the years include the birth of her children.  She denied a history of emotional eating.  She has tried many weight loss methods on her own (i.e., moderate exercises, eliminating fried foods, water aerobics) with little success, and she believes that her biggest weight loss challenge is consistency in dieting and the eating the same meals again and again as a part of meal planning.  Her motivation for seeking surgery now is her long term health, especially for managing her diabetes and high blood pressure.    Ms. Jennings  has met with Ms. Briana WHITNEY, bariatric dietician, and reports that she has made the following lifestyle changes since beginning the bariatric program: consistently exercising, changing her diet, and losing significant weight over the last several months. She has been cleared by Ms. Warren to proceed with bariatric surgery.    Co-morbidities: Type 2 Diabetes, Hypertension, GERD    Knowledge of surgery information:  - Basics of procedure: Smaller stomach, smaller portions of food. They'll make your stomach look like a banana so there's less volume.   - Risks: As with any surgery there's a risk. Clinician explained potential risks including dumping syndrome, leakage, GERD, negative reactions to anesthesia, and the remote possibility of death.   - Basics of diet: Lots of proteins and vitamins, eliminating alcohol or carbonated beverages. Two weeks of liquids before the surgery. Clinician also added that patient would be  "expected to be on an all liquid diet for two weeks following the surgery, followed by eating very soft foods such as apple sauce, then transitioning to soft foods like scrambled eggs.     Pain: 0/10    Psychiatric Symptoms:   Depression -Denied depressed mood, loss of interest in pleasurable activities, anhedonia, sleep changes, decreased motivation, decreased concentration, feelings of excessive or irrational guilt, helplessness, hopelessness, increased or decreased appetite, weight changes, increased or decreased motor activity, decreased energy, suicidal ideations/thoughts of death.  Adelia/Hypomania -Denied increased goal directed activity, decreased need for sleep, pressured speech or increased talkative, racing thoughts, increased risk-taking behavior, episodic elevated or irritable mood, flight of ideas, distractibility, inflated self-esteem, grandiosity  Anxiety - Denied excessive worry, difficulty controlling worrying, feeling keyed up, easily fatigued, difficulty concentrating or mind going blank, irritability, muscle tension, sleep disturbance, racing thoughts, being unable to relax, specific phobia.  Panic Attacks: Denied palpitations, sweating, trembling, dyspnea, choking sensation, chest pain/discomfort, nausea, dizziness, chills or hot flashes, tingling, derealization, fear of losing control, fear of dying.  Thoughts - Denied any AVH, paranoia, delusions, ideas of reference, thought insertion or thought broadcasting  Suicidal thoughts/behaviors - Denied passive or active SI, denied suicidal plans or intent  Self-injury - denied.  Substance abuse - denied abuse or dependence.   Sleep -  "Denied increased sleep latency, frequent nighttime awakenings, or early morning awakening with inability to return to sleep.    Current psychiatric treatment:  Medications: Denied     Psychotherapy: None    Treating clinicians: None    Health behaviors: Reported adherence to pharmacologic regimen.      Psychiatric " history:   Previous diagnosis:  None    Previous hospitalizations: none     History of outpatient treatment: none    Previous suicide attempt: none    Trauma history:  Denies.  PTSD: Denies re-experiencing trauma, nightmares, increased awareness of surroundings, hyperexcitability.    History of eating disorders:  History of bulimia:  She denied recurrent episodes of eating then engaging in inappropriate compensatory behaviors.    History of binge-eating episodes:  She denied eating excessive amounts of food within a discrete time period with a lack of control during eating.  She denied eating more rapidly than normal, eating until uncomfortably full, eating large amounts of food when not physically hungry, eating alone due to embarrassment, or negative emotions (i.e., disgusted, guilty, depressed) afterwards.    Family history of psychiatric illness: None reported.     Social history (marriage, employment, etc.): Ms. Jennings was born and raised in [place], [place] by her adopted parents along with five siblings.  She described her childhood as very happy.   She denied childhood trauma, abuse, and neglect. She denied being enrolled in special education or being held back.  She denied significant detentions, suspensions, and expulsions. She completed three years of college.  She is currently an  at [employer].  She is not on disability and finances are stable. She was  for twenty years before filing for divorce in 2019.  She has two children (ages 33 and 28).  She currently lives alone with her dog Elizabeth.  She identifies as Tenriism. Hobbies include having game nights, traveling, dancing, and reading.    Current psychosocial stressors: None identified, but occasionally a bad day at work although she clarified that she loves her job.     Report of coping skills: [place], hanging out with her dog Elizabeth.      Support system: Her two children, a niece who previously had the  gastric sleeve surgery, and her best friend Tony.     Substance use:   Alcohol:  Denied   Drugs: Denied current use; denied history of abuse or dependency.  Tobacco: None.   Caffeine:  Herbal tea occasionally during the week when its cold outside     Current medications and drug reactions (include OTC, herbal): see medication list     Current Evaluation:    Mental Status Exam:   General Appearance:  age appropriate, well dressed, neatly groomed, overweight    Speech:  normal tone, normal rate, normal pitch, normal volume    Level of Cooperation:  cooperative    Thought Processes:  normal and logical    Mood:  euthymic    Thought Content:  normal, no suicidality, no homicidality, delusions, or paranoia    Affect:  congruent and appropriate    Orientation:  oriented x3    Memory:  recent memory intact; immediate and delayed word recall 3/3  remote memory intact; able to recall remote personal events   Attention Span & Concentration:  appropriate   Fund of General Knowledge:  appropriate for education    Abstract Reasoning:  Not directly assessed   Judgment & Insight:  good    Language  intact        Diagnostic Impression - Plan:      Diagnostic Impression:     ICD-10-CM ICD-9-CM   1. Preoperative evaluation to rule out surgical contraindication  Z01.818 V72.83   2. Severe obesity with body mass index (BMI) of 35.0 to 39.9 with serious comorbidity  E66.01 278.01   3. Essential hypertension  I10 401.9   4. Type 2 diabetes mellitus without complication, without long-term current use of insulin  E11.9 250.00   5. Gastroesophageal reflux disease without esophagitis  K21.9 530.81         Summary/Conclusion:   There are no overt psychological contraindications for proceeding with bariatric surgery.  The patient has no significant psychiatric history and reports no current psychiatric problems or major adjustment issues.  The patient has reasonable expectations for surgery, good social support, and has already begun making  appropriate lifestyle changes in anticipation for surgery. The patient has verbalized appropriate awareness and commitment to the necessary behavioral changes associated with bariatric surgery and appears willing to comply with long-term lifestyle changes.     Recommendations:  -This patient is psychologically cleared to proceed with bariatric surgery.  - There are no recommendations for psychological treatment at this time. The patient is aware of resources available should her needs change in the future.

## 2023-05-16 ENCOUNTER — PATIENT MESSAGE (OUTPATIENT)
Dept: DERMATOLOGY | Facility: CLINIC | Age: 53
End: 2023-05-16
Payer: COMMERCIAL

## 2023-05-16 ENCOUNTER — TELEPHONE (OUTPATIENT)
Dept: DERMATOLOGY | Facility: CLINIC | Age: 53
End: 2023-05-16
Payer: COMMERCIAL

## 2023-05-16 NOTE — TELEPHONE ENCOUNTER
Pt rescheduled for 6/19 @ 945am . Pt confirmed appt.     ----- Message from Herlinda John sent at 5/16/2023  1:14 PM CDT -----  Regarding: Reschedule Appt  Contact: Pt @ [phone]  Pt is not feeling well and calling to reschedule appt. Asking for a call back

## 2023-05-17 ENCOUNTER — PATIENT MESSAGE (OUTPATIENT)
Dept: ADMINISTRATIVE | Facility: OTHER | Age: 53
End: 2023-05-17
Payer: COMMERCIAL

## 2023-05-22 ENCOUNTER — TELEPHONE (OUTPATIENT)
Dept: ADMINISTRATIVE | Facility: OTHER | Age: 53
End: 2023-05-22
Payer: COMMERCIAL

## 2023-05-22 ENCOUNTER — PATIENT MESSAGE (OUTPATIENT)
Dept: PAIN MEDICINE | Facility: CLINIC | Age: 53
End: 2023-05-22
Payer: COMMERCIAL

## 2023-05-25 ENCOUNTER — PATIENT MESSAGE (OUTPATIENT)
Dept: ADMINISTRATIVE | Facility: OTHER | Age: 53
End: 2023-05-25
Payer: COMMERCIAL

## 2023-05-26 ENCOUNTER — TELEPHONE (OUTPATIENT)
Dept: PAIN MEDICINE | Facility: CLINIC | Age: 53
End: 2023-05-26
Payer: COMMERCIAL

## 2023-05-26 NOTE — TELEPHONE ENCOUNTER
----- Message from Laura Plasencia RN sent at 5/26/2023  3:47 PM CDT -----  Regarding: Update  I spoke to patient and informed her that [employer] told me that they did not cover the codes for her procedure. I reached out to brittani but did not hear back. Please check that the proper codes are in the case and let me know.     Thank you,  Laura  Pre-service RN  ----- Message -----  From: Becka Rod  Sent: 5/22/2023   4:47 PM CDT  To: Laura Plasenica RN    Pt is requesting a callback regarding her procedure pt states she received a call from someone saying her procedure was not approved pt informed staff when she called her insurance company she was told that her procedure authorization was never submitted . Can you please advise pt     Maikel Zamorano MA Antoinette Renfroe Blackston 1 hour ago (3:43 PM)    TJ  Good Afternoon Ms. Jennings,     We will forward your message to the procedure area pending a  response you will be updated.     Have a nice day     Maikel Zamorano MA  [facility] [address] [place] [address]  [address] - Pain Management  [phone] (O) and [phone] (F)     This Patient Portal message has not been read.    Africa Jennings  P Fifi De La Torre Staff (supporting Britt Calix MD) 3 hours ago (12:57 PM)      Good afternoon  I just received a call stating my tomorrow's procedure was denied  I am on the phone with [employer] and am being told nothing was never submitted to them foe approval. Hoping this is not the case. please can someone ca11 me at [phone].  Thanks!

## 2023-05-29 ENCOUNTER — TELEPHONE (OUTPATIENT)
Dept: SPINE | Facility: CLINIC | Age: 53
End: 2023-05-29
Payer: COMMERCIAL

## 2023-05-29 NOTE — TELEPHONE ENCOUNTER
Staff contacted pt regarding rescheduling appointment on 06/07/23 with Stoney Klein NP due to the provider being out of office on that day. Patient stated that she would like the appointment to be canceled due to waiting to be contacted to have procedure rescheduled. Appointment was a 2 week f/u to a procedure that the patient didn't have yet.

## 2023-05-30 ENCOUNTER — PATIENT MESSAGE (OUTPATIENT)
Dept: BARIATRICS | Facility: CLINIC | Age: 53
End: 2023-05-30
Payer: COMMERCIAL

## 2023-05-31 ENCOUNTER — PATIENT MESSAGE (OUTPATIENT)
Dept: BARIATRICS | Facility: CLINIC | Age: 53
End: 2023-05-31
Payer: COMMERCIAL

## 2023-06-01 ENCOUNTER — TELEPHONE (OUTPATIENT)
Dept: BARIATRICS | Facility: CLINIC | Age: 53
End: 2023-06-01
Payer: COMMERCIAL

## 2023-06-01 NOTE — TELEPHONE ENCOUNTER
Called to discuss scheduling surgery.  Pt needs H&P with surgeon due to insurance requirements.  Scheduled preop and will schdule surgery after Dr. Rich's notes are in.      Surgery: Denny sleeve with HHR    Contacted Yanelis Warren RD and discussed Dr. Rich needs to co-sign 3 of the pt's 6 month consecutive visits.      Pt wishes to have time to read over the bariatric booklet and have teaching completed over the phone.

## 2023-06-06 ENCOUNTER — OFFICE VISIT (OUTPATIENT)
Dept: BARIATRICS | Facility: CLINIC | Age: 53
End: 2023-06-06
Payer: COMMERCIAL

## 2023-06-06 ENCOUNTER — TELEPHONE (OUTPATIENT)
Dept: BARIATRICS | Facility: CLINIC | Age: 53
End: 2023-06-06
Payer: COMMERCIAL

## 2023-06-06 DIAGNOSIS — E66.01 SEVERE OBESITY (BMI 35.0-39.9) WITH COMORBIDITY: Primary | ICD-10-CM

## 2023-06-06 PROCEDURE — 1160F RVW MEDS BY RX/DR IN RCRD: CPT | Mod: CPTII,95,, | Performed by: SURGERY

## 2023-06-06 PROCEDURE — 1159F MED LIST DOCD IN RCRD: CPT | Mod: CPTII,95,, | Performed by: SURGERY

## 2023-06-06 PROCEDURE — 99213 OFFICE O/P EST LOW 20 MIN: CPT | Mod: 95,,, | Performed by: SURGERY

## 2023-06-06 PROCEDURE — 99213 PR OFFICE/OUTPT VISIT, EST, LEVL III, 20-29 MIN: ICD-10-PCS | Mod: 95,,, | Performed by: SURGERY

## 2023-06-06 PROCEDURE — 1159F PR MEDICATION LIST DOCUMENTED IN MEDICAL RECORD: ICD-10-PCS | Mod: CPTII,95,, | Performed by: SURGERY

## 2023-06-06 PROCEDURE — 1160F PR REVIEW ALL MEDS BY PRESCRIBER/CLIN PHARMACIST DOCUMENTED: ICD-10-PCS | Mod: CPTII,95,, | Performed by: SURGERY

## 2023-06-06 NOTE — TELEPHONE ENCOUNTER
----- Message from Sue Gutierrez sent at 6/6/2023  8:38 AM CDT -----  Regarding: reschedule  Contact: [phone]  Africa Blackston calling regarding Appointment Reschedule for #appt today. She states that she is coming from out of town and don't think she will make it in time. She states that she have an appt on 6.19 and if it can be that day. Please call

## 2023-06-06 NOTE — PROGRESS NOTES
The patient location is: in [facility], in [place]  The chief complaint leading to consultation is: bariatric surgery    Visit type: audiovisual    17 minutes of total time spent on the encounter, which includes face to face time and non-face to face time preparing to see the patient (eg, review of tests), Obtaining and/or reviewing separately obtained history, Documenting clinical information in the electronic or other health record, Independently interpreting results (not separately reported) and communicating results to the patient/family/caregiver, or Care coordination (not separately reported).     Each patient to whom he or she provides medical services by telemedicine is:  (1) informed of the relationship between the physician and patient and the respective role of any other health care provider with respect to management of the patient; and (2) notified that he or she may decline to receive medical services by telemedicine and may withdraw from such care at any time.    Notes:     1. Morbid obesity, body mass index is 42.41 kg/m². and inability to lose weight.  First worked up in 2014 but had to delay surgery.  2. Co-morbidities: diabetes mellitus 2 not on insulin and no complications, YSABEL on CPAP and GERD on ppi without any symptoms in years, essential HTN  3. From 6/6/23, height 62inches, weight 220lb and bmi 40.2.  She has not been able to lose significant weight despite diet and exercise and has made no significant progress.  4. Prior nausea and vomiting 2014 resolved    Physical Exam  Constitutional:       Appearance: She is obese.   Neurological:      Mental Status: She is alert and oriented to person, place, and time.   Psychiatric:         Mood and Affect: Mood normal.         Behavior: Behavior normal.         Thought Content: Thought content normal.         Judgment: Judgment normal.       Cbc, cmp, a1c, lipids reviewed, anemia (Dr. Thompson taking care of this and went down due to surgery and improving on  last cbc), type 2 dm  Thoracic ct reviewed, films viewed, doubt hh  Egd 2014 reviewed, pylorus dilated  Ges 2014 reviewed, normal    For robotic sleeve gastrectomy.

## 2023-06-06 NOTE — TELEPHONE ENCOUNTER
"Called and spoke with the pt. She is driving home from a a graduation and will not be able to be seen in person today.  I rescheduled with pt for virtual appt.    For 6/6/2023: ht-5'2", Weight- 220lbs, BMI-40.2    Will be able to schedule surgery after pt completes visit today.   "

## 2023-06-07 ENCOUNTER — PATIENT MESSAGE (OUTPATIENT)
Dept: BARIATRICS | Facility: CLINIC | Age: 53
End: 2023-06-07
Payer: COMMERCIAL

## 2023-06-07 ENCOUNTER — TELEPHONE (OUTPATIENT)
Dept: BARIATRICS | Facility: CLINIC | Age: 53
End: 2023-06-07
Payer: COMMERCIAL

## 2023-06-07 DIAGNOSIS — E11.9 TYPE 2 DIABETES MELLITUS WITHOUT COMPLICATION, WITHOUT LONG-TERM CURRENT USE OF INSULIN: Chronic | ICD-10-CM

## 2023-06-07 DIAGNOSIS — I10 ESSENTIAL HYPERTENSION: Chronic | ICD-10-CM

## 2023-06-07 DIAGNOSIS — I10 ESSENTIAL HYPERTENSION: ICD-10-CM

## 2023-06-07 DIAGNOSIS — G47.33 OSA ON CPAP: ICD-10-CM

## 2023-06-07 DIAGNOSIS — E66.01 MORBID OBESITY: Primary | ICD-10-CM

## 2023-06-07 DIAGNOSIS — K21.9 GASTROESOPHAGEAL REFLUX DISEASE WITHOUT ESOPHAGITIS: ICD-10-CM

## 2023-06-07 DIAGNOSIS — E66.8 MODERATE OBESITY: Primary | ICD-10-CM

## 2023-06-07 DIAGNOSIS — E11.9 TYPE 2 DIABETES MELLITUS WITHOUT COMPLICATION, WITHOUT LONG-TERM CURRENT USE OF INSULIN: ICD-10-CM

## 2023-06-07 DIAGNOSIS — K21.9 GASTROESOPHAGEAL REFLUX DISEASE WITHOUT ESOPHAGITIS: Chronic | ICD-10-CM

## 2023-06-07 DIAGNOSIS — E66.8 MODERATE OBESITY: ICD-10-CM

## 2023-06-07 NOTE — TELEPHONE ENCOUNTER
Pt was accidentally scheduled 7/27/2023.  Corrected by sending out email to all related necessary staff to change to 7/26/2023. Called and spoke with pt to explain.

## 2023-06-07 NOTE — TELEPHONE ENCOUNTER
"Spoke with patient and confirmed the surgical procedure of lap gastric sleeve w/HHR with Dr Rich on 7/26/2023.  Scheduled preop appts/surgery date/2 week and 8 week post op appts. All dates and times agreed upon. Pt aware that if they are required to have PCP clearance, it must be within 6 months of surgery, unless their medical history has changed, it should be dated, signed and in chart for preop appointment. All medications have been reviewed regarding the necessity to be crushed or broken into pieces smaller that the tip of a pencil eraser for 2 weeks following gastric sleeve surgery and 4 weeks following gastric bypass surgery. Pt instructed to stop taking all NSAIDS 1 week before surgery and for life after surgery. Pt aware that protein liquid diet start date is 7/19/2023. Patient is doing well with their diet. Patient was instructed about the progression of the diet phases. The patient's current weight is 218lbs, height is 5'2", and BMI is 39.9. Refer to medical letter of necessity from Surgeon. Discussed the importance of increased physical activity and dieting lifestyle changes to improve weight loss and meet goals. Screened patient for history of UTI per protocol. Discussed with patient to avoid antibiotics and elective procedures involving sedation/anesthesia within 30 days of surgery unless cleared by the bariatric department. Patient instructed to call the bariatric clinic post op for any s/s of UTI. Patient's mailing address confirmed and informed to expect a manilla envelop containing bariatric surgery pre op booklet, appointment reminders, protein and fluid log sheets, and liquid diet and vitamin information sheets. Pt aware that all appts can be seen in my [facility] patient portal at this time. Confirmed email address and informed patient that they will be enrolled in the Patient Reported Outcomes program to track their progress and successes. The first email will be sent 2-3 weeks before " surgery and then every year on your surgery anniversary date. Office phone and fax number given to patient for any future questions/concerns. Discussed the pre-surgery complex carbohydrate beverage to purchase in [facility] to drink 30 minutes before the surgery arrival time. Reviewed the policy of scheduling a covid test 72 hours prior to surgery if necessary.      Pt is aware that any weight gain would cause preop liquid diet change from 1 week to 2 weeks.     Pt stated she will notify the bariatric clinic when she receives her packet and will complete the teaching over the phone in advance to her preop appt.     Pt stated current insurance, [facility] is active.

## 2023-06-13 ENCOUNTER — PATIENT MESSAGE (OUTPATIENT)
Dept: BARIATRICS | Facility: CLINIC | Age: 53
End: 2023-06-13
Payer: COMMERCIAL

## 2023-06-16 ENCOUNTER — PATIENT MESSAGE (OUTPATIENT)
Dept: PAIN MEDICINE | Facility: CLINIC | Age: 53
End: 2023-06-16
Payer: COMMERCIAL

## 2023-06-19 ENCOUNTER — OFFICE VISIT (OUTPATIENT)
Dept: DERMATOLOGY | Facility: CLINIC | Age: 53
End: 2023-06-19
Payer: COMMERCIAL

## 2023-06-19 DIAGNOSIS — L64.9 ANDROGENETIC ALOPECIA: ICD-10-CM

## 2023-06-19 DIAGNOSIS — L21.9 SEBORRHEIC DERMATITIS: ICD-10-CM

## 2023-06-19 DIAGNOSIS — L66.9 SCARRING ALOPECIA: Primary | ICD-10-CM

## 2023-06-19 DIAGNOSIS — L65.8 TRACTION ALOPECIA: ICD-10-CM

## 2023-06-19 PROCEDURE — 11901 PR INJECTION, SKIN LESIONS, 8 OR MORE: ICD-10-PCS | Mod: S$GLB,,, | Performed by: DERMATOLOGY

## 2023-06-19 PROCEDURE — 11901 INJECT SKIN LESIONS >7: CPT | Mod: S$GLB,,, | Performed by: DERMATOLOGY

## 2023-06-19 PROCEDURE — 99999 PR PBB SHADOW E&M-EST. PATIENT-LVL IV: CPT | Mod: PBBFAC,,, | Performed by: DERMATOLOGY

## 2023-06-19 PROCEDURE — 99215 OFFICE O/P EST HI 40 MIN: CPT | Mod: 25,S$GLB,, | Performed by: DERMATOLOGY

## 2023-06-19 PROCEDURE — 1159F MED LIST DOCD IN RCRD: CPT | Mod: CPTII,S$GLB,, | Performed by: DERMATOLOGY

## 2023-06-19 PROCEDURE — 1159F PR MEDICATION LIST DOCUMENTED IN MEDICAL RECORD: ICD-10-PCS | Mod: CPTII,S$GLB,, | Performed by: DERMATOLOGY

## 2023-06-19 PROCEDURE — 99215 PR OFFICE/OUTPT VISIT, EST, LEVL V, 40-54 MIN: ICD-10-PCS | Mod: 25,S$GLB,, | Performed by: DERMATOLOGY

## 2023-06-19 PROCEDURE — 99999 PR PBB SHADOW E&M-EST. PATIENT-LVL IV: ICD-10-PCS | Mod: PBBFAC,,, | Performed by: DERMATOLOGY

## 2023-06-19 NOTE — Clinical Note
I would like to start patient on spironlactone for androgenic alopecia/excessive  growth along chin, can we do this with her meds,I usally start at 50 mg then increase to 100mg

## 2023-06-19 NOTE — PROGRESS NOTES
Subjective:      Patient ID:  Africa Jennings is a 53 y.o. female who presents for   Chief Complaint   Patient presents with    Hair Loss     Follow up      Hair Loss    Pt here today for hairloss. Last visit was on 2/28/23 and she received injection of Kenalog 5mg/cc (2cc total). Pt tolerated well and is seeing some regrowth.   Review of Systems   Constitutional:  Negative for fever, chills and fatigue.   Hematologic/Lymphatic: Does not bruise/bleed easily.     Objective:   Physical Exam   Constitutional: She appears well-developed and well-nourished. No distress.   Neurological: She is alert and oriented to person, place, and time. She is not disoriented.   Psychiatric: She has a normal mood and affect.   Skin:   Areas Examined (abnormalities noted in diagram):   Scalp / Hair Palpated and Inspected  Head / Face Inspection Performed          Diagram Legend     Erythematous scaling macule/papule c/w actinic keratosis       Vascular papule c/w angioma      Pigmented verrucoid papule/plaque c/w seborrheic keratosis      Yellow umbilicated papule c/w sebaceous hyperplasia      Irregularly shaped tan macule c/w lentigo     1-2 mm smooth white papules consistent with Milia      Movable subcutaneous cyst with punctum c/w epidermal inclusion cyst      Subcutaneous movable cyst c/w pilar cyst      Firm pink to brown papule c/w dermatofibroma      Pedunculated fleshy papule(s) c/w skin tag(s)      Evenly pigmented macule c/w junctional nevus     Mildly variegated pigmented, slightly irregular-bordered macule c/w mildly atypical nevus      Flesh colored to evenly pigmented papule c/w intradermal nevus       Pink pearly papule/plaque c/w basal cell carcinoma      Erythematous hyperkeratotic cursted plaque c/w SCC      Surgical scar with no sign of skin cancer recurrence      Open and closed comedones      Inflammatory papules and pustules      Verrucoid papule consistent consistent with wart     Erythematous  eczematous patches and plaques     Dystrophic onycholytic nail with subungual debris c/w onychomycosis     Umbilicated papule    Erythematous-base heme-crusted tan verrucoid plaque consistent with inflamed seborrheic keratosis     Erythematous Silvery Scaling Plaque c/w Psoriasis     See annotation      Assessment / Plan:        Scarring alopecia  -     NM [id number] ACETONIDE INJ PER 10MG  -     triamcinolone acetonide injection 10 mg  -     NM INJECTION, SKIN LESIONS, 8 OR MORE    Seborrheic dermatitis Chronic, stable  -continue ketoconazole shampoo, area is improved of scalp.  Can use once weekly  -continue ketoconazole cream twice daily when flaring to affected areas of face      Traction alopecia  -discussed not wearing type brace as can worsen the process patient currently with christopher and discuss making sure not to pull to tight  Androgenetic alopecia  - discussed spironlactone with patient would help with hair growth in scalp and unwanted hair growth on face, messaged Dr. Thomspon  Discussed benefits and risks of therapy including but not limited to breakthrough bleeding, breast tenderness, and elevated potassium levels which may give symptoms of fatigue, palpitations, and nausea. Patient should limit potassium intake - avoid potassium supplements or salt substitutes, limit bananas and citrus fruits. Pregnancy must be avoided while taking spironolactone.             No follow-ups on file.  Intralesional Kenalog 5mg/cc (2 cc total) injected into 8 lesions on the scalp today after obtaining verbal consent including risk of surrounding hypopigmentation. Patient tolerated procedure well.  Units: 1.0  NDC for Kenalog 10mg/cc:  [id number]    Addendum:  After discussing with PCP usage of spironlactone, he agrees  Will start patient on 50 mg of spironlactone and will increase to 100 mg as tolerated.Start with 50 mg x 1 week, then increase to 100 mg, if patient feels lightheaded or weak to back down on dosage.

## 2023-06-20 RX ORDER — SPIRONOLACTONE 50 MG/1
50 TABLET, FILM COATED ORAL DAILY
Qty: 30 TABLET | Refills: 11 | Status: SHIPPED | OUTPATIENT
Start: 2023-06-20 | End: 2024-06-19

## 2023-06-21 NOTE — ADDENDUM NOTE
Addended by: MARTHA MORENO on: 6/20/2023 07:03 PM     Modules accepted: Orders, Level of Service

## 2023-06-22 ENCOUNTER — PATIENT MESSAGE (OUTPATIENT)
Dept: BARIATRICS | Facility: CLINIC | Age: 53
End: 2023-06-22
Payer: COMMERCIAL

## 2023-06-27 ENCOUNTER — PATIENT MESSAGE (OUTPATIENT)
Dept: FAMILY MEDICINE | Facility: CLINIC | Age: 53
End: 2023-06-27
Payer: COMMERCIAL

## 2023-06-30 ENCOUNTER — PATIENT MESSAGE (OUTPATIENT)
Dept: FAMILY MEDICINE | Facility: CLINIC | Age: 53
End: 2023-06-30
Payer: COMMERCIAL

## 2023-07-03 ENCOUNTER — PATIENT MESSAGE (OUTPATIENT)
Dept: FAMILY MEDICINE | Facility: CLINIC | Age: 53
End: 2023-07-03
Payer: COMMERCIAL

## 2023-07-03 DIAGNOSIS — E11.9 DIABETES MELLITUS WITHOUT COMPLICATION: Primary | ICD-10-CM

## 2023-07-05 ENCOUNTER — LAB VISIT (OUTPATIENT)
Dept: LAB | Facility: HOSPITAL | Age: 53
End: 2023-07-05
Attending: FAMILY MEDICINE
Payer: COMMERCIAL

## 2023-07-05 DIAGNOSIS — E11.9 TYPE 2 DIABETES MELLITUS WITHOUT COMPLICATION, WITHOUT LONG-TERM CURRENT USE OF INSULIN: Chronic | ICD-10-CM

## 2023-07-05 LAB
ALBUMIN SERPL BCP-MCNC: 4.1 G/DL (ref 3.5–5.2)
ALP SERPL-CCNC: 75 U/L (ref 55–135)
ALT SERPL W/O P-5'-P-CCNC: 20 U/L (ref 10–44)
ANION GAP SERPL CALC-SCNC: 17 MMOL/L (ref 8–16)
AST SERPL-CCNC: 23 U/L (ref 10–40)
BASOPHILS # BLD AUTO: 0.04 K/UL (ref 0–0.2)
BASOPHILS NFR BLD: 0.6 % (ref 0–1.9)
BILIRUB SERPL-MCNC: 0.4 MG/DL (ref 0.1–1)
BUN SERPL-MCNC: 14 MG/DL (ref 6–20)
CALCIUM SERPL-MCNC: 9.9 MG/DL (ref 8.7–10.5)
CHLORIDE SERPL-SCNC: 101 MMOL/L (ref 95–110)
CHOLEST SERPL-MCNC: 136 MG/DL (ref 120–199)
CHOLEST/HDLC SERPL: 3.2 {RATIO} (ref 2–5)
CO2 SERPL-SCNC: 22 MMOL/L (ref 23–29)
CREAT SERPL-MCNC: 0.8 MG/DL (ref 0.5–1.4)
DIFFERENTIAL METHOD: ABNORMAL
EOSINOPHIL # BLD AUTO: 0.2 K/UL (ref 0–0.5)
EOSINOPHIL NFR BLD: 2.3 % (ref 0–8)
ERYTHROCYTE [DISTWIDTH] IN BLOOD BY AUTOMATED COUNT: 15.4 % (ref 11.5–14.5)
EST. GFR  (NO RACE VARIABLE): >60 ML/MIN/1.73 M^2
ESTIMATED AVG GLUCOSE: 137 MG/DL (ref 68–131)
GLUCOSE SERPL-MCNC: 75 MG/DL (ref 70–110)
HBA1C MFR BLD: 6.4 % (ref 4–5.6)
HCT VFR BLD AUTO: 37.4 % (ref 37–48.5)
HDLC SERPL-MCNC: 43 MG/DL (ref 40–75)
HDLC SERPL: 31.6 % (ref 20–50)
HGB BLD-MCNC: 11.6 G/DL (ref 12–16)
IMM GRANULOCYTES # BLD AUTO: 0.01 K/UL (ref 0–0.04)
IMM GRANULOCYTES NFR BLD AUTO: 0.1 % (ref 0–0.5)
LDLC SERPL CALC-MCNC: 76.2 MG/DL (ref 63–159)
LYMPHOCYTES # BLD AUTO: 3.8 K/UL (ref 1–4.8)
LYMPHOCYTES NFR BLD: 53.5 % (ref 18–48)
MCH RBC QN AUTO: 26.6 PG (ref 27–31)
MCHC RBC AUTO-ENTMCNC: 31 G/DL (ref 32–36)
MCV RBC AUTO: 86 FL (ref 82–98)
MONOCYTES # BLD AUTO: 0.4 K/UL (ref 0.3–1)
MONOCYTES NFR BLD: 6.2 % (ref 4–15)
NEUTROPHILS # BLD AUTO: 2.6 K/UL (ref 1.8–7.7)
NEUTROPHILS NFR BLD: 37.3 % (ref 38–73)
NONHDLC SERPL-MCNC: 93 MG/DL
NRBC BLD-RTO: 0 /100 WBC
PLATELET # BLD AUTO: 357 K/UL (ref 150–450)
PMV BLD AUTO: 11.7 FL (ref 9.2–12.9)
POTASSIUM SERPL-SCNC: 3.4 MMOL/L (ref 3.5–5.1)
PROT SERPL-MCNC: 8.2 G/DL (ref 6–8.4)
RBC # BLD AUTO: 4.36 M/UL (ref 4–5.4)
SODIUM SERPL-SCNC: 140 MMOL/L (ref 136–145)
TRIGL SERPL-MCNC: 84 MG/DL (ref 30–150)
TSH SERPL DL<=0.005 MIU/L-ACNC: 2.11 UIU/ML (ref 0.4–4)
WBC # BLD AUTO: 7.07 K/UL (ref 3.9–12.7)

## 2023-07-05 PROCEDURE — 80061 LIPID PANEL: CPT | Performed by: FAMILY MEDICINE

## 2023-07-05 PROCEDURE — 83036 HEMOGLOBIN GLYCOSYLATED A1C: CPT | Performed by: FAMILY MEDICINE

## 2023-07-05 PROCEDURE — 84443 ASSAY THYROID STIM HORMONE: CPT | Performed by: FAMILY MEDICINE

## 2023-07-05 PROCEDURE — 36415 COLL VENOUS BLD VENIPUNCTURE: CPT | Mod: PO | Performed by: FAMILY MEDICINE

## 2023-07-05 PROCEDURE — 85025 COMPLETE CBC W/AUTO DIFF WBC: CPT | Performed by: FAMILY MEDICINE

## 2023-07-05 PROCEDURE — 80053 COMPREHEN METABOLIC PANEL: CPT | Performed by: FAMILY MEDICINE

## 2023-07-08 DIAGNOSIS — E11.9 TYPE 2 DIABETES MELLITUS WITHOUT COMPLICATION, WITHOUT LONG-TERM CURRENT USE OF INSULIN: ICD-10-CM

## 2023-07-09 NOTE — TELEPHONE ENCOUNTER
No care due was identified.  [facility] Embedded Care Due Messages. Reference number: [id number].   7/08/2023 9:24:20 PM CDT

## 2023-07-10 ENCOUNTER — OFFICE VISIT (OUTPATIENT)
Dept: FAMILY MEDICINE | Facility: CLINIC | Age: 53
End: 2023-07-10
Payer: COMMERCIAL

## 2023-07-10 VITALS
SYSTOLIC BLOOD PRESSURE: 116 MMHG | HEIGHT: 62 IN | BODY MASS INDEX: 41.17 KG/M2 | TEMPERATURE: 98 F | HEART RATE: 88 BPM | WEIGHT: 223.75 LBS | OXYGEN SATURATION: 97 % | DIASTOLIC BLOOD PRESSURE: 68 MMHG

## 2023-07-10 DIAGNOSIS — I10 ESSENTIAL HYPERTENSION: Chronic | ICD-10-CM

## 2023-07-10 DIAGNOSIS — K21.9 GASTROESOPHAGEAL REFLUX DISEASE WITHOUT ESOPHAGITIS: ICD-10-CM

## 2023-07-10 DIAGNOSIS — E11.9 DIABETES MELLITUS WITHOUT COMPLICATION: ICD-10-CM

## 2023-07-10 DIAGNOSIS — E11.9 TYPE 2 DIABETES MELLITUS WITHOUT COMPLICATION, WITHOUT LONG-TERM CURRENT USE OF INSULIN: ICD-10-CM

## 2023-07-10 DIAGNOSIS — Z01.818 PRE-OP EXAMINATION: Primary | ICD-10-CM

## 2023-07-10 DIAGNOSIS — E66.01 MORBID OBESITY WITH BMI OF 40.0-44.9, ADULT: ICD-10-CM

## 2023-07-10 PROCEDURE — 99999 PR PBB SHADOW E&M-EST. PATIENT-LVL V: ICD-10-PCS | Mod: PBBFAC,,, | Performed by: FAMILY MEDICINE

## 2023-07-10 PROCEDURE — 1160F RVW MEDS BY RX/DR IN RCRD: CPT | Mod: CPTII,S$GLB,, | Performed by: FAMILY MEDICINE

## 2023-07-10 PROCEDURE — 3066F NEPHROPATHY DOC TX: CPT | Mod: CPTII,S$GLB,, | Performed by: FAMILY MEDICINE

## 2023-07-10 PROCEDURE — 3066F PR DOCUMENTATION OF TREATMENT FOR NEPHROPATHY: ICD-10-PCS | Mod: CPTII,S$GLB,, | Performed by: FAMILY MEDICINE

## 2023-07-10 PROCEDURE — 1159F MED LIST DOCD IN RCRD: CPT | Mod: CPTII,S$GLB,, | Performed by: FAMILY MEDICINE

## 2023-07-10 PROCEDURE — 3008F BODY MASS INDEX DOCD: CPT | Mod: CPTII,S$GLB,, | Performed by: FAMILY MEDICINE

## 2023-07-10 PROCEDURE — 3074F PR MOST RECENT SYSTOLIC BLOOD PRESSURE < 130 MM HG: ICD-10-PCS | Mod: CPTII,S$GLB,, | Performed by: FAMILY MEDICINE

## 2023-07-10 PROCEDURE — 3078F DIAST BP <80 MM HG: CPT | Mod: CPTII,S$GLB,, | Performed by: FAMILY MEDICINE

## 2023-07-10 PROCEDURE — 99999 PR PBB SHADOW E&M-EST. PATIENT-LVL V: CPT | Mod: PBBFAC,,, | Performed by: FAMILY MEDICINE

## 2023-07-10 PROCEDURE — 99214 PR OFFICE/OUTPT VISIT, EST, LEVL IV, 30-39 MIN: ICD-10-PCS | Mod: S$GLB,,, | Performed by: FAMILY MEDICINE

## 2023-07-10 PROCEDURE — 3008F PR BODY MASS INDEX (BMI) DOCUMENTED: ICD-10-PCS | Mod: CPTII,S$GLB,, | Performed by: FAMILY MEDICINE

## 2023-07-10 PROCEDURE — 3074F SYST BP LT 130 MM HG: CPT | Mod: CPTII,S$GLB,, | Performed by: FAMILY MEDICINE

## 2023-07-10 PROCEDURE — 3044F HG A1C LEVEL LT 7.0%: CPT | Mod: CPTII,S$GLB,, | Performed by: FAMILY MEDICINE

## 2023-07-10 PROCEDURE — 3044F PR MOST RECENT HEMOGLOBIN A1C LEVEL <7.0%: ICD-10-PCS | Mod: CPTII,S$GLB,, | Performed by: FAMILY MEDICINE

## 2023-07-10 PROCEDURE — 3061F NEG MICROALBUMINURIA REV: CPT | Mod: CPTII,S$GLB,, | Performed by: FAMILY MEDICINE

## 2023-07-10 PROCEDURE — 3078F PR MOST RECENT DIASTOLIC BLOOD PRESSURE < 80 MM HG: ICD-10-PCS | Mod: CPTII,S$GLB,, | Performed by: FAMILY MEDICINE

## 2023-07-10 PROCEDURE — 1160F PR REVIEW ALL MEDS BY PRESCRIBER/CLIN PHARMACIST DOCUMENTED: ICD-10-PCS | Mod: CPTII,S$GLB,, | Performed by: FAMILY MEDICINE

## 2023-07-10 PROCEDURE — 1159F PR MEDICATION LIST DOCUMENTED IN MEDICAL RECORD: ICD-10-PCS | Mod: CPTII,S$GLB,, | Performed by: FAMILY MEDICINE

## 2023-07-10 PROCEDURE — 99214 OFFICE O/P EST MOD 30 MIN: CPT | Mod: S$GLB,,, | Performed by: FAMILY MEDICINE

## 2023-07-10 PROCEDURE — 3061F PR NEG MICROALBUMINURIA RESULT DOCUMENTED/REVIEW: ICD-10-PCS | Mod: CPTII,S$GLB,, | Performed by: FAMILY MEDICINE

## 2023-07-10 RX ORDER — METFORMIN HYDROCHLORIDE 500 MG/1
500 TABLET ORAL 2 TIMES DAILY WITH MEALS
Qty: 180 TABLET | Refills: 3 | Status: SHIPPED | OUTPATIENT
Start: 2023-07-10 | End: 2023-08-11

## 2023-07-10 RX ORDER — SEMAGLUTIDE 1.34 MG/ML
1 INJECTION, SOLUTION SUBCUTANEOUS
Qty: 3 EACH | Refills: 0 | Status: SHIPPED | OUTPATIENT
Start: 2023-07-10 | End: 2023-10-05 | Stop reason: SDUPTHER

## 2023-07-10 NOTE — PROGRESS NOTES
"Routine Office Visit    Africa Jennings  1970  [id number]      Subjective     Africa is a 53 y.o. female who presents today for:    Pre-op evaluation - Patient is scheduled for bariatric surgery. She has had surgery in the past without complications from surgery. She denies any chest pain or shortness of breath. She is able to complete her ADLs. She exercises regularly by walking. She is able to walk without shortness of breath or chest pain   Diabetes - Patient is on semaglutide and metformin. Doing well on current regimen. A1c has decreased   S/p robotic decompression surgery and fusion - she has been doing low impact cardio   Hypertension - bp is well controlled. No side effect from current regimen     Objective     Review of Systems   Constitutional:  Negative for chills and fever.   HENT:  Negative for congestion.    Eyes:  Negative for blurred vision.   Respiratory:  Negative for cough.    Cardiovascular:  Negative for chest pain.   Gastrointestinal:  Negative for abdominal pain, constipation, diarrhea, heartburn, nausea and vomiting.   Genitourinary:  Negative for dysuria.   Musculoskeletal:  Negative for myalgias.   Skin:  Negative for itching and rash.   Neurological:  Negative for dizziness and headaches.   Psychiatric/Behavioral:  Negative for depression.      /68   Pulse 88   Temp 98.4 °F (36.9 °C) (Oral)   Ht 5' 2" (1.575 m)   Wt 101.5 kg (223 lb 12.3 oz)   LMP  (LMP Unknown)   SpO2 97%   BMI 40.93 kg/m²   Physical Exam  Constitutional:       Appearance: She is well-developed.   HENT:      Head: Normocephalic and atraumatic.   Eyes:      Conjunctiva/sclera: Conjunctivae normal.      Pupils: Pupils are equal, round, and reactive to light.   Cardiovascular:      Rate and Rhythm: Normal rate and regular rhythm.      Heart sounds: Normal heart sounds. No murmur heard.    No friction rub. No gallop.   Pulmonary:      Effort: No respiratory distress.      Breath sounds: Normal " breath sounds.   Abdominal:      General: Bowel sounds are normal. There is no distension.      Palpations: Abdomen is soft.      Tenderness: There is no abdominal tenderness.   Musculoskeletal:         General: Normal range of motion.      Cervical back: Normal range of motion and neck supple.   Lymphadenopathy:      Cervical: No cervical adenopathy.   Skin:     General: Skin is warm.   Neurological:      Mental Status: She is alert and oriented to person, place, and time.           Assessment     Health Maintenance         Date Due Completion Date    Influenza Vaccine (1) 09/01/2023 10/11/2022    Pneumococcal Vaccines (Age 0-64) (2 - PCV) 10/11/2023 10/11/2022    Mammogram 12/01/2023 12/1/2022    Hemoglobin A1c 01/05/2024 7/5/2023    Foot Exam 01/09/2024 1/9/2023    Override on 1/9/2023: Done    Override on 10/12/2021: Done    Override on 9/29/2020: Done    Override on 2/13/2020: Done    Eye Exam 01/11/2024 1/11/2023    Diabetes Urine Screening 07/05/2024 7/5/2023    Lipid Panel 07/05/2024 7/5/2023    Low Dose Statin 07/10/2024 7/10/2023    Colorectal Cancer Screening 06/21/2028 6/21/2018    TETANUS VACCINE 05/13/2031 5/13/2021              Problem List Items Addressed This Visit          Cardiac/Vascular    Essential hypertension (Chronic)  The current medical regimen is effective;  continue present plan and medications.   Consider decreasing and adjusting medication with weight loss          Endocrine    Type 2 diabetes mellitus without complication, without long-term current use of insulin (Chronic)  The current medical regimen is effective;  continue present plan and medications.   Consider stopping metformin as Patient progresses with weight loss          GI    Gastroesophageal reflux disease without esophagitis (Chronic)  The current medical regimen is effective;  continue present plan and medications.        Other Visit Diagnoses       Pre-op examination    -  Primary  Patient medically optimized for  surgery   Will recheck bmp   EKG within 6 months - NSR       Diabetes mellitus without complication        Relevant Orders    BASIC METABOLIC PANEL    Hemoglobin A1C    CBC Auto Differential    Comprehensive Metabolic Panel    Lipid Panel    TSH    Hemoglobin A1C    Morbid obesity with BMI of 40.0-44.9, adult      The patient is asked to make an attempt to improve diet and exercise patterns to aid in medical management of this problem.   Scheduled for surgery                     Follow up in about 1 year (around 7/10/2024), or if symptoms worsen or fail to improve, for yearly exam.

## 2023-07-11 ENCOUNTER — TELEPHONE (OUTPATIENT)
Dept: BARIATRICS | Facility: CLINIC | Age: 53
End: 2023-07-11
Payer: COMMERCIAL

## 2023-07-11 ENCOUNTER — OFFICE VISIT (OUTPATIENT)
Dept: BARIATRICS | Facility: CLINIC | Age: 53
End: 2023-07-11
Payer: COMMERCIAL

## 2023-07-11 VITALS
HEIGHT: 62 IN | OXYGEN SATURATION: 97 % | BODY MASS INDEX: 40.85 KG/M2 | HEART RATE: 76 BPM | SYSTOLIC BLOOD PRESSURE: 118 MMHG | WEIGHT: 222 LBS | DIASTOLIC BLOOD PRESSURE: 76 MMHG

## 2023-07-11 DIAGNOSIS — E66.01 SEVERE OBESITY (BMI 35.0-39.9) WITH COMORBIDITY: Primary | ICD-10-CM

## 2023-07-11 PROCEDURE — 3078F DIAST BP <80 MM HG: CPT | Mod: CPTII,S$GLB,, | Performed by: SURGERY

## 2023-07-11 PROCEDURE — 1159F PR MEDICATION LIST DOCUMENTED IN MEDICAL RECORD: ICD-10-PCS | Mod: CPTII,S$GLB,, | Performed by: SURGERY

## 2023-07-11 PROCEDURE — 3044F PR MOST RECENT HEMOGLOBIN A1C LEVEL <7.0%: ICD-10-PCS | Mod: CPTII,S$GLB,, | Performed by: SURGERY

## 2023-07-11 PROCEDURE — 1159F MED LIST DOCD IN RCRD: CPT | Mod: CPTII,S$GLB,, | Performed by: SURGERY

## 2023-07-11 PROCEDURE — 3078F PR MOST RECENT DIASTOLIC BLOOD PRESSURE < 80 MM HG: ICD-10-PCS | Mod: CPTII,S$GLB,, | Performed by: SURGERY

## 2023-07-11 PROCEDURE — 1160F PR REVIEW ALL MEDS BY PRESCRIBER/CLIN PHARMACIST DOCUMENTED: ICD-10-PCS | Mod: CPTII,S$GLB,, | Performed by: SURGERY

## 2023-07-11 PROCEDURE — 3008F BODY MASS INDEX DOCD: CPT | Mod: CPTII,S$GLB,, | Performed by: SURGERY

## 2023-07-11 PROCEDURE — 3066F NEPHROPATHY DOC TX: CPT | Mod: CPTII,S$GLB,, | Performed by: SURGERY

## 2023-07-11 PROCEDURE — 3074F PR MOST RECENT SYSTOLIC BLOOD PRESSURE < 130 MM HG: ICD-10-PCS | Mod: CPTII,S$GLB,, | Performed by: SURGERY

## 2023-07-11 PROCEDURE — 3061F NEG MICROALBUMINURIA REV: CPT | Mod: CPTII,S$GLB,, | Performed by: SURGERY

## 2023-07-11 PROCEDURE — 3061F PR NEG MICROALBUMINURIA RESULT DOCUMENTED/REVIEW: ICD-10-PCS | Mod: CPTII,S$GLB,, | Performed by: SURGERY

## 2023-07-11 PROCEDURE — 99214 OFFICE O/P EST MOD 30 MIN: CPT | Mod: S$GLB,,, | Performed by: SURGERY

## 2023-07-11 PROCEDURE — 99214 PR OFFICE/OUTPT VISIT, EST, LEVL IV, 30-39 MIN: ICD-10-PCS | Mod: S$GLB,,, | Performed by: SURGERY

## 2023-07-11 PROCEDURE — 3066F PR DOCUMENTATION OF TREATMENT FOR NEPHROPATHY: ICD-10-PCS | Mod: CPTII,S$GLB,, | Performed by: SURGERY

## 2023-07-11 PROCEDURE — 3074F SYST BP LT 130 MM HG: CPT | Mod: CPTII,S$GLB,, | Performed by: SURGERY

## 2023-07-11 PROCEDURE — 99999 PR PBB SHADOW E&M-EST. PATIENT-LVL V: CPT | Mod: PBBFAC,,, | Performed by: SURGERY

## 2023-07-11 PROCEDURE — 99999 PR PBB SHADOW E&M-EST. PATIENT-LVL V: ICD-10-PCS | Mod: PBBFAC,,, | Performed by: SURGERY

## 2023-07-11 PROCEDURE — 1160F RVW MEDS BY RX/DR IN RCRD: CPT | Mod: CPTII,S$GLB,, | Performed by: SURGERY

## 2023-07-11 PROCEDURE — 3008F PR BODY MASS INDEX (BMI) DOCUMENTED: ICD-10-PCS | Mod: CPTII,S$GLB,, | Performed by: SURGERY

## 2023-07-11 PROCEDURE — 3044F HG A1C LEVEL LT 7.0%: CPT | Mod: CPTII,S$GLB,, | Performed by: SURGERY

## 2023-07-11 RX ORDER — HEPARIN SODIUM 5000 [USP'U]/ML
5000 INJECTION, SOLUTION INTRAVENOUS; SUBCUTANEOUS ONCE
Status: CANCELLED | OUTPATIENT
Start: 2023-07-11 | End: 2023-07-11

## 2023-07-11 RX ORDER — ENOXAPARIN SODIUM 100 MG/ML
40 INJECTION SUBCUTANEOUS EVERY 12 HOURS
Status: CANCELLED | OUTPATIENT
Start: 2023-07-11

## 2023-07-11 RX ORDER — SODIUM CHLORIDE, SODIUM LACTATE, POTASSIUM CHLORIDE, CALCIUM CHLORIDE 600; 310; 30; 20 MG/100ML; MG/100ML; MG/100ML; MG/100ML
INJECTION, SOLUTION INTRAVENOUS CONTINUOUS
Status: CANCELLED | OUTPATIENT
Start: 2023-07-11

## 2023-07-11 RX ORDER — OXYCODONE HCL 5 MG/5 ML
5 SOLUTION, ORAL ORAL EVERY 8 HOURS PRN
Qty: 150 ML | Refills: 0 | Status: SHIPPED | OUTPATIENT
Start: 2023-07-11 | End: 2023-10-12

## 2023-07-11 RX ORDER — PROCHLORPERAZINE EDISYLATE 5 MG/ML
5 INJECTION INTRAMUSCULAR; INTRAVENOUS EVERY 6 HOURS PRN
Status: CANCELLED | OUTPATIENT
Start: 2023-07-11

## 2023-07-11 RX ORDER — HYDROCODONE BITARTRATE AND ACETAMINOPHEN 7.5; 325 MG/15ML; MG/15ML
15 SOLUTION ORAL EVERY 4 HOURS PRN
Status: CANCELLED | OUTPATIENT
Start: 2023-07-12

## 2023-07-11 RX ORDER — LIDOCAINE HYDROCHLORIDE 10 MG/ML
1 INJECTION, SOLUTION EPIDURAL; INFILTRATION; INTRACAUDAL; PERINEURAL ONCE
Status: CANCELLED | OUTPATIENT
Start: 2023-07-11 | End: 2023-07-11

## 2023-07-11 RX ORDER — MUPIROCIN 20 MG/G
OINTMENT TOPICAL
Status: CANCELLED | OUTPATIENT
Start: 2023-07-11

## 2023-07-11 RX ORDER — ONDANSETRON 2 MG/ML
8 INJECTION INTRAMUSCULAR; INTRAVENOUS EVERY 6 HOURS PRN
Status: CANCELLED | OUTPATIENT
Start: 2023-07-11

## 2023-07-11 RX ORDER — FAMOTIDINE 10 MG/ML
20 INJECTION INTRAVENOUS ONCE
Status: CANCELLED | OUTPATIENT
Start: 2023-07-11 | End: 2023-07-11

## 2023-07-11 RX ORDER — HYDROMORPHONE HYDROCHLORIDE 1 MG/ML
0.5 INJECTION, SOLUTION INTRAMUSCULAR; INTRAVENOUS; SUBCUTANEOUS
Status: CANCELLED | OUTPATIENT
Start: 2023-07-11

## 2023-07-11 RX ORDER — ACETAMINOPHEN 650 MG/20.3ML
500 LIQUID ORAL EVERY 8 HOURS
Status: CANCELLED | OUTPATIENT
Start: 2023-07-11 | End: 2023-07-12

## 2023-07-11 RX ORDER — DEXTROMETHORPHAN/PSEUDOEPHED 2.5-7.5/.8
40 DROPS ORAL 4 TIMES DAILY PRN
Status: CANCELLED | OUTPATIENT
Start: 2023-07-11

## 2023-07-11 RX ORDER — PANTOPRAZOLE SODIUM 40 MG/10ML
40 INJECTION, POWDER, LYOPHILIZED, FOR SOLUTION INTRAVENOUS 2 TIMES DAILY
Status: CANCELLED | OUTPATIENT
Start: 2023-07-11

## 2023-07-11 RX ORDER — OMEPRAZOLE 40 MG/1
40 CAPSULE, DELAYED RELEASE ORAL EVERY MORNING
Qty: 30 CAPSULE | Refills: 2 | Status: SHIPPED | OUTPATIENT
Start: 2023-07-11 | End: 2023-10-12

## 2023-07-11 RX ORDER — GABAPENTIN 250 MG/5ML
300 SOLUTION ORAL 3 TIMES DAILY
Status: CANCELLED | OUTPATIENT
Start: 2023-07-11

## 2023-07-11 RX ORDER — ONDANSETRON 8 MG/1
8 TABLET, ORALLY DISINTEGRATING ORAL EVERY 6 HOURS PRN
Qty: 30 TABLET | Refills: 0 | Status: SHIPPED | OUTPATIENT
Start: 2023-07-11 | End: 2023-10-27 | Stop reason: SDUPTHER

## 2023-07-11 RX ORDER — SODIUM CHLORIDE 9 MG/ML
INJECTION, SOLUTION INTRAVENOUS CONTINUOUS
Status: CANCELLED | OUTPATIENT
Start: 2023-07-11

## 2023-07-11 RX ORDER — ONDANSETRON 8 MG/1
8 TABLET, ORALLY DISINTEGRATING ORAL EVERY 6 HOURS PRN
Qty: 30 TABLET | Refills: 0 | Status: SHIPPED | OUTPATIENT
Start: 2023-07-11 | End: 2023-10-12

## 2023-07-11 RX ORDER — OMEPRAZOLE 40 MG/1
40 CAPSULE, DELAYED RELEASE ORAL EVERY MORNING
Qty: 30 CAPSULE | Refills: 2 | Status: SHIPPED | OUTPATIENT
Start: 2023-07-11 | End: 2024-01-03

## 2023-07-11 NOTE — PROGRESS NOTES
History & Physical    SUBJECTIVE:     History of Present Illness:  Africa Jennings is a 53 y.o. female who presents for pre-operative exam for weight loss surgery.  she has completed her pre-operative evaluation.  she has failed medical treatment for obesity.  1. Morbid obesity, body mass index is 42.41 kg/m². and inability to lose weight.  First worked up in 2014 but had to delay surgery.  2. Co-morbidities: diabetes mellitus 2 not on insulin and no complications, YSABEL on CPAP and GERD on ppi without any symptoms in years, essential HTN  3. From 6/6/23, height 62inches, weight 220lb and bmi 40.2.  She has not been able to lose significant weight despite diet and exercise and has made no significant progress.  4. Prior nausea and vomiting 2014 resolved    Chief Complaint   Patient presents with    Pre-op Exam       Review of patient's allergies indicates:  No Known Allergies    Current Outpatient Medications   Medication Sig    acetaminophen (TYLENOL) 500 MG tablet Take 2 tablets (1,000 mg total) by mouth every 8 (eight) hours as needed for Pain.    amLODIPine (NORVASC) 10 MG tablet Take 1 tablet (10 mg total) by mouth once daily.    atorvastatin (LIPITOR) 20 MG tablet TAKE 1 TABLET BY MOUTH EVERY DAY    blood sugar diagnostic Strp 1 strip by Misc.(Non-Drug; Combo Route) route 2 (two) times daily with meals.    cetirizine (ZYRTEC) 10 MG tablet Take 1 tablet (10 mg total) by mouth once daily.    doxycycline monohydrate 100 mg Tab Take 1 tablet by mouth 2 (two) times daily.    ergocalciferol (ERGOCALCIFEROL) 50,000 unit Cap TAKE 2 CAPSULE (50,000 UNITS TOTAL)BY MOUTH TWICE A WEEK    estradioL (ESTRACE) 1 MG tablet TAKE 1 TABLET BY MOUTH EVERY DAY    finasteride (PROSCAR) 5 mg tablet Take 1 tablet (5 mg total) by mouth once daily.    fluocinonide (LIDEX) 0.05 % ointment Apply to affected areas of scalp qhs    ketoconazole (NIZORAL) 2 % cream Apply to affected areas of scalp BID.    ketoconazole (NIZORAL) 2  "% shampoo Wash hair with medicated shampoo at least 2x/week - let sit on scalp at least 5 minutes prior to rinsing    lancets Misc Use to test blood sugar once daily.    meloxicam (MOBIC) 15 MG tablet TAKE 1 TABLET BY MOUTH EVERY DAY    metFORMIN (GLUCOPHAGE) 500 MG tablet Take 1 tablet (500 mg total) by mouth 2 (two) times daily with meals.    mometasone (ELOCON) 0.1 % solution Apply topically once daily. To thinning area of scalp    neomycin-polymyxin-dexamethasone (MAXITROL) 3.5mg/mL-10,000 unit/mL-0.1 % DrpS as needed.    nystatin (MYCOSTATIN) ointment Apply topically 2 (two) times daily. USES PRN    omeprazole (PRILOSEC) 40 MG capsule TAKE 1 CAPSULE BY MOUTH EVERY DAY    ondansetron (ZOFRAN) 4 MG tablet TAKE 1 TABLET BY MOUTH EVERY 8 HOURS AS NEEDED    oxyCODONE (ROXICODONE) 5 MG immediate release tablet Take 1 tablet (5 mg total) by mouth every 8 (eight) hours as needed for Pain.    pen needle, diabetic (PEN NEEDLE) 32 gauge x 5/32" Ndle 1 each by Misc.(Non-Drug; Combo Route) route once a week.    prednisoLONE acetate (PRED FORTE) 1 % DrpS USES PRN    pregabalin (LYRICA) 75 MG capsule Take 1 capsule (75 mg total) by mouth 2 (two) times daily.    semaglutide (OZEMPIC) 1 mg/dose (4 mg/3 mL) Inject 1 mg into the skin every 7 days.    spironolactone (ALDACTONE) 50 MG tablet Take 1 tablet (50 mg total) by mouth once daily.    tiZANidine (ZANAFLEX) 4 MG tablet Take 4 mg by mouth nightly as needed.    triamcinolone acetonide 0.1% (KENALOG) 0.1 % cream Apply topically 2 (two) times daily. Apply topically 2 (two) times daily.    valsartan-hydrochlorothiazide (DIOVAN-HCT) 160-12.5 mg per tablet Take 1 tablet by mouth once daily.    blood-glucose meter kit Use as instructed    lancing device Misc 1 Device by Misc.(Non-Drug; Combo Route) route 2 (two) times daily with meals.    omeprazole (PRILOSEC) 40 MG capsule Take 1 capsule (40 mg total) by mouth every morning. Open capsule and take with apple sauce    omeprazole " (PRILOSEC) 40 MG capsule Take 1 capsule (40 mg total) by mouth every morning. Open capsule and take with apple sauce    ondansetron (ZOFRAN-ODT) 8 MG TbDL Take 1 tablet (8 mg total) by mouth every 6 (six) hours as needed.    ondansetron (ZOFRAN-ODT) 8 MG TbDL Take 1 tablet (8 mg total) by mouth every 6 (six) hours as needed.    oxyCODONE (ROXICODONE) 5 mg/5 mL Soln Take 5 mLs (5 mg total) by mouth every 8 (eight) hours as needed.    oxyCODONE (ROXICODONE) 5 mg/5 mL Soln Take 5 mLs (5 mg total) by mouth every 8 (eight) hours as needed.     Current Facility-Administered Medications   Medication    triamcinolone acetonide injection 10 mg    triamcinolone acetonide injection 10 mg       Past Medical History:   Diagnosis Date    Diabetes mellitus     Diabetes mellitus, type 2     Eczema     GERD (gastroesophageal reflux disease)     Hypertension     Impaired fasting blood sugar     YSABEL on CPAP        Past Surgical History:   Procedure Laterality Date    breast reduction      CHOLECYSTECTOMY      laprascopic    DECOMPRESSION OF THORACIC OUTLET N/A 10/28/2022    Procedure: T9-12 ROBOTIC DECOMPRESSION, THORACIC FUSION;  Surgeon: Edouard Whitt DO;  Location: [facility] OR [address] [place] [address];  Service: Neurosurgery;  Laterality: N/A;  ANESTHESIA GENERAL [place] I BLOOD TYPE & SCREEN NERVE MONITORING EMG SEP MEP POSTION PRONE BED CARA 4 POST HEAD REST [facility] RADIOLOGY C-ARM GLOBUS PROTOCOL MISCELLANEOUS ALANIZ BRACE TLSO    EPIDURAL STEROID INJECTION N/A 1/11/2022    Procedure: INJECTION, STEROID, EPIDURAL IL L4/5 NEEDS CONSENT;  Surgeon: Britt Calix MD;  Location: [facility] PAIN MGT;  Service: Pain Management;  Laterality: N/A;    ESOPHAGOGASTRODUODENOSCOPY  8/18/14    HYSTERECTOMY  2007    laprascopic, total for fibroids.  Dr Polo    INJECTION OF JOINT Left 2/8/2022    Procedure: INJECTION, JOINT, SI LEFT NEEDS CONSENT;  Surgeon: Britt Calix MD;  Location: [facility] PAIN MGT;  Service: Pain Management;   "Laterality: Left;    OOPHORECTOMY      TOTAL REDUCTION MAMMOPLASTY         ROS       OBJECTIVE:     Vitals:    07/11/23 1620   BP: 118/76   Pulse: 76   SpO2: 97%   Weight: 100.7 kg (222 lb 0.1 oz)   Height: 5' 2" (1.575 m)       Physical Exam  Vitals reviewed.   Constitutional:       Appearance: Normal appearance.   Abdominal:      Palpations: Abdomen is soft.      Hernia: No hernia is present.   Skin:     General: Skin is warm and dry.   Neurological:      General: No focal deficit present.      Mental Status: She is alert and oriented to person, place, and time.   Psychiatric:         Mood and Affect: Mood normal.         Behavior: Behavior normal.         Thought Content: Thought content normal.         Judgment: Judgment normal.          Diagnostic Results:  Cbc, cmp, a1c, lipids reviewed, anemia (Dr. Thompson taking care of this and went down due to surgery and improving on last cbc), type 2 dm  Thoracic ct reviewed, films viewed, doubt hh  Egd 2014 reviewed, pylorus dilated  Ges 2014 reviewed, normal    Dietitian: Patient has participated in pre-operative nutritional program with understanding of necessary lifelong dietary changes required with surgery.    Psych: No overt contraindications to bariatric surgery, patient has completed psychological evaluation and is able to give informed consent.    Clearance: pcp    ASSESSMENT/PLAN:     Morbid obesity with failure of medical conservative therapy.    Co Morbid Conditions:   Patient Active Problem List   Diagnosis    Essential hypertension    Type 2 diabetes mellitus without complication, without long-term current use of insulin    Obstructive sleep apnea (adult) (pediatric)    Severe obesity (BMI 35.0-39.9) with comorbidity    Gastroesophageal reflux disease without esophagitis    Pyloric stenosis    Encounter for screening colonoscopy    Lumbar radiculopathy    Lumbar spondylosis    DDD (degenerative disc disease), lumbar    Pneumonia of both lungs due to " infectious organism    Acute respiratory failure with hypoxemia    Thoracic myelopathy       Patient wishes to undergo sleeve gastrectomy robotic with  likely hh repair.      The patient was informed that risks include, but are not limited to: death, leak, obstruction, bleeding, and sepsis. Any of these could require further surgery. Other risks include DVT, PE, pneumonia, wound dehiscence, hernia, wound infection, the need for dilatations and the inability to lose appropriate weight and keep it off.     We discussed that our goal is to ameliorate her medical problems and not to obtain a specific body mass index. she understands the risks and benefits and wishes to proceed with the procedure.  she has signed a consent form.           1. Morbid obesity, body mass index is 42.41 kg/m². and inability to lose weight.  First worked up in 2014 but had to delay surgery.  2. Co-morbidities: diabetes mellitus 2 not on insulin and no complications, YSABEL on CPAP and GERD on ppi without any symptoms in years, essential HTN  3. From 6/6/23, height 62inches, weight 220lb and bmi 40.2.  She has not been able to lose significant weight despite diet and exercise and has made no significant progress.  4. Prior nausea and vomiting 2014 resolved    PE abdomen soft    Cbc, cmp, a1c, lipids reviewed, anemia (Dr. Thompson taking care of this and went down due to surgery and improving on last cbc), type 2 dm  Thoracic ct reviewed, films viewed, doubt hh  Egd 2014 reviewed, pylorus dilated  Ges 2014 reviewed, normal    For sleeve gastrectomy with likely hh repair.

## 2023-07-11 NOTE — TELEPHONE ENCOUNTER
Called and spoke with the pt.  She never received her packet.  Will provide a new packet for her preop appt today.

## 2023-07-11 NOTE — H&P (VIEW-ONLY)
History & Physical    SUBJECTIVE:     History of Present Illness:  Africa Jennings is a 53 y.o. female who presents for pre-operative exam for weight loss surgery.  she has completed her pre-operative evaluation.  she has failed medical treatment for obesity.  1. Morbid obesity, body mass index is 42.41 kg/m². and inability to lose weight.  First worked up in 2014 but had to delay surgery.  2. Co-morbidities: diabetes mellitus 2 not on insulin and no complications, YSABEL on CPAP and GERD on ppi without any symptoms in years, essential HTN  3. From 6/6/23, height 62inches, weight 220lb and bmi 40.2.  She has not been able to lose significant weight despite diet and exercise and has made no significant progress.  4. Prior nausea and vomiting 2014 resolved    Chief Complaint   Patient presents with    Pre-op Exam       Review of patient's allergies indicates:  No Known Allergies    Current Outpatient Medications   Medication Sig    acetaminophen (TYLENOL) 500 MG tablet Take 2 tablets (1,000 mg total) by mouth every 8 (eight) hours as needed for Pain.    amLODIPine (NORVASC) 10 MG tablet Take 1 tablet (10 mg total) by mouth once daily.    atorvastatin (LIPITOR) 20 MG tablet TAKE 1 TABLET BY MOUTH EVERY DAY    blood sugar diagnostic Strp 1 strip by Misc.(Non-Drug; Combo Route) route 2 (two) times daily with meals.    cetirizine (ZYRTEC) 10 MG tablet Take 1 tablet (10 mg total) by mouth once daily.    doxycycline monohydrate 100 mg Tab Take 1 tablet by mouth 2 (two) times daily.    ergocalciferol (ERGOCALCIFEROL) 50,000 unit Cap TAKE 2 CAPSULE (50,000 UNITS TOTAL)BY MOUTH TWICE A WEEK    estradioL (ESTRACE) 1 MG tablet TAKE 1 TABLET BY MOUTH EVERY DAY    finasteride (PROSCAR) 5 mg tablet Take 1 tablet (5 mg total) by mouth once daily.    fluocinonide (LIDEX) 0.05 % ointment Apply to affected areas of scalp qhs    ketoconazole (NIZORAL) 2 % cream Apply to affected areas of scalp BID.    ketoconazole (NIZORAL) 2  "% shampoo Wash hair with medicated shampoo at least 2x/week - let sit on scalp at least 5 minutes prior to rinsing    lancets Misc Use to test blood sugar once daily.    meloxicam (MOBIC) 15 MG tablet TAKE 1 TABLET BY MOUTH EVERY DAY    metFORMIN (GLUCOPHAGE) 500 MG tablet Take 1 tablet (500 mg total) by mouth 2 (two) times daily with meals.    mometasone (ELOCON) 0.1 % solution Apply topically once daily. To thinning area of scalp    neomycin-polymyxin-dexamethasone (MAXITROL) 3.5mg/mL-10,000 unit/mL-0.1 % DrpS as needed.    nystatin (MYCOSTATIN) ointment Apply topically 2 (two) times daily. USES PRN    omeprazole (PRILOSEC) 40 MG capsule TAKE 1 CAPSULE BY MOUTH EVERY DAY    ondansetron (ZOFRAN) 4 MG tablet TAKE 1 TABLET BY MOUTH EVERY 8 HOURS AS NEEDED    oxyCODONE (ROXICODONE) 5 MG immediate release tablet Take 1 tablet (5 mg total) by mouth every 8 (eight) hours as needed for Pain.    pen needle, diabetic (PEN NEEDLE) 32 gauge x 5/32" Ndle 1 each by Misc.(Non-Drug; Combo Route) route once a week.    prednisoLONE acetate (PRED FORTE) 1 % DrpS USES PRN    pregabalin (LYRICA) 75 MG capsule Take 1 capsule (75 mg total) by mouth 2 (two) times daily.    semaglutide (OZEMPIC) 1 mg/dose (4 mg/3 mL) Inject 1 mg into the skin every 7 days.    spironolactone (ALDACTONE) 50 MG tablet Take 1 tablet (50 mg total) by mouth once daily.    tiZANidine (ZANAFLEX) 4 MG tablet Take 4 mg by mouth nightly as needed.    triamcinolone acetonide 0.1% (KENALOG) 0.1 % cream Apply topically 2 (two) times daily. Apply topically 2 (two) times daily.    valsartan-hydrochlorothiazide (DIOVAN-HCT) 160-12.5 mg per tablet Take 1 tablet by mouth once daily.    blood-glucose meter kit Use as instructed    lancing device Misc 1 Device by Misc.(Non-Drug; Combo Route) route 2 (two) times daily with meals.    omeprazole (PRILOSEC) 40 MG capsule Take 1 capsule (40 mg total) by mouth every morning. Open capsule and take with apple sauce    omeprazole " (PRILOSEC) 40 MG capsule Take 1 capsule (40 mg total) by mouth every morning. Open capsule and take with apple sauce    ondansetron (ZOFRAN-ODT) 8 MG TbDL Take 1 tablet (8 mg total) by mouth every 6 (six) hours as needed.    ondansetron (ZOFRAN-ODT) 8 MG TbDL Take 1 tablet (8 mg total) by mouth every 6 (six) hours as needed.    oxyCODONE (ROXICODONE) 5 mg/5 mL Soln Take 5 mLs (5 mg total) by mouth every 8 (eight) hours as needed.    oxyCODONE (ROXICODONE) 5 mg/5 mL Soln Take 5 mLs (5 mg total) by mouth every 8 (eight) hours as needed.     Current Facility-Administered Medications   Medication    triamcinolone acetonide injection 10 mg    triamcinolone acetonide injection 10 mg       Past Medical History:   Diagnosis Date    Diabetes mellitus     Diabetes mellitus, type 2     Eczema     GERD (gastroesophageal reflux disease)     Hypertension     Impaired fasting blood sugar     YSABEL on CPAP        Past Surgical History:   Procedure Laterality Date    breast reduction      CHOLECYSTECTOMY      laprascopic    DECOMPRESSION OF THORACIC OUTLET N/A 10/28/2022    Procedure: T9-12 ROBOTIC DECOMPRESSION, THORACIC FUSION;  Surgeon: Edouard Whitt DO;  Location: [facility] OR [address] [place] [address];  Service: Neurosurgery;  Laterality: N/A;  ANESTHESIA GENERAL [place] I BLOOD TYPE & SCREEN NERVE MONITORING EMG SEP MEP POSTION PRONE BED CARA 4 POST HEAD REST [facility] RADIOLOGY C-ARM GLOBUS PROTOCOL MISCELLANEOUS ALANIZ BRACE TLSO    EPIDURAL STEROID INJECTION N/A 1/11/2022    Procedure: INJECTION, STEROID, EPIDURAL IL L4/5 NEEDS CONSENT;  Surgeon: Britt Calix MD;  Location: [facility] PAIN MGT;  Service: Pain Management;  Laterality: N/A;    ESOPHAGOGASTRODUODENOSCOPY  8/18/14    HYSTERECTOMY  2007    laprascopic, total for fibroids.  Dr Polo    INJECTION OF JOINT Left 2/8/2022    Procedure: INJECTION, JOINT, SI LEFT NEEDS CONSENT;  Surgeon: Britt Calix MD;  Location: [facility] PAIN MGT;  Service: Pain Management;   "Laterality: Left;    OOPHORECTOMY      TOTAL REDUCTION MAMMOPLASTY         ROS       OBJECTIVE:     Vitals:    07/11/23 1620   BP: 118/76   Pulse: 76   SpO2: 97%   Weight: 100.7 kg (222 lb 0.1 oz)   Height: 5' 2" (1.575 m)       Physical Exam  Vitals reviewed.   Constitutional:       Appearance: Normal appearance.   Abdominal:      Palpations: Abdomen is soft.      Hernia: No hernia is present.   Skin:     General: Skin is warm and dry.   Neurological:      General: No focal deficit present.      Mental Status: She is alert and oriented to person, place, and time.   Psychiatric:         Mood and Affect: Mood normal.         Behavior: Behavior normal.         Thought Content: Thought content normal.         Judgment: Judgment normal.          Diagnostic Results:  Cbc, cmp, a1c, lipids reviewed, anemia (Dr. Thompson taking care of this and went down due to surgery and improving on last cbc), type 2 dm  Thoracic ct reviewed, films viewed, doubt hh  Egd 2014 reviewed, pylorus dilated  Ges 2014 reviewed, normal    Dietitian: Patient has participated in pre-operative nutritional program with understanding of necessary lifelong dietary changes required with surgery.    Psych: No overt contraindications to bariatric surgery, patient has completed psychological evaluation and is able to give informed consent.    Clearance: pcp    ASSESSMENT/PLAN:     Morbid obesity with failure of medical conservative therapy.    Co Morbid Conditions:   Patient Active Problem List   Diagnosis    Essential hypertension    Type 2 diabetes mellitus without complication, without long-term current use of insulin    Obstructive sleep apnea (adult) (pediatric)    Severe obesity (BMI 35.0-39.9) with comorbidity    Gastroesophageal reflux disease without esophagitis    Pyloric stenosis    Encounter for screening colonoscopy    Lumbar radiculopathy    Lumbar spondylosis    DDD (degenerative disc disease), lumbar    Pneumonia of both lungs due to " infectious organism    Acute respiratory failure with hypoxemia    Thoracic myelopathy       Patient wishes to undergo sleeve gastrectomy robotic with  likely hh repair.      The patient was informed that risks include, but are not limited to: death, leak, obstruction, bleeding, and sepsis. Any of these could require further surgery. Other risks include DVT, PE, pneumonia, wound dehiscence, hernia, wound infection, the need for dilatations and the inability to lose appropriate weight and keep it off.     We discussed that our goal is to ameliorate her medical problems and not to obtain a specific body mass index. she understands the risks and benefits and wishes to proceed with the procedure.  she has signed a consent form.           1. Morbid obesity, body mass index is 42.41 kg/m². and inability to lose weight.  First worked up in 2014 but had to delay surgery.  2. Co-morbidities: diabetes mellitus 2 not on insulin and no complications, YSABEL on CPAP and GERD on ppi without any symptoms in years, essential HTN  3. From 6/6/23, height 62inches, weight 220lb and bmi 40.2.  She has not been able to lose significant weight despite diet and exercise and has made no significant progress.  4. Prior nausea and vomiting 2014 resolved    PE abdomen soft    Cbc, cmp, a1c, lipids reviewed, anemia (Dr. Thompson taking care of this and went down due to surgery and improving on last cbc), type 2 dm  Thoracic ct reviewed, films viewed, doubt hh  Egd 2014 reviewed, pylorus dilated  Ges 2014 reviewed, normal    For sleeve gastrectomy with likely hh repair.

## 2023-07-11 NOTE — TELEPHONE ENCOUNTER
----- Message from Renata Kirkland sent at 7/11/2023  3:32 PM CDT -----  Regarding: returning call  Contact: pt [phone]  Missed Callback     Pt returning callback from missed call. Requesting to speak with Selena in 's   office. Please call at  [phone]     Caller:Ms bell   Reason for call:Missed call  Reason appt not scheduled she is schedule today

## 2023-07-11 NOTE — TELEPHONE ENCOUNTER
Called and LVM for RC.  Called to discuss teaching over the phone.  Pt has an appt today with Dr. Rich.     Followed up with [facility] portal message.

## 2023-07-12 ENCOUNTER — TELEPHONE (OUTPATIENT)
Dept: BARIATRICS | Facility: CLINIC | Age: 53
End: 2023-07-12
Payer: COMMERCIAL

## 2023-07-12 ENCOUNTER — PATIENT MESSAGE (OUTPATIENT)
Dept: DERMATOLOGY | Facility: CLINIC | Age: 53
End: 2023-07-12
Payer: COMMERCIAL

## 2023-07-17 ENCOUNTER — LAB VISIT (OUTPATIENT)
Dept: LAB | Facility: HOSPITAL | Age: 53
End: 2023-07-17
Attending: FAMILY MEDICINE
Payer: COMMERCIAL

## 2023-07-17 DIAGNOSIS — E11.9 DIABETES MELLITUS WITHOUT COMPLICATION: ICD-10-CM

## 2023-07-17 LAB
ANION GAP SERPL CALC-SCNC: 11 MMOL/L (ref 8–16)
BUN SERPL-MCNC: 12 MG/DL (ref 6–20)
CALCIUM SERPL-MCNC: 9.1 MG/DL (ref 8.7–10.5)
CHLORIDE SERPL-SCNC: 104 MMOL/L (ref 95–110)
CO2 SERPL-SCNC: 26 MMOL/L (ref 23–29)
CREAT SERPL-MCNC: 0.7 MG/DL (ref 0.5–1.4)
EST. GFR  (NO RACE VARIABLE): >60 ML/MIN/1.73 M^2
GLUCOSE SERPL-MCNC: 102 MG/DL (ref 70–110)
POTASSIUM SERPL-SCNC: 3.7 MMOL/L (ref 3.5–5.1)
SODIUM SERPL-SCNC: 141 MMOL/L (ref 136–145)

## 2023-07-17 PROCEDURE — 80048 BASIC METABOLIC PNL TOTAL CA: CPT | Performed by: FAMILY MEDICINE

## 2023-07-17 PROCEDURE — 36415 COLL VENOUS BLD VENIPUNCTURE: CPT | Mod: PO | Performed by: FAMILY MEDICINE

## 2023-07-19 ENCOUNTER — CLINICAL SUPPORT (OUTPATIENT)
Dept: BARIATRICS | Facility: CLINIC | Age: 53
End: 2023-07-19
Payer: COMMERCIAL

## 2023-07-19 DIAGNOSIS — I10 ESSENTIAL HYPERTENSION: ICD-10-CM

## 2023-07-19 DIAGNOSIS — G47.33 OSA ON CPAP: ICD-10-CM

## 2023-07-19 DIAGNOSIS — E11.9 TYPE 2 DIABETES MELLITUS WITHOUT COMPLICATION, WITHOUT LONG-TERM CURRENT USE OF INSULIN: Primary | ICD-10-CM

## 2023-07-19 DIAGNOSIS — E66.01 MORBID OBESITY: ICD-10-CM

## 2023-07-19 DIAGNOSIS — Z71.3 DIETARY COUNSELING AND SURVEILLANCE: ICD-10-CM

## 2023-07-19 PROCEDURE — 99499 UNLISTED E&M SERVICE: CPT | Mod: 95,,, | Performed by: DIETITIAN, REGISTERED

## 2023-07-19 PROCEDURE — 99499 NO LOS: ICD-10-PCS | Mod: 95,,, | Performed by: DIETITIAN, REGISTERED

## 2023-07-19 NOTE — PROGRESS NOTES
Established Patient - Audio Only Telehealth Visit     The patient location is: [facility]  The chief complaint leading to consultation is: preop liquid diet reminder  Visit type: Virtual visit with audio only (telephone)  Total time spent with patient: 15 min       The reason for the audio only service rather than synchronous audio and video virtual visit was related to technical difficulties or patient preference/necessity.     Each patient to whom I provide medical services by telemedicine is:  (1) informed of the relationship between the physician and patient and the respective role of any other health care provider with respect to management of the patient; and (2) notified that they may decline to receive medical services by telemedicine and may withdraw from such care at any time. Patient verbally consented to receive this service via voice-only telephone call.       NUTRITION NOTE    Bariatric Surgeon: Roge Rich M.D.  Reason for MNT Referral: Pre-op liquid diet and nutrition instructions  Sleeve   Date of Surgery 7/26/2023    Current Weight:     Pre-op liquid diet  Pt using unjury with water or premier  for preop liquid diet    Discussion:  -  gms of protein per day  - 600-800 calories per day   - Less than 4gm sugar per shake  - SF, Decaf, non-carbonated Fluids  - No Fruits, juices, yogurt or pudding on liquid diet  - No vitamins for 1 week prior to surgery  - No herbal supplements including green tea and fish oils for 2 weeks prior to surgery    Remind pt per nursing and medical team to inform our department if taking antibiotics within the 30 days post bariatric surgery or it any other surgeries/procedures are scheduled within 30 days after bariatric surgery.    2 week post-op instructions  Pt will use unjury  protein shakes for first three days after surgery.  If using protein powder, reminded pt of mixing with water for days 1 and 2, by day 3 may try using skim, 1% milk or unsweetened  almond/soy milk.  By Day 4, may use RTD protein shakes as tolerated  Pt has the following vitamins and minerals to start taking once discharged from hospital  Multivitamin with 18 mg iron take one tablet or chewable twice a day  B-complex with 50 mg thiamin included in multivitamin  Calcium citrate 500 mg with vitamin D three times per day  Vitamin B-12 500 mcg  Sublingually daily  Reviewed dosage and timing of vitamin/mineral guidelines.  Reviewed nutritional guidelines for protein and fluid requirements for week 1 and week 2 post-surgery.  Handout provided to log protein and fluid daily.  1 ounce medicine cups provided for patient to measure fluid intake after surgery.  Reviewed common nutritional concerns and prevention tips after bariatric surgery.  Reminded not to lift anything greater than 10 lbs for 6 week post-surgery  Pt verbalized understanding of information provided with appropriate questions and comments.         SESSION TIME: 15 minutes                         This service was not originating from a related E/M service provided within the previous 7 days nor will  to an E/M service or procedure within the next 24 hours or my soonest available appointment.  Prevailing standard of care was able to be met in this audio-only visit.

## 2023-07-21 DIAGNOSIS — E55.9 VITAMIN D DEFICIENCY: ICD-10-CM

## 2023-07-24 RX ORDER — ERGOCALCIFEROL 1.25 MG/1
CAPSULE ORAL
Qty: 48 CAPSULE | Refills: 1 | Status: SHIPPED | OUTPATIENT
Start: 2023-07-24

## 2023-07-24 NOTE — PRE-PROCEDURE INSTRUCTIONS
PreOp Instructions given:   - Verbal medication information (what to hold and what to take)   - NPO guidelines given per Bariatric Sx DEPT  - Arrival place directions given; time to be given the day before procedure by the   Surgeon's Office DOSC  - Bathing with antibacterial soap   - Don't wear any jewelry or bring any valuables AM of surgery   - No makeup or moisturizer to face   - No perfume/cologne, powder, lotions or aftershave   Pt. verbalized understanding.   Pt denies any h/o Anesthesia/Sedation complications or side effects.  Patient does not know arrival time.  Explained that this information comes from the surgeon's office and if they haven't heard from them by 2 or 3 pm to call the office.  Patient stated an understanding.

## 2023-07-25 ENCOUNTER — PATIENT MESSAGE (OUTPATIENT)
Dept: DERMATOLOGY | Facility: CLINIC | Age: 53
End: 2023-07-25
Payer: COMMERCIAL

## 2023-07-25 ENCOUNTER — ANESTHESIA EVENT (OUTPATIENT)
Dept: SURGERY | Facility: HOSPITAL | Age: 53
DRG: 621 | End: 2023-07-25
Payer: COMMERCIAL

## 2023-07-25 ENCOUNTER — TELEPHONE (OUTPATIENT)
Dept: BARIATRICS | Facility: CLINIC | Age: 53
End: 2023-07-25
Payer: COMMERCIAL

## 2023-07-25 NOTE — TELEPHONE ENCOUNTER
Notified patient of arrival time to the [facility] at 0930 with expected surgery start time 1130 on 7/26/2023. Instructed patient regarding pre-op instructions including no protein shakes or sugar free clear liquids after midnight but can have a rare sip of water for comfort, showering and preop medications to hold/take per anesthesia/preop. Reminded pt to drink pre-surgery beverage or 8 oz water at 0900. Instructed patient on the s/s of dehydration and for patient to call at the first sign of dehydration. Informed patient that someone from bariatrics will call them 1 week post op to review diet/fluid intake and to ensure adequate hydration. Reminded patient not to take antibiotics for 30 days following surgery or schedule elective procedures involving anesthesia/sedation for 30 days following surgery unless checking with the bariatric clinic first. Pt verbalized understanding. Pt given office phone number for any additional questions/concerns.    No

## 2023-07-26 ENCOUNTER — HOSPITAL ENCOUNTER (INPATIENT)
Facility: HOSPITAL | Age: 53
LOS: 1 days | Discharge: HOME | DRG: 621 | End: 2023-07-27
Attending: SURGERY | Admitting: SURGERY
Payer: COMMERCIAL

## 2023-07-26 ENCOUNTER — ANESTHESIA (OUTPATIENT)
Dept: SURGERY | Facility: HOSPITAL | Age: 53
DRG: 621 | End: 2023-07-26
Payer: COMMERCIAL

## 2023-07-26 DIAGNOSIS — E66.01 SEVERE OBESITY (BMI 35.0-39.9) WITH COMORBIDITY: Primary | ICD-10-CM

## 2023-07-26 PROBLEM — E66.9 OBESITY: Status: ACTIVE | Noted: 2023-07-26

## 2023-07-26 LAB
POCT GLUCOSE: 107 MG/DL (ref 70–110)
POCT GLUCOSE: 184 MG/DL (ref 70–110)
POCT GLUCOSE: 199 MG/DL (ref 70–110)

## 2023-07-26 PROCEDURE — 71000016 HC POSTOP RECOV ADDL HR: Performed by: SURGERY

## 2023-07-26 PROCEDURE — 63600175 PHARM REV CODE 636 W HCPCS: Performed by: NURSE ANESTHETIST, CERTIFIED REGISTERED

## 2023-07-26 PROCEDURE — D9220A PRA ANESTHESIA: ICD-10-PCS | Mod: CRNA,,, | Performed by: NURSE ANESTHETIST, CERTIFIED REGISTERED

## 2023-07-26 PROCEDURE — 71000039 HC RECOVERY, EACH ADD'L HOUR: Performed by: SURGERY

## 2023-07-26 PROCEDURE — 99900035 HC TECH TIME PER 15 MIN (STAT)

## 2023-07-26 PROCEDURE — 63600175 PHARM REV CODE 636 W HCPCS: Performed by: STUDENT IN AN ORGANIZED HEALTH CARE EDUCATION/TRAINING PROGRAM

## 2023-07-26 PROCEDURE — 88307 PR  SURG PATH,LEVEL V: ICD-10-PCS | Mod: 26,,, | Performed by: PATHOLOGY

## 2023-07-26 PROCEDURE — 43775 PR LAP, GAST RESTRICT PROC, LONGITUDINAL GASTRECTOMY: ICD-10-PCS | Mod: ,,, | Performed by: SURGERY

## 2023-07-26 PROCEDURE — 27201423 OPTIME MED/SURG SUP & DEVICES STERILE SUPPLY: Performed by: SURGERY

## 2023-07-26 PROCEDURE — 43775 LAP SLEEVE GASTRECTOMY: CPT | Mod: ,,, | Performed by: SURGERY

## 2023-07-26 PROCEDURE — 36000712 HC OR TIME LEV V 1ST 15 MIN: Performed by: SURGERY

## 2023-07-26 PROCEDURE — 82962 GLUCOSE BLOOD TEST: CPT | Performed by: SURGERY

## 2023-07-26 PROCEDURE — C9113 INJ PANTOPRAZOLE SODIUM, VIA: HCPCS | Performed by: SURGERY

## 2023-07-26 PROCEDURE — 94761 N-INVAS EAR/PLS OXIMETRY MLT: CPT

## 2023-07-26 PROCEDURE — D9220A PRA ANESTHESIA: Mod: CRNA,,, | Performed by: NURSE ANESTHETIST, CERTIFIED REGISTERED

## 2023-07-26 PROCEDURE — 25000003 PHARM REV CODE 250: Performed by: NURSE ANESTHETIST, CERTIFIED REGISTERED

## 2023-07-26 PROCEDURE — 25000003 PHARM REV CODE 250: Performed by: SURGERY

## 2023-07-26 PROCEDURE — 63600175 PHARM REV CODE 636 W HCPCS: Performed by: SURGERY

## 2023-07-26 PROCEDURE — 88307 TISSUE EXAM BY PATHOLOGIST: CPT | Mod: 26,,, | Performed by: PATHOLOGY

## 2023-07-26 PROCEDURE — 88307 TISSUE EXAM BY PATHOLOGIST: CPT | Performed by: PATHOLOGY

## 2023-07-26 PROCEDURE — 11000001 HC ACUTE MED/SURG PRIVATE ROOM

## 2023-07-26 PROCEDURE — D9220A PRA ANESTHESIA: ICD-10-PCS | Mod: ANES,,, | Performed by: ANESTHESIOLOGY

## 2023-07-26 PROCEDURE — 71000015 HC POSTOP RECOV 1ST HR: Performed by: SURGERY

## 2023-07-26 PROCEDURE — 71000033 HC RECOVERY, INTIAL HOUR: Performed by: SURGERY

## 2023-07-26 PROCEDURE — 36000713 HC OR TIME LEV V EA ADD 15 MIN: Performed by: SURGERY

## 2023-07-26 PROCEDURE — D9220A PRA ANESTHESIA: Mod: ANES,,, | Performed by: ANESTHESIOLOGY

## 2023-07-26 PROCEDURE — 37000008 HC ANESTHESIA 1ST 15 MINUTES: Performed by: SURGERY

## 2023-07-26 PROCEDURE — 27000221 HC OXYGEN, UP TO 24 HOURS

## 2023-07-26 PROCEDURE — 37000009 HC ANESTHESIA EA ADD 15 MINS: Performed by: SURGERY

## 2023-07-26 RX ORDER — SUCCINYLCHOLINE CHLORIDE 20 MG/ML
INJECTION INTRAMUSCULAR; INTRAVENOUS
Status: DISCONTINUED | OUTPATIENT
Start: 2023-07-26 | End: 2023-07-26

## 2023-07-26 RX ORDER — LIDOCAINE HYDROCHLORIDE 20 MG/ML
INJECTION INTRAVENOUS
Status: DISCONTINUED | OUTPATIENT
Start: 2023-07-26 | End: 2023-07-26

## 2023-07-26 RX ORDER — DEXTROMETHORPHAN/PSEUDOEPHED 2.5-7.5/.8
40 DROPS ORAL 4 TIMES DAILY PRN
Status: DISCONTINUED | OUTPATIENT
Start: 2023-07-26 | End: 2023-07-27 | Stop reason: HOSPADM

## 2023-07-26 RX ORDER — PROPOFOL 10 MG/ML
VIAL (ML) INTRAVENOUS
Status: DISCONTINUED | OUTPATIENT
Start: 2023-07-26 | End: 2023-07-26

## 2023-07-26 RX ORDER — PROCHLORPERAZINE EDISYLATE 5 MG/ML
5 INJECTION INTRAMUSCULAR; INTRAVENOUS EVERY 6 HOURS PRN
Status: DISCONTINUED | OUTPATIENT
Start: 2023-07-26 | End: 2023-07-27 | Stop reason: HOSPADM

## 2023-07-26 RX ORDER — ENOXAPARIN SODIUM 100 MG/ML
40 INJECTION SUBCUTANEOUS EVERY 12 HOURS
Status: DISCONTINUED | OUTPATIENT
Start: 2023-07-26 | End: 2023-07-27 | Stop reason: HOSPADM

## 2023-07-26 RX ORDER — MIDAZOLAM HYDROCHLORIDE 1 MG/ML
INJECTION, SOLUTION INTRAMUSCULAR; INTRAVENOUS
Status: DISCONTINUED | OUTPATIENT
Start: 2023-07-26 | End: 2023-07-26

## 2023-07-26 RX ORDER — CEFAZOLIN SODIUM 1 G/3ML
INJECTION, POWDER, FOR SOLUTION INTRAMUSCULAR; INTRAVENOUS
Status: DISCONTINUED | OUTPATIENT
Start: 2023-07-26 | End: 2023-07-26

## 2023-07-26 RX ORDER — DEXAMETHASONE SODIUM PHOSPHATE 4 MG/ML
INJECTION, SOLUTION INTRA-ARTICULAR; INTRALESIONAL; INTRAMUSCULAR; INTRAVENOUS; SOFT TISSUE
Status: DISCONTINUED | OUTPATIENT
Start: 2023-07-26 | End: 2023-07-26

## 2023-07-26 RX ORDER — SODIUM CHLORIDE 9 MG/ML
INJECTION, SOLUTION INTRAVENOUS CONTINUOUS
Status: DISCONTINUED | OUTPATIENT
Start: 2023-07-26 | End: 2023-07-26

## 2023-07-26 RX ORDER — AMLODIPINE BESYLATE 10 MG/1
10 TABLET ORAL DAILY
Status: DISCONTINUED | OUTPATIENT
Start: 2023-07-27 | End: 2023-07-27 | Stop reason: HOSPADM

## 2023-07-26 RX ORDER — HEPARIN SODIUM 5000 [USP'U]/ML
5000 INJECTION, SOLUTION INTRAVENOUS; SUBCUTANEOUS ONCE
Status: COMPLETED | OUTPATIENT
Start: 2023-07-26 | End: 2023-07-26

## 2023-07-26 RX ORDER — ROCURONIUM BROMIDE 10 MG/ML
INJECTION, SOLUTION INTRAVENOUS
Status: DISCONTINUED | OUTPATIENT
Start: 2023-07-26 | End: 2023-07-26

## 2023-07-26 RX ORDER — ACETAMINOPHEN 10 MG/ML
INJECTION, SOLUTION INTRAVENOUS
Status: DISCONTINUED | OUTPATIENT
Start: 2023-07-26 | End: 2023-07-26

## 2023-07-26 RX ORDER — HYDROCODONE BITARTRATE AND ACETAMINOPHEN 7.5; 325 MG/15ML; MG/15ML
15 SOLUTION ORAL EVERY 4 HOURS PRN
Status: DISCONTINUED | OUTPATIENT
Start: 2023-07-26 | End: 2023-07-27 | Stop reason: HOSPADM

## 2023-07-26 RX ORDER — ACETAMINOPHEN 650 MG/20.3ML
500 LIQUID ORAL EVERY 8 HOURS
Status: COMPLETED | OUTPATIENT
Start: 2023-07-26 | End: 2023-07-27

## 2023-07-26 RX ORDER — LIDOCAINE HYDROCHLORIDE 10 MG/ML
1 INJECTION, SOLUTION EPIDURAL; INFILTRATION; INTRACAUDAL; PERINEURAL ONCE
Status: COMPLETED | OUTPATIENT
Start: 2023-07-26 | End: 2023-07-26

## 2023-07-26 RX ORDER — FAMOTIDINE 10 MG/ML
20 INJECTION INTRAVENOUS ONCE
Status: COMPLETED | OUTPATIENT
Start: 2023-07-26 | End: 2023-07-26

## 2023-07-26 RX ORDER — PANTOPRAZOLE SODIUM 40 MG/10ML
40 INJECTION, POWDER, LYOPHILIZED, FOR SOLUTION INTRAVENOUS 2 TIMES DAILY
Status: DISCONTINUED | OUTPATIENT
Start: 2023-07-26 | End: 2023-07-27 | Stop reason: HOSPADM

## 2023-07-26 RX ORDER — GABAPENTIN 250 MG/5ML
300 SOLUTION ORAL 3 TIMES DAILY
Status: DISCONTINUED | OUTPATIENT
Start: 2023-07-26 | End: 2023-07-27 | Stop reason: HOSPADM

## 2023-07-26 RX ORDER — GLUCAGON 1 MG
1 KIT INJECTION
Status: DISCONTINUED | OUTPATIENT
Start: 2023-07-26 | End: 2023-07-27 | Stop reason: HOSPADM

## 2023-07-26 RX ORDER — HYDROMORPHONE HYDROCHLORIDE 1 MG/ML
0.5 INJECTION, SOLUTION INTRAMUSCULAR; INTRAVENOUS; SUBCUTANEOUS
Status: DISCONTINUED | OUTPATIENT
Start: 2023-07-26 | End: 2023-07-27 | Stop reason: HOSPADM

## 2023-07-26 RX ORDER — MUPIROCIN 20 MG/G
OINTMENT TOPICAL
Status: DISCONTINUED | OUTPATIENT
Start: 2023-07-26 | End: 2023-07-26

## 2023-07-26 RX ORDER — ATORVASTATIN CALCIUM 20 MG/1
20 TABLET, FILM COATED ORAL DAILY
Status: DISCONTINUED | OUTPATIENT
Start: 2023-07-27 | End: 2023-07-27 | Stop reason: HOSPADM

## 2023-07-26 RX ORDER — SODIUM CHLORIDE, SODIUM LACTATE, POTASSIUM CHLORIDE, CALCIUM CHLORIDE 600; 310; 30; 20 MG/100ML; MG/100ML; MG/100ML; MG/100ML
INJECTION, SOLUTION INTRAVENOUS CONTINUOUS
Status: DISCONTINUED | OUTPATIENT
Start: 2023-07-26 | End: 2023-07-27 | Stop reason: HOSPADM

## 2023-07-26 RX ORDER — ONDANSETRON 2 MG/ML
8 INJECTION INTRAMUSCULAR; INTRAVENOUS EVERY 6 HOURS PRN
Status: DISCONTINUED | OUTPATIENT
Start: 2023-07-26 | End: 2023-07-27 | Stop reason: HOSPADM

## 2023-07-26 RX ORDER — INSULIN ASPART 100 [IU]/ML
1-10 INJECTION, SOLUTION INTRAVENOUS; SUBCUTANEOUS EVERY 6 HOURS PRN
Status: DISCONTINUED | OUTPATIENT
Start: 2023-07-26 | End: 2023-07-27 | Stop reason: HOSPADM

## 2023-07-26 RX ORDER — BUPIVACAINE HYDROCHLORIDE 2.5 MG/ML
INJECTION, SOLUTION EPIDURAL; INFILTRATION; INTRACAUDAL
Status: DISCONTINUED | OUTPATIENT
Start: 2023-07-26 | End: 2023-07-26

## 2023-07-26 RX ORDER — ONDANSETRON 2 MG/ML
INJECTION INTRAMUSCULAR; INTRAVENOUS
Status: DISCONTINUED | OUTPATIENT
Start: 2023-07-26 | End: 2023-07-26

## 2023-07-26 RX ORDER — FENTANYL CITRATE 50 UG/ML
INJECTION, SOLUTION INTRAMUSCULAR; INTRAVENOUS
Status: DISCONTINUED | OUTPATIENT
Start: 2023-07-26 | End: 2023-07-26

## 2023-07-26 RX ADMIN — DEXAMETHASONE SODIUM PHOSPHATE 4 MG: 4 INJECTION, SOLUTION INTRAMUSCULAR; INTRAVENOUS at 12:07

## 2023-07-26 RX ADMIN — GABAPENTIN 300 MG: 250 SOLUTION ORAL at 09:07

## 2023-07-26 RX ADMIN — MIDAZOLAM 2 MG: 1 INJECTION INTRAMUSCULAR; INTRAVENOUS at 11:07

## 2023-07-26 RX ADMIN — SUGAMMADEX 200 MG: 100 INJECTION, SOLUTION INTRAVENOUS at 01:07

## 2023-07-26 RX ADMIN — ROCURONIUM BROMIDE 10 MG: 10 INJECTION, SOLUTION INTRAVENOUS at 11:07

## 2023-07-26 RX ADMIN — FENTANYL CITRATE 25 MCG: 0.05 INJECTION, SOLUTION INTRAMUSCULAR; INTRAVENOUS at 12:07

## 2023-07-26 RX ADMIN — SUCCINYLCHOLINE CHLORIDE 180 MG: 20 INJECTION, SOLUTION INTRAMUSCULAR; INTRAVENOUS at 11:07

## 2023-07-26 RX ADMIN — LIDOCAINE HYDROCHLORIDE 100 MG: 20 INJECTION INTRAVENOUS at 11:07

## 2023-07-26 RX ADMIN — ACETAMINOPHEN 1000 MG: 10 INJECTION, SOLUTION INTRAVENOUS at 12:07

## 2023-07-26 RX ADMIN — FENTANYL CITRATE 50 MCG: 0.05 INJECTION, SOLUTION INTRAMUSCULAR; INTRAVENOUS at 11:07

## 2023-07-26 RX ADMIN — ROCURONIUM BROMIDE 30 MG: 10 INJECTION, SOLUTION INTRAVENOUS at 11:07

## 2023-07-26 RX ADMIN — HYDROCODONE BITARTRATE AND ACETAMINOPHEN 15 ML: 7.5; 325 SOLUTION ORAL at 03:07

## 2023-07-26 RX ADMIN — ENOXAPARIN SODIUM 40 MG: 40 INJECTION SUBCUTANEOUS at 09:07

## 2023-07-26 RX ADMIN — SODIUM CHLORIDE, POTASSIUM CHLORIDE, SODIUM LACTATE AND CALCIUM CHLORIDE: 600; 310; 30; 20 INJECTION, SOLUTION INTRAVENOUS at 09:07

## 2023-07-26 RX ADMIN — FAMOTIDINE 20 MG: 10 INJECTION, SOLUTION INTRAVENOUS at 10:07

## 2023-07-26 RX ADMIN — SODIUM CHLORIDE: 9 INJECTION, SOLUTION INTRAVENOUS at 10:07

## 2023-07-26 RX ADMIN — PROPOFOL 180 MG: 10 INJECTION, EMULSION INTRAVENOUS at 11:07

## 2023-07-26 RX ADMIN — ONDANSETRON 4 MG: 2 INJECTION INTRAMUSCULAR; INTRAVENOUS at 12:07

## 2023-07-26 RX ADMIN — LIDOCAINE HYDROCHLORIDE 10 MG: 10 INJECTION, SOLUTION EPIDURAL; INFILTRATION; INTRACAUDAL; PERINEURAL at 10:07

## 2023-07-26 RX ADMIN — HEPARIN SODIUM 5000 UNITS: 5000 INJECTION INTRAVENOUS; SUBCUTANEOUS at 10:07

## 2023-07-26 RX ADMIN — INSULIN ASPART 2 UNITS: 100 INJECTION, SOLUTION INTRAVENOUS; SUBCUTANEOUS at 07:07

## 2023-07-26 RX ADMIN — PROCHLORPERAZINE EDISYLATE 5 MG: 5 INJECTION INTRAMUSCULAR; INTRAVENOUS at 03:07

## 2023-07-26 RX ADMIN — ACETAMINOPHEN 499.51 MG: 650 SOLUTION ORAL at 09:07

## 2023-07-26 RX ADMIN — FENTANYL CITRATE 25 MCG: 0.05 INJECTION, SOLUTION INTRAMUSCULAR; INTRAVENOUS at 11:07

## 2023-07-26 RX ADMIN — PANTOPRAZOLE SODIUM 40 MG: 40 INJECTION, POWDER, FOR SOLUTION INTRAVENOUS at 09:07

## 2023-07-26 RX ADMIN — CEFAZOLIN 3 G: 330 INJECTION, POWDER, FOR SOLUTION INTRAMUSCULAR; INTRAVENOUS at 11:07

## 2023-07-26 RX ADMIN — MUPIROCIN: 20 OINTMENT TOPICAL at 10:07

## 2023-07-26 RX ADMIN — ROCURONIUM BROMIDE 10 MG: 10 INJECTION, SOLUTION INTRAVENOUS at 12:07

## 2023-07-26 NOTE — BRIEF OP NOTE
[facility] ([facility])  Brief Operative Note    SUMMARY     Surgery Date: 7/26/2023     Surgeon(s) and Role:     * Roge Rich MD - Primary     * Becka Carrillo MD - Resident - Assisting    Pre-op Diagnosis:  Moderate obesity [E66.8]  BMI 40.0-44.9, adult [Z68.41]  Essential hypertension [I10]  Type 2 diabetes mellitus without complication, without long-term current use of insulin [E11.9]  Gastroesophageal reflux disease without esophagitis [K21.9]  YSABEL on CPAP [G47.33, Z99.89]    Post-op Diagnosis:  Post-Op Diagnosis Codes:     * Morbid obesity [E66.01]     * BMI 40.0-44.9, adult [Z68.41]     * Essential hypertension [I10]     * Type 2 diabetes mellitus without complication, without long-term current use of insulin [E11.9]     * Gastroesophageal reflux disease without esophagitis [K21.9]     * YSABEL on CPAP [G47.33, Z99.89]    Procedure(s) (LRB):  XI ROBOTIC SLEEVE GASTRECTOMY with intraop EGD (N/A)    Anesthesia: General    Operative Findings: robotic sleeve gastrectomy w/ intraoperative EGD    Estimated Blood Loss: * No values recorded between 7/26/2023 11:48 AM and 7/26/2023  1:08 PM *    Estimated Blood Loss has been documented.         Specimens:   Specimen (24h ago, onward)       Start     Ordered    07/26/23 1208  Specimen to Pathology, Surgery General Surgery  Once        Comments: Pre-op Diagnosis: Moderate obesity [E66.8]  BMI 40.0-44.9, adult [Z68.41]  Essential hypertension [I10]  Type 2 diabetes mellitus without complication, without long-term current use of insulin [E11.9]  Gastroesophageal reflux disease without esophagitis [K21.9]  YSABEL on CPAP [G47.33, Z99.89]      Procedure(s):  XI ROBOTIC SLEEVE GASTRECTOMY with intraop EGD     Number of specimens: 1    Name of specimens:   1.) Stomach (permanent)   References:    Click here for ordering Quick Tip   Question:  Procedure Type:  Answer:  General Surgery    07/26/23 1256                    [id number]    Becka Carrillo MD  General  Surgery PGY5

## 2023-07-26 NOTE — OP NOTE
DATE OF PROCEDURE: 7/26/2023    PRE OP DIAGNOSIS: Moderate obesity [E66.8]  BMI 40.0-44.9, adult [Z68.41]  Essential hypertension [I10]  Type 2 diabetes mellitus without complication, without long-term current use of insulin [E11.9]  Gastroesophageal reflux disease without esophagitis [K21.9]  YSABEL on CPAP [G47.33, Z99.89]    POST OP DIAGNOSIS: Moderate obesity [E66.8]  BMI 40.0-44.9, adult [Z68.41]  Essential hypertension [I10]  Type 2 diabetes mellitus without complication, without long-term current use of insulin [E11.9]  Gastroesophageal reflux disease without esophagitis [K21.9]  YSABEL on CPAP [G47.33, Z99.89]    PROCEDURE: Procedure(s) (LRB):  XI ROBOTIC SLEEVE GASTRECTOMY with intraop EGD (N/A)    Surgeon(s) and Role:     * Roge Rich MD - Primary     * Becka Carrillo MD - Resident - Assisting    ANESTHESIA: General.     Indication:    1. Morbid obesity, body mass index is 42.41 kg/m². and inability to lose weight.  First worked up in 2014 but had to delay surgery.  2. Co-morbidities: diabetes mellitus 2 not on insulin and no complications, YSABEL on CPAP and GERD on ppi without any symptoms in years, essential HTN  3. From 6/6/23, height 62inches, weight 220lb and bmi 40.2.  She has not been able to lose significant weight despite diet and exercise and has made no significant progress.    Procedure:    The patient was placed under general anesthesia and the abdomen prepped and draped in usual manner.  Access to the peritoneum was gained 20 cm below the xiphoid and to the right of midline with a 5 mm Optiview trocar under direct vision, pneumoperitoneum to 15 mmHg CO2 gas was obtained and the patient was placed in reverse Trendelenburg.  Three robotic trocars were placed 7 cm apart to the left of the primary trocar under direct vision.  The primary trocar was placed with a 12 mm robotic trocar under direct vision.  A 5 mm trocar was placed in the right anterior axillary line approximately 10 cm below  the costal margin under direct vision and the liver retractor was placed through this to elevate the left lobe of the liver.  There was no hiatal hernia.  Using caudier graspers and the synchroseal the greater curve was taken down 6 cm from the pylorus and all the way to the base of the left peter.  Posterior gastric attachments were taken down.  A 42 Belarusian bougie was placed through the mouth and easily traversed the stomach towards the pylorus and using the robotic stapler the stomach was divided along the dilator making sure to come out just a little bit at the lower esophageal sphincter.  Hemostatic glue was then placed over the staple line and at the hilum of the spleen.  The dilator was removed and endoscopy was performed.  The endoscope easily traverse the pharynx, esophagus and the stomach.  The stomach was insufflated with gas and appeared of appropriate size and configuration and there were no air leak seen laparoscopically and no bleeding into the stomach.  Air was aspirated and the endoscope was withdrawn.  Careful inspection of the abdominal cavity was done to ensure hemostasis and then the liver retractor was removed.  The gastric resection was placed in the Endo-Catch bag and removed from the abdomen through the primary trocar site. The fascia of primary trocar site was closed transfascially 0 Vicryl.  The trocars were removed under direct vision and prior removing last trocar pneumoperitoneum was allowed to escape.  The skin incisions were infiltrated with Marcaine and reapproximated with 4-0 plain catgut.  Skin glue was applied.  The patient tolerated procedure well was brought to the recovery room stable condition.  Sponge and needle counts were correct at the end of the case.  Blood loss was minimal, complications none and pathology gastric resection.

## 2023-07-26 NOTE — TRANSFER OF CARE
"Anesthesia Transfer of Care Note    Patient: Africa Jennings    Procedure(s) Performed: Procedure(s) (LRB):  XI ROBOTIC SLEEVE GASTRECTOMY with intraop EGD (N/A)    Patient location: PACU    Anesthesia Type: general    Transport from OR: Transported from OR on 6-10 L/min O2 by face mask with adequate spontaneous ventilation    Post pain: adequate analgesia    Post assessment: no apparent anesthetic complications    Post vital signs: stable    Level of consciousness: awake    Nausea/Vomiting: no nausea/vomiting    Complications: none    Transfer of care protocol was followed      Last vitals:   Visit Vitals  /66 (BP Location: Right arm, Patient Position: Lying)   Pulse 81   Temp 37.1 °C (98.7 °F) (Oral)   Resp 20   Ht 5' 2" (1.575 m)   Wt 99.8 kg (220 lb 0.3 oz)   LMP  (LMP Unknown)   SpO2 99%   Breastfeeding No   BMI 40.24 kg/m²     "

## 2023-07-26 NOTE — NURSING
Nurses Note -- 4 Eyes      7/26/2023   5:58 PM      Skin assessed during: Admit      [x] No Altered Skin Integrity Present    []Prevention Measures Documented      [] Yes- Altered Skin Integrity Present or Discovered   [] LDA Added if Not in Epic (Describe Wound)   [] New Altered Skin Integrity was Present on Admit and Documented in LDA   [] Wound Image Taken    Wound Care Consulted? No    Attending Nurse:  Ra Garcia RN     Second RN/Staff Member:  Max Espinoza

## 2023-07-26 NOTE — PLAN OF CARE
Patient AAOX4, call light and belongings in reach, siderails up x3, scds on and operating, patient still slightly drowsy, but awakens when providing care.  Surgical abdomen lap sites c/d/I, pain controlled with current regimen.  Remains NPO, states nausea controlled currently, has voided post op. Currently 3L O2, denies using CPAP at home, will continue to monitor and wean.  Remains free from falls and safety maintained this shift.     Problem: Adult Inpatient Plan of Care  Goal: Plan of Care Review  Outcome: Ongoing, Progressing  Goal: Patient-Specific Goal (Individualized)  Outcome: Ongoing, Progressing  Goal: Absence of Hospital-Acquired Illness or Injury  Outcome: Ongoing, Progressing  Goal: Optimal Comfort and Wellbeing  Outcome: Ongoing, Progressing     Problem: Bariatric Environmental Safety  Goal: Safety Maintained with Care  Outcome: Ongoing, Progressing     Problem: Bariatric Care Environmental Safety (Bariatric Surgery)  Goal: Safety Maintained with Care  Outcome: Ongoing, Progressing     Problem: Bleeding (Bariatric Surgery)  Goal: Absence of Bleeding  Outcome: Ongoing, Progressing     Problem: Fluid and Electrolyte Imbalance (Bariatric Surgery)  Goal: Fluid and Electrolyte Balance  Outcome: Ongoing, Progressing     Problem: Infection (Bariatric Surgery)  Goal: Absence of Infection Signs and Symptoms  Outcome: Ongoing, Progressing     Problem: Ongoing Anesthesia Effects (Bariatric Surgery)  Goal: Anesthesia/Sedation Recovery  Outcome: Ongoing, Progressing     Problem: Pain (Bariatric Surgery)  Goal: Acceptable Pain Control  Outcome: Ongoing, Progressing     Problem: Postoperative Nausea and Vomiting (Bariatric Surgery)  Goal: Nausea and Vomiting Relief  Outcome: Ongoing, Progressing     Problem: Respiratory Compromise (Bariatric Surgery)  Goal: Effective Oxygenation and Ventilation  Outcome: Ongoing, Progressing     Problem: Post-Bariatric Surgery Care  Goal: Bariatric Home Regimen Maintained  Outcome:  Ongoing, Progressing     Problem: Fall Injury Risk  Goal: Absence of Fall and Fall-Related Injury  Outcome: Ongoing, Progressing     POD 0 (PATHWAY - GI BARIATRIC BYPASS OR BARIATRIC SLEEVE - [facility])  POD 0: Maintain thrombosis prophylaxis  Outcome: Met  POD 0: Patient is wearing SCDs while in bed or chair and they are functioning properly  Outcome: Met

## 2023-07-26 NOTE — ANESTHESIA PROCEDURE NOTES
Intubation    Date/Time: 7/26/2023 11:36 AM  Performed by: Manny Elizalde CRNA  Authorized by: Vega Lizarraga MD     Intubation:     Induction:  Intravenous    Intubated:  Postinduction    Mask Ventilation:  Easy mask    Attempts:  1    Attempted By:  CRNA    Method of Intubation:  Video laryngoscopy    Blade:  Braden 3    Laryngeal View Grade: Grade I - full view of cords      Difficult Airway Encountered?: No      Complications:  None    Airway Device:  Oral endotracheal tube    Airway Device Size:  7.5    Style/Cuff Inflation:  Cuffed (inflated to minimal occlusive pressure)    Tube secured:  22    Secured at:  The lips    Placement Verified By:  Capnometry    Complicating Factors:  None    Findings Post-Intubation:  BS equal bilateral and atraumatic/condition of teeth unchanged

## 2023-07-26 NOTE — BRIEF OP NOTE
Operative Note       Surgery Date: 7/26/2023     Surgeon(s) and Role:     * Roge Rich MD - Primary     * Becka Carrillo MD - Resident - Assisting    Pre-op Diagnosis:  Moderate obesity [E66.8]  BMI 40.0-44.9, adult [Z68.41]  Essential hypertension [I10]  Type 2 diabetes mellitus without complication, without long-term current use of insulin [E11.9]  Gastroesophageal reflux disease without esophagitis [K21.9]  YSABEL on CPAP [G47.33, Z99.89]    Post-op Diagnosis:  Moderate obesity [E66.8]  BMI 40.0-44.9, adult [Z68.41]  Essential hypertension [I10]  Type 2 diabetes mellitus without complication, without long-term current use of insulin [E11.9]  Gastroesophageal reflux disease without esophagitis [K21.9]  YSABEL on CPAP [G47.33, Z99.89]    Procedure(s) (LRB):  XI ROBOTIC SLEEVE GASTRECTOMY with intraop EGD (N/A)    Anesthesia: General    Procedure in Detail/Findings:  Sleeve without apparent complication    Estimated Blood Loss: Minimal           Specimens (From admission, onward)       Start     Ordered    07/26/23 1208  Specimen to Pathology, Surgery General Surgery  Once        Comments: Pre-op Diagnosis: Moderate obesity [E66.8]  BMI 40.0-44.9, adult [Z68.41]  Essential hypertension [I10]  Type 2 diabetes mellitus without complication, without long-term current use of insulin [E11.9]  Gastroesophageal reflux disease without esophagitis [K21.9]  YSABEL on CPAP [G47.33, Z99.89]      Procedure(s):  XI ROBOTIC SLEEVE GASTRECTOMY with intraop EGD     Number of specimens: 1    Name of specimens:   1.) Stomach (permanent)   References:    Click here for ordering Quick Tip   Question:  Procedure Type:  Answer:  General Surgery    07/26/23 1256                  Implants: * No implants in log *           Disposition: PACU - hemodynamically stable.           Condition: Good    Attestation:  I was present and scrubbed for the entire procedure.

## 2023-07-26 NOTE — NURSING TRANSFER
Nursing Transfer Note      7/26/2023   4:06 PM    Nurse giving handoff: COLLIN Douglas PACU  Nurse receiving handoff:COLLIN Ernandez POSS    Reason patient is being transferred: to room from pacu    Transfer To: 544    Transfer via bed    Transfer with 2L to O2    Transported by transport    Transfer Vital Signs:  See last vital signs charted     Telemetry: Rhythm NSR  Order for Tele Monitor? No    Additional Lines: Oxygen    Medicines sent: None    Any special needs or follow-up needed: None    Patient belongings transferred with patient: Yes    Chart send with patient: Yes    Notified: Relative    Patient reassessed at: 1530 on 7/26/23

## 2023-07-26 NOTE — ANESTHESIA PREPROCEDURE EVALUATION
07/26/2023  Africa Jennings is a 53 y.o., female.  XI ROBOTIC SLEEVE GASTRECTOMY with intraop EGD    Pre-op Assessment          Review of Systems  Anesthesia Hx:  No problems with previous Anesthesia    Cardiovascular:   Hypertension Denies CAD.     Functional Capacity Can you climb two flights of stairs? ==> Yes    Pulmonary:   Denies Asthma. Sleep Apnea, CPAP    Renal/:   Denies Chronic Renal Disease.     Hepatic/GI:   Denies PUD. GERD Denies Liver Disease.    Neurological:   Denies CVA. Denies Seizures.    Endocrine:   Denies Diabetes. Denies Hypothyroidism.        Physical Exam  General: Alert    Airway:  Mallampati: I   Mouth Opening: Normal  TM Distance: Normal  Tongue: Normal  Neck ROM: Normal ROM    Dental:  Intact        Anesthesia Plan  Type of Anesthesia, risks & benefits discussed:    Anesthesia Type: Gen ETT  Intra-op Monitoring Plan: Standard ASA Monitors  Post Op Pain Control Plan: multimodal analgesia and IV/PO Opioids PRN  Induction:  IV  Airway Plan: Direct  Informed Consent: Informed consent signed with the Patient and all parties understand the risks and agree with anesthesia plan.  All questions answered.   ASA Score: 3  Day of Surgery Review of History & Physical: H&P Update referred to the surgeon/provider.    Ready For Surgery From Anesthesia Perspective.     .

## 2023-07-27 VITALS
SYSTOLIC BLOOD PRESSURE: 108 MMHG | TEMPERATURE: 98 F | HEIGHT: 62 IN | DIASTOLIC BLOOD PRESSURE: 51 MMHG | WEIGHT: 220 LBS | OXYGEN SATURATION: 93 % | HEART RATE: 92 BPM | BODY MASS INDEX: 40.48 KG/M2 | RESPIRATION RATE: 17 BRPM

## 2023-07-27 DIAGNOSIS — E11.9 TYPE 2 DIABETES MELLITUS WITHOUT COMPLICATION, WITHOUT LONG-TERM CURRENT USE OF INSULIN: ICD-10-CM

## 2023-07-27 DIAGNOSIS — E78.5 HYPERLIPIDEMIA, UNSPECIFIED HYPERLIPIDEMIA TYPE: ICD-10-CM

## 2023-07-27 LAB
ANION GAP SERPL CALC-SCNC: 19 MMOL/L (ref 8–16)
BASOPHILS # BLD AUTO: 0.03 K/UL (ref 0–0.2)
BASOPHILS NFR BLD: 0.2 % (ref 0–1.9)
BUN SERPL-MCNC: 10 MG/DL (ref 6–20)
CALCIUM SERPL-MCNC: 10.1 MG/DL (ref 8.7–10.5)
CHLORIDE SERPL-SCNC: 105 MMOL/L (ref 95–110)
CO2 SERPL-SCNC: 14 MMOL/L (ref 23–29)
CREAT SERPL-MCNC: 0.5 MG/DL (ref 0.5–1.4)
DIFFERENTIAL METHOD: ABNORMAL
EOSINOPHIL # BLD AUTO: 0 K/UL (ref 0–0.5)
EOSINOPHIL NFR BLD: 0 % (ref 0–8)
ERYTHROCYTE [DISTWIDTH] IN BLOOD BY AUTOMATED COUNT: 15.1 % (ref 11.5–14.5)
EST. GFR  (NO RACE VARIABLE): >60 ML/MIN/1.73 M^2
GLUCOSE SERPL-MCNC: 144 MG/DL (ref 70–110)
HCT VFR BLD AUTO: 34.2 % (ref 37–48.5)
HGB BLD-MCNC: 11.2 G/DL (ref 12–16)
IMM GRANULOCYTES # BLD AUTO: 0.05 K/UL (ref 0–0.04)
IMM GRANULOCYTES NFR BLD AUTO: 0.3 % (ref 0–0.5)
LYMPHOCYTES # BLD AUTO: 2 K/UL (ref 1–4.8)
LYMPHOCYTES NFR BLD: 13.8 % (ref 18–48)
MCH RBC QN AUTO: 27.2 PG (ref 27–31)
MCHC RBC AUTO-ENTMCNC: 32.7 G/DL (ref 32–36)
MCV RBC AUTO: 83 FL (ref 82–98)
MONOCYTES # BLD AUTO: 1.1 K/UL (ref 0.3–1)
MONOCYTES NFR BLD: 7.2 % (ref 4–15)
NEUTROPHILS # BLD AUTO: 11.4 K/UL (ref 1.8–7.7)
NEUTROPHILS NFR BLD: 78.5 % (ref 38–73)
NRBC BLD-RTO: 0 /100 WBC
PHOSPHATE SERPL-MCNC: 2.4 MG/DL (ref 2.7–4.5)
PLATELET # BLD AUTO: 330 K/UL (ref 150–450)
PMV BLD AUTO: 11.8 FL (ref 9.2–12.9)
POCT GLUCOSE: 118 MG/DL (ref 70–110)
POCT GLUCOSE: 143 MG/DL (ref 70–110)
POCT GLUCOSE: 162 MG/DL (ref 70–110)
POCT GLUCOSE: 171 MG/DL (ref 70–110)
POTASSIUM SERPL-SCNC: 5.5 MMOL/L (ref 3.5–5.1)
RBC # BLD AUTO: 4.12 M/UL (ref 4–5.4)
SODIUM SERPL-SCNC: 138 MMOL/L (ref 136–145)
WBC # BLD AUTO: 14.52 K/UL (ref 3.9–12.7)

## 2023-07-27 PROCEDURE — 84100 ASSAY OF PHOSPHORUS: CPT | Performed by: SURGERY

## 2023-07-27 PROCEDURE — C9113 INJ PANTOPRAZOLE SODIUM, VIA: HCPCS | Performed by: SURGERY

## 2023-07-27 PROCEDURE — 85025 COMPLETE CBC W/AUTO DIFF WBC: CPT | Performed by: SURGERY

## 2023-07-27 PROCEDURE — 25000003 PHARM REV CODE 250: Performed by: SURGERY

## 2023-07-27 PROCEDURE — 80048 BASIC METABOLIC PNL TOTAL CA: CPT | Performed by: SURGERY

## 2023-07-27 PROCEDURE — 36415 COLL VENOUS BLD VENIPUNCTURE: CPT | Performed by: SURGERY

## 2023-07-27 PROCEDURE — 63600175 PHARM REV CODE 636 W HCPCS: Performed by: SURGERY

## 2023-07-27 PROCEDURE — 25000003 PHARM REV CODE 250: Performed by: STUDENT IN AN ORGANIZED HEALTH CARE EDUCATION/TRAINING PROGRAM

## 2023-07-27 PROCEDURE — 94761 N-INVAS EAR/PLS OXIMETRY MLT: CPT

## 2023-07-27 RX ORDER — METFORMIN HYDROCHLORIDE 500 MG/1
TABLET, EXTENDED RELEASE ORAL
Qty: 180 TABLET | Refills: 1 | Status: SHIPPED | OUTPATIENT
Start: 2023-07-27 | End: 2023-08-11

## 2023-07-27 RX ORDER — ATORVASTATIN CALCIUM 20 MG/1
TABLET, FILM COATED ORAL
Qty: 90 TABLET | Refills: 0 | Status: SHIPPED | OUTPATIENT
Start: 2023-07-27 | End: 2023-09-11

## 2023-07-27 RX ADMIN — ACETAMINOPHEN 499.51 MG: 650 SOLUTION ORAL at 05:07

## 2023-07-27 RX ADMIN — GABAPENTIN 300 MG: 250 SOLUTION ORAL at 02:07

## 2023-07-27 RX ADMIN — GABAPENTIN 300 MG: 250 SOLUTION ORAL at 08:07

## 2023-07-27 RX ADMIN — SODIUM CHLORIDE, POTASSIUM CHLORIDE, SODIUM LACTATE AND CALCIUM CHLORIDE: 600; 310; 30; 20 INJECTION, SOLUTION INTRAVENOUS at 06:07

## 2023-07-27 RX ADMIN — ATORVASTATIN CALCIUM 20 MG: 20 TABLET, FILM COATED ORAL at 08:07

## 2023-07-27 RX ADMIN — INSULIN ASPART 1 UNITS: 100 INJECTION, SOLUTION INTRAVENOUS; SUBCUTANEOUS at 12:07

## 2023-07-27 RX ADMIN — PANTOPRAZOLE SODIUM 40 MG: 40 INJECTION, POWDER, FOR SOLUTION INTRAVENOUS at 08:07

## 2023-07-27 RX ADMIN — ENOXAPARIN SODIUM 40 MG: 40 INJECTION SUBCUTANEOUS at 08:07

## 2023-07-27 RX ADMIN — HYDROCODONE BITARTRATE AND ACETAMINOPHEN 15 ML: 7.5; 325 SOLUTION ORAL at 02:07

## 2023-07-27 RX ADMIN — ACETAMINOPHEN 499.51 MG: 650 SOLUTION ORAL at 02:07

## 2023-07-27 RX ADMIN — AMLODIPINE BESYLATE 10 MG: 10 TABLET ORAL at 08:07

## 2023-07-27 RX ADMIN — ONDANSETRON 8 MG: 2 INJECTION INTRAMUSCULAR; INTRAVENOUS at 05:07

## 2023-07-27 NOTE — PROGRESS NOTES
[facility]  General Surgery  Progress Note    Subjective:     History of Present Illness:  No notes on file    Post-Op Info:  Procedure(s) (LRB):  XI ROBOTIC SLEEVE GASTRECTOMY with intraop EGD (N/A)   1 Day Post-Op     Interval History: POD1 robotic sleeve gastrectomy. No acute events overnight. Started water protocol this morning, going well so far. Denies nausea/vomiting. Pain is well controlled this morning.    Medications:  Continuous Infusions:   lactated ringers 125 mL/hr at 07/27/23 0612     Scheduled Meds:   acetaminophen  499.5074 mg Oral Q8H    amLODIPine  10 mg Oral Daily    atorvastatin  20 mg Oral Daily    enoxparin  40 mg Subcutaneous Q12H    gabapentin  300 mg Oral TID    pantoprazole  40 mg Intravenous BID     PRN Meds:dextrose 10%, glucagon (human recombinant), hydrocodone-apap 7.5-325 MG/15 ML, HYDROmorphone, insulin aspart U-100, ondansetron, prochlorperazine, simethicone     Review of patient's allergies indicates:  No Known Allergies  Objective:     Vital Signs (Most Recent):  Temp: 97.8 °F (36.6 °C) (07/27/23 0426)  Pulse: 91 (07/27/23 0426)  Resp: 18 (07/27/23 0426)  BP: (!) 152/79 (07/27/23 0426)  SpO2: 96 % (07/27/23 0426) Vital Signs (24h Range):  Temp:  [97.8 °F (36.6 °C)-98.9 °F (37.2 °C)] 97.8 °F (36.6 °C)  Pulse:  [] 91  Resp:  [16-21] 18  SpO2:  [92 %-100 %] 96 %  BP: (111-152)/(56-85) 152/79     Weight: 99.8 kg (220 lb 0.3 oz)  Body mass index is 40.24 kg/m².    Intake/Output - Last 3 Shifts         07/25 0700 07/26 0659 07/26 0700 07/27 0659 07/27 0700 07/28 0659    I.V. (mL/kg)  600 (6)     Total Intake(mL/kg)  600 (6)     Net  +600            Urine Occurrence  2 x     Stool Occurrence  0 x              Physical Exam  Constitutional:       General: She is not in acute distress.     Appearance: Normal appearance.   HENT:      Head: Normocephalic and atraumatic.      Mouth/Throat:      Mouth: Mucous membranes are moist.   Eyes:      Pupils: Pupils are equal,  round, and reactive to light.   Cardiovascular:      Rate and Rhythm: Normal rate.   Pulmonary:      Effort: Pulmonary effort is normal. No respiratory distress.   Abdominal:      General: Abdomen is flat. There is no distension.      Palpations: Abdomen is soft.   Musculoskeletal:         General: Normal range of motion.      Cervical back: Normal range of motion.   Skin:     General: Skin is warm and dry.   Neurological:      General: No focal deficit present.      Mental Status: She is alert and oriented to person, place, and time.   Psychiatric:         Mood and Affect: Mood normal.         Behavior: Behavior normal.        Significant Labs:  I have reviewed all pertinent lab results within the past 24 hours.  CBC: No results for input(s): WBC, RBC, HGB, HCT, PLT, MCV, MCH, MCHC in the last 168 hours.  BMP: No results for input(s): GLU, NA, K, CL, CO2, BUN, CREATININE, CALCIUM, MG in the last 168 hours.    Significant Diagnostics:  I have reviewed all pertinent imaging results/findings within the past 24 hours.    Assessment/Plan:     Obesity  POD1 robotic sleeve gastrectomy doing well this morning    - Water protocol this am, then advance to CLD if tolerates this  - DC mIVF it tolerating CLD  - All meds to be in elixer form or crushed  - Mary Carmen tylenol, gabapentin, PRN hycet  - PRN nausea meds   - OOB, IS, ambulate  - GI ppx: protonix  - DVT ppx: lovenox BID          Glory Segovia MD  General Surgery  [facility]

## 2023-07-27 NOTE — DISCHARGE INSTRUCTIONS
Postoperative General Instructions    What to Expect:  It is normal to experience pain and swelling at the surgical site.  The pain usually decreases significantly after the first week but may last for many weeks.  Each day, the pain should be similar or better to the previous day. If it worsens, call the doctor's office.     Activity:  You should walk beginning on the day of surgery and increase activity slowly over the next two to four weeks.  Do not drive while taking prescription pain medication.  You may return to work when you feel ready.  Do not lift anything heavier than 10 lbs     Wound Care:  You may shower. Let soap and water run over the incisions and pat dry. Do not submerge in a bathtub or pool.     Diet:  Follow the diet recommenced by the dietician. You will be on a high protein, low sugar clear liquid diet after surgery  Take a stool softener or laxative to avoid constipation.     Medication:  Pain Control  Take liquid acetaminophen (Tylenol) 650 mg 4 times daily as needed for pain.  Take the prescribed Oxycodone as needed if you have pain that is not controlled by acetaminophen and ibuprofen.  Bowel Regularity  Take an over-the-counter stool softener/laxative (Colace, Miralax, or Milk of Magnesia) to avoid constipation.  Previous Home Medication  Restart your previous home medication unless otherwise instructed.  All medications should be liquid, 5mm or smaller, or crushed    Call Your Doctor's Office If You Experience:  Fevers greater than 101.3°F.  Vomiting.  Spreading redness or drainage from the incisions.  Opening of the incisions.  Worsening pain not controlled by medication.  Chest pain or shortness of breath.    Follow-Up:  Follow-up with your surgeon as scheduled in 1-2 weeks.  If no follow-up appointment has been scheduled, call to schedule an appointment within 1-2 weeks of discharge.     Contact Information:  During normal business hours call the clinic with any questions or concerns.  On weekends and evenings call [phone] to have the operators page the surgery resident on call after hours with questions or concerns.

## 2023-07-27 NOTE — NURSING
Nurses Note -- 4 Eyes      7/26/2023   7:12 PM      Skin assessed during: Admit      [x] No Altered Skin Integrity Present    []Prevention Measures Documented      [] Yes- Altered Skin Integrity Present or Discovered   [] LDA Added if Not in Epic (Describe Wound)   [] New Altered Skin Integrity was Present on Admit and Documented in LDA   [] Wound Image Taken    Wound Care Consulted? No    Attending Nurse:  Oma Espinoza LPN     Second RN/Staff Member:  Ra GarciaRN

## 2023-07-27 NOTE — HOSPITAL COURSE
AMILCARDUSTIN YANEZ 53 y.o.female underwent: Procedure(s) (LRB):  XI ROBOTIC SLEEVE GASTRECTOMY with intraop EGD (N/A). The patient tolerated the procedure well, was transferred to recovery post-op, and then transferred to the floor for continuation of medical care. The patient's clinical condition progressively improved. By the time of discharge, she was tolerating a diet without nausea or vomiting, pain was well controlled with oral medications, and she was ambulating without difficulty. On POD 1 the patient was discharged to home. On discharge, the patient's incisions were c/d/i and the surgical site was soft and appropriately tender to palpation. The patient will follow up in Dr. Rich's clinic in 2 weeks.

## 2023-07-27 NOTE — ANESTHESIA POSTPROCEDURE EVALUATION
Anesthesia Post Evaluation    Patient: Africa Jennings    Procedure(s) Performed: Procedure(s) (LRB):  XI ROBOTIC SLEEVE GASTRECTOMY with intraop EGD (N/A)    Final Anesthesia Type: general      Patient location during evaluation: PACU  Patient participation: Yes- Able to Participate  Level of consciousness: awake and alert  Post-procedure vital signs: reviewed and stable  Pain management: adequate  Airway patency: patent    PONV status at discharge: No PONV  Anesthetic complications: no      Cardiovascular status: blood pressure returned to baseline  Respiratory status: unassisted  Hydration status: euvolemic  Follow-up not needed.          Vitals Value Taken Time   /65 07/27/23 1139   Temp 36.6 °C (97.8 °F) 07/27/23 1139   Pulse 92 07/27/23 1139   Resp 16 07/27/23 1139   SpO2 93 % 07/27/23 1139         Event Time   Out of Recovery 15:00:00         Pain/Sophia Score: Pain Rating Prior to Med Admin: 0 (7/27/2023  5:56 AM)  Sophia Score: 9 (7/26/2023  3:00 PM)

## 2023-07-27 NOTE — ASSESSMENT & PLAN NOTE
POD1 robotic sleeve gastrectomy doing well this morning    - Water protocol this am, then advance to CLD if tolerates this  - DC mIVF it tolerating CLD  - All meds to be in elixer form or crushed  - Mary Carmen tylenol, gabapentin, PRN hycet  - PRN nausea meds   - OOB, IS, ambulate  - GI ppx: protonix  - DVT ppx: lovenox BID

## 2023-07-27 NOTE — ASSESSMENT & PLAN NOTE
POD1 robotic sleeve gastrectomy doing well this morning. If tolerating clears and able to stay hydrated, may d/c this afternoon    - Water protocol this am, then advance to CLD if tolerates this  - DC mIVF it tolerating CLD  - All meds to be in elixer form or crushed  - Mary Carmen tylenol, gabapentin, PRN hycet  - PRN nausea meds   - OOB, IS, ambulate  - GI ppx: protonix  - DVT ppx: lovenox BID

## 2023-07-27 NOTE — DISCHARGE SUMMARY
[facility]  General Surgery  Discharge Summary      Patient Name: Africa Jennings  MRN: [id number]  Admission Date: 7/26/2023  Hospital Length of Stay: 1 days  Discharge Date and Time:  07/27/2023 4:43 PM  Attending Physician: Roge Rich MD   Discharging Provider: Glory Segovia MD  Primary Care Provider: Brandi Thompson MD    HPI:   No notes on file    Procedure(s) (LRB):  XI ROBOTIC SLEEVE GASTRECTOMY with intraop EGD (N/A)      Indwelling Lines/Drains at time of discharge:   Lines/Drains/Airways     None               Hospital Course: DUSTIN JENNINGS 53 y.o.female underwent: Procedure(s) (LRB):  XI ROBOTIC SLEEVE GASTRECTOMY with intraop EGD (N/A). The patient tolerated the procedure well, was transferred to recovery post-op, and then transferred to the floor for continuation of medical care. The patient's clinical condition progressively improved. By the time of discharge, she was tolerating a diet without nausea or vomiting, pain was well controlled with oral medications, and she was ambulating without difficulty. On POD 1 the patient was discharged to home. On discharge, the patient's incisions were c/d/i and the surgical site was soft and appropriately tender to palpation. The patient will follow up in Dr. Rich's clinic in 2 weeks.       Goals of Care Treatment Preferences:  Code Status: Full Code      Consults:     Significant Diagnostic Studies: Labs:   BMP:   Recent Labs   Lab 07/27/23  1014   *      K 5.5*      CO2 14*   BUN 10   CREATININE 0.5   CALCIUM 10.1    and CBC   Recent Labs   Lab 07/27/23  1210   WBC 14.52*   HGB 11.2*   HCT 34.2*          Pending Diagnostic Studies:     Procedure Component Value Units Date/Time    Specimen to Pathology, Surgery General Surgery [[id number]] Collected: 07/26/23 1256    Order Status: Sent Lab Status: In process Updated: 07/26/23 1475        Final Active Diagnoses:    Diagnosis Date Noted POA     PRINCIPAL PROBLEM:  Morbid obesity [E66.01] 07/03/2014 Yes    Hypertension [I10]  Yes    Obesity [E66.9] 07/26/2023 Yes    YSABEL on CPAP [G47.33, Z99.89] 08/23/2013 Not Applicable    Diabetes mellitus [E11.9] 08/03/2012 Yes      Problems Resolved During this Admission:      Discharged Condition: good    Disposition: Home or Self Care    Follow Up:    Patient Instructions:      Diet clear liquid     Medications:  Reconciled Home Medications:      Medication List      CHANGE how you take these medications    * metFORMIN 500 MG tablet  Commonly known as: GLUCOPHAGE  Take 1 tablet (500 mg total) by mouth 2 (two) times daily with meals.  What changed: Another medication with the same name was added. Make sure you understand how and when to take each.     * metFORMIN 500 MG ER 24hr tablet  Commonly known as: GLUCOPHAGE-XR  TAKE 1 TABLET BY MOUTH TWICE A DAY WITH MEALS  What changed: You were already taking a medication with the same name, and this prescription was added. Make sure you understand how and when to take each.     * oxyCODONE 5 mg/5 mL Soln  Commonly known as: ROXICODONE  Take 5 mLs (5 mg total) by mouth every 8 (eight) hours as needed.  What changed: Another medication with the same name was removed. Continue taking this medication, and follow the directions you see here.     * oxyCODONE 5 mg/5 mL Soln  Commonly known as: ROXICODONE  Take 5 mLs (5 mg total) by mouth every 8 (eight) hours as needed.  What changed: Another medication with the same name was removed. Continue taking this medication, and follow the directions you see here.         * This list has 4 medication(s) that are the same as other medications prescribed for you. Read the directions carefully, and ask your doctor or other care provider to review them with you.            CONTINUE taking these medications    acetaminophen 500 MG tablet  Commonly known as: TYLENOL  Take 2 tablets (1,000 mg total) by mouth every 8 (eight) hours as needed for  Pain.     amLODIPine 10 MG tablet  Commonly known as: NORVASC  Take 1 tablet (10 mg total) by mouth once daily.     atorvastatin 20 MG tablet  Commonly known as: LIPITOR  TAKE 1 TABLET BY MOUTH EVERY DAY     blood sugar diagnostic Strp  1 strip by Misc.(Non-Drug; Combo Route) route 2 (two) times daily with meals.     blood-glucose meter kit  Use as instructed     doxycycline monohydrate 100 mg Tab  Take 1 tablet by mouth 2 (two) times daily.     ergocalciferol 50,000 unit Cap  Commonly known as: ERGOCALCIFEROL  TAKE 2 CAPSULES BY MOUTH TWICE A WEEK     estradioL 1 MG tablet  Commonly known as: ESTRACE  TAKE 1 TABLET BY MOUTH EVERY DAY     finasteride 5 mg tablet  Commonly known as: PROSCAR  Take 1 tablet (5 mg total) by mouth once daily.     fluocinonide 0.05 % ointment  Commonly known as: LIDEX  Apply to affected areas of scalp qhs     * ketoconazole 2 % cream  Commonly known as: NIZORAL  Apply to affected areas of scalp BID.     * ketoconazole 2 % shampoo  Commonly known as: NIZORAL  Wash hair with medicated shampoo at least 2x/week - let sit on scalp at least 5 minutes prior to rinsing     lancets Misc  Use to test blood sugar once daily.     lancing device Misc  1 Device by Misc.(Non-Drug; Combo Route) route 2 (two) times daily with meals.     mometasone 0.1 % solution  Commonly known as: ELOCON  Apply topically once daily. To thinning area of scalp     neomycin-polymyxin-dexamethasone 3.5mg/mL-10,000 unit/mL-0.1 % Drps  Commonly known as: MAXITROL  as needed.     nystatin ointment  Commonly known as: MYCOSTATIN  Apply topically 2 (two) times daily. USES PRN     * omeprazole 40 MG capsule  Commonly known as: PRILOSEC  TAKE 1 CAPSULE BY MOUTH EVERY DAY     * omeprazole 40 MG capsule  Commonly known as: PRILOSEC  Take 1 capsule (40 mg total) by mouth every morning. Open capsule and take with apple sauce     * omeprazole 40 MG capsule  Commonly known as: PRILOSEC  Take 1 capsule (40 mg total) by mouth every  "morning. Open capsule and take with apple sauce     ondansetron 4 MG tablet  Commonly known as: ZOFRAN  TAKE 1 TABLET BY MOUTH EVERY 8 HOURS AS NEEDED     * ondansetron 8 MG Tbdl  Commonly known as: ZOFRAN-ODT  Take 1 tablet (8 mg total) by mouth every 6 (six) hours as needed.     * ondansetron 8 MG Tbdl  Commonly known as: ZOFRAN-ODT  Take 1 tablet (8 mg total) by mouth every 6 (six) hours as needed.     OZEMPIC 1 mg/dose (4 mg/3 mL)  Generic drug: semaglutide  Inject 1 mg into the skin every 7 days.     pen needle, diabetic 32 gauge x 5/32" Ndle  Commonly known as: PEN NEEDLE  1 each by Misc.(Non-Drug; Combo Route) route once a week.     prednisoLONE acetate 1 % Drps  Commonly known as: PRED FORTE  USES PRN     pregabalin 75 MG capsule  Commonly known as: LYRICA  Take 1 capsule (75 mg total) by mouth 2 (two) times daily.     spironolactone 50 MG tablet  Commonly known as: ALDACTONE  Take 1 tablet (50 mg total) by mouth once daily.     tiZANidine 4 MG tablet  Commonly known as: ZANAFLEX  Take 4 mg by mouth nightly as needed.     triamcinolone acetonide 0.1% 0.1 % cream  Commonly known as: KENALOG  Apply topically 2 (two) times daily. Apply topically 2 (two) times daily.     valsartan-hydrochlorothiazide 160-12.5 mg per tablet  Commonly known as: DIOVAN-HCT  Take 1 tablet by mouth once daily.         * This list has 7 medication(s) that are the same as other medications prescribed for you. Read the directions carefully, and ask your doctor or other care provider to review them with you.            STOP taking these medications    cetirizine 10 MG tablet  Commonly known as: ZYRTEC          Time spent on the discharge of patient: 15 minutes    Glory Segovia MD  General Surgery  [facility] - Surgery  "

## 2023-07-27 NOTE — SUBJECTIVE & OBJECTIVE
Interval History: POD1 robotic sleeve gastrectomy. No acute events overnight. Started water protocol this morning, going well so far. Denies nausea/vomiting. Pain is well controlled this morning.    Medications:  Continuous Infusions:   lactated ringers 125 mL/hr at 07/27/23 0612     Scheduled Meds:   acetaminophen  499.5074 mg Oral Q8H    amLODIPine  10 mg Oral Daily    atorvastatin  20 mg Oral Daily    enoxparin  40 mg Subcutaneous Q12H    gabapentin  300 mg Oral TID    pantoprazole  40 mg Intravenous BID     PRN Meds:dextrose 10%, glucagon (human recombinant), hydrocodone-apap 7.5-325 MG/15 ML, HYDROmorphone, insulin aspart U-100, ondansetron, prochlorperazine, simethicone     Review of patient's allergies indicates:  No Known Allergies  Objective:     Vital Signs (Most Recent):  Temp: 97.8 °F (36.6 °C) (07/27/23 0426)  Pulse: 91 (07/27/23 0426)  Resp: 18 (07/27/23 0426)  BP: (!) 152/79 (07/27/23 0426)  SpO2: 96 % (07/27/23 0426) Vital Signs (24h Range):  Temp:  [97.8 °F (36.6 °C)-98.9 °F (37.2 °C)] 97.8 °F (36.6 °C)  Pulse:  [] 91  Resp:  [16-21] 18  SpO2:  [92 %-100 %] 96 %  BP: (111-152)/(56-85) 152/79     Weight: 99.8 kg (220 lb 0.3 oz)  Body mass index is 40.24 kg/m².    Intake/Output - Last 3 Shifts         07/25 0700 07/26 0659 07/26 0700 07/27 0659 07/27 0700 07/28 0659    I.V. (mL/kg)  600 (6)     Total Intake(mL/kg)  600 (6)     Net  +600            Urine Occurrence  2 x     Stool Occurrence  0 x              Physical Exam  Constitutional:       General: She is not in acute distress.     Appearance: Normal appearance.   HENT:      Head: Normocephalic and atraumatic.      Mouth/Throat:      Mouth: Mucous membranes are moist.   Eyes:      Pupils: Pupils are equal, round, and reactive to light.   Cardiovascular:      Rate and Rhythm: Normal rate.   Pulmonary:      Effort: Pulmonary effort is normal. No respiratory distress.   Abdominal:      General: Abdomen is flat. There is no distension.       Palpations: Abdomen is soft.      Comments: Robotic incisions c/d/I with dermabond   Musculoskeletal:         General: Normal range of motion.      Cervical back: Normal range of motion.   Skin:     General: Skin is warm and dry.   Neurological:      General: No focal deficit present.      Mental Status: She is alert and oriented to person, place, and time.   Psychiatric:         Mood and Affect: Mood normal.         Behavior: Behavior normal.        Significant Labs:  I have reviewed all pertinent lab results within the past 24 hours.  CBC: No results for input(s): WBC, RBC, HGB, HCT, PLT, MCV, MCH, MCHC in the last 168 hours.  BMP: No results for input(s): GLU, NA, K, CL, CO2, BUN, CREATININE, CALCIUM, MG in the last 168 hours.    Significant Diagnostics:  I have reviewed all pertinent imaging results/findings within the past 24 hours.

## 2023-07-27 NOTE — PLAN OF CARE
[facility]  Initial Discharge Assessment       Primary Care Provider: Brandi Thompson MD    Admission Diagnosis: Morbid obesity [E66.01]  BMI 40.0-44.9, adult [Z68.41]  Essential hypertension [I10]  Type 2 diabetes mellitus without complication, without long-term current use of insulin [E11.9]  Gastroesophageal reflux disease without esophagitis [K21.9]  YSABEL on CPAP [G47.33, Z99.89]  Severe obesity (BMI 35.0-39.9) with comorbidity [E66.01]  Obesity [E66.9]    Admission Date: 7/26/2023  Expected Discharge Date: 7/27/2023    Transition of Care Barriers: None    Payor: BLUE CROSS BLUE SHIELD / Plan: BCBS ALL OUT OF STATE / Product Type: PPO /     Extended Emergency Contact Information  Primary Emergency Contact: Bud Marley           [facility]  Home Phone: [phone]  Mobile Phone: [phone]  Relation: Son  Secondary Emergency Contact: Howard Tolbert  Address: [address] [place] [address] [place] [address] [facility]  Home Phone: [phone]  Mobile Phone: [phone]  Relation: Significant other    Discharge Plan A: Home with family  Discharge Plan B: Home      [id number] - [place] - [address]  [address] [address]  Phone: [phone] Fax: [phone]      Initial Assessment (most recent)       Adult Discharge Assessment - 07/27/23 0947          Discharge Assessment    Assessment Type Discharge Planning Assessment     Confirmed/corrected address, phone number and insurance Yes     Confirmed Demographics Correct on Facesheet     Source of Information patient     Communicated MIKE with patient/caregiver Yes     People in Home significant other;other relative(s)     Do you expect to return to your current living situation? Yes     Do you have help at home or someone to help you manage your care at home? Yes     Prior to hospitilization cognitive status: Alert/Oriented     Current cognitive status: Alert/Oriented     Home  "Accessibility wheelchair accessible     Home Layout Able to live on 1st floor     Equipment Currently Used at Home none     Readmission within 30 days? No     Patient currently being followed by outpatient case management? No     Do you currently have service(s) that help you manage your care at home? No     Do you take prescription medications? Yes     Do you have prescription coverage? Yes     Do you have any problems affording any of your prescribed medications? Yes     Is the patient taking medications as prescribed? yes     How do you get to doctors appointments? car, drives self;family or friend will provide     Are you on dialysis? No     Do you take coumadin? No     Discharge Plan A Home with family     Discharge Plan B Home     DME Needed Upon Discharge  none     Discharge Plan discussed with: Patient     Transition of Care Barriers None                       SW completed discharge planning assessment with the patient at bedside. SW verified demographic information listed on the pt.'s Face sheet.Pt reports living in a single story home with her Fiance and niece. Pt plans for her relatives to help manage her care upon discharge, pt stated "I have a great support system". Pt reports walking in the halls and feeling good to discharge today.SW is following this Pt for DC planning needs. There are no identified needs at this time.    Rhoda Bradley, HEMA  Case Management   [facility]   Ext. [phone]              "

## 2023-07-27 NOTE — TELEPHONE ENCOUNTER
No care due was identified.  [facility] Embedded Care Due Messages. Reference number: [id number].   7/27/2023 12:44:25 AM CDT

## 2023-07-27 NOTE — PROGRESS NOTES
[facility]  General Surgery  Progress Note    Subjective:     History of Present Illness:  No notes on file    Post-Op Info:  Procedure(s) (LRB):  XI ROBOTIC SLEEVE GASTRECTOMY with intraop EGD (N/A)   1 Day Post-Op     No new subjective & objective note has been filed under this hospital service since the last note was generated.    Assessment/Plan:     Obesity  POD1 robotic sleeve gastrectomy doing well this morning. If tolerating clears and able to stay hydrated, may d/c this afternoon    - Water protocol this am, then advance to CLD if tolerates this  - DC mIVF it tolerating CLD  - All meds to be in elixer form or crushed  - Mary Carmen tylenol, gabapentin, PRN hycet  - PRN nausea meds   - OOB, IS, ambulate  - GI ppx: protonix  - DVT ppx: lovenox BID          Glory Segovia MD  General Surgery  [facility]

## 2023-07-27 NOTE — SUBJECTIVE & OBJECTIVE
Interval History: POD1 robotic sleeve gastrectomy. No acute events overnight. Started water protocol this morning, going well so far. Denies nausea/vomiting. Pain is well controlled this morning.    Medications:  Continuous Infusions:   lactated ringers 125 mL/hr at 07/27/23 0612     Scheduled Meds:   acetaminophen  499.5074 mg Oral Q8H    amLODIPine  10 mg Oral Daily    atorvastatin  20 mg Oral Daily    enoxparin  40 mg Subcutaneous Q12H    gabapentin  300 mg Oral TID    pantoprazole  40 mg Intravenous BID     PRN Meds:dextrose 10%, glucagon (human recombinant), hydrocodone-apap 7.5-325 MG/15 ML, HYDROmorphone, insulin aspart U-100, ondansetron, prochlorperazine, simethicone     Review of patient's allergies indicates:  No Known Allergies  Objective:     Vital Signs (Most Recent):  Temp: 97.8 °F (36.6 °C) (07/27/23 0426)  Pulse: 91 (07/27/23 0426)  Resp: 18 (07/27/23 0426)  BP: (!) 152/79 (07/27/23 0426)  SpO2: 96 % (07/27/23 0426) Vital Signs (24h Range):  Temp:  [97.8 °F (36.6 °C)-98.9 °F (37.2 °C)] 97.8 °F (36.6 °C)  Pulse:  [] 91  Resp:  [16-21] 18  SpO2:  [92 %-100 %] 96 %  BP: (111-152)/(56-85) 152/79     Weight: 99.8 kg (220 lb 0.3 oz)  Body mass index is 40.24 kg/m².    Intake/Output - Last 3 Shifts         07/25 0700 07/26 0659 07/26 0700 07/27 0659 07/27 0700 07/28 0659    I.V. (mL/kg)  600 (6)     Total Intake(mL/kg)  600 (6)     Net  +600            Urine Occurrence  2 x     Stool Occurrence  0 x              Physical Exam  Constitutional:       General: She is not in acute distress.     Appearance: Normal appearance.   HENT:      Head: Normocephalic and atraumatic.      Mouth/Throat:      Mouth: Mucous membranes are moist.   Eyes:      Pupils: Pupils are equal, round, and reactive to light.   Cardiovascular:      Rate and Rhythm: Normal rate.   Pulmonary:      Effort: Pulmonary effort is normal. No respiratory distress.   Abdominal:      General: Abdomen is flat. There is no distension.       Palpations: Abdomen is soft.   Musculoskeletal:         General: Normal range of motion.      Cervical back: Normal range of motion.   Skin:     General: Skin is warm and dry.   Neurological:      General: No focal deficit present.      Mental Status: She is alert and oriented to person, place, and time.   Psychiatric:         Mood and Affect: Mood normal.         Behavior: Behavior normal.        Significant Labs:  I have reviewed all pertinent lab results within the past 24 hours.  CBC: No results for input(s): WBC, RBC, HGB, HCT, PLT, MCV, MCH, MCHC in the last 168 hours.  BMP: No results for input(s): GLU, NA, K, CL, CO2, BUN, CREATININE, CALCIUM, MG in the last 168 hours.    Significant Diagnostics:  I have reviewed all pertinent imaging results/findings within the past 24 hours.

## 2023-07-27 NOTE — NURSING
Home Oxygen Evaluation    Date Performed: 2023    1) Patient's Home O2 Sat on room air, while at rest: 92        If O2 sats on room air at rest are 88% or below, patient qualifies. No additional testing needed. Document N/A in steps 2 and 3. If 89% or above, complete steps 2.      2) Patient's O2 Sat on room air while exercisin        If O2 sats on room air while exercising remain 89% or above patient does not qualify, no further testing needed Document N/A in step 3. If O2 sats on room air while exercising are 88% or below, continue to step 3.

## 2023-07-27 NOTE — NURSING
Walked in hallway with standby assist only; no complications after walk; continues to use oxygen during the night sats in high 80's without oxygen; minimal to no pain not requiring narcotics; voiding freely; vss; remains npo.

## 2023-07-30 ENCOUNTER — PATIENT MESSAGE (OUTPATIENT)
Dept: ADMINISTRATIVE | Facility: OTHER | Age: 53
End: 2023-07-30
Payer: COMMERCIAL

## 2023-08-01 LAB
FINAL PATHOLOGIC DIAGNOSIS: NORMAL
Lab: NORMAL

## 2023-08-01 RX ORDER — PREGABALIN 75 MG/1
75 CAPSULE ORAL 2 TIMES DAILY
Qty: 60 CAPSULE | Refills: 6 | Status: SHIPPED | OUTPATIENT
Start: 2023-08-01 | End: 2024-01-30

## 2023-08-02 ENCOUNTER — PATIENT MESSAGE (OUTPATIENT)
Dept: BARIATRICS | Facility: CLINIC | Age: 53
End: 2023-08-02

## 2023-08-02 ENCOUNTER — CLINICAL SUPPORT (OUTPATIENT)
Dept: BARIATRICS | Facility: CLINIC | Age: 53
End: 2023-08-02
Payer: COMMERCIAL

## 2023-08-02 ENCOUNTER — PATIENT OUTREACH (OUTPATIENT)
Dept: ADMINISTRATIVE | Facility: HOSPITAL | Age: 53
End: 2023-08-02
Payer: COMMERCIAL

## 2023-08-02 PROCEDURE — 99499 NO LOS: ICD-10-PCS | Mod: 95,,, | Performed by: DIETITIAN, REGISTERED

## 2023-08-02 PROCEDURE — 99499 UNLISTED E&M SERVICE: CPT | Mod: 95,,, | Performed by: DIETITIAN, REGISTERED

## 2023-08-02 NOTE — PROGRESS NOTES
Established Patient - Audio Only Telehealth Visit     The patient location is: home ([place])  The chief complaint leading to consultation is: s/p bariatric sx  Visit type: Virtual visit with audio only (telephone)  Total time spent with patient: 15 min       The reason for the audio only service rather than synchronous audio and video virtual visit was related to technical difficulties or patient preference/necessity.     Each patient to whom I provide medical services by telemedicine is:  (1) informed of the relationship between the physician and patient and the respective role of any other health care provider with respect to management of the patient; and (2) notified that they may decline to receive medical services by telemedicine and may withdraw from such care at any time. Patient verbally consented to receive this service via voice-only telephone call.       NUTRITION NOTE    Referring Physician: Roge Rich M.D.   Reason for MNT Referral: Follow-up 1 Week s/p Gastric Sleeve    CURRENT DIET:  Bariatric Liquid Diet    Dehydration assessment:  Urine output/color: normal  Chest pain:n  Persistent increased heart rate:n  Fatigue:n  N/V: y, medicine helps  Dizzy/weak: n  BM: y    Protein and fluid intake assessment: (food diary)    Fluid intake: 32floz  Protein supplements: Unjury powder 20g + water  Protein intake yesterday: 40g    Vitamins  Opurity Multivitamin (with 45 mg iron, 24mg B1, 500mg B12) taking 1 per day  Opurity Calcium Citrate 1300 mg with vitamin D chewables, taking 4/day  -Are you tolerating them well? y      Medications  Omeprazole: y   Hycet: n  How are you tolerating pain at this time? 1 (rate on a scale from 1 to 10; >7 notify PA/MD)  Are you having any problems? n (f/u with PCP, cardiologist, endocrinologist)  How is your support system at home? good  Exercise reminder (light exercise at this time, no lifting above 10 lbs)     Questions for nurse/MA/PA: no       BARIATRIC DIET  DISCUSSION:  Reinforced post-op nutrition guidelines.  Continue to work on fluid and protein intake.    PLAN/RECOMMONDATIONS:  Increase protein intake.  Increase fluid intake.  Continue light exercise.  Continue appropriate vitamins & minerals.         Confirmed date and time for 2 week po labs and clinic visit         SESSION TIME: 15 minutes                          This service was not originating from a related E/M service provided within the previous 7 days nor will  to an E/M service or procedure within the next 24 hours or my soonest available appointment.  Prevailing standard of care was able to be met in this audio-only visit.

## 2023-08-08 ENCOUNTER — PATIENT MESSAGE (OUTPATIENT)
Dept: FAMILY MEDICINE | Facility: CLINIC | Age: 53
End: 2023-08-08
Payer: COMMERCIAL

## 2023-08-09 ENCOUNTER — LAB VISIT (OUTPATIENT)
Dept: LAB | Facility: HOSPITAL | Age: 53
End: 2023-08-09
Payer: COMMERCIAL

## 2023-08-09 ENCOUNTER — OFFICE VISIT (OUTPATIENT)
Dept: BARIATRICS | Facility: CLINIC | Age: 53
End: 2023-08-09
Payer: COMMERCIAL

## 2023-08-09 ENCOUNTER — CLINICAL SUPPORT (OUTPATIENT)
Dept: BARIATRICS | Facility: CLINIC | Age: 53
End: 2023-08-09
Payer: COMMERCIAL

## 2023-08-09 ENCOUNTER — PATIENT MESSAGE (OUTPATIENT)
Dept: BARIATRICS | Facility: CLINIC | Age: 53
End: 2023-08-09

## 2023-08-09 ENCOUNTER — INFUSION (OUTPATIENT)
Dept: INFECTIOUS DISEASES | Facility: HOSPITAL | Age: 53
End: 2023-08-09
Payer: COMMERCIAL

## 2023-08-09 VITALS
BODY MASS INDEX: 37.91 KG/M2 | DIASTOLIC BLOOD PRESSURE: 63 MMHG | HEART RATE: 60 BPM | SYSTOLIC BLOOD PRESSURE: 99 MMHG | TEMPERATURE: 98 F | OXYGEN SATURATION: 96 % | WEIGHT: 206 LBS | RESPIRATION RATE: 16 BRPM | HEIGHT: 62 IN

## 2023-08-09 VITALS
OXYGEN SATURATION: 97 % | HEIGHT: 62 IN | BODY MASS INDEX: 38.01 KG/M2 | DIASTOLIC BLOOD PRESSURE: 62 MMHG | SYSTOLIC BLOOD PRESSURE: 102 MMHG | WEIGHT: 206.56 LBS | HEART RATE: 75 BPM

## 2023-08-09 DIAGNOSIS — E66.9 OBESITY (BMI 30-39.9): ICD-10-CM

## 2023-08-09 DIAGNOSIS — I10 ESSENTIAL HYPERTENSION: ICD-10-CM

## 2023-08-09 DIAGNOSIS — E11.9 TYPE 2 DIABETES MELLITUS WITHOUT COMPLICATION, WITHOUT LONG-TERM CURRENT USE OF INSULIN: Primary | ICD-10-CM

## 2023-08-09 DIAGNOSIS — E11.9 TYPE 2 DIABETES MELLITUS WITHOUT COMPLICATION, WITHOUT LONG-TERM CURRENT USE OF INSULIN: ICD-10-CM

## 2023-08-09 DIAGNOSIS — G47.33 OSA ON CPAP: ICD-10-CM

## 2023-08-09 DIAGNOSIS — E66.01 MORBID OBESITY: ICD-10-CM

## 2023-08-09 DIAGNOSIS — K21.9 GASTROESOPHAGEAL REFLUX DISEASE WITHOUT ESOPHAGITIS: ICD-10-CM

## 2023-08-09 DIAGNOSIS — Z98.890 POST-OPERATIVE STATE: Primary | ICD-10-CM

## 2023-08-09 DIAGNOSIS — K21.9 GASTROESOPHAGEAL REFLUX DISEASE WITHOUT ESOPHAGITIS: Chronic | ICD-10-CM

## 2023-08-09 DIAGNOSIS — Z98.84 S/P BARIATRIC SURGERY: Primary | ICD-10-CM

## 2023-08-09 DIAGNOSIS — Z98.84 S/P BARIATRIC SURGERY: ICD-10-CM

## 2023-08-09 DIAGNOSIS — Z90.3 S/P GASTRIC SLEEVE PROCEDURE: ICD-10-CM

## 2023-08-09 DIAGNOSIS — R63.4 WEIGHT LOSS: ICD-10-CM

## 2023-08-09 DIAGNOSIS — Z71.3 DIETARY COUNSELING AND SURVEILLANCE: ICD-10-CM

## 2023-08-09 LAB
ALBUMIN SERPL BCP-MCNC: 4.2 G/DL (ref 3.5–5.2)
ALP SERPL-CCNC: 78 U/L (ref 55–135)
ALT SERPL W/O P-5'-P-CCNC: 26 U/L (ref 10–44)
ANION GAP SERPL CALC-SCNC: 14 MMOL/L (ref 8–16)
AST SERPL-CCNC: 30 U/L (ref 10–40)
BASOPHILS # BLD AUTO: 0.04 K/UL (ref 0–0.2)
BASOPHILS NFR BLD: 0.7 % (ref 0–1.9)
BILIRUB SERPL-MCNC: 0.5 MG/DL (ref 0.1–1)
BUN SERPL-MCNC: 41 MG/DL (ref 6–20)
CALCIUM SERPL-MCNC: 10.2 MG/DL (ref 8.7–10.5)
CHLORIDE SERPL-SCNC: 99 MMOL/L (ref 95–110)
CO2 SERPL-SCNC: 23 MMOL/L (ref 23–29)
CREAT SERPL-MCNC: 1.9 MG/DL (ref 0.5–1.4)
DIFFERENTIAL METHOD: ABNORMAL
EOSINOPHIL # BLD AUTO: 0.2 K/UL (ref 0–0.5)
EOSINOPHIL NFR BLD: 2.8 % (ref 0–8)
ERYTHROCYTE [DISTWIDTH] IN BLOOD BY AUTOMATED COUNT: 14.9 % (ref 11.5–14.5)
EST. GFR  (NO RACE VARIABLE): 31 ML/MIN/1.73 M^2
GLUCOSE SERPL-MCNC: 78 MG/DL (ref 70–110)
HCT VFR BLD AUTO: 37.7 % (ref 37–48.5)
HGB BLD-MCNC: 12.6 G/DL (ref 12–16)
IMM GRANULOCYTES # BLD AUTO: 0.01 K/UL (ref 0–0.04)
IMM GRANULOCYTES NFR BLD AUTO: 0.2 % (ref 0–0.5)
LYMPHOCYTES # BLD AUTO: 3 K/UL (ref 1–4.8)
LYMPHOCYTES NFR BLD: 52.8 % (ref 18–48)
MCH RBC QN AUTO: 26.6 PG (ref 27–31)
MCHC RBC AUTO-ENTMCNC: 33.4 G/DL (ref 32–36)
MCV RBC AUTO: 80 FL (ref 82–98)
MONOCYTES # BLD AUTO: 0.5 K/UL (ref 0.3–1)
MONOCYTES NFR BLD: 8.2 % (ref 4–15)
NEUTROPHILS # BLD AUTO: 2 K/UL (ref 1.8–7.7)
NEUTROPHILS NFR BLD: 35.3 % (ref 38–73)
NRBC BLD-RTO: 0 /100 WBC
PLATELET # BLD AUTO: 316 K/UL (ref 150–450)
PMV BLD AUTO: 11.3 FL (ref 9.2–12.9)
POTASSIUM SERPL-SCNC: 4.4 MMOL/L (ref 3.5–5.1)
PROT SERPL-MCNC: 8.2 G/DL (ref 6–8.4)
RBC # BLD AUTO: 4.73 M/UL (ref 4–5.4)
SODIUM SERPL-SCNC: 136 MMOL/L (ref 136–145)
VIT B12 SERPL-MCNC: >2000 PG/ML (ref 210–950)
WBC # BLD AUTO: 5.74 K/UL (ref 3.9–12.7)

## 2023-08-09 PROCEDURE — 99999 PR PBB SHADOW E&M-EST. PATIENT-LVL V: ICD-10-PCS | Mod: PBBFAC,,, | Performed by: PHYSICIAN ASSISTANT

## 2023-08-09 PROCEDURE — 63600175 PHARM REV CODE 636 W HCPCS: Performed by: PHYSICIAN ASSISTANT

## 2023-08-09 PROCEDURE — 99024 PR POST-OP FOLLOW-UP VISIT: ICD-10-PCS | Mod: S$GLB,,, | Performed by: PHYSICIAN ASSISTANT

## 2023-08-09 PROCEDURE — 99024 POSTOP FOLLOW-UP VISIT: CPT | Mod: S$GLB,,, | Performed by: PHYSICIAN ASSISTANT

## 2023-08-09 PROCEDURE — 3066F NEPHROPATHY DOC TX: CPT | Mod: CPTII,S$GLB,, | Performed by: PHYSICIAN ASSISTANT

## 2023-08-09 PROCEDURE — 3074F SYST BP LT 130 MM HG: CPT | Mod: CPTII,S$GLB,, | Performed by: PHYSICIAN ASSISTANT

## 2023-08-09 PROCEDURE — 1160F RVW MEDS BY RX/DR IN RCRD: CPT | Mod: CPTII,S$GLB,, | Performed by: PHYSICIAN ASSISTANT

## 2023-08-09 PROCEDURE — 99999 PR PBB SHADOW E&M-EST. PATIENT-LVL V: CPT | Mod: PBBFAC,,, | Performed by: PHYSICIAN ASSISTANT

## 2023-08-09 PROCEDURE — 3074F PR MOST RECENT SYSTOLIC BLOOD PRESSURE < 130 MM HG: ICD-10-PCS | Mod: CPTII,S$GLB,, | Performed by: PHYSICIAN ASSISTANT

## 2023-08-09 PROCEDURE — 80053 COMPREHEN METABOLIC PANEL: CPT | Performed by: PHYSICIAN ASSISTANT

## 2023-08-09 PROCEDURE — 96361 HYDRATE IV INFUSION ADD-ON: CPT

## 2023-08-09 PROCEDURE — 84425 ASSAY OF VITAMIN B-1: CPT | Performed by: PHYSICIAN ASSISTANT

## 2023-08-09 PROCEDURE — 3008F PR BODY MASS INDEX (BMI) DOCUMENTED: ICD-10-PCS | Mod: CPTII,S$GLB,, | Performed by: PHYSICIAN ASSISTANT

## 2023-08-09 PROCEDURE — 3078F PR MOST RECENT DIASTOLIC BLOOD PRESSURE < 80 MM HG: ICD-10-PCS | Mod: CPTII,S$GLB,, | Performed by: PHYSICIAN ASSISTANT

## 2023-08-09 PROCEDURE — 3078F DIAST BP <80 MM HG: CPT | Mod: CPTII,S$GLB,, | Performed by: PHYSICIAN ASSISTANT

## 2023-08-09 PROCEDURE — 36415 COLL VENOUS BLD VENIPUNCTURE: CPT | Performed by: PHYSICIAN ASSISTANT

## 2023-08-09 PROCEDURE — 99999 PR PBB SHADOW E&M-EST. PATIENT-LVL I: ICD-10-PCS | Mod: PBBFAC,,, | Performed by: DIETITIAN, REGISTERED

## 2023-08-09 PROCEDURE — 1159F MED LIST DOCD IN RCRD: CPT | Mod: CPTII,S$GLB,, | Performed by: PHYSICIAN ASSISTANT

## 2023-08-09 PROCEDURE — 1159F PR MEDICATION LIST DOCUMENTED IN MEDICAL RECORD: ICD-10-PCS | Mod: CPTII,S$GLB,, | Performed by: PHYSICIAN ASSISTANT

## 2023-08-09 PROCEDURE — 96374 THER/PROPH/DIAG INJ IV PUSH: CPT

## 2023-08-09 PROCEDURE — 3044F PR MOST RECENT HEMOGLOBIN A1C LEVEL <7.0%: ICD-10-PCS | Mod: CPTII,S$GLB,, | Performed by: PHYSICIAN ASSISTANT

## 2023-08-09 PROCEDURE — 96375 TX/PRO/DX INJ NEW DRUG ADDON: CPT

## 2023-08-09 PROCEDURE — 99499 UNLISTED E&M SERVICE: CPT | Mod: S$GLB,,, | Performed by: DIETITIAN, REGISTERED

## 2023-08-09 PROCEDURE — 25000003 PHARM REV CODE 250: Performed by: NURSE PRACTITIONER

## 2023-08-09 PROCEDURE — 3061F PR NEG MICROALBUMINURIA RESULT DOCUMENTED/REVIEW: ICD-10-PCS | Mod: CPTII,S$GLB,, | Performed by: PHYSICIAN ASSISTANT

## 2023-08-09 PROCEDURE — 82607 VITAMIN B-12: CPT | Performed by: PHYSICIAN ASSISTANT

## 2023-08-09 PROCEDURE — 99999 PR PBB SHADOW E&M-EST. PATIENT-LVL I: CPT | Mod: PBBFAC,,, | Performed by: DIETITIAN, REGISTERED

## 2023-08-09 PROCEDURE — 3044F HG A1C LEVEL LT 7.0%: CPT | Mod: CPTII,S$GLB,, | Performed by: PHYSICIAN ASSISTANT

## 2023-08-09 PROCEDURE — 1160F PR REVIEW ALL MEDS BY PRESCRIBER/CLIN PHARMACIST DOCUMENTED: ICD-10-PCS | Mod: CPTII,S$GLB,, | Performed by: PHYSICIAN ASSISTANT

## 2023-08-09 PROCEDURE — 96360 HYDRATION IV INFUSION INIT: CPT

## 2023-08-09 PROCEDURE — 99499 NO LOS: ICD-10-PCS | Mod: S$GLB,,, | Performed by: DIETITIAN, REGISTERED

## 2023-08-09 PROCEDURE — 3008F BODY MASS INDEX DOCD: CPT | Mod: CPTII,S$GLB,, | Performed by: PHYSICIAN ASSISTANT

## 2023-08-09 PROCEDURE — 3061F NEG MICROALBUMINURIA REV: CPT | Mod: CPTII,S$GLB,, | Performed by: PHYSICIAN ASSISTANT

## 2023-08-09 PROCEDURE — 85025 COMPLETE CBC W/AUTO DIFF WBC: CPT | Performed by: PHYSICIAN ASSISTANT

## 2023-08-09 PROCEDURE — 3066F PR DOCUMENTATION OF TREATMENT FOR NEPHROPATHY: ICD-10-PCS | Mod: CPTII,S$GLB,, | Performed by: PHYSICIAN ASSISTANT

## 2023-08-09 RX ORDER — SODIUM CHLORIDE 0.9 % (FLUSH) 0.9 %
10 SYRINGE (ML) INJECTION
OUTPATIENT
Start: 2023-08-09

## 2023-08-09 RX ORDER — ONDANSETRON HCL IN 0.9 % NACL 8 MG/50 ML
8 INTRAVENOUS SOLUTION, PIGGYBACK (ML) INTRAVENOUS
Status: CANCELLED
Start: 2023-08-09 | End: 2023-08-09

## 2023-08-09 RX ORDER — ONDANSETRON HCL IN 0.9 % NACL 8 MG/50 ML
8 INTRAVENOUS SOLUTION, PIGGYBACK (ML) INTRAVENOUS
Status: DISCONTINUED | OUTPATIENT
Start: 2023-08-09 | End: 2023-08-09

## 2023-08-09 RX ORDER — SODIUM CHLORIDE 0.9 % (FLUSH) 0.9 %
10 SYRINGE (ML) INJECTION
Status: CANCELLED | OUTPATIENT
Start: 2023-08-09

## 2023-08-09 RX ORDER — HEPARIN 100 UNIT/ML
500 SYRINGE INTRAVENOUS
OUTPATIENT
Start: 2023-08-09

## 2023-08-09 RX ORDER — HEPARIN 100 UNIT/ML
500 SYRINGE INTRAVENOUS
Status: CANCELLED | OUTPATIENT
Start: 2023-08-09

## 2023-08-09 RX ORDER — ONDANSETRON 2 MG/ML
8 INJECTION INTRAMUSCULAR; INTRAVENOUS
Status: COMPLETED | OUTPATIENT
Start: 2023-08-09 | End: 2023-08-09

## 2023-08-09 RX ADMIN — ONDANSETRON 8 MG: 2 INJECTION, SOLUTION INTRAMUSCULAR; INTRAVENOUS at 11:08

## 2023-08-09 RX ADMIN — SODIUM CHLORIDE 1000 ML: 9 INJECTION, SOLUTION INTRAVENOUS at 11:08

## 2023-08-09 NOTE — PROGRESS NOTES
NUTRITION NOTE    Referring Physician: Roge Rich M.D.  Reason for MNT Referral: Follow-up 2 Weeks s/p Gastric Sleeve    Denies nausea, vomiting, constipation, and diarrhea.  Reports doing well.    Past Medical History:   Diagnosis Date    Diabetes mellitus     Diabetes mellitus, type 2     Eczema     GERD (gastroesophageal reflux disease)     Hypertension     Impaired fasting blood sugar     YSABEL on CPAP        CLINICAL DATA:  53 y.o. female.    CURRENT DIET:  Bariatric Liquid Diet    Diet Recall: 80 grams of protein/day; 48 oz of fluids/day    Diet Includes:   Protein Supplements: 2/day Premier shakes + Unjury soup mix    EXERCISE:  Walking 3 days/week at the park, early mornings    Restrictions to Exercise: None.    VITAMINS/MINERALS:  Opurity Multivitamin (with 45 mg iron, 24mg B1, 500mg B12) taking 1 per day  Opurity Calcium Citrate 1300 mg with vitamin D chewables, taking 4/day    ASSESSMENT:  Doing well overall.  Adequate protein intake.  Adequate fluid intake.    BARIATRIC DIET DISCUSSION:  Instructed and provided written materials on bariatric pureed and soft diet plans.  Reinforced post-op nutrition guidelines.    PLAN/RECOMMONDATIONS:  Advance to bariatric pureed diet.  May advance to bariatric soft diet in 2 weeks as anjelica.  Maintain protein intake.  Maintain fluid intake.  Continue light exercise.  Continue appropriate vitamins & minerals.      Return to clinic in 6 weeks.    SESSION TIME: 15 minutes

## 2023-08-09 NOTE — LETTER
August 9, 2023      [facility] 2nd Fl  [address]  [facility] [address]  Phone: [phone]  Fax: [phone]       Patient: Africa Jennings   YOB: 1970  Date of Visit: 08/09/2023    To Whom It May Concern:    Zaki Jennings  was at [facility] on 08/09/2023. The patient may return to work on 8/14/23 with restrictions no manipulation of ten pounds or greater for the next four weeks.  If you have any questions or concerns, or if I can be of further assistance, please do not hesitate to contact me.    Sincerely,    Brie Asif PA-C

## 2023-08-09 NOTE — PROGRESS NOTES
HR=54 bpm, XXHM=020/69 mmhg, IrE8=871.0 %, Resp=16 B/min, EtCO2=5 mmHg, Apnea=2 Seconds, Pain=0, Juwan=9, Cintron=2 Pt receives 1L NS over 1 hour per therapy plan orders, with 8mg Zofran;  pt tolerated well and left in nad;        Limited head-to-toe assessment due to privacy issues and visit reason though the opportunity was given for patient to express any concerns

## 2023-08-09 NOTE — PATIENT INSTRUCTIONS
High Protein Pureed Diet    2 weeks after gastric bypass and sleeve you may be ready to add pureed food to your diet.  All food should be the consistency of baby food, or thinner.  Follow pureed diet for the next 2 weeks.    Protein - It is very important to pay attention to protein intake during this time.      Inadequate protein intake can cause:  Delayed Wound Healing  Hair Loss  Muscle Breakdown    Meal Plan - Eat 3-4 meals per day (2-4 tbsp each), with protein supplements in between to meet protein needs.  Meeting protein needs daily will help increase healing, decrease muscle loss, and increase weight loss.  Your goal is  grams of protein a day.    Protein First - Always eat the foods with the highest protein first.  Foods high in protein include milk, yogurt, cheese, egg whites, and blenderized meat, seafood, and beans.    Fluids - Keep track in your journal of how much you are drinking; you should try to drink at least 64oz of fluids every day.      Foods allowed: Portion size Protein (g)   Sugar-free clear liquids As desired 0   Skim or 1% milk ½ cup 4   Sugar free pudding, light yogurt, custard (use skim or 1% milk in preparation) 3 oz 2.5   Strained baby food meats, or home-made pureed lean meats and shrimp 1 oz 7   Beans (red, white, black, lima, stuart, fat free refried, hummus) and lentils ¼ cup 4   Low-fat/fat free cheese.(cottage cheese, mozzarella string cheese, ricotta cheese, Laughing Cow, Baby Bell, cheddar, etc) ¼ cup 7-8   Scrambled eggs or Egg Beaters 1 or ¼ cup 6   Edamame or Tofu, mashed ¼ cup 5   Unflavored protein powder (add to 1 scoop to  98% fat free soups or SF pudding) 3 Tbsp 9   *PB2: peanut powder (45 calories) 2 Tbsp 5     *PB2 powdered peanut butter: 45 calories vs. 190 calories in 2 tbsp of regular peanut butter. Purchase online at [employer], or  at various [employer], [employer], [employer], [employer] and [employer].        Bariatric Liquid/Pureed Sample Menu    3-4 small meals  plus 2-3 protein drinks per day.    8am 1 egg or ¼ cup Egg Beaters   9am 1 cup water, or decaf coffee or tea   10am Protein drink, 30g protein   11am 2 tbsp low-fat cottage cheese, and 1 tbsp pureed peaches   12pm 1 cup water, or sugar-free lemonade    1pm 2 tbsp pureed chicken, and 1 tbsp pureed carrots    2pm 1 cup water, or sugar-free lemonade   3pm Protein drink, 30g protein   5pm 1 cup water    6pm 1 cup hi-protein creamy chicken soup 14g protein (see Recipe below)   7pm 1 cup water, or sugar-free fruit punch    8pm 1 cup water     This sample menu provides approx. 80g protein and 64oz fluids.  Liquid protein supplements should contain 20-30g protein and less than 4 grams of sugar each.    Sip fluids continuously in between meals.  Drink at least ¼ cup every 15 minutes.  For fluids: ¼ cup = 2 oz = 4 tbsp       RECIPE IDEAS for Bariatric Pureed Diet:    Hi-Protein Creamy Chicken Soup: (10g protein per 1 cup serving)  Empty 1 can of 98% fat free cream of chicken soup into saucepan. Then  blend 1 scoop of unflavored protein powder with 1 can of skim milk until smooth.  Add protein milk to saucepan and heat to warm. (Note: Do NOT boil. Protein powder may clump if heated too hot).     Hi-Protein Pudding: (14g protein per ½ cup serving)  Add 2 scoops protein powder to 2 cups cold skim milk and mix well.  Stir in dry Jell-O Sugar-Free Instant Pudding mix.  Chill and Enjoy!    Tuna Mousse (12g protein per ¼ cup serving) Page 135 in book Eating Well After Weight Loss Surgery.  In a  or , combine all ingredients and pulse until smooth.  2 6-ounce cans tuna packed in water, drained  2 tbsp low-fat mayonnaise  2 tbsp fat-free sour cream  2 tbsp fat-free cream cheese, softened  ½ cup shallots, finely chopped  1 tbsp lemon juice  ¼ tsp ground pepper  ½ tsp celery seed    Chocolate Peanut Butter Mousse  (28g protein total)  6oz plain Greek yogurt  4 tbsp chocolate PB2

## 2023-08-09 NOTE — LETTER
August 9, 2023      [facility] 2nd [facility]  [address]  [facility] [address]  Phone: [phone]  Fax: [phone]       Patient: Africa Jennings   YOB: 1970  Date of Visit: 08/09/2023    To Whom It May Concern:    Zaki Jennings  was at [facility] for surgery on 7/26/2023. 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 Ms. Jennings may return to work on 8/14/2023 with the restrictions of not being able to lift, push or pull anything greater than 10lbs for the first 6 weeks after surgery.  She may return to work on 9/6/2023 with no restrictions. If you have any questions or concerns, or if I can be of further assistance, please do not hesitate to contact me.    Sincerely,    Roge Rich MD  Section Head - General, Laparoscopic, Bariatric,  Acute Care and Oncologic Surgery  Bariatric   [facility] - [place], [place]

## 2023-08-09 NOTE — TELEPHONE ENCOUNTER
Called and spoke with the pt.  Discussed her RTW letter.  Will bring the letter to her at the infusion center.

## 2023-08-09 NOTE — PROGRESS NOTES
BARIATRIC POST-OPERATIVE VISIT:    HPI:  Africa Jennings is a 53 y.o. year old female presents for 2 week post op visit following s/p sleeve.  she is doing well and tolerating the diet without difficulty.  she has no complaints.      Blood sugars running well   Digital medicaine requested that she stopped taking HTN medications   Ozempic on back order not taking   Metformin taking         Denies: nausea, vomiting, abdominal pain, changes in bowel movement pattern, fever, chills, dysphagia, chest pain, and shortness of breath.    Review of Systems   Constitutional:  Negative for appetite change, fatigue and fever.   HENT:  Negative for tinnitus and trouble swallowing.    Eyes:  Negative for visual disturbance.   Respiratory:  Negative for cough, choking, chest tightness and shortness of breath.    Gastrointestinal:  Negative for abdominal pain, constipation, diarrhea, nausea and vomiting.   Genitourinary:  Negative for decreased urine volume and hematuria.   Skin:  Negative for rash.   Neurological:  Negative for dizziness, light-headedness and headaches.   Psychiatric/Behavioral:  Negative for dysphoric mood, sleep disturbance and suicidal ideas. The patient is not nervous/anxious.        EXERCISE & VITAMINS:  See Bariatric Assessment  Adherent to vitamin regimen   MEDICATIONS/ALLERGIES:  Have been reviewed.    DIET:  See Dietician note from today for a more detailed assessment.      Physical Exam  Constitutional:       Appearance: She is obese.   HENT:      Head: Normocephalic and atraumatic.   Eyes:      Extraocular Movements: Extraocular movements intact.      Conjunctiva/sclera: Conjunctivae normal.      Pupils: Pupils are equal, round, and reactive to light.   Cardiovascular:      Rate and Rhythm: Normal rate and regular rhythm.      Pulses: Normal pulses.      Heart sounds: Normal heart sounds. No murmur heard.  Pulmonary:      Effort: Pulmonary effort is normal. No respiratory distress.       Call about pneumonia vaccine (pneumococcal vaccine) for smoking and see if insurance     Start sertraline 50 mg once daily. Take daily as prescribed. Follow- up with me in 6 weeks.    Flexeril take half - 1 tablet up to three times daily for muscle spasms   Cold packs or heating pad  can take Ibuprofen and Tylenol  Drink plenty of fluids     Premature ejaculation -- Premature ejaculation is defined as ejaculation that occurs too early, before the man is ready. It causes distress in the man and/or his partner.     Treatments -- Antidepressant drugs prolong the time between arousal and ejaculation in some men. These are regarded as the most successful treatment for premature ejaculation. Antidepressants include selective serotonin reuptake inhibitors (SSRIs), such as sertraline and paroxetine. Men may take these medications on a regular (daily) basis; intermittent use (three to four hours before planned sex) works well for some patients. Other treatment options include topical lidocaine formulations and sex therapy.     Premature Ejaculation    Premature ejaculation is ejaculation that occurs prior to when a man wishes.    It is probably \"natural\" for healthy males to ejaculate quickly. Lasting longer is a learned behavior.    Premature ejaculation can also be aggravated by psychological factors such as guilt, fear, and performance anxiety.    Sometimes by becoming more familiar with the sensations nearing ejaculation you can slowly learn to gain more control over ejaculation.    If you want to enjoy a longer period of arousal without ejaculation you must be able to detect your point of no return. Masturbate and focus on this feeling. Once you have recognized this feeling do not let yourself get to this point. Stop moving or change positions. Another technique is the \"squeeze technique\"    The \"stop/start\" technique is done during thrusting (when you feel yourself getting close to \"your point of no return\") -- STOP-  Breath sounds: Normal breath sounds.   Abdominal:      General: Bowel sounds are normal.      Palpations: Abdomen is soft.      Tenderness: There is no abdominal tenderness.   Musculoskeletal:      Cervical back: Normal range of motion and neck supple.   Skin:     Capillary Refill: Capillary refill takes less than 2 seconds.   Neurological:      General: No focal deficit present.      Mental Status: She is alert and oriented to person, place, and time.   Psychiatric:         Mood and Affect: Mood normal.         Behavior: Behavior normal.         Thought Content: Thought content normal.         Judgment: Judgment normal.         ASSESSMENT:  - Morbid obesity s/p sleeve gastrectomy   - Co-morbidities: diabetes mellitus, GERD, hypertension, and obstructive sleep apnea  - Good  Weight loss, 16#'s and 17% EWL  - Great Exercise routine  - Good Diet  - Good Vitamin regimen    PLAN:  - Anti-Acid medication, PPI daily for 3 months  - No lifting more than 10 lbs for start/continue weeks  - Miralax daily for constipation  - Emphasized the importance of regular exercise and adherence to bariatric diet to achieve maximum weight loss.  - Encouraged patient to start/continue regular exercise.  - Follow-up with dietician to advance diet.  - Continue daily vitamins and medications.  - RTC in 2 weeks or sooner if needed.  - Call the office for any issues.  - Check labs today.   and press the pubic bone against your mates pubic bone. When the urge to ejaculate diminishes, start thrusting again. Repeat this procedure several times in a row.    How the Porfirio's \"squeeze technique\" is performed is as ejaculation approaches, the partner or person himself squeezes the glans(head of penis) firmly for 3-4 seconds, inhibiting ejaculation and reducing erection. This technique is done repetitively to achieve better control over ejaculation.    Medications can be effective to help delay ejaculation. However as with all medication there can be side effects (see package insert form or ask a pharmacist for details)    Premature ejaculation may be affected by psychological and/or emotional factors; therefore psychological counseling may be of benefit. Sexual counselors are available. Please ask for referrals if you are interested.                                 MUSCULAR LOW BACK PAIN    Low back pain is very common. The pain may begin immediately after exertion or injury. It may also come on suddenly after minor activity - usually stretching, twisting, or reaching.  Most often in these cases, a period of heavy use preceded the pain by as much as several days. The pain may be one sided or extend from side to side and as high as the ribs.  The pain results from spasm of the very large muscles in the back. People often speak of their back \"going out\" but in fact it is not dislocated. It may however be bent from spasm of nearby muscles.    TREATMENT: rest/anti-inflammatory pain medicine/ stretching and heat    REST: Often one to two days of rest in bed are necessary because of the pain. Resting for more than two or three days usually has no benefit.    HEAT:  A heating pad on a low setting for 15-30 minute three or four times daily will help relieve the pain and relax the muscles. Use a towel or shirt between the heating pad and your skin to prevent burns. Don't fall asleep on the pad. Some  people prefer to use ice which can be just as effective but is more work and sometimes messier. Ice is preferred for the first day or two when there is a sudden injury to the back (for example: after a car wreck).    STRETCHING:  After one day of rest, begin stretching your back in any direction that you can manage without severe pain. Lean to each side until tightness is felt and hold that position for twenty seconds. Repeat up to three times. Rotate your shoulders in each direction to the point of tightness and hold this for twenty seconds at a time as before. Lean forward and do the same.  This is the hardest and you may want to hold onto a chair for support or balance the first few times.    MEDICINES:  Medicines can help in two ways. First, by lessening the pain they make you more comfortable which helps your back muscles to relax. Second, anti-inflammatory medicines decrease the irritation in the muscles and the spine. These medicines work slowly and will take effect over about 3-4 days. It is important to take them regularly even if they do not seem to help immediately. For most people, the over the counter medicines are as effective as prescription medicines and are safer and less costly. The newer prescription once daily medicines are convenient but are more likely to cause bleeding in the stomach and liver irritation. Try the following:  Ibuprofen 200 mg:  two  tablets with food three times daily for one week.  Ask the doctor if this dose is correct for you if you are above or below average size.  Aleve 200 mg: two tablets twice daily with food for one week.    EXERCISE: The best exercise is walking. Walk for a few minutes three times daily. Your doctor may prescribe more specific strengthening exercises for your back or stomach after you are feeling better.    PREVENTION: Most people have several episodes of low back pain in their lifetime. If the episodes are repeated, you may need to make some  improvements in your habits:  Lose weight: Being overweight increases the stress on your back as well as other joints. Sometimes even a five to ten pound reduction will help a lot.  Walk anywhere you can.  Learn better lifting mechanics. Avoid lift and twist activities. Instead, lift the object and turn your feet and then set the object down.  Bend your knees when lifting whenever possible.    Using a back brace may help you learn to have better posture and bend at the knees.    COMPLICATIONS: Contact us if any of the following occur:  Pain longer than three weeks without improvement  Pain that goes down either leg  Weakness of either leg  Numbness or tingling in one leg  Fever that wakes you at night especially if you are drenched in perspiration  Unexplained weight loss.      Sertraline tablets  Brand Name: Zoloft  What is this medicine?  SERTRALINE (SER tra huseyin) is used to treat depression. It may also be used to treat obsessive compulsive disorder, panic disorder, post-trauma stress, premenstrual dysphoric disorder (PMDD) or social anxiety.  How should I use this medicine?  Take this medicine by mouth with a glass of water. Follow the directions on the prescription label. You can take it with or without food. Take your medicine at regular intervals. Do not take your medicine more often than directed. Do not stop taking this medicine suddenly except upon the advice of your doctor. Stopping this medicine too quickly may cause serious side effects or your condition may worsen.  A special MedGuide will be given to you by the pharmacist with each prescription and refill. Be sure to read this information carefully each time.  Talk to your pediatrician regarding the use of this medicine in children. While this drug may be prescribed for children as young as 7 years for selected conditions, precautions do apply.  What side effects may I notice from receiving this medicine?  Side effects that you should report to your  doctor or health care professional as soon as possible:  · allergic reactions like skin rash, itching or hives, swelling of the face, lips, or tongue  · anxious  · black, tarry stools  · changes in vision  · confusion  · elevated mood, decreased need for sleep, racing thoughts, impulsive behavior  · eye pain  · fast, irregular heartbeat  · feeling faint or lightheaded, falls  · feeling agitated, angry, or irritable  · hallucination, loss of contact with reality  · loss of balance or coordination  · loss of memory  · painful or prolonged erections  · restlessness, pacing, inability to keep still  · seizures  · stiff muscles  · suicidal thoughts or other mood changes  · trouble sleeping  · unusual bleeding or bruising  · unusually weak or tired  · vomiting  Side effects that usually do not require medical attention (report to your doctor or health care professional if they continue or are bothersome):  · change in appetite or weight  · change in sex drive or performance  · diarrhea  · increased sweating  · indigestion, nausea  · tremors  What may interact with this medicine?  Do not take this medicine with any of the following medications:  · certain medicines for fungal infections like fluconazole, itraconazole, ketoconazole, posaconazole, voriconazole  · cisapride  · disulfiram  · dofetilide  · linezolid  · MAOIs like Carbex, Eldepryl, Marplan, Nardil, and Parnate  · metronidazole  · methylene blue (injected into a vein)  · pimozide  · thioridazine  · ziprasidone  This medicine may also interact with the following medications:  · alcohol  · amphetamines  · aspirin and aspirin-like medicines  · certain medicines for depression, anxiety, or psychotic disturbances  · certain medicines for irregular heart beat like flecainide, propafenone  · certain medicines for migraine headaches like almotriptan, eletriptan, frovatriptan, naratriptan, rizatriptan, sumatriptan, zolmitriptan  · certain medicines for sleep  · certain  medicines for seizures like carbamazepine, valproic acid, phenytoin  · certain medicines that treat or prevent blood clots like warfarin, enoxaparin, dalteparin  · cimetidine  · digoxin  · diuretics  · fentanyl  · furazolidone  · isoniazid  · lithium  · NSAIDs, medicines for pain and inflammation, like ibuprofen or naproxen  · other medicines that prolong the QT interval (cause an abnormal heart rhythm)  · procarbazine  · rasagiline  · supplements like [place]'s wort, kava kava, valerian  · tolbutamide  · tramadol  · tryptophan  What if I miss a dose?  If you miss a dose, take it as soon as you can. If it is almost time for your next dose, take only that dose. Do not take double or extra doses.  Where should I keep my medicine?  Keep out of the reach of children.  Store at room temperature between 15 and 30 degrees C (59 and 86 degrees F). Throw away any unused medicine after the expiration date.  What should I tell my health care provider before I take this medicine?  They need to know if you have any of these conditions:  · bleeding disorders  · bipolar disorder or a family history of bipolar disorder  · glaucoma  · heart disease  · high blood pressure  · history of irregular heartbeat  · history of low levels of calcium, magnesium, or potassium in the blood  · if you often drink alcohol  · liver disease  · receiving electroconvulsive therapy  · seizures  · suicidal thoughts, plans, or attempt; a previous suicide attempt by you or a family member  · take medicines that treat or prevent blood clots  · thyroid disease  · an unusual or allergic reaction to sertraline, other medicines, foods, dyes, or preservatives  · pregnant or trying to get pregnant  · breast-feeding  What should I watch for while using this medicine?  Tell your doctor if your symptoms do not get better or if they get worse. Visit your doctor or health care professional for regular checks on your progress. Because it may take several weeks to see  the full effects of this medicine, it is important to continue your treatment as prescribed by your doctor.  Patients and their families should watch out for new or worsening thoughts of suicide or depression. Also watch out for sudden changes in feelings such as feeling anxious, agitated, panicky, irritable, hostile, aggressive, impulsive, severely restless, overly excited and hyperactive, or not being able to sleep. If this happens, especially at the beginning of treatment or after a change in dose, call your health care professional.  You may get drowsy or dizzy. Do not drive, use machinery, or do anything that needs mental alertness until you know how this medicine affects you. Do not stand or sit up quickly, especially if you are an older patient. This reduces the risk of dizzy or fainting spells. Alcohol may interfere with the effect of this medicine. Avoid alcoholic drinks.  Your mouth may get dry. Chewing sugarless gum or sucking hard candy, and drinking plenty of water may help. Contact your doctor if the problem does not go away or is severe.  NOTE:This sheet is a summary. It may not cover all possible information. If you have questions about this medicine, talk to your doctor, pharmacist, or health care provider. Copyright© 2018 [employer]

## 2023-08-09 NOTE — PATIENT INSTRUCTIONS
High Protein Pureed Diet    2 weeks after gastric bypass and sleeve you may be ready to add pureed food to your diet.  All food should be the consistency of baby food, or thinner.  Follow pureed diet for the next 2 weeks.    Protein - It is very important to pay attention to protein intake during this time.      Inadequate protein intake can cause:  Delayed Wound Healing  Hair Loss  Muscle Breakdown    Meal Plan - Eat 3-4 meals per day (2-4 tbsp each), with protein supplements in between to meet protein needs.  Meeting protein needs daily will help increase healing, decrease muscle loss, and increase weight loss.  Your goal is  grams of protein a day.    Protein First - Always eat the foods with the highest protein first.  Foods high in protein include milk, yogurt, cheese, egg whites, and blenderized meat, seafood, and beans.    Fluids - Keep track in your journal of how much you are drinking; you should try to drink at least 64oz of fluids every day.      Foods allowed: Portion size Protein (g)   Sugar-free clear liquids As desired 0   Skim or 1% milk ½ cup 4   Sugar free pudding, light yogurt, custard (use skim or 1% milk in preparation) 3 oz 2.5   Strained baby food meats, or home-made pureed lean meats and shrimp 1 oz 7   Beans (red, white, black, lima, stuart, fat free refried, hummus) and lentils ¼ cup 4   Low-fat/fat free cheese.(cottage cheese, mozzarella string cheese, ricotta cheese, Laughing Cow, Baby Bell, cheddar, etc) ¼ cup 7-8   Scrambled eggs or Egg Beaters 1 or ¼ cup 6   Edamame or Tofu, mashed ¼ cup 5   Unflavored protein powder (add to 1 scoop to  98% fat free soups or SF pudding) 3 Tbsp 9   *PB2: peanut powder (45 calories) 2 Tbsp 5     *PB2 powdered peanut butter: 45 calories vs. 190 calories in 2 tbsp of regular peanut butter. Purchase online at [employer], or  at various [employer], [employer], [employer], [employer] and [employer].        Bariatric Liquid/Pureed Sample Menu    3-4 small meals  plus 2-3 protein drinks per day.      8-8:30am 1 egg or ¼ cup Egg Beaters   9-9:30am 1 cup water, or decaf coffee or tea   10-10:30am Protein drink, 30g protein   11-11:30am 2 tbsp low-fat cottage cheese, and 1 tbsp pureed peaches   12-12:30pm 1 cup water, or sugar-free lemonade    1-1:30pm 2 tbsp pureed chicken, and 1 tbsp pureed carrots    2-2:30pm 1 cup water, or sugar-free lemonade   3-3:30pm Protein drink, 30g protein   5-5:30pm 1 cup water    6-6:30pm 1 cup hi-protein creamy chicken soup 14g protein (see Recipe below)   7-7:30pm 1 cup water, or sugar-free fruit punch    8-8:30pm 1 cup water       This sample menu provides approx. 80g protein and 64oz fluids.  Liquid protein supplements should contain 20-30g protein and less than 4 grams of sugar each.    Sip fluids continuously in between meals.  Drink at least ¼ cup every 15 minutes.  For fluids: ¼ cup = 2 oz = 4 tbsp       RECIPE IDEAS for Bariatric Pureed Diet:    Hi-Protein Creamy Chicken Soup: (10g protein per 1 cup serving)  Empty 1 can of 98% fat free cream of chicken soup into saucepan. Then  blend 1 scoop of unflavored protein powder with 1 can of skim milk until smooth.  Add protein milk to saucepan and heat to warm. (Note: Do NOT boil. Protein powder may clump if heated too hot).     Hi-Protein Pudding: (14g protein per ½ cup serving)  Add 2 scoops protein powder to 2 cups cold skim milk and mix well.  Stir in dry Jell-O Sugar-Free Instant Pudding mix.  Chill and Enjoy!    Tuna Mousse (12g protein per ¼ cup serving) Page 135 in book Eating Well After Weight Loss Surgery.  In a  or , combine all ingredients and pulse until smooth.  2 6-ounce cans tuna packed in water, drained  2 tbsp low-fat mayonnaise  2 tbsp fat-free sour cream  2 tbsp fat-free cream cheese, softened  ½ cup shallots, finely chopped  1 tbsp lemon juice  ¼ tsp ground pepper  ½ tsp celery seed    Chocolate Peanut Butter Mousse  (28g protein total)  6oz plain Greek  yogurt  4 tbsp chocolate PB2

## 2023-08-10 ENCOUNTER — OFFICE VISIT (OUTPATIENT)
Dept: FAMILY MEDICINE | Facility: CLINIC | Age: 53
End: 2023-08-10
Payer: COMMERCIAL

## 2023-08-10 VITALS
TEMPERATURE: 98 F | BODY MASS INDEX: 38.09 KG/M2 | HEIGHT: 62 IN | OXYGEN SATURATION: 96 % | DIASTOLIC BLOOD PRESSURE: 68 MMHG | WEIGHT: 207 LBS | SYSTOLIC BLOOD PRESSURE: 104 MMHG | HEART RATE: 70 BPM

## 2023-08-10 DIAGNOSIS — I10 ESSENTIAL HYPERTENSION: ICD-10-CM

## 2023-08-10 DIAGNOSIS — K21.9 GASTROESOPHAGEAL REFLUX DISEASE WITHOUT ESOPHAGITIS: ICD-10-CM

## 2023-08-10 DIAGNOSIS — E66.01 SEVERE OBESITY (BMI 35.0-39.9) WITH COMORBIDITY: ICD-10-CM

## 2023-08-10 DIAGNOSIS — G47.33 OSA ON CPAP: ICD-10-CM

## 2023-08-10 DIAGNOSIS — E11.9 TYPE 2 DIABETES MELLITUS WITHOUT COMPLICATION, WITHOUT LONG-TERM CURRENT USE OF INSULIN: Primary | ICD-10-CM

## 2023-08-10 PROCEDURE — 1111F DSCHRG MED/CURRENT MED MERGE: CPT | Mod: CPTII,S$GLB,, | Performed by: FAMILY MEDICINE

## 2023-08-10 PROCEDURE — 1160F PR REVIEW ALL MEDS BY PRESCRIBER/CLIN PHARMACIST DOCUMENTED: ICD-10-PCS | Mod: CPTII,S$GLB,, | Performed by: FAMILY MEDICINE

## 2023-08-10 PROCEDURE — 3044F PR MOST RECENT HEMOGLOBIN A1C LEVEL <7.0%: ICD-10-PCS | Mod: CPTII,S$GLB,, | Performed by: FAMILY MEDICINE

## 2023-08-10 PROCEDURE — 3008F PR BODY MASS INDEX (BMI) DOCUMENTED: ICD-10-PCS | Mod: CPTII,S$GLB,, | Performed by: FAMILY MEDICINE

## 2023-08-10 PROCEDURE — 3044F HG A1C LEVEL LT 7.0%: CPT | Mod: CPTII,S$GLB,, | Performed by: FAMILY MEDICINE

## 2023-08-10 PROCEDURE — 3066F PR DOCUMENTATION OF TREATMENT FOR NEPHROPATHY: ICD-10-PCS | Mod: CPTII,S$GLB,, | Performed by: FAMILY MEDICINE

## 2023-08-10 PROCEDURE — 3008F BODY MASS INDEX DOCD: CPT | Mod: CPTII,S$GLB,, | Performed by: FAMILY MEDICINE

## 2023-08-10 PROCEDURE — 3066F NEPHROPATHY DOC TX: CPT | Mod: CPTII,S$GLB,, | Performed by: FAMILY MEDICINE

## 2023-08-10 PROCEDURE — 1159F PR MEDICATION LIST DOCUMENTED IN MEDICAL RECORD: ICD-10-PCS | Mod: CPTII,S$GLB,, | Performed by: FAMILY MEDICINE

## 2023-08-10 PROCEDURE — 3061F NEG MICROALBUMINURIA REV: CPT | Mod: CPTII,S$GLB,, | Performed by: FAMILY MEDICINE

## 2023-08-10 PROCEDURE — 2023F PR DILATED RETINAL EXAM W/O EVID OF RETINOPATHY: ICD-10-PCS | Mod: CPTII,S$GLB,, | Performed by: FAMILY MEDICINE

## 2023-08-10 PROCEDURE — 99214 PR OFFICE/OUTPT VISIT, EST, LEVL IV, 30-39 MIN: ICD-10-PCS | Mod: S$GLB,,, | Performed by: FAMILY MEDICINE

## 2023-08-10 PROCEDURE — 99999 PR PBB SHADOW E&M-EST. PATIENT-LVL V: ICD-10-PCS | Mod: PBBFAC,,, | Performed by: FAMILY MEDICINE

## 2023-08-10 PROCEDURE — 3078F DIAST BP <80 MM HG: CPT | Mod: CPTII,S$GLB,, | Performed by: FAMILY MEDICINE

## 2023-08-10 PROCEDURE — 1159F MED LIST DOCD IN RCRD: CPT | Mod: CPTII,S$GLB,, | Performed by: FAMILY MEDICINE

## 2023-08-10 PROCEDURE — 1160F RVW MEDS BY RX/DR IN RCRD: CPT | Mod: CPTII,S$GLB,, | Performed by: FAMILY MEDICINE

## 2023-08-10 PROCEDURE — 99214 OFFICE O/P EST MOD 30 MIN: CPT | Mod: S$GLB,,, | Performed by: FAMILY MEDICINE

## 2023-08-10 PROCEDURE — 99999 PR PBB SHADOW E&M-EST. PATIENT-LVL V: CPT | Mod: PBBFAC,,, | Performed by: FAMILY MEDICINE

## 2023-08-10 PROCEDURE — 3078F PR MOST RECENT DIASTOLIC BLOOD PRESSURE < 80 MM HG: ICD-10-PCS | Mod: CPTII,S$GLB,, | Performed by: FAMILY MEDICINE

## 2023-08-10 PROCEDURE — 1111F PR DISCHARGE MEDS RECONCILED W/ CURRENT OUTPATIENT MED LIST: ICD-10-PCS | Mod: CPTII,S$GLB,, | Performed by: FAMILY MEDICINE

## 2023-08-10 PROCEDURE — 3074F SYST BP LT 130 MM HG: CPT | Mod: CPTII,S$GLB,, | Performed by: FAMILY MEDICINE

## 2023-08-10 PROCEDURE — 2023F DILAT RTA XM W/O RTNOPTHY: CPT | Mod: CPTII,S$GLB,, | Performed by: FAMILY MEDICINE

## 2023-08-10 PROCEDURE — 3061F PR NEG MICROALBUMINURIA RESULT DOCUMENTED/REVIEW: ICD-10-PCS | Mod: CPTII,S$GLB,, | Performed by: FAMILY MEDICINE

## 2023-08-10 PROCEDURE — 3074F PR MOST RECENT SYSTOLIC BLOOD PRESSURE < 130 MM HG: ICD-10-PCS | Mod: CPTII,S$GLB,, | Performed by: FAMILY MEDICINE

## 2023-08-10 NOTE — PROGRESS NOTES
"Routine Office Visit    Africa Jennings  1970  [id number]      Subjective     Africa is a 53 y.o. female who presents today for:    S/p bariatric surgery - Patient has lost approximately 20 pounds since her surgery. She is following bariatric plan and is still on mostly liquid diet. She is transitioning to soft foods.    Diabetes - Patient has not been able to fill semaglutide due to back order. She has been holding metformin since surgery. Blood glucose checked has been lower in the 80s-90s.   S/p robotic decompression surgery and fusion - she has been doing low impact cardio   Hypertension - bp is well controlled. She has not been taking amlodipine.     Objective     Review of Systems   Constitutional:  Negative for chills and fever.   HENT:  Negative for congestion.    Eyes:  Negative for blurred vision.   Respiratory:  Negative for cough.    Cardiovascular:  Negative for chest pain.   Gastrointestinal:  Negative for abdominal pain, constipation, diarrhea, heartburn, nausea and vomiting.   Genitourinary:  Negative for dysuria.   Musculoskeletal:  Negative for myalgias.   Skin:  Negative for itching and rash.   Neurological:  Negative for dizziness and headaches.   Psychiatric/Behavioral:  Negative for depression.      Do you have leakage of urine? No    /68   Pulse 70   Temp 97.6 °F (36.4 °C) (Oral)   Ht 5' 2" (1.575 m)   Wt 93.9 kg (207 lb 0.2 oz)   LMP  (LMP Unknown)   SpO2 96%   BMI 37.86 kg/m²   Physical Exam  Constitutional:       Appearance: Normal appearance. She is well-developed. She is obese.   HENT:      Head: Normocephalic and atraumatic.      Nose: Nose normal.   Eyes:      Extraocular Movements: Extraocular movements intact.      Conjunctiva/sclera: Conjunctivae normal.      Pupils: Pupils are equal, round, and reactive to light.   Cardiovascular:      Rate and Rhythm: Normal rate and regular rhythm.      Pulses: Normal pulses.      Heart sounds: Normal heart sounds. " No murmur heard.     No friction rub. No gallop.   Pulmonary:      Effort: Pulmonary effort is normal. No respiratory distress.      Breath sounds: Normal breath sounds.   Abdominal:      General: Bowel sounds are normal. There is no distension.      Palpations: Abdomen is soft.      Tenderness: There is no abdominal tenderness.   Musculoskeletal:         General: Normal range of motion.      Cervical back: Normal range of motion and neck supple.   Lymphadenopathy:      Cervical: No cervical adenopathy.   Skin:     General: Skin is warm.   Neurological:      Mental Status: She is alert and oriented to person, place, and time.   Psychiatric:         Mood and Affect: Mood normal.             Assessment     Health Maintenance         Date Due Completion Date    Influenza Vaccine (1) 09/01/2023 10/11/2022    Pneumococcal Vaccines (Age 0-64) (2 - PCV) 10/11/2023 10/11/2022    Mammogram 12/01/2023 12/1/2022    Hemoglobin A1c 01/05/2024 7/5/2023    Foot Exam 01/09/2024 1/9/2023    Override on 1/9/2023: Done    Override on 10/12/2021: Done    Override on 9/29/2020: Done    Override on 2/13/2020: Done    Eye Exam 01/11/2024 1/11/2023    Diabetes Urine Screening 07/05/2024 7/5/2023    Lipid Panel 07/05/2024 7/5/2023    Low Dose Statin 08/10/2024 8/10/2023    Colorectal Cancer Screening 06/21/2028 6/21/2018    TETANUS VACCINE 05/13/2031 5/13/2021              Problem List Items Addressed This Visit          Cardiac/Vascular    Essential hypertension (Chronic)  The current medical regimen is effective;  continue present plan and medications.   Stop amlodipine        Endocrine    Diabetes mellitus - Primary    Relevant Orders    Comprehensive Metabolic Panel    Hemoglobin A1C  Hold metformin   Take semagludie when no longer on back order   The current medical regimen is effective;  continue present plan and medications.          GI    Gastroesophageal reflux disease without esophagitis (Chronic)  The current medical regimen is  effective;  continue present plan and medications.          Other    YSABEL on CPAP  Noted in chart        Other Visit Diagnoses       Severe obesity (BMI 35.0-39.9) with comorbidity      The patient is asked to make an attempt to improve diet and exercise patterns to aid in medical management of this problem.  Continue f/u with bariatric team                     F/u 6 months with AKBAR   F/u 12 months with me

## 2023-08-14 LAB — VIT B1 BLD-MCNC: 60 UG/L (ref 38–122)

## 2023-08-25 DIAGNOSIS — N95.1 HOT FLASH, MENOPAUSAL: ICD-10-CM

## 2023-08-25 DIAGNOSIS — N95.1 MENOPAUSE SYNDROME: ICD-10-CM

## 2023-08-26 ENCOUNTER — PATIENT MESSAGE (OUTPATIENT)
Dept: BARIATRICS | Facility: CLINIC | Age: 53
End: 2023-08-26
Payer: COMMERCIAL

## 2023-08-26 ENCOUNTER — PATIENT MESSAGE (OUTPATIENT)
Dept: PAIN MEDICINE | Facility: CLINIC | Age: 53
End: 2023-08-26
Payer: COMMERCIAL

## 2023-08-28 ENCOUNTER — OFFICE VISIT (OUTPATIENT)
Dept: PAIN MEDICINE | Facility: CLINIC | Age: 53
End: 2023-08-28
Payer: COMMERCIAL

## 2023-08-28 DIAGNOSIS — M54.16 LUMBAR RADICULOPATHY, RIGHT: Primary | ICD-10-CM

## 2023-08-28 DIAGNOSIS — M51.36 DDD (DEGENERATIVE DISC DISEASE), LUMBAR: ICD-10-CM

## 2023-08-28 PROCEDURE — 3044F HG A1C LEVEL LT 7.0%: CPT | Mod: CPTII,95,, | Performed by: ANESTHESIOLOGY

## 2023-08-28 PROCEDURE — 3061F PR NEG MICROALBUMINURIA RESULT DOCUMENTED/REVIEW: ICD-10-PCS | Mod: CPTII,95,, | Performed by: ANESTHESIOLOGY

## 2023-08-28 PROCEDURE — 99214 PR OFFICE/OUTPT VISIT, EST, LEVL IV, 30-39 MIN: ICD-10-PCS | Mod: 95,,, | Performed by: ANESTHESIOLOGY

## 2023-08-28 PROCEDURE — 3066F PR DOCUMENTATION OF TREATMENT FOR NEPHROPATHY: ICD-10-PCS | Mod: CPTII,95,, | Performed by: ANESTHESIOLOGY

## 2023-08-28 PROCEDURE — 99214 OFFICE O/P EST MOD 30 MIN: CPT | Mod: 95,,, | Performed by: ANESTHESIOLOGY

## 2023-08-28 PROCEDURE — 3061F NEG MICROALBUMINURIA REV: CPT | Mod: CPTII,95,, | Performed by: ANESTHESIOLOGY

## 2023-08-28 PROCEDURE — 3066F NEPHROPATHY DOC TX: CPT | Mod: CPTII,95,, | Performed by: ANESTHESIOLOGY

## 2023-08-28 PROCEDURE — 3044F PR MOST RECENT HEMOGLOBIN A1C LEVEL <7.0%: ICD-10-PCS | Mod: CPTII,95,, | Performed by: ANESTHESIOLOGY

## 2023-08-28 NOTE — PROGRESS NOTES
Chronic Pain-Tele-Medicine-Established Note (Follow up visit)      The patient location is: Work  The chief complaint leading to consultation is: R sided lower back and focal R L5 radicular pain  Visit type: Virtual visit with synchronous audio and video  Total time spent with patient: 20 mins  Each patient to whom he or she provides medical services by telemedicine is:  (1) informed of the relationship between the physician and patient and the respective role of any other health care provider with respect to management of the patient; and (2) notified that he or she may decline to receive medical services by telemedicine and may withdraw from such care at any time.    Notes:     SUBJECTIVE:    Interval History 8/28/2023:  Africa Jennings presents tele-medicine appointment for a follow-up appointment for R sided lower back and focal R L5 radicular pain. She states leg pain > back pain. She describes a sharp, shooting pain in her R buttocks that radiates to her R mid calf posteriorly and is associated with burning, numbness, and tingling. Worse with activity, bending, lifting, night time. She states the R leg is subjectively weaker than the left.. Since the last visit, Africa Jennings states the pain has been worsening. Current pain intensity is 7/10. Current medications include lyrica, zanaflex, tylenol with some relief. She has completed physical therapy and aqua therapy (March 2023 - April 2023) to help strengthen her back and notes benefit. She continues with Home exercise program and is waiting to hear back from [facility] to schedule her.     Patient was supposed to have R L4/5 and L5/S1 TFESI done since last visit, however, there was some issue with scheduling or insurance (the patient is unsure which). She is interested in having the procedure rescheduled to help alleviate her symptoms. Patient denies red flags including weakness, unexpected weight loss/gain, night  sweats/fevers, saddle anesthesia, and symptoms of VERA.       Interval History 4/26/2023:  Mrs Jennings presents for follow up. Over interval she has had T9-12 Thoracic decompression by Dr Whitt on 10/28/2022. She is doing well since then but continues to have R sided lower back and focal R L5 radicular pain. She states back pain = leg pain. She states the R leg is subjectively weaker than the left. Denies any s/s concerning for cauda equina. Pain has limited functioning and she has Completed PT/Aquatherapy for >6 weeks.      Interval History 3/16/2022:  Pt presents for follow up of. She has had L4/5 ILESI and left SI injection in past with benefit. She has more vocal pain to right and associated radiculopathy down right leg in L4/5 pattern. She had no recent MRI in past 2 years and known lumbar discogenic issues but this was more focal to left. She has no complaint of loss of b/b or saddle paresthesias.      Interval History  2/22/2022:   Mrs Jennings presents for virtual follow up of lower back and left sided buttock pain. She is now s/p Left SIJ and states 90% improved pain and feels her relief was more significant than MARILEE. She states pain more noticeable to right side now without radicular complaint. There is no focal voicing of loss of b/b or saddle paresthesias.      Interval History 1/25/2021:  Mrs Jennings presents for follow up of lower back pain. She is s/p L4/5 ILESI with 90% benefit from lower back pain. She has lingering lower buttock/back pain to left side. Increased pain when sitting or stairs. Denies radiculopathy. Denies any loss of b/b or saddle paresthesias.      Interval History 12/23/2021:  Mrs Jennings presents to establish care at [facility]. She states continued lower back pain to left side but leg pain/paresthesias=back pain. She states left lower back pain and radiation in L4/5 pattern but also on right side. She has MRI findings to left L4/5 NF disc protrusion. She has been  doing HEP she learned through [facility] for over 6 months straight and continues with no lasting benefit. She is currently taking Mobic 15mg and Neurontin 300mg TID. There is no complaint of loss of b/b or saddle paresthesias.      Interval History (4/13/20):     The patient location is: home  The chief complaint leading to consultation is: follow up  Visit type: Virtual visit with audio.  Patient verbally consented for audio visit.  Total time spent with patient: 15 minutes  Each patient to whom he or she provides medical services by telemedicine is:  (1) informed of the relationship between the physician and patient and the respective role of any other health care provider with respect to management of the patient; and (2) notified that he or she may decline to receive medical services by telemedicine and may withdraw from such care at any time.        She returns today for follow up.  She reports that her right-sided low back and lower extremity pain has returned since last encounter.  She denies any changes in the quality or location of pain.  She continues to report associated numbness.  She denies any associated weakness or bowel bladder dysfunction.  She has attempted gabapentin 600 mg t.i.d..  She states this medication did not provide any relief.  She has also tried goody's powder, Motrin, Tylenol without any relief.          Interval History (2/17/20):  She returns today for follow up and MRI review.  She reports that her pain is unchanged in quality and location since last encounter.  She does state that the pain has significantly improved and is overall not very bothersome for the time being.          Initial Encounter (2/3/20):  Africa Jennings is a 53 y.o. female who presents today with chronic right-sided low back and lower extremity pain. This pain has been present for 6-7 months.  No specific inciting event or injury noted.  Patient localizes the pain to the right lumbar region.  Pain  radiates to the right posterior and anterior thigh.  She reports associated numbness in this area as well. She also reports weakness of the right lower extremity when ambulating.  She denies any associated bowel or bladder dysfunction.  The pain is constant and worsened with activity.  She states that the pain interrupt her sleep.  Patient does have a history of a similar problem roughly 8 years ago.  She underwent epidural steroid injections with good relief.  This pain is described in detail below.    Pain Scores:     Best:       6/10  Worst:     10/10  Usually:   8/10  Today:    0/10     Physical Therapy:  Yes     Non-pharmacologic Treatment:  Rest helps         TENS?  No     Pain Medications:         Currently taking:  Aleve, gabapentin     Has tried in the past:  Tylenol, Motrin     Has not tried:  Opioids, Tylenol, Muscle relaxants, TCAs, SNRIs, anticonvulsants, topical creams     report:  Reviewed and consistent with medication use as prescribed.     Blood thinners:  None     Relevant Surgeries:  None     Affecting sleep?  Yes     Affecting daily activities? yes     Depressive symptoms? no          SI/HI? No     Work status: Employed     Pain Procedures:   - L4-5 interlaminar epidural steroid injection x2  - 1/11/2022 L4/5 ILESI 90% benefit    - 2/8/2022 Left SIJ injection      Imaging:   CT Thoracic Spine Without Contrast  Order: [id number]  Status: Final result     Visible to patient: Yes (seen)     Next appt: 09/18/2023 at 08:45 AM in Dermatology (Basilia Valdez MD)     Dx: S/P fusion of thoracic spine     0 Result Notes  Details    Reading Physician Reading Date Result Priority   Tony Powell MD  [phone] 1/30/2023 Routine   Dex Bojorquez MD  [phone]  [phone] 1/30/2023      Narrative & Impression  EXAMINATION:  CT THORACIC SPINE WITHOUT CONTRAST     CLINICAL HISTORY:  s/p T9-12 fusion;  Arthrodesis status     TECHNIQUE:  CT thoracic spine performed without contrast.   Coronal and sagittal reformats provided.     COMPARISON:  X-ray thoracic spine 12/12/2022, x-ray thoracolumbar spine 10/29/2022, MRI thoracic spine 06/03/2022, CT thoracic spine 06/03/2022.     FINDINGS:  Postsurgical change of posterior spinal instrumented fusion T9-T12, T9-12 laminectomies and medial facetectomies and decortication of the T9-12 posterior elements and placement of a mixture of autologous and synthetic bone into the bilateral gutters for arthrodesis.  No hardware fracture or periprosthetic lucency to suggest hardware loosening.     Alignment: Stable, slightly pronounced kyphotic curvature.  Grade 1 retrolisthesis T11-12.     Vertebrae: Vertebral body heights are well maintained.  No fracture.  Stable sclerotic focus in the T5 vertebral body likely representing a bone island.  No lytic or blastic lesion.  Morselized synthetic and autologous bone in the bilateral T9-T12 gutters with noted abutment at the T12 posterior elements likely representing fusion.     Discs: Normal height.  Vacuum disc phenomenon at T8-9 and T11-12.     Degenerative findings:     C7-L1: No spinal canal stenosis or neural foraminal narrowing.     Paraspinal muscles & soft tissues: Cholecystectomy.  High attenuation focus in the posterior right upper quadrant possibly represent calcification or surgical clip.  Otherwise unremarkable.     Impression:     1. Postsurgical changes at T9-T12, as above, without evidence of hardware loosening or fracture.  2. Additional findings as above.     Electronically signed by resident: Dex Bojorquez  Date:                                            01/30/2023  Time:                                           13:59     Electronically signed by: Tony Powell MD  Date:                                            01/30/2023  Time:                                           15:43       MRI Thoracic Spine Without Contrast  Order: [id number]  Status: Final result     Visible to patient: Yes (seen)      Next appt: 09/18/2023 at 08:45 AM in Dermatology (Basilia Valdez MD)     Dx: Thoracic spinal stenosis     0 Result Notes  Details    Reading Physician Reading Date Result Priority   Tony Powell MD  [phone] 6/4/2022 Routine     Narrative & Impression  EXAMINATION:  MRI THORACIC SPINE WITHOUT CONTRAST     CLINICAL HISTORY:  T9-11 stenosis, please focus on these levels.;  Spinal stenosis, thoracic region     TECHNIQUE:  Multiplanar, multisequence images were performed through the thoracic spine.  Contrast was not administered.     COMPARISON:  MRI 03/31/2022, CT 06/03/2022.     FINDINGS:  Thoracic spine alignment demonstrates a slightly pronounced kyphotic curvature.  No spondylolisthesis.  Vertebral body heights are well maintained without evidence fracture.  There is slight heterogeneous marrow signal intensity, similar when compared to the previous exam and favored to relate to benign reconversion.  No marrow signal abnormality to suggest an infiltrative process.     Persistent focal area of increased T2/STIR cord signal at the T10-T11 level, similar when compared to the previous exam concerning for myelomalacia.  Remaining visualized spinal cord demonstrates normal contour and signal intensity.     There is a 1.1 cm T2 hypointense right thyroid nodule.  There are multiple colonic diverticuli.  Remaining visualized thoracic and upper abdominal organs demonstrate no significant abnormalities.  Paraspinal musculature demonstrates normal bulk and signal intensity.     T1-T2: Bilateral facet arthropathy contributes to moderate left neural foraminal narrowing.  No spinal canal stenosis.     T2-T3: Broad-based disc osteophyte complex causes mild mass effect on the ventral thecal sac.  Left facet arthropathy.  Findings contribute to mild-to-moderate left and mild right neural foraminal narrowing.  No spinal canal stenosis.     T7-T8: Circumferential disc osteophyte complex, bilateral facet arthropathy and  right ligamentum flavum buckling.  Findings contribute to moderate right neural foraminal narrowing.  No spinal canal stenosis.     T8-T9: Circumferential disc osteophyte complex and mild bilateral ligamentum flavum buckling.  Findings contribute to mild right neural foraminal narrowing.  No spinal canal stenosis.     T9-T10: Circumferential disc osteophyte complex and bilateral ligamentum flavum buckling.  Findings contribute to mild spinal canal stenosis and moderate right neural foraminal narrowing.     T10-T11: Circumferential disc osteophyte complex, bilateral facet arthropathy and bilateral ligamentum flavum buckling.  Findings contribute to severe spinal canal stenosis and moderate right and mild left neural foraminal narrowing.     Impression:     1. Multilevel degenerative changes of the thoracic spine most pronounced at T10-T11 noting severe spinal canal stenosis and mild-to-moderate neural foraminal narrowing.  2. Persistent focal area of cord signal abnormality at T10-T11, similar when compared to MRI most suggestive of myelomalacia.        Electronically signed by: Tony Powell MD  Date:                                            06/04/2022  Time:                                           09:05       MRI Lumbar Spine Without Contrast  Order: [id number]  Status: Final result     Visible to patient: Yes (seen)     Next appt: 09/18/2023 at 08:45 AM in Dermatology (Basilia Valdez MD)     Dx: Dorsalgia, unspecified     1 Result Note  Details    Reading Physician Reading Date Result Priority   Maximo Mcallister Jr., MD  [phone] 3/25/2022 Routine     Narrative & Impression  EXAMINATION:  MRI LUMBAR SPINE WITHOUT CONTRAST     CLINICAL HISTORY:  Dorsalgia, unspecifiedLow back pain, symptoms persist with > 6wks conservative treatment;     TECHNIQUE:  Sagittal T1, sagittal T2, sagittal STIR, axial T1 and axial T2 weighted images of the lumbar spine obtained without contrast.     COMPARISON:  Similar  study 02/10/2020     FINDINGS:  Lumbar spine alignment is within normal limits. The vertebral body heights are well maintained, with no fracture.  Degenerative sclerotic marrow endplate change at S1 and fatty metaplasia degenerative endplate change at T12.  Otherwise marrow signal normal.     The conus is normal in appearance.  The adjacent soft tissue structures show no significant abnormalities.     Spinal canal is congenitally narrow on the basis of short pedicles.  At the T10-11 level there is extradural indentation of the thecal sac from posteriorly and there is some broad-based disc bulging.  Transverse images do not cover this level.  There is at least moderate spinal stenosis at this level.     L1-L2: No focal disc herniation.No significant central canal or neural foraminal narrowing.     L2-L3: No focal disc herniation.  Mild facet arthrosis.  No significant central canal or neural foraminal narrowing.     L3-L4: No evidence of a disc herniation.  No significant central canal or neural foraminal narrowing.     L4-L5: Broad-based disc bulging with left-sided foraminal and far lateral annular fissure and superimposed mild spondylitic spurring. Facet arthrosis contributes to severe left-sided neural foraminal narrowing.  Moderate central spinal stenosis.     L5-S1: Broad-based disc bulging, facet arthrosis and ligamentum flavum hypertrophy result in severe bilateral neural foraminal stenosis.  Mild central spinal stenosis.     Impression:     Extradural degenerative changes at T10-11, L4-5, and L5-S1 resulting in central canal and foraminal stenosis as detailed above.  Dedicated imaging of the thoracic spine could lend additional information regarding findings at T10-11     This report was flagged in Epic as abnormal.        Electronically signed by: Maximo Aguirre Jr  Date:                                            03/25/2022  Time:                                           09:06       Past Medical  History:   Diagnosis Date    Diabetes mellitus     Diabetes mellitus, type 2     Eczema     GERD (gastroesophageal reflux disease)     Hypertension     Impaired fasting blood sugar     YSABEL on CPAP      Past Surgical History:   Procedure Laterality Date    breast reduction      CHOLECYSTECTOMY      laprascopic    DECOMPRESSION OF THORACIC OUTLET N/A 10/28/2022    Procedure: T9-12 ROBOTIC DECOMPRESSION, THORACIC FUSION;  Surgeon: Edouard Whitt DO;  Location: [facility] OR 2ND FLR;  Service: Neurosurgery;  Laterality: N/A;  ANESTHESIA GENERAL [place] I BLOOD TYPE & SCREEN NERVE MONITORING EMG SEP MEP POSTION PRONE BED CARA 4 POST HEAD REST [facility] RADIOLOGY C-ARM GLOBUS PROTOCOL MISCELLANEOUS ALANIZ BRACE TLSO    EPIDURAL STEROID INJECTION N/A 1/11/2022    Procedure: INJECTION, STEROID, EPIDURAL IL L4/5 NEEDS CONSENT;  Surgeon: Britt Calix MD;  Location: [facility] PAIN MGT;  Service: Pain Management;  Laterality: N/A;    ESOPHAGOGASTRODUODENOSCOPY  8/18/14    HYSTERECTOMY  2007    laprascopic, total for fibroids.  Dr Polo    INJECTION OF JOINT Left 2/8/2022    Procedure: INJECTION, JOINT, SI LEFT NEEDS CONSENT;  Surgeon: Britt Calix MD;  Location: [facility] PAIN MGT;  Service: Pain Management;  Laterality: Left;    OOPHORECTOMY      ROBOT-ASSISTED LAPAROSCOPIC SLEEVE GASTRECTOMY USING DA DEVI XI N/A 7/26/2023    Procedure: XI ROBOTIC SLEEVE GASTRECTOMY with intraop EGD;  Surgeon: Roge Rich MD;  [facility];  Service: General;  Laterality: N/A;  with possible HHR    TOTAL REDUCTION MAMMOPLASTY       Social History     Socioeconomic History    Marital status:    Tobacco Use    Smoking status: Never    Smokeless tobacco: Never   Substance and Sexual Activity    Alcohol use: No     Comment: socially    Drug use: No    Sexual activity: Yes     Partners: Male     Birth control/protection: Surgical   Social History Narrative    Was  since 2000.      Has a friend  since 2016.    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     Social Determinants of Health     Financial Resource Strain: Low Risk  (8/10/2023)    Overall Financial Resource Strain (CARDIA)     Difficulty of Paying Living Expenses: Not hard at all   Food Insecurity: No Food Insecurity (8/10/2023)    Hunger Vital Sign     Worried About Running Out of Food in the Last Year: Never true     Ran Out of Food in the Last Year: Never true   Transportation Needs: No Transportation Needs (8/10/2023)    PRAPARE - Transportation     Lack of Transportation (Medical): No     Lack of Transportation (Non-Medical): No   Physical Activity: Sufficiently Active (8/10/2023)    Exercise Vital Sign     Days of Exercise per Week: 4 days     Minutes of Exercise per Session: 60 min   Recent Concern: Physical Activity - Insufficiently Active (5/30/2023)    Exercise Vital Sign     Days of Exercise per Week: 4 days     Minutes of Exercise per Session: 30 min   Stress: No Stress Concern Present (8/10/2023)    [employer] - Occupational Stress Questionnaire     Feeling of Stress : Not at all   Recent Concern: Stress - Stress Concern Present (7/9/2023)    [employer] - Occupational Stress Questionnaire     Feeling of Stress : Very much   Social Connections: Unknown (8/10/2023)    Social Connection and Isolation Panel [NHANES]     Frequency of Communication with Friends and Family: More than three times a week     Frequency of Social Gatherings with Friends and Family: Once a week     Active Member of Clubs or Organizations: No     Attends Club or Organization Meetings: Never     Marital Status:    Housing Stability: Low Risk  (8/10/2023)    Housing Stability Vital Sign     Unable to Pay for Housing in the Last Year: No     Number of Places Lived in the Last Year: 1     Unstable Housing in the Last Year: No     Family History   Problem Relation Age of Onset     Diabetes Maternal Grandmother     Heart disease Maternal Grandmother     Hypertension Maternal Grandmother     Crohn's disease Sister     Diabetes Mother     Hypertension Mother     Heart disease Mother     Cancer Father 58        Prostate    Breast cancer Sister     Amblyopia Neg Hx     Blindness Neg Hx     Cataracts Neg Hx     Glaucoma Neg Hx     Macular degeneration Neg Hx     Retinal detachment Neg Hx     Strabismus Neg Hx        Review of patient's allergies indicates:  No Known Allergies    Current Outpatient Medications   Medication Sig    acetaminophen (TYLENOL) 500 MG tablet Take 2 tablets (1,000 mg total) by mouth every 8 (eight) hours as needed for Pain.    atorvastatin (LIPITOR) 20 MG tablet TAKE 1 TABLET BY MOUTH EVERY DAY    blood sugar diagnostic Strp 1 strip by Misc.(Non-Drug; Combo Route) route 2 (two) times daily with meals.    blood-glucose meter kit Use as instructed    doxycycline monohydrate 100 mg Tab Take 1 tablet by mouth 2 (two) times daily.    ergocalciferol (ERGOCALCIFEROL) 50,000 unit Cap TAKE 2 CAPSULES BY MOUTH TWICE A WEEK    estradioL (ESTRACE) 1 MG tablet TAKE 1 TABLET BY MOUTH EVERY DAY    finasteride (PROSCAR) 5 mg tablet Take 1 tablet (5 mg total) by mouth once daily.    fluocinonide (LIDEX) 0.05 % ointment Apply to affected areas of scalp qhs    ketoconazole (NIZORAL) 2 % cream Apply to affected areas of scalp BID.    ketoconazole (NIZORAL) 2 % shampoo Wash hair with medicated shampoo at least 2x/week - let sit on scalp at least 5 minutes prior to rinsing    lancets Misc Use to test blood sugar once daily.    lancing device Misc 1 Device by Misc.(Non-Drug; Combo Route) route 2 (two) times daily with meals.    mometasone (ELOCON) 0.1 % solution Apply topically once daily. To thinning area of scalp    neomycin-polymyxin-dexamethasone (MAXITROL) 3.5mg/mL-10,000 unit/mL-0.1 % DrpS as needed.    nystatin (MYCOSTATIN) ointment Apply topically 2 (two) times daily. USES PRN     "omeprazole (PRILOSEC) 40 MG capsule TAKE 1 CAPSULE BY MOUTH EVERY DAY    omeprazole (PRILOSEC) 40 MG capsule Take 1 capsule (40 mg total) by mouth every morning. Open capsule and take with apple sauce    omeprazole (PRILOSEC) 40 MG capsule Take 1 capsule (40 mg total) by mouth every morning. Open capsule and take with apple sauce    ondansetron (ZOFRAN) 4 MG tablet TAKE 1 TABLET BY MOUTH EVERY 8 HOURS AS NEEDED    ondansetron (ZOFRAN-ODT) 8 MG TbDL Take 1 tablet (8 mg total) by mouth every 6 (six) hours as needed.    ondansetron (ZOFRAN-ODT) 8 MG TbDL Take 1 tablet (8 mg total) by mouth every 6 (six) hours as needed.    oxyCODONE (ROXICODONE) 5 mg/5 mL Soln Take 5 mLs (5 mg total) by mouth every 8 (eight) hours as needed.    oxyCODONE (ROXICODONE) 5 mg/5 mL Soln Take 5 mLs (5 mg total) by mouth every 8 (eight) hours as needed.    pen needle, diabetic (PEN NEEDLE) 32 gauge x 5/32" Ndle 1 each by Misc.(Non-Drug; Combo Route) route once a week.    prednisoLONE acetate (PRED FORTE) 1 % DrpS USES PRN    pregabalin (LYRICA) 75 MG capsule Take 1 capsule (75 mg total) by mouth 2 (two) times daily.    semaglutide (OZEMPIC) 1 mg/dose (4 mg/3 mL) Inject 1 mg into the skin every 7 days.    spironolactone (ALDACTONE) 50 MG tablet Take 1 tablet (50 mg total) by mouth once daily. (Patient not taking: Reported on 8/9/2023)    tiZANidine (ZANAFLEX) 4 MG tablet Take 4 mg by mouth nightly as needed.    triamcinolone acetonide 0.1% (KENALOG) 0.1 % cream Apply topically 2 (two) times daily. Apply topically 2 (two) times daily.    valsartan-hydrochlorothiazide (DIOVAN-HCT) 160-12.5 mg per tablet Take 1 tablet by mouth once daily. (Patient not taking: Reported on 8/9/2023)     No current facility-administered medications for this visit.       REVIEW OF SYSTEMS:    GENERAL:  No weight loss, malaise or fevers.  HEENT:   No recent changes in vision or hearing  NECK:  Negative for lumps, no difficulty with swallowing.  RESPIRATORY:  Negative " for cough, wheezing or shortness of breath, patient denies any recent URI.  CARDIOVASCULAR:  Negative for chest pain, leg swelling or palpitations.  GI:  Negative for abdominal discomfort, blood in stools or black stools or change in bowel habits.  MUSCULOSKELETAL:  See HPI.  SKIN:  Negative for lesions, rash, and itching.  PSYCH:  No mood disorder or recent psychosocial stressors.  Patients sleep is not disturbed secondary to pain.  HEMATOLOGY/LYMPHOLOGY:  Negative for prolonged bleeding, bruising easily or swollen nodes.  Patient is not currently taking any anti-coagulants  NEURO:   No history of headaches, syncope, paralysis, seizures or tremors.  All other reviewed and negative other than HPI.    OBJECTIVE:    General appearance: Well appearing, in no acute distress, alert and oriented x3.  Psych:  Mood and affect appropriate.      ASSESSMENT: 53 y.o. year old female with pain consistent with     1. Lumbar radiculopathy, right  Procedure Order to Pain Management      2. DDD (degenerative disc disease), lumbar  Procedure Order to Pain Management      3. Lumbar spondylosis        4. Chronic pain syndrome          DISCUSSION: Ms. Jennings has a history of T9-12 decompression and posterior fusion with Dr. Whitt. She came to us with left low back pain and did well after SIJ. She then presented back with right radicular symptoms. MRI shows short pedicles, moderate canal stenosis at L4/5, and severe foraminal stenosis at L5/S1.     PLAN:     - Previous records and imaging reviewed  - I have stressed the importance of physical activity and a home exercise plan to help with pain and improve health.  - Patient can continue with medications for now since they are providing benefits, using them appropriately, and without side effects.  - Schedule for a Right Transforaminal epidural steroid injection at L4/5 and L5/S1 to help with his pain and progress with a home exercise plan.  - Continue   - RTC 2 weeks after the  procedure  - Counseled patient regarding the importance of activity modification, constant sleeping habits, and physical therapy.    The above plan and management options were discussed at length with patient. Patient is in agreement with the above and verbalized understanding. It will be communicated with the referring physician via electronic record, fax, or mail.    Britt Calix MD  08/28/2023

## 2023-08-28 NOTE — H&P (VIEW-ONLY)
Chronic Pain-Tele-Medicine-Established Note (Follow up visit)      The patient location is: Work  The chief complaint leading to consultation is: R sided lower back and focal R L5 radicular pain  Visit type: Virtual visit with synchronous audio and video  Total time spent with patient: 20 mins  Each patient to whom he or she provides medical services by telemedicine is:  (1) informed of the relationship between the physician and patient and the respective role of any other health care provider with respect to management of the patient; and (2) notified that he or she may decline to receive medical services by telemedicine and may withdraw from such care at any time.    Notes:     SUBJECTIVE:    Interval History 8/28/2023:  Africa Jennings presents tele-medicine appointment for a follow-up appointment for R sided lower back and focal R L5 radicular pain. She states leg pain > back pain. She describes a sharp, shooting pain in her R buttocks that radiates to her R mid calf posteriorly and is associated with burning, numbness, and tingling. Worse with activity, bending, lifting, night time. She states the R leg is subjectively weaker than the left.. Since the last visit, Africa Jennings states the pain has been worsening. Current pain intensity is 7/10. Current medications include lyrica, zanaflex, tylenol with some relief. She has completed physical therapy and aqua therapy (March 2023 - April 2023) to help strengthen her back and notes benefit. She continues with Home exercise program and is waiting to hear back from [facility] to schedule her.     Patient was supposed to have R L4/5 and L5/S1 TFESI done since last visit, however, there was some issue with scheduling or insurance (the patient is unsure which). She is interested in having the procedure rescheduled to help alleviate her symptoms. Patient denies red flags including weakness, unexpected weight loss/gain, night  sweats/fevers, saddle anesthesia, and symptoms of VERA.       Interval History 4/26/2023:  Mrs Jennings presents for follow up. Over interval she has had T9-12 Thoracic decompression by Dr Whitt on 10/28/2022. She is doing well since then but continues to have R sided lower back and focal R L5 radicular pain. She states back pain = leg pain. She states the R leg is subjectively weaker than the left. Denies any s/s concerning for cauda equina. Pain has limited functioning and she has Completed PT/Aquatherapy for >6 weeks.      Interval History 3/16/2022:  Pt presents for follow up of. She has had L4/5 ILESI and left SI injection in past with benefit. She has more vocal pain to right and associated radiculopathy down right leg in L4/5 pattern. She had no recent MRI in past 2 years and known lumbar discogenic issues but this was more focal to left. She has no complaint of loss of b/b or saddle paresthesias.      Interval History  2/22/2022:   Mrs Jennings presents for virtual follow up of lower back and left sided buttock pain. She is now s/p Left SIJ and states 90% improved pain and feels her relief was more significant than MARILEE. She states pain more noticeable to right side now without radicular complaint. There is no focal voicing of loss of b/b or saddle paresthesias.      Interval History 1/25/2021:  Mrs Jennings presents for follow up of lower back pain. She is s/p L4/5 ILESI with 90% benefit from lower back pain. She has lingering lower buttock/back pain to left side. Increased pain when sitting or stairs. Denies radiculopathy. Denies any loss of b/b or saddle paresthesias.      Interval History 12/23/2021:  Mrs Jennings presents to establish care at [facility]. She states continued lower back pain to left side but leg pain/paresthesias=back pain. She states left lower back pain and radiation in L4/5 pattern but also on right side. She has MRI findings to left L4/5 NF disc protrusion. She has been  doing HEP she learned through [facility] for over 6 months straight and continues with no lasting benefit. She is currently taking Mobic 15mg and Neurontin 300mg TID. There is no complaint of loss of b/b or saddle paresthesias.      Interval History (4/13/20):     The patient location is: home  The chief complaint leading to consultation is: follow up  Visit type: Virtual visit with audio.  Patient verbally consented for audio visit.  Total time spent with patient: 15 minutes  Each patient to whom he or she provides medical services by telemedicine is:  (1) informed of the relationship between the physician and patient and the respective role of any other health care provider with respect to management of the patient; and (2) notified that he or she may decline to receive medical services by telemedicine and may withdraw from such care at any time.        She returns today for follow up.  She reports that her right-sided low back and lower extremity pain has returned since last encounter.  She denies any changes in the quality or location of pain.  She continues to report associated numbness.  She denies any associated weakness or bowel bladder dysfunction.  She has attempted gabapentin 600 mg t.i.d..  She states this medication did not provide any relief.  She has also tried goody's powder, Motrin, Tylenol without any relief.          Interval History (2/17/20):  She returns today for follow up and MRI review.  She reports that her pain is unchanged in quality and location since last encounter.  She does state that the pain has significantly improved and is overall not very bothersome for the time being.          Initial Encounter (2/3/20):  Africa Jennings is a 53 y.o. female who presents today with chronic right-sided low back and lower extremity pain. This pain has been present for 6-7 months.  No specific inciting event or injury noted.  Patient localizes the pain to the right lumbar region.  Pain  radiates to the right posterior and anterior thigh.  She reports associated numbness in this area as well. She also reports weakness of the right lower extremity when ambulating.  She denies any associated bowel or bladder dysfunction.  The pain is constant and worsened with activity.  She states that the pain interrupt her sleep.  Patient does have a history of a similar problem roughly 8 years ago.  She underwent epidural steroid injections with good relief.  This pain is described in detail below.    Pain Scores:     Best:       6/10  Worst:     10/10  Usually:   8/10  Today:    0/10     Physical Therapy:  Yes     Non-pharmacologic Treatment:  Rest helps         TENS?  No     Pain Medications:         Currently taking:  Aleve, gabapentin     Has tried in the past:  Tylenol, Motrin     Has not tried:  Opioids, Tylenol, Muscle relaxants, TCAs, SNRIs, anticonvulsants, topical creams     report:  Reviewed and consistent with medication use as prescribed.     Blood thinners:  None     Relevant Surgeries:  None     Affecting sleep?  Yes     Affecting daily activities? yes     Depressive symptoms? no          SI/HI? No     Work status: Employed     Pain Procedures:   - L4-5 interlaminar epidural steroid injection x2  - 1/11/2022 L4/5 ILESI 90% benefit    - 2/8/2022 Left SIJ injection      Imaging:   CT Thoracic Spine Without Contrast  Order: [id number]  Status: Final result     Visible to patient: Yes (seen)     Next appt: 09/18/2023 at 08:45 AM in Dermatology (Basilia Valdez MD)     Dx: S/P fusion of thoracic spine     0 Result Notes  Details    Reading Physician Reading Date Result Priority   Tony Powell MD  [phone] 1/30/2023 Routine   Dex Bojorquez MD  [phone]  [phone] 1/30/2023      Narrative & Impression  EXAMINATION:  CT THORACIC SPINE WITHOUT CONTRAST     CLINICAL HISTORY:  s/p T9-12 fusion;  Arthrodesis status     TECHNIQUE:  CT thoracic spine performed without contrast.   Coronal and sagittal reformats provided.     COMPARISON:  X-ray thoracic spine 12/12/2022, x-ray thoracolumbar spine 10/29/2022, MRI thoracic spine 06/03/2022, CT thoracic spine 06/03/2022.     FINDINGS:  Postsurgical change of posterior spinal instrumented fusion T9-T12, T9-12 laminectomies and medial facetectomies and decortication of the T9-12 posterior elements and placement of a mixture of autologous and synthetic bone into the bilateral gutters for arthrodesis.  No hardware fracture or periprosthetic lucency to suggest hardware loosening.     Alignment: Stable, slightly pronounced kyphotic curvature.  Grade 1 retrolisthesis T11-12.     Vertebrae: Vertebral body heights are well maintained.  No fracture.  Stable sclerotic focus in the T5 vertebral body likely representing a bone island.  No lytic or blastic lesion.  Morselized synthetic and autologous bone in the bilateral T9-T12 gutters with noted abutment at the T12 posterior elements likely representing fusion.     Discs: Normal height.  Vacuum disc phenomenon at T8-9 and T11-12.     Degenerative findings:     C7-L1: No spinal canal stenosis or neural foraminal narrowing.     Paraspinal muscles & soft tissues: Cholecystectomy.  High attenuation focus in the posterior right upper quadrant possibly represent calcification or surgical clip.  Otherwise unremarkable.     Impression:     1. Postsurgical changes at T9-T12, as above, without evidence of hardware loosening or fracture.  2. Additional findings as above.     Electronically signed by resident: Dex Bojorquez  Date:                                            01/30/2023  Time:                                           13:59     Electronically signed by: Tony Powell MD  Date:                                            01/30/2023  Time:                                           15:43       MRI Thoracic Spine Without Contrast  Order: [id number]  Status: Final result     Visible to patient: Yes (seen)      Next appt: 09/18/2023 at 08:45 AM in Dermatology (Basilia Valdez MD)     Dx: Thoracic spinal stenosis     0 Result Notes  Details    Reading Physician Reading Date Result Priority   Tony Powell MD  [phone] 6/4/2022 Routine     Narrative & Impression  EXAMINATION:  MRI THORACIC SPINE WITHOUT CONTRAST     CLINICAL HISTORY:  T9-11 stenosis, please focus on these levels.;  Spinal stenosis, thoracic region     TECHNIQUE:  Multiplanar, multisequence images were performed through the thoracic spine.  Contrast was not administered.     COMPARISON:  MRI 03/31/2022, CT 06/03/2022.     FINDINGS:  Thoracic spine alignment demonstrates a slightly pronounced kyphotic curvature.  No spondylolisthesis.  Vertebral body heights are well maintained without evidence fracture.  There is slight heterogeneous marrow signal intensity, similar when compared to the previous exam and favored to relate to benign reconversion.  No marrow signal abnormality to suggest an infiltrative process.     Persistent focal area of increased T2/STIR cord signal at the T10-T11 level, similar when compared to the previous exam concerning for myelomalacia.  Remaining visualized spinal cord demonstrates normal contour and signal intensity.     There is a 1.1 cm T2 hypointense right thyroid nodule.  There are multiple colonic diverticuli.  Remaining visualized thoracic and upper abdominal organs demonstrate no significant abnormalities.  Paraspinal musculature demonstrates normal bulk and signal intensity.     T1-T2: Bilateral facet arthropathy contributes to moderate left neural foraminal narrowing.  No spinal canal stenosis.     T2-T3: Broad-based disc osteophyte complex causes mild mass effect on the ventral thecal sac.  Left facet arthropathy.  Findings contribute to mild-to-moderate left and mild right neural foraminal narrowing.  No spinal canal stenosis.     T7-T8: Circumferential disc osteophyte complex, bilateral facet arthropathy and  right ligamentum flavum buckling.  Findings contribute to moderate right neural foraminal narrowing.  No spinal canal stenosis.     T8-T9: Circumferential disc osteophyte complex and mild bilateral ligamentum flavum buckling.  Findings contribute to mild right neural foraminal narrowing.  No spinal canal stenosis.     T9-T10: Circumferential disc osteophyte complex and bilateral ligamentum flavum buckling.  Findings contribute to mild spinal canal stenosis and moderate right neural foraminal narrowing.     T10-T11: Circumferential disc osteophyte complex, bilateral facet arthropathy and bilateral ligamentum flavum buckling.  Findings contribute to severe spinal canal stenosis and moderate right and mild left neural foraminal narrowing.     Impression:     1. Multilevel degenerative changes of the thoracic spine most pronounced at T10-T11 noting severe spinal canal stenosis and mild-to-moderate neural foraminal narrowing.  2. Persistent focal area of cord signal abnormality at T10-T11, similar when compared to MRI most suggestive of myelomalacia.        Electronically signed by: Tony Powell MD  Date:                                            06/04/2022  Time:                                           09:05       MRI Lumbar Spine Without Contrast  Order: [id number]  Status: Final result     Visible to patient: Yes (seen)     Next appt: 09/18/2023 at 08:45 AM in Dermatology (Basilia Valdez MD)     Dx: Dorsalgia, unspecified     1 Result Note  Details    Reading Physician Reading Date Result Priority   Maximo Mcallister Jr., MD  [phone] 3/25/2022 Routine     Narrative & Impression  EXAMINATION:  MRI LUMBAR SPINE WITHOUT CONTRAST     CLINICAL HISTORY:  Dorsalgia, unspecifiedLow back pain, symptoms persist with > 6wks conservative treatment;     TECHNIQUE:  Sagittal T1, sagittal T2, sagittal STIR, axial T1 and axial T2 weighted images of the lumbar spine obtained without contrast.     COMPARISON:  Similar  study 02/10/2020     FINDINGS:  Lumbar spine alignment is within normal limits. The vertebral body heights are well maintained, with no fracture.  Degenerative sclerotic marrow endplate change at S1 and fatty metaplasia degenerative endplate change at T12.  Otherwise marrow signal normal.     The conus is normal in appearance.  The adjacent soft tissue structures show no significant abnormalities.     Spinal canal is congenitally narrow on the basis of short pedicles.  At the T10-11 level there is extradural indentation of the thecal sac from posteriorly and there is some broad-based disc bulging.  Transverse images do not cover this level.  There is at least moderate spinal stenosis at this level.     L1-L2: No focal disc herniation.No significant central canal or neural foraminal narrowing.     L2-L3: No focal disc herniation.  Mild facet arthrosis.  No significant central canal or neural foraminal narrowing.     L3-L4: No evidence of a disc herniation.  No significant central canal or neural foraminal narrowing.     L4-L5: Broad-based disc bulging with left-sided foraminal and far lateral annular fissure and superimposed mild spondylitic spurring. Facet arthrosis contributes to severe left-sided neural foraminal narrowing.  Moderate central spinal stenosis.     L5-S1: Broad-based disc bulging, facet arthrosis and ligamentum flavum hypertrophy result in severe bilateral neural foraminal stenosis.  Mild central spinal stenosis.     Impression:     Extradural degenerative changes at T10-11, L4-5, and L5-S1 resulting in central canal and foraminal stenosis as detailed above.  Dedicated imaging of the thoracic spine could lend additional information regarding findings at T10-11     This report was flagged in Epic as abnormal.        Electronically signed by: Maximo Aguirre Jr  Date:                                            03/25/2022  Time:                                           09:06       Past Medical  History:   Diagnosis Date    Diabetes mellitus     Diabetes mellitus, type 2     Eczema     GERD (gastroesophageal reflux disease)     Hypertension     Impaired fasting blood sugar     YSABEL on CPAP      Past Surgical History:   Procedure Laterality Date    breast reduction      CHOLECYSTECTOMY      laprascopic    DECOMPRESSION OF THORACIC OUTLET N/A 10/28/2022    Procedure: T9-12 ROBOTIC DECOMPRESSION, THORACIC FUSION;  Surgeon: Edouard Whitt DO;  Location: [facility] OR 2ND FLR;  Service: Neurosurgery;  Laterality: N/A;  ANESTHESIA GENERAL [place] I BLOOD TYPE & SCREEN NERVE MONITORING EMG SEP MEP POSTION PRONE BED CARA 4 POST HEAD REST [facility] RADIOLOGY C-ARM GLOBUS PROTOCOL MISCELLANEOUS ALANIZ BRACE TLSO    EPIDURAL STEROID INJECTION N/A 1/11/2022    Procedure: INJECTION, STEROID, EPIDURAL IL L4/5 NEEDS CONSENT;  Surgeon: Britt Calix MD;  Location: [facility] PAIN MGT;  Service: Pain Management;  Laterality: N/A;    ESOPHAGOGASTRODUODENOSCOPY  8/18/14    HYSTERECTOMY  2007    laprascopic, total for fibroids.  Dr Polo    INJECTION OF JOINT Left 2/8/2022    Procedure: INJECTION, JOINT, SI LEFT NEEDS CONSENT;  Surgeon: Britt Calix MD;  Location: [facility] PAIN MGT;  Service: Pain Management;  Laterality: Left;    OOPHORECTOMY      ROBOT-ASSISTED LAPAROSCOPIC SLEEVE GASTRECTOMY USING DA DEVI XI N/A 7/26/2023    Procedure: XI ROBOTIC SLEEVE GASTRECTOMY with intraop EGD;  Surgeon: Roge Rich MD;  [facility];  Service: General;  Laterality: N/A;  with possible HHR    TOTAL REDUCTION MAMMOPLASTY       Social History     Socioeconomic History    Marital status:    Tobacco Use    Smoking status: Never    Smokeless tobacco: Never   Substance and Sexual Activity    Alcohol use: No     Comment: socially    Drug use: No    Sexual activity: Yes     Partners: Male     Birth control/protection: Surgical   Social History Narrative    Was  since 2000.      Has a friend  since 2016.    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     Social Determinants of Health     Financial Resource Strain: Low Risk  (8/10/2023)    Overall Financial Resource Strain (CARDIA)     Difficulty of Paying Living Expenses: Not hard at all   Food Insecurity: No Food Insecurity (8/10/2023)    Hunger Vital Sign     Worried About Running Out of Food in the Last Year: Never true     Ran Out of Food in the Last Year: Never true   Transportation Needs: No Transportation Needs (8/10/2023)    PRAPARE - Transportation     Lack of Transportation (Medical): No     Lack of Transportation (Non-Medical): No   Physical Activity: Sufficiently Active (8/10/2023)    Exercise Vital Sign     Days of Exercise per Week: 4 days     Minutes of Exercise per Session: 60 min   Recent Concern: Physical Activity - Insufficiently Active (5/30/2023)    Exercise Vital Sign     Days of Exercise per Week: 4 days     Minutes of Exercise per Session: 30 min   Stress: No Stress Concern Present (8/10/2023)    [employer] [employer] - Occupational Stress Questionnaire     Feeling of Stress : Not at all   Recent Concern: Stress - Stress Concern Present (7/9/2023)    [employer] [place] [employer] - Occupational Stress Questionnaire     Feeling of Stress : Very much   Social Connections: Unknown (8/10/2023)    Social Connection and Isolation Panel [NHANES]     Frequency of Communication with Friends and Family: More than three times a week     Frequency of Social Gatherings with Friends and Family: Once a week     Active Member of Clubs or Organizations: No     Attends Club or Organization Meetings: Never     Marital Status:    Housing Stability: Low Risk  (8/10/2023)    Housing Stability Vital Sign     Unable to Pay for Housing in the Last Year: No     Number of Places Lived in the Last Year: 1     Unstable Housing in the Last Year: No     Family History   Problem Relation Age of Onset     Diabetes Maternal Grandmother     Heart disease Maternal Grandmother     Hypertension Maternal Grandmother     Crohn's disease Sister     Diabetes Mother     Hypertension Mother     Heart disease Mother     Cancer Father 58        Prostate    Breast cancer Sister     Amblyopia Neg Hx     Blindness Neg Hx     Cataracts Neg Hx     Glaucoma Neg Hx     Macular degeneration Neg Hx     Retinal detachment Neg Hx     Strabismus Neg Hx        Review of patient's allergies indicates:  No Known Allergies    Current Outpatient Medications   Medication Sig    acetaminophen (TYLENOL) 500 MG tablet Take 2 tablets (1,000 mg total) by mouth every 8 (eight) hours as needed for Pain.    atorvastatin (LIPITOR) 20 MG tablet TAKE 1 TABLET BY MOUTH EVERY DAY    blood sugar diagnostic Strp 1 strip by Misc.(Non-Drug; Combo Route) route 2 (two) times daily with meals.    blood-glucose meter kit Use as instructed    doxycycline monohydrate 100 mg Tab Take 1 tablet by mouth 2 (two) times daily.    ergocalciferol (ERGOCALCIFEROL) 50,000 unit Cap TAKE 2 CAPSULES BY MOUTH TWICE A WEEK    estradioL (ESTRACE) 1 MG tablet TAKE 1 TABLET BY MOUTH EVERY DAY    finasteride (PROSCAR) 5 mg tablet Take 1 tablet (5 mg total) by mouth once daily.    fluocinonide (LIDEX) 0.05 % ointment Apply to affected areas of scalp qhs    ketoconazole (NIZORAL) 2 % cream Apply to affected areas of scalp BID.    ketoconazole (NIZORAL) 2 % shampoo Wash hair with medicated shampoo at least 2x/week - let sit on scalp at least 5 minutes prior to rinsing    lancets Misc Use to test blood sugar once daily.    lancing device Misc 1 Device by Misc.(Non-Drug; Combo Route) route 2 (two) times daily with meals.    mometasone (ELOCON) 0.1 % solution Apply topically once daily. To thinning area of scalp    neomycin-polymyxin-dexamethasone (MAXITROL) 3.5mg/mL-10,000 unit/mL-0.1 % DrpS as needed.    nystatin (MYCOSTATIN) ointment Apply topically 2 (two) times daily. USES PRN     "omeprazole (PRILOSEC) 40 MG capsule TAKE 1 CAPSULE BY MOUTH EVERY DAY    omeprazole (PRILOSEC) 40 MG capsule Take 1 capsule (40 mg total) by mouth every morning. Open capsule and take with apple sauce    omeprazole (PRILOSEC) 40 MG capsule Take 1 capsule (40 mg total) by mouth every morning. Open capsule and take with apple sauce    ondansetron (ZOFRAN) 4 MG tablet TAKE 1 TABLET BY MOUTH EVERY 8 HOURS AS NEEDED    ondansetron (ZOFRAN-ODT) 8 MG TbDL Take 1 tablet (8 mg total) by mouth every 6 (six) hours as needed.    ondansetron (ZOFRAN-ODT) 8 MG TbDL Take 1 tablet (8 mg total) by mouth every 6 (six) hours as needed.    oxyCODONE (ROXICODONE) 5 mg/5 mL Soln Take 5 mLs (5 mg total) by mouth every 8 (eight) hours as needed.    oxyCODONE (ROXICODONE) 5 mg/5 mL Soln Take 5 mLs (5 mg total) by mouth every 8 (eight) hours as needed.    pen needle, diabetic (PEN NEEDLE) 32 gauge x 5/32" Ndle 1 each by Misc.(Non-Drug; Combo Route) route once a week.    prednisoLONE acetate (PRED FORTE) 1 % DrpS USES PRN    pregabalin (LYRICA) 75 MG capsule Take 1 capsule (75 mg total) by mouth 2 (two) times daily.    semaglutide (OZEMPIC) 1 mg/dose (4 mg/3 mL) Inject 1 mg into the skin every 7 days.    spironolactone (ALDACTONE) 50 MG tablet Take 1 tablet (50 mg total) by mouth once daily. (Patient not taking: Reported on 8/9/2023)    tiZANidine (ZANAFLEX) 4 MG tablet Take 4 mg by mouth nightly as needed.    triamcinolone acetonide 0.1% (KENALOG) 0.1 % cream Apply topically 2 (two) times daily. Apply topically 2 (two) times daily.    valsartan-hydrochlorothiazide (DIOVAN-HCT) 160-12.5 mg per tablet Take 1 tablet by mouth once daily. (Patient not taking: Reported on 8/9/2023)     No current facility-administered medications for this visit.       REVIEW OF SYSTEMS:    GENERAL:  No weight loss, malaise or fevers.  HEENT:   No recent changes in vision or hearing  NECK:  Negative for lumps, no difficulty with swallowing.  RESPIRATORY:  Negative " for cough, wheezing or shortness of breath, patient denies any recent URI.  CARDIOVASCULAR:  Negative for chest pain, leg swelling or palpitations.  GI:  Negative for abdominal discomfort, blood in stools or black stools or change in bowel habits.  MUSCULOSKELETAL:  See HPI.  SKIN:  Negative for lesions, rash, and itching.  PSYCH:  No mood disorder or recent psychosocial stressors.  Patients sleep is not disturbed secondary to pain.  HEMATOLOGY/LYMPHOLOGY:  Negative for prolonged bleeding, bruising easily or swollen nodes.  Patient is not currently taking any anti-coagulants  NEURO:   No history of headaches, syncope, paralysis, seizures or tremors.  All other reviewed and negative other than HPI.    OBJECTIVE:    General appearance: Well appearing, in no acute distress, alert and oriented x3.  Psych:  Mood and affect appropriate.      ASSESSMENT: 53 y.o. year old female with pain consistent with     1. Lumbar radiculopathy, right  Procedure Order to Pain Management      2. DDD (degenerative disc disease), lumbar  Procedure Order to Pain Management      3. Lumbar spondylosis        4. Chronic pain syndrome          DISCUSSION: Ms. Jennings has a history of T9-12 decompression and posterior fusion with Dr. Whitt. She came to us with left low back pain and did well after SIJ. She then presented back with right radicular symptoms. MRI shows short pedicles, moderate canal stenosis at L4/5, and severe foraminal stenosis at L5/S1.     PLAN:     - Previous records and imaging reviewed  - I have stressed the importance of physical activity and a home exercise plan to help with pain and improve health.  - Patient can continue with medications for now since they are providing benefits, using them appropriately, and without side effects.  - Schedule for a Right Transforaminal epidural steroid injection at L4/5 and L5/S1 to help with his pain and progress with a home exercise plan.  - Continue   - RTC 2 weeks after the  procedure  - Counseled patient regarding the importance of activity modification, constant sleeping habits, and physical therapy.    The above plan and management options were discussed at length with patient. Patient is in agreement with the above and verbalized understanding. It will be communicated with the referring physician via electronic record, fax, or mail.    Britt Calix MD  08/28/2023

## 2023-08-29 ENCOUNTER — TELEPHONE (OUTPATIENT)
Dept: ADMINISTRATIVE | Facility: OTHER | Age: 53
End: 2023-08-29
Payer: COMMERCIAL

## 2023-08-30 ENCOUNTER — PATIENT MESSAGE (OUTPATIENT)
Dept: OBSTETRICS AND GYNECOLOGY | Facility: CLINIC | Age: 53
End: 2023-08-30
Payer: COMMERCIAL

## 2023-08-30 ENCOUNTER — PATIENT MESSAGE (OUTPATIENT)
Dept: PAIN MEDICINE | Facility: OTHER | Age: 53
End: 2023-08-30
Payer: COMMERCIAL

## 2023-09-01 DIAGNOSIS — M62.838 MUSCLE SPASM: ICD-10-CM

## 2023-09-01 RX ORDER — TIZANIDINE 4 MG/1
TABLET ORAL
Qty: 90 TABLET | Refills: 2 | Status: SHIPPED | OUTPATIENT
Start: 2023-09-01

## 2023-09-06 ENCOUNTER — PATIENT MESSAGE (OUTPATIENT)
Dept: BARIATRICS | Facility: CLINIC | Age: 53
End: 2023-09-06
Payer: COMMERCIAL

## 2023-09-10 DIAGNOSIS — E78.5 HYPERLIPIDEMIA, UNSPECIFIED HYPERLIPIDEMIA TYPE: ICD-10-CM

## 2023-09-10 NOTE — TELEPHONE ENCOUNTER
No care due was identified.  Health [facility] Embedded Care Due Messages. Reference number: [id number].   9/10/2023 2:26:19 PM CDT

## 2023-09-11 ENCOUNTER — PATIENT MESSAGE (OUTPATIENT)
Dept: ADMINISTRATIVE | Facility: HOSPITAL | Age: 53
End: 2023-09-11
Payer: COMMERCIAL

## 2023-09-11 RX ORDER — ATORVASTATIN CALCIUM 20 MG/1
TABLET, FILM COATED ORAL
Qty: 90 TABLET | Refills: 3 | Status: SHIPPED | OUTPATIENT
Start: 2023-09-11

## 2023-09-11 NOTE — TELEPHONE ENCOUNTER
Refill Decision Note   Africahari Jennings  is requesting a refill authorization.  Brief Assessment and Rationale for Refill:  Approve     Medication Therapy Plan:       Medication Reconciliation Completed: No   Comments:     No Care Gaps recommended.     Note composed:1:30 PM 09/11/2023

## 2023-09-12 ENCOUNTER — PATIENT MESSAGE (OUTPATIENT)
Dept: BARIATRICS | Facility: CLINIC | Age: 53
End: 2023-09-12
Payer: COMMERCIAL

## 2023-09-16 RX ORDER — ESTRADIOL 1 MG/1
1 TABLET ORAL DAILY
Qty: 30 TABLET | Refills: 0 | OUTPATIENT
Start: 2023-09-16

## 2023-09-18 ENCOUNTER — LAB VISIT (OUTPATIENT)
Dept: LAB | Facility: HOSPITAL | Age: 53
End: 2023-09-18
Attending: DERMATOLOGY
Payer: COMMERCIAL

## 2023-09-18 ENCOUNTER — OFFICE VISIT (OUTPATIENT)
Dept: DERMATOLOGY | Facility: CLINIC | Age: 53
End: 2023-09-18
Payer: COMMERCIAL

## 2023-09-18 DIAGNOSIS — L66.9 SCARRING ALOPECIA: Primary | ICD-10-CM

## 2023-09-18 DIAGNOSIS — Z79.899 ENCOUNTER FOR LONG-TERM (CURRENT) USE OF OTHER MEDICATIONS: ICD-10-CM

## 2023-09-18 DIAGNOSIS — L64.9 ANDROGENETIC ALOPECIA: ICD-10-CM

## 2023-09-18 DIAGNOSIS — L65.8 TRACTION ALOPECIA: ICD-10-CM

## 2023-09-18 DIAGNOSIS — L21.9 SEBORRHEIC DERMATITIS: ICD-10-CM

## 2023-09-18 LAB
ALBUMIN SERPL BCP-MCNC: 4.3 G/DL (ref 3.5–5.2)
ALP SERPL-CCNC: 88 U/L (ref 55–135)
ALT SERPL W/O P-5'-P-CCNC: 50 U/L (ref 10–44)
ANION GAP SERPL CALC-SCNC: 7 MMOL/L (ref 8–16)
AST SERPL-CCNC: 52 U/L (ref 10–40)
BILIRUB SERPL-MCNC: 0.5 MG/DL (ref 0.1–1)
BUN SERPL-MCNC: 29 MG/DL (ref 6–20)
CALCIUM SERPL-MCNC: 10.5 MG/DL (ref 8.7–10.5)
CHLORIDE SERPL-SCNC: 105 MMOL/L (ref 95–110)
CO2 SERPL-SCNC: 28 MMOL/L (ref 23–29)
CREAT SERPL-MCNC: 1 MG/DL (ref 0.5–1.4)
EST. GFR  (NO RACE VARIABLE): >60 ML/MIN/1.73 M^2
GLUCOSE SERPL-MCNC: 108 MG/DL (ref 70–110)
POTASSIUM SERPL-SCNC: 4.5 MMOL/L (ref 3.5–5.1)
PROT SERPL-MCNC: 8.4 G/DL (ref 6–8.4)
SODIUM SERPL-SCNC: 140 MMOL/L (ref 136–145)

## 2023-09-18 PROCEDURE — 1159F MED LIST DOCD IN RCRD: CPT | Mod: CPTII,S$GLB,, | Performed by: DERMATOLOGY

## 2023-09-18 PROCEDURE — 99999 PR PBB SHADOW E&M-EST. PATIENT-LVL II: CPT | Mod: PBBFAC,,, | Performed by: DERMATOLOGY

## 2023-09-18 PROCEDURE — 3044F PR MOST RECENT HEMOGLOBIN A1C LEVEL <7.0%: ICD-10-PCS | Mod: CPTII,S$GLB,, | Performed by: DERMATOLOGY

## 2023-09-18 PROCEDURE — 3061F PR NEG MICROALBUMINURIA RESULT DOCUMENTED/REVIEW: ICD-10-PCS | Mod: CPTII,S$GLB,, | Performed by: DERMATOLOGY

## 2023-09-18 PROCEDURE — 3066F NEPHROPATHY DOC TX: CPT | Mod: CPTII,S$GLB,, | Performed by: DERMATOLOGY

## 2023-09-18 PROCEDURE — 99214 OFFICE O/P EST MOD 30 MIN: CPT | Mod: 25,S$GLB,, | Performed by: DERMATOLOGY

## 2023-09-18 PROCEDURE — 11901 PR INJECTION, SKIN LESIONS, 8 OR MORE: ICD-10-PCS | Mod: S$GLB,,, | Performed by: DERMATOLOGY

## 2023-09-18 PROCEDURE — 3066F PR DOCUMENTATION OF TREATMENT FOR NEPHROPATHY: ICD-10-PCS | Mod: CPTII,S$GLB,, | Performed by: DERMATOLOGY

## 2023-09-18 PROCEDURE — 1159F PR MEDICATION LIST DOCUMENTED IN MEDICAL RECORD: ICD-10-PCS | Mod: CPTII,S$GLB,, | Performed by: DERMATOLOGY

## 2023-09-18 PROCEDURE — 1160F RVW MEDS BY RX/DR IN RCRD: CPT | Mod: CPTII,S$GLB,, | Performed by: DERMATOLOGY

## 2023-09-18 PROCEDURE — 11901 INJECT SKIN LESIONS >7: CPT | Mod: S$GLB,,, | Performed by: DERMATOLOGY

## 2023-09-18 PROCEDURE — 99214 PR OFFICE/OUTPT VISIT, EST, LEVL IV, 30-39 MIN: ICD-10-PCS | Mod: 25,S$GLB,, | Performed by: DERMATOLOGY

## 2023-09-18 PROCEDURE — 3044F HG A1C LEVEL LT 7.0%: CPT | Mod: CPTII,S$GLB,, | Performed by: DERMATOLOGY

## 2023-09-18 PROCEDURE — 99999 PR PBB SHADOW E&M-EST. PATIENT-LVL II: ICD-10-PCS | Mod: PBBFAC,,, | Performed by: DERMATOLOGY

## 2023-09-18 PROCEDURE — 36415 COLL VENOUS BLD VENIPUNCTURE: CPT | Performed by: DERMATOLOGY

## 2023-09-18 PROCEDURE — 80053 COMPREHEN METABOLIC PANEL: CPT | Performed by: DERMATOLOGY

## 2023-09-18 PROCEDURE — 1160F PR REVIEW ALL MEDS BY PRESCRIBER/CLIN PHARMACIST DOCUMENTED: ICD-10-PCS | Mod: CPTII,S$GLB,, | Performed by: DERMATOLOGY

## 2023-09-18 PROCEDURE — 3061F NEG MICROALBUMINURIA REV: CPT | Mod: CPTII,S$GLB,, | Performed by: DERMATOLOGY

## 2023-09-18 NOTE — PROGRESS NOTES
Subjective:      Patient ID:  Africa Jennings is a 53 y.o. female who presents for   Chief Complaint   Patient presents with    Hair Loss     Scalp      Hair Loss - Follow-up  Diagnosis: Traction alopecia.  Symptom course: unchanged  Currently using: Rogain and Kenalog 5mg/cc.  Affected locations: scalp  Signs / symptoms: asymptomatic      Review of Systems   Constitutional:  Negative for fever, chills and fatigue.   Hematologic/Lymphatic: Does not bruise/bleed easily.       Objective:   Physical Exam   Constitutional: She appears well-developed and well-nourished. No distress.   Neurological: She is alert and oriented to person, place, and time. She is not disoriented.   Psychiatric: She has a normal mood and affect.   Skin:   Areas Examined (abnormalities noted in diagram):   Scalp / Hair Palpated and Inspected  Head / Face Inspection Performed            Diagram Legend     Erythematous scaling macule/papule c/w actinic keratosis       Vascular papule c/w angioma      Pigmented verrucoid papule/plaque c/w seborrheic keratosis      Yellow umbilicated papule c/w sebaceous hyperplasia      Irregularly shaped tan macule c/w lentigo     1-2 mm smooth white papules consistent with Milia      Movable subcutaneous cyst with punctum c/w epidermal inclusion cyst      Subcutaneous movable cyst c/w pilar cyst      Firm pink to brown papule c/w dermatofibroma      Pedunculated fleshy papule(s) c/w skin tag(s)      Evenly pigmented macule c/w junctional nevus     Mildly variegated pigmented, slightly irregular-bordered macule c/w mildly atypical nevus      Flesh colored to evenly pigmented papule c/w intradermal nevus       Pink pearly papule/plaque c/w basal cell carcinoma      Erythematous hyperkeratotic cursted plaque c/w SCC      Surgical scar with no sign of skin cancer recurrence      Open and closed comedones      Inflammatory papules and pustules      Verrucoid papule consistent consistent with wart      Erythematous eczematous patches and plaques     Dystrophic onycholytic nail with subungual debris c/w onychomycosis     Umbilicated papule    Erythematous-base heme-crusted tan verrucoid plaque consistent with inflamed seborrheic keratosis     Erythematous Silvery Scaling Plaque c/w Psoriasis     See annotation      Assessment / Plan:        Scarring alopecia  -     MA [id number] ACETONIDE INJ PER 10MG  -     triamcinolone acetonide injection 10 mg  -     MA INJECTION, SKIN LESIONS, 8 OR MORE    Seborrheic dermatitis  - continue keto shampoo to scalp and keto cream   Traction alopecia  - continue minoxidil  Androgenetic alopecia  - continue spironolactone 100 mg - okay by Dr. Thompson    Will check potassium today  Z79. 899  - CMP       F/u 2 years     No follow-ups on file.    Intralesional Kenalog 5mg/cc (2 cc total) injected into 8 lesions on the scalp today after obtaining verbal consent including risk of surrounding hypopigmentation. Patient tolerated procedure well.  Units: 1.0  NDC for Kenalog 10mg/cc:  9858-6653-94

## 2023-09-20 ENCOUNTER — PATIENT MESSAGE (OUTPATIENT)
Dept: ADMINISTRATIVE | Facility: OTHER | Age: 53
End: 2023-09-20
Payer: COMMERCIAL

## 2023-09-20 ENCOUNTER — CLINICAL SUPPORT (OUTPATIENT)
Dept: BARIATRICS | Facility: CLINIC | Age: 53
End: 2023-09-20
Payer: COMMERCIAL

## 2023-09-20 ENCOUNTER — OFFICE VISIT (OUTPATIENT)
Dept: OBSTETRICS AND GYNECOLOGY | Facility: CLINIC | Age: 53
End: 2023-09-20
Payer: COMMERCIAL

## 2023-09-20 ENCOUNTER — OFFICE VISIT (OUTPATIENT)
Dept: BARIATRICS | Facility: CLINIC | Age: 53
End: 2023-09-20
Payer: COMMERCIAL

## 2023-09-20 ENCOUNTER — PATIENT OUTREACH (OUTPATIENT)
Dept: ADMINISTRATIVE | Facility: HOSPITAL | Age: 53
End: 2023-09-20
Payer: COMMERCIAL

## 2023-09-20 ENCOUNTER — LAB VISIT (OUTPATIENT)
Dept: LAB | Facility: HOSPITAL | Age: 53
End: 2023-09-20
Attending: PHYSICIAN ASSISTANT
Payer: COMMERCIAL

## 2023-09-20 VITALS
BODY MASS INDEX: 35.44 KG/M2 | OXYGEN SATURATION: 98 % | SYSTOLIC BLOOD PRESSURE: 122 MMHG | WEIGHT: 193.81 LBS | DIASTOLIC BLOOD PRESSURE: 75 MMHG | HEART RATE: 56 BPM

## 2023-09-20 VITALS
SYSTOLIC BLOOD PRESSURE: 120 MMHG | WEIGHT: 194 LBS | DIASTOLIC BLOOD PRESSURE: 74 MMHG | HEIGHT: 62 IN | BODY MASS INDEX: 35.7 KG/M2

## 2023-09-20 DIAGNOSIS — Z98.890 POST-OPERATIVE STATE: Primary | ICD-10-CM

## 2023-09-20 DIAGNOSIS — Z12.11 COLON CANCER SCREENING: ICD-10-CM

## 2023-09-20 DIAGNOSIS — R63.4 WEIGHT LOSS: ICD-10-CM

## 2023-09-20 DIAGNOSIS — Z98.84 S/P BARIATRIC SURGERY: ICD-10-CM

## 2023-09-20 DIAGNOSIS — E11.9 TYPE 2 DIABETES MELLITUS WITHOUT COMPLICATION, WITHOUT LONG-TERM CURRENT USE OF INSULIN: ICD-10-CM

## 2023-09-20 DIAGNOSIS — Z01.419 WELL WOMAN EXAM WITH ROUTINE GYNECOLOGICAL EXAM: Primary | ICD-10-CM

## 2023-09-20 DIAGNOSIS — Z12.31 SCREENING MAMMOGRAM, ENCOUNTER FOR: Primary | ICD-10-CM

## 2023-09-20 DIAGNOSIS — E66.9 OBESITY (BMI 30-39.9): ICD-10-CM

## 2023-09-20 DIAGNOSIS — K21.9 GASTROESOPHAGEAL REFLUX DISEASE WITHOUT ESOPHAGITIS: ICD-10-CM

## 2023-09-20 DIAGNOSIS — E11.9 TYPE 2 DIABETES MELLITUS WITHOUT COMPLICATION, WITHOUT LONG-TERM CURRENT USE OF INSULIN: Primary | ICD-10-CM

## 2023-09-20 DIAGNOSIS — E66.01 MORBID OBESITY: ICD-10-CM

## 2023-09-20 DIAGNOSIS — I10 ESSENTIAL HYPERTENSION: ICD-10-CM

## 2023-09-20 DIAGNOSIS — Z71.3 DIETARY COUNSELING AND SURVEILLANCE: ICD-10-CM

## 2023-09-20 DIAGNOSIS — Z90.710 STATUS POST HYSTERECTOMY: ICD-10-CM

## 2023-09-20 DIAGNOSIS — N95.1 MENOPAUSE SYNDROME: ICD-10-CM

## 2023-09-20 DIAGNOSIS — N95.1 HOT FLASH, MENOPAUSAL: ICD-10-CM

## 2023-09-20 DIAGNOSIS — G47.33 OBSTRUCTIVE SLEEP APNEA (ADULT) (PEDIATRIC): ICD-10-CM

## 2023-09-20 DIAGNOSIS — Z12.31 SCREENING MAMMOGRAM, ENCOUNTER FOR: ICD-10-CM

## 2023-09-20 DIAGNOSIS — G47.33 OSA ON CPAP: ICD-10-CM

## 2023-09-20 LAB
25(OH)D3+25(OH)D2 SERPL-MCNC: 58 NG/ML (ref 30–96)
ALBUMIN SERPL BCP-MCNC: 4.3 G/DL (ref 3.5–5.2)
ALP SERPL-CCNC: 82 U/L (ref 55–135)
ALT SERPL W/O P-5'-P-CCNC: 45 U/L (ref 10–44)
ANION GAP SERPL CALC-SCNC: 7 MMOL/L (ref 8–16)
AST SERPL-CCNC: 30 U/L (ref 10–40)
BASOPHILS # BLD AUTO: 0.03 K/UL (ref 0–0.2)
BASOPHILS NFR BLD: 0.4 % (ref 0–1.9)
BILIRUB SERPL-MCNC: 0.5 MG/DL (ref 0.1–1)
BUN SERPL-MCNC: 28 MG/DL (ref 6–20)
CALCIUM SERPL-MCNC: 10.4 MG/DL (ref 8.7–10.5)
CHLORIDE SERPL-SCNC: 107 MMOL/L (ref 95–110)
CHOLEST SERPL-MCNC: 119 MG/DL (ref 120–199)
CHOLEST/HDLC SERPL: 2.8 {RATIO} (ref 2–5)
CO2 SERPL-SCNC: 28 MMOL/L (ref 23–29)
CREAT SERPL-MCNC: 1 MG/DL (ref 0.5–1.4)
DIFFERENTIAL METHOD: ABNORMAL
EOSINOPHIL # BLD AUTO: 0.1 K/UL (ref 0–0.5)
EOSINOPHIL NFR BLD: 0.9 % (ref 0–8)
ERYTHROCYTE [DISTWIDTH] IN BLOOD BY AUTOMATED COUNT: 15.5 % (ref 11.5–14.5)
EST. GFR  (NO RACE VARIABLE): >60 ML/MIN/1.73 M^2
GLUCOSE SERPL-MCNC: 107 MG/DL (ref 70–110)
HCT VFR BLD AUTO: 36.7 % (ref 37–48.5)
HDLC SERPL-MCNC: 43 MG/DL (ref 40–75)
HDLC SERPL: 36.1 % (ref 20–50)
HGB BLD-MCNC: 11.5 G/DL (ref 12–16)
IMM GRANULOCYTES # BLD AUTO: 0.01 K/UL (ref 0–0.04)
IMM GRANULOCYTES NFR BLD AUTO: 0.1 % (ref 0–0.5)
IRON SERPL-MCNC: 135 UG/DL (ref 30–160)
LDLC SERPL CALC-MCNC: 65 MG/DL (ref 63–159)
LYMPHOCYTES # BLD AUTO: 3.4 K/UL (ref 1–4.8)
LYMPHOCYTES NFR BLD: 45.8 % (ref 18–48)
MCH RBC QN AUTO: 26.4 PG (ref 27–31)
MCHC RBC AUTO-ENTMCNC: 31.3 G/DL (ref 32–36)
MCV RBC AUTO: 84 FL (ref 82–98)
MONOCYTES # BLD AUTO: 0.5 K/UL (ref 0.3–1)
MONOCYTES NFR BLD: 6.6 % (ref 4–15)
NEUTROPHILS # BLD AUTO: 3.5 K/UL (ref 1.8–7.7)
NEUTROPHILS NFR BLD: 46.2 % (ref 38–73)
NONHDLC SERPL-MCNC: 76 MG/DL
NRBC BLD-RTO: 0 /100 WBC
PLATELET # BLD AUTO: 277 K/UL (ref 150–450)
PMV BLD AUTO: 12.1 FL (ref 9.2–12.9)
POTASSIUM SERPL-SCNC: 4.6 MMOL/L (ref 3.5–5.1)
PROT SERPL-MCNC: 8.5 G/DL (ref 6–8.4)
RBC # BLD AUTO: 4.36 M/UL (ref 4–5.4)
SATURATED IRON: 30 % (ref 20–50)
SODIUM SERPL-SCNC: 142 MMOL/L (ref 136–145)
TOTAL IRON BINDING CAPACITY: 444 UG/DL (ref 250–450)
TRANSFERRIN SERPL-MCNC: 300 MG/DL (ref 200–375)
TRIGL SERPL-MCNC: 55 MG/DL (ref 30–150)
VIT B12 SERPL-MCNC: 1208 PG/ML (ref 210–950)
WBC # BLD AUTO: 7.47 K/UL (ref 3.9–12.7)

## 2023-09-20 PROCEDURE — 99499 UNLISTED E&M SERVICE: CPT | Mod: S$GLB,,, | Performed by: DIETITIAN, REGISTERED

## 2023-09-20 PROCEDURE — 99999 PR PBB SHADOW E&M-EST. PATIENT-LVL V: ICD-10-PCS | Mod: PBBFAC,,, | Performed by: PHYSICIAN ASSISTANT

## 2023-09-20 PROCEDURE — 1159F MED LIST DOCD IN RCRD: CPT | Mod: CPTII,S$GLB,, | Performed by: OBSTETRICS & GYNECOLOGY

## 2023-09-20 PROCEDURE — 3008F BODY MASS INDEX DOCD: CPT | Mod: CPTII,S$GLB,, | Performed by: PHYSICIAN ASSISTANT

## 2023-09-20 PROCEDURE — 3008F PR BODY MASS INDEX (BMI) DOCUMENTED: ICD-10-PCS | Mod: CPTII,S$GLB,, | Performed by: PHYSICIAN ASSISTANT

## 2023-09-20 PROCEDURE — 3066F PR DOCUMENTATION OF TREATMENT FOR NEPHROPATHY: ICD-10-PCS | Mod: CPTII,S$GLB,, | Performed by: PHYSICIAN ASSISTANT

## 2023-09-20 PROCEDURE — 99999 PR PBB SHADOW E&M-EST. PATIENT-LVL V: CPT | Mod: PBBFAC,,, | Performed by: PHYSICIAN ASSISTANT

## 2023-09-20 PROCEDURE — 3044F HG A1C LEVEL LT 7.0%: CPT | Mod: CPTII,S$GLB,, | Performed by: PHYSICIAN ASSISTANT

## 2023-09-20 PROCEDURE — 88141 CYTOPATH C/V INTERPRET: CPT | Mod: ,,, | Performed by: PATHOLOGY

## 2023-09-20 PROCEDURE — 1160F RVW MEDS BY RX/DR IN RCRD: CPT | Mod: CPTII,S$GLB,, | Performed by: PHYSICIAN ASSISTANT

## 2023-09-20 PROCEDURE — 88175 CYTOPATH C/V AUTO FLUID REDO: CPT | Performed by: PATHOLOGY

## 2023-09-20 PROCEDURE — 3078F PR MOST RECENT DIASTOLIC BLOOD PRESSURE < 80 MM HG: ICD-10-PCS | Mod: CPTII,S$GLB,, | Performed by: OBSTETRICS & GYNECOLOGY

## 2023-09-20 PROCEDURE — 87624 HPV HI-RISK TYP POOLED RSLT: CPT | Performed by: OBSTETRICS & GYNECOLOGY

## 2023-09-20 PROCEDURE — 3044F PR MOST RECENT HEMOGLOBIN A1C LEVEL <7.0%: ICD-10-PCS | Mod: CPTII,S$GLB,, | Performed by: OBSTETRICS & GYNECOLOGY

## 2023-09-20 PROCEDURE — 36415 COLL VENOUS BLD VENIPUNCTURE: CPT | Performed by: PHYSICIAN ASSISTANT

## 2023-09-20 PROCEDURE — 1159F MED LIST DOCD IN RCRD: CPT | Mod: CPTII,S$GLB,, | Performed by: PHYSICIAN ASSISTANT

## 2023-09-20 PROCEDURE — 99999 PR PBB SHADOW E&M-EST. PATIENT-LVL I: CPT | Mod: PBBFAC,,, | Performed by: DIETITIAN, REGISTERED

## 2023-09-20 PROCEDURE — 3008F PR BODY MASS INDEX (BMI) DOCUMENTED: ICD-10-PCS | Mod: CPTII,S$GLB,, | Performed by: OBSTETRICS & GYNECOLOGY

## 2023-09-20 PROCEDURE — 3044F HG A1C LEVEL LT 7.0%: CPT | Mod: CPTII,S$GLB,, | Performed by: OBSTETRICS & GYNECOLOGY

## 2023-09-20 PROCEDURE — 3061F PR NEG MICROALBUMINURIA RESULT DOCUMENTED/REVIEW: ICD-10-PCS | Mod: CPTII,S$GLB,, | Performed by: PHYSICIAN ASSISTANT

## 2023-09-20 PROCEDURE — 99396 PR PREVENTIVE VISIT,EST,40-64: ICD-10-PCS | Mod: S$GLB,,, | Performed by: OBSTETRICS & GYNECOLOGY

## 2023-09-20 PROCEDURE — 85025 COMPLETE CBC W/AUTO DIFF WBC: CPT | Performed by: PHYSICIAN ASSISTANT

## 2023-09-20 PROCEDURE — 99499 NO LOS: ICD-10-PCS | Mod: S$GLB,,, | Performed by: DIETITIAN, REGISTERED

## 2023-09-20 PROCEDURE — 3074F PR MOST RECENT SYSTOLIC BLOOD PRESSURE < 130 MM HG: ICD-10-PCS | Mod: CPTII,S$GLB,, | Performed by: PHYSICIAN ASSISTANT

## 2023-09-20 PROCEDURE — 99024 PR POST-OP FOLLOW-UP VISIT: ICD-10-PCS | Mod: S$GLB,,, | Performed by: PHYSICIAN ASSISTANT

## 2023-09-20 PROCEDURE — 99999 PR PBB SHADOW E&M-EST. PATIENT-LVL I: ICD-10-PCS | Mod: PBBFAC,,, | Performed by: DIETITIAN, REGISTERED

## 2023-09-20 PROCEDURE — 1160F RVW MEDS BY RX/DR IN RCRD: CPT | Mod: CPTII,S$GLB,, | Performed by: OBSTETRICS & GYNECOLOGY

## 2023-09-20 PROCEDURE — 80061 LIPID PANEL: CPT | Performed by: PHYSICIAN ASSISTANT

## 2023-09-20 PROCEDURE — 3061F NEG MICROALBUMINURIA REV: CPT | Mod: CPTII,S$GLB,, | Performed by: OBSTETRICS & GYNECOLOGY

## 2023-09-20 PROCEDURE — 88141 PR  CYTOPATH CERV/VAG INTERPRET: ICD-10-PCS | Mod: ,,, | Performed by: PATHOLOGY

## 2023-09-20 PROCEDURE — 1159F PR MEDICATION LIST DOCUMENTED IN MEDICAL RECORD: ICD-10-PCS | Mod: CPTII,S$GLB,, | Performed by: PHYSICIAN ASSISTANT

## 2023-09-20 PROCEDURE — 3074F SYST BP LT 130 MM HG: CPT | Mod: CPTII,S$GLB,, | Performed by: OBSTETRICS & GYNECOLOGY

## 2023-09-20 PROCEDURE — 99999 PR PBB SHADOW E&M-EST. PATIENT-LVL III: CPT | Mod: PBBFAC,,, | Performed by: OBSTETRICS & GYNECOLOGY

## 2023-09-20 PROCEDURE — 3044F PR MOST RECENT HEMOGLOBIN A1C LEVEL <7.0%: ICD-10-PCS | Mod: CPTII,S$GLB,, | Performed by: PHYSICIAN ASSISTANT

## 2023-09-20 PROCEDURE — 3008F BODY MASS INDEX DOCD: CPT | Mod: CPTII,S$GLB,, | Performed by: OBSTETRICS & GYNECOLOGY

## 2023-09-20 PROCEDURE — 3066F NEPHROPATHY DOC TX: CPT | Mod: CPTII,S$GLB,, | Performed by: OBSTETRICS & GYNECOLOGY

## 2023-09-20 PROCEDURE — 3061F PR NEG MICROALBUMINURIA RESULT DOCUMENTED/REVIEW: ICD-10-PCS | Mod: CPTII,S$GLB,, | Performed by: OBSTETRICS & GYNECOLOGY

## 2023-09-20 PROCEDURE — 3066F PR DOCUMENTATION OF TREATMENT FOR NEPHROPATHY: ICD-10-PCS | Mod: CPTII,S$GLB,, | Performed by: OBSTETRICS & GYNECOLOGY

## 2023-09-20 PROCEDURE — 1159F PR MEDICATION LIST DOCUMENTED IN MEDICAL RECORD: ICD-10-PCS | Mod: CPTII,S$GLB,, | Performed by: OBSTETRICS & GYNECOLOGY

## 2023-09-20 PROCEDURE — 1160F PR REVIEW ALL MEDS BY PRESCRIBER/CLIN PHARMACIST DOCUMENTED: ICD-10-PCS | Mod: CPTII,S$GLB,, | Performed by: OBSTETRICS & GYNECOLOGY

## 2023-09-20 PROCEDURE — 84466 ASSAY OF TRANSFERRIN: CPT | Performed by: PHYSICIAN ASSISTANT

## 2023-09-20 PROCEDURE — 1160F PR REVIEW ALL MEDS BY PRESCRIBER/CLIN PHARMACIST DOCUMENTED: ICD-10-PCS | Mod: CPTII,S$GLB,, | Performed by: PHYSICIAN ASSISTANT

## 2023-09-20 PROCEDURE — 3078F DIAST BP <80 MM HG: CPT | Mod: CPTII,S$GLB,, | Performed by: OBSTETRICS & GYNECOLOGY

## 2023-09-20 PROCEDURE — 3078F DIAST BP <80 MM HG: CPT | Mod: CPTII,S$GLB,, | Performed by: PHYSICIAN ASSISTANT

## 2023-09-20 PROCEDURE — 99024 POSTOP FOLLOW-UP VISIT: CPT | Mod: S$GLB,,, | Performed by: PHYSICIAN ASSISTANT

## 2023-09-20 PROCEDURE — 3061F NEG MICROALBUMINURIA REV: CPT | Mod: CPTII,S$GLB,, | Performed by: PHYSICIAN ASSISTANT

## 2023-09-20 PROCEDURE — 80053 COMPREHEN METABOLIC PANEL: CPT | Performed by: PHYSICIAN ASSISTANT

## 2023-09-20 PROCEDURE — 82607 VITAMIN B-12: CPT | Performed by: PHYSICIAN ASSISTANT

## 2023-09-20 PROCEDURE — 82306 VITAMIN D 25 HYDROXY: CPT | Performed by: PHYSICIAN ASSISTANT

## 2023-09-20 PROCEDURE — 99999 PR PBB SHADOW E&M-EST. PATIENT-LVL III: ICD-10-PCS | Mod: PBBFAC,,, | Performed by: OBSTETRICS & GYNECOLOGY

## 2023-09-20 PROCEDURE — 99396 PREV VISIT EST AGE 40-64: CPT | Mod: S$GLB,,, | Performed by: OBSTETRICS & GYNECOLOGY

## 2023-09-20 PROCEDURE — 3074F SYST BP LT 130 MM HG: CPT | Mod: CPTII,S$GLB,, | Performed by: PHYSICIAN ASSISTANT

## 2023-09-20 PROCEDURE — 3066F NEPHROPATHY DOC TX: CPT | Mod: CPTII,S$GLB,, | Performed by: PHYSICIAN ASSISTANT

## 2023-09-20 PROCEDURE — 83540 ASSAY OF IRON: CPT | Performed by: PHYSICIAN ASSISTANT

## 2023-09-20 PROCEDURE — 84425 ASSAY OF VITAMIN B-1: CPT | Performed by: PHYSICIAN ASSISTANT

## 2023-09-20 PROCEDURE — 3074F PR MOST RECENT SYSTOLIC BLOOD PRESSURE < 130 MM HG: ICD-10-PCS | Mod: CPTII,S$GLB,, | Performed by: OBSTETRICS & GYNECOLOGY

## 2023-09-20 PROCEDURE — 3078F PR MOST RECENT DIASTOLIC BLOOD PRESSURE < 80 MM HG: ICD-10-PCS | Mod: CPTII,S$GLB,, | Performed by: PHYSICIAN ASSISTANT

## 2023-09-20 RX ORDER — ESTRADIOL 1 MG/1
1 TABLET ORAL DAILY
Qty: 30 TABLET | Refills: 12 | Status: SHIPPED | OUTPATIENT
Start: 2023-09-20 | End: 2023-10-13

## 2023-09-20 NOTE — PROGRESS NOTES
Subjective:       Patient ID: Africa Jennings is a 53 y.o. female.    Chief Complaint:  Well Woman (Pt here for annual and pap. Last normal mammogram was 2022)      History of Present Illness  HPI  Annual Exam-Premenopausal  Patient presents for annual exam. The patient is sexually active. GYN screening history: last pap: approximate date  and was normal and last mammogram: approximate date 2022 and was normal. The patient wears seatbelts: yes. The patient participates in regular exercise: yes. Has the patient ever been transfused or tattooed?:  No/Yes . The patient reports that there is not domestic violence in her life.    Complaining of hot flashes without significant vaginal dryness or dyspareunia.  ERT worked well.  But she ran out.    Status post Total laparoscopic hysterectomy with bilateral salpingectomy  by Dr Polo.    History of chronic hypertension and diabetes mellitus.  Well-controlled.  Status post breast reduction  Status post gastric sleeve in 2023.  No longer needs meds for DM      GYN & OB History  No LMP recorded (lmp unknown). Patient has had a hysterectomy.   Date of Last Pap: 2023    OB History    Para Term  AB Living   6 5 3   1     SAB IAB Ectopic Multiple Live Births   1              # Outcome Date GA Lbr Chris/2nd Weight Sex Delivery Anes PTL Lv   6 Term            5 Term            4 Term            3 SAB            2 Para      Vag-Spont      1 Para      Vag-Spont        Past Medical History:   Diagnosis Date    Diabetes mellitus     Diabetes mellitus, type 2     Eczema     GERD (gastroesophageal reflux disease)     Hypertension     Impaired fasting blood sugar     YSABEL on CPAP        Past Surgical History:   Procedure Laterality Date    breast reduction      CHOLECYSTECTOMY      laprascopic    DECOMPRESSION OF THORACIC OUTLET N/A 10/28/2022    Procedure: T9-12 ROBOTIC DECOMPRESSION, THORACIC FUSION;  Surgeon: Edouard Whitt,  DO;  Location: [facility] OR 2ND FLR;  Service: Neurosurgery;  Laterality: N/A;  ANESTHESIA GENERAL [place] I BLOOD TYPE & SCREEN NERVE MONITORING EMG SEP MEP POSTION PRONE BED CARA 4 POST HEAD REST MOLINA [facility] RADIOLOGY C-ARM GLOBUS PROTOCOL MISCELLANEOUS ALANIZ BRACE TLSO    EPIDURAL STEROID INJECTION N/A 1/11/2022    Procedure: INJECTION, STEROID, EPIDURAL IL L4/5 NEEDS CONSENT;  Surgeon: Britt Calix MD;  Location: [facility] PAIN MGT;  Service: Pain Management;  Laterality: N/A;    ESOPHAGOGASTRODUODENOSCOPY  8/18/14    HYSTERECTOMY  2007    laprascopic, total for fibroids.  Dr Polo    INJECTION OF JOINT Left 2/8/2022    Procedure: INJECTION, JOINT, SI LEFT NEEDS CONSENT;  Surgeon: Britt Calix MD;  Location: [facility] PAIN MGT;  Service: Pain Management;  Laterality: Left;    OOPHORECTOMY      ROBOT-ASSISTED LAPAROSCOPIC SLEEVE GASTRECTOMY USING DA DEVI XI N/A 7/26/2023    Procedure: XI ROBOTIC SLEEVE GASTRECTOMY with intraop EGD;  Surgeon: Roge Rich MD;  Location: [facility] OR 2ND FLR;  Service: General;  Laterality: N/A;  with possible HHR    TOTAL REDUCTION MAMMOPLASTY         Family History   Problem Relation Age of Onset    Diabetes Maternal Grandmother     Heart disease Maternal Grandmother     Hypertension Maternal Grandmother     Crohn's disease Sister     Diabetes Mother     Hypertension Mother     Heart disease Mother     Cancer Father 58        Prostate    Breast cancer Sister     Amblyopia Neg Hx     Blindness Neg Hx     Cataracts Neg Hx     Glaucoma Neg Hx     Macular degeneration Neg Hx     Retinal detachment Neg Hx     Strabismus Neg Hx        Social History     Socioeconomic History    Marital status:    Tobacco Use    Smoking status: Never    Smokeless tobacco: Never   Substance and Sexual Activity    Alcohol use: No     Comment: socially    Drug use: No    Sexual activity: Yes     Partners: Male     Birth control/protection: Surgical   Social History Narrative    Was   since 2000.      Has a friend since 2016.    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     Social Determinants of Health     Financial Resource Strain: Low Risk  (9/20/2023)    Overall Financial Resource Strain (CARDIA)     Difficulty of Paying Living Expenses: Not very hard   Food Insecurity: No Food Insecurity (9/20/2023)    Hunger Vital Sign     Worried About Running Out of Food in the Last Year: Never true     Ran Out of Food in the Last Year: Never true   Transportation Needs: No Transportation Needs (9/20/2023)    PRAPARE - Transportation     Lack of Transportation (Medical): No     Lack of Transportation (Non-Medical): No   Physical Activity: Sufficiently Active (9/20/2023)    Exercise Vital Sign     Days of Exercise per Week: 3 days     Minutes of Exercise per Session: 50 min   Recent Concern: Physical Activity - Insufficiently Active (9/11/2023)    Exercise Vital Sign     Days of Exercise per Week: 2 days     Minutes of Exercise per Session: 60 min   Stress: No Stress Concern Present (9/20/2023)    [employer] [employer] - Occupational Stress Questionnaire     Feeling of Stress : Not at all   Recent Concern: Stress - Stress Concern Present (7/9/2023)    [place] [place] [employer] - Occupational Stress Questionnaire     Feeling of Stress : Very much   Social Connections: Unknown (9/20/2023)    Social Connection and Isolation Panel [NHANES]     Frequency of Communication with Friends and Family: More than three times a week     Frequency of Social Gatherings with Friends and Family: Twice a week     Active Member of Clubs or Organizations: No     Attends Club or Organization Meetings: Never     Marital Status: Living with partner   Housing Stability: Low Risk  (9/20/2023)    Housing Stability Vital Sign     Unable to Pay for Housing in the Last Year: No     Number of Places Lived in the Last Year: 1     Unstable Housing in the Last Year: No        Current Outpatient Medications   Medication Sig Dispense Refill    acetaminophen (TYLENOL) 500 MG tablet Take 2 tablets (1,000 mg total) by mouth every 8 (eight) hours as needed for Pain. 30 tablet 0    atorvastatin (LIPITOR) 20 MG tablet TAKE 1 TABLET BY MOUTH EVERY DAY 90 tablet 3    doxycycline monohydrate 100 mg Tab Take 1 tablet by mouth 2 (two) times daily.      ergocalciferol (ERGOCALCIFEROL) 50,000 unit Cap TAKE 2 CAPSULES BY MOUTH TWICE A WEEK 48 capsule 1    finasteride (PROSCAR) 5 mg tablet Take 1 tablet (5 mg total) by mouth once daily. 30 tablet 11    fluocinonide (LIDEX) 0.05 % ointment Apply to affected areas of scalp qhs 60 g 3    ketoconazole (NIZORAL) 2 % cream Apply to affected areas of scalp BID. 60 g 5    ketoconazole (NIZORAL) 2 % shampoo Wash hair with medicated shampoo at least 2x/week - let sit on scalp at least 5 minutes prior to rinsing 120 mL 5    lancets Misc Use to test blood sugar once daily. 300 each 0    mometasone (ELOCON) 0.1 % solution Apply topically once daily. To thinning area of scalp 60 mL 5    neomycin-polymyxin-dexamethasone (MAXITROL) 3.5mg/mL-10,000 unit/mL-0.1 % DrpS as needed.      nystatin (MYCOSTATIN) ointment Apply topically 2 (two) times daily. USES PRN      omeprazole (PRILOSEC) 40 MG capsule TAKE 1 CAPSULE BY MOUTH EVERY DAY 90 capsule 3    omeprazole (PRILOSEC) 40 MG capsule Take 1 capsule (40 mg total) by mouth every morning. Open capsule and take with apple sauce 30 capsule 2    omeprazole (PRILOSEC) 40 MG capsule Take 1 capsule (40 mg total) by mouth every morning. Open capsule and take with apple sauce 30 capsule 2    ondansetron (ZOFRAN) 4 MG tablet TAKE 1 TABLET BY MOUTH EVERY 8 HOURS AS NEEDED 90 tablet 1    ondansetron (ZOFRAN-ODT) 8 MG TbDL Take 1 tablet (8 mg total) by mouth every 6 (six) hours as needed. 30 tablet 0    ondansetron (ZOFRAN-ODT) 8 MG TbDL Take 1 tablet (8 mg total) by mouth every 6 (six) hours as needed. 30 tablet 0    oxyCODONE  (ROXICODONE) 5 mg/5 mL Soln Take 5 mLs (5 mg total) by mouth every 8 (eight) hours as needed. 150 mL 0    oxyCODONE (ROXICODONE) 5 mg/5 mL Soln Take 5 mLs (5 mg total) by mouth every 8 (eight) hours as needed. 150 mL 0    prednisoLONE acetate (PRED FORTE) 1 % DrpS USES PRN      pregabalin (LYRICA) 75 MG capsule Take 1 capsule (75 mg total) by mouth 2 (two) times daily. 60 capsule 6    semaglutide (OZEMPIC) 1 mg/dose (4 mg/3 mL) Inject 1 mg into the skin every 7 days. 3 each 0    spironolactone (ALDACTONE) 50 MG tablet Take 1 tablet (50 mg total) by mouth once daily. 30 tablet 11    tiZANidine (ZANAFLEX) 4 MG tablet TAKE 1 TABLET BY MOUTH NIGHTLY AS NEEDED (MUSCLE SPASM). 90 tablet 2    triamcinolone acetonide 0.1% (KENALOG) 0.1 % cream Apply topically 2 (two) times daily. Apply topically 2 (two) times daily. 135 g 4    valsartan-hydrochlorothiazide (DIOVAN-HCT) 160-12.5 mg per tablet Take 1 tablet by mouth once daily. 90 tablet 1    estradioL (ESTRACE) 1 MG tablet Take 1 tablet (1 mg total) by mouth once daily. 30 tablet 12     Current Facility-Administered Medications   Medication Dose Route Frequency Provider Last Rate Last Admin    triamcinolone acetonide injection 10 mg  10 mg Intradermal Once Basilia Valdez MD           Review of patient's allergies indicates:  No Known Allergies    Review of Systems  Review of Systems   Constitutional:  Negative for activity change, appetite change, chills, fatigue, fever and unexpected weight change.   HENT:  Negative for mouth sores.    Respiratory:  Negative for cough, shortness of breath and wheezing.    Cardiovascular:  Negative for chest pain and palpitations.   Gastrointestinal:  Negative for abdominal pain, bloating, blood in stool, constipation, nausea and vomiting.   Endocrine: Positive for hot flashes. Negative for diabetes.   Genitourinary:  Positive for hot flashes. Negative for dysmenorrhea, dyspareunia, dysuria, frequency, hematuria, menorrhagia, menstrual  problem, pelvic pain, urgency, vaginal bleeding, vaginal discharge, vaginal pain, urinary incontinence, postcoital bleeding and vaginal odor.   Musculoskeletal:  Negative for back pain and myalgias.   Integumentary:  Negative for rash, breast mass and nipple discharge.   Neurological:  Negative for seizures and headaches.   Psychiatric/Behavioral:  Negative for depression and sleep disturbance. The patient is not nervous/anxious.    Breast: Negative for mass, mastodynia and nipple discharge          Objective:    Physical Exam:   Constitutional: She appears well-developed and well-nourished. No distress.   BMI of 35.48    HENT:   Head: Normocephalic and atraumatic.    Eyes: EOM are normal.      Pulmonary/Chest: Effort normal. No respiratory distress.   Breasts: Non-tender, no engorgement, no masses, no retraction, no discharge. Negative for lymphadenopathy.   Breast reduction scars        Abdominal: Soft. She exhibits no distension. There is no abdominal tenderness. There is no rebound and no guarding.   Pfannenstiel scar      Genitourinary:    Vagina normal.   No  no vaginal discharge in the vagina.    Genitourinary Comments: Minimal atrophy.  Vulva without any obvious lesions.  Urethral meatus normal size and location without any lesion.  Urethra is non-tender without stricture or discharge.  Bladder is non-tender.  Vaginal vault with good support.  Minimal white discharge noted.  No obvious lesion.  Normal rugation.  Cervix and Uterus are surgically absent.  Adnexa is without any masses or tenderness.  Perineum without obvious lesion.               Musculoskeletal: Normal range of motion.       Neurological: She is alert.    Skin: Skin is warm and dry.    Psychiatric: She has a normal mood and affect.          Assessment:        1. Well woman exam with routine gynecological exam    2. Screening mammogram, encounter for    3. Colon cancer screening    4. Status post hysterectomy    5. Menopause syndrome    6. Hot  flash, menopausal            Plan:          I have discussed with the patient her condition.  Monthly breast examination was instructed, discussed, and encouraged.  Patient was encouraged to consume a low-calorie, low fat diet, and to increase of physical activity.  Healthy habits encouraged.  A Pap smear was performed for the record; she no longer would need Pap after this according to the USPSTF recommendations.  Mammogram was ordered because of the combination of her age and risk factors, according to ACOG guidelines.  Gonorrhea and Chlamydia testing not performed;  HIV test not offered, again according to guidelines.  Colonoscopy discussed according to ACS guideline.  We also discussed her obstetric history and CV risks.   Patient is to continue her medications as prescribed.    Refills for Estrace given  She will come back to see me in one year for her annual visit.  She can come back to see me sooner as necessary.  All of her questions were answered appropriately to her satisfaction.

## 2023-09-20 NOTE — PROGRESS NOTES
NUTRITION NOTE    Referring Physician: Roge Rich M.D.  Reason for MNT Referral: Follow-up 8 weeks s/p Gastric Sleeve    Denies nausea, vomiting, constipation, and diarrhea.  Reports doing well.    Past Medical History:   Diagnosis Date    Diabetes mellitus     Diabetes mellitus, type 2     Eczema     GERD (gastroesophageal reflux disease)     Hypertension     Impaired fasting blood sugar     YSABEL on CPAP        CLINICAL DATA:  53 y.o. female. 5 ft. 2 in.    Current Weight: 194 lbs  BMI: 35.4  Total Weight Loss: 28 lbs  Excess Weight Loss: 30%    LABS:  Reviewed.    CURRENT DIET:  Bariatric Diet.  Diet Recall:  grams of protein/day; 48+ oz of fluids/day    B: scrambled egg beaters 1/4 cup  - Premier shake  L: tuna salad  - 1/3 prot smoothie 15g  D: 2oz salmon in air fryer patted with olive oil, 1/4 cup soft cooked carrots or black beans  - Premier shake    Meal Pattern: 3 meal(s) + 2 protein supplement(s)  Adequate protein supplement intake. Lunch smoothie (30g prot scoop + 6oz plain greek yogurt + 8oz skim milk = 50. (1/3 of it 15g prot)  Adequate dairy intake.  Adequate vegetable intake. Tolerates raw vegetables and lettuce.  Adequate fruit intake.    EXERCISE:  None  Was walking 4 miles per day  Plans to start water aerobics with her sister soon, goal of 3 days/week, [place] pool    Restrictions to Exercise: dehydration from walking in the heat    VITAMINS / MINERALS:  As directed    ASSESSMENT:  Doing well overall.  Weight loss.  Adequate calorie intake.  Adequate protein intake.  Adequate fluid intake.  Following diet appropriately.  Not exercising.  Adequate vitamins & minerals.    BARIATRIC DIET DISCUSSION:  Instructed and provided written materials on bariatric diet plan.  Reinforced post-op nutrition guidelines.    PLAN / RECOMMENDATIONS:  May begin to incorporate raw vegetables, lettuce, unsalted nuts, and protein bars as tolerated.  Continue excellent diet plan.  Maintain protein  intake.  Maintain fluid intake.  Begin exercise.  Continue appropriate vitamins & minerals.    Return to clinic in 4 months.    SESSION TIME: 15 minutes

## 2023-09-20 NOTE — PROGRESS NOTES
BARIATRIC POST-OPERATIVE VISIT:    HPI:  Africa Jennings is a 53 y.o. year old female presents for 8 week post op visit following s/p sleeve.  she is doing well and tolerating the diet without difficulty.  she has no complaints.    Stopped metformin   BP running well   Ozempic still on back order.           Denies: nausea, vomiting, abdominal pain, changes in bowel movement pattern, fever, chills, dysphagia, chest pain, and shortness of breath.    Review of Systems   Constitutional:  Negative for appetite change, fatigue and fever.   HENT:  Negative for tinnitus and trouble swallowing.    Eyes:  Negative for visual disturbance.   Respiratory:  Negative for cough, choking, chest tightness and shortness of breath.    Gastrointestinal:  Negative for abdominal pain, constipation, diarrhea, nausea and vomiting.   Genitourinary:  Negative for decreased urine volume and hematuria.   Skin:  Negative for rash.   Neurological:  Negative for dizziness, light-headedness and headaches.   Psychiatric/Behavioral:  Negative for dysphoric mood, sleep disturbance and suicidal ideas. The patient is not nervous/anxious.        EXERCISE & VITAMINS:  See Bariatric Assessment  Adherent to vitamin regimen   MEDICATIONS/ALLERGIES:  Have been reviewed.    DIET:  See Dietician note from today for a more detailed assessment.    2 shakes and 1 home made shake   Black beans  Red beans  Black beans  Physical Exam  Constitutional:       Appearance: She is obese.   HENT:      Head: Normocephalic and atraumatic.   Eyes:      Extraocular Movements: Extraocular movements intact.      Conjunctiva/sclera: Conjunctivae normal.      Pupils: Pupils are equal, round, and reactive to light.   Cardiovascular:      Rate and Rhythm: Normal rate and regular rhythm.      Pulses: Normal pulses.      Heart sounds: Normal heart sounds. No murmur heard.  Pulmonary:      Effort: Pulmonary effort is normal. No respiratory distress.      Breath sounds:  Normal breath sounds.   Abdominal:      General: Bowel sounds are normal.      Palpations: Abdomen is soft.      Tenderness: There is no abdominal tenderness.   Musculoskeletal:      Cervical back: Normal range of motion and neck supple.   Skin:     Capillary Refill: Capillary refill takes less than 2 seconds.   Neurological:      General: No focal deficit present.      Mental Status: She is alert and oriented to person, place, and time.   Psychiatric:         Mood and Affect: Mood normal.         Behavior: Behavior normal.         Thought Content: Thought content normal.         Judgment: Judgment normal.         ASSESSMENT:  - Morbid obesity s/p sleeve gastrectomy   - Co-morbidities: diabetes mellitus, GERD, hypertension, and obstructive sleep apnea  - Good  Weight loss, 29#'s and 31% EWL  - Great Exercise routine  - Good Diet  - Good Vitamin regimen    PLAN:  - Anti-Acid medication, PPI daily for 3 months  - No lifting more than 10 lbs for start/continue weeks  - Miralax daily for constipation  - Emphasized the importance of regular exercise and adherence to bariatric diet to achieve maximum weight loss.  - Encouraged patient to start/continue regular exercise.  - Follow-up with dietician to advance diet.  - Continue daily vitamins and medications.  - RTC in 2 weeks or sooner if needed.  - Call the office for any issues.  - Check labs today.

## 2023-09-20 NOTE — PATIENT INSTRUCTIONS
How to taper off of Omeprazole: ( start on 10/18/23)   - Take 1 Tablet every other day for 2 weeks.  If you do not experience any heartburn, indigestion, nausea symptoms, the following week, take 1 tablet every 3 days.  Again if you remain without the above symptoms, you may completely discontinue the medication.  If symptoms return at any point, please restart the medication.     Meal Ideas for Regular Bariatric Diet  *Recipes and products available at [email or web address]      Breakfast: (15-20g protein)    - Egg white omelet: 2 egg whites or ½ cup Egg Beaters. (Optional proteins: cheese, shrimp, black beans, chicken, sliced turkey) (Optional veggies: tomatoes, salsa, spinach, mushrooms, onions, green peppers, or small slice avocado)     - Egg and sausage: 1 egg or ¼ cup Egg Beaters (any variety), with 1 alejandro or 2 links of Turkey sausage or Veggie breakfast sausage ([email or web address] or [employer])    - Crust-less breakfast quiche: To make a glass pie dish, mix 4oz part skim Ricotta, 1 cup skim milk, and 2 eggs as your base. Add protein: shredded cheese, sliced lean ham or turkey, turkey michelle/sausage. Add veggies: tomato, onion, green onion, mushroom, green pepper, spinach, etc.    - Yogurt parfait: Mix 1 - 6oz container Dannon Light N Fit vanilla yogurt, with ¼ cup crushed unsalted nuts    - Cottage cheese and fruit: ½ cup part-skim cottage cheese or ricotta cheese topped with fresh fruit or sugar free preserves     - Chelita Garcia's Vanilla Egg custard* (add 2 Tbsp instant coffee granules to make Cappuccino Custard*)    - Hi-Protein café latte (skim milk, decaf coffee, 1 scoop protein powder). Optional to add Sugar free syrup or extract flavoring.    - Breakfast Lox: spread fat free cream cheese on slices of smoked salmon. Serve over scrambled or egg over easy (sauteed with nonstick cookspray) OR on a cucumber slice    - Eggwhich: Scramble or cook 1 large egg over easy using nonstick cookspray. Place between 2  slices of [place] michelle and low fat cheese.     Lunch: (20-30g protein)    - ½ cup Black bean soup (Homemade or Progresso), with ¼ cup shredded low-fat cheese. Top with chopped tomato or fresh salsa.     - Lean deli turkey breast and low-fat sliced cheese, mustard or light holland to moisten, rolled up together, or wrapped in a Misael lettuce leaf    - Chicken salad made from dinner leftovers, moisten with low-fat salad dressing or light holland. Also try leftover salmon, shrimp, tuna or boiled eggs. Serve ½ cup over dark green salad    - Fat-free canned refried beans, topped with ¼ cup shredded low-fat cheese. Top with chopped tomato or fresh salsa.     - Greek salad: Top mixed greens with 1-2oz grilled chicken, tomatoes, red onions, 2-3 kalamata olives, and sprinkle lightly with feta cheese. Spritz with Balsamic vinegar to taste.     - Crust-less lunch quiche: To make a glass pie dish, mix 4oz part skim Ricotta, 1 cup skim milk, and 2 eggs as your base. Add protein: shredded cheese, sliced lean ham or turkey, shrimp, chicken. Add veggies: tomato, onion, green onion, mushroom, green pepper, spinach, artichoke, broccoli, etc.    - Pizza bake: spread a  melo tonio mushroom with tomato sauce, low-fat shredded mozzarella and turkey pepperoni or [place] michelle. Add any veggies. Roast for 10-15 minutes, until cheese melted.     - Cucumber crab bites: Spread ¼ cup crab dip (lump crabmeat + light cream cheese and green onions) over sliced cucumber.     - Chicken with light spinach and artichoke dip*: Puree in : 6oz cooked and drained spinach, 2 cloves garlic, 1 can cannelloni beans, ½ cup chopped green onions, 1 can drained artichoke hearts (not marinated in oil), lemon juice and basil. Mix in 2oz chopped up chicken.    Supper: (20-30g protein)    - Serve grilled fish over dark green salad tossed with low-fat dressing, served with grilled asparagus masterson     - Rotisserie chicken salad: served with sliced  strawberries, walnuts, fat-free feta cheese crumbles and 1 tbsp Paynes Own Light Raspberry [place] Vinaigrette    - Shrimp cocktail: Dip cold boiled shrimp in homemade low-sugar cocktail sauce (1/2 cup Naseem One Carb ketchup, 2 tbsp horseradish, 1/4 tsp hot sauce, 1 tsp Worcestershire sauce, 1 tbsp freshly-squeezed lemon juice). Serve with dark green salad, walnuts, and crumbled blue cheese drizzled with olive oil and Balsamic vinegar    - Tuna Melt: Spread tuna salad onto 2 thick slices of tomato. Top with low-fat cheese and broil until cheese is melted. May also be made with chicken salad of shrimp salad. [place] with different types of cheeses.    - Chicken or beef fajitas (no tortilla, rice, beans, chips). Top meat and veggies w/ fresh salsa, fat free sour cream.     - Homemade low-fat Chili using extra lean ground beef or ground turkey. Top with shredded cheese and salsa as desired. May add dollop fat-free sour cream if desired    - Chicken parmesan: Top chicken breast w/ low sugar marinara sauce, mozzarella cheese and bake until chicken reaches 165*.  Serve w/ spaghetti SQUASH or Romanian cut green beans    - Dinner Omelet with shrimp or chicken and onion, green peppers and chives.    - No noodle lasagna: Use sliced zucchini or eggplant in place of noodles.  Layer with part skim ricotta cheese and low sugar meat sauce (use very lean ground beef or ground turkey).    - Mexican chicken bake: Bake chunks of chicken breast or thigh with taco seasoning, Pace brand enchilada sauce, green onions and low-fat cheese. Serve with ¼ cup black beans or fat free refried beans topped with chopped tomatoes or salsa.    - Prosper frozen meatballs, simmered in Classico Marinara sauce. Different flavors of salsa or spaghetti sauce create different dishes! Sprinkle with parmesan cheese. Serve with grilled or steamed veggies, or a dark green salad.    - Simmer boneless skinless chicken thigh chunks in Classico Marinara sauce  or roasted salsa until tender with chopped onion, bell pepper, garlic, mushrooms, spinach, etc.     - Hamburger or veggie burger, without the bun, dressed the way you like. Served with grilled or steamed veggies.    - Eggplant parmesan: Bake slices of eggplant at 350 degrees for 15 minutes. Layer tomato sauce, sliced eggplant and low-fat mozzarella cheese in a baking dish and cover with foil. Bake 30-40 more minutes or until bubbly. Uncover and bake at 400 degrees for about 15 more minutes, or until top is slightly crisp.    - Fish tacos: grilled/baked white fish, wrapped in Misael lettuce leaf, topped with salsa, shredded low-fat cheese, and light coleslaw.    - Chicken trice: Sprinkle chicken w/ 1 tsp of hidden valley ranch dip mix. Then grill chicken and top with black beans, salsa and 1 tsp fat free sour cream.     - Cauliflower pizza crust: Use cauliflower as crust ([facility], no flour!). Top w/ low fat cheese, turkey pepperoni and veggies and bake again    - chicken or turkey crust pizza: use ground chicken or turkey instead of cauliflower, spread in [place] and bake at 350 for about 20-30 minutes(may want to add garlic, black pepper, oregano and other herbs to ground meat mixture).  Remove and top w/ low fat cheese, turkey pepperoni and veggies and bake again for another 10 minutes or until cheese is browned.     Snacks: (100-200 calories; >5g protein)    - 1 low-fat cheese stick with 8 cherry tomatoes or 1 serving fresh fruit  - 4 thin slices fat-free turkey breast and 1 slice low-fat cheese  - 4 thin slices fat-free honey ham with wedge of melon  - 6-8 edamame pods (equivalent to about 1/4 cup edamame without pods).   - 1/4 cup unsalted nuts with ½ cup fruit  - 6-oz container Dannon Light n Fit vanilla yogurt, topped with 1oz unsalted nuts         - apple, celery or baby carrots spread with 2 Tbsp PB2  - apple slices with 1 oz slice low-fat cheese  - Apple slices dipped in 2 Tbsp of PB2  -  celery, cucumber, bell pepper or baby carrots dipped in ¼ cup hummus bean spread or light spinach and artichoke dip (*recipe in lunch section)  - celery, cucumber, baby carrots dipped in high protein greek yogurt (Mix 16 oz plain greek yogurt + 1 packet of hidden valley ranch dip mix)  - Barrie Giovanny Beef Steak - 14g protein! (similar to beef jerky)  - 2 wedges [place] - Light Herb & Garlic Cheese with sliced cucumber or green bell pepper  - 1/2 cup low-fat cottage cheese with ¼ cup fruit or ¼ cup salsa  - RTD Protein drinks: Atkins, Low Carb Slim Fast, EAS light, Muscle Milk Light, etc.  - Homemade Protein drinks: GNC Soy95, Isopure, Nectar, UNJURY, Whey Gourmet, etc. Mix 1 scoop powder with 8oz skim/1% milk or light soymilk.  - Protein bars: Atkins, EAS, Pure Protein, Think Thin, Detour, etc. Must have 0-4 grams sugar - Read the label.    Takeout Options: No more than twice/week  Deli - Salads (no pasta or rice), meats, cheeses. Roasted chicken. Lox (salmon)    Mexican - Platters which don't include tortillas, chips, or rice. Go easy on the beans. Example: Fajitas without the tortillas. Ask the  not to bring chips to the table if they are too tempting.    Greek - Meat or fish and vegetable, but no bread or rice. Including hummus, baba ganoush, etc, is OK. Most sit-down Greek restaurants can provide you with cucumber slices for dipping instead of colby bread.    Fast Food (Avoid as much as possible) - Salads (no croutons and limit salad dressing to 2 tbsp), grilled chicken sandwich without the bun and ask for no holland. Jennfiers low fat chili or Taco Bell pintos and cheese.    BBQ - The meats are fine if you ask for sauces on the side, but most of the traditional side dishes are loaded with carbs. Fletcher slaw, baked beans and BBQ sauce are typically made with sugar.    Chinese - Nothing deep-fried, no rice or noodles. Many Chinese sauces have starch and sugar in them, so you'll have to use your judgement. If  you find that these sauces trigger cravings, or cause Dumping, you can ask for the sauce to be made without sugar or just use soy sauce.

## 2023-09-21 ENCOUNTER — PATIENT MESSAGE (OUTPATIENT)
Dept: ADMINISTRATIVE | Facility: OTHER | Age: 53
End: 2023-09-21
Payer: COMMERCIAL

## 2023-09-25 ENCOUNTER — PATIENT MESSAGE (OUTPATIENT)
Dept: ADMINISTRATIVE | Facility: OTHER | Age: 53
End: 2023-09-25
Payer: COMMERCIAL

## 2023-09-25 LAB
HPV HR 12 DNA SPEC QL NAA+PROBE: NEGATIVE
HPV16 AG SPEC QL: NEGATIVE
HPV18 DNA SPEC QL NAA+PROBE: NEGATIVE
VIT B1 BLD-MCNC: 70 UG/L (ref 38–122)

## 2023-09-26 ENCOUNTER — HOSPITAL ENCOUNTER (OUTPATIENT)
Facility: OTHER | Age: 53
Discharge: HOME | End: 2023-09-26
Attending: ANESTHESIOLOGY | Admitting: ANESTHESIOLOGY
Payer: COMMERCIAL

## 2023-09-26 VITALS
TEMPERATURE: 97 F | HEIGHT: 62 IN | DIASTOLIC BLOOD PRESSURE: 97 MMHG | BODY MASS INDEX: 34.96 KG/M2 | HEART RATE: 65 BPM | WEIGHT: 190 LBS | SYSTOLIC BLOOD PRESSURE: 132 MMHG | RESPIRATION RATE: 16 BRPM | OXYGEN SATURATION: 100 %

## 2023-09-26 DIAGNOSIS — M51.36 DDD (DEGENERATIVE DISC DISEASE), LUMBAR: ICD-10-CM

## 2023-09-26 DIAGNOSIS — G89.29 CHRONIC PAIN: ICD-10-CM

## 2023-09-26 DIAGNOSIS — M54.16 LUMBAR RADICULOPATHY: Primary | ICD-10-CM

## 2023-09-26 LAB — POCT GLUCOSE: 92 MG/DL (ref 70–110)

## 2023-09-26 PROCEDURE — 63600175 PHARM REV CODE 636 W HCPCS: Performed by: ANESTHESIOLOGY

## 2023-09-26 PROCEDURE — 64484 PRA INJECT ANES/STEROID FORAMEN LUMBAR/SACRAL W IMG GUIDE ,EA ADD LEVEL: ICD-10-PCS | Mod: RT,,, | Performed by: ANESTHESIOLOGY

## 2023-09-26 PROCEDURE — 64483 NJX AA&/STRD TFRM EPI L/S 1: CPT | Mod: RT,,, | Performed by: ANESTHESIOLOGY

## 2023-09-26 PROCEDURE — 82947 ASSAY GLUCOSE BLOOD QUANT: CPT | Performed by: ANESTHESIOLOGY

## 2023-09-26 PROCEDURE — 64483 PR EPIDURAL INJ, ANES/STEROID, TRANSFORAMINAL, LUMB/SACR, SNGL LEVL: ICD-10-PCS | Mod: RT,,, | Performed by: ANESTHESIOLOGY

## 2023-09-26 PROCEDURE — 25000003 PHARM REV CODE 250

## 2023-09-26 PROCEDURE — 64484 NJX AA&/STRD TFRM EPI L/S EA: CPT | Mod: RT,,, | Performed by: ANESTHESIOLOGY

## 2023-09-26 PROCEDURE — 25500020 PHARM REV CODE 255: Performed by: ANESTHESIOLOGY

## 2023-09-26 PROCEDURE — 64484 NJX AA&/STRD TFRM EPI L/S EA: CPT | Mod: RT | Performed by: ANESTHESIOLOGY

## 2023-09-26 PROCEDURE — 25000003 PHARM REV CODE 250: Performed by: ANESTHESIOLOGY

## 2023-09-26 PROCEDURE — 64483 NJX AA&/STRD TFRM EPI L/S 1: CPT | Mod: RT | Performed by: ANESTHESIOLOGY

## 2023-09-26 RX ORDER — LIDOCAINE HYDROCHLORIDE 10 MG/ML
INJECTION, SOLUTION EPIDURAL; INFILTRATION; INTRACAUDAL; PERINEURAL
Status: DISCONTINUED | OUTPATIENT
Start: 2023-09-26 | End: 2023-09-26 | Stop reason: HOSPADM

## 2023-09-26 RX ORDER — LIDOCAINE HYDROCHLORIDE 20 MG/ML
INJECTION, SOLUTION INFILTRATION; PERINEURAL
Status: DISCONTINUED | OUTPATIENT
Start: 2023-09-26 | End: 2023-09-26 | Stop reason: HOSPADM

## 2023-09-26 RX ORDER — DEXAMETHASONE SODIUM PHOSPHATE 10 MG/ML
INJECTION INTRAMUSCULAR; INTRAVENOUS
Status: DISCONTINUED | OUTPATIENT
Start: 2023-09-26 | End: 2023-09-26 | Stop reason: HOSPADM

## 2023-09-26 RX ORDER — SODIUM CHLORIDE 9 MG/ML
INJECTION, SOLUTION INTRAVENOUS CONTINUOUS
Status: DISCONTINUED | OUTPATIENT
Start: 2023-09-26 | End: 2023-09-26 | Stop reason: HOSPADM

## 2023-09-26 NOTE — DISCHARGE SUMMARY
Discharge Note  Short Stay      SUMMARY     Admit Date: 9/26/2023    Attending Physician: Britt Calix       Discharge Physician: Dallas Kenny      Discharge Date: 9/26/2023 11:32 AM    Procedure(s) (LRB):  INJECTION, STEROID, EPIDURAL, TRANSFORAMINAL APPROACH, RIGHT L4/L5 AND L5/S1 SOONER DATE (Right)    Final Diagnosis: DDD (degenerative disc disease), lumbar [M51.36]  Lumbar radiculopathy, right [M54.16]    Disposition: Home or self care    Patient Instructions:   Current Discharge Medication List        CONTINUE these medications which have NOT CHANGED    Details   acetaminophen (TYLENOL) 500 MG tablet Take 2 tablets (1,000 mg total) by mouth every 8 (eight) hours as needed for Pain.  Qty: 30 tablet, Refills: 0      atorvastatin (LIPITOR) 20 MG tablet TAKE 1 TABLET BY MOUTH EVERY DAY  Qty: 90 tablet, Refills: 3    Associated Diagnoses: Hyperlipidemia, unspecified hyperlipidemia type      doxycycline monohydrate 100 mg Tab Take 1 tablet by mouth 2 (two) times daily.      ergocalciferol (ERGOCALCIFEROL) 50,000 unit Cap TAKE 2 CAPSULES BY MOUTH TWICE A WEEK  Qty: 48 capsule, Refills: 1    Associated Diagnoses: Vitamin D deficiency      estradioL (ESTRACE) 1 MG tablet Take 1 tablet (1 mg total) by mouth once daily.  Qty: 30 tablet, Refills: 12    Associated Diagnoses: Menopause syndrome; Hot flash, menopausal      finasteride (PROSCAR) 5 mg tablet Take 1 tablet (5 mg total) by mouth once daily.  Qty: 30 tablet, Refills: 11    Associated Diagnoses: Androgenetic alopecia      fluocinonide (LIDEX) 0.05 % ointment Apply to affected areas of scalp qhs  Qty: 60 g, Refills: 3    Associated Diagnoses: Cicatricial alopecia      ketoconazole (NIZORAL) 2 % cream Apply to affected areas of scalp BID.  Qty: 60 g, Refills: 5    Associated Diagnoses: Cicatricial alopecia      ketoconazole (NIZORAL) 2 % shampoo Wash hair with medicated shampoo at least 2x/week - let sit on scalp at least 5 minutes prior to rinsing  Qty: 120  mL, Refills: 5    Associated Diagnoses: Androgenetic alopecia      lancets Misc Use to test blood sugar once daily.  Qty: 300 each, Refills: 0    Associated Diagnoses: Type 2 diabetes mellitus without complication, without long-term current use of insulin      mometasone (ELOCON) 0.1 % solution Apply topically once daily. To thinning area of scalp  Qty: 60 mL, Refills: 5    Associated Diagnoses: Traction alopecia      neomycin-polymyxin-dexamethasone (MAXITROL) 3.5mg/mL-10,000 unit/mL-0.1 % DrpS as needed.      nystatin (MYCOSTATIN) ointment Apply topically 2 (two) times daily. USES PRN      !! omeprazole (PRILOSEC) 40 MG capsule TAKE 1 CAPSULE BY MOUTH EVERY DAY  Qty: 90 capsule, Refills: 3    Associated Diagnoses: Gastroesophageal reflux disease without esophagitis      !! omeprazole (PRILOSEC) 40 MG capsule Take 1 capsule (40 mg total) by mouth every morning. Open capsule and take with apple sauce  Qty: 30 capsule, Refills: 2      !! omeprazole (PRILOSEC) 40 MG capsule Take 1 capsule (40 mg total) by mouth every morning. Open capsule and take with apple sauce  Qty: 30 capsule, Refills: 2      ondansetron (ZOFRAN) 4 MG tablet TAKE 1 TABLET BY MOUTH EVERY 8 HOURS AS NEEDED  Qty: 90 tablet, Refills: 1    Associated Diagnoses: Gastroesophageal reflux disease without esophagitis      !! ondansetron (ZOFRAN-ODT) 8 MG TbDL Take 1 tablet (8 mg total) by mouth every 6 (six) hours as needed.  Qty: 30 tablet, Refills: 0      !! ondansetron (ZOFRAN-ODT) 8 MG TbDL Take 1 tablet (8 mg total) by mouth every 6 (six) hours as needed.  Qty: 30 tablet, Refills: 0      !! oxyCODONE (ROXICODONE) 5 mg/5 mL Soln Take 5 mLs (5 mg total) by mouth every 8 (eight) hours as needed.  Qty: 150 mL, Refills: 0    Comments: Quantity prescribed more than 7 day supply? No  No    For [facility] procedure      !! oxyCODONE (ROXICODONE) 5 mg/5 mL Soln Take 5 mLs (5 mg total) by mouth every 8 (eight) hours as needed.  Qty: 150 mL, Refills: 0    Comments:  Quantity prescribed more than 7 day supply? No    For [facility] procedure      prednisoLONE acetate (PRED FORTE) 1 % DrpS USES PRN      pregabalin (LYRICA) 75 MG capsule Take 1 capsule (75 mg total) by mouth 2 (two) times daily.  Qty: 60 capsule, Refills: 6      semaglutide (OZEMPIC) 1 mg/dose (4 mg/3 mL) Inject 1 mg into the skin every 7 days.  Qty: 3 each, Refills: 0    Comments: DX Code Needed  .  Associated Diagnoses: Type 2 diabetes mellitus without complication, without long-term current use of insulin      spironolactone (ALDACTONE) 50 MG tablet Take 1 tablet (50 mg total) by mouth once daily.  Qty: 30 tablet, Refills: 11    Comments: .  Associated Diagnoses: Androgenetic alopecia      tiZANidine (ZANAFLEX) 4 MG tablet TAKE 1 TABLET BY MOUTH NIGHTLY AS NEEDED (MUSCLE SPASM).  Qty: 90 tablet, Refills: 2    Associated Diagnoses: Muscle spasm      triamcinolone acetonide 0.1% (KENALOG) 0.1 % cream Apply topically 2 (two) times daily. Apply topically 2 (two) times daily.  Qty: 135 g, Refills: 4    Associated Diagnoses: Eczema, unspecified type      valsartan-hydrochlorothiazide (DIOVAN-HCT) 160-12.5 mg per tablet Take 1 tablet by mouth once daily.  Qty: 90 tablet, Refills: 1    Comments: .  Associated Diagnoses: Essential hypertension       !! - Potential duplicate medications found. Please discuss with provider.              Discharge Diagnosis: DDD (degenerative disc disease), lumbar [M51.36]  Lumbar radiculopathy, right [M54.16]  Condition on Discharge: Stable with no complications to procedure   Diet on Discharge: Same as before.  Activity: as per instruction sheet.  Discharge to: Home with a responsible adult.  Follow up: 2-4 weeks       Please call my office or pager at [phone] if experienced any weakness or loss of sensation, fever > 101.5, pain uncontrolled with oral medications, persistent nausea/vomiting/or diarrhea, redness or drainage from the incisions, or any other worrisome concerns. If physician on  call was not reached or could not communicate with our office for any reason please go to the nearest emergency department

## 2023-09-26 NOTE — OP NOTE
Lumbar Transforaminal Epidural Steroid Injection under Fluoroscopic Guidance    The procedure, risks, benefits, and options were discussed with the patient. There are no contraindications to the procedure. The patent expressed understanding and agreed to the procedure. Informed written consent was obtained prior to the start of the procedure and can be found in the patient's chart.    PATIENT NAME: Mrs. Africa Jennings   MRN: [id number]     DATE OF PROCEDURE: 09/26/2023    PROCEDURE:  Right  L4/5 and L5/S1 Lumbar Transforaminal Epidural Steroid Injection under Fluoroscopic Guidance    PRE-OP DIAGNOSIS: DDD (degenerative disc disease), lumbar [M51.36]  Lumbar radiculopathy, right [M54.16] Lumbar radiculopathy [M54.16]    POST-OP DIAGNOSIS: Same    PHYSICIAN: Britt Calix MD    ASSISTANTS: Hugo Aponte MD [employer] Pain Fellow      MEDICATIONS INJECTED: Preservative-free Decadron 10mg with 5cc of Lidocaine 1% MPF     LOCAL ANESTHETIC INJECTED: Xylocaine 2%     SEDATION: None    ESTIMATED BLOOD LOSS: None    COMPLICATIONS: None    TECHNIQUE: Time-out was performed to identify the patient and procedure to be performed. With the patient laying in a prone position, the surgical area was prepped and draped in the usual sterile fashion using ChloraPrep and a fenestrated drape.The levels were determined under fluoroscopy guidance. Skin anesthesia was achieved by injecting Lidocaine 2% over the injection sites. The transforaminal spaces were then approached with a 22 gauge, 5 inch spinal quinke needle that was introduced under fluoroscopic guidance in the AP and Lateral views. Once the needle tip was in the area of the transforaminal space, and there was no blood, CSF or paraesthesias, contrast dye Omnipaque (300mg/mL) was injected to confirm placement and there was no vascular runoff. Fluoroscopic imaging in the AP and lateral views revealed a clear outline of the spinal nerve with proximal spread of agent through the  neural foramen into the epidural space. 3 mL of the medication mixture listed above was injected slowly at each site. Displacement of the radio opaque contrast after injection of the medication confirmed that the medication went into the area of the transforaminal spaces. The needles were removed and bleeding was nil. A sterile dressing was applied. No specimens collected. The patient tolerated the procedure well.       The patient was monitored after the procedure in the recovery area. They were given post-procedure and discharge instructions to follow at home. The patient was discharged in a stable condition.    Dallas Kenny MD   [facility], PGY3  09/26/2023    I reviewed and edited the fellow's note. I conducted my own interview and physical examination. I agree with the findings. I was present and supervising all critical portions of the procedure.

## 2023-09-26 NOTE — DISCHARGE INSTRUCTIONS

## 2023-09-27 LAB
FINAL PATHOLOGIC DIAGNOSIS: ABNORMAL
Lab: ABNORMAL

## 2023-10-04 ENCOUNTER — PATIENT MESSAGE (OUTPATIENT)
Dept: BARIATRICS | Facility: CLINIC | Age: 53
End: 2023-10-04
Payer: COMMERCIAL

## 2023-10-05 DIAGNOSIS — E11.9 TYPE 2 DIABETES MELLITUS WITHOUT COMPLICATION, WITHOUT LONG-TERM CURRENT USE OF INSULIN: ICD-10-CM

## 2023-10-05 DIAGNOSIS — L66.9 CICATRICIAL ALOPECIA: ICD-10-CM

## 2023-10-05 RX ORDER — SEMAGLUTIDE 1.34 MG/ML
1 INJECTION, SOLUTION SUBCUTANEOUS
Qty: 3 EACH | Refills: 0 | Status: SHIPPED | OUTPATIENT
Start: 2023-10-05 | End: 2024-01-03 | Stop reason: SDUPTHER

## 2023-10-05 NOTE — TELEPHONE ENCOUNTER
No care due was identified.  [facility] Embedded Care Due Messages. Reference number: [id number].   10/05/2023 11:25:52 AM CDT

## 2023-10-08 DIAGNOSIS — L65.8 TRACTION ALOPECIA: ICD-10-CM

## 2023-10-09 RX ORDER — KETOCONAZOLE 20 MG/G
CREAM TOPICAL
Qty: 60 G | Refills: 5 | Status: SHIPPED | OUTPATIENT
Start: 2023-10-09 | End: 2023-10-12

## 2023-10-09 RX ORDER — FLUOCINONIDE 0.5 MG/G
OINTMENT TOPICAL
Qty: 60 G | Refills: 3 | Status: SHIPPED | OUTPATIENT
Start: 2023-10-09

## 2023-10-10 ENCOUNTER — PATIENT MESSAGE (OUTPATIENT)
Dept: BARIATRICS | Facility: CLINIC | Age: 53
End: 2023-10-10
Payer: COMMERCIAL

## 2023-10-12 ENCOUNTER — OFFICE VISIT (OUTPATIENT)
Dept: PAIN MEDICINE | Facility: CLINIC | Age: 53
End: 2023-10-12
Payer: COMMERCIAL

## 2023-10-12 VITALS
HEIGHT: 62 IN | RESPIRATION RATE: 18 BRPM | BODY MASS INDEX: 36.52 KG/M2 | WEIGHT: 198.44 LBS | HEART RATE: 72 BPM | SYSTOLIC BLOOD PRESSURE: 127 MMHG | TEMPERATURE: 98 F | OXYGEN SATURATION: 100 % | DIASTOLIC BLOOD PRESSURE: 85 MMHG

## 2023-10-12 DIAGNOSIS — M70.62 TROCHANTERIC BURSITIS OF BOTH HIPS: Primary | ICD-10-CM

## 2023-10-12 DIAGNOSIS — M70.61 TROCHANTERIC BURSITIS OF BOTH HIPS: Primary | ICD-10-CM

## 2023-10-12 PROCEDURE — 3066F PR DOCUMENTATION OF TREATMENT FOR NEPHROPATHY: ICD-10-PCS | Mod: CPTII,S$GLB,, | Performed by: ANESTHESIOLOGY

## 2023-10-12 PROCEDURE — 1159F PR MEDICATION LIST DOCUMENTED IN MEDICAL RECORD: ICD-10-PCS | Mod: CPTII,S$GLB,, | Performed by: ANESTHESIOLOGY

## 2023-10-12 PROCEDURE — 99999 PR PBB SHADOW E&M-EST. PATIENT-LVL V: CPT | Mod: PBBFAC,,, | Performed by: ANESTHESIOLOGY

## 2023-10-12 PROCEDURE — 3061F NEG MICROALBUMINURIA REV: CPT | Mod: CPTII,S$GLB,, | Performed by: ANESTHESIOLOGY

## 2023-10-12 PROCEDURE — 3079F PR MOST RECENT DIASTOLIC BLOOD PRESSURE 80-89 MM HG: ICD-10-PCS | Mod: CPTII,S$GLB,, | Performed by: ANESTHESIOLOGY

## 2023-10-12 PROCEDURE — 3074F SYST BP LT 130 MM HG: CPT | Mod: CPTII,S$GLB,, | Performed by: ANESTHESIOLOGY

## 2023-10-12 PROCEDURE — 3008F PR BODY MASS INDEX (BMI) DOCUMENTED: ICD-10-PCS | Mod: CPTII,S$GLB,, | Performed by: ANESTHESIOLOGY

## 2023-10-12 PROCEDURE — 3044F PR MOST RECENT HEMOGLOBIN A1C LEVEL <7.0%: ICD-10-PCS | Mod: CPTII,S$GLB,, | Performed by: ANESTHESIOLOGY

## 2023-10-12 PROCEDURE — 99214 OFFICE O/P EST MOD 30 MIN: CPT | Mod: S$GLB,,, | Performed by: ANESTHESIOLOGY

## 2023-10-12 PROCEDURE — 99999 PR PBB SHADOW E&M-EST. PATIENT-LVL V: ICD-10-PCS | Mod: PBBFAC,,, | Performed by: ANESTHESIOLOGY

## 2023-10-12 PROCEDURE — 99214 PR OFFICE/OUTPT VISIT, EST, LEVL IV, 30-39 MIN: ICD-10-PCS | Mod: S$GLB,,, | Performed by: ANESTHESIOLOGY

## 2023-10-12 PROCEDURE — 3061F PR NEG MICROALBUMINURIA RESULT DOCUMENTED/REVIEW: ICD-10-PCS | Mod: CPTII,S$GLB,, | Performed by: ANESTHESIOLOGY

## 2023-10-12 PROCEDURE — 1159F MED LIST DOCD IN RCRD: CPT | Mod: CPTII,S$GLB,, | Performed by: ANESTHESIOLOGY

## 2023-10-12 PROCEDURE — 3008F BODY MASS INDEX DOCD: CPT | Mod: CPTII,S$GLB,, | Performed by: ANESTHESIOLOGY

## 2023-10-12 PROCEDURE — 3044F HG A1C LEVEL LT 7.0%: CPT | Mod: CPTII,S$GLB,, | Performed by: ANESTHESIOLOGY

## 2023-10-12 PROCEDURE — 3074F PR MOST RECENT SYSTOLIC BLOOD PRESSURE < 130 MM HG: ICD-10-PCS | Mod: CPTII,S$GLB,, | Performed by: ANESTHESIOLOGY

## 2023-10-12 PROCEDURE — 3079F DIAST BP 80-89 MM HG: CPT | Mod: CPTII,S$GLB,, | Performed by: ANESTHESIOLOGY

## 2023-10-12 PROCEDURE — 3066F NEPHROPATHY DOC TX: CPT | Mod: CPTII,S$GLB,, | Performed by: ANESTHESIOLOGY

## 2023-10-12 RX ORDER — MOMETASONE FUROATE 1 MG/ML
SOLUTION TOPICAL DAILY
Qty: 60 ML | Refills: 5 | Status: SHIPPED | OUTPATIENT
Start: 2023-10-12

## 2023-10-12 NOTE — PROGRESS NOTES
Chronic Pain-Tele-Medicine-Established Note (Follow up visit)      The patient location is: Work  The chief complaint leading to consultation is: R sided lower back and focal R L5 radicular pain  Visit type: Virtual visit with synchronous audio and video  Total time spent with patient: 20 mins  Each patient to whom he or she provides medical services by telemedicine is:  (1) informed of the relationship between the physician and patient and the respective role of any other health care provider with respect to management of the patient; and (2) notified that he or she may decline to receive medical services by telemedicine and may withdraw from such care at any time.    Notes:     SUBJECTIVE:  Interval History 10/12/23: Africa Jennings returns for follow up. At our last visit, which was virtual, she was having a lot of radiating pain. Unfortunately, the TFESI we performed was not helpful. She presents today in person and I am able to examine her. Today, on examination, it is clear that this pain is reproduced with palpation of her GTBs. She continues to note 7/10 pain which is worse with laying on her side and improved with movement. It is impacting her work.     Interval History 8/28/2023:  Africa Jennings presents tele-medicine appointment for a follow-up appointment for R sided lower back and focal R L5 radicular pain. She states leg pain > back pain. She describes a sharp, shooting pain in her R buttocks that radiates to her R mid calf posteriorly and is associated with burning, numbness, and tingling. Worse with activity, bending, lifting, night time. She states the R leg is subjectively weaker than the left.. Since the last visit, Africa Jennings states the pain has been worsening. Current pain intensity is 7/10. Current medications include lyrica, zanaflex, tylenol with some relief. She has completed physical therapy and aqua therapy (March 2023 - April 2023) to help  strengthen her back and notes benefit. She continues with Home exercise program and is waiting to hear back from [facility] to schedule her. Patient was supposed to have R L4/5 and L5/S1 TFESI done since last visit, however, there was some issue with scheduling or insurance (the patient is unsure which). She is interested in having the procedure rescheduled to help alleviate her symptoms. Patient denies red flags including weakness, unexpected weight loss/gain, night sweats/fevers, saddle anesthesia, and symptoms of VERA.    Interval History 4/26/2023:  Mrs Jennings presents for follow up. Over interval she has had T9-12 Thoracic decompression by Dr Whitt on 10/28/2022. She is doing well since then but continues to have R sided lower back and focal R L5 radicular pain. She states back pain = leg pain. She states the R leg is subjectively weaker than the left. Denies any s/s concerning for cauda equina. Pain has limited functioning and she has Completed PT/Aquatherapy for >6 weeks.      Interval History 3/16/2022:  Pt presents for follow up of. She has had L4/5 ILESI and left SI injection in past with benefit. She has more vocal pain to right and associated radiculopathy down right leg in L4/5 pattern. She had no recent MRI in past 2 years and known lumbar discogenic issues but this was more focal to left. She has no complaint of loss of b/b or saddle paresthesias.      Interval History  2/22/2022:   Mrs Jennings presents for virtual follow up of lower back and left sided buttock pain. She is now s/p Left SIJ and states 90% improved pain and feels her relief was more significant than MARILEE. She states pain more noticeable to right side now without radicular complaint. There is no focal voicing of loss of b/b or saddle paresthesias.      Interval History 1/25/2021:  Mrs Jennings presents for follow up of lower back pain. She is s/p L4/5 ILESI with 90% benefit from lower back pain. She has lingering lower  buttock/back pain to left side. Increased pain when sitting or stairs. Denies radiculopathy. Denies any loss of b/b or saddle paresthesias.      Interval History 12/23/2021:  Mrs Jennings presents to establish care at [facility]. She states continued lower back pain to left side but leg pain/paresthesias=back pain. She states left lower back pain and radiation in L4/5 pattern but also on right side. She has MRI findings to left L4/5 NF disc protrusion. She has been doing HEP she learned through [facility] for over 6 months straight and continues with no lasting benefit. She is currently taking Mobic 15mg and Neurontin 300mg TID. There is no complaint of loss of b/b or saddle paresthesias.      Interval History (4/13/20):     The patient location is: home  The chief complaint leading to consultation is: follow up  Visit type: Virtual visit with audio.  Patient verbally consented for audio visit.  Total time spent with patient: 15 minutes  Each patient to whom he or she provides medical services by telemedicine is:  (1) informed of the relationship between the physician and patient and the respective role of any other health care provider with respect to management of the patient; and (2) notified that he or she may decline to receive medical services by telemedicine and may withdraw from such care at any time.        She returns today for follow up.  She reports that her right-sided low back and lower extremity pain has returned since last encounter.  She denies any changes in the quality or location of pain.  She continues to report associated numbness.  She denies any associated weakness or bowel bladder dysfunction.  She has attempted gabapentin 600 mg t.i.d..  She states this medication did not provide any relief.  She has also tried goody's powder, Motrin, Tylenol without any relief.          Interval History (2/17/20):  She returns today for follow up and MRI review.  She reports that her pain is unchanged  in quality and location since last encounter.  She does state that the pain has significantly improved and is overall not very bothersome for the time being.          Initial Encounter (2/3/20):  Africa Jennings is a 53 y.o. female who presents today with chronic right-sided low back and lower extremity pain. This pain has been present for 6-7 months.  No specific inciting event or injury noted.  Patient localizes the pain to the right lumbar region.  Pain radiates to the right posterior and anterior thigh.  She reports associated numbness in this area as well. She also reports weakness of the right lower extremity when ambulating.  She denies any associated bowel or bladder dysfunction.  The pain is constant and worsened with activity.  She states that the pain interrupt her sleep.  Patient does have a history of a similar problem roughly 8 years ago.  She underwent epidural steroid injections with good relief.  This pain is described in detail below.    Pain Scores:     Best:       6/10  Worst:     10/10  Usually:   8/10  Today:    0/10     Physical Therapy:  Feb-May, 2023  HEP: Continues HEP daily as prescribed at PT above     Non-pharmacologic Treatment:  Rest helps         TENS?  No     Pain Medications:         Currently taking:  Aleve, gabapentin     Has tried in the past:  Tylenol, Motrin     Has not tried:  Opioids, Tylenol, Muscle relaxants, TCAs, SNRIs, anticonvulsants, topical creams     report:  Reviewed and consistent with medication use as prescribed.     Blood thinners:  None     Relevant Surgeries:  None     Affecting sleep?  Yes     Affecting daily activities? yes     Depressive symptoms? no          SI/HI? No     Work status: Employed     Pain Procedures:   - L4-5 interlaminar epidural steroid injection x2  - 1/11/2022 L4/5 ILESI 90% benefit    - 2/8/2022 Left SIJ injection  9/26/23: Right L4/5, L5/S1 TFESI - 1 day of relief      Imaging:   CT Thoracic Spine Without  Contrast  Order: [id number]  Status: Final result     Visible to patient: Yes (seen)     Next appt: 09/18/2023 at 08:45 AM in Dermatology (Basilia Valdez MD)     Dx: S/P fusion of thoracic spine     0 Result Notes  Details    Reading Physician Reading Date Result Priority   Tony Powell MD  [phone] 1/30/2023 Routine   Dex Bojorquez MD  [phone] 1/30/2023      Narrative & Impression  EXAMINATION:  CT THORACIC SPINE WITHOUT CONTRAST     CLINICAL HISTORY:  s/p T9-12 fusion;  Arthrodesis status     TECHNIQUE:  CT thoracic spine performed without contrast.  Coronal and sagittal reformats provided.     COMPARISON:  X-ray thoracic spine 12/12/2022, x-ray thoracolumbar spine 10/29/2022, MRI thoracic spine 06/03/2022, CT thoracic spine 06/03/2022.     FINDINGS:  Postsurgical change of posterior spinal instrumented fusion T9-T12, T9-12 laminectomies and medial facetectomies and decortication of the T9-12 posterior elements and placement of a mixture of autologous and synthetic bone into the bilateral gutters for arthrodesis.  No hardware fracture or periprosthetic lucency to suggest hardware loosening.     Alignment: Stable, slightly pronounced kyphotic curvature.  Grade 1 retrolisthesis T11-12.     Vertebrae: Vertebral body heights are well maintained.  No fracture.  Stable sclerotic focus in the T5 vertebral body likely representing a bone island.  No lytic or blastic lesion.  Morselized synthetic and autologous bone in the bilateral T9-T12 gutters with noted abutment at the T12 posterior elements likely representing fusion.     Discs: Normal height.  Vacuum disc phenomenon at T8-9 and T11-12.     Degenerative findings:     C7-L1: No spinal canal stenosis or neural foraminal narrowing.     Paraspinal muscles & soft tissues: Cholecystectomy.  High attenuation focus in the posterior right upper quadrant possibly represent calcification or surgical clip.  Otherwise unremarkable.      Impression:     1. Postsurgical changes at T9-T12, as above, without evidence of hardware loosening or fracture.  2. Additional findings as above.     Electronically signed by resident: Dex Bojorquez  Date:                                            01/30/2023  Time:                                           13:59     Electronically signed by: Tony Powell MD  Date:                                            01/30/2023  Time:                                           15:43       MRI Thoracic Spine Without Contrast  Order: [id number]  Status: Final result     Visible to patient: Yes (seen)     Next appt: 09/18/2023 at 08:45 AM in Dermatology (Basilia Valdez MD)     Dx: Thoracic spinal stenosis     0 Result Notes  Details    Reading Physician Reading Date Result Priority   Tony Powell MD  [phone] 6/4/2022 Routine     Narrative & Impression  EXAMINATION:  MRI THORACIC SPINE WITHOUT CONTRAST     CLINICAL HISTORY:  T9-11 stenosis, please focus on these levels.;  Spinal stenosis, thoracic region     TECHNIQUE:  Multiplanar, multisequence images were performed through the thoracic spine.  Contrast was not administered.     COMPARISON:  MRI 03/31/2022, CT 06/03/2022.     FINDINGS:  Thoracic spine alignment demonstrates a slightly pronounced kyphotic curvature.  No spondylolisthesis.  Vertebral body heights are well maintained without evidence fracture.  There is slight heterogeneous marrow signal intensity, similar when compared to the previous exam and favored to relate to benign reconversion.  No marrow signal abnormality to suggest an infiltrative process.     Persistent focal area of increased T2/STIR cord signal at the T10-T11 level, similar when compared to the previous exam concerning for myelomalacia.  Remaining visualized spinal cord demonstrates normal contour and signal intensity.     There is a 1.1 cm T2 hypointense right thyroid nodule.  There are multiple colonic diverticuli.  Remaining  visualized thoracic and upper abdominal organs demonstrate no significant abnormalities.  Paraspinal musculature demonstrates normal bulk and signal intensity.     T1-T2: Bilateral facet arthropathy contributes to moderate left neural foraminal narrowing.  No spinal canal stenosis.     T2-T3: Broad-based disc osteophyte complex causes mild mass effect on the ventral thecal sac.  Left facet arthropathy.  Findings contribute to mild-to-moderate left and mild right neural foraminal narrowing.  No spinal canal stenosis.     T7-T8: Circumferential disc osteophyte complex, bilateral facet arthropathy and right ligamentum flavum buckling.  Findings contribute to moderate right neural foraminal narrowing.  No spinal canal stenosis.     T8-T9: Circumferential disc osteophyte complex and mild bilateral ligamentum flavum buckling.  Findings contribute to mild right neural foraminal narrowing.  No spinal canal stenosis.     T9-T10: Circumferential disc osteophyte complex and bilateral ligamentum flavum buckling.  Findings contribute to mild spinal canal stenosis and moderate right neural foraminal narrowing.     T10-T11: Circumferential disc osteophyte complex, bilateral facet arthropathy and bilateral ligamentum flavum buckling.  Findings contribute to severe spinal canal stenosis and moderate right and mild left neural foraminal narrowing.     Impression:     1. Multilevel degenerative changes of the thoracic spine most pronounced at T10-T11 noting severe spinal canal stenosis and mild-to-moderate neural foraminal narrowing.  2. Persistent focal area of cord signal abnormality at T10-T11, similar when compared to MRI most suggestive of myelomalacia.        Electronically signed by: Tony Powell MD  Date:                                            06/04/2022  Time:                                           09:05       MRI Lumbar Spine Without Contrast  Order: [id number]  Status: Final result     Visible to patient: Yes  (seen)     Next appt: 09/18/2023 at 08:45 AM in Dermatology (Basilia Valdez MD)     Dx: Dorsalgia, unspecified     1 Result Note  Details    Reading Physician Reading Date Result Priority   Maximo Mcallister Jr., MD  [phone] 3/25/2022 Routine     Narrative & Impression  EXAMINATION:  MRI LUMBAR SPINE WITHOUT CONTRAST     CLINICAL HISTORY:  Dorsalgia, unspecifiedLow back pain, symptoms persist with > 6wks conservative treatment;     TECHNIQUE:  Sagittal T1, sagittal T2, sagittal STIR, axial T1 and axial T2 weighted images of the lumbar spine obtained without contrast.     COMPARISON:  Similar study 02/10/2020     FINDINGS:  Lumbar spine alignment is within normal limits. The vertebral body heights are well maintained, with no fracture.  Degenerative sclerotic marrow endplate change at S1 and fatty metaplasia degenerative endplate change at T12.  Otherwise marrow signal normal.     The conus is normal in appearance.  The adjacent soft tissue structures show no significant abnormalities.     Spinal canal is congenitally narrow on the basis of short pedicles.  At the T10-11 level there is extradural indentation of the thecal sac from posteriorly and there is some broad-based disc bulging.  Transverse images do not cover this level.  There is at least moderate spinal stenosis at this level.     L1-L2: No focal disc herniation.No significant central canal or neural foraminal narrowing.     L2-L3: No focal disc herniation.  Mild facet arthrosis.  No significant central canal or neural foraminal narrowing.     L3-L4: No evidence of a disc herniation.  No significant central canal or neural foraminal narrowing.     L4-L5: Broad-based disc bulging with left-sided foraminal and far lateral annular fissure and superimposed mild spondylitic spurring. Facet arthrosis contributes to severe left-sided neural foraminal narrowing.  Moderate central spinal stenosis.     L5-S1: Broad-based disc bulging, facet arthrosis and  ligamentum flavum hypertrophy result in severe bilateral neural foraminal stenosis.  Mild central spinal stenosis.     Impression:     Extradural degenerative changes at T10-11, L4-5, and L5-S1 resulting in central canal and foraminal stenosis as detailed above.  Dedicated imaging of the thoracic spine could lend additional information regarding findings at T10-11     This report was flagged in Epic as abnormal.        Electronically signed by: Maximo Aguirre Jr  Date:                                            03/25/2022  Time:                                           09:06       Past Medical History:   Diagnosis Date    Diabetes mellitus     Diabetes mellitus, type 2     Eczema     GERD (gastroesophageal reflux disease)     Hypertension     Impaired fasting blood sugar     YSABEL on CPAP      Past Surgical History:   Procedure Laterality Date    breast reduction      CHOLECYSTECTOMY      laprascopic    DECOMPRESSION OF THORACIC OUTLET N/A 10/28/2022    Procedure: T9-12 ROBOTIC DECOMPRESSION, THORACIC FUSION;  Surgeon: Edouard Whitt DO;  Location: [facility] OR [address] [place] [address];  Service: Neurosurgery;  Laterality: N/A;  ANESTHESIA GENERAL [place] I BLOOD TYPE & SCREEN NERVE MONITORING EMG SEP MEP POSTION PRONE BED CARA 4 POST HEAD REST [facility] RADIOLOGY C-ARM GLOBUS PROTOCOL MISCELLANEOUS ALANIZ BRACE TLSO    EPIDURAL STEROID INJECTION N/A 1/11/2022    Procedure: INJECTION, STEROID, EPIDURAL IL L4/5 NEEDS CONSENT;  Surgeon: Britt Calix MD;  Location: [facility] PAIN MGT;  Service: Pain Management;  Laterality: N/A;    ESOPHAGOGASTRODUODENOSCOPY  8/18/14    HYSTERECTOMY  2007    laprascopic, total for fibroids.  Dr Polo    INJECTION OF JOINT Left 2/8/2022    Procedure: INJECTION, JOINT, SI LEFT NEEDS CONSENT;  Surgeon: Britt Calix MD;  Location: [facility] PAIN MGT;  Service: Pain Management;  Laterality: Left;    OOPHORECTOMY      ROBOT-ASSISTED LAPAROSCOPIC SLEEVE GASTRECTOMY USING DA DEVI XI N/A  7/26/2023    Procedure: XI ROBOTIC SLEEVE GASTRECTOMY with intraop EGD;  Surgeon: Roge Rich MD;  [facility];  Service: General;  Laterality: N/A;  with possible HHR    TOTAL REDUCTION MAMMOPLASTY      TRANSFORAMINAL EPIDURAL INJECTION OF STEROID Right 9/26/2023    Procedure: INJECTION, STEROID, EPIDURAL, TRANSFORAMINAL APPROACH, RIGHT L4/L5 AND L5/S1 SOONER DATE;  Surgeon: Britt Calix MD;  Location: [facility] PAIN MGT;  Service: Pain Management;  Laterality: Right;     Social History     Socioeconomic History    Marital status:    Tobacco Use    Smoking status: Never    Smokeless tobacco: Never   Substance and Sexual Activity    Alcohol use: No     Comment: socially    Drug use: No    Sexual activity: Yes     Partners: Male     Birth control/protection: Surgical   Social History Narrative    Was  since 2000.      Has a friend since 2016.    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     Social Determinants of Health     Financial Resource Strain: Low Risk  (9/20/2023)    Overall Financial Resource Strain (CARDIA)     Difficulty of Paying Living Expenses: Not very hard   Food Insecurity: No Food Insecurity (9/20/2023)    Hunger Vital Sign     Worried About Running Out of Food in the Last Year: Never true     Ran Out of Food in the Last Year: Never true   Transportation Needs: No Transportation Needs (9/20/2023)    PRAPARE - Transportation     Lack of Transportation (Medical): No     Lack of Transportation (Non-Medical): No   Physical Activity: Sufficiently Active (9/20/2023)    Exercise Vital Sign     Days of Exercise per Week: 3 days     Minutes of Exercise per Session: 50 min   Recent Concern: Physical Activity - Insufficiently Active (9/11/2023)    Exercise Vital Sign     Days of Exercise per Week: 2 days     Minutes of Exercise per Session: 60 min   Stress: No Stress Concern Present (9/20/2023)    [employer] [employer] -  Occupational Stress Questionnaire     Feeling of Stress : Not at all   Recent Concern: Stress - Stress Concern Present (7/9/2023)    [employer] [place] [employer] - Occupational Stress Questionnaire     Feeling of Stress : Very much   Social Connections: Unknown (9/20/2023)    Social Connection and Isolation Panel [NHANES]     Frequency of Communication with Friends and Family: More than three times a week     Frequency of Social Gatherings with Friends and Family: Twice a week     Active Member of Clubs or Organizations: No     Attends Club or Organization Meetings: Never     Marital Status: Living with partner   Housing Stability: Low Risk  (9/20/2023)    Housing Stability Vital Sign     Unable to Pay for Housing in the Last Year: No     Number of Places Lived in the Last Year: 1     Unstable Housing in the Last Year: No     Family History   Problem Relation Age of Onset    Diabetes Maternal Grandmother     Heart disease Maternal Grandmother     Hypertension Maternal Grandmother     Crohn's disease Sister     Diabetes Mother     Hypertension Mother     Heart disease Mother     Cancer Father 58        Prostate    Breast cancer Sister     Amblyopia Neg Hx     Blindness Neg Hx     Cataracts Neg Hx     Glaucoma Neg Hx     Macular degeneration Neg Hx     Retinal detachment Neg Hx     Strabismus Neg Hx        Review of patient's allergies indicates:  No Known Allergies    Current Outpatient Medications   Medication Sig    acetaminophen (TYLENOL) 500 MG tablet Take 2 tablets (1,000 mg total) by mouth every 8 (eight) hours as needed for Pain.    atorvastatin (LIPITOR) 20 MG tablet TAKE 1 TABLET BY MOUTH EVERY DAY    ergocalciferol (ERGOCALCIFEROL) 50,000 unit Cap TAKE 2 CAPSULES BY MOUTH TWICE A WEEK    estradioL (ESTRACE) 1 MG tablet Take 1 tablet (1 mg total) by mouth once daily.    finasteride (PROSCAR) 5 mg tablet Take 1 tablet (5 mg total) by mouth once daily.    fluocinonide (LIDEX) 0.05 % ointment Apply  to affected areas of scalp at bedtime    ketoconazole (NIZORAL) 2 % cream Apply to affected areas of scalp twice daily.    ketoconazole (NIZORAL) 2 % shampoo Wash hair with medicated shampoo at least 2x/week - let sit on scalp at least 5 minutes prior to rinsing    mometasone (ELOCON) 0.1 % solution Apply topically once daily. To thinning area of scalp    neomycin-polymyxin-dexamethasone (MAXITROL) 3.5mg/mL-10,000 unit/mL-0.1 % DrpS as needed.    nystatin (MYCOSTATIN) ointment Apply topically 2 (two) times daily. USES PRN    omeprazole (PRILOSEC) 40 MG capsule Take 1 capsule (40 mg total) by mouth every morning. Open capsule and take with apple sauce    ondansetron (ZOFRAN) 4 MG tablet TAKE 1 TABLET BY MOUTH EVERY 8 HOURS AS NEEDED    prednisoLONE acetate (PRED FORTE) 1 % DrpS USES PRN    pregabalin (LYRICA) 75 MG capsule Take 1 capsule (75 mg total) by mouth 2 (two) times daily.    semaglutide (OZEMPIC) 1 mg/dose (4 mg/3 mL) Inject 1 mg into the skin every 7 days.    spironolactone (ALDACTONE) 50 MG tablet Take 1 tablet (50 mg total) by mouth once daily.    tiZANidine (ZANAFLEX) 4 MG tablet TAKE 1 TABLET BY MOUTH NIGHTLY AS NEEDED (MUSCLE SPASM).    triamcinolone acetonide 0.1% (KENALOG) 0.1 % cream Apply topically 2 (two) times daily. Apply topically 2 (two) times daily.    valsartan-hydrochlorothiazide (DIOVAN-HCT) 160-12.5 mg per tablet Take 1 tablet by mouth once daily.    doxycycline monohydrate 100 mg Tab Take 1 tablet by mouth 2 (two) times daily.    lancets Misc Use to test blood sugar once daily.    omeprazole (PRILOSEC) 40 MG capsule TAKE 1 CAPSULE BY MOUTH EVERY DAY    omeprazole (PRILOSEC) 40 MG capsule Take 1 capsule (40 mg total) by mouth every morning. Open capsule and take with apple sauce    ondansetron (ZOFRAN-ODT) 8 MG TbDL Take 1 tablet (8 mg total) by mouth every 6 (six) hours as needed. (Patient not taking: Reported on 10/12/2023)    ondansetron (ZOFRAN-ODT) 8 MG TbDL Take 1 tablet (8 mg  total) by mouth every 6 (six) hours as needed.    oxyCODONE (ROXICODONE) 5 mg/5 mL Soln Take 5 mLs (5 mg total) by mouth every 8 (eight) hours as needed. (Patient not taking: Reported on 10/12/2023)    oxyCODONE (ROXICODONE) 5 mg/5 mL Soln Take 5 mLs (5 mg total) by mouth every 8 (eight) hours as needed.     Current Facility-Administered Medications   Medication    triamcinolone acetonide injection 10 mg       REVIEW OF SYSTEMS:    GENERAL:  No weight loss, malaise or fevers.  HEENT:   No recent changes in vision or hearing  NECK:  Negative for lumps, no difficulty with swallowing.  RESPIRATORY:  Negative for cough, wheezing or shortness of breath, patient denies any recent URI.  CARDIOVASCULAR:  Negative for chest pain, leg swelling or palpitations.  GI:  Negative for abdominal discomfort, blood in stools or black stools or change in bowel habits.  MUSCULOSKELETAL:  See HPI.  SKIN:  Negative for lesions, rash, and itching.  PSYCH:  No mood disorder or recent psychosocial stressors.  Patients sleep is not disturbed secondary to pain.  HEMATOLOGY/LYMPHOLOGY:  Negative for prolonged bleeding, bruising easily or swollen nodes.  Patient is not currently taking any anti-coagulants  NEURO:   No history of headaches, syncope, paralysis, seizures or tremors.  All other reviewed and negative other than HPI.    OBJECTIVE:  PHYSICAL EXAM:   GENERAL: Well appearing, in no acute distress, alert and oriented x3.  PSYCH:  Mood and affect appropriate.  SKIN: Skin color, texture, turgor normal, no rashes or lesions.  HEENT:  Normocephalic, atraumatic. Cranial nerves grossly intact.  NECK: No pain to palpation over the cervical paraspinous muscles. No pain to palpation over facets. No pain with neck flexion, extension, or lateral flexion.   PULM: No evidence of respiratory difficulty, symmetric chest rise.  GI:  Non-distended  BACK: Normal range of motion. No pain to palpation over the spinous processes. No pain to palpation over  facet joints. There is no pain with palpation over the sacroiliac joints bilaterally. Straight leg raising in the supine position is negative to radicular pain. Pain reproduced over bilateral GTBs.   EXTREMITIES: No deformities, edema, or skin discoloration.   MUSCULOSKELETAL: Shoulder, hip, and knee provocative maneuvers are negative. Bilateral upper and lower extremity strength is normal and symmetric. No atrophy is noted.  NEURO: Sensation is equal and appropriate bilaterally. Bilateral upper and lower extremity coordination and muscle stretch reflexes are physiologic and symmetric. Plantar response are downgoing.   GAIT: normal.      ASSESSMENT: 53 y.o. year old female with pain consistent with     1. Trochanteric bursitis of both hips            DISCUSSION: Ms. Jennings is a . She has a history of T9-12 decompression and posterior fusion with Dr. Whitt. She came to us with left low back pain and did well after SIJ. She then presented back with right radicular symptoms. MRI shows short pedicles, moderate canal stenosis at L4/5, and severe foraminal stenosis at L5/S1.     PLAN:   1) Continue heat  2) Try not sleeping on side  3) Schedule b/l GTB injections  4) Follow up in 2 weeks      Britt Calix MD  10/12/2023

## 2023-10-12 NOTE — H&P (VIEW-ONLY)
Chronic Pain-Tele-Medicine-Established Note (Follow up visit)      The patient location is: Work  The chief complaint leading to consultation is: R sided lower back and focal R L5 radicular pain  Visit type: Virtual visit with synchronous audio and video  Total time spent with patient: 20 mins  Each patient to whom he or she provides medical services by telemedicine is:  (1) informed of the relationship between the physician and patient and the respective role of any other health care provider with respect to management of the patient; and (2) notified that he or she may decline to receive medical services by telemedicine and may withdraw from such care at any time.    Notes:     SUBJECTIVE:  Interval History 10/12/23: Africa Jennings returns for follow up. At our last visit, which was virtual, she was having a lot of radiating pain. Unfortunately, the TFESI we performed was not helpful. She presents today in person and I am able to examine her. Today, on examination, it is clear that this pain is reproduced with palpation of her GTBs. She continues to note 7/10 pain which is worse with laying on her side and improved with movement. It is impacting her work.     Interval History 8/28/2023:  Africa Jennings presents tele-medicine appointment for a follow-up appointment for R sided lower back and focal R L5 radicular pain. She states leg pain > back pain. She describes a sharp, shooting pain in her R buttocks that radiates to her R mid calf posteriorly and is associated with burning, numbness, and tingling. Worse with activity, bending, lifting, night time. She states the R leg is subjectively weaker than the left.. Since the last visit, Africa Jennings states the pain has been worsening. Current pain intensity is 7/10. Current medications include lyrica, zanaflex, tylenol with some relief. She has completed physical therapy and aqua therapy (March 2023 - April 2023) to help  strengthen her back and notes benefit. She continues with Home exercise program and is waiting to hear back from [facility] to schedule her. Patient was supposed to have R L4/5 and L5/S1 TFESI done since last visit, however, there was some issue with scheduling or insurance (the patient is unsure which). She is interested in having the procedure rescheduled to help alleviate her symptoms. Patient denies red flags including weakness, unexpected weight loss/gain, night sweats/fevers, saddle anesthesia, and symptoms of VERA.    Interval History 4/26/2023:  Mrs Jennings presents for follow up. Over interval she has had T9-12 Thoracic decompression by Dr Whitt on 10/28/2022. She is doing well since then but continues to have R sided lower back and focal R L5 radicular pain. She states back pain = leg pain. She states the R leg is subjectively weaker than the left. Denies any s/s concerning for cauda equina. Pain has limited functioning and she has Completed PT/Aquatherapy for >6 weeks.      Interval History 3/16/2022:  Pt presents for follow up of. She has had L4/5 ILESI and left SI injection in past with benefit. She has more vocal pain to right and associated radiculopathy down right leg in L4/5 pattern. She had no recent MRI in past 2 years and known lumbar discogenic issues but this was more focal to left. She has no complaint of loss of b/b or saddle paresthesias.      Interval History  2/22/2022:   Mrs Jennings presents for virtual follow up of lower back and left sided buttock pain. She is now s/p Left SIJ and states 90% improved pain and feels her relief was more significant than MARILEE. She states pain more noticeable to right side now without radicular complaint. There is no focal voicing of loss of b/b or saddle paresthesias.      Interval History 1/25/2021:  Mrs Jennings presents for follow up of lower back pain. She is s/p L4/5 ILESI with 90% benefit from lower back pain. She has lingering lower  buttock/back pain to left side. Increased pain when sitting or stairs. Denies radiculopathy. Denies any loss of b/b or saddle paresthesias.      Interval History 12/23/2021:  Mrs Jennings presents to establish care at [facility]. She states continued lower back pain to left side but leg pain/paresthesias=back pain. She states left lower back pain and radiation in L4/5 pattern but also on right side. She has MRI findings to left L4/5 NF disc protrusion. She has been doing HEP she learned through [facility] for over 6 months straight and continues with no lasting benefit. She is currently taking Mobic 15mg and Neurontin 300mg TID. There is no complaint of loss of b/b or saddle paresthesias.      Interval History (4/13/20):     The patient location is: home  The chief complaint leading to consultation is: follow up  Visit type: Virtual visit with audio.  Patient verbally consented for audio visit.  Total time spent with patient: 15 minutes  Each patient to whom he or she provides medical services by telemedicine is:  (1) informed of the relationship between the physician and patient and the respective role of any other health care provider with respect to management of the patient; and (2) notified that he or she may decline to receive medical services by telemedicine and may withdraw from such care at any time.        She returns today for follow up.  She reports that her right-sided low back and lower extremity pain has returned since last encounter.  She denies any changes in the quality or location of pain.  She continues to report associated numbness.  She denies any associated weakness or bowel bladder dysfunction.  She has attempted gabapentin 600 mg t.i.d..  She states this medication did not provide any relief.  She has also tried goody's powder, Motrin, Tylenol without any relief.          Interval History (2/17/20):  She returns today for follow up and MRI review.  She reports that her pain is unchanged  in quality and location since last encounter.  She does state that the pain has significantly improved and is overall not very bothersome for the time being.          Initial Encounter (2/3/20):  Africa Jennings is a 53 y.o. female who presents today with chronic right-sided low back and lower extremity pain. This pain has been present for 6-7 months.  No specific inciting event or injury noted.  Patient localizes the pain to the right lumbar region.  Pain radiates to the right posterior and anterior thigh.  She reports associated numbness in this area as well. She also reports weakness of the right lower extremity when ambulating.  She denies any associated bowel or bladder dysfunction.  The pain is constant and worsened with activity.  She states that the pain interrupt her sleep.  Patient does have a history of a similar problem roughly 8 years ago.  She underwent epidural steroid injections with good relief.  This pain is described in detail below.    Pain Scores:     Best:       6/10  Worst:     10/10  Usually:   8/10  Today:    0/10     Physical Therapy:  Feb-May, 2023  HEP: Continues HEP daily as prescribed at PT above     Non-pharmacologic Treatment:  Rest helps         TENS?  No     Pain Medications:         Currently taking:  Aleve, gabapentin     Has tried in the past:  Tylenol, Motrin     Has not tried:  Opioids, Tylenol, Muscle relaxants, TCAs, SNRIs, anticonvulsants, topical creams     report:  Reviewed and consistent with medication use as prescribed.     Blood thinners:  None     Relevant Surgeries:  None     Affecting sleep?  Yes     Affecting daily activities? yes     Depressive symptoms? no          SI/HI? No     Work status: Employed     Pain Procedures:   - L4-5 interlaminar epidural steroid injection x2  - 1/11/2022 L4/5 ILESI 90% benefit    - 2/8/2022 Left SIJ injection  9/26/23: Right L4/5, L5/S1 TFESI - 1 day of relief      Imaging:   CT Thoracic Spine Without  Contrast  Order: [id number]  Status: Final result     Visible to patient: Yes (seen)     Next appt: 09/18/2023 at 08:45 AM in Dermatology (Basilia Valdez MD)     Dx: S/P fusion of thoracic spine     0 Result Notes  Details    Reading Physician Reading Date Result Priority   Tony Powell MD  [phone] 1/30/2023 Routine   Dex Bojorquez MD  [phone] 1/30/2023      Narrative & Impression  EXAMINATION:  CT THORACIC SPINE WITHOUT CONTRAST     CLINICAL HISTORY:  s/p T9-12 fusion;  Arthrodesis status     TECHNIQUE:  CT thoracic spine performed without contrast.  Coronal and sagittal reformats provided.     COMPARISON:  X-ray thoracic spine 12/12/2022, x-ray thoracolumbar spine 10/29/2022, MRI thoracic spine 06/03/2022, CT thoracic spine 06/03/2022.     FINDINGS:  Postsurgical change of posterior spinal instrumented fusion T9-T12, T9-12 laminectomies and medial facetectomies and decortication of the T9-12 posterior elements and placement of a mixture of autologous and synthetic bone into the bilateral gutters for arthrodesis.  No hardware fracture or periprosthetic lucency to suggest hardware loosening.     Alignment: Stable, slightly pronounced kyphotic curvature.  Grade 1 retrolisthesis T11-12.     Vertebrae: Vertebral body heights are well maintained.  No fracture.  Stable sclerotic focus in the T5 vertebral body likely representing a bone island.  No lytic or blastic lesion.  Morselized synthetic and autologous bone in the bilateral T9-T12 gutters with noted abutment at the T12 posterior elements likely representing fusion.     Discs: Normal height.  Vacuum disc phenomenon at T8-9 and T11-12.     Degenerative findings:     C7-L1: No spinal canal stenosis or neural foraminal narrowing.     Paraspinal muscles & soft tissues: Cholecystectomy.  High attenuation focus in the posterior right upper quadrant possibly represent calcification or surgical clip.  Otherwise unremarkable.      Impression:     1. Postsurgical changes at T9-T12, as above, without evidence of hardware loosening or fracture.  2. Additional findings as above.     Electronically signed by resident: Dex Bojorquez  Date:                                            01/30/2023  Time:                                           13:59     Electronically signed by: Tony Powell MD  Date:                                            01/30/2023  Time:                                           15:43       MRI Thoracic Spine Without Contrast  Order: [id number]  Status: Final result     Visible to patient: Yes (seen)     Next appt: 09/18/2023 at 08:45 AM in Dermatology (Basilia Valdez MD)     Dx: Thoracic spinal stenosis     0 Result Notes  Details    Reading Physician Reading Date Result Priority   Tony Powell MD  [phone] 6/4/2022 Routine     Narrative & Impression  EXAMINATION:  MRI THORACIC SPINE WITHOUT CONTRAST     CLINICAL HISTORY:  T9-11 stenosis, please focus on these levels.;  Spinal stenosis, thoracic region     TECHNIQUE:  Multiplanar, multisequence images were performed through the thoracic spine.  Contrast was not administered.     COMPARISON:  MRI 03/31/2022, CT 06/03/2022.     FINDINGS:  Thoracic spine alignment demonstrates a slightly pronounced kyphotic curvature.  No spondylolisthesis.  Vertebral body heights are well maintained without evidence fracture.  There is slight heterogeneous marrow signal intensity, similar when compared to the previous exam and favored to relate to benign reconversion.  No marrow signal abnormality to suggest an infiltrative process.     Persistent focal area of increased T2/STIR cord signal at the T10-T11 level, similar when compared to the previous exam concerning for myelomalacia.  Remaining visualized spinal cord demonstrates normal contour and signal intensity.     There is a 1.1 cm T2 hypointense right thyroid nodule.  There are multiple colonic diverticuli.  Remaining  visualized thoracic and upper abdominal organs demonstrate no significant abnormalities.  Paraspinal musculature demonstrates normal bulk and signal intensity.     T1-T2: Bilateral facet arthropathy contributes to moderate left neural foraminal narrowing.  No spinal canal stenosis.     T2-T3: Broad-based disc osteophyte complex causes mild mass effect on the ventral thecal sac.  Left facet arthropathy.  Findings contribute to mild-to-moderate left and mild right neural foraminal narrowing.  No spinal canal stenosis.     T7-T8: Circumferential disc osteophyte complex, bilateral facet arthropathy and right ligamentum flavum buckling.  Findings contribute to moderate right neural foraminal narrowing.  No spinal canal stenosis.     T8-T9: Circumferential disc osteophyte complex and mild bilateral ligamentum flavum buckling.  Findings contribute to mild right neural foraminal narrowing.  No spinal canal stenosis.     T9-T10: Circumferential disc osteophyte complex and bilateral ligamentum flavum buckling.  Findings contribute to mild spinal canal stenosis and moderate right neural foraminal narrowing.     T10-T11: Circumferential disc osteophyte complex, bilateral facet arthropathy and bilateral ligamentum flavum buckling.  Findings contribute to severe spinal canal stenosis and moderate right and mild left neural foraminal narrowing.     Impression:     1. Multilevel degenerative changes of the thoracic spine most pronounced at T10-T11 noting severe spinal canal stenosis and mild-to-moderate neural foraminal narrowing.  2. Persistent focal area of cord signal abnormality at T10-T11, similar when compared to MRI most suggestive of myelomalacia.        Electronically signed by: Tony Powell MD  Date:                                            06/04/2022  Time:                                           09:05       MRI Lumbar Spine Without Contrast  Order: [id number]  Status: Final result     Visible to patient: Yes  (seen)     Next appt: 09/18/2023 at 08:45 AM in Dermatology (Basilia Valdez MD)     Dx: Dorsalgia, unspecified     1 Result Note  Details    Reading Physician Reading Date Result Priority   Maximo Mcallister Jr., MD  [phone] 3/25/2022 Routine     Narrative & Impression  EXAMINATION:  MRI LUMBAR SPINE WITHOUT CONTRAST     CLINICAL HISTORY:  Dorsalgia, unspecifiedLow back pain, symptoms persist with > 6wks conservative treatment;     TECHNIQUE:  Sagittal T1, sagittal T2, sagittal STIR, axial T1 and axial T2 weighted images of the lumbar spine obtained without contrast.     COMPARISON:  Similar study 02/10/2020     FINDINGS:  Lumbar spine alignment is within normal limits. The vertebral body heights are well maintained, with no fracture.  Degenerative sclerotic marrow endplate change at S1 and fatty metaplasia degenerative endplate change at T12.  Otherwise marrow signal normal.     The conus is normal in appearance.  The adjacent soft tissue structures show no significant abnormalities.     Spinal canal is congenitally narrow on the basis of short pedicles.  At the T10-11 level there is extradural indentation of the thecal sac from posteriorly and there is some broad-based disc bulging.  Transverse images do not cover this level.  There is at least moderate spinal stenosis at this level.     L1-L2: No focal disc herniation.No significant central canal or neural foraminal narrowing.     L2-L3: No focal disc herniation.  Mild facet arthrosis.  No significant central canal or neural foraminal narrowing.     L3-L4: No evidence of a disc herniation.  No significant central canal or neural foraminal narrowing.     L4-L5: Broad-based disc bulging with left-sided foraminal and far lateral annular fissure and superimposed mild spondylitic spurring. Facet arthrosis contributes to severe left-sided neural foraminal narrowing.  Moderate central spinal stenosis.     L5-S1: Broad-based disc bulging, facet arthrosis and  ligamentum flavum hypertrophy result in severe bilateral neural foraminal stenosis.  Mild central spinal stenosis.     Impression:     Extradural degenerative changes at T10-11, L4-5, and L5-S1 resulting in central canal and foraminal stenosis as detailed above.  Dedicated imaging of the thoracic spine could lend additional information regarding findings at T10-11     This report was flagged in Epic as abnormal.        Electronically signed by: Maximo Aguirre Jr  Date:                                            03/25/2022  Time:                                           09:06       Past Medical History:   Diagnosis Date    Diabetes mellitus     Diabetes mellitus, type 2     Eczema     GERD (gastroesophageal reflux disease)     Hypertension     Impaired fasting blood sugar     YSABEL on CPAP      Past Surgical History:   Procedure Laterality Date    breast reduction      CHOLECYSTECTOMY      laprascopic    DECOMPRESSION OF THORACIC OUTLET N/A 10/28/2022    Procedure: T9-12 ROBOTIC DECOMPRESSION, THORACIC FUSION;  Surgeon: Edouard Whitt DO;  Location: [facility] OR [address] [place] [address];  Service: Neurosurgery;  Laterality: N/A;  ANESTHESIA GENERAL [place] I BLOOD TYPE & SCREEN NERVE MONITORING EMG SEP MEP POSTION PRONE BED CARA 4 POST HEAD REST [facility] RADIOLOGY C-ARM GLOBUS PROTOCOL MISCELLANEOUS ALANIZ BRACE TLSO    EPIDURAL STEROID INJECTION N/A 1/11/2022    Procedure: INJECTION, STEROID, EPIDURAL IL L4/5 NEEDS CONSENT;  Surgeon: Britt Calix MD;  Location: [facility] PAIN MGT;  Service: Pain Management;  Laterality: N/A;    ESOPHAGOGASTRODUODENOSCOPY  8/18/14    HYSTERECTOMY  2007    laprascopic, total for fibroids.  Dr Polo    INJECTION OF JOINT Left 2/8/2022    Procedure: INJECTION, JOINT, SI LEFT NEEDS CONSENT;  Surgeon: Britt Calix MD;  Location: [facility] PAIN MGT;  Service: Pain Management;  Laterality: Left;    OOPHORECTOMY      ROBOT-ASSISTED LAPAROSCOPIC SLEEVE GASTRECTOMY USING DA DEVI XI N/A  7/26/2023    Procedure: XI ROBOTIC SLEEVE GASTRECTOMY with intraop EGD;  Surgeon: Roge Rich MD;  [facility];  Service: General;  Laterality: N/A;  with possible HHR    TOTAL REDUCTION MAMMOPLASTY      TRANSFORAMINAL EPIDURAL INJECTION OF STEROID Right 9/26/2023    Procedure: INJECTION, STEROID, EPIDURAL, TRANSFORAMINAL APPROACH, RIGHT L4/L5 AND L5/S1 SOONER DATE;  Surgeon: Britt Calix MD;  Location: [facility] PAIN MGT;  Service: Pain Management;  Laterality: Right;     Social History     Socioeconomic History    Marital status:    Tobacco Use    Smoking status: Never    Smokeless tobacco: Never   Substance and Sexual Activity    Alcohol use: No     Comment: socially    Drug use: No    Sexual activity: Yes     Partners: Male     Birth control/protection: Surgical   Social History Narrative    Was  since 2000.      Has a friend since 2016.    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     Social Determinants of Health     Financial Resource Strain: Low Risk  (9/20/2023)    Overall Financial Resource Strain (CARDIA)     Difficulty of Paying Living Expenses: Not very hard   Food Insecurity: No Food Insecurity (9/20/2023)    Hunger Vital Sign     Worried About Running Out of Food in the Last Year: Never true     Ran Out of Food in the Last Year: Never true   Transportation Needs: No Transportation Needs (9/20/2023)    PRAPARE - Transportation     Lack of Transportation (Medical): No     Lack of Transportation (Non-Medical): No   Physical Activity: Sufficiently Active (9/20/2023)    Exercise Vital Sign     Days of Exercise per Week: 3 days     Minutes of Exercise per Session: 50 min   Recent Concern: Physical Activity - Insufficiently Active (9/11/2023)    Exercise Vital Sign     Days of Exercise per Week: 2 days     Minutes of Exercise per Session: 60 min   Stress: No Stress Concern Present (9/20/2023)    [place] [employer] -  Occupational Stress Questionnaire     Feeling of Stress : Not at all   Recent Concern: Stress - Stress Concern Present (7/9/2023)    [place] [employer] - Occupational Stress Questionnaire     Feeling of Stress : Very much   Social Connections: Unknown (9/20/2023)    Social Connection and Isolation Panel [NHANES]     Frequency of Communication with Friends and Family: More than three times a week     Frequency of Social Gatherings with Friends and Family: Twice a week     Active Member of Clubs or Organizations: No     Attends Club or Organization Meetings: Never     Marital Status: Living with partner   Housing Stability: Low Risk  (9/20/2023)    Housing Stability Vital Sign     Unable to Pay for Housing in the Last Year: No     Number of Places Lived in the Last Year: 1     Unstable Housing in the Last Year: No     Family History   Problem Relation Age of Onset    Diabetes Maternal Grandmother     Heart disease Maternal Grandmother     Hypertension Maternal Grandmother     Crohn's disease Sister     Diabetes Mother     Hypertension Mother     Heart disease Mother     Cancer Father 58        Prostate    Breast cancer Sister     Amblyopia Neg Hx     Blindness Neg Hx     Cataracts Neg Hx     Glaucoma Neg Hx     Macular degeneration Neg Hx     Retinal detachment Neg Hx     Strabismus Neg Hx        Review of patient's allergies indicates:  No Known Allergies    Current Outpatient Medications   Medication Sig    acetaminophen (TYLENOL) 500 MG tablet Take 2 tablets (1,000 mg total) by mouth every 8 (eight) hours as needed for Pain.    atorvastatin (LIPITOR) 20 MG tablet TAKE 1 TABLET BY MOUTH EVERY DAY    ergocalciferol (ERGOCALCIFEROL) 50,000 unit Cap TAKE 2 CAPSULES BY MOUTH TWICE A WEEK    estradioL (ESTRACE) 1 MG tablet Take 1 tablet (1 mg total) by mouth once daily.    finasteride (PROSCAR) 5 mg tablet Take 1 tablet (5 mg total) by mouth once daily.    fluocinonide (LIDEX) 0.05 % ointment Apply  to affected areas of scalp at bedtime    ketoconazole (NIZORAL) 2 % cream Apply to affected areas of scalp twice daily.    ketoconazole (NIZORAL) 2 % shampoo Wash hair with medicated shampoo at least 2x/week - let sit on scalp at least 5 minutes prior to rinsing    mometasone (ELOCON) 0.1 % solution Apply topically once daily. To thinning area of scalp    neomycin-polymyxin-dexamethasone (MAXITROL) 3.5mg/mL-10,000 unit/mL-0.1 % DrpS as needed.    nystatin (MYCOSTATIN) ointment Apply topically 2 (two) times daily. USES PRN    omeprazole (PRILOSEC) 40 MG capsule Take 1 capsule (40 mg total) by mouth every morning. Open capsule and take with apple sauce    ondansetron (ZOFRAN) 4 MG tablet TAKE 1 TABLET BY MOUTH EVERY 8 HOURS AS NEEDED    prednisoLONE acetate (PRED FORTE) 1 % DrpS USES PRN    pregabalin (LYRICA) 75 MG capsule Take 1 capsule (75 mg total) by mouth 2 (two) times daily.    semaglutide (OZEMPIC) 1 mg/dose (4 mg/3 mL) Inject 1 mg into the skin every 7 days.    spironolactone (ALDACTONE) 50 MG tablet Take 1 tablet (50 mg total) by mouth once daily.    tiZANidine (ZANAFLEX) 4 MG tablet TAKE 1 TABLET BY MOUTH NIGHTLY AS NEEDED (MUSCLE SPASM).    triamcinolone acetonide 0.1% (KENALOG) 0.1 % cream Apply topically 2 (two) times daily. Apply topically 2 (two) times daily.    valsartan-hydrochlorothiazide (DIOVAN-HCT) 160-12.5 mg per tablet Take 1 tablet by mouth once daily.    doxycycline monohydrate 100 mg Tab Take 1 tablet by mouth 2 (two) times daily.    lancets Misc Use to test blood sugar once daily.    omeprazole (PRILOSEC) 40 MG capsule TAKE 1 CAPSULE BY MOUTH EVERY DAY    omeprazole (PRILOSEC) 40 MG capsule Take 1 capsule (40 mg total) by mouth every morning. Open capsule and take with apple sauce    ondansetron (ZOFRAN-ODT) 8 MG TbDL Take 1 tablet (8 mg total) by mouth every 6 (six) hours as needed. (Patient not taking: Reported on 10/12/2023)    ondansetron (ZOFRAN-ODT) 8 MG TbDL Take 1 tablet (8 mg  total) by mouth every 6 (six) hours as needed.    oxyCODONE (ROXICODONE) 5 mg/5 mL Soln Take 5 mLs (5 mg total) by mouth every 8 (eight) hours as needed. (Patient not taking: Reported on 10/12/2023)    oxyCODONE (ROXICODONE) 5 mg/5 mL Soln Take 5 mLs (5 mg total) by mouth every 8 (eight) hours as needed.     Current Facility-Administered Medications   Medication    triamcinolone acetonide injection 10 mg       REVIEW OF SYSTEMS:    GENERAL:  No weight loss, malaise or fevers.  HEENT:   No recent changes in vision or hearing  NECK:  Negative for lumps, no difficulty with swallowing.  RESPIRATORY:  Negative for cough, wheezing or shortness of breath, patient denies any recent URI.  CARDIOVASCULAR:  Negative for chest pain, leg swelling or palpitations.  GI:  Negative for abdominal discomfort, blood in stools or black stools or change in bowel habits.  MUSCULOSKELETAL:  See HPI.  SKIN:  Negative for lesions, rash, and itching.  PSYCH:  No mood disorder or recent psychosocial stressors.  Patients sleep is not disturbed secondary to pain.  HEMATOLOGY/LYMPHOLOGY:  Negative for prolonged bleeding, bruising easily or swollen nodes.  Patient is not currently taking any anti-coagulants  NEURO:   No history of headaches, syncope, paralysis, seizures or tremors.  All other reviewed and negative other than HPI.    OBJECTIVE:  PHYSICAL EXAM:   GENERAL: Well appearing, in no acute distress, alert and oriented x3.  PSYCH:  Mood and affect appropriate.  SKIN: Skin color, texture, turgor normal, no rashes or lesions.  HEENT:  Normocephalic, atraumatic. Cranial nerves grossly intact.  NECK: No pain to palpation over the cervical paraspinous muscles. No pain to palpation over facets. No pain with neck flexion, extension, or lateral flexion.   PULM: No evidence of respiratory difficulty, symmetric chest rise.  GI:  Non-distended  BACK: Normal range of motion. No pain to palpation over the spinous processes. No pain to palpation over  facet joints. There is no pain with palpation over the sacroiliac joints bilaterally. Straight leg raising in the supine position is negative to radicular pain. Pain reproduced over bilateral GTBs.   EXTREMITIES: No deformities, edema, or skin discoloration.   MUSCULOSKELETAL: Shoulder, hip, and knee provocative maneuvers are negative. Bilateral upper and lower extremity strength is normal and symmetric. No atrophy is noted.  NEURO: Sensation is equal and appropriate bilaterally. Bilateral upper and lower extremity coordination and muscle stretch reflexes are physiologic and symmetric. Plantar response are downgoing.   GAIT: normal.      ASSESSMENT: 53 y.o. year old female with pain consistent with     1. Trochanteric bursitis of both hips            DISCUSSION: Ms. Jennings is a . She has a history of T9-12 decompression and posterior fusion with Dr. Whitt. She came to us with left low back pain and did well after SIJ. She then presented back with right radicular symptoms. MRI shows short pedicles, moderate canal stenosis at L4/5, and severe foraminal stenosis at L5/S1.     PLAN:   1) Continue heat  2) Try not sleeping on side  3) Schedule b/l GTB injections  4) Follow up in 2 weeks      Britt Calix MD  10/12/2023

## 2023-10-13 DIAGNOSIS — N95.1 MENOPAUSE SYNDROME: ICD-10-CM

## 2023-10-13 DIAGNOSIS — N95.1 HOT FLASH, MENOPAUSAL: ICD-10-CM

## 2023-10-13 RX ORDER — ESTRADIOL 1 MG/1
1 TABLET ORAL
Qty: 90 TABLET | Refills: 5 | Status: SHIPPED | OUTPATIENT
Start: 2023-10-13

## 2023-10-16 ENCOUNTER — PATIENT MESSAGE (OUTPATIENT)
Dept: PAIN MEDICINE | Facility: OTHER | Age: 53
End: 2023-10-16
Payer: COMMERCIAL

## 2023-10-16 DIAGNOSIS — M70.61 TROCHANTERIC BURSITIS OF BOTH HIPS: Primary | ICD-10-CM

## 2023-10-16 DIAGNOSIS — M70.62 TROCHANTERIC BURSITIS OF BOTH HIPS: Primary | ICD-10-CM

## 2023-10-25 ENCOUNTER — PATIENT MESSAGE (OUTPATIENT)
Dept: ADMINISTRATIVE | Facility: OTHER | Age: 53
End: 2023-10-25
Payer: COMMERCIAL

## 2023-10-27 ENCOUNTER — PATIENT MESSAGE (OUTPATIENT)
Dept: ADMINISTRATIVE | Facility: OTHER | Age: 53
End: 2023-10-27
Payer: COMMERCIAL

## 2023-10-31 ENCOUNTER — HOSPITAL ENCOUNTER (OUTPATIENT)
Facility: OTHER | Age: 53
Discharge: HOME | End: 2023-10-31
Attending: ANESTHESIOLOGY | Admitting: ANESTHESIOLOGY
Payer: COMMERCIAL

## 2023-10-31 VITALS
WEIGHT: 189 LBS | OXYGEN SATURATION: 97 % | RESPIRATION RATE: 16 BRPM | BODY MASS INDEX: 34.78 KG/M2 | HEIGHT: 62 IN | SYSTOLIC BLOOD PRESSURE: 142 MMHG | TEMPERATURE: 98 F | DIASTOLIC BLOOD PRESSURE: 80 MMHG | HEART RATE: 61 BPM

## 2023-10-31 DIAGNOSIS — G89.29 CHRONIC PAIN: ICD-10-CM

## 2023-10-31 DIAGNOSIS — M70.60 GREATER TROCHANTERIC BURSITIS, UNSPECIFIED LATERALITY: Primary | ICD-10-CM

## 2023-10-31 LAB — POCT GLUCOSE: 77 MG/DL (ref 70–110)

## 2023-10-31 PROCEDURE — 25500020 PHARM REV CODE 255: Performed by: ANESTHESIOLOGY

## 2023-10-31 PROCEDURE — 63600175 PHARM REV CODE 636 W HCPCS: Performed by: ANESTHESIOLOGY

## 2023-10-31 PROCEDURE — 20610 DRAIN/INJ JOINT/BURSA W/O US: CPT | Mod: 50 | Performed by: ANESTHESIOLOGY

## 2023-10-31 PROCEDURE — 25000003 PHARM REV CODE 250: Performed by: ANESTHESIOLOGY

## 2023-10-31 PROCEDURE — 20610 PR DRAIN/INJECT LARGE JOINT/BURSA: ICD-10-PCS | Mod: 50,,, | Performed by: ANESTHESIOLOGY

## 2023-10-31 PROCEDURE — 82947 ASSAY GLUCOSE BLOOD QUANT: CPT | Performed by: ANESTHESIOLOGY

## 2023-10-31 PROCEDURE — 20610 DRAIN/INJ JOINT/BURSA W/O US: CPT | Mod: 50,,, | Performed by: ANESTHESIOLOGY

## 2023-10-31 RX ORDER — TRIAMCINOLONE ACETONIDE 40 MG/ML
INJECTION, SUSPENSION INTRA-ARTICULAR; INTRAMUSCULAR
Status: DISCONTINUED | OUTPATIENT
Start: 2023-10-31 | End: 2023-10-31 | Stop reason: HOSPADM

## 2023-10-31 RX ORDER — BUPIVACAINE HYDROCHLORIDE 2.5 MG/ML
INJECTION, SOLUTION EPIDURAL; INFILTRATION; INTRACAUDAL
Status: DISCONTINUED | OUTPATIENT
Start: 2023-10-31 | End: 2023-10-31 | Stop reason: HOSPADM

## 2023-10-31 RX ORDER — SODIUM CHLORIDE 9 MG/ML
INJECTION, SOLUTION INTRAVENOUS CONTINUOUS
Status: DISCONTINUED | OUTPATIENT
Start: 2023-10-31 | End: 2023-10-31 | Stop reason: HOSPADM

## 2023-10-31 RX ORDER — LIDOCAINE HYDROCHLORIDE 20 MG/ML
INJECTION, SOLUTION INFILTRATION; PERINEURAL
Status: DISCONTINUED | OUTPATIENT
Start: 2023-10-31 | End: 2023-10-31 | Stop reason: HOSPADM

## 2023-10-31 NOTE — OP NOTE
Greater Trochanteric Bursa Injection under Fluoroscopic Guidance    The procedure, risks, benefits, and options were discussed with the patient. There are no contraindications to the procedure. The patent expressed understanding and agreed to the procedure. Informed written consent was obtained prior to the start of the procedure and can be found in the patient's chart.    PATIENT NAME: Mrs. Africa Jennings   MRN: [id number]     DATE OF PROCEDURE: 10/31/2023    PROCEDURE: Bilateral Greater Trochanteric Bursa Injection under Fluoroscopic Guidance    PRE-OP DIAGNOSIS: Trochanteric bursitis of both hips [M70.61, M70.62]    POST-OP DIAGNOSIS: Same    PHYSICIAN: Britt Calix MD    ASSISTANTS: Chiki Parmar MD Fellow    MEDICATIONS INJECTED: Preservative-free Kenalog 40mg with 7cc of Bupivacine 0.25%     LOCAL ANESTHETIC INJECTED: Xylocaine 2%     SEDATION: None    ESTIMATED BLOOD LOSS: None    COMPLICATIONS: None    TECHNIQUE: Time-out was performed to identify the patient and procedure to be performed. With the patient laying in a prone position, the surgical area was prepped and draped in the usual sterile fashion using ChloraPrep and a fenestrated drape. The area overlying the greater trochanteric bursa was determined under fluoroscopy guidance. Skin anesthesia was achieved by injecting Lidocaine 2% over the injection site. The greater trochanteric bursa was then approached with a 22 gauge, 3.5 inch spinal quinke needle that was introduced under fluoroscopic guidance in the AP view. Once the needle tip was in the area of the bursa, and there was no blood aspiration, Contrast dye  Omnipaque (300mg/mL) was injected to confirm placement and there was no vascular runoff. 4 mL of the medication mixture listed above was injected slowly. The needles were removed and bleeding was nil. A sterile dressing was applied. No specimens collected. The patient tolerated the procedure well.    The patient was monitored after  the procedure in the recovery area. They were given post-procedure and discharge instructions to follow at home. The patient was discharged in a stable condition.    Britt Calix MD

## 2023-10-31 NOTE — DISCHARGE INSTRUCTIONS

## 2023-10-31 NOTE — DISCHARGE SUMMARY
Discharge Note  Short Stay      SUMMARY     Admit Date: 10/31/2023    Attending Physician: Britt Calix      Discharge Physician: Britt Calix      Discharge Date: 10/31/2023 8:43 AM    Procedure(s) (LRB):  INJECTION, JOINT BILATERAL GTB (Bilateral)    Final Diagnosis: Trochanteric bursitis of both hips [M70.61, M70.62]    Disposition: Home or self care    Patient Instructions:   Current Discharge Medication List        CONTINUE these medications which have NOT CHANGED    Details   acetaminophen (TYLENOL) 500 MG tablet Take 2 tablets (1,000 mg total) by mouth every 8 (eight) hours as needed for Pain.  Qty: 30 tablet, Refills: 0      atorvastatin (LIPITOR) 20 MG tablet TAKE 1 TABLET BY MOUTH EVERY DAY  Qty: 90 tablet, Refills: 3    Associated Diagnoses: Hyperlipidemia, unspecified hyperlipidemia type      doxycycline monohydrate 100 mg Tab Take 1 tablet by mouth 2 (two) times daily.      ergocalciferol (ERGOCALCIFEROL) 50,000 unit Cap TAKE 2 CAPSULES BY MOUTH TWICE A WEEK  Qty: 48 capsule, Refills: 1    Associated Diagnoses: Vitamin D deficiency      estradioL (ESTRACE) 1 MG tablet TAKE 1 TABLET BY MOUTH EVERY DAY  Qty: 90 tablet, Refills: 5    Associated Diagnoses: Menopause syndrome; Hot flash, menopausal      finasteride (PROSCAR) 5 mg tablet Take 1 tablet (5 mg total) by mouth once daily.  Qty: 30 tablet, Refills: 11    Associated Diagnoses: Androgenetic alopecia      fluocinonide (LIDEX) 0.05 % ointment Apply to affected areas of scalp at bedtime  Qty: 60 g, Refills: 3    Associated Diagnoses: Cicatricial alopecia      ketoconazole (NIZORAL) 2 % shampoo Wash hair with medicated shampoo at least 2x/week - let sit on scalp at least 5 minutes prior to rinsing  Qty: 120 mL, Refills: 5    Associated Diagnoses: Androgenetic alopecia      mometasone (ELOCON) 0.1 % solution APPLY TOPICALLY ONCE DAILY. TO THINNING AREA OF SCALP  Qty: 60 mL, Refills: 5    Associated Diagnoses: Traction alopecia       neomycin-polymyxin-dexamethasone (MAXITROL) 3.5mg/mL-10,000 unit/mL-0.1 % DrpS as needed.      nystatin (MYCOSTATIN) ointment Apply topically 2 (two) times daily. USES PRN      omeprazole (PRILOSEC) 40 MG capsule Take 1 capsule (40 mg total) by mouth every morning. Open capsule and take with apple sauce  Qty: 30 capsule, Refills: 2      ondansetron (ZOFRAN) 4 MG tablet TAKE 1 TABLET BY MOUTH EVERY 8 HOURS AS NEEDED  Qty: 90 tablet, Refills: 1    Associated Diagnoses: Gastroesophageal reflux disease without esophagitis      ondansetron (ZOFRAN-ODT) 8 MG TbDL Take 1 tablet (8 mg total) by mouth every 6 (six) hours as needed.  Qty: 30 tablet, Refills: 0      prednisoLONE acetate (PRED FORTE) 1 % DrpS USES PRN      pregabalin (LYRICA) 75 MG capsule Take 1 capsule (75 mg total) by mouth 2 (two) times daily.  Qty: 60 capsule, Refills: 6      semaglutide (OZEMPIC) 1 mg/dose (4 mg/3 mL) Inject 1 mg into the skin every 7 days.  Qty: 3 each, Refills: 0    Comments: DX Code Needed  .  Associated Diagnoses: Type 2 diabetes mellitus without complication, without long-term current use of insulin      spironolactone (ALDACTONE) 50 MG tablet Take 1 tablet (50 mg total) by mouth once daily.  Qty: 30 tablet, Refills: 11    Comments: .  Associated Diagnoses: Androgenetic alopecia      tiZANidine (ZANAFLEX) 4 MG tablet TAKE 1 TABLET BY MOUTH NIGHTLY AS NEEDED (MUSCLE SPASM).  Qty: 90 tablet, Refills: 2    Associated Diagnoses: Muscle spasm      triamcinolone acetonide 0.1% (KENALOG) 0.1 % cream Apply topically 2 (two) times daily. Apply topically 2 (two) times daily.  Qty: 135 g, Refills: 4    Associated Diagnoses: Eczema, unspecified type      valsartan-hydrochlorothiazide (DIOVAN-HCT) 160-12.5 mg per tablet Take 1 tablet by mouth once daily.  Qty: 90 tablet, Refills: 1    Comments: .  Associated Diagnoses: Essential hypertension                 Discharge Diagnosis: Trochanteric bursitis of both hips [M70.61, M70.62]  Condition on  Discharge: Stable with no complications to procedure   Diet on Discharge: Same as before.  Activity: as per instruction sheet.  Discharge to: Home with a responsible adult.  Follow up: 2-4 weeks       Please call my office or pager at [phone] if experienced any weakness or loss of sensation, fever > 101.5, pain uncontrolled with oral medications, persistent nausea/vomiting/or diarrhea, redness or drainage from the incisions, or any other worrisome concerns. If physician on call was not reached or could not communicate with our office for any reason please go to the nearest emergency department      Britt Calix  10/31/2023

## 2023-11-01 ENCOUNTER — PATIENT MESSAGE (OUTPATIENT)
Dept: ADMINISTRATIVE | Facility: OTHER | Age: 53
End: 2023-11-01
Payer: COMMERCIAL

## 2023-11-01 RX ORDER — ONDANSETRON 8 MG/1
8 TABLET, ORALLY DISINTEGRATING ORAL EVERY 6 HOURS PRN
Qty: 30 TABLET | Refills: 0 | Status: SHIPPED | OUTPATIENT
Start: 2023-11-01

## 2023-11-03 ENCOUNTER — TELEPHONE (OUTPATIENT)
Dept: DERMATOLOGY | Facility: CLINIC | Age: 53
End: 2023-11-03
Payer: COMMERCIAL

## 2023-11-14 ENCOUNTER — PATIENT MESSAGE (OUTPATIENT)
Dept: BARIATRICS | Facility: CLINIC | Age: 53
End: 2023-11-14
Payer: COMMERCIAL

## 2023-11-17 ENCOUNTER — PATIENT MESSAGE (OUTPATIENT)
Dept: FAMILY MEDICINE | Facility: CLINIC | Age: 53
End: 2023-11-17
Payer: COMMERCIAL

## 2023-11-24 ENCOUNTER — TELEPHONE (OUTPATIENT)
Dept: ENDOSCOPY | Facility: HOSPITAL | Age: 53
End: 2023-11-24

## 2023-11-24 ENCOUNTER — CLINICAL SUPPORT (OUTPATIENT)
Dept: ENDOSCOPY | Facility: HOSPITAL | Age: 53
End: 2023-11-24
Attending: OBSTETRICS & GYNECOLOGY
Payer: COMMERCIAL

## 2023-11-24 DIAGNOSIS — Z12.11 COLON CANCER SCREENING: ICD-10-CM

## 2023-11-24 RX ORDER — SODIUM, POTASSIUM,MAG SULFATES 17.5-3.13G
1 SOLUTION, RECONSTITUTED, ORAL ORAL DAILY
Qty: 1 KIT | Refills: 0 | Status: SHIPPED | OUTPATIENT
Start: 2023-11-24 | End: 2023-11-26

## 2023-11-24 NOTE — TELEPHONE ENCOUNTER
Spoke to pt to schedule procedure(s) Colonoscopy       Physician to perform procedure(s) Dr. DISHA Taylor  Date of Procedure (s) 3/26/24  Arrival Time 8:00 AM  Time of Procedure(s) 9:00 AM   Location of Procedure(s) [address]  Type of Rx Prep sent to patient: Suprep  Instructions provided to patient via [facility]    Patient was informed on the following information and verbalized understanding. Screening questionnaire reviewed with patient and complete. If procedure requires anesthesia, a responsible adult needs to be present to accompany the patient home, patient cannot drive after receiving anesthesia. Appointment details are tentative, especially check-in time. Patient will receive a prep-op call 7 days prior to confirm check-in time for procedure. If applicable the patient should contact their pharmacy to verify Rx for procedure prep is ready for pick-up. Patient was advised to call the scheduling department at [phone] if pharmacy states no Rx is available. Patient was advised to call the endoscopy scheduling department if any questions or concerns arise.      [facility] Scheduling Department

## 2023-12-04 ENCOUNTER — HOSPITAL ENCOUNTER (OUTPATIENT)
Dept: RADIOLOGY | Facility: HOSPITAL | Age: 53
Discharge: HOME | End: 2023-12-04
Attending: FAMILY MEDICINE
Payer: COMMERCIAL

## 2023-12-04 DIAGNOSIS — Z12.31 SCREENING MAMMOGRAM, ENCOUNTER FOR: ICD-10-CM

## 2023-12-04 PROCEDURE — 77067 SCR MAMMO BI INCL CAD: CPT | Mod: 26,,, | Performed by: RADIOLOGY

## 2023-12-04 PROCEDURE — 77063 BREAST TOMOSYNTHESIS BI: CPT | Mod: 26,,, | Performed by: RADIOLOGY

## 2023-12-04 PROCEDURE — 77067 SCR MAMMO BI INCL CAD: CPT | Mod: TC,PO

## 2023-12-04 PROCEDURE — 77063 MAMMO DIGITAL SCREENING BILAT WITH TOMO: ICD-10-PCS | Mod: 26,,, | Performed by: RADIOLOGY

## 2023-12-04 PROCEDURE — 77067 MAMMO DIGITAL SCREENING BILAT WITH TOMO: ICD-10-PCS | Mod: 26,,, | Performed by: RADIOLOGY

## 2023-12-12 ENCOUNTER — PATIENT MESSAGE (OUTPATIENT)
Dept: BARIATRICS | Facility: CLINIC | Age: 53
End: 2023-12-12
Payer: COMMERCIAL

## 2023-12-13 ENCOUNTER — PATIENT MESSAGE (OUTPATIENT)
Dept: BARIATRICS | Facility: CLINIC | Age: 53
End: 2023-12-13
Payer: COMMERCIAL

## 2023-12-27 ENCOUNTER — TELEPHONE (OUTPATIENT)
Dept: DERMATOLOGY | Facility: CLINIC | Age: 53
End: 2023-12-27
Payer: COMMERCIAL

## 2023-12-27 NOTE — TELEPHONE ENCOUNTER
I spoke with Frank Michaela about rescheduling her appointment with Dr. Valdez for 01/02/2024.  She accepted the 2 PM times slot and thanked me for the call.

## 2024-01-02 ENCOUNTER — OFFICE VISIT (OUTPATIENT)
Dept: DERMATOLOGY | Facility: CLINIC | Age: 54
End: 2024-01-02
Payer: COMMERCIAL

## 2024-01-02 DIAGNOSIS — L66.9 SCARRING ALOPECIA: Primary | ICD-10-CM

## 2024-01-02 DIAGNOSIS — L64.9 ANDROGENETIC ALOPECIA: ICD-10-CM

## 2024-01-02 DIAGNOSIS — L21.9 SEBORRHEIC DERMATITIS: ICD-10-CM

## 2024-01-02 DIAGNOSIS — Z79.899 ENCOUNTER FOR LONG-TERM (CURRENT) USE OF OTHER MEDICATIONS: ICD-10-CM

## 2024-01-02 PROCEDURE — 11901 INJECT SKIN LESIONS >7: CPT | Mod: S$GLB,,, | Performed by: DERMATOLOGY

## 2024-01-02 PROCEDURE — 1159F MED LIST DOCD IN RCRD: CPT | Mod: CPTII,S$GLB,, | Performed by: DERMATOLOGY

## 2024-01-02 PROCEDURE — 1160F RVW MEDS BY RX/DR IN RCRD: CPT | Mod: CPTII,S$GLB,, | Performed by: DERMATOLOGY

## 2024-01-02 PROCEDURE — 99999 PR PBB SHADOW E&M-EST. PATIENT-LVL IV: CPT | Mod: PBBFAC,,, | Performed by: DERMATOLOGY

## 2024-01-02 PROCEDURE — 99214 OFFICE O/P EST MOD 30 MIN: CPT | Mod: 25,S$GLB,, | Performed by: DERMATOLOGY

## 2024-01-02 NOTE — PATIENT INSTRUCTIONS
Seborrheic dermatitis  - continue keto shampoo to scalp and keto cream   Traction alopecia  - continue minoxidil  Androgenetic alopecia/ Z79.899  - continue spironolactone 100 mg - okay by Dr. Thompson  Checked [facility] 9/2023, repeat in 6 mo to 1 year  Discussed benefits and risks of therapy including but not limited to breakthrough bleeding, breast tenderness, and elevated potassium levels which may give symptoms of fatigue, palpitations, and nausea. Patient should limit potassium intake - avoid potassium supplements or salt substitutes, limit bananas and citrus fruits. Pregnancy must be avoided while taking spironolactone.

## 2024-01-02 NOTE — PROGRESS NOTES
Subjective:      Patient ID:  Africa Jennings is a 53 y.o. female who presents for   Chief Complaint   Patient presents with    Hair Loss     Scalp      Hair Loss - Follow-up  Diagnosis: Androgenic alopecia and scarring alopecia.  Symptom course: unchanged  Currently using: rogaine and spironolactone.  Affected locations: scalp  Signs / symptoms: asymptomatic  Severity: mild to moderate      Review of Systems   Constitutional:  Negative for fever, chills and fatigue.   Hematologic/Lymphatic: Does not bruise/bleed easily.       Objective:   Physical Exam   Constitutional: She appears well-developed and well-nourished. No distress.   Neurological: She is alert and oriented to person, place, and time. She is not disoriented.   Psychiatric: She has a normal mood and affect.   Skin:   Areas Examined (abnormalities noted in diagram):   Scalp / Hair Palpated and Inspected  Head / Face Inspection Performed            Diagram Legend     Erythematous scaling macule/papule c/w actinic keratosis       Vascular papule c/w angioma      Pigmented verrucoid papule/plaque c/w seborrheic keratosis      Yellow umbilicated papule c/w sebaceous hyperplasia      Irregularly shaped tan macule c/w lentigo     1-2 mm smooth white papules consistent with Milia      Movable subcutaneous cyst with punctum c/w epidermal inclusion cyst      Subcutaneous movable cyst c/w pilar cyst      Firm pink to brown papule c/w dermatofibroma      Pedunculated fleshy papule(s) c/w skin tag(s)      Evenly pigmented macule c/w junctional nevus     Mildly variegated pigmented, slightly irregular-bordered macule c/w mildly atypical nevus      Flesh colored to evenly pigmented papule c/w intradermal nevus       Pink pearly papule/plaque c/w basal cell carcinoma      Erythematous hyperkeratotic cursted plaque c/w SCC      Surgical scar with no sign of skin cancer recurrence      Open and closed comedones      Inflammatory papules and pustules       Verrucoid papule consistent consistent with wart     Erythematous eczematous patches and plaques     Dystrophic onycholytic nail with subungual debris c/w onychomycosis     Umbilicated papule    Erythematous-base heme-crusted tan verrucoid plaque consistent with inflamed seborrheic keratosis     Erythematous Silvery Scaling Plaque c/w Psoriasis     See annotation      Assessment / Plan:        Scarring alopecia  -     NV [id number] ACETONIDE INJ PER 10MG  -     triamcinolone acetonide injection 10 mg  -     NV INJECTION, SKIN LESIONS, 8 OR MORE  Seborrheic dermatitis  - continue keto shampoo to scalp and keto cream   Traction alopecia  - continue minoxidil  Androgenetic alopecia/ Z79.899  - continue spironolactone 100 mg - okay by Dr. Thompson  Checked [facility] 9/2023, repeat in 6 mo to 1 year  Discussed benefits and risks of therapy including but not limited to breakthrough bleeding, breast tenderness, and elevated potassium levels which may give symptoms of fatigue, palpitations, and nausea. Patient should limit potassium intake - avoid potassium supplements or salt substitutes, limit bananas and citrus fruits. Pregnancy must be avoided while taking spironolactone.             No follow-ups on file.    Intralesional Kenalog 5mg/cc (2 cc total) injected into 8 lesions on the scalp today after obtaining verbal consent including risk of surrounding hypopigmentation. Patient tolerated procedure well.  Units: 1  NDC for Kenalog 10mg/cc:  9278-4442-86

## 2024-01-03 ENCOUNTER — PATIENT MESSAGE (OUTPATIENT)
Dept: FAMILY MEDICINE | Facility: CLINIC | Age: 54
End: 2024-01-03
Payer: COMMERCIAL

## 2024-01-03 DIAGNOSIS — E11.9 TYPE 2 DIABETES MELLITUS WITHOUT COMPLICATION, WITHOUT LONG-TERM CURRENT USE OF INSULIN: ICD-10-CM

## 2024-01-03 RX ORDER — SEMAGLUTIDE 1.34 MG/ML
1 INJECTION, SOLUTION SUBCUTANEOUS
Qty: 4 EACH | Refills: 0 | Status: SHIPPED | OUTPATIENT
Start: 2024-01-03 | End: 2024-01-29

## 2024-01-03 RX ORDER — OMEPRAZOLE 40 MG/1
CAPSULE, DELAYED RELEASE ORAL
Qty: 90 CAPSULE | Refills: 1 | Status: SHIPPED | OUTPATIENT
Start: 2024-01-03

## 2024-01-03 NOTE — TELEPHONE ENCOUNTER
Care Due:                  Date            Visit Type   Department     Provider  --------------------------------------------------------------------------------                                [facility]                              PRIMARY      MED/ INTERNAL  Last Visit: 08-      CARE ([facility])   MED/ PEDS      Brandi Thompson                              [facility]                              PRIMARY      MED/ INTERNAL  Next Visit: 02-      CARE ([facility])   MED/ PEDS      Jeremiah Richards                                                            Last  Test          Frequency    Reason                     Performed    Due Date  --------------------------------------------------------------------------------    HBA1C.......  6 months...  semaglutide..............  07- 01-    [facility] Embedded Care Due Messages. Reference number: [id number].   1/03/2024 10:37:11 AM CST

## 2024-01-09 ENCOUNTER — PATIENT MESSAGE (OUTPATIENT)
Dept: BARIATRICS | Facility: CLINIC | Age: 54
End: 2024-01-09
Payer: COMMERCIAL

## 2024-01-15 LAB
LEFT EYE DM RETINOPATHY: NEGATIVE
RIGHT EYE DM RETINOPATHY: NEGATIVE

## 2024-01-18 ENCOUNTER — PATIENT OUTREACH (OUTPATIENT)
Dept: ADMINISTRATIVE | Facility: HOSPITAL | Age: 54
End: 2024-01-18
Payer: COMMERCIAL

## 2024-01-24 ENCOUNTER — LAB VISIT (OUTPATIENT)
Dept: LAB | Facility: HOSPITAL | Age: 54
End: 2024-01-24
Payer: COMMERCIAL

## 2024-01-24 ENCOUNTER — PATIENT MESSAGE (OUTPATIENT)
Dept: BARIATRICS | Facility: CLINIC | Age: 54
End: 2024-01-24

## 2024-01-24 ENCOUNTER — PATIENT MESSAGE (OUTPATIENT)
Dept: FAMILY MEDICINE | Facility: CLINIC | Age: 54
End: 2024-01-24
Payer: COMMERCIAL

## 2024-01-24 ENCOUNTER — PATIENT OUTREACH (OUTPATIENT)
Dept: ADMINISTRATIVE | Facility: HOSPITAL | Age: 54
End: 2024-01-24
Payer: COMMERCIAL

## 2024-01-24 ENCOUNTER — CLINICAL SUPPORT (OUTPATIENT)
Dept: BARIATRICS | Facility: CLINIC | Age: 54
End: 2024-01-24
Payer: COMMERCIAL

## 2024-01-24 ENCOUNTER — OFFICE VISIT (OUTPATIENT)
Dept: BARIATRICS | Facility: CLINIC | Age: 54
End: 2024-01-24
Payer: COMMERCIAL

## 2024-01-24 VITALS
BODY MASS INDEX: 35.56 KG/M2 | DIASTOLIC BLOOD PRESSURE: 76 MMHG | OXYGEN SATURATION: 97 % | WEIGHT: 194.44 LBS | SYSTOLIC BLOOD PRESSURE: 110 MMHG | HEART RATE: 62 BPM

## 2024-01-24 DIAGNOSIS — Z98.84 S/P BARIATRIC SURGERY: ICD-10-CM

## 2024-01-24 DIAGNOSIS — E66.9 OBESITY (BMI 30-39.9): ICD-10-CM

## 2024-01-24 DIAGNOSIS — E66.01 MORBID OBESITY: ICD-10-CM

## 2024-01-24 DIAGNOSIS — E11.9 TYPE 2 DIABETES MELLITUS WITHOUT COMPLICATION, WITHOUT LONG-TERM CURRENT USE OF INSULIN: ICD-10-CM

## 2024-01-24 DIAGNOSIS — Z71.3 DIETARY COUNSELING AND SURVEILLANCE: ICD-10-CM

## 2024-01-24 DIAGNOSIS — R63.4 WEIGHT LOSS: Primary | ICD-10-CM

## 2024-01-24 DIAGNOSIS — I10 ESSENTIAL HYPERTENSION: ICD-10-CM

## 2024-01-24 DIAGNOSIS — E11.9 TYPE 2 DIABETES MELLITUS WITHOUT COMPLICATION, WITHOUT LONG-TERM CURRENT USE OF INSULIN: Primary | ICD-10-CM

## 2024-01-24 DIAGNOSIS — K21.9 GASTROESOPHAGEAL REFLUX DISEASE WITHOUT ESOPHAGITIS: ICD-10-CM

## 2024-01-24 DIAGNOSIS — G47.33 OSA ON CPAP: ICD-10-CM

## 2024-01-24 DIAGNOSIS — Z00.00 ANNUAL PHYSICAL EXAM: Primary | ICD-10-CM

## 2024-01-24 LAB — 25(OH)D3+25(OH)D2 SERPL-MCNC: 55 NG/ML (ref 30–96)

## 2024-01-24 PROCEDURE — 3074F SYST BP LT 130 MM HG: CPT | Mod: CPTII,S$GLB,, | Performed by: PHYSICIAN ASSISTANT

## 2024-01-24 PROCEDURE — 99999 PR PBB SHADOW E&M-EST. PATIENT-LVL IV: CPT | Mod: PBBFAC,,, | Performed by: PHYSICIAN ASSISTANT

## 2024-01-24 PROCEDURE — 1160F RVW MEDS BY RX/DR IN RCRD: CPT | Mod: CPTII,S$GLB,, | Performed by: PHYSICIAN ASSISTANT

## 2024-01-24 PROCEDURE — 99499 UNLISTED E&M SERVICE: CPT | Mod: S$GLB,,, | Performed by: DIETITIAN, REGISTERED

## 2024-01-24 PROCEDURE — 36415 COLL VENOUS BLD VENIPUNCTURE: CPT | Mod: XB | Performed by: PHYSICIAN ASSISTANT

## 2024-01-24 PROCEDURE — 3078F DIAST BP <80 MM HG: CPT | Mod: CPTII,S$GLB,, | Performed by: PHYSICIAN ASSISTANT

## 2024-01-24 PROCEDURE — 82306 VITAMIN D 25 HYDROXY: CPT | Performed by: PHYSICIAN ASSISTANT

## 2024-01-24 PROCEDURE — 99024 POSTOP FOLLOW-UP VISIT: CPT | Mod: S$GLB,,, | Performed by: PHYSICIAN ASSISTANT

## 2024-01-24 PROCEDURE — 1159F MED LIST DOCD IN RCRD: CPT | Mod: CPTII,S$GLB,, | Performed by: PHYSICIAN ASSISTANT

## 2024-01-24 PROCEDURE — 99999 PR PBB SHADOW E&M-EST. PATIENT-LVL I: CPT | Mod: PBBFAC,,, | Performed by: DIETITIAN, REGISTERED

## 2024-01-24 NOTE — PATIENT INSTRUCTIONS
Meal Ideas for Regular Bariatric Diet  *Recipes and products available at [email or web address].com      Breakfast: (15-20g protein)    - Egg white omelet: 2 egg whites or ½ cup Egg Beaters. (Optional proteins: cheese, shrimp, black beans, chicken, sliced turkey) (Optional veggies: tomatoes, salsa, spinach, mushrooms, onions, green peppers, or small slice avocado)     - Egg and sausage: 1 egg or ¼ cup Egg Beaters (any variety), with 1 alejandro or 2 links of Turkey sausage or Veggie breakfast sausage (Responsys or GoWorkaBit)    - Crust-less breakfast quiche: To make a glass pie dish, mix 4oz part skim Ricotta, 1 cup skim milk, and 2 eggs as your base. Add protein: shredded cheese, sliced lean ham or turkey, turkey michelle/sausage. Add veggies: tomato, onion, green onion, mushroom, green pepper, spinach, etc.    - Yogurt parfait: Mix 1 - 6oz container Dannon Light N Fit vanilla yogurt, with ¼ cup crushed unsalted nuts    - Cottage cheese and fruit: ½ cup part-skim cottage cheese or ricotta cheese topped with fresh fruit or sugar free preserves     - Chelita Garcia's Vanilla Egg custard* (add 2 Tbsp instant coffee granules to make Cappuccino Custard*)    - Hi-Protein café latte (skim milk, decaf coffee, 1 scoop protein powder). Optional to add Sugar free syrup or extract flavoring.    - Breakfast Lox: spread fat free cream cheese on slices of smoked salmon. Serve over scrambled or egg over easy (sauteed with nonstick cookspray) OR on a cucumber slice    - Eggwhich: Scramble or cook 1 large egg over easy using nonstick cookspray. Place between 2 slices of Hungarian michelle and low fat cheese.     Lunch: (20-30g protein)    - ½ cup Black bean soup (Homemade or Progresso), with ¼ cup shredded low-fat cheese. Top with chopped tomato or fresh salsa.     - Lean deli turkey breast and low-fat sliced cheese, mustard or light holland to moisten, rolled up together, or wrapped in a Misael lettuce leaf    - Chicken salad made from dinner  leftovers, moisten with low-fat salad dressing or light holland. Also try leftover salmon, shrimp, tuna or boiled eggs. Serve ½ cup over dark green salad    - Fat-free canned refried beans, topped with ¼ cup shredded low-fat cheese. Top with chopped tomato or fresh salsa.     - Greek salad: Top mixed greens with 1-2oz grilled chicken, tomatoes, red onions, 2-3 kalamata olives, and sprinkle lightly with feta cheese. Spritz with Balsamic vinegar to taste.     - Crust-less lunch quiche: To make a glass pie dish, mix 4oz part skim Ricotta, 1 cup skim milk, and 2 eggs as your base. Add protein: shredded cheese, sliced lean ham or turkey, shrimp, chicken. Add veggies: tomato, onion, green onion, mushroom, green pepper, spinach, artichoke, broccoli, etc.    - Pizza bake: spread a  melo tonio mushroom with tomato sauce, low-fat shredded mozzarella and turkey pepperoni or [place] michelle. Add any veggies. Roast for 10-15 minutes, until cheese melted.     - Cucumber crab bites: Spread ¼ cup crab dip (lump crabmeat + light cream cheese and green onions) over sliced cucumber.     - Chicken with light spinach and artichoke dip*: Puree in : 6oz cooked and drained spinach, 2 cloves garlic, 1 can cannelloni beans, ½ cup chopped green onions, 1 can drained artichoke hearts (not marinated in oil), lemon juice and basil. Mix in 2oz chopped up chicken.    Supper: (20-30g protein)    - Serve grilled fish over dark green salad tossed with low-fat dressing, served with grilled asparagus masterson     - Rotisserie chicken salad: served with sliced strawberries, walnuts, fat-free feta cheese crumbles and 1 tbsp Paynes Own Light Raspberry [place] Vinaigrette    - Shrimp cocktail: Dip cold boiled shrimp in homemade low-sugar cocktail sauce (1/2 cup Naseem One Carb ketchup, 2 tbsp horseradish, 1/4 tsp hot sauce, 1 tsp Worcestershire sauce, 1 tbsp freshly-squeezed lemon juice). Serve with dark green salad, walnuts, and crumbled blue  cheese drizzled with olive oil and Balsamic vinegar    - Tuna Melt: Spread tuna salad onto 2 thick slices of tomato. Top with low-fat cheese and broil until cheese is melted. May also be made with chicken salad of shrimp salad. [place] with different types of cheeses.    - Chicken or beef fajitas (no tortilla, rice, beans, chips). Top meat and veggies w/ fresh salsa, fat free sour cream.     - Homemade low-fat Chili using extra lean ground beef or ground turkey. Top with shredded cheese and salsa as desired. May add dollop fat-free sour cream if desired    - Chicken parmesan: Top chicken breast w/ low sugar marinara sauce, mozzarella cheese and bake until chicken reaches 165*.  Serve w/ spaghetti SQUASH or Iraqi cut green beans    - Dinner Omelet with shrimp or chicken and onion, green peppers and chives.    - No noodle lasagna: Use sliced zucchini or eggplant in place of noodles.  Layer with part skim ricotta cheese and low sugar meat sauce (use very lean ground beef or ground turkey).    - Mexican chicken bake: Bake chunks of chicken breast or thigh with taco seasoning, Pace brand enchilada sauce, green onions and low-fat cheese. Serve with ¼ cup black beans or fat free refried beans topped with chopped tomatoes or salsa.    - Prosper frozen meatballs, simmered in Classico Marinara sauce. Different flavors of salsa or spaghetti sauce create different dishes! Sprinkle with parmesan cheese. Serve with grilled or steamed veggies, or a dark green salad.    - Simmer boneless skinless chicken thigh chunks in Classico Marinara sauce or roasted salsa until tender with chopped onion, bell pepper, garlic, mushrooms, spinach, etc.     - Hamburger or veggie burger, without the bun, dressed the way you like. Served with grilled or steamed veggies.    - Eggplant parmesan: Bake slices of eggplant at 350 degrees for 15 minutes. Layer tomato sauce, sliced eggplant and low-fat mozzarella cheese in a baking dish and cover with  foil. Bake 30-40 more minutes or until bubbly. Uncover and bake at 400 degrees for about 15 more minutes, or until top is slightly crisp.    - Fish tacos: grilled/baked white fish, wrapped in Misael lettuce leaf, topped with salsa, shredded low-fat cheese, and light coleslaw.    - Chicken trice: Sprinkle chicken w/ 1 tsp of hidden [place] ranch dip mix. Then grill chicken and top with black beans, salsa and 1 tsp fat free sour cream.     - Cauliflower pizza crust: Use cauliflower as crust (see recipe on pinterest, no flour!). Top w/ low fat cheese, turkey pepperoni and veggies and bake again    - chicken or turkey crust pizza: use ground chicken or turkey instead of cauliflower, spread in [place] and bake at 350 for about 20-30 minutes(may want to add garlic, black pepper, oregano and other herbs to ground meat mixture).  Remove and top w/ low fat cheese, turkey pepperoni and veggies and bake again for another 10 minutes or until cheese is browned.     Snacks: (100-200 calories; >5g protein)    - 1 low-fat cheese stick with 8 cherry tomatoes or 1 serving fresh fruit  - 4 thin slices fat-free turkey breast and 1 slice low-fat cheese  - 4 thin slices fat-free honey ham with wedge of melon  - 6-8 edamame pods (equivalent to about 1/4 cup edamame without pods).   - 1/4 cup unsalted nuts with ½ cup fruit  - 6-oz container Dannon Light n Fit vanilla yogurt, topped with 1oz unsalted nuts         - apple, celery or baby carrots spread with 2 Tbsp PB2  - apple slices with 1 oz slice low-fat cheese  - Apple slices dipped in 2 Tbsp of PB2  - celery, cucumber, bell pepper or baby carrots dipped in ¼ cup hummus bean spread or light spinach and artichoke dip (*recipe in lunch section)  - celery, cucumber, baby carrots dipped in high protein greek yogurt (Mix 16 oz plain greek yogurt + 1 packet of hidden valley ranch dip mix)  - Barrie Links Beef Steak - 14g protein! (similar to beef jerky)  - 2 wedges Laughing Cow - Light Herb  & Garlic Cheese with sliced cucumber or green bell pepper  - 1/2 cup low-fat cottage cheese with ¼ cup fruit or ¼ cup salsa  - RTD Protein drinks: Atkins, Low Carb Slim Fast, EAS light, Muscle Milk Light, etc.  - Homemade Protein drinks: GNC Soy95, Isopure, Nectar, UNJURY, Whey Gourmet, etc. Mix 1 scoop powder with 8oz skim/1% milk or light soymilk.  - Protein bars: Atkins, EAS, Pure Protein, Think Thin, Detour, etc. Must have 0-4 grams sugar - Read the label.    Takeout Options: No more than twice/week  Deli - Salads (no pasta or rice), meats, cheeses. Roasted chicken. Lox (salmon)    Mexican - Platters which don't include tortillas, chips, or rice. Go easy on the beans. Example: Fajitas without the tortillas. Ask the  not to bring chips to the table if they are too tempting.    Greek - Meat or fish and vegetable, but no bread or rice. Including hummus, baba ganoush, etc, is OK. Most sit-down Greek restaurants can provide you with cucumber slices for dipping instead of colby bread.    Fast Food (Avoid as much as possible) - Salads (no croutons and limit salad dressing to 2 tbsp), grilled chicken sandwich without the bun and ask for no holland. Jennifers low fat chili or Taco Bell pintos and cheese.    BBQ - The meats are fine if you ask for sauces on the side, but most of the traditional side dishes are loaded with carbs. Fletcher slaw, baked beans and BBQ sauce are typically made with sugar.    Chinese - Nothing deep-fried, no rice or noodles. Many Chinese sauces have starch and sugar in them, so you'll have to use your judgement. If you find that these sauces trigger cravings, or cause Dumping, you can ask for the sauce to be made without sugar or just use soy sauce.

## 2024-01-24 NOTE — PROGRESS NOTES
BARIATRIC POST-OPERATIVE VISIT:    HPI:  Africa Jennings is a 53 y.o. year old female presents for 8 week post op visit following s/p sleeve.  she is doing well and tolerating the diet without difficulty.  she has no complaints.    Pt states that she is doing well  States that she is unable to exercise as much as she can because of the cold and she recently had back surgery so she is on lifting limits  Needs new meal plan getting bored with eating         Denies: nausea, vomiting, abdominal pain, changes in bowel movement pattern, fever, chills, dysphagia, chest pain, and shortness of breath.    Review of Systems   Constitutional:  Negative for appetite change, fatigue and fever.   HENT:  Negative for ear discharge, tinnitus and trouble swallowing.    Eyes:  Negative for visual disturbance.   Respiratory:  Negative for cough, choking, chest tightness and shortness of breath.    Gastrointestinal:  Negative for abdominal pain, constipation, diarrhea, nausea and vomiting.   Genitourinary:  Negative for decreased urine volume and hematuria.   Skin:  Negative for rash.   Neurological:  Negative for dizziness, light-headedness and headaches.   Psychiatric/Behavioral:  Negative for dysphoric mood, sleep disturbance and suicidal ideas. The patient is not nervous/anxious.        EXERCISE & VITAMINS:  See Bariatric Assessment  Adherent to vitamin regimen   MEDICATIONS/ALLERGIES:  Have been reviewed.    DIET:  See Dietician note from today for a more detailed assessment.    2 shakes and 1 home made shake   Black beans  Red beans  Black beans  Physical Exam  Constitutional:       Appearance: She is obese.   HENT:      Head: Normocephalic and atraumatic.   Eyes:      Extraocular Movements: Extraocular movements intact.      Conjunctiva/sclera: Conjunctivae normal.      Pupils: Pupils are equal, round, and reactive to light.   Cardiovascular:      Rate and Rhythm: Normal rate and regular rhythm.      Pulses: Normal  pulses.      Heart sounds: Normal heart sounds. No murmur heard.  Pulmonary:      Effort: Pulmonary effort is normal. No respiratory distress.      Breath sounds: Normal breath sounds.   Abdominal:      General: Bowel sounds are normal.      Palpations: Abdomen is soft.      Tenderness: There is no abdominal tenderness.   Musculoskeletal:      Cervical back: Normal range of motion and neck supple.   Skin:     Capillary Refill: Capillary refill takes less than 2 seconds.   Neurological:      General: No focal deficit present.      Mental Status: She is alert and oriented to person, place, and time.   Psychiatric:         Mood and Affect: Mood normal.         Behavior: Behavior normal.         Thought Content: Thought content normal.         Judgment: Judgment normal.         ASSESSMENT:  - Morbid obesity s/p sleeve gastrectomy   - Co-morbidities: diabetes mellitus, GERD, hypertension, and obstructive sleep apnea  - Good  Weight loss, 28#'s and 30% EWL  - Great Exercise routine  - Good Diet  - Good Vitamin regimen    PLAN:  - No lifting more than 10 lbs for start/continue weeks  - Miralax daily for constipation  - Emphasized the importance of regular exercise and adherence to bariatric diet to achieve maximum weight loss.  - Encouraged patient to start/continue regular exercise.  - Follow-up with dietician to advance diet.  - Continue daily vitamins and medications.  - RTC in 2 weeks or sooner if needed.  - Call the office for any issues.  - Check labs today.

## 2024-01-24 NOTE — PROGRESS NOTES
NUTRITION NOTE    Referring Physician: Roge Rich M.D.  Reason for MNT Referral: Follow-up 6 months s/p Gastric Sleeve    Denies nausea, vomiting, constipation, and diarrhea.  Reports doing well.    Past Medical History:   Diagnosis Date    Diabetes mellitus     Diabetes mellitus, type 2     Eczema     GERD (gastroesophageal reflux disease)     Hypertension     Impaired fasting blood sugar     YSABEL on CPAP        CLINICAL DATA:  53 y.o. female.    Current Weight: 194 lbs  BMI: 35.5  Total Weight Loss: 28 lbs  Excess Weight Loss: 30%    LABS:  Reviewed.    CURRENT DIET:  Bariatric Diet.  Diet Recall: 60-80 grams of protein/day; 48+ oz of fluids/day    B: 1-2 eggs, turkey sausage alejandro  - Premier shake  L: tuna salad (light holland and celery) wrapped in a Misael lettuce leaf  - pineapple chunks frozen  D: 2oz salmon, 1/4 cup cooked spinach  - Skinny Pop popcorn 1 cup    Meal Pattern: 3 meal(s) + 1-2 snack(s) + 1 protein supplement(s)  Adequate protein supplement intake.  Adequate dairy intake.  Adequate vegetable intake. Tolerates raw vegetables and lettuce.  Adequate fruit intake.    EXERCISE:  Walking outdoors 1 hour, resistance bands, 5 lb weights for arms    Restrictions to Exercise: bad weather    VITAMINS / MINERALS:  Opurity Bariatric MV with iron and 50mg Thiamine and 500mcg B12, 2/day  Opurity Ca Citrate +D chewables 4/day    ASSESSMENT:  Doing well overall.  Weight loss.  Adequate calorie intake.  Adequate protein intake.  Adequate fluid intake.  Following diet appropriately.  Exercising.  Adequate vitamins & minerals.    BARIATRIC DIET DISCUSSION:  Instructed and provided written materials on bariatric diet plan.  Reinforced post-op nutrition guidelines.    PLAN / RECOMMENDATIONS:  Continue excellent diet plan.  Maintain protein intake.  Maintain fluid intake.  Continue exercise.  Continue appropriate vitamins & minerals.    Return to clinic in 6 months.    SESSION TIME: 15 minutes

## 2024-01-25 NOTE — TELEPHONE ENCOUNTER
Patient had labs done on 1/24/24 - it should be everything I needed EXCEPT for the A1c, which I just ordered.    Please call lab and see if they can do the A1c as an add-on, so patient doesn't have to get labs redrawn. If it can not be an add-on - then patient will need to get a new lab appt just for the A1c.    Please update patient either way.    Thanks

## 2024-01-25 NOTE — TELEPHONE ENCOUNTER
Call was placed to lab for adding of A1c. Staff verbalized they would be able to complete withiout having patient return for 2nd draw. Understanding and thank you verbalized.

## 2024-01-29 DIAGNOSIS — E11.9 TYPE 2 DIABETES MELLITUS WITHOUT COMPLICATION, WITHOUT LONG-TERM CURRENT USE OF INSULIN: ICD-10-CM

## 2024-01-29 DIAGNOSIS — L64.9 ANDROGENETIC ALOPECIA: ICD-10-CM

## 2024-01-29 RX ORDER — SEMAGLUTIDE 1.34 MG/ML
1 INJECTION, SOLUTION SUBCUTANEOUS
Qty: 9 EACH | Refills: 1 | Status: SHIPPED | OUTPATIENT
Start: 2024-01-29

## 2024-01-30 RX ORDER — METFORMIN HYDROCHLORIDE 500 MG/1
500 TABLET, EXTENDED RELEASE ORAL 2 TIMES DAILY WITH MEALS
Qty: 180 TABLET | Refills: 1 | OUTPATIENT
Start: 2024-01-30

## 2024-01-30 NOTE — TELEPHONE ENCOUNTER
No care due was identified.  Health [facility] Embedded Care Due Messages. Reference number: [id number].   1/29/2024 6:26:02 PM CST

## 2024-01-30 NOTE — TELEPHONE ENCOUNTER
Refill Decision Note   Africa Jennings  is requesting a refill authorization.  Brief Assessment and Rationale for Refill:  Quick Discontinue     Medication Therapy Plan: Pt no longer taking Metformin since 8/2023      Comments:     Note composed:2:26 PM 01/30/2024

## 2024-01-30 NOTE — TELEPHONE ENCOUNTER
Refill Decision Note   Africa Eldersantino  is requesting a refill authorization.  Brief Assessment and Rationale for Refill:  Approve     Medication Therapy Plan:         Comments:     Note composed:8:22 PM 01/29/2024

## 2024-01-30 NOTE — TELEPHONE ENCOUNTER
Please see the attached refill request.  Per JAM directive to disperse Dr. Valdez refill requests amongst all providers.  Thanks, CHICHO Mcclellan    Patient last seen on 01/02/2024.    Assessment / Plan:         Scarring alopecia  -     KS [id number] ACETONIDE INJ PER 10MG  -     triamcinolone acetonide injection 10 mg  -     KS INJECTION, SKIN LESIONS, 8 OR MORE  Seborrheic dermatitis  - continue keto shampoo to scalp and keto cream   Traction alopecia  - continue minoxidil  Androgenetic alopecia/ Z79.899  - continue spironolactone 100 mg - okay by Dr. Thompson  Checked CMP 9/2023, repeat in 6 mo to 1 year  Discussed benefits and risks of therapy including but not limited to breakthrough bleeding, breast tenderness, and elevated potassium levels which may give symptoms of fatigue, palpitations, and nausea. Patient should limit potassium intake - avoid potassium supplements or salt substitutes, limit bananas and citrus fruits. Pregnancy must be avoided while taking spironolactone.              No follow-ups on file.     Intralesional Kenalog 5mg/cc (2 cc total) injected into 8 lesions on the scalp today after obtaining verbal consent including risk of surrounding hypopigmentation. Patient tolerated procedure well.  Units: 1  NDC for Kenalog 10mg/cc:  [id number]      Electronically signed by Basilia Valdez MD at 1/2/2024  2:28 PM

## 2024-01-30 NOTE — TELEPHONE ENCOUNTER
No care due was identified.  Health [facility] Embedded Care Due Messages. Reference number: [id number].   1/29/2024 6:27:02 PM CST

## 2024-01-31 RX ORDER — FINASTERIDE 5 MG/1
5 TABLET, FILM COATED ORAL
Qty: 90 TABLET | Refills: 3 | Status: SHIPPED | OUTPATIENT
Start: 2024-01-31

## 2024-02-02 ENCOUNTER — PATIENT MESSAGE (OUTPATIENT)
Dept: PAIN MEDICINE | Facility: CLINIC | Age: 54
End: 2024-02-02
Payer: COMMERCIAL

## 2024-02-02 ENCOUNTER — PATIENT MESSAGE (OUTPATIENT)
Dept: NEUROSURGERY | Facility: CLINIC | Age: 54
End: 2024-02-02
Payer: COMMERCIAL

## 2024-02-11 ENCOUNTER — PATIENT MESSAGE (OUTPATIENT)
Dept: ADMINISTRATIVE | Facility: OTHER | Age: 54
End: 2024-02-11
Payer: COMMERCIAL

## 2024-02-14 ENCOUNTER — PATIENT MESSAGE (OUTPATIENT)
Dept: BARIATRICS | Facility: CLINIC | Age: 54
End: 2024-02-14
Payer: COMMERCIAL

## 2024-02-19 ENCOUNTER — OFFICE VISIT (OUTPATIENT)
Dept: FAMILY MEDICINE | Facility: CLINIC | Age: 54
End: 2024-02-19
Payer: COMMERCIAL

## 2024-02-19 VITALS
BODY MASS INDEX: 35.88 KG/M2 | HEART RATE: 95 BPM | HEIGHT: 62 IN | TEMPERATURE: 98 F | OXYGEN SATURATION: 97 % | DIASTOLIC BLOOD PRESSURE: 70 MMHG | SYSTOLIC BLOOD PRESSURE: 100 MMHG | WEIGHT: 195 LBS

## 2024-02-19 DIAGNOSIS — E11.69 HYPERLIPIDEMIA ASSOCIATED WITH TYPE 2 DIABETES MELLITUS: ICD-10-CM

## 2024-02-19 DIAGNOSIS — G47.33 OSA ON CPAP: ICD-10-CM

## 2024-02-19 DIAGNOSIS — Z23 INFLUENZA VACCINE NEEDED: ICD-10-CM

## 2024-02-19 DIAGNOSIS — Z00.00 ANNUAL PHYSICAL EXAM: Primary | ICD-10-CM

## 2024-02-19 DIAGNOSIS — I10 HYPERTENSION, UNSPECIFIED TYPE: ICD-10-CM

## 2024-02-19 DIAGNOSIS — M54.16 LUMBAR RADICULOPATHY: ICD-10-CM

## 2024-02-19 DIAGNOSIS — Z98.84 S/P BARIATRIC SURGERY: ICD-10-CM

## 2024-02-19 DIAGNOSIS — M51.36 DDD (DEGENERATIVE DISC DISEASE), LUMBAR: ICD-10-CM

## 2024-02-19 DIAGNOSIS — Z23 NEED FOR PNEUMOCOCCAL VACCINATION: ICD-10-CM

## 2024-02-19 DIAGNOSIS — E11.9 TYPE 2 DIABETES MELLITUS WITHOUT COMPLICATION, WITHOUT LONG-TERM CURRENT USE OF INSULIN: ICD-10-CM

## 2024-02-19 DIAGNOSIS — K21.9 GASTROESOPHAGEAL REFLUX DISEASE WITHOUT ESOPHAGITIS: Chronic | ICD-10-CM

## 2024-02-19 DIAGNOSIS — E66.9 OBESITY (BMI 30-39.9): ICD-10-CM

## 2024-02-19 DIAGNOSIS — E78.5 HYPERLIPIDEMIA ASSOCIATED WITH TYPE 2 DIABETES MELLITUS: ICD-10-CM

## 2024-02-19 DIAGNOSIS — M47.816 LUMBAR SPONDYLOSIS: ICD-10-CM

## 2024-02-19 PROCEDURE — 3008F BODY MASS INDEX DOCD: CPT | Mod: CPTII,S$GLB,, | Performed by: FAMILY MEDICINE

## 2024-02-19 PROCEDURE — 90472 IMMUNIZATION ADMIN EACH ADD: CPT | Mod: S$GLB,,, | Performed by: FAMILY MEDICINE

## 2024-02-19 PROCEDURE — 3074F SYST BP LT 130 MM HG: CPT | Mod: CPTII,S$GLB,, | Performed by: FAMILY MEDICINE

## 2024-02-19 PROCEDURE — 1159F MED LIST DOCD IN RCRD: CPT | Mod: CPTII,S$GLB,, | Performed by: FAMILY MEDICINE

## 2024-02-19 PROCEDURE — 90677 PCV20 VACCINE IM: CPT | Mod: S$GLB,,, | Performed by: FAMILY MEDICINE

## 2024-02-19 PROCEDURE — 99999 PR PBB SHADOW E&M-EST. PATIENT-LVL V: CPT | Mod: PBBFAC,,, | Performed by: FAMILY MEDICINE

## 2024-02-19 PROCEDURE — 99396 PREV VISIT EST AGE 40-64: CPT | Mod: 25,S$GLB,, | Performed by: FAMILY MEDICINE

## 2024-02-19 PROCEDURE — 90686 IIV4 VACC NO PRSV 0.5 ML IM: CPT | Mod: S$GLB,,, | Performed by: FAMILY MEDICINE

## 2024-02-19 PROCEDURE — 90471 IMMUNIZATION ADMIN: CPT | Mod: S$GLB,,, | Performed by: FAMILY MEDICINE

## 2024-02-19 PROCEDURE — 3078F DIAST BP <80 MM HG: CPT | Mod: CPTII,S$GLB,, | Performed by: FAMILY MEDICINE

## 2024-02-19 PROCEDURE — 2023F DILAT RTA XM W/O RTNOPTHY: CPT | Mod: CPTII,S$GLB,, | Performed by: FAMILY MEDICINE

## 2024-02-19 PROCEDURE — 3044F HG A1C LEVEL LT 7.0%: CPT | Mod: CPTII,S$GLB,, | Performed by: FAMILY MEDICINE

## 2024-02-19 PROCEDURE — 1160F RVW MEDS BY RX/DR IN RCRD: CPT | Mod: CPTII,S$GLB,, | Performed by: FAMILY MEDICINE

## 2024-02-19 NOTE — PROGRESS NOTES
"  Physical Exam  /70 (BP Location: Right arm, Patient Position: Sitting, BP Method: Large (Automatic))   Pulse 95   Temp 98 °F (36.7 °C) (Oral)   Ht 5' 2" (1.575 m)   Wt 88.5 kg (195 lb)   LMP  (LMP Unknown)   SpO2 97%   BMI 35.67 kg/m²      Office Visit    Patient Name: Africa Jennings    : 1970  MRN: [id number]      Assessment/Plan:  Africa Jennings is a 54 y.o. female who presents today for :    Annual physical exam  -previous labs reviewed and discussed with patient  -anticipatory guidance provided with age appropriate preventative services discussed, healthy diet and regular physical exercise also discussed with patient    Type 2 diabetes mellitus without complication, without long-term current use of insulin  HLD associated with diabetes  -previous A1c reviewed:   Lab Results   Component Value Date    HGBA1C 6.2 (H) 2024     -well controlled, continue current medication regimen  -reviewed with patient about routine diabetic care  -weight loss and regular physical excercise    Hypertension, unspecified type  Obesity (BMI 30-39.9)  S/P bariatric surgery  -continue current medication regimen  -DASH diet, regular cardiovascular exercises  -weight loss    Gastroesophageal reflux disease without esophagitis  YSABEL on CPAP    Lumbar radiculopathy  DDD (degenerative disc disease), lumbar  Lumbar spondylosis  -stable, continue current management and f/u schedule per Pain          Follow up 6mo    This note was created by combination of typed  and MModal dictation.  Transcription errors may be present.  If there are any questions, please contact me.        ----------------------------------------------------------------------------------------------------------------------      HPI:  Patient Care Team:  Brandi Thompson MD as PCP - General (Family Medicine)  Roge Hudson MD as Consulting Physician (Ophthalmology)  Brandi Thompson MD as Hypertension Digital " Medicine Responsible Provider (Family Medicine)  Angelica Kyle as Digital Medicine Health   Suhail Martin, [place] as Hypertension Digital Medicine Clinician (Pharmacist)  Bert Krishnan MA as Care Coordinator    Africa is a 54 y.o. female with      Patient Active Problem List   Diagnosis    Essential hypertension    Diabetes mellitus    YSABEL on CPAP    Morbid obesity    Gastroesophageal reflux disease without esophagitis    Pyloric stenosis    Encounter for screening colonoscopy    Lumbar radiculopathy    Lumbar spondylosis    DDD (degenerative disc disease), lumbar    Pneumonia of both lungs due to infectious organism    Acute respiratory failure with hypoxemia    Thoracic myelopathy    Obesity    Hypertension    S/P bariatric surgery       This patient is new to me     Africa presents today for:  Annual Exam (Flu vac,and pneumococcal vac.)      Her last follow up with her PCP was about 6 months ago - she has been doing well without any new changes in health status. Health maintenance-wise, she is due for routine vaccines.  She is up to date on colon cancer screening/MMG/Pap screening.  Her diabetes and HTN are controlled and she is compliant with her current medication regimen daily without any adverse side effects. She denies any cardiovascular or neurologic complaints today.          Additional ROS    CONST: no fever, no activity change, weight stable.   EYES: no vision change.   ENT: no sore throat. No dysphagia.   CV: no CP with exertion  RESP: no SOB  GI: no N/V/diarrhea/constipation  : no urinary concerns  MSK: no new myalgias or arthralgias.   SKIN: no new rashes  NEURO: no focal deficits.   PSYCH: no new issues.   ENDOCRINE: no polyuria.           Current Medications  Medications reviewed and updated.       Current Outpatient Medications:     acetaminophen (TYLENOL) 500 MG tablet, Take 2 tablets (1,000 mg total) by mouth every 8 (eight) hours as needed for Pain., Disp: 30 tablet,  Rfl: 0    atorvastatin (LIPITOR) 20 MG tablet, TAKE 1 TABLET BY MOUTH EVERY DAY, Disp: 90 tablet, Rfl: 3    ergocalciferol (ERGOCALCIFEROL) 50,000 unit Cap, TAKE 2 CAPSULES BY MOUTH TWICE A WEEK, Disp: 48 capsule, Rfl: 1    estradioL (ESTRACE) 1 MG tablet, TAKE 1 TABLET BY MOUTH EVERY DAY, Disp: 90 tablet, Rfl: 5    finasteride (PROSCAR) 5 mg tablet, TAKE 1 TABLET BY MOUTH EVERY DAY, Disp: 90 tablet, Rfl: 3    fluocinonide (LIDEX) 0.05 % ointment, Apply to affected areas of scalp at bedtime, Disp: 60 g, Rfl: 3    ketoconazole (NIZORAL) 2 % shampoo, Wash hair with medicated shampoo at least 2x/week - let sit on scalp at least 5 minutes prior to rinsing, Disp: 120 mL, Rfl: 5    mometasone (ELOCON) 0.1 % solution, APPLY TOPICALLY ONCE DAILY. TO THINNING AREA OF SCALP, Disp: 60 mL, Rfl: 5    neomycin-polymyxin-dexamethasone (MAXITROL) 3.5mg/mL-10,000 unit/mL-0.1 % DrpS, as needed., Disp: , Rfl:     nystatin (MYCOSTATIN) ointment, Apply topically 2 (two) times daily. USES PRN, Disp: , Rfl:     omeprazole (PRILOSEC) 40 MG capsule, TAKE 1 CAPSULE BY MOUTH EVERY MORNING. OPEN CAPSULE AND TAKE WITH APPLE SAUCE, Disp: 90 capsule, Rfl: 1    ondansetron (ZOFRAN) 4 MG tablet, TAKE 1 TABLET BY MOUTH EVERY 8 HOURS AS NEEDED, Disp: 90 tablet, Rfl: 1    ondansetron (ZOFRAN-ODT) 8 MG TbDL, Take 1 tablet (8 mg total) by mouth every 6 (six) hours as needed., Disp: 30 tablet, Rfl: 0    OZEMPIC 1 mg/dose (4 mg/3 mL), INJECT 1 MG INTO THE SKIN EVERY 7 DAYS, Disp: 9 each, Rfl: 1    prednisoLONE acetate (PRED FORTE) 1 % DrpS, USES PRN, Disp: , Rfl:     spironolactone (ALDACTONE) 50 MG tablet, Take 1 tablet (50 mg total) by mouth once daily., Disp: 30 tablet, Rfl: 11    tiZANidine (ZANAFLEX) 4 MG tablet, TAKE 1 TABLET BY MOUTH NIGHTLY AS NEEDED (MUSCLE SPASM)., Disp: 90 tablet, Rfl: 2    triamcinolone acetonide 0.1% (KENALOG) 0.1 % cream, Apply topically 2 (two) times daily. Apply topically 2 (two) times daily., Disp: 135 g, Rfl: 4     valsartan-hydrochlorothiazide (DIOVAN-HCT) 160-12.5 mg per tablet, Take 1 tablet by mouth once daily., Disp: 90 tablet, Rfl: 1    doxycycline monohydrate 100 mg Tab, Take 1 tablet by mouth 2 (two) times daily., Disp: , Rfl:     pregabalin (LYRICA) 75 MG capsule, Take 1 capsule (75 mg total) by mouth 2 (two) times daily., Disp: 60 capsule, Rfl: 6    Current Facility-Administered Medications:     triamcinolone acetonide injection 10 mg, 10 mg, Intradermal, Once, Basilia Valdez MD    triamcinolone acetonide injection 10 mg, 10 mg, Intradermal, Once, Basilia Valdez MD    Past Surgical History:   Procedure Laterality Date    breast reduction      CHOLECYSTECTOMY      laprascopic    DECOMPRESSION OF THORACIC OUTLET N/A 10/28/2022    Procedure: T9-12 ROBOTIC DECOMPRESSION, THORACIC FUSION;  Surgeon: Edouard Whitt DO;  Location: [facility] OR [address] [place] [address];  Service: Neurosurgery;  Laterality: N/A;  ANESTHESIA GENERAL [place] I BLOOD TYPE & SCREEN NERVE MONITORING EMG SEP MEP POSTION PRONE BED CARA 4 POST HEAD REST [facility] RADIOLOGY C-ARM GLOBUS PROTOCOL MISCELLANEOUS ALANIZ BRACE TLSO    EPIDURAL STEROID INJECTION N/A 1/11/2022    Procedure: INJECTION, STEROID, EPIDURAL IL L4/5 NEEDS CONSENT;  Surgeon: Britt Calix MD;  Location: [facility], [place], operated by [facility]IN [facility]GT;  Service: Pain Management;  Laterality: N/A;    ESOPHAGOGASTRODUODENOSCOPY  8/18/14    HYSTERECTOMY  2007    laprascopic, total for fibroids.  Dr Polo    INJECTION OF JOINT Left 2/8/2022    Procedure: INJECTION, JOINT, SI LEFT NEEDS CONSENT;  Surgeon: Britt Calix MD;  Location: [facility], [place], operated [facility];  Service: Pain Management;  Laterality: Left;    INJECTION OF JOINT Bilateral 10/31/2023    Procedure: INJECTION, JOINT BILATERAL GTB;  Surgeon: Britt Calix MD;  Location: [facility], [place], operated by [facility];  Service: Pain Management;  Laterality: Bilateral;    OOPHORECTOMY      ROBOT-ASSISTED LAPAROSCOPIC SLEEVE GASTRECTOMY USING DA DEVI XI N/A 7/26/2023    Procedure:  XI ROBOTIC SLEEVE GASTRECTOMY with intraop EGD;  Surgeon: Roge Rich MD;  [facility];  Service: General;  Laterality: N/A;  with possible HHR    TOTAL REDUCTION MAMMOPLASTY      TRANSFORAMINAL EPIDURAL INJECTION OF STEROID Right 9/26/2023    Procedure: INJECTION, STEROID, EPIDURAL, TRANSFORAMINAL APPROACH, RIGHT L4/L5 AND L5/S1 SOONER DATE;  Surgeon: Britt Calix MD;  Location: [facility] PAIN MGT;  Service: Pain Management;  Laterality: Right;       Family History   Problem Relation Age of Onset    Diabetes Mother     Hypertension Mother     Heart disease Mother     Cancer Father 58        Prostate    Crohn's disease Sister     Breast cancer Sister     Diabetes Maternal Grandmother     Heart disease Maternal Grandmother     Hypertension Maternal Grandmother     Amblyopia Neg Hx     Blindness Neg Hx     Cataracts Neg Hx     Glaucoma Neg Hx     Macular degeneration Neg Hx     Retinal detachment Neg Hx     Strabismus Neg Hx        Social History     Socioeconomic History    Marital status:    Tobacco Use    Smoking status: Never    Smokeless tobacco: Never   Substance and Sexual Activity    Alcohol use: Never    Drug use: No    Sexual activity: Yes     Partners: Male     Birth control/protection: Surgical   Social History Narrative    Was  since 2000.      Has a friend since 2016.    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     Social Determinants of Health     Financial Resource Strain: Medium Risk (1/22/2024)    Overall Financial Resource Strain (CARDIA)     Difficulty of Paying Living Expenses: Somewhat hard   Food Insecurity: No Food Insecurity (1/22/2024)    Hunger Vital Sign     Worried About Running Out of Food in the Last Year: Never true     Ran Out of Food in the Last Year: Never true   Transportation Needs: No Transportation Needs (1/22/2024)    PRAPARE - Transportation     Lack of Transportation (Medical): No     Lack of Transportation  "(Non-Medical): No   Physical Activity: Insufficiently Active (1/22/2024)    Exercise Vital Sign     Days of Exercise per Week: 1 day     Minutes of Exercise per Session: 30 min   Stress: No Stress Concern Present (1/22/2024)    [place] [employer] - Occupational Stress Questionnaire     Feeling of Stress : Not at all   Social Connections: Unknown (1/22/2024)    Social Connection and Isolation Panel [NHANES]     Frequency of Communication with Friends and Family: More than three times a week     Frequency of Social Gatherings with Friends and Family: Twice a week     Active Member of Clubs or Organizations: No     Attends Club or Organization Meetings: Never     Marital Status:    Housing Stability: Low Risk  (1/22/2024)    Housing Stability Vital Sign     Unable to Pay for Housing in the Last Year: No     Number of Places Lived in the Last Year: 1     Unstable Housing in the Last Year: No           Allergies   Review of patient's allergies indicates:  No Known Allergies          Review of Systems  See HPI      [unfilled]  /70 (BP Location: Right arm, Patient Position: Sitting, BP Method: Large (Automatic))   Pulse 95   Temp 98 °F (36.7 °C) (Oral)   Ht 5' 2" (1.575 m)   Wt 88.5 kg (195 lb)   LMP  (LMP Unknown)   SpO2 97%   BMI 35.67 kg/m²     GEN: NAD, well developed, pleasant, well nourished  HEENT: NCAT, PERRLA, EOMI, sclera clear, anicteric, bilateral ear exam wnl, O/P clear, MMM with no lesions  NECK: normal, supple with midline trachea, no LAD, no thyromegaly  LUNGS: CTAB, no w/r/r, no increased work of breathing   HEART: RRR, normal S1 and S2, no m/r/g, no edema  ABD: s/nt/nd, NABS  SKIN: normal turgor, no rashes  PSYCH: AOx3, appropriate mood and affect  MSK: warm/well perfused, normal ROM in all extremities, no c/c/e.  NEURO: normal without focal findings, CN II-XII are grossly intact.      FOOT:  Protective Sensation (w/ 10 gram monofilament):  Right: Intact  Left: " Intact    Visual Inspection:  Normal -  Bilateral    Pedal Pulses:   Right: Present  Left: Present    Posterior Tibialis Pulses:   Right:Present  Left: Present            Labs  Lab Results   Component Value Date    HGBA1C 6.2 (H) 01/24/2024     Lab Results   Component Value Date     01/24/2024    K 4.1 01/24/2024     01/24/2024    CO2 28 01/24/2024    BUN 20 01/24/2024    CREATININE 1.0 01/24/2024    CALCIUM 10.6 (H) 01/24/2024    ANIONGAP 9 01/24/2024    ESTGFRAFRICA >60.0 04/25/2022    EGFRNONAA >60.0 04/25/2022     Lab Results   Component Value Date    CHOL 133 01/24/2024    CHOL 119 (L) 09/20/2023    CHOL 136 07/05/2023     Lab Results   Component Value Date    HDL 44 01/24/2024    HDL 43 09/20/2023    HDL 43 07/05/2023     Lab Results   Component Value Date    LDLCALC 75.4 01/24/2024    LDLCALC 65.0 09/20/2023    LDLCALC 76.2 07/05/2023     Lab Results   Component Value Date    TRIG 68 01/24/2024    TRIG 55 09/20/2023    TRIG 84 07/05/2023     Lab Results   Component Value Date    CHOLHDL 33.1 01/24/2024    CHOLHDL 36.1 09/20/2023    CHOLHDL 31.6 07/05/2023     Last set of blood work has been reviewed as noted above.

## 2024-02-20 ENCOUNTER — PATIENT MESSAGE (OUTPATIENT)
Dept: BARIATRICS | Facility: CLINIC | Age: 54
End: 2024-02-20
Payer: COMMERCIAL

## 2024-02-26 ENCOUNTER — PATIENT MESSAGE (OUTPATIENT)
Dept: PAIN MEDICINE | Facility: CLINIC | Age: 54
End: 2024-02-26
Payer: COMMERCIAL

## 2024-02-27 ENCOUNTER — OFFICE VISIT (OUTPATIENT)
Dept: PODIATRY | Facility: CLINIC | Age: 54
End: 2024-02-27
Payer: COMMERCIAL

## 2024-02-27 VITALS
HEIGHT: 62 IN | SYSTOLIC BLOOD PRESSURE: 119 MMHG | DIASTOLIC BLOOD PRESSURE: 76 MMHG | HEART RATE: 78 BPM | BODY MASS INDEX: 35.91 KG/M2 | WEIGHT: 195.13 LBS

## 2024-02-27 DIAGNOSIS — M20.42 HAMMER TOES OF BOTH FEET: ICD-10-CM

## 2024-02-27 DIAGNOSIS — M21.6X1 ACQUIRED EQUINUS DEFORMITY OF BOTH FEET: ICD-10-CM

## 2024-02-27 DIAGNOSIS — M21.6X2 ACQUIRED EQUINUS DEFORMITY OF BOTH FEET: ICD-10-CM

## 2024-02-27 DIAGNOSIS — M20.41 HAMMER TOES OF BOTH FEET: ICD-10-CM

## 2024-02-27 DIAGNOSIS — M20.5X2 HALLUX LIMITUS, ACQUIRED, LEFT: ICD-10-CM

## 2024-02-27 DIAGNOSIS — M20.5X1 HALLUX LIMITUS, ACQUIRED, RIGHT: ICD-10-CM

## 2024-02-27 DIAGNOSIS — E11.9 COMPREHENSIVE DIABETIC FOOT EXAMINATION, TYPE 2 DM, ENCOUNTER FOR: Primary | ICD-10-CM

## 2024-02-27 PROCEDURE — 3008F BODY MASS INDEX DOCD: CPT | Mod: CPTII,S$GLB,, | Performed by: PODIATRIST

## 2024-02-27 PROCEDURE — 3078F DIAST BP <80 MM HG: CPT | Mod: CPTII,S$GLB,, | Performed by: PODIATRIST

## 2024-02-27 PROCEDURE — 99213 OFFICE O/P EST LOW 20 MIN: CPT | Mod: S$GLB,,, | Performed by: PODIATRIST

## 2024-02-27 PROCEDURE — 1159F MED LIST DOCD IN RCRD: CPT | Mod: CPTII,S$GLB,, | Performed by: PODIATRIST

## 2024-02-27 PROCEDURE — 1160F RVW MEDS BY RX/DR IN RCRD: CPT | Mod: CPTII,S$GLB,, | Performed by: PODIATRIST

## 2024-02-27 PROCEDURE — 99999 PR PBB SHADOW E&M-EST. PATIENT-LVL V: CPT | Mod: PBBFAC,,, | Performed by: PODIATRIST

## 2024-02-27 PROCEDURE — 3044F HG A1C LEVEL LT 7.0%: CPT | Mod: CPTII,S$GLB,, | Performed by: PODIATRIST

## 2024-02-27 PROCEDURE — 3074F SYST BP LT 130 MM HG: CPT | Mod: CPTII,S$GLB,, | Performed by: PODIATRIST

## 2024-02-27 NOTE — PATIENT INSTRUCTIONS
Over the counter pain creams: Voltaren Gel, Biofreeze, Bengay, tiger balm, two old goat, lidocaine gel,  Absorbine Veterinary Liniment Gel Topical Analgesic Sore Muscle and Joint Pain Relief    Recommend lotions: eucerin, eucerin for diabetics, aquaphor, A&D ointment, gold bond for diabetics, sween, [place]'s Bees all purpose baby ointment,  urea 40 with aloe or SkinIntegra rapid crack repair (found on amazon.com)    Shoe recommendations: (try 6pm.com, zappos.com , nordstromrack.SavvySource for Parents, or shoes.com for discounted prices) you can visit varsity shoes in [place], DSW shoes in [place]  or ben rack in the [facility] (there are also several shoe brand outlets in the [facility])    ONLY purchase stability style tennis shoes NO flex, foam, free, yoga mat style shoes    Shoe examples:    Asics (GT 4000 or gel foundations), new balance stability type shoes (such as the 940 series), saucony (stabil c3),  Foley (GTS or Beast or   transcend), propet, HokaOne (tennis shoe) Charles (tennis shoes and boots)    Kathrynft Jos (women) Geraldine&Geoff (men), clarks, crocs, aerosoles, naturalizers, SAS, ecco, born, elisabet clements, rockports (dress shoes)    Vionic, burkenstocks, fitflops, propet, taos, baretraps (sandals)    HokaOne sandals, crocs, propet (house shoes)      Nail Home remedy:  Vicks Vapor rub or Emuaid to nails for easier manageability    Diabetes: Inspecting Your Feet  Diabetes increases your chances of developing foot problems. So inspect your feet every day. This helps you find small skin irritations before they become serious infections. If you have trouble seeing the bottoms of your feet, use a mirror or ask a family member or friend to help.     Pressure spots on the bottom of the foot are common areas where problems develop.   How to check your feet  Below are tips to help you look for foot problems. Try to check your feet at the same time each day, such as when you get out of bed in the morning:  Check the top of  each foot. The tops of toes, back of the heel, and outer edge of the foot can get a lot of rubbing from poor-fitting shoes.  Check the bottom of each foot. Daily wear and tear often leads to problems at pressure spots.  Check the toes and nails. Fungal infections often occur between toes. Toenail problems can also be a sign of fungal infections or lead to breaks in the skin.  Check your shoes, too. Loose objects inside a shoe can injure the foot. Use your hand to feel inside your shoes for things like damon, loose stitching, or rough areas that could irritate your skin.  Warning signs  Look for any color changes in the foot. Redness with streaks can signal a severe infection, which needs immediate medical attention. Tell your doctor right away if you have any of these problems:  Swelling, sometimes with color changes, may be a sign of poor blood flow or infection. Symptoms include tenderness and an increase in the size of your foot.  Warm or hot areas on your feet may be signs of infection. A foot that is cold may not be getting enough blood.  Sensations such as burning, tingling, or pins and needles can be signs of a problem. Also check for areas that may be numb.  Hot spots are caused by friction or pressure. Look for hot spots in areas that get a lot of rubbing. Hot spots can turn into blisters, calluses, or sores.  Cracks and sores are caused by dry or irritated skin. They are a sign that the skin is breaking down, which can lead to infection.  Toenail problems to watch for include nails growing into the skin (ingrown toenail) and causing redness or pain. Thick, yellow, or discolored nails can signal a fungal infection.  Drainage and odor can develop from untreated sores and ulcers. Call your doctor right away if you notice white or yellow drainage, bleeding, or unpleasant odor.   © 6897-9370 [employer]. [address] [place] [address], [place] [address]. All rights reserved. This information is not  intended as a substitute for professional medical care. Always follow your healthcare professional's instructions.        Step-by-Step:  Inspecting Your Feet (Diabetes)    Date Last Reviewed: 10/1/2016  © 9290-5682 [employer]. [address] [place] [address], [place] [address]. All rights reserved. This information is not intended as a substitute for professional medical care. Always follow your healthcare professional's instructions.

## 2024-02-27 NOTE — PROGRESS NOTES
Subjective:      Patient ID: Africa Jennings is a 54 y.o. female.    Chief Complaint: Diabetes Mellitus (2/19/2024 Dr. Richards ) and Diabetic Foot Exam    Africa is a 54 y.o. female who presents to the clinic upon referral from Dr. Dora colunga. provider found  for evaluation and treatment of diabetic feet. Africa has a past medical history of Diabetes mellitus, Diabetes mellitus, type 2, Eczema, GERD (gastroesophageal reflux disease), Hypertension, Impaired fasting blood sugar, and YSABEL on CPAP. The patient has no major complaints with feet. Chief concern is how to care for feet as a diabetic.      PCP: Brandi Thompson MD    Date Last Seen by PCP:   Chief Complaint   Patient presents with    Diabetes Mellitus     2/19/2024 Dr. Richards     Diabetic Foot Exam       Current shoe gear: casual shoes    Hemoglobin A1C   Date Value Ref Range Status   01/24/2024 6.2 (H) 4.0 - 5.6 % Final     Comment:     ADA Screening Guidelines:  5.7-6.4%  Consistent with prediabetes  >or=6.5%  Consistent with diabetes    High levels of fetal hemoglobin interfere with the HbA1C  assay. Heterozygous hemoglobin variants (HbS, HgC, etc)do  not significantly interfere with this assay.   However, presence of multiple variants may affect accuracy.     07/05/2023 6.4 (H) 4.0 - 5.6 % Final     Comment:     ADA Screening Guidelines:  5.7-6.4%  Consistent with prediabetes  >or=6.5%  Consistent with diabetes    High levels of fetal hemoglobin interfere with the HbA1C  assay. Heterozygous hemoglobin variants (HbS, HgC, etc)do  not significantly interfere with this assay.   However, presence of multiple variants may affect accuracy.     10/17/2022 6.7 (H) 4.0 - 5.6 % Final     Comment:     ADA Screening Guidelines:  5.7-6.4%  Consistent with prediabetes  >or=6.5%  Consistent with diabetes    High levels of fetal hemoglobin interfere with the HbA1C  assay. Heterozygous hemoglobin variants (HbS, HgC, etc)do  not significantly interfere with  this assay.   However, presence of multiple variants may affect accuracy.                 Patient Active Problem List   Diagnosis    Essential hypertension    Diabetes mellitus    YSBAEL on CPAP    Morbid obesity    Gastroesophageal reflux disease without esophagitis    Pyloric stenosis    Encounter for screening colonoscopy    Lumbar radiculopathy    Lumbar spondylosis    DDD (degenerative disc disease), lumbar    Pneumonia of both lungs due to infectious organism    Acute respiratory failure with hypoxemia    Thoracic myelopathy    Obesity    Hypertension    S/P bariatric surgery       Current Outpatient Medications on File Prior to Visit   Medication Sig Dispense Refill    acetaminophen (TYLENOL) 500 MG tablet Take 2 tablets (1,000 mg total) by mouth every 8 (eight) hours as needed for Pain. 30 tablet 0    atorvastatin (LIPITOR) 20 MG tablet TAKE 1 TABLET BY MOUTH EVERY DAY 90 tablet 3    doxycycline monohydrate 100 mg Tab Take 1 tablet by mouth 2 (two) times daily.      ergocalciferol (ERGOCALCIFEROL) 50,000 unit Cap TAKE 2 CAPSULES BY MOUTH TWICE A WEEK 48 capsule 1    estradioL (ESTRACE) 1 MG tablet TAKE 1 TABLET BY MOUTH EVERY DAY 90 tablet 5    finasteride (PROSCAR) 5 mg tablet TAKE 1 TABLET BY MOUTH EVERY DAY 90 tablet 3    fluocinonide (LIDEX) 0.05 % ointment Apply to affected areas of scalp at bedtime 60 g 3    ketoconazole (NIZORAL) 2 % shampoo Wash hair with medicated shampoo at least 2x/week - let sit on scalp at least 5 minutes prior to rinsing 120 mL 5    mometasone (ELOCON) 0.1 % solution APPLY TOPICALLY ONCE DAILY. TO THINNING AREA OF SCALP 60 mL 5    neomycin-polymyxin-dexamethasone (MAXITROL) 3.5mg/mL-10,000 unit/mL-0.1 % DrpS as needed.      nystatin (MYCOSTATIN) ointment Apply topically 2 (two) times daily. USES PRN      omeprazole (PRILOSEC) 40 MG capsule TAKE 1 CAPSULE BY MOUTH EVERY MORNING. OPEN CAPSULE AND TAKE WITH APPLE SAUCE 90 capsule 1    ondansetron (ZOFRAN) 4 MG tablet TAKE 1 TABLET BY  MOUTH EVERY 8 HOURS AS NEEDED 90 tablet 1    ondansetron (ZOFRAN-ODT) 8 MG TbDL Take 1 tablet (8 mg total) by mouth every 6 (six) hours as needed. 30 tablet 0    OZEMPIC 1 mg/dose (4 mg/3 mL) INJECT 1 MG INTO THE SKIN EVERY 7 DAYS 9 each 1    prednisoLONE acetate (PRED FORTE) 1 % DrpS USES PRN      spironolactone (ALDACTONE) 50 MG tablet Take 1 tablet (50 mg total) by mouth once daily. 30 tablet 11    tiZANidine (ZANAFLEX) 4 MG tablet TAKE 1 TABLET BY MOUTH NIGHTLY AS NEEDED (MUSCLE SPASM). 90 tablet 2    triamcinolone acetonide 0.1% (KENALOG) 0.1 % cream Apply topically 2 (two) times daily. Apply topically 2 (two) times daily. 135 g 4    valsartan-hydrochlorothiazide (DIOVAN-HCT) 160-12.5 mg per tablet Take 1 tablet by mouth once daily. 90 tablet 1    pregabalin (LYRICA) 75 MG capsule Take 1 capsule (75 mg total) by mouth 2 (two) times daily. 60 capsule 6     Current Facility-Administered Medications on File Prior to Visit   Medication Dose Route Frequency Provider Last Rate Last Admin    triamcinolone acetonide injection 10 mg  10 mg Intradermal Once Basilia Valdez MD        triamcinolone acetonide injection 10 mg  10 mg Intradermal Once Basilia Valdez MD           Review of patient's allergies indicates:  No Known Allergies    Past Surgical History:   Procedure Laterality Date    breast reduction      CHOLECYSTECTOMY      laprascopic    DECOMPRESSION OF THORACIC OUTLET N/A 10/28/2022    Procedure: T9-12 ROBOTIC DECOMPRESSION, THORACIC FUSION;  Surgeon: Edouard Whitt DO;  Location: [facility] OR [address] [place] [address];  Service: Neurosurgery;  Laterality: N/A;  ANESTHESIA GENERAL [place] I BLOOD TYPE & SCREEN NERVE MONITORING EMG SEP MEP POSTION PRONE BED CARA 4 POST HEAD REST [facility] RADIOLOGY C-ARM GLOBUS PROTOCOL MISCELLANEOUS ALANIZ BRACE TLSO    EPIDURAL STEROID INJECTION N/A 1/11/2022    Procedure: INJECTION, STEROID, EPIDURAL IL L4/5 NEEDS CONSENT;  Surgeon: Britt Calix MD;  Location: [facility]  PAIN MGT;  Service: Pain Management;  Laterality: N/A;    ESOPHAGOGASTRODUODENOSCOPY  8/18/14    HYSTERECTOMY  2007    laprascopic, total for fibroids.  Dr Polo    INJECTION OF JOINT Left 2/8/2022    Procedure: INJECTION, JOINT, SI LEFT NEEDS CONSENT;  Surgeon: Britt Calix MD;  Location: [facility] PAIN MGT;  Service: Pain Management;  Laterality: Left;    INJECTION OF JOINT Bilateral 10/31/2023    Procedure: INJECTION, JOINT BILATERAL GTB;  Surgeon: Britt Calix MD;  Location: [facility] PAIN MGT;  Service: Pain Management;  Laterality: Bilateral;    OOPHORECTOMY      ROBOT-ASSISTED LAPAROSCOPIC SLEEVE GASTRECTOMY USING DA DEVI XI N/A 7/26/2023    Procedure: XI ROBOTIC SLEEVE GASTRECTOMY with intraop EGD;  Surgeon: Roge Rich MD;  Location: [facility] OR [address] [place] [address];  Service: General;  Laterality: N/A;  with possible HHR    TOTAL REDUCTION MAMMOPLASTY      TRANSFORAMINAL EPIDURAL INJECTION OF STEROID Right 9/26/2023    Procedure: INJECTION, STEROID, EPIDURAL, TRANSFORAMINAL APPROACH, RIGHT L4/L5 AND L5/S1 SOONER DATE;  Surgeon: Britt Calix MD;  Location: [facility] PAIN MGT;  Service: Pain Management;  Laterality: Right;       Family History   Problem Relation Age of Onset    Diabetes Mother     Hypertension Mother     Heart disease Mother     Cancer Father 58        Prostate    Crohn's disease Sister     Breast cancer Sister     Diabetes Maternal Grandmother     Heart disease Maternal Grandmother     Hypertension Maternal Grandmother     Amblyopia Neg Hx     Blindness Neg Hx     Cataracts Neg Hx     Glaucoma Neg Hx     Macular degeneration Neg Hx     Retinal detachment Neg Hx     Strabismus Neg Hx        Social History     Socioeconomic History    Marital status:    Tobacco Use    Smoking status: Never    Smokeless tobacco: Never   Substance and Sexual Activity    Alcohol use: Never    Drug use: No    Sexual activity: Yes     Partners: Male     Birth control/protection: Surgical   Social  History Narrative    Was  since 2000.      Has a friend since 2016.    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     Social Determinants of Health     Financial Resource Strain: Medium Risk (1/22/2024)    Overall Financial Resource Strain (CARDIA)     Difficulty of Paying Living Expenses: Somewhat hard   Food Insecurity: No Food Insecurity (1/22/2024)    Hunger Vital Sign     Worried About Running Out of Food in the Last Year: Never true     Ran Out of Food in the Last Year: Never true   Transportation Needs: No Transportation Needs (1/22/2024)    PRAPARE - Transportation     Lack of Transportation (Medical): No     Lack of Transportation (Non-Medical): No   Physical Activity: Insufficiently Active (1/22/2024)    Exercise Vital Sign     Days of Exercise per Week: 1 day     Minutes of Exercise per Session: 30 min   Stress: No Stress Concern Present (1/22/2024)    [place] [employer] - Occupational Stress Questionnaire     Feeling of Stress : Not at all   Social Connections: Unknown (1/22/2024)    Social Connection and Isolation Panel [NHANES]     Frequency of Communication with Friends and Family: More than three times a week     Frequency of Social Gatherings with Friends and Family: Twice a week     Active Member of Clubs or Organizations: No     Attends Club or Organization Meetings: Never     Marital Status:    Housing Stability: Low Risk  (1/22/2024)    Housing Stability Vital Sign     Unable to Pay for Housing in the Last Year: No     Number of Places Lived in the Last Year: 1     Unstable Housing in the Last Year: No       Review of Systems   Constitutional: Negative for chills and fever.   Cardiovascular:  Positive for leg swelling. Negative for claudication.   Respiratory:  Negative for cough and shortness of breath.    Skin:  Positive for dry skin, itching and nail changes. Negative for rash.   Musculoskeletal:  Positive for arthritis, back pain,  "joint pain and myalgias. Negative for falls, joint swelling and muscle weakness.   Gastrointestinal:  Negative for diarrhea, nausea and vomiting.   Neurological:  Positive for paresthesias. Negative for numbness, tremors and weakness.   Psychiatric/Behavioral:  Negative for altered mental status and hallucinations.            Objective:      Vitals:    02/27/24 0829   BP: 119/76   Pulse: 78   Weight: 88.5 kg (195 lb 1.7 oz)   Height: 5' 2" (1.575 m)         Physical Exam  Nursing note reviewed.   Constitutional:       General: She is not in acute distress.     Appearance: She is not toxic-appearing or diaphoretic.   Cardiovascular:      Pulses:           Dorsalis pedis pulses are 2+ on the right side and 2+ on the left side.        Posterior tibial pulses are 2+ on the right side and 2+ on the left side.      Comments: dorsalis pedis and posterior tibial pulses are palpable bilaterally. Capillary refill time is within normal limits.  Pulmonary:      Effort: No respiratory distress.   Musculoskeletal:      Right ankle: No swelling. No tenderness. No lateral malleolus, medial malleolus, AITF ligament, CF ligament or posterior TF ligament tenderness. Normal range of motion.      Right Achilles Tendon: No defects. Sierra's test negative.      Left ankle: No swelling. No tenderness. No lateral malleolus, medial malleolus, AITF ligament, CF ligament or posterior TF ligament tenderness. Normal range of motion.      Left Achilles Tendon: No defects. Sierra's test negative.      Right foot: No tenderness or bony tenderness.      Left foot: Tenderness present. No bony tenderness.      Comments: There is equinus deformity bilateral with decreased dorsiflexion at the ankle joint bilateral. No tenderness with compression of heel. Negative tinels sign    Decreased stride, station of gait.  apropulsive toe off.  Increased angle and base of gait.    Patient has hammertoes of digits 2-5 bilateral partially reducible without " symptom today.    Visible and palpable bunion without pain at dorsomedial 1st metatarsal head right and left.  Hallux abducted right and left partially reducible, tracks laterally without being track bound.  No ecchymosis, erythema, edema, or cardinal signs infection or signs of trauma same foot.     Skin:     General: Skin is warm and dry.      Coloration: Skin is not pale.      Findings: No bruising, burn, laceration or lesion.      Nails: There is no clubbing.      Comments: Scaling dryness in a moccasin distribution is noted to the bilateral lower extremities with associated erythema.    Toenails 1, 4, 5 bilaterally are thickened by 2-3 mm, discolored/yellowed, dystrophic, brittle with subungual debris. Tender to distal nail plate pressure, without periungual skin abnormality of each.           Neurological:      Sensory: No sensory deficit.      Motor: No tremor, atrophy or abnormal muscle tone.      Deep Tendon Reflexes: Reflexes are normal and symmetric.      Comments: [place] 5.07 monofilament is intact bilateral feet. Sharp/dull sensation is also intact Bilateral feet.     Psychiatric:         Attention and Perception: She is attentive.         Mood and Affect: Mood is not anxious. Affect is not inappropriate.         Speech: She is communicative. Speech is not slurred.         Behavior: Behavior is not combative.               Assessment:       Encounter Diagnoses   Name Primary?    Comprehensive diabetic foot examination, type 2 DM, encounter for Yes    Hammer toes of both feet     Hallux limitus, acquired, right     Hallux limitus, acquired, left     Acquired equinus deformity of both feet            Plan:     Problem List Items Addressed This Visit    None  Visit Diagnoses       Comprehensive diabetic foot examination, type 2 DM, encounter for    -  Primary    Hammer toes of both feet        Hallux limitus, acquired, right        Hallux limitus, acquired, left        Acquired equinus deformity  of both feet                 I counseled the patient on her conditions, their implications and medical management.         Education about the diabetic foot, neuropathy, and prevention of limb loss.    Shoe inspection. Diabetic Foot Education. Patient reminded of the importance of good nutrition/healthy diet/weight management and blood sugar control to help prevent podiatric complications of diabetes. Patient instructed on proper foot hygeine. Wear comfortable, proper fitting shoes. Wash feet daily. Dry well. After drying, apply moisturizer to feet (no lotion to webspaces). Inspect feet daily for skin breaks, blisters, swelling, or redness. Wear cotton socks (preferably white)  Change socks every day. Do NOT walk barefoot. Do NOT use heating pads or hot water soaks. We discussed wearing proper shoe gear, daily foot inspections, never walking without protective shoe gear.     Stretching handout dispensed to patient. Instructions on adequate stretching reviewed in clinic     Discussed edema control and the importance of daily moisturizer     Based on chart review this patient does not qualify for nail care (Patients who qualify for nail care are usually as follows: diabetic with neurological manifestations, PVD, pernicious anemia, or CKD with appropriate modifiers that indicate high amputation risk).     She will continue to monitor the areas daily, inspect her feet, wear protective shoe gear when ambulatory, moisturizer to maintain skin integrity and follow in this office in approximately 12 months, sooner p.r.n.

## 2024-02-29 ENCOUNTER — PATIENT MESSAGE (OUTPATIENT)
Dept: BARIATRICS | Facility: CLINIC | Age: 54
End: 2024-02-29
Payer: COMMERCIAL

## 2024-03-06 ENCOUNTER — PATIENT MESSAGE (OUTPATIENT)
Dept: BARIATRICS | Facility: CLINIC | Age: 54
End: 2024-03-06
Payer: COMMERCIAL

## 2024-03-11 ENCOUNTER — OFFICE VISIT (OUTPATIENT)
Dept: SPINE | Facility: CLINIC | Age: 54
End: 2024-03-11
Payer: COMMERCIAL

## 2024-03-11 VITALS
SYSTOLIC BLOOD PRESSURE: 117 MMHG | TEMPERATURE: 98 F | WEIGHT: 195.13 LBS | BODY MASS INDEX: 35.69 KG/M2 | DIASTOLIC BLOOD PRESSURE: 79 MMHG | HEART RATE: 75 BPM

## 2024-03-11 DIAGNOSIS — M70.61 GREATER TROCHANTERIC BURSITIS OF RIGHT HIP: Primary | ICD-10-CM

## 2024-03-11 DIAGNOSIS — M70.61 TROCHANTERIC BURSITIS OF RIGHT HIP: Primary | ICD-10-CM

## 2024-03-11 PROCEDURE — 99999 PR PBB SHADOW E&M-EST. PATIENT-LVL IV: CPT | Mod: PBBFAC,,, | Performed by: ANESTHESIOLOGY

## 2024-03-11 PROCEDURE — 3044F HG A1C LEVEL LT 7.0%: CPT | Mod: CPTII,S$GLB,, | Performed by: ANESTHESIOLOGY

## 2024-03-11 PROCEDURE — 3074F SYST BP LT 130 MM HG: CPT | Mod: CPTII,S$GLB,, | Performed by: ANESTHESIOLOGY

## 2024-03-11 PROCEDURE — 1160F RVW MEDS BY RX/DR IN RCRD: CPT | Mod: CPTII,S$GLB,, | Performed by: ANESTHESIOLOGY

## 2024-03-11 PROCEDURE — 3008F BODY MASS INDEX DOCD: CPT | Mod: CPTII,S$GLB,, | Performed by: ANESTHESIOLOGY

## 2024-03-11 PROCEDURE — 3078F DIAST BP <80 MM HG: CPT | Mod: CPTII,S$GLB,, | Performed by: ANESTHESIOLOGY

## 2024-03-11 PROCEDURE — 1159F MED LIST DOCD IN RCRD: CPT | Mod: CPTII,S$GLB,, | Performed by: ANESTHESIOLOGY

## 2024-03-11 PROCEDURE — 99214 OFFICE O/P EST MOD 30 MIN: CPT | Mod: S$GLB,,, | Performed by: ANESTHESIOLOGY

## 2024-03-11 NOTE — PROGRESS NOTES
Chronic Pain-Established Note (Follow up visit)      SUBJECTIVE:  Interval History 03/11/2024: Ms. Jennings returns today for follow up. We last saw her 10/12/23 for bilateral lateral thigh pain. We performed b/l GTB injections shortly thereafter. She reports that she got excellent relief for approximately 3 months. She returns today with some recurrence of symptoms on the right side. She notes 7/10 pain on the right, sharp, shooting pain, worst with laying on her right side, better with using the rolling ball on the thigh. She has been treating this with APAP and heat, and HEP 3X weekly. On exam, she has recurrence of right GTB pain. Fortunately, the left side remains well controlled.     Interval History 10/12/23: Africa Jennings returns for follow up. At our last visit, which was virtual, she was having a lot of radiating pain. Unfortunately, the TFESI we performed was not helpful. She presents today in person and I am able to examine her. Today, on examination, it is clear that this pain is reproduced with palpation of her GTBs. She continues to note 7/10 pain which is worse with laying on her side and improved with movement. It is impacting her work.     Interval History 8/28/2023:  Africa Jennings presents tele-medicine appointment for a follow-up appointment for R sided lower back and focal R L5 radicular pain. She states leg pain > back pain. She describes a sharp, shooting pain in her R buttocks that radiates to her R mid calf posteriorly and is associated with burning, numbness, and tingling. Worse with activity, bending, lifting, night time. She states the R leg is subjectively weaker than the left.. Since the last visit, Africa Jennings states the pain has been worsening. Current pain intensity is 7/10. Current medications include lyrica, zanaflex, tylenol with some relief. She has completed physical therapy and aqua therapy (March 2023 - April 2023) to help  strengthen her back and notes benefit. She continues with Home exercise program and is waiting to hear back from [facility] to schedule her. Patient was supposed to have R L4/5 and L5/S1 TFESI done since last visit, however, there was some issue with scheduling or insurance (the patient is unsure which). She is interested in having the procedure rescheduled to help alleviate her symptoms. Patient denies red flags including weakness, unexpected weight loss/gain, night sweats/fevers, saddle anesthesia, and symptoms of VERA.    Interval History 4/26/2023:  Mrs Jennings presents for follow up. Over interval she has had T9-12 Thoracic decompression by Dr Whitt on 10/28/2022. She is doing well since then but continues to have R sided lower back and focal R L5 radicular pain. She states back pain = leg pain. She states the R leg is subjectively weaker than the left. Denies any s/s concerning for cauda equina. Pain has limited functioning and she has Completed PT/Aquatherapy for >6 weeks.      Interval History 3/16/2022:  Pt presents for follow up of. She has had L4/5 ILESI and left SI injection in past with benefit. She has more vocal pain to right and associated radiculopathy down right leg in L4/5 pattern. She had no recent MRI in past 2 years and known lumbar discogenic issues but this was more focal to left. She has no complaint of loss of b/b or saddle paresthesias.      Interval History  2/22/2022:   Mrs Jennings presents for virtual follow up of lower back and left sided buttock pain. She is now s/p Left SIJ and states 90% improved pain and feels her relief was more significant than MARILEE. She states pain more noticeable to right side now without radicular complaint. There is no focal voicing of loss of b/b or saddle paresthesias.      Interval History 1/25/2021:  Mrs Jennings presents for follow up of lower back pain. She is s/p L4/5 ILESI with 90% benefit from lower back pain. She has lingering lower  buttock/back pain to left side. Increased pain when sitting or stairs. Denies radiculopathy. Denies any loss of b/b or saddle paresthesias.      Interval History 12/23/2021:  Mrs Jennings presents to establish care at [facility]. She states continued lower back pain to left side but leg pain/paresthesias=back pain. She states left lower back pain and radiation in L4/5 pattern but also on right side. She has MRI findings to left L4/5 NF disc protrusion. She has been doing HEP she learned through [facility] for over 6 months straight and continues with no lasting benefit. She is currently taking Mobic 15mg and Neurontin 300mg TID. There is no complaint of loss of b/b or saddle paresthesias.      Interval History (4/13/20):     The patient location is: home  The chief complaint leading to consultation is: follow up  Visit type: Virtual visit with audio.  Patient verbally consented for audio visit.  Total time spent with patient: 15 minutes  Each patient to whom he or she provides medical services by telemedicine is:  (1) informed of the relationship between the physician and patient and the respective role of any other health care provider with respect to management of the patient; and (2) notified that he or she may decline to receive medical services by telemedicine and may withdraw from such care at any time.        She returns today for follow up.  She reports that her right-sided low back and lower extremity pain has returned since last encounter.  She denies any changes in the quality or location of pain.  She continues to report associated numbness.  She denies any associated weakness or bowel bladder dysfunction.  She has attempted gabapentin 600 mg t.i.d..  She states this medication did not provide any relief.  She has also tried goody's powder, Motrin, Tylenol without any relief.          Interval History (2/17/20):  She returns today for follow up and MRI review.  She reports that her pain is unchanged  in quality and location since last encounter.  She does state that the pain has significantly improved and is overall not very bothersome for the time being.          Initial Encounter (2/3/20):  Africa Jennings is a 53 y.o. female who presents today with chronic right-sided low back and lower extremity pain. This pain has been present for 6-7 months.  No specific inciting event or injury noted.  Patient localizes the pain to the right lumbar region.  Pain radiates to the right posterior and anterior thigh.  She reports associated numbness in this area as well. She also reports weakness of the right lower extremity when ambulating.  She denies any associated bowel or bladder dysfunction.  The pain is constant and worsened with activity.  She states that the pain interrupt her sleep.  Patient does have a history of a similar problem roughly 8 years ago.  She underwent epidural steroid injections with good relief.  This pain is described in detail below.    Pain Scores:     Best:       6/10  Worst:     10/10  Usually:   8/10  Today:    0/10     Physical Therapy:  Feb-May, 2023  HEP: Continues HEP 3Xweekly as prescribed at PT above     Non-pharmacologic Treatment:  Rest helps         TENS?  No     Pain Medications:         Currently taking:  Aleve, gabapentin     Has tried in the past:  Tylenol, Motrin     Has not tried:  Opioids, Tylenol, Muscle relaxants, TCAs, SNRIs, anticonvulsants, topical creams     report:  Reviewed and consistent with medication use as prescribed.     Blood thinners:  None     Relevant Surgeries:  None     Affecting sleep?  Yes     Affecting daily activities? yes     Depressive symptoms? no          SI/HI? No     Work status: Employed     Pain Procedures:   - L4-5 interlaminar epidural steroid injection x2  - 1/11/2022 L4/5 ILESI 90% benefit    - 2/8/2022 Left SIJ injection  - 9/26/2023 Right L4/5, L5/S1 TFESI - 1 day of relief  - 10/31/2023 BL GTB injections 100% relief for 3  weeks      Imaging:   CT Thoracic Spine Without Contrast  Order: [id number]  Status: Final result     Visible to patient: Yes (seen)     Next appt: 09/18/2023 at 08:45 AM in Dermatology (Basilia Valdez MD)     Dx: S/P fusion of thoracic spine     0 Result Notes  Details    Reading Physician Reading Date Result Priority   Tony Powell MD  [phone] 1/30/2023 Routine   Dex Bojorquez MD  [phone] 1/30/2023      Narrative & Impression  EXAMINATION:  CT THORACIC SPINE WITHOUT CONTRAST     CLINICAL HISTORY:  s/p T9-12 fusion;  Arthrodesis status     TECHNIQUE:  CT thoracic spine performed without contrast.  Coronal and sagittal reformats provided.     COMPARISON:  X-ray thoracic spine 12/12/2022, x-ray thoracolumbar spine 10/29/2022, MRI thoracic spine 06/03/2022, CT thoracic spine 06/03/2022.     FINDINGS:  Postsurgical change of posterior spinal instrumented fusion T9-T12, T9-12 laminectomies and medial facetectomies and decortication of the T9-12 posterior elements and placement of a mixture of autologous and synthetic bone into the bilateral gutters for arthrodesis.  No hardware fracture or periprosthetic lucency to suggest hardware loosening.     Alignment: Stable, slightly pronounced kyphotic curvature.  Grade 1 retrolisthesis T11-12.     Vertebrae: Vertebral body heights are well maintained.  No fracture.  Stable sclerotic focus in the T5 vertebral body likely representing a bone island.  No lytic or blastic lesion.  Morselized synthetic and autologous bone in the bilateral T9-T12 gutters with noted abutment at the T12 posterior elements likely representing fusion.     Discs: Normal height.  Vacuum disc phenomenon at T8-9 and T11-12.     Degenerative findings:     C7-L1: No spinal canal stenosis or neural foraminal narrowing.     Paraspinal muscles & soft tissues: Cholecystectomy.  High attenuation focus in the posterior right upper quadrant possibly represent calcification or  surgical clip.  Otherwise unremarkable.     Impression:     1. Postsurgical changes at T9-T12, as above, without evidence of hardware loosening or fracture.  2. Additional findings as above.     Electronically signed by resident: Dex Bojorquez  Date:                                            01/30/2023  Time:                                           13:59     Electronically signed by: Tony Powell MD  Date:                                            01/30/2023  Time:                                           15:43       MRI Thoracic Spine Without Contrast  Order: [id number]  Status: Final result     Visible to patient: Yes (seen)     Next appt: 09/18/2023 at 08:45 AM in Dermatology (Basilia Valdez MD)     Dx: Thoracic spinal stenosis     0 Result Notes  Details    Reading Physician Reading Date Result Priority   Tony Powell MD  [phone] 6/4/2022 Routine     Narrative & Impression  EXAMINATION:  MRI THORACIC SPINE WITHOUT CONTRAST     CLINICAL HISTORY:  T9-11 stenosis, please focus on these levels.;  Spinal stenosis, thoracic region     TECHNIQUE:  Multiplanar, multisequence images were performed through the thoracic spine.  Contrast was not administered.     COMPARISON:  MRI 03/31/2022, CT 06/03/2022.     FINDINGS:  Thoracic spine alignment demonstrates a slightly pronounced kyphotic curvature.  No spondylolisthesis.  Vertebral body heights are well maintained without evidence fracture.  There is slight heterogeneous marrow signal intensity, similar when compared to the previous exam and favored to relate to benign reconversion.  No marrow signal abnormality to suggest an infiltrative process.     Persistent focal area of increased T2/STIR cord signal at the T10-T11 level, similar when compared to the previous exam concerning for myelomalacia.  Remaining visualized spinal cord demonstrates normal contour and signal intensity.     There is a 1.1 cm T2 hypointense right thyroid nodule.  There are  multiple colonic diverticuli.  Remaining visualized thoracic and upper abdominal organs demonstrate no significant abnormalities.  Paraspinal musculature demonstrates normal bulk and signal intensity.     T1-T2: Bilateral facet arthropathy contributes to moderate left neural foraminal narrowing.  No spinal canal stenosis.     T2-T3: Broad-based disc osteophyte complex causes mild mass effect on the ventral thecal sac.  Left facet arthropathy.  Findings contribute to mild-to-moderate left and mild right neural foraminal narrowing.  No spinal canal stenosis.     T7-T8: Circumferential disc osteophyte complex, bilateral facet arthropathy and right ligamentum flavum buckling.  Findings contribute to moderate right neural foraminal narrowing.  No spinal canal stenosis.     T8-T9: Circumferential disc osteophyte complex and mild bilateral ligamentum flavum buckling.  Findings contribute to mild right neural foraminal narrowing.  No spinal canal stenosis.     T9-T10: Circumferential disc osteophyte complex and bilateral ligamentum flavum buckling.  Findings contribute to mild spinal canal stenosis and moderate right neural foraminal narrowing.     T10-T11: Circumferential disc osteophyte complex, bilateral facet arthropathy and bilateral ligamentum flavum buckling.  Findings contribute to severe spinal canal stenosis and moderate right and mild left neural foraminal narrowing.     Impression:     1. Multilevel degenerative changes of the thoracic spine most pronounced at T10-T11 noting severe spinal canal stenosis and mild-to-moderate neural foraminal narrowing.  2. Persistent focal area of cord signal abnormality at T10-T11, similar when compared to MRI most suggestive of myelomalacia.        Electronically signed by: Tony Powell MD  Date:                                            06/04/2022  Time:                                           09:05       MRI Lumbar Spine Without Contrast  Order: [id number]  Status: Final  result     Visible to patient: Yes (seen)     Next appt: 09/18/2023 at 08:45 AM in Dermatology (Basilia Valdez MD)     Dx: Dorsalgia, unspecified     1 Result Note  Details    Reading Physician Reading Date Result Priority   AryMaximo schmidt Jr., MD  [phone] 3/25/2022 Routine     Narrative & Impression  EXAMINATION:  MRI LUMBAR SPINE WITHOUT CONTRAST     CLINICAL HISTORY:  Dorsalgia, unspecifiedLow back pain, symptoms persist with > 6wks conservative treatment;     TECHNIQUE:  Sagittal T1, sagittal T2, sagittal STIR, axial T1 and axial T2 weighted images of the lumbar spine obtained without contrast.     COMPARISON:  Similar study 02/10/2020     FINDINGS:  Lumbar spine alignment is within normal limits. The vertebral body heights are well maintained, with no fracture.  Degenerative sclerotic marrow endplate change at S1 and fatty metaplasia degenerative endplate change at T12.  Otherwise marrow signal normal.     The conus is normal in appearance.  The adjacent soft tissue structures show no significant abnormalities.     Spinal canal is congenitally narrow on the basis of short pedicles.  At the T10-11 level there is extradural indentation of the thecal sac from posteriorly and there is some broad-based disc bulging.  Transverse images do not cover this level.  There is at least moderate spinal stenosis at this level.     L1-L2: No focal disc herniation.No significant central canal or neural foraminal narrowing.     L2-L3: No focal disc herniation.  Mild facet arthrosis.  No significant central canal or neural foraminal narrowing.     L3-L4: No evidence of a disc herniation.  No significant central canal or neural foraminal narrowing.     L4-L5: Broad-based disc bulging with left-sided foraminal and far lateral annular fissure and superimposed mild spondylitic spurring. Facet arthrosis contributes to severe left-sided neural foraminal narrowing.  Moderate central spinal stenosis.     L5-S1: Broad-based  disc bulging, facet arthrosis and ligamentum flavum hypertrophy result in severe bilateral neural foraminal stenosis.  Mild central spinal stenosis.     Impression:     Extradural degenerative changes at T10-11, L4-5, and L5-S1 resulting in central canal and foraminal stenosis as detailed above.  Dedicated imaging of the thoracic spine could lend additional information regarding findings at T10-11     This report was flagged in Epic as abnormal.        Electronically signed by: Maximo Aguirre Jr  Date:                                            03/25/2022  Time:                                           09:06       Past Medical History:   Diagnosis Date    Diabetes mellitus     Diabetes mellitus, type 2     Eczema     GERD (gastroesophageal reflux disease)     Hypertension     Impaired fasting blood sugar     YSABEL on CPAP      Past Surgical History:   Procedure Laterality Date    breast reduction      CHOLECYSTECTOMY      laprascopic    DECOMPRESSION OF THORACIC OUTLET N/A 10/28/2022    Procedure: T9-12 ROBOTIC DECOMPRESSION, THORACIC FUSION;  Surgeon: Edouard Whitt DO;  Location: [facility] OR [address] [place] [address];  Service: Neurosurgery;  Laterality: N/A;  ANESTHESIA GENERAL [place]OR[place]TO I BLOOD TYPE & SCREEN NERVE MONITORING EMG SEP MEP POSTION PRONE BED CARA 4 POST HEAD REST [facility] RADIOLOGY C-ARM GLOBUS PROTOCOL MISCELLANEOUS ALANIZ BRACE TLSO    EPIDURAL STEROID INJECTION N/A 1/11/2022    Procedure: INJECTION, STEROID, EPIDURAL IL L4/5 NEEDS CONSENT;  Surgeon: Britt Calix MD;  Location: [facility];  Service: Pain Management;  Laterality: N/A;    ESOPHAGOGASTRODUODENOSCOPY  8/18/14    HYSTERECTOMY  2007    laprascopic, total for fibroids.  Dr Polo    INJECTION OF JOINT Left 2/8/2022    Procedure: INJECTION, JOINT, SI LEFT NEEDS CONSENT;  Surgeon: Britt Calix MD;  Location: [facility] PAIN MGT;  Service: Pain Management;  Laterality: Left;    INJECTION OF JOINT Bilateral 10/31/2023    Procedure:  INJECTION, JOINT BILATERAL GTB;  Surgeon: Britt Calix MD;  Location: [facility] PAIN MGT;  Service: Pain Management;  Laterality: Bilateral;    OOPHORECTOMY      ROBOT-ASSISTED LAPAROSCOPIC SLEEVE GASTRECTOMY USING DA DEVI XI N/A 7/26/2023    Procedure: XI ROBOTIC SLEEVE GASTRECTOMY with intraop EGD;  Surgeon: Roge Rich MD;  Location: [facility] OR 2ND FLR;  Service: General;  Laterality: N/A;  with possible HHR    TOTAL REDUCTION MAMMOPLASTY      TRANSFORAMINAL EPIDURAL INJECTION OF STEROID Right 9/26/2023    Procedure: INJECTION, STEROID, EPIDURAL, TRANSFORAMINAL APPROACH, RIGHT L4/L5 AND L5/S1 SOONER DATE;  Surgeon: Britt Calix MD;  Location: [facility] PAIN MGT;  Service: Pain Management;  Laterality: Right;     Social History     Socioeconomic History    Marital status:    Tobacco Use    Smoking status: Never    Smokeless tobacco: Never   Substance and Sexual Activity    Alcohol use: Never    Drug use: No    Sexual activity: Yes     Partners: Male     Birth control/protection: Surgical   Social History Narrative    Was  since 2000.      Has a friend since 2016.    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     Social Determinants of Health     Financial Resource Strain: Medium Risk (1/22/2024)    Overall Financial Resource Strain (CARDIA)     Difficulty of Paying Living Expenses: Somewhat hard   Food Insecurity: No Food Insecurity (1/22/2024)    Hunger Vital Sign     Worried About Running Out of Food in the Last Year: Never true     Ran Out of Food in the Last Year: Never true   Transportation Needs: No Transportation Needs (1/22/2024)    PRAPARE - Transportation     Lack of Transportation (Medical): No     Lack of Transportation (Non-Medical): No   Physical Activity: Insufficiently Active (1/22/2024)    Exercise Vital Sign     Days of Exercise per Week: 1 day     Minutes of Exercise per Session: 30 min   Stress: No Stress Concern Present (1/22/2024)     [facility] [employer] - Occupational Stress Questionnaire     Feeling of Stress : Not at all   Social Connections: Unknown (1/22/2024)    Social Connection and Isolation Panel [NHANES]     Frequency of Communication with Friends and Family: More than three times a week     Frequency of Social Gatherings with Friends and Family: Twice a week     Active Member of Clubs or Organizations: No     Attends Club or Organization Meetings: Never     Marital Status:    Housing Stability: Low Risk  (1/22/2024)    Housing Stability Vital Sign     Unable to Pay for Housing in the Last Year: No     Number of Places Lived in the Last Year: 1     Unstable Housing in the Last Year: No     Family History   Problem Relation Age of Onset    Diabetes Mother     Hypertension Mother     Heart disease Mother     Cancer Father 58        Prostate    Crohn's disease Sister     Breast cancer Sister     Diabetes Maternal Grandmother     Heart disease Maternal Grandmother     Hypertension Maternal Grandmother     Amblyopia Neg Hx     Blindness Neg Hx     Cataracts Neg Hx     Glaucoma Neg Hx     Macular degeneration Neg Hx     Retinal detachment Neg Hx     Strabismus Neg Hx        Review of patient's allergies indicates:  No Known Allergies    Current Outpatient Medications   Medication Sig    acetaminophen (TYLENOL) 500 MG tablet Take 2 tablets (1,000 mg total) by mouth every 8 (eight) hours as needed for Pain.    atorvastatin (LIPITOR) 20 MG tablet TAKE 1 TABLET BY MOUTH EVERY DAY    doxycycline monohydrate 100 mg Tab Take 1 tablet by mouth 2 (two) times daily.    ergocalciferol (ERGOCALCIFEROL) 50,000 unit Cap TAKE 2 CAPSULES BY MOUTH TWICE A WEEK    estradioL (ESTRACE) 1 MG tablet TAKE 1 TABLET BY MOUTH EVERY DAY    finasteride (PROSCAR) 5 mg tablet TAKE 1 TABLET BY MOUTH EVERY DAY    fluocinonide (LIDEX) 0.05 % ointment Apply to affected areas of scalp at bedtime    ketoconazole (NIZORAL) 2 % shampoo Wash hair with  medicated shampoo at least 2x/week - let sit on scalp at least 5 minutes prior to rinsing    mometasone (ELOCON) 0.1 % solution APPLY TOPICALLY ONCE DAILY. TO THINNING AREA OF SCALP    neomycin-polymyxin-dexamethasone (MAXITROL) 3.5mg/mL-10,000 unit/mL-0.1 % DrpS as needed.    nystatin (MYCOSTATIN) ointment Apply topically 2 (two) times daily. USES PRN    omeprazole (PRILOSEC) 40 MG capsule TAKE 1 CAPSULE BY MOUTH EVERY MORNING. OPEN CAPSULE AND TAKE WITH APPLE SAUCE    ondansetron (ZOFRAN) 4 MG tablet TAKE 1 TABLET BY MOUTH EVERY 8 HOURS AS NEEDED    ondansetron (ZOFRAN-ODT) 8 MG TbDL Take 1 tablet (8 mg total) by mouth every 6 (six) hours as needed.    OZEMPIC 1 mg/dose (4 mg/3 mL) INJECT 1 MG INTO THE SKIN EVERY 7 DAYS    prednisoLONE acetate (PRED FORTE) 1 % DrpS USES PRN    spironolactone (ALDACTONE) 50 MG tablet Take 1 tablet (50 mg total) by mouth once daily.    tiZANidine (ZANAFLEX) 4 MG tablet TAKE 1 TABLET BY MOUTH NIGHTLY AS NEEDED (MUSCLE SPASM).    triamcinolone acetonide 0.1% (KENALOG) 0.1 % cream Apply topically 2 (two) times daily. Apply topically 2 (two) times daily.    valsartan-hydrochlorothiazide (DIOVAN-HCT) 160-12.5 mg per tablet Take 1 tablet by mouth once daily.    pregabalin (LYRICA) 75 MG capsule Take 1 capsule (75 mg total) by mouth 2 (two) times daily.     Current Facility-Administered Medications   Medication    triamcinolone acetonide injection 10 mg    triamcinolone acetonide injection 10 mg       REVIEW OF SYSTEMS:    GENERAL:  No weight loss, malaise or fevers.  HEENT:   No recent changes in vision or hearing  NECK:  Negative for lumps, no difficulty with swallowing.  RESPIRATORY:  Negative for cough, wheezing or shortness of breath, patient denies any recent URI.  CARDIOVASCULAR:  Negative for chest pain, leg swelling or palpitations.  GI:  Negative for abdominal discomfort, blood in stools or black stools or change in bowel habits.  MUSCULOSKELETAL:  See HPI.  SKIN:  Negative for  lesions, rash, and itching.  PSYCH:  No mood disorder or recent psychosocial stressors.  Patients sleep is not disturbed secondary to pain.  HEMATOLOGY/LYMPHOLOGY:  Negative for prolonged bleeding, bruising easily or swollen nodes.  Patient is not currently taking any anti-coagulants  NEURO:   No history of headaches, syncope, paralysis, seizures or tremors.  All other reviewed and negative other than HPI.    OBJECTIVE:  PHYSICAL EXAM:   GENERAL: Well appearing, in no acute distress, alert and oriented x3.  PSYCH:  Mood and affect appropriate.  SKIN: Skin color, texture, turgor normal, no rashes or lesions.  HEENT:  Normocephalic, atraumatic. Cranial nerves grossly intact.  NECK: No pain to palpation over the cervical paraspinous muscles. No pain to palpation over facets. No pain with neck flexion, extension, or lateral flexion.   PULM: No evidence of respiratory difficulty, symmetric chest rise.  GI:  Non-distended  BACK: Normal range of motion. No pain to palpation over the spinous processes. No pain to palpation over facet joints. There is no pain with palpation over the sacroiliac joints bilaterally. Straight leg raising in the supine position is negative to radicular pain. Pain reproduced over right GTBs.   EXTREMITIES: No deformities, edema, or skin discoloration.   MUSCULOSKELETAL: Shoulder, hip, and knee provocative maneuvers are negative. Bilateral upper and lower extremity strength is normal and symmetric. No atrophy is noted.  NEURO: Sensation is equal and appropriate bilaterally. Bilateral upper and lower extremity coordination and muscle stretch reflexes are physiologic and symmetric. Plantar response are downgoing.   GAIT: normal.      ASSESSMENT: 54 y.o. year old female with pain consistent with     1. 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 She has a history of T9-12 decompression and posterior fusion with Dr. Whitt.  She came to us with left low back pain and did well after SIJ. She then presented back with right radicular symptoms. MRI shows short pedicles, moderate canal stenosis at L4/5, and severe foraminal stenosis at L5/S1. She also gets recurrent GTB pain. She is working on not sleeping on her side.     PLAN:   1) Continue heat  2) Try not sleeping on side  3) Schedule right GTB injections  4) She plans to join [facility]  5) Follow up in 2 weeks      Britt Calix MD  [facility] PGY-2  03/11/2024

## 2024-03-12 ENCOUNTER — TELEPHONE (OUTPATIENT)
Dept: ENDOSCOPY | Facility: HOSPITAL | Age: 54
End: 2024-03-12
Payer: COMMERCIAL

## 2024-03-12 ENCOUNTER — PATIENT MESSAGE (OUTPATIENT)
Dept: PAIN MEDICINE | Facility: OTHER | Age: 54
End: 2024-03-12
Payer: COMMERCIAL

## 2024-03-12 ENCOUNTER — TELEPHONE (OUTPATIENT)
Dept: PAIN MEDICINE | Facility: CLINIC | Age: 54
End: 2024-03-12
Payer: COMMERCIAL

## 2024-03-12 NOTE — TELEPHONE ENCOUNTER
Telephoned pt to review GLP-1 instructions for upcoming colonoscopy on 3/26/24 with no answer.   Left voicemail with direct contact number for pt to return call.  Portal message also sent.

## 2024-03-12 NOTE — TELEPHONE ENCOUNTER
----- Message from Nate Price sent at 3/11/2024 12:12 PM CDT -----  Contact: ailin  Type:  Patient Call          Who Called: patient         Does the patient know what this is regarding?: Requesting a call back from a missed call ;please advise           Would the patient rather a call back or a response via [facility]?call           Best Call Back Number: [phone]             Additional Information:

## 2024-03-19 ENCOUNTER — TELEPHONE (OUTPATIENT)
Dept: ENDOSCOPY | Facility: HOSPITAL | Age: 54
End: 2024-03-19
Payer: COMMERCIAL

## 2024-03-19 DIAGNOSIS — Z12.11 SCREEN FOR COLON CANCER: Primary | ICD-10-CM

## 2024-03-19 RX ORDER — SODIUM, POTASSIUM,MAG SULFATES 17.5-3.13G
1 SOLUTION, RECONSTITUTED, ORAL ORAL DAILY
Qty: 1 KIT | Refills: 0 | Status: SHIPPED | OUTPATIENT
Start: 2024-03-19 | End: 2024-03-21

## 2024-03-19 NOTE — TELEPHONE ENCOUNTER
Left voicemail and sent portal message for patient to call Endoscopy Scheduling to review instructions and confirm appointment for Colonoscopy on 3/26/24.

## 2024-03-20 ENCOUNTER — OFFICE VISIT (OUTPATIENT)
Dept: DERMATOLOGY | Facility: CLINIC | Age: 54
End: 2024-03-20
Payer: COMMERCIAL

## 2024-03-20 DIAGNOSIS — L65.8 TRACTION ALOPECIA: Primary | ICD-10-CM

## 2024-03-20 PROCEDURE — 1160F RVW MEDS BY RX/DR IN RCRD: CPT | Mod: CPTII,S$GLB,, | Performed by: STUDENT IN AN ORGANIZED HEALTH CARE EDUCATION/TRAINING PROGRAM

## 2024-03-20 PROCEDURE — 11901 INJECT SKIN LESIONS >7: CPT | Mod: S$GLB,,, | Performed by: STUDENT IN AN ORGANIZED HEALTH CARE EDUCATION/TRAINING PROGRAM

## 2024-03-20 PROCEDURE — 1159F MED LIST DOCD IN RCRD: CPT | Mod: CPTII,S$GLB,, | Performed by: STUDENT IN AN ORGANIZED HEALTH CARE EDUCATION/TRAINING PROGRAM

## 2024-03-20 PROCEDURE — 3044F HG A1C LEVEL LT 7.0%: CPT | Mod: CPTII,S$GLB,, | Performed by: STUDENT IN AN ORGANIZED HEALTH CARE EDUCATION/TRAINING PROGRAM

## 2024-03-20 PROCEDURE — 99999 PR PBB SHADOW E&M-EST. PATIENT-LVL IV: CPT | Mod: PBBFAC,,, | Performed by: STUDENT IN AN ORGANIZED HEALTH CARE EDUCATION/TRAINING PROGRAM

## 2024-03-20 PROCEDURE — 99214 OFFICE O/P EST MOD 30 MIN: CPT | Mod: 25,S$GLB,, | Performed by: STUDENT IN AN ORGANIZED HEALTH CARE EDUCATION/TRAINING PROGRAM

## 2024-03-20 NOTE — PROGRESS NOTES
Subjective:      Patient ID:  Africa Jennings is a 54 y.o. female who presents for   Chief Complaint   Patient presents with    Hair Loss     Follow up     54 y.o. female presents today for hair loss.    Last office visit 01/02/2024 with Dr. Valdez     Former pt of Dr. Valdez. Reports hair loss of frontal hairline progressive worsening past 5 years. She has no pruritus. She is on spironolactone 100 mg qd (started 6-2023 discussed with PCP Dr. Thompson prior- she also has hirsutism), finasteride 5 mg qd (Since 2-2023), and has had serial ILK starting in 3-2023. She is noticing mild improvements and says her  notices improvement too.  She also uses ketoconazole shampoo and 5% topical minoxidil QD.   CMP 1-2024 wnl with K+ 4.1    Pertinent labs:  Lab Results       Component                Value               Date                       TSH                      2.113               07/05/2023            Lab Results       Component                Value               Date                       WBC                      5.97                01/24/2024                 HGB                      12.8                01/24/2024                 HCT                      40.0                01/24/2024                 MCV                      86                  01/24/2024                 PLT                      285                 01/24/2024              Lab Results       Component                Value               Date                       UIBC                     342                 02/29/2008                 IRON                     97                  01/24/2024                 TRANSFERRIN              294                 01/24/2024                 TIBC                     435                 01/24/2024                 FESATURATED              22                  01/24/2024             Lab Results       Component                Value               Date                       [id number]             55                   01/24/2024                    Review of Systems   Constitutional:  Negative for fever, chills and fatigue.   Hematologic/Lymphatic: Does not bruise/bleed easily.     Objective:   Physical Exam   Skin:   Areas Examined (abnormalities noted in diagram):   Scalp / Hair Palpated and Inspected     Diagram Legend     Erythematous scaling macule/papule c/w actinic keratosis       Vascular papule c/w angioma      Pigmented verrucoid papule/plaque c/w seborrheic keratosis      Yellow umbilicated papule c/w sebaceous hyperplasia      Irregularly shaped tan macule c/w lentigo     1-2 mm smooth white papules consistent with Milia      Movable subcutaneous cyst with punctum c/w epidermal inclusion cyst      Subcutaneous movable cyst c/w pilar cyst      Firm pink to brown papule c/w dermatofibroma      Pedunculated fleshy papule(s) c/w skin tag(s)      Evenly pigmented macule c/w junctional nevus     Mildly variegated pigmented, slightly irregular-bordered macule c/w mildly atypical nevus      Flesh colored to evenly pigmented papule c/w intradermal nevus       Pink pearly papule/plaque c/w basal cell carcinoma      Erythematous hyperkeratotic cursted plaque c/w SCC      Surgical scar with no sign of skin cancer recurrence      Open and closed comedones      Inflammatory papules and pustules      Verrucoid papule consistent consistent with wart     Erythematous eczematous patches and plaques     Dystrophic onycholytic nail with subungual debris c/w onychomycosis     Umbilicated papule    Erythematous-base heme-crusted tan verrucoid plaque consistent with inflamed seborrheic keratosis     Erythematous Silvery Scaling Plaque c/w Psoriasis     See annotation                  Assessment / Plan:      Traction alopecia  Frontotemporal hairline thinning with fringe sign and areas of scarring/no hair growth  Pt wearing hair in fairly tight christopher today, hx of tight hairstyles   No erythema/pruritus/scale    Status: chronic  condition flaring and/or not at treatment goal    I discussed with patient she has been on treatments approx. 1 year with minimal regrowth based on serial photos (my first time meeting her)- this disease can be reversed in early stages but more permanent in later stages. Related to hair practices such as tight hairstyles. Recommend wear hair natural if possible and avoid tight christopher   She is noticing mild improvements and wishes to continue treatments through 2024 then reassess if she is noticing improvement  Continue serial photo    Plan:  Continue finasteride 5 mg QD reviewed potential SE sexual dysfunction, low libido, depression  Continue spironolactone 100 mg QD (Also for hirsutism) reviewed SE elevation of K+ - recently checked 4.1 and wnl 1/2024 need recheck at least annually  Continue topical minoxidil 5% QD   Pt wishes for ILK injections today- reviewed risk of post-inflammatory hypopigmentation and discoloration of skin plus only beneficial in active disease  Intralesional Kenalog 5 mg/cc (3 cc total) injected into >7 lesions on the frontal hairline today after obtaining verbal consent including risk of surrounding hypopigmentation. Patient tolerated procedure well.    Units: 1  NDC for Kenalog 10mg/cc:  4487-2499-18    RTC 3 months, sooner prn

## 2024-03-22 ENCOUNTER — ANESTHESIA EVENT (OUTPATIENT)
Dept: ENDOSCOPY | Facility: HOSPITAL | Age: 54
End: 2024-03-22
Payer: COMMERCIAL

## 2024-03-25 RX ORDER — LIDOCAINE HYDROCHLORIDE 10 MG/ML
1 INJECTION, SOLUTION EPIDURAL; INFILTRATION; INTRACAUDAL; PERINEURAL ONCE
Status: CANCELLED | OUTPATIENT
Start: 2024-03-25 | End: 2024-03-25

## 2024-03-26 ENCOUNTER — HOSPITAL ENCOUNTER (OUTPATIENT)
Facility: HOSPITAL | Age: 54
Discharge: HOME | End: 2024-03-26
Attending: INTERNAL MEDICINE | Admitting: INTERNAL MEDICINE
Payer: COMMERCIAL

## 2024-03-26 ENCOUNTER — ANESTHESIA (OUTPATIENT)
Dept: ENDOSCOPY | Facility: HOSPITAL | Age: 54
End: 2024-03-26
Payer: COMMERCIAL

## 2024-03-26 VITALS
DIASTOLIC BLOOD PRESSURE: 84 MMHG | OXYGEN SATURATION: 95 % | RESPIRATION RATE: 20 BRPM | TEMPERATURE: 97 F | HEART RATE: 85 BPM | SYSTOLIC BLOOD PRESSURE: 122 MMHG

## 2024-03-26 DIAGNOSIS — Z12.11 COLON CANCER SCREENING: Primary | ICD-10-CM

## 2024-03-26 LAB
POCT GLUCOSE: 52 MG/DL (ref 70–110)
POCT GLUCOSE: 67 MG/DL (ref 70–110)

## 2024-03-26 PROCEDURE — 37000008 HC ANESTHESIA 1ST 15 MINUTES: Performed by: INTERNAL MEDICINE

## 2024-03-26 PROCEDURE — G0121 COLON CA SCRN NOT HI RSK IND: HCPCS | Mod: 53,,, | Performed by: INTERNAL MEDICINE

## 2024-03-26 PROCEDURE — D9220A PRA ANESTHESIA: Mod: CRNA,,, | Performed by: STUDENT IN AN ORGANIZED HEALTH CARE EDUCATION/TRAINING PROGRAM

## 2024-03-26 PROCEDURE — 25000003 PHARM REV CODE 250: Performed by: STUDENT IN AN ORGANIZED HEALTH CARE EDUCATION/TRAINING PROGRAM

## 2024-03-26 PROCEDURE — 63600175 PHARM REV CODE 636 W HCPCS: Performed by: STUDENT IN AN ORGANIZED HEALTH CARE EDUCATION/TRAINING PROGRAM

## 2024-03-26 PROCEDURE — 37000009 HC ANESTHESIA EA ADD 15 MINS: Performed by: INTERNAL MEDICINE

## 2024-03-26 PROCEDURE — 25000003 PHARM REV CODE 250: Performed by: ANESTHESIOLOGY

## 2024-03-26 PROCEDURE — G0121 COLON CA SCRN NOT HI RSK IND: HCPCS | Mod: 74 | Performed by: INTERNAL MEDICINE

## 2024-03-26 PROCEDURE — D9220A PRA ANESTHESIA: Mod: ANES,,, | Performed by: ANESTHESIOLOGY

## 2024-03-26 RX ORDER — SODIUM CHLORIDE 9 MG/ML
INJECTION, SOLUTION INTRAVENOUS CONTINUOUS
Status: DISCONTINUED | OUTPATIENT
Start: 2024-03-26 | End: 2024-03-26 | Stop reason: HOSPADM

## 2024-03-26 RX ORDER — PROPOFOL 10 MG/ML
VIAL (ML) INTRAVENOUS
Status: DISCONTINUED | OUTPATIENT
Start: 2024-03-26 | End: 2024-03-26

## 2024-03-26 RX ORDER — LIDOCAINE HYDROCHLORIDE 20 MG/ML
INJECTION INTRAVENOUS
Status: DISCONTINUED | OUTPATIENT
Start: 2024-03-26 | End: 2024-03-26

## 2024-03-26 RX ORDER — LIDOCAINE HYDROCHLORIDE 20 MG/ML
INJECTION, SOLUTION EPIDURAL; INFILTRATION; INTRACAUDAL; PERINEURAL
Status: DISCONTINUED
Start: 2024-03-26 | End: 2024-03-26 | Stop reason: HOSPADM

## 2024-03-26 RX ORDER — PROPOFOL 10 MG/ML
VIAL (ML) INTRAVENOUS
Status: DISCONTINUED
Start: 2024-03-26 | End: 2024-03-26 | Stop reason: HOSPADM

## 2024-03-26 RX ADMIN — PROPOFOL 80 MG: 10 INJECTION, EMULSION INTRAVENOUS at 09:03

## 2024-03-26 RX ADMIN — SODIUM CHLORIDE: 0.9 INJECTION, SOLUTION INTRAVENOUS at 09:03

## 2024-03-26 RX ADMIN — PROPOFOL 20 MG: 10 INJECTION, EMULSION INTRAVENOUS at 09:03

## 2024-03-26 RX ADMIN — LIDOCAINE HYDROCHLORIDE 75 MG: 20 INJECTION, SOLUTION INTRAVENOUS at 09:03

## 2024-03-26 RX ADMIN — GLYCOPYRROLATE 0.2 MG: 0.2 INJECTION, SOLUTION INTRAMUSCULAR; INTRAVITREAL at 09:03

## 2024-03-26 NOTE — PROVATION PATIENT INSTRUCTIONS
Discharge Summary/Instructions after an Endoscopic Procedure  Patient Name: Africa Jennings  Patient MRN: [id number]  Patient YOB: 1970 Tuesday, March 26, 2024  Sangeetha Taylor MD  Dear patient,  As a result of recent federal legislation (The Federal Cures Act), you may   receive lab or pathology results from your procedure in your [facility]   account before your physician is able to contact you. Your physician or   their representative will relay the results to you with their   recommendations at their soonest availability.  Thank you,  RESTRICTIONS:  During your procedure today, you received medications for sedation.  These   medications may affect your judgment, balance and coordination.  Therefore,   for 24 hours, you have the following restrictions:   - DO NOT drive a car, operate machinery, make legal/financial decisions,   sign important papers or drink alcohol.    ACTIVITY:  Today: no heavy lifting, straining or running due to procedural   sedation/anesthesia.  The following day: return to full activity including work.  DIET:  Eat and drink normally unless instructed otherwise.     TREATMENT FOR COMMON SIDE EFFECTS:  - Mild abdominal pain, nausea, belching, bloating or excessive gas:  rest,   eat lightly and use a heating pad.  - Sore Throat: treat with throat lozenges and/or gargle with warm salt   water.  - Because air was used during the procedure, expelling large amounts of air   from your rectum or belching is normal.  - If a bowel prep was taken, you may not have a bowel movement for 1-3 days.    This is normal.  SYMPTOMS TO WATCH FOR AND REPORT TO YOUR PHYSICIAN:  1. Abdominal pain or bloating, other than gas cramps.  2. Chest pain.  3. Back pain.  4. Signs of infection such as: chills or fever occurring within 24 hours   after the procedure.  5. Rectal bleeding, which would show as bright red, maroon, or black stools.   (A tablespoon of blood from the rectum is not serious, especially  if   hemorrhoids are present.)  6. Vomiting.  7. Weakness or dizziness.  GO DIRECTLY TO THE NEAREST EMERGENCY ROOM IF YOU HAVE ANY OF THE FOLLOWING:      Difficulty breathing              Chills and/or fever over 101 F   Persistent vomiting and/or vomiting blood   Severe abdominal pain   Severe chest pain   Black, tarry stools   Bleeding- more than one tablespoon   Any other symptom or condition that you feel may need urgent attention  Your doctor recommends these additional instructions:  If any biopsies were taken, your doctors clinic will contact you in 1 to 2   weeks with any results.  - Discharge patient to home.   - Resume previous diet.   - Continue present medications.   - Repeat colonoscopy at the next available appointment on the 2nd floor with   Ra Cronin / advanced endoscopy service.  For questions, problems or results please call your physician - Sangeetha Taylor MD at Work:  [phone].  [facility] can be reached at [phone]     IF A COMPLICATION OR EMERGENCY SITUATION ARISES AND YOU ARE UNABLE TO REACH   YOUR PHYSICIAN - GO DIRECTLY TO THE EMERGENCY ROOM.  Sangeetha Taylor MD  3/26/2024 10:10:08 AM  This report has been verified and signed electronically.  Dear patient,  As a result of recent federal legislation (The Federal Cures Act), you may   receive lab or pathology results from your procedure in your [facility]   account before your physician is able to contact you. Your physician or   their representative will relay the results to you with their   recommendations at their soonest availability.  Thank you,  PROVATION

## 2024-03-26 NOTE — ANESTHESIA POSTPROCEDURE EVALUATION
Anesthesia Post Evaluation    Patient: Africa Ayakanerissa Jennings    Procedure(s) Performed: Procedure(s) (LRB):  COLONOSCOPY (N/A)    Final Anesthesia Type: general      Patient location during evaluation: GI PACU  Patient participation: Yes- Able to Participate  Level of consciousness: awake and alert  Post-procedure vital signs: reviewed and stable  Pain management: adequate  Airway patency: patent    PONV status at discharge: No PONV  Anesthetic complications: no      Cardiovascular status: hemodynamically stable  Respiratory status: unassisted and spontaneous ventilation  Hydration status: euvolemic  Follow-up not needed.              Vitals Value Taken Time   /73 03/26/24 1005   Temp 36.3 °C (97.3 °F) 03/26/24 1005   Pulse 108 03/26/24 1005   Resp 18 03/26/24 1005   SpO2 98 % 03/26/24 1005         No case tracking events are documented in the log.      Pain/Sophia Score: Sophia Score: 9 (3/26/2024 10:05 AM)

## 2024-03-26 NOTE — PLAN OF CARE
Procedure and recovery complete. Pt awake and alert. MD Taylor at bedside, procedure findings and suggestions discussed. Discharge instructions given, pt verbalized understanding of instruction. Iv was D/c'd, inner cannula intact. No distress noted. No c/o pain. Gait steady, able to ambulate without assistance. Pt walked out accompanied by sister.

## 2024-03-26 NOTE — H&P
Short Stay Endoscopy History and Physical    PCP - Brandi Thompson MD    Procedure - Colonoscopy  ASA - per anesthesia  Mallampati - per anesthesia  History of Anesthesia problems - no  Family history Anesthesia problems - no   Plan of anesthesia - General, MAC    HPI:  This is a 54 y.o. female here for evaluation of : asymptomatic screening exam      ROS:  Constitutional: No fevers, chills, No weight loss  CV: No chest pain  Pulm: No cough, No shortness of breath  GI: see HPI  Derm: No rash    Medical History:  has a past medical history of Diabetes mellitus, Diabetes mellitus, type 2, Eczema, GERD (gastroesophageal reflux disease), Hypertension, Impaired fasting blood sugar, and YSABEL on CPAP.    Surgical History:  has a past surgical history that includes breast reduction; Cholecystectomy; Esophagogastroduodenoscopy (8/18/14); Hysterectomy (2007); Oophorectomy; Total Reduction Mammoplasty; Epidural steroid injection (N/A, 1/11/2022); Injection of joint (Left, 2/8/2022); Decompression of thoracic outlet (N/A, 10/28/2022); Robot-assisted laparoscopic sleeve gastrectomy using da Lorenza Xi (N/A, 7/26/2023); Transforaminal epidural injection of steroid (Right, 9/26/2023); and Injection of joint (Bilateral, 10/31/2023).    Family History: family history includes Breast cancer in her sister; Cancer (age of onset: 58) in her father; Crohn's disease in her sister; Diabetes in her maternal grandmother and mother; Heart disease in her maternal grandmother and mother; Hypertension in her maternal grandmother and mother.. Otherwise no colon cancer, inflammatory bowel disease, or GI malignancies.    Social History:  reports that she has never smoked. She has never used smokeless tobacco. She reports that she does not drink alcohol and does not use drugs.    Review of patient's allergies indicates:  No Known Allergies    Medications:   Facility-Administered Medications Prior to Admission   Medication Dose Route Frequency  Provider Last Rate Last Admin    triamcinolone acetonide injection 10 mg  10 mg Intradermal Once Basilia Valdez MD        triamcinolone acetonide injection 10 mg  10 mg Intradermal Once Basilia Valdez MD        triamcinolone acetonide injection 5 mg  5 mg Intradermal Once Sarah Beaz MD         Medications Prior to Admission   Medication Sig Dispense Refill Last Dose    acetaminophen (TYLENOL) 500 MG tablet Take 2 tablets (1,000 mg total) by mouth every 8 (eight) hours as needed for Pain. 30 tablet 0     atorvastatin (LIPITOR) 20 MG tablet TAKE 1 TABLET BY MOUTH EVERY DAY 90 tablet 3     doxycycline monohydrate 100 mg Tab Take 1 tablet by mouth 2 (two) times daily.       ergocalciferol (ERGOCALCIFEROL) 50,000 unit Cap TAKE 2 CAPSULES BY MOUTH TWICE A WEEK 48 capsule 1     estradioL (ESTRACE) 1 MG tablet TAKE 1 TABLET BY MOUTH EVERY DAY 90 tablet 5     finasteride (PROSCAR) 5 mg tablet TAKE 1 TABLET BY MOUTH EVERY DAY 90 tablet 3     fluocinonide (LIDEX) 0.05 % ointment Apply to affected areas of scalp at bedtime 60 g 3     ketoconazole (NIZORAL) 2 % shampoo Wash hair with medicated shampoo at least 2x/week - let sit on scalp at least 5 minutes prior to rinsing 120 mL 5     mometasone (ELOCON) 0.1 % solution APPLY TOPICALLY ONCE DAILY. TO THINNING AREA OF SCALP 60 mL 5     neomycin-polymyxin-dexamethasone (MAXITROL) 3.5mg/mL-10,000 unit/mL-0.1 % DrpS as needed.       nystatin (MYCOSTATIN) ointment Apply topically 2 (two) times daily. USES PRN       omeprazole (PRILOSEC) 40 MG capsule TAKE 1 CAPSULE BY MOUTH EVERY MORNING. OPEN CAPSULE AND TAKE WITH APPLE SAUCE 90 capsule 1     ondansetron (ZOFRAN) 4 MG tablet TAKE 1 TABLET BY MOUTH EVERY 8 HOURS AS NEEDED 90 tablet 1     ondansetron (ZOFRAN-ODT) 8 MG TbDL Take 1 tablet (8 mg total) by mouth every 6 (six) hours as needed. 30 tablet 0     OZEMPIC 1 mg/dose (4 mg/3 mL) INJECT 1 MG INTO THE SKIN EVERY 7 DAYS 9 each 1     prednisoLONE acetate (PRED  FORTE) 1 % DrpS USES PRN       pregabalin (LYRICA) 75 MG capsule Take 1 capsule (75 mg total) by mouth 2 (two) times daily. 60 capsule 6     spironolactone (ALDACTONE) 50 MG tablet Take 1 tablet (50 mg total) by mouth once daily. 30 tablet 11     tiZANidine (ZANAFLEX) 4 MG tablet TAKE 1 TABLET BY MOUTH NIGHTLY AS NEEDED (MUSCLE SPASM). 90 tablet 2     triamcinolone acetonide 0.1% (KENALOG) 0.1 % cream Apply topically 2 (two) times daily. Apply topically 2 (two) times daily. 135 g 4     valsartan-hydrochlorothiazide (DIOVAN-HCT) 160-12.5 mg per tablet Take 1 tablet by mouth once daily. 90 tablet 1          Physical Exam:    Vital Signs: There were no vitals filed for this visit.    Gen: NAD, lying comfortably  HENT: NCAT, oropharynx clear  Eyes: anicteric sclerae, EOMI grossly  Neck: supple, no visible masses/goiter  Cardiac: RRR  Lungs: non-labored breathing  Abd: soft, NT/ND, normoactive BS  Ext: no LE edema, warm, well perfused  Skin: skin intact on exposed body parts, no visible rashes, lesions  Neuro: A&Ox4, neuro exam grossly intact, moves all extremities  Psych: appropriate mood, affect        Labs:  Lab Results   Component Value Date    WBC 5.97 01/24/2024    HGB 12.8 01/24/2024    HCT 40.0 01/24/2024     01/24/2024    CHOL 133 01/24/2024    TRIG 68 01/24/2024    HDL 44 01/24/2024    ALT 25 01/24/2024    AST 19 01/24/2024     01/24/2024    K 4.1 01/24/2024     01/24/2024    CREATININE 1.0 01/24/2024    BUN 20 01/24/2024    CO2 28 01/24/2024    TSH 2.113 07/05/2023    INR 1.0 10/17/2022    GLUF 101 06/19/2006    HGBA1C 6.2 (H) 01/24/2024       Plan:  Colonoscopy     I have explained the risks and benefits of endoscopy procedures to the patient including but not limited to bleeding, perforation, infection, and death.  The patient was asked if they understand and allowed to ask any further questions to their satisfaction.    Sangeetha Taylor MD

## 2024-03-26 NOTE — TRANSFER OF CARE
Anesthesia Transfer of Care Note    Patient: Africahoa Jennings    Procedure(s) Performed: Procedure(s) (LRB):  COLONOSCOPY (N/A)    Patient location: GI    Anesthesia Type: general    Transport from OR: Transported from OR on room air with adequate spontaneous ventilation    Post pain: adequate analgesia    Post assessment: no apparent anesthetic complications    Post vital signs: stable    Level of consciousness: sedated    Nausea/Vomiting: no nausea/vomiting    Complications: none    Transfer of care protocol was followed      Last vitals: Visit Vitals  /73 (Patient Position: Lying)   Pulse 108   Temp 36.3 °C (97.3 °F) (Oral)   Resp 18   LMP  (LMP Unknown)   SpO2 98%   Breastfeeding No

## 2024-03-26 NOTE — ANESTHESIA PREPROCEDURE EVALUATION
03/26/2024  Africa Jennings is a 54 y.o., female.      Pre-op Assessment    I have reviewed the Patient Summary Reports.     I have reviewed the Nursing Notes.       Review of Systems  Anesthesia Hx:  No problems with previous Anesthesia             Denies Family Hx of Anesthesia complications.    Denies Personal Hx of Anesthesia complications.                    Social:  Non-Smoker       Cardiovascular:  Exercise tolerance: good   Hypertension   Denies MI.        Denies Angina.                                  Pulmonary:        Sleep Apnea                Renal/:  Renal/ Normal                 Hepatic/GI:     GERD, well controlled   H/o gastrectomy          Musculoskeletal:  Arthritis               OB/GYN/PEDS:  S/p hysterectomy           Neurological:    Neuromuscular Disease,                                   Endocrine:  Diabetes               Physical Exam  General: Cooperative, Well nourished, Alert and Oriented    Airway:  Mallampati: II   Mouth Opening: Normal  TM Distance: 4 - 6 cm  Tongue: Normal  Neck ROM: Normal ROM    Dental:  Intact    Chest/Lungs:  Normal Respiratory Rate, Clear to auscultation    Heart:  Rate: Normal  Rhythm: Regular Rhythm      Wt Readings from Last 3 Encounters:   03/11/24 88.5 kg (195 lb 1.7 oz)   02/27/24 88.5 kg (195 lb 1.7 oz)   02/19/24 88.5 kg (195 lb)     Temp Readings from Last 3 Encounters:   03/11/24 36.4 °C (97.6 °F)   02/19/24 36.7 °C (98 °F) (Oral)   10/31/23 36.8 °C (98.2 °F) (Oral)     BP Readings from Last 3 Encounters:   03/11/24 117/79   02/27/24 119/76   02/19/24 100/70     Pulse Readings from Last 3 Encounters:   03/11/24 75   02/27/24 78   02/19/24 95         Anesthesia Plan  Type of Anesthesia, risks & benefits discussed:    Anesthesia Type: Gen Natural Airway, MAC, Gen ETT  Intra-op Monitoring Plan: Standard ASA Monitors  Post Op Pain  Control Plan: multimodal analgesia  Induction:  IV  Informed Consent: Informed consent signed with the Patient and all parties understand the risks and agree with anesthesia plan.  All questions answered.   ASA Score: 3  Day of Surgery Review of History & Physical: H&P Update referred to the surgeon/provider.    Ready For Surgery From Anesthesia Perspective.     .

## 2024-03-27 ENCOUNTER — PATIENT MESSAGE (OUTPATIENT)
Dept: FAMILY MEDICINE | Facility: CLINIC | Age: 54
End: 2024-03-27
Payer: COMMERCIAL

## 2024-03-28 ENCOUNTER — TELEPHONE (OUTPATIENT)
Dept: FAMILY MEDICINE | Facility: CLINIC | Age: 54
End: 2024-03-28
Payer: COMMERCIAL

## 2024-03-28 ENCOUNTER — PATIENT MESSAGE (OUTPATIENT)
Dept: SPINE | Facility: CLINIC | Age: 54
End: 2024-03-28
Payer: COMMERCIAL

## 2024-03-28 NOTE — TELEPHONE ENCOUNTER
----- Message from Aye Blackburn sent at 3/28/2024  9:23 AM CDT -----  Regarding: self [phone]  .Type:  Patient Returning Call    Who Called: self    Who Left Message for Patient: Tita    Does the patient know what this is regarding?yes    Would the patient rather a call back or a response via [facility]? Call back    Best Call Back Number: [phone]

## 2024-04-03 ENCOUNTER — PATIENT MESSAGE (OUTPATIENT)
Dept: BARIATRICS | Facility: CLINIC | Age: 54
End: 2024-04-03

## 2024-04-09 ENCOUNTER — PATIENT MESSAGE (OUTPATIENT)
Dept: BARIATRICS | Facility: CLINIC | Age: 54
End: 2024-04-09

## 2024-04-11 ENCOUNTER — ON-DEMAND VIRTUAL (OUTPATIENT)
Dept: URGENT CARE | Facility: CLINIC | Age: 54
End: 2024-04-11

## 2024-04-11 ENCOUNTER — PATIENT MESSAGE (OUTPATIENT)
Dept: URGENT CARE | Facility: CLINIC | Age: 54
End: 2024-04-11

## 2024-04-11 DIAGNOSIS — R42 VERTIGO: Primary | ICD-10-CM

## 2024-04-11 PROCEDURE — 99213 OFFICE O/P EST LOW 20 MIN: CPT | Mod: 95,,, | Performed by: FAMILY MEDICINE

## 2024-04-11 RX ORDER — MECLIZINE HYDROCHLORIDE 25 MG/1
25 TABLET ORAL 3 TIMES DAILY PRN
Qty: 15 TABLET | Refills: 0 | Status: SHIPPED | OUTPATIENT
Start: 2024-04-11 | End: 2024-04-16

## 2024-04-11 NOTE — PATIENT INSTRUCTIONS
Suspected positional vertigo, medication prescribed, if symptoms getting worse, please see provider in person.  Please to follow-up in 1 to 2 days

## 2024-04-11 NOTE — PROGRESS NOTES
Subjective:      Patient ID: Africa Jennings is a 54 y.o. female.    Vitals:  vitals were not taken for this visit.     Chief Complaint: Nausea (Today this am )      Visit Type: TELE AUDIOVISUAL    Present with the patient at the time of consultation: TELEMED PRESENT WITH PATIENT: None    Past Medical History:   Diagnosis Date    Diabetes mellitus     Diabetes mellitus, type 2     Eczema     GERD (gastroesophageal reflux disease)     Hypertension     Impaired fasting blood sugar     YSABEL on CPAP      Past Surgical History:   Procedure Laterality Date    breast reduction      CHOLECYSTECTOMY      laprascopic    COLONOSCOPY N/A 3/26/2024    Procedure: COLONOSCOPY;  Surgeon: Sangeetha Taylor MD;  Location: [facility];  Service: Endoscopy;  Laterality: N/A;  referral Vu [facility]. Suprep Instr. to portal.EC Not taking Ozempic any longer.EC  3/12/24-LVM regarding last dose Ozempic on or before 3/18/24 if restarted, instr portal-DS  3/19- lvm and portal msg for pc- pt returned call, pc complete. prep reordered. states last dose of ozempic was 3/16    DECOMPRESSION OF THORACIC OUTLET N/A 10/28/2022    Procedure: T9-12 ROBOTIC DECOMPRESSION, THORACIC FUSION;  Surgeon: Edouard Whitt DO;  Location: [facility] OR [address] [place] [address];  Service: Neurosurgery;  Laterality: N/A;  ANESTHESIA GENERAL [place] I BLOOD TYPE & SCREEN NERVE MONITORING EMG SEP MEP POSTION PRONE BED CARA 4 POST HEAD REST [facility] RADIOLOGY C-ARM GLOBUS PROTOCOL MISCELLANEOUS ALANIZ BRACE TLSO    EPIDURAL STEROID INJECTION N/A 1/11/2022    Procedure: INJECTION, STEROID, EPIDURAL IL L4/5 NEEDS CONSENT;  Surgeon: Britt Calix MD;  [facility]: [facility] T;  Service: Pain Management;  Laterality: N/A;    ESOPHAGOGASTRODUODENOSCOPY  8/18/14    HYSTERECTOMY  2007    laprascopic, total for fibroids.  Dr Polo    INJECTION OF JOINT Left 2/8/2022    Procedure: INJECTION, JOINT, SI LEFT NEEDS CONSENT;  Surgeon: Britt Calix MD;  [facility]: [facility] PAIN  MGT;  Service: Pain Management;  Laterality: Left;    INJECTION OF JOINT Bilateral 10/31/2023    Procedure: INJECTION, JOINT BILATERAL GTB;  Surgeon: Britt Calix MD;  [facility]: [facility];  Service: Pain Management;  Laterality: Bilateral;    OOPHORECTOMY      ROBOT-ASSISTED LAPAROSCOPIC SLEEVE GASTRECTOMY USING DA DEVI XI N/A 7/26/2023    Procedure: XI ROBOTIC SLEEVE GASTRECTOMY with intraop EGD;  Surgeon: Roge Rich MD;  Location: [facility] OR 2ND FLR;  Service: General;  Laterality: N/A;  with possible HHR    TOTAL REDUCTION MAMMOPLASTY      TRANSFORAMINAL EPIDURAL INJECTION OF STEROID Right 9/26/2023    Procedure: INJECTION, STEROID, EPIDURAL, TRANSFORAMINAL APPROACH, RIGHT L4/L5 AND L5/S1 SOONER DATE;  Surgeon: Britt Calix MD;  [facility]: [facility];  Service: Pain Management;  Laterality: Right;     Review of patient's allergies indicates:  No Known Allergies  Current Outpatient Medications on File Prior to Visit   Medication Sig Dispense Refill    atorvastatin (LIPITOR) 20 MG tablet TAKE 1 TABLET BY MOUTH EVERY DAY 90 tablet 3    estradioL (ESTRACE) 1 MG tablet TAKE 1 TABLET BY MOUTH EVERY DAY 90 tablet 5    omeprazole (PRILOSEC) 40 MG capsule TAKE 1 CAPSULE BY MOUTH EVERY MORNING. OPEN CAPSULE AND TAKE WITH APPLE SAUCE 90 capsule 1    spironolactone (ALDACTONE) 50 MG tablet Take 1 tablet (50 mg total) by mouth once daily. 30 tablet 11    acetaminophen (TYLENOL) 500 MG tablet Take 2 tablets (1,000 mg total) by mouth every 8 (eight) hours as needed for Pain. 30 tablet 0    doxycycline monohydrate 100 mg Tab Take 1 tablet by mouth 2 (two) times daily.      ergocalciferol (ERGOCALCIFEROL) 50,000 unit Cap TAKE 2 CAPSULES BY MOUTH TWICE A WEEK 48 capsule 1    finasteride (PROSCAR) 5 mg tablet TAKE 1 TABLET BY MOUTH EVERY DAY (Patient not taking: Reported on 4/11/2024) 90 tablet 3    fluocinonide (LIDEX) 0.05 % ointment Apply to affected areas of scalp at bedtime 60 g 3    ketoconazole  (NIZORAL) 2 % shampoo Wash hair with medicated shampoo at least 2x/week - let sit on scalp at least 5 minutes prior to rinsing (Patient not taking: Reported on 4/11/2024) 120 mL 5    mometasone (ELOCON) 0.1 % solution APPLY TOPICALLY ONCE DAILY. TO THINNING AREA OF SCALP 60 mL 5    neomycin-polymyxin-dexamethasone (MAXITROL) 3.5mg/mL-10,000 unit/mL-0.1 % DrpS as needed.      nystatin (MYCOSTATIN) ointment Apply topically 2 (two) times daily. USES PRN      ondansetron (ZOFRAN) 4 MG tablet TAKE 1 TABLET BY MOUTH EVERY 8 HOURS AS NEEDED 90 tablet 1    ondansetron (ZOFRAN-ODT) 8 MG TbDL Take 1 tablet (8 mg total) by mouth every 6 (six) hours as needed. 30 tablet 0    OZEMPIC 1 mg/dose (4 mg/3 mL) INJECT 1 MG INTO THE SKIN EVERY 7 DAYS (Patient not taking: Reported on 4/11/2024) 9 each 1    prednisoLONE acetate (PRED FORTE) 1 % DrpS USES PRN (Patient not taking: Reported on 4/11/2024)      pregabalin (LYRICA) 75 MG capsule Take 1 capsule (75 mg total) by mouth 2 (two) times daily. 60 capsule 6    tiZANidine (ZANAFLEX) 4 MG tablet TAKE 1 TABLET BY MOUTH NIGHTLY AS NEEDED (MUSCLE SPASM). (Patient not taking: Reported on 4/11/2024) 90 tablet 2    triamcinolone acetonide 0.1% (KENALOG) 0.1 % cream Apply topically 2 (two) times daily. Apply topically 2 (two) times daily. 135 g 4    valsartan-hydrochlorothiazide (DIOVAN-HCT) 160-12.5 mg per tablet Take 1 tablet by mouth once daily. 90 tablet 1     Current Facility-Administered Medications on File Prior to Visit   Medication Dose Route Frequency Provider Last Rate Last Admin    triamcinolone acetonide injection 10 mg  10 mg Intradermal Once Basilia Valdez MD        triamcinolone acetonide injection 10 mg  10 mg Intradermal Once Basilia Valdez MD        triamcinolone acetonide injection 5 mg  5 mg Intradermal Once Sarah Baez MD         Family History   Problem Relation Age of Onset    Diabetes Mother     Hypertension Mother     Heart disease Mother     Cancer  Father 58        Prostate    Crohn's disease Sister     Breast cancer Sister     Diabetes Maternal Grandmother     Heart disease Maternal Grandmother     Hypertension Maternal Grandmother     Amblyopia Neg Hx     Blindness Neg Hx     Cataracts Neg Hx     Glaucoma Neg Hx     Macular degeneration Neg Hx     Retinal detachment Neg Hx     Strabismus Neg Hx        Medications Ordered                [id number] [place] - [address]   [address] [address]    Telephone: [phone]   Fax: [phone]   Hours: Not open 24 hours                         E-Prescribed (1 of 1)              meclizine (ANTIVERT) 25 mg tablet    Sig: Take 1 tablet (25 mg total) by mouth 3 (three) times daily as needed for Dizziness.       Start: 4/11/24     Quantity: 15 tablet Refills: 0                           Ohs Peq Odvv Intake    4/11/2024  5:55 PM CDT - Filed by Patient   What is your current physical address in the event of a medical emergency? [address] [place] [address]   Are you able to take your vital signs? Yes   Systolic Blood Pressure: 117   Diastolic Blood Pressure: 77   Weight: 188   Height: 62   Pulse: 68   Temperature: 98.6   Respiration rate:    Pulse Oxygen:    Please attach any relevant images or files          Patient woke up this morning feeling  house  spinning  dizziness nauseous, no chest pain no shortness of breath, no abdominal pain, no new medication recently, patient has been  on multiple medications, but  no new medication, she is supposed to be on Ozempic shot today but she did not do it, she states that dizziness is related to the position, she has no fever, vital signs stable per patient's patient stated her sugar 78 her blood pressure is normal pulse is normal,    Nausea  Associated symptoms include nausea. Pertinent negatives include no fever, headaches or weakness.       Constitution: Negative for fever.   Respiratory:  Negative for chest tightness, shortness of  breath and wheezing.    Gastrointestinal:  Positive for nausea.   Neurological:  Positive for light-headedness. Negative for passing out, facial drooping, speech difficulty, coordination disturbances, loss of balance, headaches and disorientation.   Psychiatric/Behavioral:  Negative for disorientation.         Objective:   The physical exam was conducted virtually.  Physical Exam   Constitutional: She is oriented to person, place, and time.      Comments:Patient is able to getting up, ambulated freely, moving arms and legs freely, her gait is normal,     HENT:   Head: Normocephalic and atraumatic.   Eyes: Conjunctivae are normal.   Pulmonary/Chest: Effort normal. No respiratory distress.   Abdominal: Normal appearance.   Neurological: no focal deficit. She is alert and oriented to person, place, and time.   Skin: Skin is no rash.   Psychiatric: Mood, judgment and thought content normal.       Assessment:     1. Vertigo        Plan:       Vertigo  -     meclizine (ANTIVERT) 25 mg tablet; Take 1 tablet (25 mg total) by mouth 3 (three) times daily as needed for Dizziness.  Dispense: 15 tablet; Refill: 0        Suspected positional vertigo, medication prescribed, if symptoms getting worse, please see provider in person.  Please to follow-up in 1 to 2 days

## 2024-04-26 RX ORDER — ONDANSETRON 8 MG/1
8 TABLET, ORALLY DISINTEGRATING ORAL EVERY 6 HOURS PRN
Qty: 30 TABLET | Refills: 0 | Status: SHIPPED | OUTPATIENT
Start: 2024-04-26

## 2024-05-03 ENCOUNTER — PATIENT OUTREACH (OUTPATIENT)
Dept: ADMINISTRATIVE | Facility: HOSPITAL | Age: 54
End: 2024-05-03
Payer: COMMERCIAL

## 2024-05-10 ENCOUNTER — TELEPHONE (OUTPATIENT)
Dept: PAIN MEDICINE | Facility: CLINIC | Age: 54
End: 2024-05-10
Payer: COMMERCIAL

## 2024-05-10 ENCOUNTER — PATIENT MESSAGE (OUTPATIENT)
Dept: PAIN MEDICINE | Facility: OTHER | Age: 54
End: 2024-05-10
Payer: COMMERCIAL

## 2024-05-10 NOTE — TELEPHONE ENCOUNTER
----- Message from Kalyani Yap Debbie sent at 5/10/2024  1:32 PM CDT -----  Regarding: call back  Type: Patient Call Back    Who called: pt     What is the request in detail: requesting call to reschedule upcoming procedure, states she is sick     Can the clinic reply by [facility]?no    Would the patient rather a call back or a response via [facility]? call    Best call back number: [phone]      Additional Information:

## 2024-05-14 ENCOUNTER — PATIENT MESSAGE (OUTPATIENT)
Dept: BARIATRICS | Facility: CLINIC | Age: 54
End: 2024-05-14
Payer: COMMERCIAL

## 2024-05-29 ENCOUNTER — PATIENT MESSAGE (OUTPATIENT)
Dept: ADMINISTRATIVE | Facility: OTHER | Age: 54
End: 2024-05-29
Payer: COMMERCIAL

## 2024-05-30 ENCOUNTER — PATIENT MESSAGE (OUTPATIENT)
Dept: ADMINISTRATIVE | Facility: OTHER | Age: 54
End: 2024-05-30
Payer: COMMERCIAL

## 2024-06-03 RX ORDER — PREGABALIN 75 MG/1
75 CAPSULE ORAL 2 TIMES DAILY
Qty: 60 CAPSULE | Refills: 6 | Status: SHIPPED | OUTPATIENT
Start: 2024-06-03 | End: 2024-12-02

## 2024-06-04 ENCOUNTER — HOSPITAL ENCOUNTER (OUTPATIENT)
Facility: OTHER | Age: 54
Discharge: HOME | End: 2024-06-04
Attending: ANESTHESIOLOGY | Admitting: ANESTHESIOLOGY
Payer: COMMERCIAL

## 2024-06-04 VITALS
HEART RATE: 63 BPM | DIASTOLIC BLOOD PRESSURE: 67 MMHG | OXYGEN SATURATION: 100 % | WEIGHT: 180 LBS | HEIGHT: 62 IN | SYSTOLIC BLOOD PRESSURE: 126 MMHG | TEMPERATURE: 98 F | BODY MASS INDEX: 33.13 KG/M2 | RESPIRATION RATE: 16 BRPM

## 2024-06-04 DIAGNOSIS — M70.61 GREATER TROCHANTERIC BURSITIS OF RIGHT HIP: Primary | ICD-10-CM

## 2024-06-04 DIAGNOSIS — G89.29 CHRONIC PAIN: ICD-10-CM

## 2024-06-04 LAB — POCT GLUCOSE: 87 MG/DL (ref 70–110)

## 2024-06-04 PROCEDURE — 25500020 PHARM REV CODE 255: Performed by: ANESTHESIOLOGY

## 2024-06-04 PROCEDURE — 63600175 PHARM REV CODE 636 W HCPCS: Mod: JZ,JG | Performed by: ANESTHESIOLOGY

## 2024-06-04 PROCEDURE — 25000003 PHARM REV CODE 250: Performed by: ANESTHESIOLOGY

## 2024-06-04 PROCEDURE — 20610 DRAIN/INJ JOINT/BURSA W/O US: CPT | Mod: RT | Performed by: ANESTHESIOLOGY

## 2024-06-04 PROCEDURE — 20610 DRAIN/INJ JOINT/BURSA W/O US: CPT | Mod: RT,,, | Performed by: ANESTHESIOLOGY

## 2024-06-04 RX ORDER — BUPIVACAINE HYDROCHLORIDE 2.5 MG/ML
INJECTION, SOLUTION EPIDURAL; INFILTRATION; INTRACAUDAL
Status: DISCONTINUED | OUTPATIENT
Start: 2024-06-04 | End: 2024-06-04 | Stop reason: HOSPADM

## 2024-06-04 RX ORDER — SODIUM CHLORIDE 9 MG/ML
INJECTION, SOLUTION INTRAVENOUS CONTINUOUS
Status: DISCONTINUED | OUTPATIENT
Start: 2024-06-04 | End: 2024-06-04 | Stop reason: HOSPADM

## 2024-06-04 RX ORDER — TRIAMCINOLONE ACETONIDE 40 MG/ML
INJECTION, SUSPENSION INTRA-ARTICULAR; INTRAMUSCULAR
Status: DISCONTINUED | OUTPATIENT
Start: 2024-06-04 | End: 2024-06-04 | Stop reason: HOSPADM

## 2024-06-04 RX ORDER — LIDOCAINE HYDROCHLORIDE 20 MG/ML
INJECTION, SOLUTION INFILTRATION; PERINEURAL
Status: DISCONTINUED | OUTPATIENT
Start: 2024-06-04 | End: 2024-06-04 | Stop reason: HOSPADM

## 2024-06-04 NOTE — DISCHARGE INSTRUCTIONS

## 2024-06-04 NOTE — H&P
HPI  Patient presenting for Procedure(s) (LRB):  INJECTION, JOINT RIGHT GTB (Right)       No health changes since previous encounter    Past Medical History:   Diagnosis Date    Diabetes mellitus     Diabetes mellitus, type 2     Eczema     GERD (gastroesophageal reflux disease)     Hypertension     Impaired fasting blood sugar     YSABEL on CPAP      Past Surgical History:   Procedure Laterality Date    breast reduction      CHOLECYSTECTOMY      laprascopic    COLONOSCOPY N/A 3/26/2024    Procedure: COLONOSCOPY;  Surgeon: Sangeetha Taylor MD;  Location: [facility];  Service: Endoscopy;  Laterality: N/A;  [place] [facility] Suprep Instr. to portal.EC Not taking Ozempic any longer.EC  3/12/24-LVM regarding last dose Ozempic on or before 3/18/24 if restarted, instr portal-DS  3/19- lvm and portal msg for pc- pt returned call, pc complete. prep reordered. states last dose of ozempic was 3/16    DECOMPRESSION OF THORACIC OUTLET N/A 10/28/2022    Procedure: T9-12 ROBOTIC DECOMPRESSION, THORACIC FUSION;  Surgeon: Edouard Whitt DO;  Location: [address];  Service: Neurosurgery;  Laterality: N/A;  ANESTHESIA GENERAL [place] I BLOOD TYPE & SCREEN NERVE MONITORING EMG SEP MEP POSTION PRONE BED CARA 4 POST HEAD REST [facility] RADIOLOGY C-ARM GLOBUS PROTOCOL MISCELLANEOUS ALANIZ BRACE TLSO    EPIDURAL STEROID INJECTION N/A 1/11/2022    Procedure: INJECTION, STEROID, EPIDURAL IL L4/5 NEEDS CONSENT;  Surgeon: Britt Calix MD;  Location: [facility] PAIN MGT;  Service: Pain Management;  Laterality: N/A;    ESOPHAGOGASTRODUODENOSCOPY  8/18/14    HYSTERECTOMY  2007    laprascopic, total for fibroids.  Dr Polo    INJECTION OF JOINT Left 2/8/2022    Procedure: INJECTION, JOINT, SI LEFT NEEDS CONSENT;  Surgeon: Britt Calix MD;  Location: [facility] PAIN MGT;  Service: Pain Management;  Laterality: Left;    INJECTION OF JOINT Bilateral 10/31/2023    Procedure: INJECTION, JOINT BILATERAL GTB;  Surgeon: Britt Calix  "MD KANA;  Location: [facility] PAIN MGT;  Service: Pain Management;  Laterality: Bilateral;    OOPHORECTOMY      ROBOT-ASSISTED LAPAROSCOPIC SLEEVE GASTRECTOMY USING DA DEVI XI N/A 7/26/2023    Procedure: XI ROBOTIC SLEEVE GASTRECTOMY with intraop EGD;  Surgeon: Roge Rich MD;  Location: [facility] OR 2ND FLR;  Service: General;  Laterality: N/A;  with possible HHR    TOTAL REDUCTION MAMMOPLASTY      TRANSFORAMINAL EPIDURAL INJECTION OF STEROID Right 9/26/2023    Procedure: INJECTION, STEROID, EPIDURAL, TRANSFORAMINAL APPROACH, RIGHT L4/L5 AND L5/S1 SOONER DATE;  Surgeon: Britt Calix MD;  Location: [facility] PAIN MGT;  Service: Pain Management;  Laterality: Right;     Review of patient's allergies indicates:  No Known Allergies   Current Facility-Administered Medications   Medication    0.9%  NaCl infusion       PMHx, PSHx, Allergies, Medications reviewed in epic    ROS negative except pain complaints in HPI    OBJECTIVE:    /88 (BP Location: Right arm, Patient Position: Sitting)   Pulse 71   Temp 98.1 °F (36.7 °C) (Oral)   Resp 16   Ht 5' 2" (1.575 m)   Wt 81.6 kg (180 lb)   LMP  (LMP Unknown)   SpO2 98%   BMI 32.92 kg/m²     PHYSICAL EXAMINATION:    GENERAL: Well appearing, in no acute distress, alert and oriented x3.  PSYCH:  Mood and affect appropriate.  SKIN: Skin color, texture, turgor normal, no rashes or lesions which will impact the procedure.  CV: Regular Rate   PULM: No evidence of respiratory difficulty, symmetric chest rise.   NEURO: Cranial nerves grossly intact.    Plan:    Proceed with procedure as planned Procedure(s) (LRB):  INJECTION, JOINT RIGHT GTB (Right)    Jason Bean  06/04/2024            "

## 2024-06-04 NOTE — OP NOTE
Greater Trochanteric Bursa Injection under Fluoroscopic Guidance    The procedure, risks, benefits, and options were discussed with the patient. There are no contraindications to the procedure. The patent expressed understanding and agreed to the procedure. Informed written consent was obtained prior to the start of the procedure and can be found in the patient's chart.    PATIENT NAME: Mrs. Africa Jennings   MRN: [id number]     DATE OF PROCEDURE: 06/04/2024    PROCEDURE: Right Greater Trochanteric Bursa Injection under Fluoroscopic Guidance    PRE-OP DIAGNOSIS: Greater trochanteric bursitis of right hip [M70.61]    POST-OP DIAGNOSIS: Same    PHYSICIAN: Britt Calix MD    ASSISTANTS: Dr. Robledo    MEDICATIONS INJECTED: Preservative-free Kenalog 40mg with 7cc of Bupivacine 0.25%     LOCAL ANESTHETIC INJECTED: Xylocaine 2%     SEDATION: None    ESTIMATED BLOOD LOSS: None    COMPLICATIONS: None    TECHNIQUE: Time-out was performed to identify the patient and procedure to be performed. With the patient laying in a prone position, the surgical area was prepped and draped in the usual sterile fashion using ChloraPrep and a fenestrated drape. The area overlying the greater trochanteric bursa was determined under fluoroscopy guidance. Skin anesthesia was achieved by injecting Lidocaine 2% over the injection site. The greater trochanteric bursa was then approached with a 22 gauge, 3.5 inch spinal quinke needle that was introduced under fluoroscopic guidance in the AP view. Once the needle tip was in the area of the bursa, and there was no blood aspiration, Contrast dye  Omnipaque (300mg/mL) was injected to confirm placement and there was no vascular runoff. 4 mL of the medication mixture listed above was injected slowly. The needles were removed and bleeding was nil. A sterile dressing was applied. No specimens collected. The patient tolerated the procedure well.    The patient was monitored after the procedure in  the recovery area. They were given post-procedure and discharge instructions to follow at home. The patient was discharged in a stable condition.      Britt Calix MD

## 2024-06-04 NOTE — DISCHARGE SUMMARY
Discharge Note  Short Stay      SUMMARY     Admit Date: 6/4/2024    Attending Physician: Britt Calix      Discharge Physician: Britt Calix      Discharge Date: 6/4/2024 8:12 AM    Procedure(s) (LRB):  INJECTION, JOINT RIGHT GTB (Right)    Final Diagnosis: Greater trochanteric bursitis of right hip [M70.61]    Disposition: Home or self care    Patient Instructions:   Current Discharge Medication List        CONTINUE these medications which have NOT CHANGED    Details   acetaminophen (TYLENOL) 500 MG tablet Take 2 tablets (1,000 mg total) by mouth every 8 (eight) hours as needed for Pain.  Qty: 30 tablet, Refills: 0      atorvastatin (LIPITOR) 20 MG tablet TAKE 1 TABLET BY MOUTH EVERY DAY  Qty: 90 tablet, Refills: 3    Associated Diagnoses: Hyperlipidemia, unspecified hyperlipidemia type      doxycycline monohydrate 100 mg Tab Take 1 tablet by mouth 2 (two) times daily.      ergocalciferol (ERGOCALCIFEROL) 50,000 unit Cap TAKE 2 CAPSULES BY MOUTH TWICE A WEEK  Qty: 48 capsule, Refills: 1    Associated Diagnoses: Vitamin D deficiency      estradioL (ESTRACE) 1 MG tablet TAKE 1 TABLET BY MOUTH EVERY DAY  Qty: 90 tablet, Refills: 5    Associated Diagnoses: Menopause syndrome; Hot flash, menopausal      finasteride (PROSCAR) 5 mg tablet TAKE 1 TABLET BY MOUTH EVERY DAY  Qty: 90 tablet, Refills: 3    Associated Diagnoses: Androgenetic alopecia      fluocinonide (LIDEX) 0.05 % ointment Apply to affected areas of scalp at bedtime  Qty: 60 g, Refills: 3    Associated Diagnoses: Cicatricial alopecia      ketoconazole (NIZORAL) 2 % shampoo Wash hair with medicated shampoo at least 2x/week - let sit on scalp at least 5 minutes prior to rinsing  Qty: 120 mL, Refills: 5    Associated Diagnoses: Androgenetic alopecia      meclizine (ANTIVERT) 25 mg tablet Take 1 tablet (25 mg total) by mouth 3 (three) times daily as needed for Dizziness.  Qty: 15 tablet, Refills: 0    Associated Diagnoses: Vertigo      mometasone  (ELOCON) 0.1 % solution APPLY TOPICALLY ONCE DAILY. TO THINNING AREA OF SCALP  Qty: 60 mL, Refills: 5    Associated Diagnoses: Traction alopecia      neomycin-polymyxin-dexamethasone (MAXITROL) 3.5mg/mL-10,000 unit/mL-0.1 % DrpS as needed.      nystatin (MYCOSTATIN) ointment Apply topically 2 (two) times daily. USES PRN      omeprazole (PRILOSEC) 40 MG capsule TAKE 1 CAPSULE BY MOUTH EVERY MORNING. OPEN CAPSULE AND TAKE WITH APPLE SAUCE  Qty: 90 capsule, Refills: 1      ondansetron (ZOFRAN) 4 MG tablet TAKE 1 TABLET BY MOUTH EVERY 8 HOURS AS NEEDED  Qty: 90 tablet, Refills: 1    Associated Diagnoses: Gastroesophageal reflux disease without esophagitis      ondansetron (ZOFRAN-ODT) 8 MG TbDL Take 1 tablet (8 mg total) by mouth every 6 (six) hours as needed.  Qty: 30 tablet, Refills: 0      OZEMPIC 1 mg/dose (4 mg/3 mL) INJECT 1 MG INTO THE SKIN EVERY 7 DAYS  Qty: 9 each, Refills: 1    Associated Diagnoses: Type 2 diabetes mellitus without complication, without long-term current use of insulin      prednisoLONE acetate (PRED FORTE) 1 % DrpS USES PRN      pregabalin (LYRICA) 75 MG capsule Take 1 capsule (75 mg total) by mouth 2 (two) times daily.  Qty: 60 capsule, Refills: 6      spironolactone (ALDACTONE) 50 MG tablet Take 1 tablet (50 mg total) by mouth once daily.  Qty: 30 tablet, Refills: 11    Comments: .  Associated Diagnoses: Androgenetic alopecia      tiZANidine (ZANAFLEX) 4 MG tablet TAKE 1 TABLET BY MOUTH NIGHTLY AS NEEDED (MUSCLE SPASM).  Qty: 90 tablet, Refills: 2    Associated Diagnoses: Muscle spasm      triamcinolone acetonide 0.1% (KENALOG) 0.1 % cream Apply topically 2 (two) times daily. Apply topically 2 (two) times daily.  Qty: 135 g, Refills: 4    Associated Diagnoses: Eczema, unspecified type      valsartan-hydrochlorothiazide (DIOVAN-HCT) 160-12.5 mg per tablet Take 1 tablet by mouth once daily.  Qty: 90 tablet, Refills: 1    Comments: .  Associated Diagnoses: Essential hypertension                  Discharge Diagnosis: Greater trochanteric bursitis of right hip [M70.61]  Condition on Discharge: Stable with no complications to procedure   Diet on Discharge: Same as before.  Activity: as per instruction sheet.  Discharge to: Home with a responsible adult.  Follow up: 2-4 weeks       Please call my office or pager at [phone] if experienced any weakness or loss of sensation, fever > 101.5, pain uncontrolled with oral medications, persistent nausea/vomiting/or diarrhea, redness or drainage from the incisions, or any other worrisome concerns. If physician on call was not reached or could not communicate with our office for any reason please go to the nearest emergency department      Britt Calix  06/04/2024

## 2024-06-10 ENCOUNTER — OFFICE VISIT (OUTPATIENT)
Dept: DERMATOLOGY | Facility: CLINIC | Age: 54
End: 2024-06-10
Payer: COMMERCIAL

## 2024-06-10 DIAGNOSIS — L64.9 ANDROGENETIC ALOPECIA: ICD-10-CM

## 2024-06-10 DIAGNOSIS — L65.8 TRACTION ALOPECIA: Primary | ICD-10-CM

## 2024-06-10 PROCEDURE — 99999 PR PBB SHADOW E&M-EST. PATIENT-LVL IV: CPT | Mod: PBBFAC,,, | Performed by: STUDENT IN AN ORGANIZED HEALTH CARE EDUCATION/TRAINING PROGRAM

## 2024-06-10 PROCEDURE — 11901 INJECT SKIN LESIONS >7: CPT | Mod: S$GLB,,, | Performed by: STUDENT IN AN ORGANIZED HEALTH CARE EDUCATION/TRAINING PROGRAM

## 2024-06-10 PROCEDURE — 99214 OFFICE O/P EST MOD 30 MIN: CPT | Mod: 25,S$GLB,, | Performed by: STUDENT IN AN ORGANIZED HEALTH CARE EDUCATION/TRAINING PROGRAM

## 2024-06-10 NOTE — PROGRESS NOTES
Subjective:      Patient ID:  Africa Jennings is a 54 y.o. female who presents for   Chief Complaint   Patient presents with    Hair Loss     Follow up      LV 3- with me, former pt of Dr. Valdez    Interval: 6/10/2024  54 y.o. female presents today for hair loss f/u. Finasteride daily. Spironolactone daily. Minoxidil topical 2x/day. Denies any itching. States condition is improving greatly in frontal hair, started all treatments around 3-2023 over 1 year ago.  Utilizing more loose styles    Initial:3/20/2024  Former pt of Dr. Valdez. Reports hair loss of frontal hairline progressive worsening past 5 years. She has no pruritus. She is on spironolactone 100 mg qd (started 6-2023 discussed with PCP Dr. Thompson prior- she also has hirsutism), finasteride 5 mg qd (Since 2-2023), and has had serial ILK starting in 3-2023. She is noticing mild improvements and says her  notices improvement too.  She also uses ketoconazole shampoo and 5% topical minoxidil QD.   CMP 1-2024 wnl with K+ 4.1    Pertinent labs:  Lab Results       Component                Value               Date                       TSH                      2.113               07/05/2023            Lab Results       Component                Value               Date                       WBC                      5.97                01/24/2024                 HGB                      12.8                01/24/2024                 HCT                      40.0                01/24/2024                 MCV                      86                  01/24/2024                 PLT                      285                 01/24/2024              Lab Results       Component                Value               Date                       UIBC                     342                 02/29/2008                 IRON                     97                  01/24/2024                 TRANSFERRIN              294                 01/24/2024                  TIBC                     435                 01/24/2024                 FESATURATED              22                  01/24/2024             Lab Results       Component                Value               Date                       [id number]             55                  01/24/2024                    Review of Systems    Objective:   Physical Exam   Skin:   Areas Examined (abnormalities noted in diagram):   Scalp / Hair Palpated and Inspected     Diagram Legend     Erythematous scaling macule/papule c/w actinic keratosis       Vascular papule c/w angioma      Pigmented verrucoid papule/plaque c/w seborrheic keratosis      Yellow umbilicated papule c/w sebaceous hyperplasia      Irregularly shaped tan macule c/w lentigo     1-2 mm smooth white papules consistent with Milia      Movable subcutaneous cyst with punctum c/w epidermal inclusion cyst      Subcutaneous movable cyst c/w pilar cyst      Firm pink to brown papule c/w dermatofibroma      Pedunculated fleshy papule(s) c/w skin tag(s)      Evenly pigmented macule c/w junctional nevus     Mildly variegated pigmented, slightly irregular-bordered macule c/w mildly atypical nevus      Flesh colored to evenly pigmented papule c/w intradermal nevus       Pink pearly papule/plaque c/w basal cell carcinoma      Erythematous hyperkeratotic cursted plaque c/w SCC      Surgical scar with no sign of skin cancer recurrence      Open and closed comedones      Inflammatory papules and pustules      Verrucoid papule consistent consistent with wart     Erythematous eczematous patches and plaques     Dystrophic onycholytic nail with subungual debris c/w onychomycosis     Umbilicated papule    Erythematous-base heme-crusted tan verrucoid plaque consistent with inflamed seborrheic keratosis     Erythematous Silvery Scaling Plaque c/w Psoriasis     See annotation                  Assessment / Plan:      Traction alopecia  Frontotemporal hairline thinning with fringe sign  and areas of scarring/no hair growth  No erythema/pruritus/scale     Status: chronic condition flaring and/or not at treatment goal     Recommend wear hair natural if possible and avoid tight christopher, wear more loose/protective styles and alternate styles frequently   She is noticing good improvements and very happy with treatments, wishes to continue treatments   Continue serial photo     Plan:  Continue finasteride 5 mg QD reviewed potential SE sexual dysfunction, low libido, depression  Continue spironolactone 100 mg QD (Also for hirsutism) reviewed SE elevation of K+ - recently checked 4.1 and wnl 1/2024 need recheck at least annually  Continue topical minoxidil 5% QD   Pt wishes for ILK injections today  Intralesional Kenalog 5 mg/cc (3 cc total) injected into >7 lesions on the frontal hairline today after obtaining verbal consent including risk of surrounding hypopigmentation. Patient tolerated procedure well.     Units: 1  NDC for Kenalog 10mg/cc:  4139-6460-24    RTC 3 months, sooner prn

## 2024-06-11 ENCOUNTER — PATIENT MESSAGE (OUTPATIENT)
Dept: BARIATRICS | Facility: CLINIC | Age: 54
End: 2024-06-11
Payer: COMMERCIAL

## 2024-06-11 ENCOUNTER — PATIENT MESSAGE (OUTPATIENT)
Dept: ADMINISTRATIVE | Facility: OTHER | Age: 54
End: 2024-06-11
Payer: COMMERCIAL

## 2024-06-11 ENCOUNTER — PATIENT MESSAGE (OUTPATIENT)
Dept: DERMATOLOGY | Facility: CLINIC | Age: 54
End: 2024-06-11
Payer: COMMERCIAL

## 2024-06-11 ENCOUNTER — TELEPHONE (OUTPATIENT)
Dept: ENDOSCOPY | Facility: HOSPITAL | Age: 54
End: 2024-06-11
Payer: COMMERCIAL

## 2024-06-11 DIAGNOSIS — L64.9 ANDROGENETIC ALOPECIA: ICD-10-CM

## 2024-06-11 DIAGNOSIS — N95.1 MENOPAUSE SYNDROME: ICD-10-CM

## 2024-06-11 DIAGNOSIS — N95.1 HOT FLASH, MENOPAUSAL: ICD-10-CM

## 2024-06-11 DIAGNOSIS — Z12.11 SCREEN FOR COLON CANCER: Primary | ICD-10-CM

## 2024-06-11 RX ORDER — SODIUM, POTASSIUM,MAG SULFATES 17.5-3.13G
1 SOLUTION, RECONSTITUTED, ORAL ORAL DAILY
Qty: 1 KIT | Refills: 0 | Status: SHIPPED | OUTPATIENT
Start: 2024-06-11 | End: 2024-06-13

## 2024-06-11 RX ORDER — ESTRADIOL 1 MG/1
1 TABLET ORAL DAILY
Qty: 90 TABLET | Refills: 1 | Status: SHIPPED | OUTPATIENT
Start: 2024-06-11

## 2024-06-11 RX ORDER — SPIRONOLACTONE 50 MG/1
50 TABLET, FILM COATED ORAL DAILY
Qty: 90 TABLET | Refills: 3 | Status: SHIPPED | OUTPATIENT
Start: 2024-06-11 | End: 2025-06-11

## 2024-06-11 NOTE — TELEPHONE ENCOUNTER
Refill Decision Note   Africa Friedmansantino  is requesting a refill authorization.  Brief Assessment and Rationale for Refill:  Approve     Medication Therapy Plan:         Comments:     Note composed:11:45 AM 06/11/2024

## 2024-06-11 NOTE — TELEPHONE ENCOUNTER
----- Message from Manny Cronin MD sent at 6/10/2024  3:41 PM CDT -----  Regarding: RE: single/double balloon  I would schedule with me in a 60-min general GI slot, starting with the slim pediatric colonoscope.  We can have the single-balloon scope available in case needed.  ----- Message -----  From: Angelica Laughlin RN  Sent: 6/5/2024   4:56 PM CDT  To: Manny Cronin MD  Subject: single/double balloon                            Hello Dr. Cronin,    This patient called to schedule her colonoscopy after an unsuccessful attempt on 3/26/24.  Dr. Taylor was not able to get past the tortuous sigmoid colon with a regular scope, and was also unsuccessful with a PCF.  Would you recommend scheduling her as a single balloon or double balloon?    Thanks,  Angelica

## 2024-06-11 NOTE — TELEPHONE ENCOUNTER
Spoke to patient to schedule Colonoscopy       Physician to perform procedure(s) Dr. TARUN Cronin  Date of Procedure (s) 8/27/24  Arrival Time 7:45 AM  Time of Procedure(s) 8:45 AM   Location of Procedure(s) [place] [address]  Type of Rx Prep sent to patient's pharmacy: [facility]  Instructions provided to patient via [facility]    The following information was discussed with patient, and patient verbalized understanding:  Screening questionnaire reviewed with patient and complete. If procedure requires anesthesia, a responsible adult needs to be present to accompany the patient home. Appointment details are tentative, especially check-in time. Patient will receive a pre-op call 7 days prior to appointment to confirm check-in time for procedure. If applicable the patient should contact their pharmacy to verify Rx for procedure prep is ready for pick-up. Patient was instructed to call the scheduling department at [phone] if pharmacy states no Rx is available. Patient was also advised to call the endoscopy scheduling department if any questions or concerns arise.       Endoscopy Scheduling Department

## 2024-06-17 ENCOUNTER — OFFICE VISIT (OUTPATIENT)
Dept: PAIN MEDICINE | Facility: CLINIC | Age: 54
End: 2024-06-17
Payer: COMMERCIAL

## 2024-06-17 VITALS
WEIGHT: 180.31 LBS | DIASTOLIC BLOOD PRESSURE: 89 MMHG | BODY MASS INDEX: 32.98 KG/M2 | RESPIRATION RATE: 18 BRPM | SYSTOLIC BLOOD PRESSURE: 121 MMHG

## 2024-06-17 DIAGNOSIS — G89.4 CHRONIC PAIN SYNDROME: Primary | ICD-10-CM

## 2024-06-17 DIAGNOSIS — M70.62 TROCHANTERIC BURSITIS OF BOTH HIPS: ICD-10-CM

## 2024-06-17 DIAGNOSIS — M70.61 TROCHANTERIC BURSITIS OF BOTH HIPS: ICD-10-CM

## 2024-06-17 PROCEDURE — 99999 PR PBB SHADOW E&M-EST. PATIENT-LVL IV: CPT | Mod: PBBFAC,,, | Performed by: NURSE PRACTITIONER

## 2024-06-17 PROCEDURE — 99213 OFFICE O/P EST LOW 20 MIN: CPT | Mod: S$GLB,,, | Performed by: NURSE PRACTITIONER

## 2024-06-17 NOTE — PROGRESS NOTES
Chronic Pain-Established Note (Follow up visit)    SUBJECTIVE:    Interval History 6/17/2024:  The patient returns for follow up of right hip pain. She is s/p right GTB injection on 6/4/24 with significant benefit. She still has some pain intermittently but says that it is mild. No other complaints. She continues to take Lyrica. Her pain today is 3/10.    Interval History 10/12/23: Africa Jennings returns for follow up. At our last visit, which was virtual, she was having a lot of radiating pain. Unfortunately, the TFESI we performed was not helpful. She presents today in person and I am able to examine her. Today, on examination, it is clear that this pain is reproduced with palpation of her GTBs. She continues to note 7/10 pain which is worse with laying on her side and improved with movement. It is impacting her work.     Interval History 8/28/2023:  Africa Jennings presents tele-medicine appointment for a follow-up appointment for R sided lower back and focal R L5 radicular pain. She states leg pain > back pain. She describes a sharp, shooting pain in her R buttocks that radiates to her R mid calf posteriorly and is associated with burning, numbness, and tingling. Worse with activity, bending, lifting, night time. She states the R leg is subjectively weaker than the left.. Since the last visit, Africa Jennings states the pain has been worsening. Current pain intensity is 7/10. Current medications include lyrica, zanaflex, tylenol with some relief. She has completed physical therapy and aqua therapy (March 2023 - April 2023) to help strengthen her back and notes benefit. She continues with Home exercise program and is waiting to hear back from [facility] to schedule her. Patient was supposed to have R L4/5 and L5/S1 TFESI done since last visit, however, there was some issue with scheduling or insurance (the patient is unsure which). She is interested in having  the procedure rescheduled to help alleviate her symptoms. Patient denies red flags including weakness, unexpected weight loss/gain, night sweats/fevers, saddle anesthesia, and symptoms of VERA.    Interval History 4/26/2023:  Mrs Jennings presents for follow up. Over interval she has had T9-12 Thoracic decompression by Dr hWitt on 10/28/2022. She is doing well since then but continues to have R sided lower back and focal R L5 radicular pain. She states back pain = leg pain. She states the R leg is subjectively weaker than the left. Denies any s/s concerning for cauda equina. Pain has limited functioning and she has Completed PT/Aquatherapy for >6 weeks.      Interval History 3/16/2022:  Pt presents for follow up of. She has had L4/5 ILESI and left SI injection in past with benefit. She has more vocal pain to right and associated radiculopathy down right leg in L4/5 pattern. She had no recent MRI in past 2 years and known lumbar discogenic issues but this was more focal to left. She has no complaint of loss of b/b or saddle paresthesias.      Interval History  2/22/2022:   Mrs Jennings presents for virtual follow up of lower back and left sided buttock pain. She is now s/p Left SIJ and states 90% improved pain and feels her relief was more significant than MARILEE. She states pain more noticeable to right side now without radicular complaint. There is no focal voicing of loss of b/b or saddle paresthesias.      Interval History 1/25/2021:  Mrs Jennings presents for follow up of lower back pain. She is s/p L4/5 ILESI with 90% benefit from lower back pain. She has lingering lower buttock/back pain to left side. Increased pain when sitting or stairs. Denies radiculopathy. Denies any loss of b/b or saddle paresthesias.      Interval History 12/23/2021:  Mrs Jennings presents to establish care at [facility]. She states continued lower back pain to left side but leg pain/paresthesias=back pain. She states left lower  back pain and radiation in L4/5 pattern but also on right side. She has MRI findings to left L4/5 NF disc protrusion. She has been doing HEP she learned through [facility] for over 6 months straight and continues with no lasting benefit. She is currently taking Mobic 15mg and Neurontin 300mg TID. There is no complaint of loss of b/b or saddle paresthesias.      Interval History (4/13/20):     The patient location is: home  The chief complaint leading to consultation is: follow up  Visit type: Virtual visit with audio.  Patient verbally consented for audio visit.  Total time spent with patient: 15 minutes  Each patient to whom he or she provides medical services by telemedicine is:  (1) informed of the relationship between the physician and patient and the respective role of any other health care provider with respect to management of the patient; and (2) notified that he or she may decline to receive medical services by telemedicine and may withdraw from such care at any time.        She returns today for follow up.  She reports that her right-sided low back and lower extremity pain has returned since last encounter.  She denies any changes in the quality or location of pain.  She continues to report associated numbness.  She denies any associated weakness or bowel bladder dysfunction.  She has attempted gabapentin 600 mg t.i.d..  She states this medication did not provide any relief.  She has also tried goody's powder, Motrin, Tylenol without any relief.          Interval History (2/17/20):  She returns today for follow up and MRI review.  She reports that her pain is unchanged in quality and location since last encounter.  She does state that the pain has significantly improved and is overall not very bothersome for the time being.          Initial Encounter (2/3/20):  Africa Jennings is a 53 y.o. female who presents today with chronic right-sided low back and lower extremity pain. This pain has been  present for 6-7 months.  No specific inciting event or injury noted.  Patient localizes the pain to the right lumbar region.  Pain radiates to the right posterior and anterior thigh.  She reports associated numbness in this area as well. She also reports weakness of the right lower extremity when ambulating.  She denies any associated bowel or bladder dysfunction.  The pain is constant and worsened with activity.  She states that the pain interrupt her sleep.  Patient does have a history of a similar problem roughly 8 years ago.  She underwent epidural steroid injections with good relief.  This pain is described in detail below.    Pain Scores:     Best:       6/10  Worst:     10/10  Usually:   8/10  Today:    0/10     Physical Therapy:  Feb-May, 2023  HEP: Continues HEP daily as prescribed at PT above     Non-pharmacologic Treatment:  Rest helps         TENS?  No     Pain Medications:         Currently taking:  Aleve, gabapentin     Has tried in the past:  Tylenol, Motrin     Has not tried:  Opioids, Tylenol, Muscle relaxants, TCAs, SNRIs, anticonvulsants, topical creams     report:  Reviewed and consistent with medication use as prescribed.     Blood thinners:  None     Relevant Surgeries:  None     Affecting sleep?  Yes     Affecting daily activities? yes     Depressive symptoms? no          SI/HI? No     Work status: Employed     Pain Procedures:   - L4-5 interlaminar epidural steroid injection x2  - 1/11/2022 L4/5 ILESI 90% benefit    - 2/8/2022 Left SIJ injection  9/26/23: Right L4/5, L5/S1 TFESI - 1 day of relief  10/21/23 B/L GTB injection  6/4/24 Right GTB injection- 90% relief      Imaging:   CT Thoracic Spine Without Contrast  Order: [id number]  Status: Final result     Visible to patient: Yes (seen)     Next appt: 09/18/2023 at 08:45 AM in Dermatology (Basilia Valdez MD)     Dx: S/P fusion of thoracic spine     0 Result Notes  Details    Reading Physician Reading Date Result Priority   Sherry  Tony MAJANO MD  [phone] 1/30/2023 Dex Treviño MD  [phone] 1/30/2023      Narrative & Impression  EXAMINATION:  CT THORACIC SPINE WITHOUT CONTRAST     CLINICAL HISTORY:  s/p T9-12 fusion;  Arthrodesis status     TECHNIQUE:  CT thoracic spine performed without contrast.  Coronal and sagittal reformats provided.     COMPARISON:  X-ray thoracic spine 12/12/2022, x-ray thoracolumbar spine 10/29/2022, MRI thoracic spine 06/03/2022, CT thoracic spine 06/03/2022.     FINDINGS:  Postsurgical change of posterior spinal instrumented fusion T9-T12, T9-12 laminectomies and medial facetectomies and decortication of the T9-12 posterior elements and placement of a mixture of autologous and synthetic bone into the bilateral gutters for arthrodesis.  No hardware fracture or periprosthetic lucency to suggest hardware loosening.     Alignment: Stable, slightly pronounced kyphotic curvature.  Grade 1 retrolisthesis T11-12.     Vertebrae: Vertebral body heights are well maintained.  No fracture.  Stable sclerotic focus in the T5 vertebral body likely representing a bone island.  No lytic or blastic lesion.  Morselized synthetic and autologous bone in the bilateral T9-T12 gutters with noted abutment at the T12 posterior elements likely representing fusion.     Discs: Normal height.  Vacuum disc phenomenon at T8-9 and T11-12.     Degenerative findings:     C7-L1: No spinal canal stenosis or neural foraminal narrowing.     Paraspinal muscles & soft tissues: Cholecystectomy.  High attenuation focus in the posterior right upper quadrant possibly represent calcification or surgical clip.  Otherwise unremarkable.     Impression:     1. Postsurgical changes at T9-T12, as above, without evidence of hardware loosening or fracture.  2. Additional findings as above.     Electronically signed by resident: Dex Bojorquez  Date:                                            01/30/2023  Time:                                            13:59     Electronically signed by: Tony Powell MD  Date:                                            01/30/2023  Time:                                           15:43       MRI Thoracic Spine Without Contrast  Order: [id number]  Status: Final result     Visible to patient: Yes (seen)     Next appt: 09/18/2023 at 08:45 AM in Dermatology (Basilia Valdez MD)     Dx: Thoracic spinal stenosis     0 Result Notes  Details    Reading Physician Reading Date Result Priority   Tony Powell MD  [phone] 6/4/2022 Routine     Narrative & Impression  EXAMINATION:  MRI THORACIC SPINE WITHOUT CONTRAST     CLINICAL HISTORY:  T9-11 stenosis, please focus on these levels.;  Spinal stenosis, thoracic region     TECHNIQUE:  Multiplanar, multisequence images were performed through the thoracic spine.  Contrast was not administered.     COMPARISON:  MRI 03/31/2022, CT 06/03/2022.     FINDINGS:  Thoracic spine alignment demonstrates a slightly pronounced kyphotic curvature.  No spondylolisthesis.  Vertebral body heights are well maintained without evidence fracture.  There is slight heterogeneous marrow signal intensity, similar when compared to the previous exam and favored to relate to benign reconversion.  No marrow signal abnormality to suggest an infiltrative process.     Persistent focal area of increased T2/STIR cord signal at the T10-T11 level, similar when compared to the previous exam concerning for myelomalacia.  Remaining visualized spinal cord demonstrates normal contour and signal intensity.     There is a 1.1 cm T2 hypointense right thyroid nodule.  There are multiple colonic diverticuli.  Remaining visualized thoracic and upper abdominal organs demonstrate no significant abnormalities.  Paraspinal musculature demonstrates normal bulk and signal intensity.     T1-T2: Bilateral facet arthropathy contributes to moderate left neural foraminal narrowing.  No spinal canal stenosis.     T2-T3:  Broad-based disc osteophyte complex causes mild mass effect on the ventral thecal sac.  Left facet arthropathy.  Findings contribute to mild-to-moderate left and mild right neural foraminal narrowing.  No spinal canal stenosis.     T7-T8: Circumferential disc osteophyte complex, bilateral facet arthropathy and right ligamentum flavum buckling.  Findings contribute to moderate right neural foraminal narrowing.  No spinal canal stenosis.     T8-T9: Circumferential disc osteophyte complex and mild bilateral ligamentum flavum buckling.  Findings contribute to mild right neural foraminal narrowing.  No spinal canal stenosis.     T9-T10: Circumferential disc osteophyte complex and bilateral ligamentum flavum buckling.  Findings contribute to mild spinal canal stenosis and moderate right neural foraminal narrowing.     T10-T11: Circumferential disc osteophyte complex, bilateral facet arthropathy and bilateral ligamentum flavum buckling.  Findings contribute to severe spinal canal stenosis and moderate right and mild left neural foraminal narrowing.     Impression:     1. Multilevel degenerative changes of the thoracic spine most pronounced at T10-T11 noting severe spinal canal stenosis and mild-to-moderate neural foraminal narrowing.  2. Persistent focal area of cord signal abnormality at T10-T11, similar when compared to MRI most suggestive of myelomalacia.        Electronically signed by: Tony Powell MD  Date:                                            06/04/2022  Time:                                           09:05       MRI Lumbar Spine Without Contrast  Order: [id number]  Status: Final result     Visible to patient: Yes (seen)     Next appt: 09/18/2023 at 08:45 AM in Dermatology (Basilia Valdez MD)     Dx: Dorsalgia, unspecified     1 Result Note  Details    Reading Physician Reading Date Result Priority   Maximo Mcallister Jr., MD  [phone] 3/25/2022 Routine     Narrative & Impression  EXAMINATION:  MRI  LUMBAR SPINE WITHOUT CONTRAST     CLINICAL HISTORY:  Dorsalgia, unspecifiedLow back pain, symptoms persist with > 6wks conservative treatment;     TECHNIQUE:  Sagittal T1, sagittal T2, sagittal STIR, axial T1 and axial T2 weighted images of the lumbar spine obtained without contrast.     COMPARISON:  Similar study 02/10/2020     FINDINGS:  Lumbar spine alignment is within normal limits. The vertebral body heights are well maintained, with no fracture.  Degenerative sclerotic marrow endplate change at S1 and fatty metaplasia degenerative endplate change at T12.  Otherwise marrow signal normal.     The conus is normal in appearance.  The adjacent soft tissue structures show no significant abnormalities.     Spinal canal is congenitally narrow on the basis of short pedicles.  At the T10-11 level there is extradural indentation of the thecal sac from posteriorly and there is some broad-based disc bulging.  Transverse images do not cover this level.  There is at least moderate spinal stenosis at this level.     L1-L2: No focal disc herniation.No significant central canal or neural foraminal narrowing.     L2-L3: No focal disc herniation.  Mild facet arthrosis.  No significant central canal or neural foraminal narrowing.     L3-L4: No evidence of a disc herniation.  No significant central canal or neural foraminal narrowing.     L4-L5: Broad-based disc bulging with left-sided foraminal and far lateral annular fissure and superimposed mild spondylitic spurring. Facet arthrosis contributes to severe left-sided neural foraminal narrowing.  Moderate central spinal stenosis.     L5-S1: Broad-based disc bulging, facet arthrosis and ligamentum flavum hypertrophy result in severe bilateral neural foraminal stenosis.  Mild central spinal stenosis.     Impression:     Extradural degenerative changes at T10-11, L4-5, and L5-S1 resulting in central canal and foraminal stenosis as detailed above.  Dedicated imaging of the thoracic  spine could lend additional information regarding findings at T10-11     This report was flagged in Epic as abnormal.        Electronically signed by: Maximo Aguirre Jr  Date:                                            03/25/2022  Time:                                           09:06       Past Medical History:   Diagnosis Date    Diabetes mellitus     Diabetes mellitus, type 2     Eczema     GERD (gastroesophageal reflux disease)     Hypertension     Impaired fasting blood sugar     YSABEL on CPAP      Past Surgical History:   Procedure Laterality Date    breast reduction      CHOLECYSTECTOMY      laprascopic    COLONOSCOPY N/A 3/26/2024    Procedure: COLONOSCOPY;  Surgeon: Sangeetha Taylor MD;  Location: [facility];  Service: Endoscopy;  Laterality: N/A;  referral Vu Britney. Suprep Instr. to portal.EC Not taking Ozempic any longer.EC  3/12/24-LVM regarding last dose Ozempic on or before 3/18/24 if restarted, instr portal-DS  3/19- lvm and portal msg for pc- pt returned call, pc complete. prep reordered. states last dose of ozempic was 3/16    DECOMPRESSION OF THORACIC OUTLET N/A 10/28/2022    Procedure: T9-12 ROBOTIC DECOMPRESSION, THORACIC FUSION;  Surgeon: Edouard Whitt DO;  Location: [address];  Service: Neurosurgery;  Laterality: N/A;  ANESTHESIA GENERAL [place] I BLOOD TYPE & SCREEN NERVE MONITORING EMG SEP MEP POSTION PRONE BED CARA 4 POST HEAD REST [facility] RADIOLOGY C-ARM GLOBUS PROTOCOL MISCELLANEOUS ALANIZ BRACE TLSO    EPIDURAL STEROID INJECTION N/A 1/11/2022    Procedure: INJECTION, STEROID, EPIDURAL IL L4/5 NEEDS CONSENT;  Surgeon: Britt Calix MD;  Location: [facility];  Service: Pain Management;  Laterality: N/A;    ESOPHAGOGASTRODUODENOSCOPY  8/18/14    HYSTERECTOMY  2007    laprascopic, total for fibroids.  Dr Polo    INJECTION OF JOINT Left 2/8/2022    Procedure: INJECTION, JOINT, SI LEFT NEEDS CONSENT;  Surgeon: Britt Calix MD;  Location: [facility];   Service: Pain Management;  Laterality: Left;    INJECTION OF JOINT Bilateral 10/31/2023    Procedure: INJECTION, JOINT BILATERAL GTB;  Surgeon: Britt Calix MD;  Location: [facility] [place] [facility];  Service: Pain Management;  Laterality: Bilateral;    INJECTION OF JOINT Right 6/4/2024    Procedure: INJECTION, JOINT RIGHT GTB;  Surgeon: Britt Calix MD;  Location: [facility] [place] [facility];  Service: Pain Management;  Laterality: Right;  [phone]  2 WK F/U AKBAR  *3/26 COLONOSCOPY*  *SCHEDULE AFTER 4/9*    OOPHORECTOMY      ROBOT-ASSISTED LAPAROSCOPIC SLEEVE GASTRECTOMY USING DA DEVI XI N/A 7/26/2023    Procedure: XI ROBOTIC SLEEVE GASTRECTOMY with intraop EGD;  Surgeon: Roge Rich MD;  [facility];  Service: General;  Laterality: N/A;  with possible HHR    TOTAL REDUCTION MAMMOPLASTY      TRANSFORAMINAL EPIDURAL INJECTION OF STEROID Right 9/26/2023    Procedure: INJECTION, STEROID, EPIDURAL, TRANSFORAMINAL APPROACH, RIGHT L4/L5 AND L5/S1 SOONER DATE;  Surgeon: Britt Calix MD;  Location: [facility] [place] [facility];  Service: Pain Management;  Laterality: Right;     Social History     Socioeconomic History    Marital status:    Tobacco Use    Smoking status: Never    Smokeless tobacco: Never   Substance and Sexual Activity    Alcohol use: Never    Drug use: No    Sexual activity: Yes     Partners: Male     Birth control/protection: Surgical   Social History Narrative    Was  since 2000.      Has a friend since 2016.    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     Social Determinants of Health     Financial Resource Strain: Medium Risk (1/22/2024)    Overall Financial Resource Strain (CARDIA)     Difficulty of Paying Living Expenses: Somewhat hard   Food Insecurity: No Food Insecurity (1/22/2024)    Hunger Vital Sign     Worried About Running Out of Food in the Last Year: Never true     Ran Out of Food in the Last Year: Never true   Transportation Needs: No  Transportation Needs (1/22/2024)    PRAPARE - Transportation     Lack of Transportation (Medical): No     Lack of Transportation (Non-Medical): No   Physical Activity: Insufficiently Active (1/22/2024)    Exercise Vital Sign     Days of Exercise per Week: 1 day     Minutes of Exercise per Session: 30 min   Stress: No Stress Concern Present (1/22/2024)    [employer] [place] [employer] - Occupational Stress Questionnaire     Feeling of Stress : Not at all   Housing Stability: Low Risk  (1/22/2024)    Housing Stability Vital Sign     Unable to Pay for Housing in the Last Year: No     Number of Places Lived in the Last Year: 1     Unstable Housing in the Last Year: No     Family History   Problem Relation Name Age of Onset    Diabetes Mother      Hypertension Mother      Heart disease Mother      Cancer Father  58        Prostate    Crohn's disease Sister      Breast cancer Sister      Diabetes Maternal Grandmother      Heart disease Maternal Grandmother      Hypertension Maternal Grandmother      Amblyopia Neg Hx      Blindness Neg Hx      Cataracts Neg Hx      Glaucoma Neg Hx      Macular degeneration Neg Hx      Retinal detachment Neg Hx      Strabismus Neg Hx         Review of patient's allergies indicates:  No Known Allergies    Current Outpatient Medications   Medication Sig    acetaminophen (TYLENOL) 500 MG tablet Take 2 tablets (1,000 mg total) by mouth every 8 (eight) hours as needed for Pain.    atorvastatin (LIPITOR) 20 MG tablet TAKE 1 TABLET BY MOUTH EVERY DAY    ergocalciferol (ERGOCALCIFEROL) 50,000 unit Cap TAKE 2 CAPSULES BY MOUTH TWICE A WEEK    estradioL (ESTRACE) 1 MG tablet Take 1 tablet (1 mg total) by mouth once daily.    finasteride (PROSCAR) 5 mg tablet TAKE 1 TABLET BY MOUTH EVERY DAY    fluocinonide (LIDEX) 0.05 % ointment Apply to affected areas of scalp at bedtime    ketoconazole (NIZORAL) 2 % shampoo Wash hair with medicated shampoo at least 2x/week - let sit on scalp at least 5  minutes prior to rinsing    mometasone (ELOCON) 0.1 % solution APPLY TOPICALLY ONCE DAILY. TO THINNING AREA OF SCALP    neomycin-polymyxin-dexamethasone (MAXITROL) 3.5mg/mL-10,000 unit/mL-0.1 % DrpS as needed.    nystatin (MYCOSTATIN) ointment Apply topically 2 (two) times daily. USES PRN    omeprazole (PRILOSEC) 40 MG capsule TAKE 1 CAPSULE BY MOUTH EVERY MORNING. OPEN CAPSULE AND TAKE WITH APPLE SAUCE    ondansetron (ZOFRAN) 4 MG tablet TAKE 1 TABLET BY MOUTH EVERY 8 HOURS AS NEEDED    ondansetron (ZOFRAN-ODT) 8 MG TbDL Take 1 tablet (8 mg total) by mouth every 6 (six) hours as needed.    OZEMPIC 1 mg/dose (4 mg/3 mL) INJECT 1 MG INTO THE SKIN EVERY 7 DAYS    prednisoLONE acetate (PRED FORTE) 1 % DrpS USES PRN    pregabalin (LYRICA) 75 MG capsule Take 1 capsule (75 mg total) by mouth 2 (two) times daily.    spironolactone (ALDACTONE) 50 MG tablet Take 1 tablet (50 mg total) by mouth once daily.    tiZANidine (ZANAFLEX) 4 MG tablet TAKE 1 TABLET BY MOUTH NIGHTLY AS NEEDED (MUSCLE SPASM).    triamcinolone acetonide 0.1% (KENALOG) 0.1 % cream Apply topically 2 (two) times daily. Apply topically 2 (two) times daily.    valsartan-hydrochlorothiazide (DIOVAN-HCT) 160-12.5 mg per tablet Take 1 tablet by mouth once daily.    doxycycline monohydrate 100 mg Tab Take 1 tablet by mouth 2 (two) times daily.    meclizine (ANTIVERT) 25 mg tablet Take 1 tablet (25 mg total) by mouth 3 (three) times daily as needed for Dizziness.     Current Facility-Administered Medications   Medication    triamcinolone acetonide injection 10 mg    triamcinolone acetonide injection 10 mg    triamcinolone acetonide injection 5 mg    triamcinolone acetonide injection 5 mg       REVIEW OF SYSTEMS:    GENERAL:  No weight loss, malaise or fevers.  HEENT:   No recent changes in vision or hearing  NECK:  Negative for lumps, no difficulty with swallowing.  RESPIRATORY:  Negative for cough, wheezing or shortness of breath, patient denies any recent  URI.  CARDIOVASCULAR:  Negative for chest pain, leg swelling or palpitations.  GI:  Negative for abdominal discomfort, blood in stools or black stools or change in bowel habits.  MUSCULOSKELETAL:  See HPI.  SKIN:  Negative for lesions, rash, and itching.  PSYCH:  No mood disorder or recent psychosocial stressors.  Patients sleep is not disturbed secondary to pain.  HEMATOLOGY/LYMPHOLOGY:  Negative for prolonged bleeding, bruising easily or swollen nodes.  Patient is not currently taking any anti-coagulants  NEURO:   No history of headaches, syncope, paralysis, seizures or tremors.  All other reviewed and negative other than HPI.    OBJECTIVE:    /89 (BP Location: Left arm, Patient Position: Sitting)   Resp 18   Wt 81.8 kg (180 lb 5.4 oz)   LMP  (LMP Unknown)   BMI 32.98 kg/m²     PHYSICAL EXAM:   GENERAL: Well appearing, in no acute distress, alert and oriented x3.  PSYCH:  Mood and affect appropriate.  SKIN: Skin color, texture, turgor normal, no rashes or lesions.  HEENT:  Normocephalic, atraumatic. Cranial nerves grossly intact.  EXTREMITIES: No deformities, edema, or skin discoloration.   MUSCULOSKELETAL: Bilateral lower extremity strength is normal and symmetric. No atrophy is noted. No TTP over bilateral GTB. SLR is negative bilaterally.  NEURO: Sensation is equal and appropriate bilaterally.   GAIT: Normal.      ASSESSMENT: 54 y.o. year old female with pain consistent with     1. Chronic pain syndrome        2. Trochanteric bursitis of both hips              DISCUSSION: Ms. Jennings is a . She has a history of T9-12 decompression and posterior fusion with Dr. Whitt. She came to us with left low back pain and did well after SIJ. She then presented back with right radicular symptoms. MRI shows short pedicles, moderate canal stenosis at L4/5, and severe foraminal stenosis at L5/S1.     PLAN:   1) The patient will continue a home exercise routine to help with pain and  strengthening.    2) She is s/p right GTB injection with excellent benefit. Will continue to monitor progress.  3) Patient voices interest in stopping Lyrica. Advised her that if she stops the medication and her pain worsens, then to restart it.  4) Follow up in 2 months or sooner if needed.      Munira Strauss  06/17/2024

## 2024-06-19 ENCOUNTER — PATIENT MESSAGE (OUTPATIENT)
Dept: ADMINISTRATIVE | Facility: HOSPITAL | Age: 54
End: 2024-06-19
Payer: COMMERCIAL

## 2024-07-03 ENCOUNTER — PATIENT MESSAGE (OUTPATIENT)
Dept: BARIATRICS | Facility: CLINIC | Age: 54
End: 2024-07-03
Payer: COMMERCIAL

## 2024-07-05 ENCOUNTER — LAB VISIT (OUTPATIENT)
Dept: LAB | Facility: HOSPITAL | Age: 54
End: 2024-07-05
Attending: FAMILY MEDICINE
Payer: COMMERCIAL

## 2024-07-05 DIAGNOSIS — E11.9 DIABETES MELLITUS WITHOUT COMPLICATION: ICD-10-CM

## 2024-07-05 LAB
ALBUMIN SERPL BCP-MCNC: 3.8 G/DL (ref 3.5–5.2)
ALP SERPL-CCNC: 74 U/L (ref 55–135)
ALT SERPL W/O P-5'-P-CCNC: 25 U/L (ref 10–44)
ANION GAP SERPL CALC-SCNC: 8 MMOL/L (ref 8–16)
AST SERPL-CCNC: 17 U/L (ref 10–40)
BASOPHILS # BLD AUTO: 0.03 K/UL (ref 0–0.2)
BASOPHILS NFR BLD: 0.5 % (ref 0–1.9)
BILIRUB SERPL-MCNC: 0.4 MG/DL (ref 0.1–1)
BUN SERPL-MCNC: 26 MG/DL (ref 6–20)
CALCIUM SERPL-MCNC: 9.7 MG/DL (ref 8.7–10.5)
CHLORIDE SERPL-SCNC: 105 MMOL/L (ref 95–110)
CHOLEST SERPL-MCNC: 149 MG/DL (ref 120–199)
CHOLEST/HDLC SERPL: 3.2 {RATIO} (ref 2–5)
CO2 SERPL-SCNC: 24 MMOL/L (ref 23–29)
CREAT SERPL-MCNC: 0.9 MG/DL (ref 0.5–1.4)
DIFFERENTIAL METHOD BLD: ABNORMAL
EOSINOPHIL # BLD AUTO: 0.1 K/UL (ref 0–0.5)
EOSINOPHIL NFR BLD: 1.3 % (ref 0–8)
ERYTHROCYTE [DISTWIDTH] IN BLOOD BY AUTOMATED COUNT: 13.6 % (ref 11.5–14.5)
EST. GFR  (NO RACE VARIABLE): >60 ML/MIN/1.73 M^2
ESTIMATED AVG GLUCOSE: 120 MG/DL (ref 68–131)
GLUCOSE SERPL-MCNC: 83 MG/DL (ref 70–110)
HBA1C MFR BLD: 5.8 % (ref 4–5.6)
HCT VFR BLD AUTO: 36.9 % (ref 37–48.5)
HDLC SERPL-MCNC: 47 MG/DL (ref 40–75)
HDLC SERPL: 31.5 % (ref 20–50)
HGB BLD-MCNC: 11.7 G/DL (ref 12–16)
IMM GRANULOCYTES # BLD AUTO: 0.01 K/UL (ref 0–0.04)
IMM GRANULOCYTES NFR BLD AUTO: 0.2 % (ref 0–0.5)
LDLC SERPL CALC-MCNC: 89 MG/DL (ref 63–159)
LYMPHOCYTES # BLD AUTO: 3 K/UL (ref 1–4.8)
LYMPHOCYTES NFR BLD: 50.7 % (ref 18–48)
MCH RBC QN AUTO: 28.1 PG (ref 27–31)
MCHC RBC AUTO-ENTMCNC: 31.7 G/DL (ref 32–36)
MCV RBC AUTO: 89 FL (ref 82–98)
MONOCYTES # BLD AUTO: 0.5 K/UL (ref 0.3–1)
MONOCYTES NFR BLD: 8.9 % (ref 4–15)
NEUTROPHILS # BLD AUTO: 2.3 K/UL (ref 1.8–7.7)
NEUTROPHILS NFR BLD: 38.4 % (ref 38–73)
NONHDLC SERPL-MCNC: 102 MG/DL
NRBC BLD-RTO: 0 /100 WBC
PLATELET # BLD AUTO: 252 K/UL (ref 150–450)
PMV BLD AUTO: 11.5 FL (ref 9.2–12.9)
POTASSIUM SERPL-SCNC: 4 MMOL/L (ref 3.5–5.1)
PROT SERPL-MCNC: 7.4 G/DL (ref 6–8.4)
RBC # BLD AUTO: 4.16 M/UL (ref 4–5.4)
SODIUM SERPL-SCNC: 137 MMOL/L (ref 136–145)
TRIGL SERPL-MCNC: 65 MG/DL (ref 30–150)
TSH SERPL DL<=0.005 MIU/L-ACNC: 1.8 UIU/ML (ref 0.4–4)
WBC # BLD AUTO: 5.98 K/UL (ref 3.9–12.7)

## 2024-07-05 PROCEDURE — 36415 COLL VENOUS BLD VENIPUNCTURE: CPT | Mod: PO | Performed by: FAMILY MEDICINE

## 2024-07-05 PROCEDURE — 85025 COMPLETE CBC W/AUTO DIFF WBC: CPT | Performed by: FAMILY MEDICINE

## 2024-07-05 PROCEDURE — 80061 LIPID PANEL: CPT | Performed by: FAMILY MEDICINE

## 2024-07-05 PROCEDURE — 84443 ASSAY THYROID STIM HORMONE: CPT | Performed by: FAMILY MEDICINE

## 2024-07-05 PROCEDURE — 80053 COMPREHEN METABOLIC PANEL: CPT | Performed by: FAMILY MEDICINE

## 2024-07-05 PROCEDURE — 83036 HEMOGLOBIN GLYCOSYLATED A1C: CPT | Performed by: FAMILY MEDICINE

## 2024-07-08 ENCOUNTER — PATIENT MESSAGE (OUTPATIENT)
Dept: FAMILY MEDICINE | Facility: CLINIC | Age: 54
End: 2024-07-08
Payer: COMMERCIAL

## 2024-07-09 ENCOUNTER — PATIENT MESSAGE (OUTPATIENT)
Dept: BARIATRICS | Facility: CLINIC | Age: 54
End: 2024-07-09
Payer: COMMERCIAL

## 2024-07-11 ENCOUNTER — OFFICE VISIT (OUTPATIENT)
Dept: FAMILY MEDICINE | Facility: CLINIC | Age: 54
End: 2024-07-11
Payer: COMMERCIAL

## 2024-07-11 ENCOUNTER — LAB VISIT (OUTPATIENT)
Dept: LAB | Facility: HOSPITAL | Age: 54
End: 2024-07-11
Attending: FAMILY MEDICINE
Payer: COMMERCIAL

## 2024-07-11 ENCOUNTER — PATIENT MESSAGE (OUTPATIENT)
Dept: SPINE | Facility: CLINIC | Age: 54
End: 2024-07-11
Payer: COMMERCIAL

## 2024-07-11 VITALS
HEIGHT: 62 IN | SYSTOLIC BLOOD PRESSURE: 124 MMHG | TEMPERATURE: 97 F | DIASTOLIC BLOOD PRESSURE: 76 MMHG | OXYGEN SATURATION: 97 % | BODY MASS INDEX: 33.23 KG/M2 | WEIGHT: 180.56 LBS | HEART RATE: 72 BPM

## 2024-07-11 DIAGNOSIS — M62.838 MUSCLE SPASM: ICD-10-CM

## 2024-07-11 DIAGNOSIS — E78.5 HYPERLIPIDEMIA ASSOCIATED WITH TYPE 2 DIABETES MELLITUS: ICD-10-CM

## 2024-07-11 DIAGNOSIS — E78.5 HYPERLIPIDEMIA, UNSPECIFIED HYPERLIPIDEMIA TYPE: ICD-10-CM

## 2024-07-11 DIAGNOSIS — M51.36 DDD (DEGENERATIVE DISC DISEASE), LUMBAR: Primary | ICD-10-CM

## 2024-07-11 DIAGNOSIS — K21.9 GASTROESOPHAGEAL REFLUX DISEASE WITHOUT ESOPHAGITIS: ICD-10-CM

## 2024-07-11 DIAGNOSIS — N95.1 HOT FLASH, MENOPAUSAL: ICD-10-CM

## 2024-07-11 DIAGNOSIS — E11.9 TYPE 2 DIABETES MELLITUS WITHOUT COMPLICATION, WITHOUT LONG-TERM CURRENT USE OF INSULIN: ICD-10-CM

## 2024-07-11 DIAGNOSIS — E11.69 HYPERLIPIDEMIA ASSOCIATED WITH TYPE 2 DIABETES MELLITUS: ICD-10-CM

## 2024-07-11 DIAGNOSIS — E55.9 VITAMIN D DEFICIENCY: ICD-10-CM

## 2024-07-11 DIAGNOSIS — N95.1 MENOPAUSE SYNDROME: ICD-10-CM

## 2024-07-11 DIAGNOSIS — L64.9 ANDROGENETIC ALOPECIA: ICD-10-CM

## 2024-07-11 LAB
ALBUMIN/CREAT UR: 6.8 UG/MG (ref 0–30)
CREAT UR-MCNC: 206 MG/DL (ref 15–325)
MICROALBUMIN UR DL<=1MG/L-MCNC: 14 UG/ML

## 2024-07-11 PROCEDURE — 3008F BODY MASS INDEX DOCD: CPT | Mod: CPTII,S$GLB,, | Performed by: FAMILY MEDICINE

## 2024-07-11 PROCEDURE — 3074F SYST BP LT 130 MM HG: CPT | Mod: CPTII,S$GLB,, | Performed by: FAMILY MEDICINE

## 2024-07-11 PROCEDURE — 99214 OFFICE O/P EST MOD 30 MIN: CPT | Mod: S$GLB,,, | Performed by: FAMILY MEDICINE

## 2024-07-11 PROCEDURE — G2211 COMPLEX E/M VISIT ADD ON: HCPCS | Mod: S$GLB,,, | Performed by: FAMILY MEDICINE

## 2024-07-11 PROCEDURE — 2023F DILAT RTA XM W/O RTNOPTHY: CPT | Mod: CPTII,S$GLB,, | Performed by: FAMILY MEDICINE

## 2024-07-11 PROCEDURE — 3078F DIAST BP <80 MM HG: CPT | Mod: CPTII,S$GLB,, | Performed by: FAMILY MEDICINE

## 2024-07-11 PROCEDURE — 1159F MED LIST DOCD IN RCRD: CPT | Mod: CPTII,S$GLB,, | Performed by: FAMILY MEDICINE

## 2024-07-11 PROCEDURE — 82043 UR ALBUMIN QUANTITATIVE: CPT | Performed by: FAMILY MEDICINE

## 2024-07-11 PROCEDURE — 3044F HG A1C LEVEL LT 7.0%: CPT | Mod: CPTII,S$GLB,, | Performed by: FAMILY MEDICINE

## 2024-07-11 PROCEDURE — 1160F RVW MEDS BY RX/DR IN RCRD: CPT | Mod: CPTII,S$GLB,, | Performed by: FAMILY MEDICINE

## 2024-07-11 PROCEDURE — 99999 PR PBB SHADOW E&M-EST. PATIENT-LVL V: CPT | Mod: PBBFAC,,, | Performed by: FAMILY MEDICINE

## 2024-07-11 RX ORDER — ATORVASTATIN CALCIUM 20 MG/1
20 TABLET, FILM COATED ORAL DAILY
Qty: 90 TABLET | Refills: 3 | Status: SHIPPED | OUTPATIENT
Start: 2024-07-11

## 2024-07-11 RX ORDER — PREGABALIN 75 MG/1
75 CAPSULE ORAL 2 TIMES DAILY
Qty: 60 CAPSULE | Refills: 0 | Status: SHIPPED | OUTPATIENT
Start: 2024-07-11 | End: 2025-01-09

## 2024-07-11 RX ORDER — ERGOCALCIFEROL 1.25 MG/1
50000 CAPSULE ORAL
Qty: 12 CAPSULE | Refills: 3 | Status: SHIPPED | OUTPATIENT
Start: 2024-07-11

## 2024-07-11 RX ORDER — SPIRONOLACTONE 50 MG/1
50 TABLET, FILM COATED ORAL DAILY
Qty: 90 TABLET | Refills: 3 | Status: SHIPPED | OUTPATIENT
Start: 2024-07-11 | End: 2025-07-11

## 2024-07-11 RX ORDER — PREGABALIN 75 MG/1
75 CAPSULE ORAL 2 TIMES DAILY
Qty: 60 CAPSULE | Refills: 6 | Status: SHIPPED | OUTPATIENT
Start: 2024-07-11 | End: 2024-07-11 | Stop reason: SDUPTHER

## 2024-07-11 RX ORDER — OMEPRAZOLE 40 MG/1
40 CAPSULE, DELAYED RELEASE ORAL EVERY MORNING
Qty: 90 CAPSULE | Refills: 3 | Status: SHIPPED | OUTPATIENT
Start: 2024-07-11

## 2024-07-11 RX ORDER — SEMAGLUTIDE 1.34 MG/ML
1 INJECTION, SOLUTION SUBCUTANEOUS
Qty: 9 EACH | Refills: 3 | Status: SHIPPED | OUTPATIENT
Start: 2024-07-11

## 2024-07-11 RX ORDER — TIZANIDINE 4 MG/1
4 TABLET ORAL EVERY 8 HOURS
Qty: 90 TABLET | Refills: 2 | Status: SHIPPED | OUTPATIENT
Start: 2024-07-11

## 2024-07-11 RX ORDER — ESTRADIOL 1 MG/1
1 TABLET ORAL DAILY
Qty: 90 TABLET | Refills: 1 | Status: SHIPPED | OUTPATIENT
Start: 2024-07-11

## 2024-07-11 NOTE — PROGRESS NOTES
"Routine Office Visit    Africa Jennings  1970  [id number]      Subjective     Africa is a 54 y.o. female who presents today for:    S/p bariatric surgery - Patient has continues to lose more weight since last visit. She continues to follow-up with bariatric medicine. She wants to continue with weight loss. She continues to exercise regularly by walking or using her trampoline at home. She monitors what she eats and meets with her health team regularly.   Diabetes - has seen improvement in her A1c. She continues taking her ozempic. She reports no side effect from medication regimen.   S/p robotic decompression surgery and fusion - she has been doing low impact cardio. She continues to f/u with pain management.   Hypertension - bp is well controlled. She has not been taking amlodipine.  Vitamin d - recheck vitamin d next year. Patient was on a twice a week vitamin d dose    Objective     Review of Systems   Constitutional:  Negative for chills and fever.   HENT:  Negative for congestion.    Eyes:  Negative for blurred vision.   Respiratory:  Negative for cough.    Cardiovascular:  Negative for chest pain.   Gastrointestinal:  Negative for abdominal pain, constipation, diarrhea, heartburn, nausea and vomiting.   Genitourinary:  Negative for dysuria.   Musculoskeletal:  Negative for myalgias.   Skin:  Negative for itching and rash.   Neurological:  Negative for dizziness and headaches.   Psychiatric/Behavioral:  Negative for depression.        /76   Pulse 72   Temp 97.1 °F (36.2 °C) (Oral)   Ht 5' 2" (1.575 m)   Wt 81.9 kg (180 lb 8.9 oz)   LMP  (LMP Unknown)   SpO2 97%   BMI 33.02 kg/m²   Physical Exam  Constitutional:       Appearance: She is well-developed.   HENT:      Head: Normocephalic and atraumatic.   Eyes:      Conjunctiva/sclera: Conjunctivae normal.      Pupils: Pupils are equal, round, and reactive to light.   Cardiovascular:      Rate and Rhythm: Normal rate and regular " rhythm.      Heart sounds: Normal heart sounds. No murmur heard.     No friction rub. No gallop.   Pulmonary:      Effort: No respiratory distress.      Breath sounds: Normal breath sounds.   Abdominal:      General: Bowel sounds are normal. There is no distension.      Palpations: Abdomen is soft.      Tenderness: There is no abdominal tenderness.   Musculoskeletal:         General: Normal range of motion.      Cervical back: Normal range of motion and neck supple.   Lymphadenopathy:      Cervical: No cervical adenopathy.   Skin:     General: Skin is warm.   Neurological:      Mental Status: She is alert and oriented to person, place, and time.             Assessment     Health Maintenance         Date Due Completion Date    COVID-19 Vaccine (5 - 2023-24 season) 09/01/2023 12/29/2022    Diabetes Urine Screening 07/05/2024 7/5/2023    Influenza Vaccine (1) 09/01/2024 2/19/2024    Mammogram 12/04/2024 12/4/2023    Hemoglobin A1c 01/05/2025 7/5/2024    Eye Exam 01/15/2025 1/15/2024    Foot Exam 02/27/2025 2/27/2024    Override on 1/9/2023: Done    Override on 10/12/2021: Done    Override on 9/29/2020: Done    Override on 2/13/2020: Done    Lipid Panel 07/05/2025 7/5/2024    Low Dose Statin 07/11/2025 7/11/2024    TETANUS VACCINE 05/13/2031 5/13/2021    Colorectal Cancer Screening 03/26/2034 3/26/2024              Problem List Items Addressed This Visit          Neuro    DDD (degenerative disc disease), lumbar - Primary    Relevant Medications    pregabalin (LYRICA) 75 MG capsule       Endocrine    Diabetes mellitus    Relevant Medications    semaglutide (OZEMPIC) 1 mg/dose (4 mg/3 mL)  The current medical regimen is effective;  continue present plan and medications.       Other Relevant Orders    Microalbumin/Creatinine Ratio, Urine    Comprehensive Metabolic Panel    Hemoglobin A1C       GI    Gastroesophageal reflux disease without esophagitis (Chronic)    Relevant Medications    omeprazole (PRILOSEC) 40 MG  capsule  The current medical regimen is effective;  continue present plan and medications.        Other Visit Diagnoses       Androgenetic alopecia        Relevant Medications    spironolactone (ALDACTONE) 50 MG tablet    Muscle spasm        Relevant Medications    tiZANidine (ZANAFLEX) 4 MG tablet    Hyperlipidemia, unspecified hyperlipidemia type        Relevant Medications    atorvastatin (LIPITOR) 20 MG tablet  The current medical regimen is effective;  continue present plan and medications.       Vitamin D deficiency        Relevant Medications    ergocalciferol (ERGOCALCIFEROL) 50,000 unit Cap    Menopause syndrome        Relevant Medications    estradioL (ESTRACE) 1 MG tablet    Hot flash, menopausal        Relevant Medications    estradioL (ESTRACE) 1 MG tablet    Hyperlipidemia associated with type 2 diabetes mellitus        Relevant Medications    semaglutide (OZEMPIC) 1 mg/dose (4 mg/3 mL)  The current medical regimen is effective;  continue present plan and medications.                     Follow up in about 6 months (around 1/11/2025), or if symptoms worsen or fail to improve, for yearly exam.

## 2024-07-16 ENCOUNTER — PATIENT MESSAGE (OUTPATIENT)
Dept: BARIATRICS | Facility: CLINIC | Age: 54
End: 2024-07-16
Payer: COMMERCIAL

## 2024-07-16 DIAGNOSIS — Z98.84 S/P BARIATRIC SURGERY: Primary | ICD-10-CM

## 2024-07-25 ENCOUNTER — LAB VISIT (OUTPATIENT)
Dept: LAB | Facility: HOSPITAL | Age: 54
End: 2024-07-25
Payer: COMMERCIAL

## 2024-07-25 ENCOUNTER — CLINICAL SUPPORT (OUTPATIENT)
Dept: BARIATRICS | Facility: CLINIC | Age: 54
End: 2024-07-25
Payer: COMMERCIAL

## 2024-07-25 ENCOUNTER — OFFICE VISIT (OUTPATIENT)
Dept: BARIATRICS | Facility: CLINIC | Age: 54
End: 2024-07-25
Payer: COMMERCIAL

## 2024-07-25 VITALS
WEIGHT: 177.25 LBS | HEART RATE: 71 BPM | BODY MASS INDEX: 32.42 KG/M2 | SYSTOLIC BLOOD PRESSURE: 115 MMHG | DIASTOLIC BLOOD PRESSURE: 72 MMHG | OXYGEN SATURATION: 96 %

## 2024-07-25 VITALS — WEIGHT: 177.25 LBS | BODY MASS INDEX: 32.42 KG/M2

## 2024-07-25 DIAGNOSIS — Z98.84 S/P BARIATRIC SURGERY: ICD-10-CM

## 2024-07-25 DIAGNOSIS — I10 ESSENTIAL HYPERTENSION: ICD-10-CM

## 2024-07-25 DIAGNOSIS — E11.9 TYPE 2 DIABETES MELLITUS WITHOUT COMPLICATION, WITHOUT LONG-TERM CURRENT USE OF INSULIN: ICD-10-CM

## 2024-07-25 DIAGNOSIS — Z71.3 DIETARY COUNSELING: Primary | ICD-10-CM

## 2024-07-25 DIAGNOSIS — R63.4 WEIGHT LOSS: Primary | ICD-10-CM

## 2024-07-25 DIAGNOSIS — E66.9 OBESITY (BMI 30-39.9): ICD-10-CM

## 2024-07-25 DIAGNOSIS — I10 ESSENTIAL HYPERTENSION: Chronic | ICD-10-CM

## 2024-07-25 LAB
25(OH)D3+25(OH)D2 SERPL-MCNC: 60 NG/ML (ref 30–96)
ALBUMIN SERPL BCP-MCNC: 3.9 G/DL (ref 3.5–5.2)
ALP SERPL-CCNC: 91 U/L (ref 55–135)
ALT SERPL W/O P-5'-P-CCNC: 23 U/L (ref 10–44)
ANION GAP SERPL CALC-SCNC: 10 MMOL/L (ref 8–16)
AST SERPL-CCNC: 16 U/L (ref 10–40)
BASOPHILS # BLD AUTO: 0.03 K/UL (ref 0–0.2)
BASOPHILS NFR BLD: 0.5 % (ref 0–1.9)
BILIRUB SERPL-MCNC: 0.5 MG/DL (ref 0.1–1)
BUN SERPL-MCNC: 26 MG/DL (ref 6–20)
CALCIUM SERPL-MCNC: 10.3 MG/DL (ref 8.7–10.5)
CHLORIDE SERPL-SCNC: 102 MMOL/L (ref 95–110)
CHOLEST SERPL-MCNC: 169 MG/DL (ref 120–199)
CHOLEST/HDLC SERPL: 3.5 {RATIO} (ref 2–5)
CO2 SERPL-SCNC: 26 MMOL/L (ref 23–29)
CREAT SERPL-MCNC: 1.1 MG/DL (ref 0.5–1.4)
DIFFERENTIAL METHOD BLD: ABNORMAL
EOSINOPHIL # BLD AUTO: 0.1 K/UL (ref 0–0.5)
EOSINOPHIL NFR BLD: 1.4 % (ref 0–8)
ERYTHROCYTE [DISTWIDTH] IN BLOOD BY AUTOMATED COUNT: 13.4 % (ref 11.5–14.5)
EST. GFR  (NO RACE VARIABLE): 59.3 ML/MIN/1.73 M^2
GLUCOSE SERPL-MCNC: 86 MG/DL (ref 70–110)
HCT VFR BLD AUTO: 37.8 % (ref 37–48.5)
HDLC SERPL-MCNC: 48 MG/DL (ref 40–75)
HDLC SERPL: 28.4 % (ref 20–50)
HGB BLD-MCNC: 12.2 G/DL (ref 12–16)
IMM GRANULOCYTES # BLD AUTO: 0.01 K/UL (ref 0–0.04)
IMM GRANULOCYTES NFR BLD AUTO: 0.2 % (ref 0–0.5)
IRON SERPL-MCNC: 102 UG/DL (ref 30–160)
LDLC SERPL CALC-MCNC: 108.6 MG/DL (ref 63–159)
LYMPHOCYTES # BLD AUTO: 2.9 K/UL (ref 1–4.8)
LYMPHOCYTES NFR BLD: 50 % (ref 18–48)
MCH RBC QN AUTO: 28.6 PG (ref 27–31)
MCHC RBC AUTO-ENTMCNC: 32.3 G/DL (ref 32–36)
MCV RBC AUTO: 89 FL (ref 82–98)
MONOCYTES # BLD AUTO: 0.5 K/UL (ref 0.3–1)
MONOCYTES NFR BLD: 8 % (ref 4–15)
NEUTROPHILS # BLD AUTO: 2.3 K/UL (ref 1.8–7.7)
NEUTROPHILS NFR BLD: 39.9 % (ref 38–73)
NONHDLC SERPL-MCNC: 121 MG/DL
NRBC BLD-RTO: 0 /100 WBC
PLATELET # BLD AUTO: 292 K/UL (ref 150–450)
PMV BLD AUTO: 11.1 FL (ref 9.2–12.9)
POTASSIUM SERPL-SCNC: 4.9 MMOL/L (ref 3.5–5.1)
PROT SERPL-MCNC: 8 G/DL (ref 6–8.4)
RBC # BLD AUTO: 4.27 M/UL (ref 4–5.4)
SATURATED IRON: 26 % (ref 20–50)
SODIUM SERPL-SCNC: 138 MMOL/L (ref 136–145)
TOTAL IRON BINDING CAPACITY: 386 UG/DL (ref 250–450)
TRANSFERRIN SERPL-MCNC: 261 MG/DL (ref 200–375)
TRIGL SERPL-MCNC: 62 MG/DL (ref 30–150)
VIT B12 SERPL-MCNC: >2000 PG/ML (ref 210–950)
WBC # BLD AUTO: 5.78 K/UL (ref 3.9–12.7)

## 2024-07-25 PROCEDURE — 36415 COLL VENOUS BLD VENIPUNCTURE: CPT | Performed by: PHYSICIAN ASSISTANT

## 2024-07-25 PROCEDURE — 80053 COMPREHEN METABOLIC PANEL: CPT | Performed by: PHYSICIAN ASSISTANT

## 2024-07-25 PROCEDURE — 83540 ASSAY OF IRON: CPT | Performed by: PHYSICIAN ASSISTANT

## 2024-07-25 PROCEDURE — 80061 LIPID PANEL: CPT | Performed by: PHYSICIAN ASSISTANT

## 2024-07-25 PROCEDURE — 82306 VITAMIN D 25 HYDROXY: CPT | Performed by: PHYSICIAN ASSISTANT

## 2024-07-25 PROCEDURE — 99999 PR PBB SHADOW E&M-EST. PATIENT-LVL I: CPT | Mod: PBBFAC,,, | Performed by: DIETITIAN, REGISTERED

## 2024-07-25 PROCEDURE — 99999 PR PBB SHADOW E&M-EST. PATIENT-LVL V: CPT | Mod: PBBFAC,,, | Performed by: PHYSICIAN ASSISTANT

## 2024-07-25 PROCEDURE — 84425 ASSAY OF VITAMIN B-1: CPT | Performed by: PHYSICIAN ASSISTANT

## 2024-07-25 PROCEDURE — 82607 VITAMIN B-12: CPT | Performed by: PHYSICIAN ASSISTANT

## 2024-07-25 PROCEDURE — 85025 COMPLETE CBC W/AUTO DIFF WBC: CPT | Performed by: PHYSICIAN ASSISTANT

## 2024-07-25 NOTE — PROGRESS NOTES
BARIATRIC POST-OPERATIVE VISIT:    HPI:  Africa Jennings is a 54 y.o. year old female presents for 1 year post op visit following s/p sleeve.  she is doing well and tolerating the diet without difficulty.  she has no complaints.    Pt states that she is doing  States that constipation is an issue but takes her dulcolax every other day   Pt states that her goal is 175   Not on metformin   Not on ozempic   ( Controlled)         Denies: nausea, vomiting, abdominal pain, changes in bowel movement pattern, fever, chills, dysphagia, chest pain, and shortness of breath.    Review of Systems   Constitutional:  Negative for appetite change, fatigue and fever.   HENT:  Negative for ear discharge, tinnitus and trouble swallowing.    Eyes:  Negative for visual disturbance.   Respiratory:  Negative for cough, choking, chest tightness and shortness of breath.    Gastrointestinal:  Negative for abdominal pain, constipation, diarrhea, nausea and vomiting.   Genitourinary:  Negative for decreased urine volume and hematuria.   Skin:  Negative for rash.   Neurological:  Negative for dizziness, light-headedness and headaches.   Psychiatric/Behavioral:  Negative for dysphoric mood, sleep disturbance and suicidal ideas. The patient is not nervous/anxious.        EXERCISE & VITAMINS:  See Bariatric Assessment  Adherent to vitamin regimen   MEDICATIONS/ALLERGIES:  Have been reviewed.    DIET:  See Dietician note from today for a more detailed assessment.    2 shakes and 1 home made shake   Black beans  Red beans  Black beans  Physical Exam  Constitutional:       Appearance: She is obese.   HENT:      Head: Normocephalic and atraumatic.   Eyes:      Extraocular Movements: Extraocular movements intact.      Conjunctiva/sclera: Conjunctivae normal.      Pupils: Pupils are equal, round, and reactive to light.   Cardiovascular:      Rate and Rhythm: Normal rate and regular rhythm.      Pulses: Normal pulses.      Heart sounds:  Normal heart sounds. No murmur heard.  Pulmonary:      Effort: Pulmonary effort is normal. No respiratory distress.      Breath sounds: Normal breath sounds.   Abdominal:      General: Bowel sounds are normal.      Palpations: Abdomen is soft.      Tenderness: There is no abdominal tenderness.   Musculoskeletal:      Cervical back: Normal range of motion and neck supple.   Skin:     Capillary Refill: Capillary refill takes less than 2 seconds.   Neurological:      General: No focal deficit present.      Mental Status: She is alert and oriented to person, place, and time.   Psychiatric:         Mood and Affect: Mood normal.         Behavior: Behavior normal.         Thought Content: Thought content normal.         Judgment: Judgment normal.         ASSESSMENT:  - Morbid obesity s/p sleeve gastrectomy   - Co-morbidities: diabetes mellitus, GERD, hypertension, and obstructive sleep apnea  - Good  Weight loss, 45#'s and 48% EWL  - Great Exercise routine  - Good Diet  - Good Vitamin regimen    PLAN:  - No lifting more than 10 lbs for start/continue weeks  - Miralax daily for constipation  - Emphasized the importance of regular exercise and adherence to bariatric diet to achieve maximum weight loss.  - Encouraged patient to start/continue regular exercise.  - Follow-up with dietician to advance diet.  - Continue daily vitamins and medications.  - RTC   - Call the office for any issues.  - Check labs today.

## 2024-07-25 NOTE — PROGRESS NOTES
NUTRITION NOTE    Referring Physician: Roge Rich M.D.  Reason for MNT Referral: Follow-up 1 years s/p Gastric Sleeve    Denies nausea, vomiting, constipation, and diarrhea.  Reports doing well. States she is still on track with diet, exercise and vitamins. Good family support.    Past Medical History:   Diagnosis Date    Diabetes mellitus     Diabetes mellitus, type 2     Eczema     GERD (gastroesophageal reflux disease)     Hypertension     Impaired fasting blood sugar     YSABEL on CPAP      CLINICAL DATA:  54 y.o. female.    Current Weight: 177 lbs  BMI: 32.4  Total Weight Loss: 45 lbs  Excess Weight Loss: 48%    LABS:  Reviewed.    CURRENT DIET:  Bariatric Diet.  Diet Recall:  grams of protein/day; 48+ oz of fluids/day    B: 1 egg and 1/2 cup cottage cheese  - Premier shake  L: small salad with cottage cheese or boiled egg or cold salmon chunks  - Premier shake  D: 2oz chicken or salmon, cooked veg or salad    Meal Pattern: 2-3 meal(s) + 1-2 snack(s) + 2 protein supplement(s)  Adequate protein supplement intake. Premier shakes  Adequate dairy intake.  Adequate vegetable intake. Tolerates raw vegetables and lettuce.  Starchy CHO: avoids  Snacking: nuts, fruits  Beverages: water and CL    EXERCISE:  3 days/week trampoline with weights and walking    Restrictions to Exercise: None.    VITAMINS / MINERALS:  Multivitamins  B-Complex  Calcium Citrate + Vitamin D  Vitamin B12: Sublingual.    ASSESSMENT:  Doing well overall.  Weight loss excellent at 48% EWL.  Adequate calorie intake.  Adequate protein intake.  Adequate fluid intake.  Following diet appropriately.  Exercising.  Adequate vitamins & minerals.    BARIATRIC DIET DISCUSSION:  Instructed and provided written materials on bariatric diet plan.  Reinforced post-op nutrition guidelines.    PLAN / RECOMMENDATIONS:  Continue excellent diet plan.  Maintain protein intake.  Maintain fluid intake.  Continue exercise.  Continue appropriate vitamins &  minerals.    Return to clinic in 1 years.    SESSION TIME: 30 minutes

## 2024-07-25 NOTE — PATIENT INSTRUCTIONS
"Snacks: (100-200 calories; >5g protein)    - 1 low-fat cheese stick with 8 cherry tomatoes or 1 serving fresh fruit  - 4 thin slices fat-free turkey breast and 1 slice low-fat cheese  - 4 thin slices fat-free honey ham with wedge of melon  - 2 slices of turkey michelle  - Boiled eggs (can buy at [employer] already boiled w/ shell removed)  - for convenience,  [place] read, snack, go (deli meat and cheese rolls)  - P3 packets (Protein packs w/ cheese, nuts, lean deli meat)  - MHP Fit and Lean Protein Pudding (find at [employer] - per 1 cup serving = 100 calories, 15 g protein, 0 g sugar)  - 6-8 edamame pods (equivalent to about 1/4 cup edamame without pods).   - 1/4 cup unsalted nuts with ½ cup fruit  - 6-oz container Dannon Light n Fit vanilla yogurt, topped with 1oz unsalted nuts         - apple, celery or baby carrots spread with 2 Tbsp PB2  - apple slices with 1 oz slice low-fat cheese  - Apple slices dipped in 2 Tbsp of PB2  - 2 Tbsp PB2 mixed in light or greek yogurt or sugar-free pudding  - celery, cucumber, bell pepper or baby carrots dipped in ¼ cup hummus bean spread   - celery, cucumber, baby carrots dipped in high protein greek yogurt (Mix 16 oz plain greek yogurt + 1 packet of hidden valley ranch dip mix)  - Barrie Links Beef Steak - 14g protein! (similar to beef jerky but very lean)  - 2 wedges Laughing Cow - Light Herb & Garlic Cheese with sliced cucumber or green bell pepper  - 1/2 cup low-fat cottage cheese with ¼ cup fruit or ¼ cup salsa  - 1/2 cup low fat cottage cheese with 10-15 cherry tomatoes  - 8 oz glass of FAIRLIFE fat free milk (13 g protein)  - 8 oz glass of FAIRLIFE fat free milk + 1 packet of sugar-free hot cocoa  - Add Atkins advantage Cafe Caramel shake to decaf coffee. Serve hot or blend with ice for "frappaccino" like drink  - RTD Protein drinks: Atkins, Low Carb Slim Fast, EAS light, Muscle Milk Light, etc.  - Homemade Protein drinks: GNC Soy95, Isopure, Nectar, UNJURY, Whey " Gourmet, etc. Mix 1 scoop powder with 8oz skim/1% milk or light soymilk.  - Protein bars: Atkins, EAS, Pure Protein,  Quest, Think Thin, Detour, etc. Must have 0-4 grams sugar - Read the label.    ** Be CREATIVE. You can always snack on bites of grilled chicken or tuna salad made with low fat holland, if needed!

## 2024-07-30 ENCOUNTER — PATIENT MESSAGE (OUTPATIENT)
Dept: FAMILY MEDICINE | Facility: CLINIC | Age: 54
End: 2024-07-30
Payer: COMMERCIAL

## 2024-07-30 LAB — VIT B1 BLD-MCNC: 53 UG/L (ref 38–122)

## 2024-08-12 ENCOUNTER — PATIENT MESSAGE (OUTPATIENT)
Dept: BARIATRICS | Facility: CLINIC | Age: 54
End: 2024-08-12
Payer: COMMERCIAL

## 2024-08-19 ENCOUNTER — OFFICE VISIT (OUTPATIENT)
Dept: PAIN MEDICINE | Facility: CLINIC | Age: 54
End: 2024-08-19
Payer: COMMERCIAL

## 2024-08-19 DIAGNOSIS — M54.16 LUMBAR RADICULOPATHY: ICD-10-CM

## 2024-08-19 DIAGNOSIS — G89.4 CHRONIC PAIN SYNDROME: Primary | ICD-10-CM

## 2024-08-19 DIAGNOSIS — M70.62 TROCHANTERIC BURSITIS OF BOTH HIPS: ICD-10-CM

## 2024-08-19 DIAGNOSIS — M70.61 TROCHANTERIC BURSITIS OF BOTH HIPS: ICD-10-CM

## 2024-08-19 PROCEDURE — 1159F MED LIST DOCD IN RCRD: CPT | Mod: CPTII,95,, | Performed by: NURSE PRACTITIONER

## 2024-08-19 PROCEDURE — 3066F NEPHROPATHY DOC TX: CPT | Mod: CPTII,95,, | Performed by: NURSE PRACTITIONER

## 2024-08-19 PROCEDURE — 1160F RVW MEDS BY RX/DR IN RCRD: CPT | Mod: CPTII,95,, | Performed by: NURSE PRACTITIONER

## 2024-08-19 PROCEDURE — 99213 OFFICE O/P EST LOW 20 MIN: CPT | Mod: 95,,, | Performed by: NURSE PRACTITIONER

## 2024-08-19 PROCEDURE — 3044F HG A1C LEVEL LT 7.0%: CPT | Mod: CPTII,95,, | Performed by: NURSE PRACTITIONER

## 2024-08-19 PROCEDURE — 3061F NEG MICROALBUMINURIA REV: CPT | Mod: CPTII,95,, | Performed by: NURSE PRACTITIONER

## 2024-08-19 RX ORDER — PREGABALIN 100 MG/1
100 CAPSULE ORAL 2 TIMES DAILY
Qty: 60 CAPSULE | Refills: 1 | Status: SHIPPED | OUTPATIENT
Start: 2024-08-19 | End: 2024-10-18

## 2024-08-19 NOTE — PROGRESS NOTES
Chronic Pain-Tele-Medicine-Established Note (Follow up visit)        The patient location is: Home  The chief complaint leading to consultation is: pain  Visit type: Virtual visit with synchronous audio and video  Total time spent with patient: 13 min  Each patient to whom he or she provides medical services by telemedicine is:  (1) informed of the relationship between the physician and patient and the respective role of any other health care provider with respect to management of the patient; and (2) notified that he or she may decline to receive medical services by telemedicine and may withdraw from such care at any time.      SUBJECTIVE:    Interval History 8/19/2024:  The patient has a virtual visit for 2 month follow up. She reports return of right hip pain. She says that previous GTB injection provided benefit for about 3 weeks. The pain bothers her with more activity. She has been taking Lyrica 75 mg twice daily for some time with mild benefit and no side effects. She is interested in increasing the medication. No additional complaints at this time.     Interval History 6/17/2024:  The patient returns for follow up of right hip pain. She is s/p right GTB injection on 6/4/24 with significant benefit. She still has some pain intermittently but says that it is mild. No other complaints. She continues to take Lyrica. Her pain today is 3/10.    Interval History 10/12/23: Africa Jennings returns for follow up. At our last visit, which was virtual, she was having a lot of radiating pain. Unfortunately, the TFESI we performed was not helpful. She presents today in person and I am able to examine her. Today, on examination, it is clear that this pain is reproduced with palpation of her GTBs. She continues to note 7/10 pain which is worse with laying on her side and improved with movement. It is impacting her work.     Interval History 8/28/2023:  Africa Jennings presents tele-medicine  appointment for a follow-up appointment for R sided lower back and focal R L5 radicular pain. She states leg pain > back pain. She describes a sharp, shooting pain in her R buttocks that radiates to her R mid calf posteriorly and is associated with burning, numbness, and tingling. Worse with activity, bending, lifting, night time. She states the R leg is subjectively weaker than the left.. Since the last visit, Africa Finleyqian Jennings states the pain has been worsening. Current pain intensity is 7/10. Current medications include lyrica, zanaflex, tylenol with some relief. She has completed physical therapy and aqua therapy (March 2023 - April 2023) to help strengthen her back and notes benefit. She continues with Home exercise program and is waiting to hear back from [facility] to schedule her. Patient was supposed to have R L4/5 and L5/S1 TFESI done since last visit, however, there was some issue with scheduling or insurance (the patient is unsure which). She is interested in having the procedure rescheduled to help alleviate her symptoms. Patient denies red flags including weakness, unexpected weight loss/gain, night sweats/fevers, saddle anesthesia, and symptoms of VERA.    Interval History 4/26/2023:  Mrs Jennings presents for follow up. Over interval she has had T9-12 Thoracic decompression by Dr Whitt on 10/28/2022. She is doing well since then but continues to have R sided lower back and focal R L5 radicular pain. She states back pain = leg pain. She states the R leg is subjectively weaker than the left. Denies any s/s concerning for cauda equina. Pain has limited functioning and she has Completed PT/Aquatherapy for >6 weeks.      Interval History 3/16/2022:  Pt presents for follow up of. She has had L4/5 ILESI and left SI injection in past with benefit. She has more vocal pain to right and associated radiculopathy down right leg in L4/5 pattern. She had no recent MRI in past 2 years and known  lumbar discogenic issues but this was more focal to left. She has no complaint of loss of b/b or saddle paresthesias.      Interval History  2/22/2022:   Mrs Jennings presents for virtual follow up of lower back and left sided buttock pain. She is now s/p Left SIJ and states 90% improved pain and feels her relief was more significant than MARILEE. She states pain more noticeable to right side now without radicular complaint. There is no focal voicing of loss of b/b or saddle paresthesias.      Interval History 1/25/2021:  Mrs Jennings presents for follow up of lower back pain. She is s/p L4/5 ILESI with 90% benefit from lower back pain. She has lingering lower buttock/back pain to left side. Increased pain when sitting or stairs. Denies radiculopathy. Denies any loss of b/b or saddle paresthesias.      Interval History 12/23/2021:  Mrs Jennings presents to establish care at [facility]. She states continued lower back pain to left side but leg pain/paresthesias=back pain. She states left lower back pain and radiation in L4/5 pattern but also on right side. She has MRI findings to left L4/5 NF disc protrusion. She has been doing HEP she learned through [facility] for over 6 months straight and continues with no lasting benefit. She is currently taking Mobic 15mg and Neurontin 300mg TID. There is no complaint of loss of b/b or saddle paresthesias.      Interval History (4/13/20):     The patient location is: home  The chief complaint leading to consultation is: follow up  Visit type: Virtual visit with audio.  Patient verbally consented for audio visit.  Total time spent with patient: 15 minutes  Each patient to whom he or she provides medical services by telemedicine is:  (1) informed of the relationship between the physician and patient and the respective role of any other health care provider with respect to management of the patient; and (2) notified that he or she may decline to receive medical services by  telemedicine and may withdraw from such care at any time.        She returns today for follow up.  She reports that her right-sided low back and lower extremity pain has returned since last encounter.  She denies any changes in the quality or location of pain.  She continues to report associated numbness.  She denies any associated weakness or bowel bladder dysfunction.  She has attempted gabapentin 600 mg t.i.d..  She states this medication did not provide any relief.  She has also tried goody's powder, Motrin, Tylenol without any relief.          Interval History (2/17/20):  She returns today for follow up and MRI review.  She reports that her pain is unchanged in quality and location since last encounter.  She does state that the pain has significantly improved and is overall not very bothersome for the time being.          Initial Encounter (2/3/20):  Africa Jennings is a 53 y.o. female who presents today with chronic right-sided low back and lower extremity pain. This pain has been present for 6-7 months.  No specific inciting event or injury noted.  Patient localizes the pain to the right lumbar region.  Pain radiates to the right posterior and anterior thigh.  She reports associated numbness in this area as well. She also reports weakness of the right lower extremity when ambulating.  She denies any associated bowel or bladder dysfunction.  The pain is constant and worsened with activity.  She states that the pain interrupt her sleep.  Patient does have a history of a similar problem roughly 8 years ago.  She underwent epidural steroid injections with good relief.  This pain is described in detail below.    Pain Scores:     Best:       6/10  Worst:     10/10  Usually:   8/10  Today:    0/10     Physical Therapy:  Feb-May, 2023  HEP: Continues HEP daily as prescribed at PT above     Non-pharmacologic Treatment:  Rest helps         TENS?  No     Pain Medications:         Currently taking:  Aleve,  gabapentin     Has tried in the past:  Tylenol, Motrin     Has not tried:  Opioids, Tylenol, Muscle relaxants, TCAs, SNRIs, anticonvulsants, topical creams     report:  Reviewed and consistent with medication use as prescribed.     Blood thinners:  None     Relevant Surgeries:  None     Affecting sleep?  Yes     Affecting daily activities? yes     Depressive symptoms? no          SI/HI? No     Work status: Employed     Pain Procedures:   - L4-5 interlaminar epidural steroid injection x2  - 1/11/2022 L4/5 ILESI 90% benefit    - 2/8/2022 Left SIJ injection  9/26/23: Right L4/5, L5/S1 TFESI - 1 day of relief  10/21/23 B/L GTB injection  6/4/24 Right GTB injection- 90% relief for 3 weeks      Imaging:   CT Thoracic Spine Without Contrast  Order: [id number]  Status: Final result     Visible to patient: Yes (seen)     Next appt: 09/18/2023 at 08:45 AM in Dermatology (Basilia Valdez MD)     Dx: S/P fusion of thoracic spine     0 Result Notes  Details    Reading Physician Reading Date Result Priority   Tony Powell MD  [phone] 1/30/2023 Routine   Dex Bojorquez MD  [phone]  [phone] 1/30/2023      Narrative & Impression  EXAMINATION:  CT THORACIC SPINE WITHOUT CONTRAST     CLINICAL HISTORY:  s/p T9-12 fusion;  Arthrodesis status     TECHNIQUE:  CT thoracic spine performed without contrast.  Coronal and sagittal reformats provided.     COMPARISON:  X-ray thoracic spine 12/12/2022, x-ray thoracolumbar spine 10/29/2022, MRI thoracic spine 06/03/2022, CT thoracic spine 06/03/2022.     FINDINGS:  Postsurgical change of posterior spinal instrumented fusion T9-T12, T9-12 laminectomies and medial facetectomies and decortication of the T9-12 posterior elements and placement of a mixture of autologous and synthetic bone into the bilateral gutters for arthrodesis.  No hardware fracture or periprosthetic lucency to suggest hardware loosening.     Alignment: Stable, slightly pronounced kyphotic curvature.   Grade 1 retrolisthesis T11-12.     Vertebrae: Vertebral body heights are well maintained.  No fracture.  Stable sclerotic focus in the T5 vertebral body likely representing a bone island.  No lytic or blastic lesion.  Morselized synthetic and autologous bone in the bilateral T9-T12 gutters with noted abutment at the T12 posterior elements likely representing fusion.     Discs: Normal height.  Vacuum disc phenomenon at T8-9 and T11-12.     Degenerative findings:     C7-L1: No spinal canal stenosis or neural foraminal narrowing.     Paraspinal muscles & soft tissues: Cholecystectomy.  High attenuation focus in the posterior right upper quadrant possibly represent calcification or surgical clip.  Otherwise unremarkable.     Impression:     1. Postsurgical changes at T9-T12, as above, without evidence of hardware loosening or fracture.  2. Additional findings as above.     Electronically signed by resident: Dex Bojorquez  Date:                                            01/30/2023  Time:                                           13:59     Electronically signed by: Tony Powell MD  Date:                                            01/30/2023  Time:                                           15:43       MRI Thoracic Spine Without Contrast  Order: [id number]  Status: Final result     Visible to patient: Yes (seen)     Next appt: 09/18/2023 at 08:45 AM in Dermatology (Basilia Valdez MD)     Dx: Thoracic spinal stenosis     0 Result Notes  Details    Reading Physician Reading Date Result Priority   Tony Powell MD  [phone] 6/4/2022 Routine     Narrative & Impression  EXAMINATION:  MRI THORACIC SPINE WITHOUT CONTRAST     CLINICAL HISTORY:  T9-11 stenosis, please focus on these levels.;  Spinal stenosis, thoracic region     TECHNIQUE:  Multiplanar, multisequence images were performed through the thoracic spine.  Contrast was not administered.     COMPARISON:  MRI 03/31/2022, CT 06/03/2022.      FINDINGS:  Thoracic spine alignment demonstrates a slightly pronounced kyphotic curvature.  No spondylolisthesis.  Vertebral body heights are well maintained without evidence fracture.  There is slight heterogeneous marrow signal intensity, similar when compared to the previous exam and favored to relate to benign reconversion.  No marrow signal abnormality to suggest an infiltrative process.     Persistent focal area of increased T2/STIR cord signal at the T10-T11 level, similar when compared to the previous exam concerning for myelomalacia.  Remaining visualized spinal cord demonstrates normal contour and signal intensity.     There is a 1.1 cm T2 hypointense right thyroid nodule.  There are multiple colonic diverticuli.  Remaining visualized thoracic and upper abdominal organs demonstrate no significant abnormalities.  Paraspinal musculature demonstrates normal bulk and signal intensity.     T1-T2: Bilateral facet arthropathy contributes to moderate left neural foraminal narrowing.  No spinal canal stenosis.     T2-T3: Broad-based disc osteophyte complex causes mild mass effect on the ventral thecal sac.  Left facet arthropathy.  Findings contribute to mild-to-moderate left and mild right neural foraminal narrowing.  No spinal canal stenosis.     T7-T8: Circumferential disc osteophyte complex, bilateral facet arthropathy and right ligamentum flavum buckling.  Findings contribute to moderate right neural foraminal narrowing.  No spinal canal stenosis.     T8-T9: Circumferential disc osteophyte complex and mild bilateral ligamentum flavum buckling.  Findings contribute to mild right neural foraminal narrowing.  No spinal canal stenosis.     T9-T10: Circumferential disc osteophyte complex and bilateral ligamentum flavum buckling.  Findings contribute to mild spinal canal stenosis and moderate right neural foraminal narrowing.     T10-T11: Circumferential disc osteophyte complex, bilateral facet arthropathy and  bilateral ligamentum flavum buckling.  Findings contribute to severe spinal canal stenosis and moderate right and mild left neural foraminal narrowing.     Impression:     1. Multilevel degenerative changes of the thoracic spine most pronounced at T10-T11 noting severe spinal canal stenosis and mild-to-moderate neural foraminal narrowing.  2. Persistent focal area of cord signal abnormality at T10-T11, similar when compared to MRI most suggestive of myelomalacia.        Electronically signed by: Tony Powell MD  Date:                                            06/04/2022  Time:                                           09:05       MRI Lumbar Spine Without Contrast  Order: [id number]  Status: Final result     Visible to patient: Yes (seen)     Next appt: 09/18/2023 at 08:45 AM in Dermatology (Basilia Valdez MD)     Dx: Dorsalgia, unspecified     1 Result Note  Details    Reading Physician Reading Date Result Priority   Maximo Mcallister Jr., MD  [phone] 3/25/2022 Routine     Narrative & Impression  EXAMINATION:  MRI LUMBAR SPINE WITHOUT CONTRAST     CLINICAL HISTORY:  Dorsalgia, unspecifiedLow back pain, symptoms persist with > 6wks conservative treatment;     TECHNIQUE:  Sagittal T1, sagittal T2, sagittal STIR, axial T1 and axial T2 weighted images of the lumbar spine obtained without contrast.     COMPARISON:  Similar study 02/10/2020     FINDINGS:  Lumbar spine alignment is within normal limits. The vertebral body heights are well maintained, with no fracture.  Degenerative sclerotic marrow endplate change at S1 and fatty metaplasia degenerative endplate change at T12.  Otherwise marrow signal normal.     The conus is normal in appearance.  The adjacent soft tissue structures show no significant abnormalities.     Spinal canal is congenitally narrow on the basis of short pedicles.  At the T10-11 level there is extradural indentation of the thecal sac from posteriorly and there is some broad-based disc  bulging.  Transverse images do not cover this level.  There is at least moderate spinal stenosis at this level.     L1-L2: No focal disc herniation.No significant central canal or neural foraminal narrowing.     L2-L3: No focal disc herniation.  Mild facet arthrosis.  No significant central canal or neural foraminal narrowing.     L3-L4: No evidence of a disc herniation.  No significant central canal or neural foraminal narrowing.     L4-L5: Broad-based disc bulging with left-sided foraminal and far lateral annular fissure and superimposed mild spondylitic spurring. Facet arthrosis contributes to severe left-sided neural foraminal narrowing.  Moderate central spinal stenosis.     L5-S1: Broad-based disc bulging, facet arthrosis and ligamentum flavum hypertrophy result in severe bilateral neural foraminal stenosis.  Mild central spinal stenosis.     Impression:     Extradural degenerative changes at T10-11, L4-5, and L5-S1 resulting in central canal and foraminal stenosis as detailed above.  Dedicated imaging of the thoracic spine could lend additional information regarding findings at T10-11     This report was flagged in Epic as abnormal.        Electronically signed by: Maximo Aguirre Jr  Date:                                            03/25/2022  Time:                                           09:06       Past Medical History:   Diagnosis Date    Diabetes mellitus     Diabetes mellitus, type 2     Eczema     GERD (gastroesophageal reflux disease)     Hypertension     Impaired fasting blood sugar     YSABEL on CPAP      Past Surgical History:   Procedure Laterality Date    breast reduction      CHOLECYSTECTOMY      laprascopic    COLONOSCOPY N/A 3/26/2024    Procedure: COLONOSCOPY;  Surgeon: Sangeetha Taylor MD;  Location: [facility];  Service: Endoscopy;  Laterality: N/A;  referral Vu Britney. Suprep Instr. to portal.EC Not taking Ozempic any longer.EC  3/12/24-LVM regarding last dose Ozempic on or before 3/18/24 if  restarted, instr portal-DS  3/19- lvm and portal msg for pc- pt returned call, pc complete. prep reordered. states last dose of ozempic was 3/16    DECOMPRESSION OF THORACIC OUTLET N/A 10/28/2022    Procedure: T9-12 ROBOTIC DECOMPRESSION, THORACIC FUSION;  Surgeon: Edouard Whitt DO;  Location: [facility] OR 2ND FLR;  Service: Neurosurgery;  Laterality: N/A;  ANESTHESIA GENERAL [place] I BLOOD TYPE & SCREEN NERVE MONITORING EMG SEP MEP POSTION PRONE BED CARA 4 POST HEAD REST [facility] RADIOLOGY C-ARM GLOBUS PROTOCOL MISCELLANEOUS ALANIZ BRACE TLSO    EPIDURAL STEROID INJECTION N/A 1/11/2022    Procedure: INJECTION, STEROID, EPIDURAL IL L4/5 NEEDS CONSENT;  Surgeon: Britt Calix MD;  Location: [facility] PAIN MGT;  Service: Pain Management;  Laterality: N/A;    ESOPHAGOGASTRODUODENOSCOPY  8/18/14    HYSTERECTOMY  2007    laprascopic, total for fibroids.  Dr Polo    INJECTION OF JOINT Left 2/8/2022    Procedure: INJECTION, JOINT, SI LEFT NEEDS CONSENT;  Surgeon: Britt Calix MD;  Location: [facility] PAIN MGT;  Service: Pain Management;  Laterality: Left;    INJECTION OF JOINT Bilateral 10/31/2023    Procedure: INJECTION, JOINT BILATERAL GTB;  Surgeon: Britt Calix MD;  Location: [facility] PAIN MGT;  Service: Pain Management;  Laterality: Bilateral;    INJECTION OF JOINT Right 6/4/2024    Procedure: INJECTION, JOINT RIGHT GTB;  Surgeon: Britt Calix MD;  Location: [facility] PAIN MGT;  Service: Pain Management;  Laterality: Right;  [phone]  2 WK F/U AKBAR  *3/26 COLONOSCOPY*  *SCHEDULE AFTER 4/9*    OOPHORECTOMY      ROBOT-ASSISTED LAPAROSCOPIC SLEEVE GASTRECTOMY USING DA DEVI XI N/A 7/26/2023    Procedure: XI ROBOTIC SLEEVE GASTRECTOMY with intraop EGD;  Surgeon: Roge Rich MD;  Location: [facility] OR 2ND FLR;  Service: General;  Laterality: N/A;  with possible HHR    TOTAL REDUCTION MAMMOPLASTY      TRANSFORAMINAL EPIDURAL INJECTION OF STEROID Right 9/26/2023    Procedure: INJECTION,  STEROID, EPIDURAL, TRANSFORAMINAL APPROACH, RIGHT L4/L5 AND L5/S1 SOONER DATE;  Surgeon: Britt Calix MD;  Location: [facility] PAIN T;  Service: Pain Management;  Laterality: Right;     Social History     Socioeconomic History    Marital status:    Tobacco Use    Smoking status: Never    Smokeless tobacco: Never   Substance and Sexual Activity    Alcohol use: Never    Drug use: No    Sexual activity: Yes     Partners: Male     Birth control/protection: Surgical   Social History Narrative    Was  since 2000.      Has a friend since 2016.    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     Social Determinants of Health     Financial Resource Strain: Medium Risk (1/22/2024)    Overall Financial Resource Strain (CARDIA)     Difficulty of Paying Living Expenses: Somewhat hard   Food Insecurity: No Food Insecurity (1/22/2024)    Hunger Vital Sign     Worried About Running Out of Food in the Last Year: Never true     Ran Out of Food in the Last Year: Never true   Transportation Needs: No Transportation Needs (1/22/2024)    PRAPARE - Transportation     Lack of Transportation (Medical): No     Lack of Transportation (Non-Medical): No   Physical Activity: Insufficiently Active (1/22/2024)    Exercise Vital Sign     Days of Exercise per Week: 1 day     Minutes of Exercise per Session: 30 min   Stress: No Stress Concern Present (1/22/2024)    [employer] - Occupational Stress Questionnaire     Feeling of Stress : Not at all   Housing Stability: Low Risk  (1/22/2024)    Housing Stability Vital Sign     Unable to Pay for Housing in the Last Year: No     Number of Places Lived in the Last Year: 1     Unstable Housing in the Last Year: No     Family History   Problem Relation Name Age of Onset    Diabetes Mother      Hypertension Mother      Heart disease Mother      Cancer Father  58        Prostate    Crohn's disease Sister      Breast cancer Sister       Diabetes Maternal Grandmother      Heart disease Maternal Grandmother      Hypertension Maternal Grandmother      Amblyopia Neg Hx      Blindness Neg Hx      Cataracts Neg Hx      Glaucoma Neg Hx      Macular degeneration Neg Hx      Retinal detachment Neg Hx      Strabismus Neg Hx         Review of patient's allergies indicates:  No Known Allergies    Current Outpatient Medications   Medication Sig    acetaminophen (TYLENOL) 500 MG tablet Take 2 tablets (1,000 mg total) by mouth every 8 (eight) hours as needed for Pain.    atorvastatin (LIPITOR) 20 MG tablet Take 1 tablet (20 mg total) by mouth once daily.    ergocalciferol (ERGOCALCIFEROL) 50,000 unit Cap Take 1 capsule (50,000 Units total) by mouth every 7 days.    estradioL (ESTRACE) 1 MG tablet Take 1 tablet (1 mg total) by mouth once daily.    finasteride (PROSCAR) 5 mg tablet TAKE 1 TABLET BY MOUTH EVERY DAY    fluocinonide (LIDEX) 0.05 % ointment Apply to affected areas of scalp at bedtime    ketoconazole (NIZORAL) 2 % shampoo Wash hair with medicated shampoo at least 2x/week - let sit on scalp at least 5 minutes prior to rinsing    meclizine (ANTIVERT) 25 mg tablet Take 1 tablet (25 mg total) by mouth 3 (three) times daily as needed for Dizziness.    mometasone (ELOCON) 0.1 % solution APPLY TOPICALLY ONCE DAILY. TO THINNING AREA OF SCALP    neomycin-polymyxin-dexamethasone (MAXITROL) 3.5mg/mL-10,000 unit/mL-0.1 % DrpS as needed.    nystatin (MYCOSTATIN) ointment Apply topically 2 (two) times daily. USES PRN    omeprazole (PRILOSEC) 40 MG capsule Take 1 capsule (40 mg total) by mouth every morning.    ondansetron (ZOFRAN) 4 MG tablet TAKE 1 TABLET BY MOUTH EVERY 8 HOURS AS NEEDED    ondansetron (ZOFRAN-ODT) 8 MG TbDL Take 1 tablet (8 mg total) by mouth every 6 (six) hours as needed.    prednisoLONE acetate (PRED FORTE) 1 % DrpS USES PRN    pregabalin (LYRICA) 75 MG capsule Take 1 capsule (75 mg total) by mouth 2 (two) times daily.    semaglutide (OZEMPIC) 1  mg/dose (4 mg/3 mL) Inject 1 mg into the skin every 7 days.    spironolactone (ALDACTONE) 50 MG tablet Take 1 tablet (50 mg total) by mouth once daily.    tiZANidine (ZANAFLEX) 4 MG tablet Take 1 tablet (4 mg total) by mouth every 8 (eight) hours.    triamcinolone acetonide 0.1% (KENALOG) 0.1 % cream Apply topically 2 (two) times daily. Apply topically 2 (two) times daily.    valsartan-hydrochlorothiazide (DIOVAN-HCT) 160-12.5 mg per tablet Take 1 tablet by mouth once daily.     Current Facility-Administered Medications   Medication    triamcinolone acetonide injection 10 mg    triamcinolone acetonide injection 10 mg    triamcinolone acetonide injection 5 mg    triamcinolone acetonide injection 5 mg       REVIEW OF SYSTEMS:    GENERAL:  No weight loss, malaise or fevers.  HEENT:   No recent changes in vision or hearing  NECK:  Negative for lumps, no difficulty with swallowing.  RESPIRATORY:  Negative for cough, wheezing or shortness of breath, patient denies any recent URI.  CARDIOVASCULAR:  Negative for chest pain, leg swelling or palpitations.  GI:  Negative for abdominal discomfort, blood in stools or black stools or change in bowel habits.  MUSCULOSKELETAL:  See HPI.  SKIN:  Negative for lesions, rash, and itching.  PSYCH:  No mood disorder or recent psychosocial stressors.  Patients sleep is not disturbed secondary to pain.  HEMATOLOGY/LYMPHOLOGY:  Negative for prolonged bleeding, bruising easily or swollen nodes.  Patient is not currently taking any anti-coagulants  NEURO:   No history of headaches, syncope, paralysis, seizures or tremors.  All other reviewed and negative other than HPI.    OBJECTIVE:    LMP  (LMP Unknown)     PHYSICAL EXAMINATION:    General appearance: Well appearing, in no acute distress, alert and oriented x3.  Psych:  Mood and affect appropriate.  Skin: Skin color normal, no rashes or lesions, in both upper and lower body.  Extremities: Moves all visualized extremities  freely.      PREVIOUS PHYSICAL EXAM:   GENERAL: Well appearing, in no acute distress, alert and oriented x3.  PSYCH:  Mood and affect appropriate.  SKIN: Skin color, texture, turgor normal, no rashes or lesions.  HEENT:  Normocephalic, atraumatic. Cranial nerves grossly intact.  EXTREMITIES: No deformities, edema, or skin discoloration.   MUSCULOSKELETAL: Bilateral lower extremity strength is normal and symmetric. No atrophy is noted. No TTP over bilateral GTB. SLR is negative bilaterally.  NEURO: Sensation is equal and appropriate bilaterally.   GAIT: Normal.      ASSESSMENT: 54 y.o. year old female with pain consistent with     1. Chronic pain syndrome        2. Trochanteric bursitis of both hips        3. Lumbar radiculopathy            DISCUSSION: Ms. Jennings is a . She has a history of T9-12 decompression and posterior fusion with Dr. Whitt. She came to us with left low back pain and did well after SIJ. She then presented back with right radicular symptoms. MRI shows short pedicles, moderate canal stenosis at L4/5, and severe foraminal stenosis at L5/S1.     PLAN:   1) The patient will continue a home exercise routine to help with pain and strengthening.    2) She is s/p right GTB injection with excellent benefit for 3 weeks then return of pain.  3) Increase Lyrica to 100 mg BID.   4) Can repeat right GTB injection. She declined at this time.  5) Follow up in 2 months or sooner if needed.      The above plan and management options were discussed at length with patient. Patient is in agreement with the above and verbalized understanding.     Munira Strauss  08/19/2024

## 2024-08-20 ENCOUNTER — TELEPHONE (OUTPATIENT)
Dept: ENDOSCOPY | Facility: HOSPITAL | Age: 54
End: 2024-08-20
Payer: COMMERCIAL

## 2024-08-20 ENCOUNTER — PATIENT MESSAGE (OUTPATIENT)
Dept: ENDOSCOPY | Facility: HOSPITAL | Age: 54
End: 2024-08-20
Payer: COMMERCIAL

## 2024-08-20 NOTE — TELEPHONE ENCOUNTER
Contacted Pt for Endoscopy precall to confirm scheduled procedure(s) Colonoscopy on 8/27/24. Pt did not answer.unable to leave  voicemail for pt to call Endoscopy Scheduling to confirm. Portal message sent

## 2024-08-26 ENCOUNTER — TELEPHONE (OUTPATIENT)
Dept: ENDOSCOPY | Facility: HOSPITAL | Age: 54
End: 2024-08-26
Payer: COMMERCIAL

## 2024-08-26 NOTE — TELEPHONE ENCOUNTER
Spoke to patient for pre-call to confirm scheduled Colonoscopy and patient verbalized understanding of the following:       Date of Procedure (s)  verified 8/27/24  Arrival Time 7:45 AM verified.  Location of Procedure(s) [facility] 4th Floor verified.  NPO status reinforced. Ok to continue clear liquids up until 4 hours prior to the Endoscopy procedure.   Confirmed Pt is currently taking Ozempic (Semaglutide), Pt instructed to stop stop taking Ozempic (Semaglutide) on 8/19/24.     Pt confirmed receipt of prep instructions and Rx prep (if applicable).  Instructions provided to patient via [facility]  Pt confirmed ride home after procedure if procedure requires anesthesia.   Pre-call screening questionnaire reviewed and completed with patient.   Appointment details are tentative, including check-in time.  If the patient begins taking any blood thinning medications, injectable weight loss/diabetes medications (other than insulin), or Adipex (phentermine) patient was instructed to contact the endoscopy scheduling department as soon as possible.  Patient was advised to call the endoscopy scheduling department if any questions or concerns arise.       [facility] Scheduling Department

## 2024-08-27 ENCOUNTER — HOSPITAL ENCOUNTER (OUTPATIENT)
Facility: HOSPITAL | Age: 54
Discharge: HOME | End: 2024-08-27
Attending: INTERNAL MEDICINE | Admitting: INTERNAL MEDICINE
Payer: COMMERCIAL

## 2024-08-27 ENCOUNTER — ANESTHESIA (OUTPATIENT)
Dept: ENDOSCOPY | Facility: HOSPITAL | Age: 54
End: 2024-08-27
Payer: COMMERCIAL

## 2024-08-27 ENCOUNTER — ANESTHESIA EVENT (OUTPATIENT)
Dept: ENDOSCOPY | Facility: HOSPITAL | Age: 54
End: 2024-08-27
Payer: COMMERCIAL

## 2024-08-27 VITALS
OXYGEN SATURATION: 99 % | RESPIRATION RATE: 18 BRPM | DIASTOLIC BLOOD PRESSURE: 94 MMHG | WEIGHT: 177 LBS | HEIGHT: 62 IN | HEART RATE: 68 BPM | SYSTOLIC BLOOD PRESSURE: 132 MMHG | TEMPERATURE: 98 F | BODY MASS INDEX: 32.57 KG/M2

## 2024-08-27 DIAGNOSIS — Z12.11 COLON CANCER SCREENING: Primary | ICD-10-CM

## 2024-08-27 LAB — POCT GLUCOSE: 76 MG/DL (ref 70–110)

## 2024-08-27 PROCEDURE — G0121 COLON CA SCRN NOT HI RSK IND: HCPCS | Performed by: INTERNAL MEDICINE

## 2024-08-27 PROCEDURE — 25000003 PHARM REV CODE 250: Performed by: INTERNAL MEDICINE

## 2024-08-27 PROCEDURE — 25000003 PHARM REV CODE 250: Performed by: NURSE ANESTHETIST, CERTIFIED REGISTERED

## 2024-08-27 PROCEDURE — G0121 COLON CA SCRN NOT HI RSK IND: HCPCS | Mod: 22,,, | Performed by: INTERNAL MEDICINE

## 2024-08-27 PROCEDURE — 37000008 HC ANESTHESIA 1ST 15 MINUTES: Performed by: INTERNAL MEDICINE

## 2024-08-27 PROCEDURE — 37000009 HC ANESTHESIA EA ADD 15 MINS: Performed by: INTERNAL MEDICINE

## 2024-08-27 PROCEDURE — 63600175 PHARM REV CODE 636 W HCPCS: Performed by: NURSE ANESTHETIST, CERTIFIED REGISTERED

## 2024-08-27 RX ORDER — LIDOCAINE HYDROCHLORIDE 20 MG/ML
INJECTION INTRAVENOUS
Status: DISCONTINUED | OUTPATIENT
Start: 2024-08-27 | End: 2024-08-27

## 2024-08-27 RX ORDER — PROPOFOL 10 MG/ML
VIAL (ML) INTRAVENOUS
Status: DISCONTINUED | OUTPATIENT
Start: 2024-08-27 | End: 2024-08-27

## 2024-08-27 RX ORDER — SODIUM CHLORIDE 9 MG/ML
INJECTION, SOLUTION INTRAVENOUS CONTINUOUS
Status: DISCONTINUED | OUTPATIENT
Start: 2024-08-27 | End: 2024-08-27 | Stop reason: HOSPADM

## 2024-08-27 RX ADMIN — PROPOFOL 50 MG: 10 INJECTION, EMULSION INTRAVENOUS at 09:08

## 2024-08-27 RX ADMIN — SODIUM CHLORIDE: 0.9 INJECTION, SOLUTION INTRAVENOUS at 08:08

## 2024-08-27 RX ADMIN — LIDOCAINE HYDROCHLORIDE 50 MG: 20 INJECTION INTRAVENOUS at 09:08

## 2024-08-27 NOTE — PROVATION PATIENT INSTRUCTIONS
Discharge Summary/Instructions after an Endoscopic Procedure  Patient Name: Africa Jennings  Patient MRN: [id number]  Patient YOB: 1970 Tuesday, August 27, 2024  Manny Cronin MD  Dear patient,  As a result of recent federal legislation (The Federal Cures Act), you may   receive lab or pathology results from your procedure in your [facility]   account before your physician is able to contact you. Your physician or   their representative will relay the results to you with their   recommendations at their soonest availability.  Thank you,  RESTRICTIONS:  During your procedure today, you received medications for sedation.  These   medications may affect your judgment, balance and coordination.  Therefore,   for 24 hours, you have the following restrictions:   - DO NOT drive a car, operate machinery, make legal/financial decisions,   sign important papers or drink alcohol.    ACTIVITY:  Today: no heavy lifting, straining or running due to procedural   sedation/anesthesia.  The following day: return to full activity including work.  DIET:  Eat and drink normally unless instructed otherwise.     TREATMENT FOR COMMON SIDE EFFECTS:  - Mild abdominal pain, nausea, belching, bloating or excessive gas:  rest,   eat lightly and use a heating pad.  - Sore Throat: treat with throat lozenges and/or gargle with warm salt   water.  - Because air was used during the procedure, expelling large amounts of air   from your rectum or belching is normal.  - If a bowel prep was taken, you may not have a bowel movement for 1-3 days.    This is normal.  SYMPTOMS TO WATCH FOR AND REPORT TO YOUR PHYSICIAN:  1. Abdominal pain or bloating, other than gas cramps.  2. Chest pain.  3. Back pain.  4. Signs of infection such as: chills or fever occurring within 24 hours   after the procedure.  5. Rectal bleeding, which would show as bright red, maroon, or black stools.   (A tablespoon of blood from the rectum is not serious,  especially if   hemorrhoids are present.)  6. Vomiting.  7. Weakness or dizziness.  GO DIRECTLY TO THE NEAREST EMERGENCY ROOM IF YOU HAVE ANY OF THE FOLLOWING:      Difficulty breathing              Chills and/or fever over 101 F   Persistent vomiting and/or vomiting blood   Severe abdominal pain   Severe chest pain   Black, tarry stools   Bleeding- more than one tablespoon   Any other symptom or condition that you feel may need urgent attention  Your doctor recommends these additional instructions:  If any biopsies were taken, your doctors clinic will contact you in 1 to 2   weeks with any results.  - Discharge patient to home.   - Patient has a contact number available for emergencies.  The signs and   symptoms of potential delayed complications were discussed with the   patient.  Return to normal activities tomorrow.  Written discharge   instructions were provided to the patient.   - Resume previous diet.   - Continue present medications.   - Repeat colonoscopy in 10 years for screening purposes.  For questions, problems or results please call your physician - Manny Cronin MD at Work:  [phone].  [facility], EMERGENCY ROOM PHONE NUMBER: [phone]  IF A COMPLICATION OR EMERGENCY SITUATION ARISES AND YOU ARE UNABLE TO REACH   YOUR PHYSICIAN - GO DIRECTLY TO THE EMERGENCY ROOM.  Manny Cronin MD  8/27/2024 9:58:43 AM  This report has been verified and signed electronically.  Dear patient,  As a result of recent federal legislation (The Federal Cures Act), you may   receive lab or pathology results from your procedure in your [facility]   account before your physician is able to contact you. Your physician or   their representative will relay the results to you with their   recommendations at their soonest availability.  Thank you,  PROVATION

## 2024-08-27 NOTE — H&P
Short Stay Endoscopy History and Physical      Procedure - Colonoscopy  ASA - per anesthesia  Mallampati - per anesthesia  History of Anesthesia problems - no  Family history Anesthesia problems - no   Plan of anesthesia - MAC    HPI:  This is a 54 y.o. female here for colonoscopy for colon cancer screening.  Prior incomplete colonoscopy x2 due to tortuous sigmoid colon.       ROS:  Constitutional: No fevers, chills  CV: No chest pain  Pulm: No cough, No shortness of breath  GI: see HPI    Medical History:  has a past medical history of Diabetes mellitus, Diabetes mellitus, type 2, Eczema, GERD (gastroesophageal reflux disease), Hypertension, Impaired fasting blood sugar, and YSABEL on CPAP.    Surgical History:  has a past surgical history that includes breast reduction; Cholecystectomy; Esophagogastroduodenoscopy (8/18/14); Hysterectomy (2007); Oophorectomy; Total Reduction Mammoplasty; Epidural steroid injection (N/A, 1/11/2022); Injection of joint (Left, 2/8/2022); Decompression of thoracic outlet (N/A, 10/28/2022); Robot-assisted laparoscopic sleeve gastrectomy using da Lorenza Xi (N/A, 7/26/2023); Transforaminal epidural injection of steroid (Right, 9/26/2023); Injection of joint (Bilateral, 10/31/2023); Colonoscopy (N/A, 3/26/2024); and Injection of joint (Right, 6/4/2024).    Family History: family history includes Breast cancer in her sister; Cancer (age of onset: 58) in her father; Crohn's disease in her sister; Diabetes in her maternal grandmother and mother; Heart disease in her maternal grandmother and mother; Hypertension in her maternal grandmother and mother.    Social History:  reports that she has never smoked. She has never used smokeless tobacco. She reports that she does not drink alcohol and does not use drugs.    Review of patient's allergies indicates:  No Known Allergies    Medications:   Facility-Administered Medications Prior to Admission   Medication Dose Route Frequency Provider Last Rate  Last Admin    triamcinolone acetonide injection 10 mg  10 mg Intradermal Once Basilia Valdez MD        triamcinolone acetonide injection 10 mg  10 mg Intradermal Once Basilia Valdez MD        triamcinolone acetonide injection 5 mg  5 mg Intradermal Once Sarah Baez MD        triamcinolone acetonide injection 5 mg  5 mg Intradermal Once          Medications Prior to Admission   Medication Sig Dispense Refill Last Dose    acetaminophen (TYLENOL) 500 MG tablet Take 2 tablets (1,000 mg total) by mouth every 8 (eight) hours as needed for Pain. 30 tablet 0 Past Week    atorvastatin (LIPITOR) 20 MG tablet Take 1 tablet (20 mg total) by mouth once daily. 90 tablet 3 Past Week    ergocalciferol (ERGOCALCIFEROL) 50,000 unit Cap Take 1 capsule (50,000 Units total) by mouth every 7 days. 12 capsule 3 Past Week    estradioL (ESTRACE) 1 MG tablet Take 1 tablet (1 mg total) by mouth once daily. 90 tablet 1 Past Week    finasteride (PROSCAR) 5 mg tablet TAKE 1 TABLET BY MOUTH EVERY DAY 90 tablet 3 Past Week    fluocinonide (LIDEX) 0.05 % ointment Apply to affected areas of scalp at bedtime 60 g 3 Past Week    ketoconazole (NIZORAL) 2 % shampoo Wash hair with medicated shampoo at least 2x/week - let sit on scalp at least 5 minutes prior to rinsing 120 mL 5 Past Week    mometasone (ELOCON) 0.1 % solution APPLY TOPICALLY ONCE DAILY. TO THINNING AREA OF SCALP 60 mL 5 Past Week    neomycin-polymyxin-dexamethasone (MAXITROL) 3.5mg/mL-10,000 unit/mL-0.1 % DrpS as needed.   Past Week    nystatin (MYCOSTATIN) ointment Apply topically 2 (two) times daily. USES PRN   Past Week    omeprazole (PRILOSEC) 40 MG capsule Take 1 capsule (40 mg total) by mouth every morning. 90 capsule 3 Past Week    ondansetron (ZOFRAN) 4 MG tablet TAKE 1 TABLET BY MOUTH EVERY 8 HOURS AS NEEDED 90 tablet 1 Past Week    ondansetron (ZOFRAN-ODT) 8 MG TbDL Take 1 tablet (8 mg total) by mouth every 6 (six) hours as needed. 30 tablet 0 Past Week     prednisoLONE acetate (PRED FORTE) 1 % DrpS USES PRN   Past Week    pregabalin (LYRICA) 100 MG capsule Take 1 capsule (100 mg total) by mouth 2 (two) times daily. 60 capsule 1 Past Week    spironolactone (ALDACTONE) 50 MG tablet Take 1 tablet (50 mg total) by mouth once daily. 90 tablet 3 Past Week    tiZANidine (ZANAFLEX) 4 MG tablet Take 1 tablet (4 mg total) by mouth every 8 (eight) hours. 90 tablet 2 Past Week    triamcinolone acetonide 0.1% (KENALOG) 0.1 % cream Apply topically 2 (two) times daily. Apply topically 2 (two) times daily. 135 g 4 Past Week    valsartan-hydrochlorothiazide (DIOVAN-HCT) 160-12.5 mg per tablet Take 1 tablet by mouth once daily. 90 tablet 1 Past Week    meclizine (ANTIVERT) 25 mg tablet Take 1 tablet (25 mg total) by mouth 3 (three) times daily as needed for Dizziness. 15 tablet 0     semaglutide (OZEMPIC) 1 mg/dose (4 mg/3 mL) Inject 1 mg into the skin every 7 days. 9 each 3 8/17/2024         Physical Exam:    Vital Signs:   Vitals:    08/27/24 0814   BP: 121/74   Pulse: 65   Resp: 15   Temp: 97.8 °F (36.6 °C)       General Appearance: Well appearing in no acute distress  Eyes:    No scleral icterus  Lungs: CTA bilaterally  Heart:  reg rate and rhythm   Abdomen: Soft, non tender, non distended with positive bowel sounds      Labs:  Lab Results   Component Value Date    WBC 5.78 07/25/2024    HGB 12.2 07/25/2024    HCT 37.8 07/25/2024    MCV 89 07/25/2024     07/25/2024        BMP  Lab Results   Component Value Date     07/25/2024    K 4.9 07/25/2024     07/25/2024    CO2 26 07/25/2024    BUN 26 (H) 07/25/2024    CREATININE 1.1 07/25/2024    CALCIUM 10.3 07/25/2024    ANIONGAP 10 07/25/2024    ESTGFRAFRICA >60.0 04/25/2022    EGFRNONAA >60.0 04/25/2022     Lab Results   Component Value Date    INR 1.0 10/17/2022    INR 1.1 10/02/2006    INR 1.1 05/24/2005          Assessment:  54 y.o. female here for colon cancer screening with history of difficult and incomplete  colonoscopy.     Plan:  Proceed with colonoscopy today, device-assisted as needed.  I have explained the risks and benefits of endoscopy procedures to the patient including but not limited to bleeding, perforation, infection, and death.  All questions and answered.        Manny Cronin MD

## 2024-08-27 NOTE — ANESTHESIA POSTPROCEDURE EVALUATION
Anesthesia Post Evaluation    Patient: Africa Jennings    Procedure(s) Performed: Procedure(s) (LRB):  COLONOSCOPY (N/A)    Final Anesthesia Type: general      Patient location during evaluation: GI PACU  Patient participation: Yes- Able to Participate  Level of consciousness: awake and alert  Post-procedure vital signs: reviewed and stable  Pain management: adequate  Airway patency: patent    PONV status at discharge: No PONV  Anesthetic complications: no      Cardiovascular status: stable  Respiratory status: unassisted and spontaneous ventilation  Hydration status: euvolemic  Follow-up not needed.              Vitals Value Taken Time   /94 08/27/24 1031   Temp 36.5 °C (97.7 °F) 08/27/24 1000   Pulse 68 08/27/24 1031   Resp 18 08/27/24 1031   SpO2 99 % 08/27/24 1031         Event Time   Out of Recovery 10:33:11         Pain/Sophia Score: Sophia Score: 10 (8/27/2024 10:31 AM)

## 2024-08-27 NOTE — TRANSFER OF CARE
"Anesthesia Transfer of Care Note    Patient: Africa Jennings    Procedure(s) Performed: Procedure(s) (LRB):  COLONOSCOPY (N/A)    Patient location: PACU    Anesthesia Type: general    Transport from OR: Transported from OR on room air with adequate spontaneous ventilation    Post pain: adequate analgesia    Post assessment: no apparent anesthetic complications and tolerated procedure well    Post vital signs: stable    Level of consciousness: awake    Nausea/Vomiting: no nausea/vomiting    Complications: none    Transfer of care protocol was followed      Last vitals: Visit Vitals  /74   Pulse 65   Temp 36.6 °C (97.8 °F)   Resp 15   Ht 5' 2" (1.575 m)   Wt 80.3 kg (177 lb)   LMP  (LMP Unknown)   SpO2 100%   Breastfeeding No   BMI 32.37 kg/m²     "

## 2024-08-27 NOTE — ANESTHESIA PREPROCEDURE EVALUATION
08/27/2024  Africa Jennings is a 54 y.o.,   Past Medical History:   Diagnosis Date    Diabetes mellitus     Diabetes mellitus, type 2     Eczema     GERD (gastroesophageal reflux disease)     Hypertension     Impaired fasting blood sugar     YSABEL on CPAP      Past Surgical History:   Procedure Laterality Date    breast reduction      CHOLECYSTECTOMY      laprascopic    COLONOSCOPY N/A 3/26/2024    Procedure: COLONOSCOPY;  Surgeon: Sangeetha Taylor MD;  Location: [facility];  Service: Endoscopy;  Laterality: N/A;  referral Vu [facility]. Suprep Instr. to portal.EC Not taking Ozempic any longer.EC  3/12/24-LVM regarding last dose Ozempic on or before 3/18/24 if restarted, instr portal-DS  3/19- lvm and portal msg for pc- pt returned call, pc complete. prep reordered. states last dose of ozempic was 3/16    DECOMPRESSION OF THORACIC OUTLET N/A 10/28/2022    Procedure: T9-12 ROBOTIC DECOMPRESSION, THORACIC FUSION;  Surgeon: Edouard Whitt DO;  Location: [address];  Service: Neurosurgery;  Laterality: N/A;  ANESTHESIA GENERAL [place] I BLOOD TYPE & SCREEN NERVE MONITORING EMG SEP MEP POSTION PRONE BED CARA 4 POST HEAD REST [facility] RADIOLOGY C-ARM GLOBUS PROTOCOL MISCELLANEOUS ALANIZ BRACE TLSO    EPIDURAL STEROID INJECTION N/A 1/11/2022    Procedure: INJECTION, STEROID, EPIDURAL IL L4/5 NEEDS CONSENT;  Surgeon: Britt Calix MD;  Location: [facility] PAIN MGT;  Service: Pain Management;  Laterality: N/A;    ESOPHAGOGASTRODUODENOSCOPY  8/18/14    HYSTERECTOMY  2007    laprascopic, total for fibroids.  Dr Polo    INJECTION OF JOINT Left 2/8/2022    Procedure: INJECTION, JOINT, SI LEFT NEEDS CONSENT;  Surgeon: Britt Calix MD;  Location: [facility] PAIN MGT;  Service: Pain Management;  Laterality: Left;    INJECTION OF JOINT Bilateral 10/31/2023    Procedure: INJECTION, JOINT BILATERAL  GTB;  Surgeon: Britt Calix MD;  Location: [facility] PAIN MGT;  Service: Pain Management;  Laterality: Bilateral;    INJECTION OF JOINT Right 6/4/2024    Procedure: INJECTION, JOINT RIGHT GTB;  Surgeon: Britt Calix MD;  Location: [facility] PAIN MGT;  Service: Pain Management;  Laterality: Right;  [phone]  2 WK F/U AKBAR  *3/26 COLONOSCOPY*  *SCHEDULE AFTER 4/9*    OOPHORECTOMY      ROBOT-ASSISTED LAPAROSCOPIC SLEEVE GASTRECTOMY USING DA DEVI XI N/A 7/26/2023    Procedure: XI ROBOTIC SLEEVE GASTRECTOMY with intraop EGD;  Surgeon: Roge Rich MD;  Location: [facility] OR 2ND FLR;  Service: General;  Laterality: N/A;  with possible HHR    TOTAL REDUCTION MAMMOPLASTY      TRANSFORAMINAL EPIDURAL INJECTION OF STEROID Right 9/26/2023    Procedure: INJECTION, STEROID, EPIDURAL, TRANSFORAMINAL APPROACH, RIGHT L4/L5 AND L5/S1 SOONER DATE;  Surgeon: Britt Calix MD;  Location: [facility] PAIN MGT;  Service: Pain Management;  Laterality: Right;     female.      Pre-op Assessment    I have reviewed the Patient Summary Reports.     I have reviewed the Nursing Notes. I have reviewed the NPO Status.   I have reviewed the Medications.     Review of Systems  Anesthesia Hx:  No problems with previous Anesthesia   Neg history of prior surgery.          Denies Family Hx of Anesthesia complications.    Denies Personal Hx of Anesthesia complications.                    Cardiovascular:  Exercise tolerance: good   Hypertension                                        Pulmonary:        Sleep Apnea                Hepatic/GI:     GERD             Musculoskeletal:  Arthritis               Endocrine:  Diabetes, well controlled         Obesity / BMI > 30      Physical Exam  General: Well nourished, Alert and Oriented    Airway:  Mallampati: II / II  Mouth Opening: Normal  TM Distance: Normal  Tongue: Normal  Neck ROM: Normal ROM    Dental:  Intact    Chest/Lungs:  Clear to auscultation, Normal Respiratory Rate    Heart:  Rate:  Normal  Rhythm: Regular Rhythm  Sounds: Normal    Abdomen:  Normal, Soft, Nontender        Anesthesia Plan  Type of Anesthesia, risks & benefits discussed:    Anesthesia Type: MAC  Intra-op Monitoring Plan: Standard ASA Monitors  Post Op Pain Control Plan:   (medical reason for not using multimodal pain management)  Induction:  IV  Informed Consent: Informed consent signed with the Patient and all parties understand the risks and agree with anesthesia plan.  All questions answered.   ASA Score: 2  Day of Surgery Review of History & Physical: H&P Update referred to the surgeon/provider.    Ready For Surgery From Anesthesia Perspective.     .

## 2024-09-03 ENCOUNTER — PATIENT MESSAGE (OUTPATIENT)
Dept: BARIATRICS | Facility: CLINIC | Age: 54
End: 2024-09-03
Payer: COMMERCIAL

## 2024-09-10 ENCOUNTER — PATIENT MESSAGE (OUTPATIENT)
Dept: BARIATRICS | Facility: CLINIC | Age: 54
End: 2024-09-10
Payer: COMMERCIAL

## 2024-09-10 ENCOUNTER — OFFICE VISIT (OUTPATIENT)
Dept: DERMATOLOGY | Facility: CLINIC | Age: 54
End: 2024-09-10
Payer: COMMERCIAL

## 2024-09-10 DIAGNOSIS — L66.9 CICATRICIAL ALOPECIA: ICD-10-CM

## 2024-09-10 DIAGNOSIS — L65.8 TRACTION ALOPECIA: Primary | ICD-10-CM

## 2024-09-10 PROCEDURE — 1160F RVW MEDS BY RX/DR IN RCRD: CPT | Mod: CPTII,S$GLB,, | Performed by: STUDENT IN AN ORGANIZED HEALTH CARE EDUCATION/TRAINING PROGRAM

## 2024-09-10 PROCEDURE — 3061F NEG MICROALBUMINURIA REV: CPT | Mod: CPTII,S$GLB,, | Performed by: STUDENT IN AN ORGANIZED HEALTH CARE EDUCATION/TRAINING PROGRAM

## 2024-09-10 PROCEDURE — 11900 INJECT SKIN LESIONS </W 7: CPT | Mod: S$GLB,,, | Performed by: STUDENT IN AN ORGANIZED HEALTH CARE EDUCATION/TRAINING PROGRAM

## 2024-09-10 PROCEDURE — 99999 PR PBB SHADOW E&M-EST. PATIENT-LVL IV: CPT | Mod: PBBFAC,,, | Performed by: STUDENT IN AN ORGANIZED HEALTH CARE EDUCATION/TRAINING PROGRAM

## 2024-09-10 PROCEDURE — 99214 OFFICE O/P EST MOD 30 MIN: CPT | Mod: 25,S$GLB,, | Performed by: STUDENT IN AN ORGANIZED HEALTH CARE EDUCATION/TRAINING PROGRAM

## 2024-09-10 PROCEDURE — 1159F MED LIST DOCD IN RCRD: CPT | Mod: CPTII,S$GLB,, | Performed by: STUDENT IN AN ORGANIZED HEALTH CARE EDUCATION/TRAINING PROGRAM

## 2024-09-10 PROCEDURE — 3044F HG A1C LEVEL LT 7.0%: CPT | Mod: CPTII,S$GLB,, | Performed by: STUDENT IN AN ORGANIZED HEALTH CARE EDUCATION/TRAINING PROGRAM

## 2024-09-10 PROCEDURE — 3066F NEPHROPATHY DOC TX: CPT | Mod: CPTII,S$GLB,, | Performed by: STUDENT IN AN ORGANIZED HEALTH CARE EDUCATION/TRAINING PROGRAM

## 2024-09-10 NOTE — PROGRESS NOTES
Subjective:      Patient ID:  Africa Jennings is a 54 y.o. female who presents for   Chief Complaint   Patient presents with    Hair Loss     F/u     LV 6/10/24 with me, former pt of Dr. Valdez    1. Hair loss follow up    Interval 9/10/24:  Pt states hair loss is improving. Noticing it is not coming back as much at crown. Wearing twists today.  Current tx: finasteride (PROSCAR) 5 mg tablet QD, spironolactone (ALDACTONE) 50 MG tablet QD, topical minoxidil 5% (men's Rogaine) QD, ILK injections  No pruritus or tenderness    Interval: 6/10/2024  54 y.o. female presents today for hair loss f/u. Finasteride daily. Spironolactone daily. Minoxidil topical 2x/day. Denies any itching. States condition is improving greatly in frontal hair, started all treatments around 3-2023 over 1 year ago.  Utilizing more loose styles    Initial:3/20/2024  Former pt of Dr. Valdez. Reports hair loss of frontal hairline progressive worsening past 5 years. She has no pruritus. She is on spironolactone 100 mg qd (started 6-2023 discussed with PCP Dr. Thompson prior- she also has hirsutism), finasteride 5 mg qd (Since 2-2023), and has had serial ILK starting in 3-2023. She is noticing mild improvements and says her  notices improvement too.  She also uses ketoconazole shampoo and 5% topical minoxidil QD.   CMP 1-2024 wnl with K+ 4.1    Pertinent labs:  Lab Results       Component                Value               Date                       TSH                      2.113               07/05/2023            Lab Results       Component                Value               Date                       WBC                      5.97                01/24/2024                 HGB                      12.8                01/24/2024                 HCT                      40.0                01/24/2024                 MCV                      86                  01/24/2024                 PLT                      285                  01/24/2024              Lab Results       Component                Value               Date                       UIBC                     342                 02/29/2008                 IRON                     97                  01/24/2024                 TRANSFERRIN              294                 01/24/2024                 TIBC                     435                 01/24/2024                 FESATURATED              22                  01/24/2024             Lab Results       Component                Value               Date                       [id number]             55                  01/24/2024                    Review of Systems    Objective:   Physical Exam   Skin:   Areas Examined (abnormalities noted in diagram):   Scalp / Hair Palpated and Inspected     Diagram Legend     Erythematous scaling macule/papule c/w actinic keratosis       Vascular papule c/w angioma      Pigmented verrucoid papule/plaque c/w seborrheic keratosis      Yellow umbilicated papule c/w sebaceous hyperplasia      Irregularly shaped tan macule c/w lentigo     1-2 mm smooth white papules consistent with Milia      Movable subcutaneous cyst with punctum c/w epidermal inclusion cyst      Subcutaneous movable cyst c/w pilar cyst      Firm pink to brown papule c/w dermatofibroma      Pedunculated fleshy papule(s) c/w skin tag(s)      Evenly pigmented macule c/w junctional nevus     Mildly variegated pigmented, slightly irregular-bordered macule c/w mildly atypical nevus      Flesh colored to evenly pigmented papule c/w intradermal nevus       Pink pearly papule/plaque c/w basal cell carcinoma      Erythematous hyperkeratotic cursted plaque c/w SCC      Surgical scar with no sign of skin cancer recurrence      Open and closed comedones      Inflammatory papules and pustules      Verrucoid papule consistent consistent with wart     Erythematous eczematous patches and plaques     Dystrophic onycholytic nail with subungual debris c/w  onychomycosis     Umbilicated papule    Erythematous-base heme-crusted tan verrucoid plaque consistent with inflamed seborrheic keratosis     Erythematous Silvery Scaling Plaque c/w Psoriasis     See annotation                      Assessment / Plan:        Traction alopecia  Frontotemporal hairline thinning with fringe sign and areas of scarring/no hair growth  No erythema/pruritus/scale    Possibly component of CCCA as well as pt reports thinning of vertex- unable to assess distribution with christopher or twists in recommend at some visit to time with hair styling to she can come in with hair out     Status: chronic condition flaring and/or not at treatment goal     Recommend wear hair natural if possible and avoid tight styles, avoid anything that feels painful, and alternate styles frequently   She is noticing good improvements and very happy with treatments, wishes to continue treatments   Continue serial photo     Plan:  Continue finasteride 5 mg QD (since 2-2023) reviewed potential SE sexual dysfunction, low libido, depression  Continue spironolactone 100 mg QD (since 6-2023 Also for hirsutism) reviewed SE elevation of K+ - recently checked 4.1 and wnl 1/2024 need recheck at least annually  Continue topical minoxidil 5% QD   Continue ketoconazole or ciclopirox shampoo once weekly- pt says she has this at home    Pt wishes for ILK injections today  Intralesional Kenalog 5 mg/cc (3 cc total) injected into >7 lesions on the frontal hairline today after obtaining verbal consent including risk of surrounding hypopigmentation. Patient tolerated procedure well.     Units: 1  NDC for Kenalog 10mg/cc:  3821-9803-53     RTC 3 months, sooner prn

## 2024-10-01 ENCOUNTER — PATIENT MESSAGE (OUTPATIENT)
Dept: BARIATRICS | Facility: CLINIC | Age: 54
End: 2024-10-01
Payer: COMMERCIAL

## 2024-10-08 ENCOUNTER — PATIENT MESSAGE (OUTPATIENT)
Dept: BARIATRICS | Facility: CLINIC | Age: 54
End: 2024-10-08
Payer: COMMERCIAL

## 2024-10-08 ENCOUNTER — PATIENT MESSAGE (OUTPATIENT)
Dept: ADMINISTRATIVE | Facility: HOSPITAL | Age: 54
End: 2024-10-08
Payer: COMMERCIAL

## 2024-10-10 ENCOUNTER — OFFICE VISIT (OUTPATIENT)
Dept: PAIN MEDICINE | Facility: CLINIC | Age: 54
End: 2024-10-10
Payer: COMMERCIAL

## 2024-10-10 DIAGNOSIS — G89.4 CHRONIC PAIN SYNDROME: Primary | ICD-10-CM

## 2024-10-10 DIAGNOSIS — M70.62 TROCHANTERIC BURSITIS OF BOTH HIPS: ICD-10-CM

## 2024-10-10 DIAGNOSIS — M70.61 TROCHANTERIC BURSITIS OF BOTH HIPS: ICD-10-CM

## 2024-10-10 DIAGNOSIS — M54.16 LUMBAR RADICULOPATHY: ICD-10-CM

## 2024-10-10 PROCEDURE — 99213 OFFICE O/P EST LOW 20 MIN: CPT | Mod: 95,,, | Performed by: NURSE PRACTITIONER

## 2024-10-10 PROCEDURE — 1159F MED LIST DOCD IN RCRD: CPT | Mod: CPTII,95,, | Performed by: NURSE PRACTITIONER

## 2024-10-10 PROCEDURE — 3061F NEG MICROALBUMINURIA REV: CPT | Mod: CPTII,95,, | Performed by: NURSE PRACTITIONER

## 2024-10-10 PROCEDURE — 3066F NEPHROPATHY DOC TX: CPT | Mod: CPTII,95,, | Performed by: NURSE PRACTITIONER

## 2024-10-10 PROCEDURE — 1160F RVW MEDS BY RX/DR IN RCRD: CPT | Mod: CPTII,95,, | Performed by: NURSE PRACTITIONER

## 2024-10-10 PROCEDURE — 3044F HG A1C LEVEL LT 7.0%: CPT | Mod: CPTII,95,, | Performed by: NURSE PRACTITIONER

## 2024-10-10 RX ORDER — PREGABALIN 100 MG/1
100 CAPSULE ORAL 2 TIMES DAILY
Qty: 60 CAPSULE | Refills: 5 | Status: SHIPPED | OUTPATIENT
Start: 2024-10-10 | End: 2025-04-10

## 2024-10-10 NOTE — PROGRESS NOTES
Chronic Pain-Tele-Medicine-Established Note (Follow up visit)        The patient location is: Home  The chief complaint leading to consultation is: pain  Visit type: Virtual visit with synchronous audio and video  Total time spent with patient: 12 min  Each patient to whom he or she provides medical services by telemedicine is:  (1) informed of the relationship between the physician and patient and the respective role of any other health care provider with respect to management of the patient; and (2) notified that he or she may decline to receive medical services by telemedicine and may withdraw from such care at any time.      SUBJECTIVE:    Interval History 10/10/2024:  The patient has a virtual follow up for right hip pain. She reports improvement in pain since previous visit. She says that the increase in Lyrica has been helpful. No side effects reported at this time. She has been sleeping better as well. No additional complaints today.    Interval History 8/19/2024:  The patient has a virtual visit for 2 month follow up. She reports return of right hip pain. She says that previous GTB injection provided benefit for about 3 weeks. The pain bothers her with more activity. She has been taking Lyrica 75 mg twice daily for some time with mild benefit and no side effects. She is interested in increasing the medication. No additional complaints at this time.     Interval History 6/17/2024:  The patient returns for follow up of right hip pain. She is s/p right GTB injection on 6/4/24 with significant benefit. She still has some pain intermittently but says that it is mild. No other complaints. She continues to take Lyrica. Her pain today is 3/10.    Interval History 10/12/23: Africa Jennings returns for follow up. At our last visit, which was virtual, she was having a lot of radiating pain. Unfortunately, the TFESI we performed was not helpful. She presents today in person and I am able to examine her.  Today, on examination, it is clear that this pain is reproduced with palpation of her GTBs. She continues to note 7/10 pain which is worse with laying on her side and improved with movement. It is impacting her work.     Interval History 8/28/2023:  Africa Jennings presents tele-medicine appointment for a follow-up appointment for R sided lower back and focal R L5 radicular pain. She states leg pain > back pain. She describes a sharp, shooting pain in her R buttocks that radiates to her R mid calf posteriorly and is associated with burning, numbness, and tingling. Worse with activity, bending, lifting, night time. She states the R leg is subjectively weaker than the left.. Since the last visit, Africa Jennings states the pain has been worsening. Current pain intensity is 7/10. Current medications include lyrica, zanaflex, tylenol with some relief. She has completed physical therapy and aqua therapy (March 2023 - April 2023) to help strengthen her back and notes benefit. She continues with Home exercise program and is waiting to hear back from [facility] to schedule her. Patient was supposed to have R L4/5 and L5/S1 TFESI done since last visit, however, there was some issue with scheduling or insurance (the patient is unsure which). She is interested in having the procedure rescheduled to help alleviate her symptoms. Patient denies red flags including weakness, unexpected weight loss/gain, night sweats/fevers, saddle anesthesia, and symptoms of VERA.    Interval History 4/26/2023:  Mrs Jennings presents for follow up. Over interval she has had T9-12 Thoracic decompression by Dr Whitt on 10/28/2022. She is doing well since then but continues to have R sided lower back and focal R L5 radicular pain. She states back pain = leg pain. She states the R leg is subjectively weaker than the left. Denies any s/s concerning for cauda equina. Pain has limited functioning and she has Completed  PT/Aquatherapy for >6 weeks.      Interval History 3/16/2022:  Pt presents for follow up of. She has had L4/5 ILESI and left SI injection in past with benefit. She has more vocal pain to right and associated radiculopathy down right leg in L4/5 pattern. She had no recent MRI in past 2 years and known lumbar discogenic issues but this was more focal to left. She has no complaint of loss of b/b or saddle paresthesias.      Interval History  2/22/2022:   Mrs Jennings presents for virtual follow up of lower back and left sided buttock pain. She is now s/p Left SIJ and states 90% improved pain and feels her relief was more significant than MARILEE. She states pain more noticeable to right side now without radicular complaint. There is no focal voicing of loss of b/b or saddle paresthesias.      Interval History 1/25/2021:  Mrs Jennings presents for follow up of lower back pain. She is s/p L4/5 ILESI with 90% benefit from lower back pain. She has lingering lower buttock/back pain to left side. Increased pain when sitting or stairs. Denies radiculopathy. Denies any loss of b/b or saddle paresthesias.      Interval History 12/23/2021:  Mrs Jennings presents to establish care at [facility]. She states continued lower back pain to left side but leg pain/paresthesias=back pain. She states left lower back pain and radiation in L4/5 pattern but also on right side. She has MRI findings to left L4/5 NF disc protrusion. She has been doing HEP she learned through [facility] for over 6 months straight and continues with no lasting benefit. She is currently taking Mobic 15mg and Neurontin 300mg TID. There is no complaint of loss of b/b or saddle paresthesias.      Interval History (4/13/20):     The patient location is: home  The chief complaint leading to consultation is: follow up  Visit type: Virtual visit with audio.  Patient verbally consented for audio visit.  Total time spent with patient: 15 minutes  Each patient to whom he  or she provides medical services by telemedicine is:  (1) informed of the relationship between the physician and patient and the respective role of any other health care provider with respect to management of the patient; and (2) notified that he or she may decline to receive medical services by telemedicine and may withdraw from such care at any time.        She returns today for follow up.  She reports that her right-sided low back and lower extremity pain has returned since last encounter.  She denies any changes in the quality or location of pain.  She continues to report associated numbness.  She denies any associated weakness or bowel bladder dysfunction.  She has attempted gabapentin 600 mg t.i.d..  She states this medication did not provide any relief.  She has also tried goody's powder, Motrin, Tylenol without any relief.          Interval History (2/17/20):  She returns today for follow up and MRI review.  She reports that her pain is unchanged in quality and location since last encounter.  She does state that the pain has significantly improved and is overall not very bothersome for the time being.          Initial Encounter (2/3/20):  Africa Jennings is a 53 y.o. female who presents today with chronic right-sided low back and lower extremity pain. This pain has been present for 6-7 months.  No specific inciting event or injury noted.  Patient localizes the pain to the right lumbar region.  Pain radiates to the right posterior and anterior thigh.  She reports associated numbness in this area as well. She also reports weakness of the right lower extremity when ambulating.  She denies any associated bowel or bladder dysfunction.  The pain is constant and worsened with activity.  She states that the pain interrupt her sleep.  Patient does have a history of a similar problem roughly 8 years ago.  She underwent epidural steroid injections with good relief.  This pain is described in detail  below.    Pain Scores:     Best:       6/10  Worst:     10/10  Usually:   8/10  Today:    0/10     Physical Therapy:  Feb-May, 2023  HEP: Continues HEP daily as prescribed at PT above     Non-pharmacologic Treatment:  Rest helps         TENS?  No     Pain Medications:         Currently taking:  Aleve, gabapentin     Has tried in the past:  Tylenol, Motrin     Has not tried:  Opioids, Tylenol, Muscle relaxants, TCAs, SNRIs, anticonvulsants, topical creams     report:  Reviewed and consistent with medication use as prescribed.     Blood thinners:  None     Relevant Surgeries:  None     Affecting sleep?  Yes     Affecting daily activities? yes     Depressive symptoms? no          SI/HI? No     Work status: Employed     Pain Procedures:   - L4-5 interlaminar epidural steroid injection x2  - 1/11/2022 L4/5 ILESI 90% benefit    - 2/8/2022 Left SIJ injection  9/26/23: Right L4/5, L5/S1 TFESI - 1 day of relief  10/21/23 B/L GTB injection  6/4/24 Right GTB injection- 90% relief for 3 weeks      Imaging:   CT Thoracic Spine Without Contrast  Order: [id number]  Status: Final result     Visible to patient: Yes (seen)     Next appt: 09/18/2023 at 08:45 AM in Dermatology (Basilia Valdez MD)     Dx: S/P fusion of thoracic spine     0 Result Notes  Details    Reading Physician Reading Date Result Priority   Tony Powell MD  [phone] 1/30/2023 Routine   Dex Bojorquez MD  [phone]  [phone] 1/30/2023      Narrative & Impression  EXAMINATION:  CT THORACIC SPINE WITHOUT CONTRAST     CLINICAL HISTORY:  s/p T9-12 fusion;  Arthrodesis status     TECHNIQUE:  CT thoracic spine performed without contrast.  Coronal and sagittal reformats provided.     COMPARISON:  X-ray thoracic spine 12/12/2022, x-ray thoracolumbar spine 10/29/2022, MRI thoracic spine 06/03/2022, CT thoracic spine 06/03/2022.     FINDINGS:  Postsurgical change of posterior spinal instrumented fusion T9-T12, T9-12 laminectomies and medial  facetectomies and decortication of the T9-12 posterior elements and placement of a mixture of autologous and synthetic bone into the bilateral gutters for arthrodesis.  No hardware fracture or periprosthetic lucency to suggest hardware loosening.     Alignment: Stable, slightly pronounced kyphotic curvature.  Grade 1 retrolisthesis T11-12.     Vertebrae: Vertebral body heights are well maintained.  No fracture.  Stable sclerotic focus in the T5 vertebral body likely representing a bone island.  No lytic or blastic lesion.  Morselized synthetic and autologous bone in the bilateral T9-T12 gutters with noted abutment at the T12 posterior elements likely representing fusion.     Discs: Normal height.  Vacuum disc phenomenon at T8-9 and T11-12.     Degenerative findings:     C7-L1: No spinal canal stenosis or neural foraminal narrowing.     Paraspinal muscles & soft tissues: Cholecystectomy.  High attenuation focus in the posterior right upper quadrant possibly represent calcification or surgical clip.  Otherwise unremarkable.     Impression:     1. Postsurgical changes at T9-T12, as above, without evidence of hardware loosening or fracture.  2. Additional findings as above.     Electronically signed by resident: Dex Bojorquez  Date:                                            01/30/2023  Time:                                           13:59     Electronically signed by: Tony Powell MD  Date:                                            01/30/2023  Time:                                           15:43       MRI Thoracic Spine Without Contrast  Order: [id number]  Status: Final result     Visible to patient: Yes (seen)     Next appt: 09/18/2023 at 08:45 AM in Dermatology (Basilia Valdez MD)     Dx: Thoracic spinal stenosis     0 Result Notes  Details    Reading Physician Reading Date Result Priority   Tony Powell MD  [phone] 6/4/2022 Routine     Narrative & Impression  EXAMINATION:  MRI THORACIC SPINE  WITHOUT CONTRAST     CLINICAL HISTORY:  T9-11 stenosis, please focus on these levels.;  Spinal stenosis, thoracic region     TECHNIQUE:  Multiplanar, multisequence images were performed through the thoracic spine.  Contrast was not administered.     COMPARISON:  MRI 03/31/2022, CT 06/03/2022.     FINDINGS:  Thoracic spine alignment demonstrates a slightly pronounced kyphotic curvature.  No spondylolisthesis.  Vertebral body heights are well maintained without evidence fracture.  There is slight heterogeneous marrow signal intensity, similar when compared to the previous exam and favored to relate to benign reconversion.  No marrow signal abnormality to suggest an infiltrative process.     Persistent focal area of increased T2/STIR cord signal at the T10-T11 level, similar when compared to the previous exam concerning for myelomalacia.  Remaining visualized spinal cord demonstrates normal contour and signal intensity.     There is a 1.1 cm T2 hypointense right thyroid nodule.  There are multiple colonic diverticuli.  Remaining visualized thoracic and upper abdominal organs demonstrate no significant abnormalities.  Paraspinal musculature demonstrates normal bulk and signal intensity.     T1-T2: Bilateral facet arthropathy contributes to moderate left neural foraminal narrowing.  No spinal canal stenosis.     T2-T3: Broad-based disc osteophyte complex causes mild mass effect on the ventral thecal sac.  Left facet arthropathy.  Findings contribute to mild-to-moderate left and mild right neural foraminal narrowing.  No spinal canal stenosis.     T7-T8: Circumferential disc osteophyte complex, bilateral facet arthropathy and right ligamentum flavum buckling.  Findings contribute to moderate right neural foraminal narrowing.  No spinal canal stenosis.     T8-T9: Circumferential disc osteophyte complex and mild bilateral ligamentum flavum buckling.  Findings contribute to mild right neural foraminal narrowing.  No spinal  canal stenosis.     T9-T10: Circumferential disc osteophyte complex and bilateral ligamentum flavum buckling.  Findings contribute to mild spinal canal stenosis and moderate right neural foraminal narrowing.     T10-T11: Circumferential disc osteophyte complex, bilateral facet arthropathy and bilateral ligamentum flavum buckling.  Findings contribute to severe spinal canal stenosis and moderate right and mild left neural foraminal narrowing.     Impression:     1. Multilevel degenerative changes of the thoracic spine most pronounced at T10-T11 noting severe spinal canal stenosis and mild-to-moderate neural foraminal narrowing.  2. Persistent focal area of cord signal abnormality at T10-T11, similar when compared to MRI most suggestive of myelomalacia.        Electronically signed by: Tony Powell MD  Date:                                            06/04/2022  Time:                                           09:05       MRI Lumbar Spine Without Contrast  Order: [id number]  Status: Final result     Visible to patient: Yes (seen)     Next appt: 09/18/2023 at 08:45 AM in Dermatology (Basilia Valdez MD)     Dx: Dorsalgia, unspecified     1 Result Note  Details    Reading Physician Reading Date Result Priority   Maximo Mcallister Jr., MD  [phone] 3/25/2022 Routine     Narrative & Impression  EXAMINATION:  MRI LUMBAR SPINE WITHOUT CONTRAST     CLINICAL HISTORY:  Dorsalgia, unspecifiedLow back pain, symptoms persist with > 6wks conservative treatment;     TECHNIQUE:  Sagittal T1, sagittal T2, sagittal STIR, axial T1 and axial T2 weighted images of the lumbar spine obtained without contrast.     COMPARISON:  Similar study 02/10/2020     FINDINGS:  Lumbar spine alignment is within normal limits. The vertebral body heights are well maintained, with no fracture.  Degenerative sclerotic marrow endplate change at S1 and fatty metaplasia degenerative endplate change at T12.  Otherwise marrow signal normal.     The  conus is normal in appearance.  The adjacent soft tissue structures show no significant abnormalities.     Spinal canal is congenitally narrow on the basis of short pedicles.  At the T10-11 level there is extradural indentation of the thecal sac from posteriorly and there is some broad-based disc bulging.  Transverse images do not cover this level.  There is at least moderate spinal stenosis at this level.     L1-L2: No focal disc herniation.No significant central canal or neural foraminal narrowing.     L2-L3: No focal disc herniation.  Mild facet arthrosis.  No significant central canal or neural foraminal narrowing.     L3-L4: No evidence of a disc herniation.  No significant central canal or neural foraminal narrowing.     L4-L5: Broad-based disc bulging with left-sided foraminal and far lateral annular fissure and superimposed mild spondylitic spurring. Facet arthrosis contributes to severe left-sided neural foraminal narrowing.  Moderate central spinal stenosis.     L5-S1: Broad-based disc bulging, facet arthrosis and ligamentum flavum hypertrophy result in severe bilateral neural foraminal stenosis.  Mild central spinal stenosis.     Impression:     Extradural degenerative changes at T10-11, L4-5, and L5-S1 resulting in central canal and foraminal stenosis as detailed above.  Dedicated imaging of the thoracic spine could lend additional information regarding findings at T10-11     This report was flagged in Epic as abnormal.        Electronically signed by: Maximo Aguirre Jr  Date:                                            03/25/2022  Time:                                           09:06       Past Medical History:   Diagnosis Date    Diabetes mellitus     Diabetes mellitus, type 2     Eczema     GERD (gastroesophageal reflux disease)     Hypertension     Impaired fasting blood sugar     YSABEL on CPAP      Past Surgical History:   Procedure Laterality Date    breast reduction      CHOLECYSTECTOMY       laprascopic    COLONOSCOPY N/A 3/26/2024    Procedure: COLONOSCOPY;  Surgeon: Sangeetha Taylor MD;  Location: [facility] ENDO;  Service: Endoscopy;  Laterality: N/A;  referral [facility] [place]. Suprep Instr. to portal.EC Not taking Ozempic any longer.EC  3/12/24-LVM regarding last dose Ozempic on or before 3/18/24 if restarted, instr portal-DS  3/19- lvm and portal msg for pc- pt returned call, pc complete. prep reordered. states last dose of ozempic was 3/16    COLONOSCOPY N/A 8/27/2024    Procedure: COLONOSCOPY;  Surgeon: Manny Cronin MD;  Location: [facility] (4TH FLR);  Service: Endoscopy;  Laterality: N/A;  Per Dr. Cronin- 60-min general GI slot, starting with the slim pediatric colonoscope.  We can have the single-balloon scope available in case needed.  Ozempic / Suprep / instr to portal. DBM  8/20-called pt for pre call-unable to lvm-portal message sent-tb  8/26-last dose of    DECOMPRESSION OF THORACIC OUTLET N/A 10/28/2022    Procedure: T9-12 ROBOTIC DECOMPRESSION, THORACIC FUSION;  Surgeon: Edouard Whitt DO;  Location: [facility] OR [facility] FLR;  Service: Neurosurgery;  Laterality: N/A;  ANESTHESIA GENERAL [place] I BLOOD TYPE & SCREEN NERVE MONITORING EMG SEP MEP POSTION PRONE BED CARA 4 POST HEAD REST [facility] RADIOLOGY C-ARM GLOBUS PROTOCOL MISCELLANEOUS ALANIZ BRACE TLSO    EPIDURAL STEROID INJECTION N/A 1/11/2022    Procedure: INJECTION, STEROID, EPIDURAL IL L4/5 NEEDS CONSENT;  Surgeon: Britt Calix MD;  [place] [facility] [place] [facility]GT;  Service: Pain Management;  Laterality: N/A;    ESOPHAGOGASTRODUODENOSCOPY  8/18/14    HYSTERECTOMY  2007    laprascopic, total for fibroids.  Dr Polo    INJECTION OF JOINT Left 2/8/2022    Procedure: INJECTION, JOINT, SI LEFT NEEDS CONSENT;  Surgeon: Britt Calix MD;  [place] [facility] [place] MGT;  Service: Pain Management;  Laterality: Left;    INJECTION OF JOINT Bilateral 10/31/2023    Procedure: INJECTION, JOINT BILATERAL GTB;  Surgeon: Britt Calix MD;   Location: [facility] PAIN MGT;  Service: Pain Management;  Laterality: Bilateral;    INJECTION OF JOINT Right 6/4/2024    Procedure: INJECTION, JOINT RIGHT GTB;  Surgeon: Britt Calix MD;  Location: [facility] PAIN MGT;  Service: Pain Management;  Laterality: Right;  [phone]  2 WK F/U AKBAR  *3/26 COLONOSCOPY*  *SCHEDULE AFTER 4/9*    OOPHORECTOMY      ROBOT-ASSISTED LAPAROSCOPIC SLEEVE GASTRECTOMY USING DA DEVI XI N/A 7/26/2023    Procedure: XI ROBOTIC SLEEVE GASTRECTOMY with intraop EGD;  Surgeon: Roge iRch MD;  Location: [facility] OR 2ND FLR;  Service: General;  Laterality: N/A;  with possible HHR    TOTAL REDUCTION MAMMOPLASTY      TRANSFORAMINAL EPIDURAL INJECTION OF STEROID Right 9/26/2023    Procedure: INJECTION, STEROID, EPIDURAL, TRANSFORAMINAL APPROACH, RIGHT L4/L5 AND L5/S1 SOONER DATE;  Surgeon: Britt Calix MD;  Location: [facility] PAIN MGT;  Service: Pain Management;  Laterality: Right;     Social History     Socioeconomic History    Marital status:    Tobacco Use    Smoking status: Never    Smokeless tobacco: Never   Substance and Sexual Activity    Alcohol use: Never    Drug use: No    Sexual activity: Yes     Partners: Male     Birth control/protection: Surgical   Social History Narrative    Was  since 2000.      Has a friend since 2016.    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     Social Drivers of Health     Financial Resource Strain: Medium Risk (1/22/2024)    Overall Financial Resource Strain (CARDIA)     Difficulty of Paying Living Expenses: Somewhat hard   Food Insecurity: No Food Insecurity (1/22/2024)    Hunger Vital Sign     Worried About Running Out of Food in the Last Year: Never true     Ran Out of Food in the Last Year: Never true   Transportation Needs: No Transportation Needs (1/22/2024)    PRAPARE - Transportation     Lack of Transportation (Medical): No     Lack of Transportation (Non-Medical): No   Physical Activity:  Insufficiently Active (1/22/2024)    Exercise Vital Sign     Days of Exercise per Week: 1 day     Minutes of Exercise per Session: 30 min   Stress: No Stress Concern Present (1/22/2024)    [employer] - Occupational Stress Questionnaire     Feeling of Stress : Not at all   Housing Stability: Low Risk  (1/22/2024)    Housing Stability Vital Sign     Unable to Pay for Housing in the Last Year: No     Number of Places Lived in the Last Year: 1     Unstable Housing in the Last Year: No     Family History   Problem Relation Name Age of Onset    Diabetes Mother      Hypertension Mother      Heart disease Mother      Cancer Father  58        Prostate    Crohn's disease Sister      Breast cancer Sister      Diabetes Maternal Grandmother      Heart disease Maternal Grandmother      Hypertension Maternal Grandmother      Amblyopia Neg Hx      Blindness Neg Hx      Cataracts Neg Hx      Glaucoma Neg Hx      Macular degeneration Neg Hx      Retinal detachment Neg Hx      Strabismus Neg Hx         Review of patient's allergies indicates:  No Known Allergies    Current Outpatient Medications   Medication Sig    acetaminophen (TYLENOL) 500 MG tablet Take 2 tablets (1,000 mg total) by mouth every 8 (eight) hours as needed for Pain.    atorvastatin (LIPITOR) 20 MG tablet Take 1 tablet (20 mg total) by mouth once daily.    ergocalciferol (ERGOCALCIFEROL) 50,000 unit Cap Take 1 capsule (50,000 Units total) by mouth every 7 days.    estradioL (ESTRACE) 1 MG tablet Take 1 tablet (1 mg total) by mouth once daily.    finasteride (PROSCAR) 5 mg tablet TAKE 1 TABLET BY MOUTH EVERY DAY    fluocinonide (LIDEX) 0.05 % ointment Apply to affected areas of scalp at bedtime    ketoconazole (NIZORAL) 2 % shampoo Wash hair with medicated shampoo at least 2x/week - let sit on scalp at least 5 minutes prior to rinsing    meclizine (ANTIVERT) 25 mg tablet Take 1 tablet (25 mg total) by mouth 3 (three) times daily as needed  for Dizziness.    mometasone (ELOCON) 0.1 % solution APPLY TOPICALLY ONCE DAILY. TO THINNING AREA OF SCALP    neomycin-polymyxin-dexamethasone (MAXITROL) 3.5mg/mL-10,000 unit/mL-0.1 % DrpS as needed.    nystatin (MYCOSTATIN) ointment Apply topically 2 (two) times daily. USES PRN    omeprazole (PRILOSEC) 40 MG capsule Take 1 capsule (40 mg total) by mouth every morning.    ondansetron (ZOFRAN) 4 MG tablet TAKE 1 TABLET BY MOUTH EVERY 8 HOURS AS NEEDED    ondansetron (ZOFRAN-ODT) 8 MG TbDL Take 1 tablet (8 mg total) by mouth every 6 (six) hours as needed.    prednisoLONE acetate (PRED FORTE) 1 % DrpS USES PRN    pregabalin (LYRICA) 100 MG capsule Take 1 capsule (100 mg total) by mouth 2 (two) times daily.    semaglutide (OZEMPIC) 1 mg/dose (4 mg/3 mL) Inject 1 mg into the skin every 7 days.    spironolactone (ALDACTONE) 50 MG tablet Take 1 tablet (50 mg total) by mouth once daily.    tiZANidine (ZANAFLEX) 4 MG tablet Take 1 tablet (4 mg total) by mouth every 8 (eight) hours.    triamcinolone acetonide 0.1% (KENALOG) 0.1 % cream Apply topically 2 (two) times daily. Apply topically 2 (two) times daily.     Current Facility-Administered Medications   Medication    triamcinolone acetonide injection 10 mg    triamcinolone acetonide injection 10 mg    triamcinolone acetonide injection 5 mg    triamcinolone acetonide injection 5 mg    triamcinolone acetonide injection 5 mg       REVIEW OF SYSTEMS:    GENERAL:  No weight loss, malaise or fevers.  HEENT:   No recent changes in vision or hearing  NECK:  Negative for lumps, no difficulty with swallowing.  RESPIRATORY:  Negative for cough, wheezing or shortness of breath, patient denies any recent URI.  CARDIOVASCULAR:  Negative for chest pain, leg swelling or palpitations.  GI:  Negative for abdominal discomfort, blood in stools or black stools or change in bowel habits.  MUSCULOSKELETAL:  See HPI.  SKIN:  Negative for lesions, rash, and itching.  PSYCH:  No mood disorder or  recent psychosocial stressors.  Patients sleep is not disturbed secondary to pain.  HEMATOLOGY/LYMPHOLOGY:  Negative for prolonged bleeding, bruising easily or swollen nodes.  Patient is not currently taking any anti-coagulants  NEURO:   No history of headaches, syncope, paralysis, seizures or tremors.  All other reviewed and negative other than HPI.    OBJECTIVE:    LMP  (LMP Unknown)     PHYSICAL EXAMINATION:    General appearance: Well appearing, in no acute distress, alert and oriented x3.  Psych:  Mood and affect appropriate.  Skin: Skin color normal, no rashes or lesions, in both upper and lower body.  Extremities: Moves all visualized extremities freely.      PREVIOUS PHYSICAL EXAM:   GENERAL: Well appearing, in no acute distress, alert and oriented x3.  PSYCH:  Mood and affect appropriate.  SKIN: Skin color, texture, turgor normal, no rashes or lesions.  HEENT:  Normocephalic, atraumatic. Cranial nerves grossly intact.  EXTREMITIES: No deformities, edema, or skin discoloration.   MUSCULOSKELETAL: Bilateral lower extremity strength is normal and symmetric. No atrophy is noted. No TTP over bilateral GTB. SLR is negative bilaterally.  NEURO: Sensation is equal and appropriate bilaterally.   GAIT: Normal.      ASSESSMENT: 54 y.o. year old female with pain consistent with     1. Chronic pain syndrome        2. Lumbar radiculopathy        3. Trochanteric bursitis of both hips              DISCUSSION: Ms. Jennings is a . She has a history of T9-12 decompression and posterior fusion with Dr. Whitt. She came to us with left low back pain and did well after SIJ. She then presented back with right radicular symptoms. MRI shows short pedicles, moderate canal stenosis at L4/5, and severe foraminal stenosis at L5/S1.     PLAN:   1) The patient will continue a home exercise routine to help with pain and strengthening.    2) Can repeat right GTB injection if needed.  3) Continue Lyrica 100 mg BID.  Refilled today.  4) Follow up in 2 months or sooner if needed.      The above plan and management options were discussed at length with patient. Patient is in agreement with the above and verbalized understanding.     Munira Strauss  10/10/2024

## 2024-10-24 ENCOUNTER — PATIENT MESSAGE (OUTPATIENT)
Dept: ADMINISTRATIVE | Facility: OTHER | Age: 54
End: 2024-10-24
Payer: COMMERCIAL

## 2024-11-02 ENCOUNTER — PATIENT MESSAGE (OUTPATIENT)
Dept: BARIATRICS | Facility: CLINIC | Age: 54
End: 2024-11-02
Payer: COMMERCIAL

## 2024-11-12 ENCOUNTER — PATIENT MESSAGE (OUTPATIENT)
Dept: BARIATRICS | Facility: CLINIC | Age: 54
End: 2024-11-12
Payer: COMMERCIAL

## 2024-11-12 DIAGNOSIS — E11.9 TYPE 2 DIABETES MELLITUS WITHOUT COMPLICATION, WITHOUT LONG-TERM CURRENT USE OF INSULIN: ICD-10-CM

## 2024-11-12 NOTE — TELEPHONE ENCOUNTER
Care Due:                  Date            Visit Type   Department     Provider  --------------------------------------------------------------------------------                                [facility]                              PRIMARY      MED/ INTERNAL  Last Visit: 07-      CARE ([facility])   MED/ PEDS      Brandi Thompson                              [facility]                              PRIMARY      MED/ INTERNAL  Next Visit: 01-      CARE ([facility])   MED/ PEDS      Brandi Thompson                                                            Last  Test          Frequency    Reason                     Performed    Due Date  --------------------------------------------------------------------------------    HBA1C.......  6 months...  semaglutide..............  07- 01-    [facility] Embedded Care Due Messages. Reference number: [id number].   11/12/2024 12:48:09 PM CST

## 2024-11-13 ENCOUNTER — PATIENT MESSAGE (OUTPATIENT)
Dept: PAIN MEDICINE | Facility: CLINIC | Age: 54
End: 2024-11-13
Payer: COMMERCIAL

## 2024-11-15 RX ORDER — SEMAGLUTIDE 1.34 MG/ML
1 INJECTION, SOLUTION SUBCUTANEOUS
Qty: 9 EACH | Refills: 3 | Status: SHIPPED | OUTPATIENT
Start: 2024-11-15

## 2024-11-18 ENCOUNTER — OFFICE VISIT (OUTPATIENT)
Dept: PAIN MEDICINE | Facility: CLINIC | Age: 54
End: 2024-11-18
Payer: COMMERCIAL

## 2024-11-18 ENCOUNTER — PATIENT MESSAGE (OUTPATIENT)
Dept: PAIN MEDICINE | Facility: OTHER | Age: 54
End: 2024-11-18
Payer: COMMERCIAL

## 2024-11-18 DIAGNOSIS — M54.17 LUMBOSACRAL RADICULOPATHY: Primary | ICD-10-CM

## 2024-11-18 DIAGNOSIS — G89.4 CHRONIC PAIN SYNDROME: ICD-10-CM

## 2024-11-18 PROCEDURE — 99213 OFFICE O/P EST LOW 20 MIN: CPT | Mod: 95,,, | Performed by: NURSE PRACTITIONER

## 2024-11-18 PROCEDURE — 3061F NEG MICROALBUMINURIA REV: CPT | Mod: CPTII,95,, | Performed by: NURSE PRACTITIONER

## 2024-11-18 PROCEDURE — 3044F HG A1C LEVEL LT 7.0%: CPT | Mod: CPTII,95,, | Performed by: NURSE PRACTITIONER

## 2024-11-18 PROCEDURE — 1160F RVW MEDS BY RX/DR IN RCRD: CPT | Mod: CPTII,95,, | Performed by: NURSE PRACTITIONER

## 2024-11-18 PROCEDURE — 1159F MED LIST DOCD IN RCRD: CPT | Mod: CPTII,95,, | Performed by: NURSE PRACTITIONER

## 2024-11-18 PROCEDURE — 3066F NEPHROPATHY DOC TX: CPT | Mod: CPTII,95,, | Performed by: NURSE PRACTITIONER

## 2024-11-18 NOTE — H&P (VIEW-ONLY)
Chronic Pain-Tele-Medicine-Established Note (Follow up visit)        The patient location is: Home  The chief complaint leading to consultation is: pain  Visit type: Virtual visit with synchronous audio and video  Total time spent with patient: 13 min  Each patient to whom he or she provides medical services by telemedicine is:  (1) informed of the relationship between the physician and patient and the respective role of any other health care provider with respect to management of the patient; and (2) notified that he or she may decline to receive medical services by telemedicine and may withdraw from such care at any time.      SUBJECTIVE:    Interval History 11/18/2024:  The patient returns for virtual follow up to discuss worsened back and right leg pain. The pain starts across the lower back and radiates down the back and side of the right leg to the foot. There is associated numbness and tingling. The pain worsens with prolonged activity and standing. Leg pain is severe. She previously had right L4/5 and L5/S1 TF MARILEE with 80% relief of right leg pain for over a year. She takes Lyrica with some benefit. Hip pain is tolerable. No additional complaints today. Her pain today is 7/10.    Interval History 10/10/2024:  The patient has a virtual follow up for right hip pain. She reports improvement in pain since previous visit. She says that the increase in Lyrica has been helpful. No side effects reported at this time. She has been sleeping better as well. No additional complaints today.    Interval History 8/19/2024:  The patient has a virtual visit for 2 month follow up. She reports return of right hip pain. She says that previous GTB injection provided benefit for about 3 weeks. The pain bothers her with more activity. She has been taking Lyrica 75 mg twice daily for some time with mild benefit and no side effects. She is interested in increasing the medication. No additional complaints at this time.     Interval  History 6/17/2024:  The patient returns for follow up of right hip pain. She is s/p right GTB injection on 6/4/24 with significant benefit. She still has some pain intermittently but says that it is mild. No other complaints. She continues to take Lyrica. Her pain today is 3/10.    Interval History 10/12/23: Africa Jennings returns for follow up. At our last visit, which was virtual, she was having a lot of radiating pain. Unfortunately, the TFESI we performed was not helpful. She presents today in person and I am able to examine her. Today, on examination, it is clear that this pain is reproduced with palpation of her GTBs. She continues to note 7/10 pain which is worse with laying on her side and improved with movement. It is impacting her work.     Interval History 8/28/2023:  Africa Jennings presents tele-medicine appointment for a follow-up appointment for R sided lower back and focal R L5 radicular pain. She states leg pain > back pain. She describes a sharp, shooting pain in her R buttocks that radiates to her R mid calf posteriorly and is associated with burning, numbness, and tingling. Worse with activity, bending, lifting, night time. She states the R leg is subjectively weaker than the left.. Since the last visit, Africa Jennings states the pain has been worsening. Current pain intensity is 7/10. Current medications include lyrica, zanaflex, tylenol with some relief. She has completed physical therapy and aqua therapy (March 2023 - April 2023) to help strengthen her back and notes benefit. She continues with Home exercise program and is waiting to hear back from [facility] to schedule her. Patient was supposed to have R L4/5 and L5/S1 TFESI done since last visit, however, there was some issue with scheduling or insurance (the patient is unsure which). She is interested in having the procedure rescheduled to help alleviate her symptoms. Patient denies red  flags including weakness, unexpected weight loss/gain, night sweats/fevers, saddle anesthesia, and symptoms of VERA.    Interval History 4/26/2023:  Mrs Jennings presents for follow up. Over interval she has had T9-12 Thoracic decompression by Dr Whitt on 10/28/2022. She is doing well since then but continues to have R sided lower back and focal R L5 radicular pain. She states back pain = leg pain. She states the R leg is subjectively weaker than the left. Denies any s/s concerning for cauda equina. Pain has limited functioning and she has Completed PT/Aquatherapy for >6 weeks.      Interval History 3/16/2022:  Pt presents for follow up of. She has had L4/5 ILESI and left SI injection in past with benefit. She has more vocal pain to right and associated radiculopathy down right leg in L4/5 pattern. She had no recent MRI in past 2 years and known lumbar discogenic issues but this was more focal to left. She has no complaint of loss of b/b or saddle paresthesias.      Interval History  2/22/2022:   Mrs Jennings presents for virtual follow up of lower back and left sided buttock pain. She is now s/p Left SIJ and states 90% improved pain and feels her relief was more significant than MARILEE. She states pain more noticeable to right side now without radicular complaint. There is no focal voicing of loss of b/b or saddle paresthesias.      Interval History 1/25/2021:  Mrs Jennings presents for follow up of lower back pain. She is s/p L4/5 ILESI with 90% benefit from lower back pain. She has lingering lower buttock/back pain to left side. Increased pain when sitting or stairs. Denies radiculopathy. Denies any loss of b/b or saddle paresthesias.      Interval History 12/23/2021:  Mrs Jennings presents to establish care at [facility]. She states continued lower back pain to left side but leg pain/paresthesias=back pain. She states left lower back pain and radiation in L4/5 pattern but also on right side. She has MRI  findings to left L4/5 NF disc protrusion. She has been doing HEP she learned through [facility] for over 6 months straight and continues with no lasting benefit. She is currently taking Mobic 15mg and Neurontin 300mg TID. There is no complaint of loss of b/b or saddle paresthesias.      Interval History (4/13/20):     The patient location is: home  The chief complaint leading to consultation is: follow up  Visit type: Virtual visit with audio.  Patient verbally consented for audio visit.  Total time spent with patient: 15 minutes  Each patient to whom he or she provides medical services by telemedicine is:  (1) informed of the relationship between the physician and patient and the respective role of any other health care provider with respect to management of the patient; and (2) notified that he or she may decline to receive medical services by telemedicine and may withdraw from such care at any time.        She returns today for follow up.  She reports that her right-sided low back and lower extremity pain has returned since last encounter.  She denies any changes in the quality or location of pain.  She continues to report associated numbness.  She denies any associated weakness or bowel bladder dysfunction.  She has attempted gabapentin 600 mg t.i.d..  She states this medication did not provide any relief.  She has also tried goody's powder, Motrin, Tylenol without any relief.          Interval History (2/17/20):  She returns today for follow up and MRI review.  She reports that her pain is unchanged in quality and location since last encounter.  She does state that the pain has significantly improved and is overall not very bothersome for the time being.          Initial Encounter (2/3/20):  Africa Jennings is a 53 y.o. female who presents today with chronic right-sided low back and lower extremity pain. This pain has been present for 6-7 months.  No specific inciting event or injury noted.  Patient  localizes the pain to the right lumbar region.  Pain radiates to the right posterior and anterior thigh.  She reports associated numbness in this area as well. She also reports weakness of the right lower extremity when ambulating.  She denies any associated bowel or bladder dysfunction.  The pain is constant and worsened with activity.  She states that the pain interrupt her sleep.  Patient does have a history of a similar problem roughly 8 years ago.  She underwent epidural steroid injections with good relief.  This pain is described in detail below.    Pain Scores:     Best:       6/10  Worst:     10/10  Usually:   8/10  Today:    0/10     Physical Therapy:  Feb-May, 2023  HEP: Continues HEP daily as prescribed at PT above     Non-pharmacologic Treatment:  Rest helps         TENS?  No     Pain Medications:         Currently taking:  Lyrica, Zanaflex     Has tried in the past:  Tylenol, Motrin     Has not tried:  Opioids, Tylenol, Muscle relaxants, TCAs, SNRIs, anticonvulsants, topical creams     report:  Reviewed and consistent with medication use as prescribed.     Blood thinners:  None     Relevant Surgeries:  None     Affecting sleep?  Yes     Affecting daily activities? yes     Depressive symptoms? no          SI/HI? No     Work status: Employed     Pain Procedures:   - L4-5 interlaminar epidural steroid injection x2  - 1/11/2022 L4/5 ILESI 90% benefit    - 2/8/2022 Left SIJ injection  9/26/23: Right L4/5, L5/S1 TFESI - 80% relief for over a year  10/21/23 B/L GTB injection  6/4/24 Right GTB injection- 90% relief for 3 weeks      Imaging:   CT Thoracic Spine Without Contrast  Order: [id number]  Status: Final result     Visible to patient: Yes (seen)     Next appt: 09/18/2023 at 08:45 AM in Dermatology (Basilia Valdez MD)     Dx: S/P fusion of thoracic spine     0 Result Notes  Details    Reading Physician Reading Date Result Priority   Tony Powell MD  [phone] 1/30/2023 Routine   [place]  Dex AKINS MD  [phone]  [phone] 1/30/2023      Narrative & Impression  EXAMINATION:  CT THORACIC SPINE WITHOUT CONTRAST     CLINICAL HISTORY:  s/p T9-12 fusion;  Arthrodesis status     TECHNIQUE:  CT thoracic spine performed without contrast.  Coronal and sagittal reformats provided.     COMPARISON:  X-ray thoracic spine 12/12/2022, x-ray thoracolumbar spine 10/29/2022, MRI thoracic spine 06/03/2022, CT thoracic spine 06/03/2022.     FINDINGS:  Postsurgical change of posterior spinal instrumented fusion T9-T12, T9-12 laminectomies and medial facetectomies and decortication of the T9-12 posterior elements and placement of a mixture of autologous and synthetic bone into the bilateral gutters for arthrodesis.  No hardware fracture or periprosthetic lucency to suggest hardware loosening.     Alignment: Stable, slightly pronounced kyphotic curvature.  Grade 1 retrolisthesis T11-12.     Vertebrae: Vertebral body heights are well maintained.  No fracture.  Stable sclerotic focus in the T5 vertebral body likely representing a bone island.  No lytic or blastic lesion.  Morselized synthetic and autologous bone in the bilateral T9-T12 gutters with noted abutment at the T12 posterior elements likely representing fusion.     Discs: Normal height.  Vacuum disc phenomenon at T8-9 and T11-12.     Degenerative findings:     C7-L1: No spinal canal stenosis or neural foraminal narrowing.     Paraspinal muscles & soft tissues: Cholecystectomy.  High attenuation focus in the posterior right upper quadrant possibly represent calcification or surgical clip.  Otherwise unremarkable.     Impression:     1. Postsurgical changes at T9-T12, as above, without evidence of hardware loosening or fracture.  2. Additional findings as above.     Electronically signed by resident: Dex Bojorquez  Date:                                            01/30/2023  Time:                                           13:59     Electronically signed by:  Tony Powell MD  Date:                                            01/30/2023  Time:                                           15:43       MRI Thoracic Spine Without Contrast  Order: [id number]  Status: Final result     Visible to patient: Yes (seen)     Next appt: 09/18/2023 at 08:45 AM in Dermatology (Basilia Valdez MD)     Dx: Thoracic spinal stenosis     0 Result Notes  Details    Reading Physician Reading Date Result Priority   Tony Powell MD  [phone] 6/4/2022 Routine     Narrative & Impression  EXAMINATION:  MRI THORACIC SPINE WITHOUT CONTRAST     CLINICAL HISTORY:  T9-11 stenosis, please focus on these levels.;  Spinal stenosis, thoracic region     TECHNIQUE:  Multiplanar, multisequence images were performed through the thoracic spine.  Contrast was not administered.     COMPARISON:  MRI 03/31/2022, CT 06/03/2022.     FINDINGS:  Thoracic spine alignment demonstrates a slightly pronounced kyphotic curvature.  No spondylolisthesis.  Vertebral body heights are well maintained without evidence fracture.  There is slight heterogeneous marrow signal intensity, similar when compared to the previous exam and favored to relate to benign reconversion.  No marrow signal abnormality to suggest an infiltrative process.     Persistent focal area of increased T2/STIR cord signal at the T10-T11 level, similar when compared to the previous exam concerning for myelomalacia.  Remaining visualized spinal cord demonstrates normal contour and signal intensity.     There is a 1.1 cm T2 hypointense right thyroid nodule.  There are multiple colonic diverticuli.  Remaining visualized thoracic and upper abdominal organs demonstrate no significant abnormalities.  Paraspinal musculature demonstrates normal bulk and signal intensity.     T1-T2: Bilateral facet arthropathy contributes to moderate left neural foraminal narrowing.  No spinal canal stenosis.     T2-T3: Broad-based disc osteophyte complex causes mild mass  effect on the ventral thecal sac.  Left facet arthropathy.  Findings contribute to mild-to-moderate left and mild right neural foraminal narrowing.  No spinal canal stenosis.     T7-T8: Circumferential disc osteophyte complex, bilateral facet arthropathy and right ligamentum flavum buckling.  Findings contribute to moderate right neural foraminal narrowing.  No spinal canal stenosis.     T8-T9: Circumferential disc osteophyte complex and mild bilateral ligamentum flavum buckling.  Findings contribute to mild right neural foraminal narrowing.  No spinal canal stenosis.     T9-T10: Circumferential disc osteophyte complex and bilateral ligamentum flavum buckling.  Findings contribute to mild spinal canal stenosis and moderate right neural foraminal narrowing.     T10-T11: Circumferential disc osteophyte complex, bilateral facet arthropathy and bilateral ligamentum flavum buckling.  Findings contribute to severe spinal canal stenosis and moderate right and mild left neural foraminal narrowing.     Impression:     1. Multilevel degenerative changes of the thoracic spine most pronounced at T10-T11 noting severe spinal canal stenosis and mild-to-moderate neural foraminal narrowing.  2. Persistent focal area of cord signal abnormality at T10-T11, similar when compared to MRI most suggestive of myelomalacia.        Electronically signed by: Tony Powell MD  Date:                                            06/04/2022  Time:                                           09:05       MRI Lumbar Spine Without Contrast  Order: [id number]  Status: Final result     Visible to patient: Yes (seen)     Next appt: 09/18/2023 at 08:45 AM in Dermatology (Basilia Valdez MD)     Dx: Dorsalgia, unspecified     1 Result Note  Details    Reading Physician Reading Date Result Priority   Maximo Mcallister Jr., MD  [phone] 3/25/2022 Routine     Narrative & Impression  EXAMINATION:  MRI LUMBAR SPINE WITHOUT CONTRAST     CLINICAL  HISTORY:  Dorsalgia, unspecifiedLow back pain, symptoms persist with > 6wks conservative treatment;     TECHNIQUE:  Sagittal T1, sagittal T2, sagittal STIR, axial T1 and axial T2 weighted images of the lumbar spine obtained without contrast.     COMPARISON:  Similar study 02/10/2020     FINDINGS:  Lumbar spine alignment is within normal limits. The vertebral body heights are well maintained, with no fracture.  Degenerative sclerotic marrow endplate change at S1 and fatty metaplasia degenerative endplate change at T12.  Otherwise marrow signal normal.     The conus is normal in appearance.  The adjacent soft tissue structures show no significant abnormalities.     Spinal canal is congenitally narrow on the basis of short pedicles.  At the T10-11 level there is extradural indentation of the thecal sac from posteriorly and there is some broad-based disc bulging.  Transverse images do not cover this level.  There is at least moderate spinal stenosis at this level.     L1-L2: No focal disc herniation.No significant central canal or neural foraminal narrowing.     L2-L3: No focal disc herniation.  Mild facet arthrosis.  No significant central canal or neural foraminal narrowing.     L3-L4: No evidence of a disc herniation.  No significant central canal or neural foraminal narrowing.     L4-L5: Broad-based disc bulging with left-sided foraminal and far lateral annular fissure and superimposed mild spondylitic spurring. Facet arthrosis contributes to severe left-sided neural foraminal narrowing.  Moderate central spinal stenosis.     L5-S1: Broad-based disc bulging, facet arthrosis and ligamentum flavum hypertrophy result in severe bilateral neural foraminal stenosis.  Mild central spinal stenosis.     Impression:     Extradural degenerative changes at T10-11, L4-5, and L5-S1 resulting in central canal and foraminal stenosis as detailed above.  Dedicated imaging of the thoracic spine could lend additional information  regarding findings at T10-11     This report was flagged in Epic as abnormal.        Electronically signed by: Maximo Aguirre Jr  Date:                                            03/25/2022  Time:                                           09:06       Past Medical History:   Diagnosis Date    Diabetes mellitus     Diabetes mellitus, type 2     Eczema     GERD (gastroesophageal reflux disease)     Hypertension     Impaired fasting blood sugar     YSABEL on CPAP      Past Surgical History:   Procedure Laterality Date    breast reduction      CHOLECYSTECTOMY      laprascopic    COLONOSCOPY N/A 3/26/2024    Procedure: COLONOSCOPY;  Surgeon: Sangeetha Taylor MD;  Location: [facility] ENDO;  Service: Endoscopy;  Laterality: N/A;  referral Vu Britney. Suprep Instr. to portal.EC Not taking Ozempic any longer.EC  3/12/24-LVM regarding last dose Ozempic on or before 3/18/24 if restarted, instr portal-DS  3/19- lvm and portal msg for pc- pt returned call, pc complete. prep reordered. states last dose of ozempic was 3/16    COLONOSCOPY N/A 8/27/2024    Procedure: COLONOSCOPY;  Surgeon: Manny Cronin MD;  Location: [facility] (4TH FLR);  Service: Endoscopy;  Laterality: N/A;  Per Dr. Cronin- 60-min general GI slot, starting with the slim pediatric colonoscope.  We can have the single-balloon scope available in case needed.  Ozempic / Suprep / instr to portal. DBM  8/20-called pt for pre call-unable to lvm-portal message sent-tb  8/26-last dose of    DECOMPRESSION OF THORACIC OUTLET N/A 10/28/2022    Procedure: T9-12 ROBOTIC DECOMPRESSION, THORACIC FUSION;  Surgeon: Edouard Whitt DO;  Location: [facility] 2ND FLR;  Service: Neurosurgery;  Laterality: N/A;  ANESTHESIA GENERAL [place] I BLOOD TYPE & SCREEN NERVE MONITORING EMG SEP MEP POSTION PRONE BED CARA 4 POST HEAD REST [facility] RADIOLOGY C-ARM GLOBUS PROTOCOL MISCELLANEOUS ALANIZ BRACE TLSO    EPIDURAL STEROID INJECTION N/A 1/11/2022    Procedure: INJECTION, STEROID,  EPIDURAL IL L4/5 NEEDS CONSENT;  Surgeon: Britt Calix MD;  Location: [facility] PAIN MGT;  Service: Pain Management;  Laterality: N/A;    ESOPHAGOGASTRODUODENOSCOPY  8/18/14    HYSTERECTOMY  2007    laprascopic, total for fibroids.  Dr Polo    INJECTION OF JOINT Left 2/8/2022    Procedure: INJECTION, JOINT, SI LEFT NEEDS CONSENT;  Surgeon: Britt Calix MD;  Location: [facility] PAIN MGT;  Service: Pain Management;  Laterality: Left;    INJECTION OF JOINT Bilateral 10/31/2023    Procedure: INJECTION, JOINT BILATERAL GTB;  Surgeon: Britt Calix MD;  Location: [facility] PAIN MGT;  Service: Pain Management;  Laterality: Bilateral;    INJECTION OF JOINT Right 6/4/2024    Procedure: INJECTION, JOINT RIGHT GTB;  Surgeon: Britt Calix MD;  Location: [facility] PAIN MGT;  Service: Pain Management;  Laterality: Right;  [phone]  2 WK F/U AKBAR  *3/26 COLONOSCOPY*  *SCHEDULE AFTER 4/9*    OOPHORECTOMY      ROBOT-ASSISTED LAPAROSCOPIC SLEEVE GASTRECTOMY USING DA DEVI XI N/A 7/26/2023    Procedure: XI ROBOTIC SLEEVE GASTRECTOMY with intraop EGD;  Surgeon: Roge Rich MD;  Location: [facility] OR 2ND FLR;  Service: General;  Laterality: N/A;  with possible HHR    TOTAL REDUCTION MAMMOPLASTY      TRANSFORAMINAL EPIDURAL INJECTION OF STEROID Right 9/26/2023    Procedure: INJECTION, STEROID, EPIDURAL, TRANSFORAMINAL APPROACH, RIGHT L4/L5 AND L5/S1 SOONER DATE;  Surgeon: Britt Calix MD;  Location: [facility] PAIN MGT;  Service: Pain Management;  Laterality: Right;     Social History     Socioeconomic History    Marital status:    Tobacco Use    Smoking status: Never    Smokeless tobacco: Never   Substance and Sexual Activity    Alcohol use: Never    Drug use: No    Sexual activity: Yes     Partners: Male     Birth control/protection: Surgical   Social History Narrative    Was  since 2000.      Has a friend since 2016.    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     Social  Drivers of Health     Financial Resource Strain: Medium Risk (1/22/2024)    Overall Financial Resource Strain (CARDIA)     Difficulty of Paying Living Expenses: Somewhat hard   Food Insecurity: No Food Insecurity (1/22/2024)    Hunger Vital Sign     Worried About Running Out of Food in the Last Year: Never true     Ran Out of Food in the Last Year: Never true   Transportation Needs: No Transportation Needs (1/22/2024)    PRAPARE - Transportation     Lack of Transportation (Medical): No     Lack of Transportation (Non-Medical): No   Physical Activity: Insufficiently Active (1/22/2024)    Exercise Vital Sign     Days of Exercise per Week: 1 day     Minutes of Exercise per Session: 30 min   Stress: No Stress Concern Present (1/22/2024)    [employer] - Occupational Stress Questionnaire     Feeling of Stress : Not at all   Housing Stability: Low Risk  (1/22/2024)    Housing Stability Vital Sign     Unable to Pay for Housing in the Last Year: No     Number of Places Lived in the Last Year: 1     Unstable Housing in the Last Year: No     Family History   Problem Relation Name Age of Onset    Diabetes Mother      Hypertension Mother      Heart disease Mother      Cancer Father  58        Prostate    Crohn's disease Sister      Breast cancer Sister      Diabetes Maternal Grandmother      Heart disease Maternal Grandmother      Hypertension Maternal Grandmother      Amblyopia Neg Hx      Blindness Neg Hx      Cataracts Neg Hx      Glaucoma Neg Hx      Macular degeneration Neg Hx      Retinal detachment Neg Hx      Strabismus Neg Hx         Review of patient's allergies indicates:  No Known Allergies    Current Outpatient Medications   Medication Sig    acetaminophen (TYLENOL) 500 MG tablet Take 2 tablets (1,000 mg total) by mouth every 8 (eight) hours as needed for Pain.    atorvastatin (LIPITOR) 20 MG tablet Take 1 tablet (20 mg total) by mouth once daily.    ergocalciferol (ERGOCALCIFEROL)  50,000 unit Cap Take 1 capsule (50,000 Units total) by mouth every 7 days.    estradioL (ESTRACE) 1 MG tablet Take 1 tablet (1 mg total) by mouth once daily.    finasteride (PROSCAR) 5 mg tablet TAKE 1 TABLET BY MOUTH EVERY DAY    fluocinonide (LIDEX) 0.05 % ointment Apply to affected areas of scalp at bedtime    ketoconazole (NIZORAL) 2 % shampoo Wash hair with medicated shampoo at least 2x/week - let sit on scalp at least 5 minutes prior to rinsing    meclizine (ANTIVERT) 25 mg tablet Take 1 tablet (25 mg total) by mouth 3 (three) times daily as needed for Dizziness.    mometasone (ELOCON) 0.1 % solution APPLY TOPICALLY ONCE DAILY. TO THINNING AREA OF SCALP    neomycin-polymyxin-dexamethasone (MAXITROL) 3.5mg/mL-10,000 unit/mL-0.1 % DrpS as needed.    nystatin (MYCOSTATIN) ointment Apply topically 2 (two) times daily. USES PRN    omeprazole (PRILOSEC) 40 MG capsule Take 1 capsule (40 mg total) by mouth every morning.    ondansetron (ZOFRAN) 4 MG tablet TAKE 1 TABLET BY MOUTH EVERY 8 HOURS AS NEEDED    ondansetron (ZOFRAN-ODT) 8 MG TbDL Take 1 tablet (8 mg total) by mouth every 6 (six) hours as needed.    prednisoLONE acetate (PRED FORTE) 1 % DrpS USES PRN    pregabalin (LYRICA) 100 MG capsule Take 1 capsule (100 mg total) by mouth 2 (two) times daily.    semaglutide (OZEMPIC) 1 mg/dose (4 mg/3 mL) Inject 1 mg into the skin every 7 days.    spironolactone (ALDACTONE) 50 MG tablet Take 1 tablet (50 mg total) by mouth once daily.    tiZANidine (ZANAFLEX) 4 MG tablet Take 1 tablet (4 mg total) by mouth every 8 (eight) hours.    triamcinolone acetonide 0.1% (KENALOG) 0.1 % cream Apply topically 2 (two) times daily. Apply topically 2 (two) times daily.     Current Facility-Administered Medications   Medication    triamcinolone acetonide injection 10 mg    triamcinolone acetonide injection 10 mg    triamcinolone acetonide injection 5 mg    triamcinolone acetonide injection 5 mg    triamcinolone acetonide injection 5 mg        REVIEW OF SYSTEMS:    GENERAL:  No weight loss, malaise or fevers.  HEENT:   No recent changes in vision or hearing  NECK:  Negative for lumps, no difficulty with swallowing.  RESPIRATORY:  Negative for cough, wheezing or shortness of breath, patient denies any recent URI.  CARDIOVASCULAR:  Negative for chest pain, leg swelling or palpitations.  GI:  Negative for abdominal discomfort, blood in stools or black stools or change in bowel habits.  MUSCULOSKELETAL:  See HPI.  SKIN:  Negative for lesions, rash, and itching.  PSYCH:  No mood disorder or recent psychosocial stressors.  Patients sleep is not disturbed secondary to pain.  HEMATOLOGY/LYMPHOLOGY:  Negative for prolonged bleeding, bruising easily or swollen nodes.  Patient is not currently taking any anti-coagulants  NEURO:   No history of headaches, syncope, paralysis, seizures or tremors.  All other reviewed and negative other than HPI.    OBJECTIVE:    LMP  (LMP Unknown)     PHYSICAL EXAMINATION:    General appearance: Well appearing, in no acute distress, alert and oriented x3.  Psych:  Mood and affect appropriate.  Skin: Skin color normal, no rashes or lesions, in both upper and lower body.  Extremities: Moves all visualized extremities freely.      PREVIOUS PHYSICAL EXAM:   GENERAL: Well appearing, in no acute distress, alert and oriented x3.  PSYCH:  Mood and affect appropriate.  SKIN: Skin color, texture, turgor normal, no rashes or lesions.  HEENT:  Normocephalic, atraumatic. Cranial nerves grossly intact.  EXTREMITIES: No deformities, edema, or skin discoloration.   MUSCULOSKELETAL: Bilateral lower extremity strength is normal and symmetric. No atrophy is noted. No TTP over bilateral GTB. SLR is negative bilaterally.  NEURO: Sensation is equal and appropriate bilaterally.   GAIT: Normal.      ASSESSMENT: 54 y.o. year old female with pain consistent with     1. Lumbosacral radiculopathy  Procedure Order to Pain Management      2. Chronic pain  syndrome                DISCUSSION: Ms. Jennings is a . She has a history of T9-12 decompression and posterior fusion with Dr. Whitt. She came to us with left low back pain and did well after SIJ. She then presented back with right radicular symptoms. MRI shows short pedicles, moderate canal stenosis at L4/5, and severe foraminal stenosis at L5/S1.     PLAN:   1) The patient will continue a home exercise routine to help with pain and strengthening.    2) Schedule for repeat right L4/5 and L5/S1 TF MARILEE. Previous provided 80% relief of right leg pain for over one year. She continues with PT exercises 3 days per week for over 12 weeks.  3) Continue Lyrica 100 mg BID. Refills on file.  4) Follow up 2 weeks after MARILEE.      The above plan and management options were discussed at length with patient. Patient is in agreement with the above and verbalized understanding.     Munira Strauss  11/18/2024

## 2024-11-18 NOTE — PROGRESS NOTES
Chronic Pain-Tele-Medicine-Established Note (Follow up visit)        The patient location is: Home  The chief complaint leading to consultation is: pain  Visit type: Virtual visit with synchronous audio and video  Total time spent with patient: 13 min  Each patient to whom he or she provides medical services by telemedicine is:  (1) informed of the relationship between the physician and patient and the respective role of any other health care provider with respect to management of the patient; and (2) notified that he or she may decline to receive medical services by telemedicine and may withdraw from such care at any time.      SUBJECTIVE:    Interval History 11/18/2024:  The patient returns for virtual follow up to discuss worsened back and right leg pain. The pain starts across the lower back and radiates down the back and side of the right leg to the foot. There is associated numbness and tingling. The pain worsens with prolonged activity and standing. Leg pain is severe. She previously had right L4/5 and L5/S1 TF MARILEE with 80% relief of right leg pain for over a year. She takes Lyrica with some benefit. Hip pain is tolerable. No additional complaints today. Her pain today is 7/10.    Interval History 10/10/2024:  The patient has a virtual follow up for right hip pain. She reports improvement in pain since previous visit. She says that the increase in Lyrica has been helpful. No side effects reported at this time. She has been sleeping better as well. No additional complaints today.    Interval History 8/19/2024:  The patient has a virtual visit for 2 month follow up. She reports return of right hip pain. She says that previous GTB injection provided benefit for about 3 weeks. The pain bothers her with more activity. She has been taking Lyrica 75 mg twice daily for some time with mild benefit and no side effects. She is interested in increasing the medication. No additional complaints at this time.     Interval  History 6/17/2024:  The patient returns for follow up of right hip pain. She is s/p right GTB injection on 6/4/24 with significant benefit. She still has some pain intermittently but says that it is mild. No other complaints. She continues to take Lyrica. Her pain today is 3/10.    Interval History 10/12/23: Africa Jennings returns for follow up. At our last visit, which was virtual, she was having a lot of radiating pain. Unfortunately, the TFESI we performed was not helpful. She presents today in person and I am able to examine her. Today, on examination, it is clear that this pain is reproduced with palpation of her GTBs. She continues to note 7/10 pain which is worse with laying on her side and improved with movement. It is impacting her work.     Interval History 8/28/2023:  Africa Jennings presents tele-medicine appointment for a follow-up appointment for R sided lower back and focal R L5 radicular pain. She states leg pain > back pain. She describes a sharp, shooting pain in her R buttocks that radiates to her R mid calf posteriorly and is associated with burning, numbness, and tingling. Worse with activity, bending, lifting, night time. She states the R leg is subjectively weaker than the left.. Since the last visit, Africa Jennings states the pain has been worsening. Current pain intensity is 7/10. Current medications include lyrica, zanaflex, tylenol with some relief. She has completed physical therapy and aqua therapy (March 2023 - April 2023) to help strengthen her back and notes benefit. She continues with Home exercise program and is waiting to hear back from [facility] to schedule her. Patient was supposed to have R L4/5 and L5/S1 TFESI done since last visit, however, there was some issue with scheduling or insurance (the patient is unsure which). She is interested in having the procedure rescheduled to help alleviate her symptoms. Patient denies red  flags including weakness, unexpected weight loss/gain, night sweats/fevers, saddle anesthesia, and symptoms of VERA.    Interval History 4/26/2023:  Mrs Jennings presents for follow up. Over interval she has had T9-12 Thoracic decompression by Dr Whitt on 10/28/2022. She is doing well since then but continues to have R sided lower back and focal R L5 radicular pain. She states back pain = leg pain. She states the R leg is subjectively weaker than the left. Denies any s/s concerning for cauda equina. Pain has limited functioning and she has Completed PT/Aquatherapy for >6 weeks.      Interval History 3/16/2022:  Pt presents for follow up of. She has had L4/5 ILESI and left SI injection in past with benefit. She has more vocal pain to right and associated radiculopathy down right leg in L4/5 pattern. She had no recent MRI in past 2 years and known lumbar discogenic issues but this was more focal to left. She has no complaint of loss of b/b or saddle paresthesias.      Interval History  2/22/2022:   Mrs Jennings presents for virtual follow up of lower back and left sided buttock pain. She is now s/p Left SIJ and states 90% improved pain and feels her relief was more significant than MARILEE. She states pain more noticeable to right side now without radicular complaint. There is no focal voicing of loss of b/b or saddle paresthesias.      Interval History 1/25/2021:  Mrs Jennings presents for follow up of lower back pain. She is s/p L4/5 ILESI with 90% benefit from lower back pain. She has lingering lower buttock/back pain to left side. Increased pain when sitting or stairs. Denies radiculopathy. Denies any loss of b/b or saddle paresthesias.      Interval History 12/23/2021:  Mrs Jennings presents to establish care at [facility]. She states continued lower back pain to left side but leg pain/paresthesias=back pain. She states left lower back pain and radiation in L4/5 pattern but also on right side. She has MRI  findings to left L4/5 NF disc protrusion. She has been doing HEP she learned through [facility] for over 6 months straight and continues with no lasting benefit. She is currently taking Mobic 15mg and Neurontin 300mg TID. There is no complaint of loss of b/b or saddle paresthesias.      Interval History (4/13/20):     The patient location is: home  The chief complaint leading to consultation is: follow up  Visit type: Virtual visit with audio.  Patient verbally consented for audio visit.  Total time spent with patient: 15 minutes  Each patient to whom he or she provides medical services by telemedicine is:  (1) informed of the relationship between the physician and patient and the respective role of any other health care provider with respect to management of the patient; and (2) notified that he or she may decline to receive medical services by telemedicine and may withdraw from such care at any time.        She returns today for follow up.  She reports that her right-sided low back and lower extremity pain has returned since last encounter.  She denies any changes in the quality or location of pain.  She continues to report associated numbness.  She denies any associated weakness or bowel bladder dysfunction.  She has attempted gabapentin 600 mg t.i.d..  She states this medication did not provide any relief.  She has also tried goody's powder, Motrin, Tylenol without any relief.          Interval History (2/17/20):  She returns today for follow up and MRI review.  She reports that her pain is unchanged in quality and location since last encounter.  She does state that the pain has significantly improved and is overall not very bothersome for the time being.          Initial Encounter (2/3/20):  Africa Jennings is a 53 y.o. female who presents today with chronic right-sided low back and lower extremity pain. This pain has been present for 6-7 months.  No specific inciting event or injury noted.  Patient  localizes the pain to the right lumbar region.  Pain radiates to the right posterior and anterior thigh.  She reports associated numbness in this area as well. She also reports weakness of the right lower extremity when ambulating.  She denies any associated bowel or bladder dysfunction.  The pain is constant and worsened with activity.  She states that the pain interrupt her sleep.  Patient does have a history of a similar problem roughly 8 years ago.  She underwent epidural steroid injections with good relief.  This pain is described in detail below.    Pain Scores:     Best:       6/10  Worst:     10/10  Usually:   8/10  Today:    0/10     Physical Therapy:  Feb-May, 2023  HEP: Continues HEP daily as prescribed at PT above     Non-pharmacologic Treatment:  Rest helps         TENS?  No     Pain Medications:         Currently taking:  Lyrica, Zanaflex     Has tried in the past:  Tylenol, Motrin     Has not tried:  Opioids, Tylenol, Muscle relaxants, TCAs, SNRIs, anticonvulsants, topical creams     report:  Reviewed and consistent with medication use as prescribed.     Blood thinners:  None     Relevant Surgeries:  None     Affecting sleep?  Yes     Affecting daily activities? yes     Depressive symptoms? no          SI/HI? No     Work status: Employed     Pain Procedures:   - L4-5 interlaminar epidural steroid injection x2  - 1/11/2022 L4/5 ILESI 90% benefit    - 2/8/2022 Left SIJ injection  9/26/23: Right L4/5, L5/S1 TFESI - 80% relief for over a year  10/21/23 B/L GTB injection  6/4/24 Right GTB injection- 90% relief for 3 weeks      Imaging:   CT Thoracic Spine Without Contrast  Order: [id number]  Status: Final result     Visible to patient: Yes (seen)     Next appt: 09/18/2023 at 08:45 AM in Dermatology (Basilia Valdez MD)     Dx: S/P fusion of thoracic spine     0 Result Notes  Details    Reading Physician Reading Date Result Priority   Tony Powell MD  [phone] 1/30/2023 Routine   [place]  Dex AKINS MD  [phone]  [phone] 1/30/2023      Narrative & Impression  EXAMINATION:  CT THORACIC SPINE WITHOUT CONTRAST     CLINICAL HISTORY:  s/p T9-12 fusion;  Arthrodesis status     TECHNIQUE:  CT thoracic spine performed without contrast.  Coronal and sagittal reformats provided.     COMPARISON:  X-ray thoracic spine 12/12/2022, x-ray thoracolumbar spine 10/29/2022, MRI thoracic spine 06/03/2022, CT thoracic spine 06/03/2022.     FINDINGS:  Postsurgical change of posterior spinal instrumented fusion T9-T12, T9-12 laminectomies and medial facetectomies and decortication of the T9-12 posterior elements and placement of a mixture of autologous and synthetic bone into the bilateral gutters for arthrodesis.  No hardware fracture or periprosthetic lucency to suggest hardware loosening.     Alignment: Stable, slightly pronounced kyphotic curvature.  Grade 1 retrolisthesis T11-12.     Vertebrae: Vertebral body heights are well maintained.  No fracture.  Stable sclerotic focus in the T5 vertebral body likely representing a bone island.  No lytic or blastic lesion.  Morselized synthetic and autologous bone in the bilateral T9-T12 gutters with noted abutment at the T12 posterior elements likely representing fusion.     Discs: Normal height.  Vacuum disc phenomenon at T8-9 and T11-12.     Degenerative findings:     C7-L1: No spinal canal stenosis or neural foraminal narrowing.     Paraspinal muscles & soft tissues: Cholecystectomy.  High attenuation focus in the posterior right upper quadrant possibly represent calcification or surgical clip.  Otherwise unremarkable.     Impression:     1. Postsurgical changes at T9-T12, as above, without evidence of hardware loosening or fracture.  2. Additional findings as above.     Electronically signed by resident: Dex Bojorquez  Date:                                            01/30/2023  Time:                                           13:59     Electronically signed by:  Tony Powell MD  Date:                                            01/30/2023  Time:                                           15:43       MRI Thoracic Spine Without Contrast  Order: [id number]  Status: Final result     Visible to patient: Yes (seen)     Next appt: 09/18/2023 at 08:45 AM in Dermatology (Basilia Valdez MD)     Dx: Thoracic spinal stenosis     0 Result Notes  Details    Reading Physician Reading Date Result Priority   Tony Powell MD  [phone] 6/4/2022 Routine     Narrative & Impression  EXAMINATION:  MRI THORACIC SPINE WITHOUT CONTRAST     CLINICAL HISTORY:  T9-11 stenosis, please focus on these levels.;  Spinal stenosis, thoracic region     TECHNIQUE:  Multiplanar, multisequence images were performed through the thoracic spine.  Contrast was not administered.     COMPARISON:  MRI 03/31/2022, CT 06/03/2022.     FINDINGS:  Thoracic spine alignment demonstrates a slightly pronounced kyphotic curvature.  No spondylolisthesis.  Vertebral body heights are well maintained without evidence fracture.  There is slight heterogeneous marrow signal intensity, similar when compared to the previous exam and favored to relate to benign reconversion.  No marrow signal abnormality to suggest an infiltrative process.     Persistent focal area of increased T2/STIR cord signal at the T10-T11 level, similar when compared to the previous exam concerning for myelomalacia.  Remaining visualized spinal cord demonstrates normal contour and signal intensity.     There is a 1.1 cm T2 hypointense right thyroid nodule.  There are multiple colonic diverticuli.  Remaining visualized thoracic and upper abdominal organs demonstrate no significant abnormalities.  Paraspinal musculature demonstrates normal bulk and signal intensity.     T1-T2: Bilateral facet arthropathy contributes to moderate left neural foraminal narrowing.  No spinal canal stenosis.     T2-T3: Broad-based disc osteophyte complex causes mild mass  effect on the ventral thecal sac.  Left facet arthropathy.  Findings contribute to mild-to-moderate left and mild right neural foraminal narrowing.  No spinal canal stenosis.     T7-T8: Circumferential disc osteophyte complex, bilateral facet arthropathy and right ligamentum flavum buckling.  Findings contribute to moderate right neural foraminal narrowing.  No spinal canal stenosis.     T8-T9: Circumferential disc osteophyte complex and mild bilateral ligamentum flavum buckling.  Findings contribute to mild right neural foraminal narrowing.  No spinal canal stenosis.     T9-T10: Circumferential disc osteophyte complex and bilateral ligamentum flavum buckling.  Findings contribute to mild spinal canal stenosis and moderate right neural foraminal narrowing.     T10-T11: Circumferential disc osteophyte complex, bilateral facet arthropathy and bilateral ligamentum flavum buckling.  Findings contribute to severe spinal canal stenosis and moderate right and mild left neural foraminal narrowing.     Impression:     1. Multilevel degenerative changes of the thoracic spine most pronounced at T10-T11 noting severe spinal canal stenosis and mild-to-moderate neural foraminal narrowing.  2. Persistent focal area of cord signal abnormality at T10-T11, similar when compared to MRI most suggestive of myelomalacia.        Electronically signed by: Tony Powell MD  Date:                                            06/04/2022  Time:                                           09:05       MRI Lumbar Spine Without Contrast  Order: [id number]  Status: Final result     Visible to patient: Yes (seen)     Next appt: 09/18/2023 at 08:45 AM in Dermatology (Basilia Valdez MD)     Dx: Dorsalgia, unspecified     1 Result Note  Details    Reading Physician Reading Date Result Priority   Maximo Mcallister Jr., MD  [phone] 3/25/2022 Routine     Narrative & Impression  EXAMINATION:  MRI LUMBAR SPINE WITHOUT CONTRAST     CLINICAL  HISTORY:  Dorsalgia, unspecifiedLow back pain, symptoms persist with > 6wks conservative treatment;     TECHNIQUE:  Sagittal T1, sagittal T2, sagittal STIR, axial T1 and axial T2 weighted images of the lumbar spine obtained without contrast.     COMPARISON:  Similar study 02/10/2020     FINDINGS:  Lumbar spine alignment is within normal limits. The vertebral body heights are well maintained, with no fracture.  Degenerative sclerotic marrow endplate change at S1 and fatty metaplasia degenerative endplate change at T12.  Otherwise marrow signal normal.     The conus is normal in appearance.  The adjacent soft tissue structures show no significant abnormalities.     Spinal canal is congenitally narrow on the basis of short pedicles.  At the T10-11 level there is extradural indentation of the thecal sac from posteriorly and there is some broad-based disc bulging.  Transverse images do not cover this level.  There is at least moderate spinal stenosis at this level.     L1-L2: No focal disc herniation.No significant central canal or neural foraminal narrowing.     L2-L3: No focal disc herniation.  Mild facet arthrosis.  No significant central canal or neural foraminal narrowing.     L3-L4: No evidence of a disc herniation.  No significant central canal or neural foraminal narrowing.     L4-L5: Broad-based disc bulging with left-sided foraminal and far lateral annular fissure and superimposed mild spondylitic spurring. Facet arthrosis contributes to severe left-sided neural foraminal narrowing.  Moderate central spinal stenosis.     L5-S1: Broad-based disc bulging, facet arthrosis and ligamentum flavum hypertrophy result in severe bilateral neural foraminal stenosis.  Mild central spinal stenosis.     Impression:     Extradural degenerative changes at T10-11, L4-5, and L5-S1 resulting in central canal and foraminal stenosis as detailed above.  Dedicated imaging of the thoracic spine could lend additional information  regarding findings at T10-11     This report was flagged in Epic as abnormal.        Electronically signed by: Maximo Aguirre Jr  Date:                                            03/25/2022  Time:                                           09:06       Past Medical History:   Diagnosis Date    Diabetes mellitus     Diabetes mellitus, type 2     Eczema     GERD (gastroesophageal reflux disease)     Hypertension     Impaired fasting blood sugar     YSABEL on CPAP      Past Surgical History:   Procedure Laterality Date    breast reduction      CHOLECYSTECTOMY      laprascopic    COLONOSCOPY N/A 3/26/2024    Procedure: COLONOSCOPY;  Surgeon: Sangeetha Taylor MD;  Location: [facility] ENDO;  Service: Endoscopy;  Laterality: N/A;  referral Vu Britney. Suprep Instr. to portal.EC Not taking Ozempic any longer.EC  3/12/24-LVM regarding last dose Ozempic on or before 3/18/24 if restarted, instr portal-DS  3/19- lvm and portal msg for pc- pt returned call, pc complete. prep reordered. states last dose of ozempic was 3/16    COLONOSCOPY N/A 8/27/2024    Procedure: COLONOSCOPY;  Surgeon: Manny Cronin MD;  Location: [facility] (4TH FLR);  Service: Endoscopy;  Laterality: N/A;  Per Dr. Cronin- 60-min general GI slot, starting with the slim pediatric colonoscope.  We can have the single-balloon scope available in case needed.  Ozempic / Suprep / instr to portal. DBM  8/20-called pt for pre call-unable to lvm-portal message sent-tb  8/26-last dose of    DECOMPRESSION OF THORACIC OUTLET N/A 10/28/2022    Procedure: T9-12 ROBOTIC DECOMPRESSION, THORACIC FUSION;  Surgeon: Edouard Whitt DO;  Location: [facility] 2ND FLR;  Service: Neurosurgery;  Laterality: N/A;  ANESTHESIA GENERAL [place] I BLOOD TYPE & SCREEN NERVE MONITORING EMG SEP MEP POSTION PRONE BED CARA 4 POST HEAD REST [facility] RADIOLOGY C-ARM GLOBUS PROTOCOL MISCELLANEOUS ALANIZ BRACE TLSO    EPIDURAL STEROID INJECTION N/A 1/11/2022    Procedure: INJECTION, STEROID,  EPIDURAL IL L4/5 NEEDS CONSENT;  Surgeon: Britt Calix MD;  Location: [facility] PAIN MGT;  Service: Pain Management;  Laterality: N/A;    ESOPHAGOGASTRODUODENOSCOPY  8/18/14    HYSTERECTOMY  2007    laprascopic, total for fibroids.  Dr Polo    INJECTION OF JOINT Left 2/8/2022    Procedure: INJECTION, JOINT, SI LEFT NEEDS CONSENT;  Surgeon: Britt Calix MD;  Location: [facility] PAIN MGT;  Service: Pain Management;  Laterality: Left;    INJECTION OF JOINT Bilateral 10/31/2023    Procedure: INJECTION, JOINT BILATERAL GTB;  Surgeon: Britt Calix MD;  Location: [facility] PAIN MGT;  Service: Pain Management;  Laterality: Bilateral;    INJECTION OF JOINT Right 6/4/2024    Procedure: INJECTION, JOINT RIGHT GTB;  Surgeon: Britt Calix MD;  Location: [facility] PAIN MGT;  Service: Pain Management;  Laterality: Right;  [phone]  2 WK F/U AKBAR  *3/26 COLONOSCOPY*  *SCHEDULE AFTER 4/9*    OOPHORECTOMY      ROBOT-ASSISTED LAPAROSCOPIC SLEEVE GASTRECTOMY USING DA DEVI XI N/A 7/26/2023    Procedure: XI ROBOTIC SLEEVE GASTRECTOMY with intraop EGD;  Surgeon: Roge Rich MD;  Location: [facility] OR 2ND FLR;  Service: General;  Laterality: N/A;  with possible HHR    TOTAL REDUCTION MAMMOPLASTY      TRANSFORAMINAL EPIDURAL INJECTION OF STEROID Right 9/26/2023    Procedure: INJECTION, STEROID, EPIDURAL, TRANSFORAMINAL APPROACH, RIGHT L4/L5 AND L5/S1 SOONER DATE;  Surgeon: Britt Calix MD;  Location: [facility] PAIN MGT;  Service: Pain Management;  Laterality: Right;     Social History     Socioeconomic History    Marital status:    Tobacco Use    Smoking status: Never    Smokeless tobacco: Never   Substance and Sexual Activity    Alcohol use: Never    Drug use: No    Sexual activity: Yes     Partners: Male     Birth control/protection: Surgical   Social History Narrative    Was  since 2000.      Has a friend since 2016.    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     Social  Drivers of Health     Financial Resource Strain: Medium Risk (1/22/2024)    Overall Financial Resource Strain (CARDIA)     Difficulty of Paying Living Expenses: Somewhat hard   Food Insecurity: No Food Insecurity (1/22/2024)    Hunger Vital Sign     Worried About Running Out of Food in the Last Year: Never true     Ran Out of Food in the Last Year: Never true   Transportation Needs: No Transportation Needs (1/22/2024)    PRAPARE - Transportation     Lack of Transportation (Medical): No     Lack of Transportation (Non-Medical): No   Physical Activity: Insufficiently Active (1/22/2024)    Exercise Vital Sign     Days of Exercise per Week: 1 day     Minutes of Exercise per Session: 30 min   Stress: No Stress Concern Present (1/22/2024)    [place] [employer] - Occupational Stress Questionnaire     Feeling of Stress : Not at all   Housing Stability: Low Risk  (1/22/2024)    Housing Stability Vital Sign     Unable to Pay for Housing in the Last Year: No     Number of Places Lived in the Last Year: 1     Unstable Housing in the Last Year: No     Family History   Problem Relation Name Age of Onset    Diabetes Mother      Hypertension Mother      Heart disease Mother      Cancer Father  58        Prostate    Crohn's disease Sister      Breast cancer Sister      Diabetes Maternal Grandmother      Heart disease Maternal Grandmother      Hypertension Maternal Grandmother      Amblyopia Neg Hx      Blindness Neg Hx      Cataracts Neg Hx      Glaucoma Neg Hx      Macular degeneration Neg Hx      Retinal detachment Neg Hx      Strabismus Neg Hx         Review of patient's allergies indicates:  No Known Allergies    Current Outpatient Medications   Medication Sig    acetaminophen (TYLENOL) 500 MG tablet Take 2 tablets (1,000 mg total) by mouth every 8 (eight) hours as needed for Pain.    atorvastatin (LIPITOR) 20 MG tablet Take 1 tablet (20 mg total) by mouth once daily.    ergocalciferol (ERGOCALCIFEROL)  50,000 unit Cap Take 1 capsule (50,000 Units total) by mouth every 7 days.    estradioL (ESTRACE) 1 MG tablet Take 1 tablet (1 mg total) by mouth once daily.    finasteride (PROSCAR) 5 mg tablet TAKE 1 TABLET BY MOUTH EVERY DAY    fluocinonide (LIDEX) 0.05 % ointment Apply to affected areas of scalp at bedtime    ketoconazole (NIZORAL) 2 % shampoo Wash hair with medicated shampoo at least 2x/week - let sit on scalp at least 5 minutes prior to rinsing    meclizine (ANTIVERT) 25 mg tablet Take 1 tablet (25 mg total) by mouth 3 (three) times daily as needed for Dizziness.    mometasone (ELOCON) 0.1 % solution APPLY TOPICALLY ONCE DAILY. TO THINNING AREA OF SCALP    neomycin-polymyxin-dexamethasone (MAXITROL) 3.5mg/mL-10,000 unit/mL-0.1 % DrpS as needed.    nystatin (MYCOSTATIN) ointment Apply topically 2 (two) times daily. USES PRN    omeprazole (PRILOSEC) 40 MG capsule Take 1 capsule (40 mg total) by mouth every morning.    ondansetron (ZOFRAN) 4 MG tablet TAKE 1 TABLET BY MOUTH EVERY 8 HOURS AS NEEDED    ondansetron (ZOFRAN-ODT) 8 MG TbDL Take 1 tablet (8 mg total) by mouth every 6 (six) hours as needed.    prednisoLONE acetate (PRED FORTE) 1 % DrpS USES PRN    pregabalin (LYRICA) 100 MG capsule Take 1 capsule (100 mg total) by mouth 2 (two) times daily.    semaglutide (OZEMPIC) 1 mg/dose (4 mg/3 mL) Inject 1 mg into the skin every 7 days.    spironolactone (ALDACTONE) 50 MG tablet Take 1 tablet (50 mg total) by mouth once daily.    tiZANidine (ZANAFLEX) 4 MG tablet Take 1 tablet (4 mg total) by mouth every 8 (eight) hours.    triamcinolone acetonide 0.1% (KENALOG) 0.1 % cream Apply topically 2 (two) times daily. Apply topically 2 (two) times daily.     Current Facility-Administered Medications   Medication    triamcinolone acetonide injection 10 mg    triamcinolone acetonide injection 10 mg    triamcinolone acetonide injection 5 mg    triamcinolone acetonide injection 5 mg    triamcinolone acetonide injection 5 mg        REVIEW OF SYSTEMS:    GENERAL:  No weight loss, malaise or fevers.  HEENT:   No recent changes in vision or hearing  NECK:  Negative for lumps, no difficulty with swallowing.  RESPIRATORY:  Negative for cough, wheezing or shortness of breath, patient denies any recent URI.  CARDIOVASCULAR:  Negative for chest pain, leg swelling or palpitations.  GI:  Negative for abdominal discomfort, blood in stools or black stools or change in bowel habits.  MUSCULOSKELETAL:  See HPI.  SKIN:  Negative for lesions, rash, and itching.  PSYCH:  No mood disorder or recent psychosocial stressors.  Patients sleep is not disturbed secondary to pain.  HEMATOLOGY/LYMPHOLOGY:  Negative for prolonged bleeding, bruising easily or swollen nodes.  Patient is not currently taking any anti-coagulants  NEURO:   No history of headaches, syncope, paralysis, seizures or tremors.  All other reviewed and negative other than HPI.    OBJECTIVE:    LMP  (LMP Unknown)     PHYSICAL EXAMINATION:    General appearance: Well appearing, in no acute distress, alert and oriented x3.  Psych:  Mood and affect appropriate.  Skin: Skin color normal, no rashes or lesions, in both upper and lower body.  Extremities: Moves all visualized extremities freely.      PREVIOUS PHYSICAL EXAM:   GENERAL: Well appearing, in no acute distress, alert and oriented x3.  PSYCH:  Mood and affect appropriate.  SKIN: Skin color, texture, turgor normal, no rashes or lesions.  HEENT:  Normocephalic, atraumatic. Cranial nerves grossly intact.  EXTREMITIES: No deformities, edema, or skin discoloration.   MUSCULOSKELETAL: Bilateral lower extremity strength is normal and symmetric. No atrophy is noted. No TTP over bilateral GTB. SLR is negative bilaterally.  NEURO: Sensation is equal and appropriate bilaterally.   GAIT: Normal.      ASSESSMENT: 54 y.o. year old female with pain consistent with     1. Lumbosacral radiculopathy  Procedure Order to Pain Management      2. Chronic pain  syndrome                DISCUSSION: Ms. Jennings is a . She has a history of T9-12 decompression and posterior fusion with Dr. Whitt. She came to us with left low back pain and did well after SIJ. She then presented back with right radicular symptoms. MRI shows short pedicles, moderate canal stenosis at L4/5, and severe foraminal stenosis at L5/S1.     PLAN:   1) The patient will continue a home exercise routine to help with pain and strengthening.    2) Schedule for repeat right L4/5 and L5/S1 TF MARILEE. Previous provided 80% relief of right leg pain for over one year. She continues with PT exercises 3 days per week for over 12 weeks.  3) Continue Lyrica 100 mg BID. Refills on file.  4) Follow up 2 weeks after MARILEE.      The above plan and management options were discussed at length with patient. Patient is in agreement with the above and verbalized understanding.     Munira Strauss  11/18/2024

## 2024-11-20 DIAGNOSIS — E78.5 HYPERLIPIDEMIA, UNSPECIFIED HYPERLIPIDEMIA TYPE: ICD-10-CM

## 2024-11-20 RX ORDER — ATORVASTATIN CALCIUM 20 MG/1
20 TABLET, FILM COATED ORAL
Qty: 90 TABLET | Refills: 2 | Status: SHIPPED | OUTPATIENT
Start: 2024-11-20

## 2024-11-20 NOTE — TELEPHONE ENCOUNTER
No care due was identified.  [facility] Embedded Care Due Messages. Reference number: [id number].   11/20/2024 1:56:48 AM CST

## 2024-11-20 NOTE — TELEPHONE ENCOUNTER
Refill Decision Note   Africahari Jennings  is requesting a refill authorization.  Brief Assessment and Rationale for Refill:  Approve     Medication Therapy Plan:         Comments:     Note composed:7:41 AM 11/20/2024

## 2024-11-21 ENCOUNTER — PATIENT MESSAGE (OUTPATIENT)
Dept: PAIN MEDICINE | Facility: OTHER | Age: 54
End: 2024-11-21
Payer: COMMERCIAL

## 2024-11-22 DIAGNOSIS — L64.9 ANDROGENETIC ALOPECIA: ICD-10-CM

## 2024-11-25 RX ORDER — FINASTERIDE 5 MG/1
TABLET, FILM COATED ORAL
Qty: 90 TABLET | Refills: 3 | Status: SHIPPED | OUTPATIENT
Start: 2024-11-25

## 2024-11-25 RX ORDER — FINASTERIDE 5 MG/1
5 TABLET, FILM COATED ORAL
Qty: 90 TABLET | Refills: 3 | OUTPATIENT
Start: 2024-11-25

## 2024-11-25 NOTE — TELEPHONE ENCOUNTER
Last seen on 9/10/2024:  Traction alopecia  Frontotemporal hairline thinning with fringe sign and areas of scarring/no hair growth  No erythema/pruritus/scale     Possibly component of CCCA as well as pt reports thinning of vertex- unable to assess distribution with christopher or twists in recommend at some visit to time with hair styling to she can come in with hair out     Status: chronic condition flaring and/or not at treatment goal     Recommend wear hair natural if possible and avoid tight styles, avoid anything that feels painful, and alternate styles frequently   She is noticing good improvements and very happy with treatments, wishes to continue treatments   Continue serial photo     Plan:  Continue finasteride 5 mg QD (since 2-2023) reviewed potential SE sexual dysfunction, low libido, depression  Continue spironolactone 100 mg QD (since 6-2023 Also for hirsutism) reviewed SE elevation of K+ - recently checked 4.1 and wnl 1/2024 need recheck at least annually  Continue topical minoxidil 5% QD   Continue ketoconazole or ciclopirox shampoo once weekly- pt says she has this at home     Pt wishes for ILK injections today  Intralesional Kenalog 5 mg/cc (3 cc total) injected into >7 lesions on the frontal hairline today after obtaining verbal consent including risk of surrounding hypopigmentation. Patient tolerated procedure well.     Units: 1  NDC for Kenalog 10mg/cc:  5743-4423-53     RTC 3 months, sooner prn    Please see the attached refill request.

## 2024-12-03 ENCOUNTER — PATIENT MESSAGE (OUTPATIENT)
Dept: BARIATRICS | Facility: CLINIC | Age: 54
End: 2024-12-03
Payer: COMMERCIAL

## 2024-12-04 ENCOUNTER — PATIENT MESSAGE (OUTPATIENT)
Dept: ADMINISTRATIVE | Facility: OTHER | Age: 54
End: 2024-12-04
Payer: COMMERCIAL

## 2024-12-04 ENCOUNTER — HOSPITAL ENCOUNTER (OUTPATIENT)
Dept: RADIOLOGY | Facility: HOSPITAL | Age: 54
Discharge: HOME | End: 2024-12-04
Attending: FAMILY MEDICINE
Payer: COMMERCIAL

## 2024-12-04 DIAGNOSIS — Z12.31 ENCOUNTER FOR SCREENING MAMMOGRAM FOR BREAST CANCER: ICD-10-CM

## 2024-12-04 PROCEDURE — 77063 BREAST TOMOSYNTHESIS BI: CPT | Mod: TC,PO

## 2024-12-05 ENCOUNTER — PATIENT MESSAGE (OUTPATIENT)
Dept: ADMINISTRATIVE | Facility: OTHER | Age: 54
End: 2024-12-05
Payer: COMMERCIAL

## 2024-12-10 ENCOUNTER — PATIENT MESSAGE (OUTPATIENT)
Dept: BARIATRICS | Facility: CLINIC | Age: 54
End: 2024-12-10
Payer: COMMERCIAL

## 2024-12-10 ENCOUNTER — HOSPITAL ENCOUNTER (OUTPATIENT)
Facility: OTHER | Age: 54
Discharge: HOME | End: 2024-12-10
Attending: ANESTHESIOLOGY | Admitting: ANESTHESIOLOGY
Payer: COMMERCIAL

## 2024-12-10 ENCOUNTER — PATIENT MESSAGE (OUTPATIENT)
Dept: ADMINISTRATIVE | Facility: OTHER | Age: 54
End: 2024-12-10
Payer: COMMERCIAL

## 2024-12-10 VITALS
HEIGHT: 62 IN | OXYGEN SATURATION: 98 % | TEMPERATURE: 98 F | BODY MASS INDEX: 32.57 KG/M2 | HEART RATE: 64 BPM | SYSTOLIC BLOOD PRESSURE: 129 MMHG | WEIGHT: 177 LBS | DIASTOLIC BLOOD PRESSURE: 81 MMHG | RESPIRATION RATE: 16 BRPM

## 2024-12-10 DIAGNOSIS — M54.16 LUMBAR RADICULOPATHY: Primary | ICD-10-CM

## 2024-12-10 DIAGNOSIS — G89.29 CHRONIC PAIN: ICD-10-CM

## 2024-12-10 LAB — POCT GLUCOSE: 77 MG/DL (ref 70–110)

## 2024-12-10 PROCEDURE — 82962 GLUCOSE BLOOD TEST: CPT | Performed by: ANESTHESIOLOGY

## 2024-12-10 PROCEDURE — 25500020 PHARM REV CODE 255: Performed by: ANESTHESIOLOGY

## 2024-12-10 PROCEDURE — 64483 NJX AA&/STRD TFRM EPI L/S 1: CPT | Mod: RT,,, | Performed by: ANESTHESIOLOGY

## 2024-12-10 PROCEDURE — 64484 NJX AA&/STRD TFRM EPI L/S EA: CPT | Mod: RT,,, | Performed by: ANESTHESIOLOGY

## 2024-12-10 PROCEDURE — 63600175 PHARM REV CODE 636 W HCPCS: Performed by: ANESTHESIOLOGY

## 2024-12-10 PROCEDURE — 64483 NJX AA&/STRD TFRM EPI L/S 1: CPT | Mod: RT | Performed by: ANESTHESIOLOGY

## 2024-12-10 PROCEDURE — 64484 NJX AA&/STRD TFRM EPI L/S EA: CPT | Mod: RT | Performed by: ANESTHESIOLOGY

## 2024-12-10 RX ORDER — LIDOCAINE HYDROCHLORIDE 20 MG/ML
INJECTION, SOLUTION INFILTRATION; PERINEURAL
Status: DISCONTINUED | OUTPATIENT
Start: 2024-12-10 | End: 2024-12-10 | Stop reason: HOSPADM

## 2024-12-10 RX ORDER — SODIUM CHLORIDE 9 MG/ML
500 INJECTION, SOLUTION INTRAVENOUS CONTINUOUS
Status: DISCONTINUED | OUTPATIENT
Start: 2024-12-10 | End: 2024-12-10 | Stop reason: HOSPADM

## 2024-12-10 RX ORDER — LIDOCAINE HYDROCHLORIDE 10 MG/ML
INJECTION, SOLUTION EPIDURAL; INFILTRATION; INTRACAUDAL; PERINEURAL
Status: DISCONTINUED | OUTPATIENT
Start: 2024-12-10 | End: 2024-12-10 | Stop reason: HOSPADM

## 2024-12-10 RX ORDER — DEXAMETHASONE SODIUM PHOSPHATE 10 MG/ML
INJECTION INTRAMUSCULAR; INTRAVENOUS
Status: DISCONTINUED | OUTPATIENT
Start: 2024-12-10 | End: 2024-12-10 | Stop reason: HOSPADM

## 2024-12-10 NOTE — DISCHARGE SUMMARY
Discharge Note  Short Stay      SUMMARY     Admit Date: 12/10/2024    Attending Physician: Britt Calix      Discharge Physician: Britt Calix      Discharge Date: 12/10/2024 8:26 AM    Procedure(s) (LRB):  LUMBAR TRANSFORAMINAL RIGHT L4/5 AND L5/S1 (Right)    Final Diagnosis: Lumbosacral radiculopathy [M54.17]    Disposition: Home or self care    Patient Instructions:   Current Discharge Medication List        CONTINUE these medications which have NOT CHANGED    Details   acetaminophen (TYLENOL) 500 MG tablet Take 2 tablets (1,000 mg total) by mouth every 8 (eight) hours as needed for Pain.  Qty: 30 tablet, Refills: 0      atorvastatin (LIPITOR) 20 MG tablet TAKE 1 TABLET BY MOUTH EVERY DAY  Qty: 90 tablet, Refills: 2    Associated Diagnoses: Hyperlipidemia, unspecified hyperlipidemia type      ergocalciferol (ERGOCALCIFEROL) 50,000 unit Cap Take 1 capsule (50,000 Units total) by mouth every 7 days.  Qty: 12 capsule, Refills: 3    Associated Diagnoses: Vitamin D deficiency      estradioL (ESTRACE) 1 MG tablet Take 1 tablet (1 mg total) by mouth once daily.  Qty: 90 tablet, Refills: 1    Associated Diagnoses: Menopause syndrome; Hot flash, menopausal      !! finasteride (PROSCAR) 5 mg tablet TAKE 1 TABLET BY MOUTH EVERY DAY  Qty: 90 tablet, Refills: 3    Associated Diagnoses: Androgenetic alopecia      !! finasteride (PROSCAR) 5 mg tablet Take 1 tablet by mouth daily  Qty: 90 tablet, Refills: 3    Associated Diagnoses: Androgenetic alopecia      fluocinonide (LIDEX) 0.05 % ointment Apply to affected areas of scalp at bedtime  Qty: 60 g, Refills: 3    Associated Diagnoses: Cicatricial alopecia      ketoconazole (NIZORAL) 2 % shampoo Wash hair with medicated shampoo at least 2x/week - let sit on scalp at least 5 minutes prior to rinsing  Qty: 120 mL, Refills: 5    Associated Diagnoses: Androgenetic alopecia      meclizine (ANTIVERT) 25 mg tablet Take 1 tablet (25 mg total) by mouth 3 (three) times daily as  needed for Dizziness.  Qty: 15 tablet, Refills: 0    Associated Diagnoses: Vertigo      mometasone (ELOCON) 0.1 % solution APPLY TOPICALLY ONCE DAILY. TO THINNING AREA OF SCALP  Qty: 60 mL, Refills: 5    Associated Diagnoses: Traction alopecia      neomycin-polymyxin-dexamethasone (MAXITROL) 3.5mg/mL-10,000 unit/mL-0.1 % DrpS as needed.      nystatin (MYCOSTATIN) ointment Apply topically 2 (two) times daily. USES PRN      omeprazole (PRILOSEC) 40 MG capsule Take 1 capsule (40 mg total) by mouth every morning.  Qty: 90 capsule, Refills: 3    Associated Diagnoses: Gastroesophageal reflux disease without esophagitis      ondansetron (ZOFRAN) 4 MG tablet TAKE 1 TABLET BY MOUTH EVERY 8 HOURS AS NEEDED  Qty: 90 tablet, Refills: 1    Associated Diagnoses: Gastroesophageal reflux disease without esophagitis      ondansetron (ZOFRAN-ODT) 8 MG TbDL Take 1 tablet (8 mg total) by mouth every 6 (six) hours as needed.  Qty: 30 tablet, Refills: 0      prednisoLONE acetate (PRED FORTE) 1 % DrpS USES PRN      pregabalin (LYRICA) 100 MG capsule Take 1 capsule (100 mg total) by mouth 2 (two) times daily.  Qty: 60 capsule, Refills: 5      semaglutide (OZEMPIC) 1 mg/dose (4 mg/3 mL) Inject 1 mg into the skin every 7 days.  Qty: 9 each, Refills: 3    Associated Diagnoses: Type 2 diabetes mellitus without complication, without long-term current use of insulin      spironolactone (ALDACTONE) 50 MG tablet Take 1 tablet (50 mg total) by mouth once daily.  Qty: 90 tablet, Refills: 3    Comments: .  Associated Diagnoses: Androgenetic alopecia      tiZANidine (ZANAFLEX) 4 MG tablet Take 1 tablet (4 mg total) by mouth every 8 (eight) hours.  Qty: 90 tablet, Refills: 2    Associated Diagnoses: Muscle spasm      triamcinolone acetonide 0.1% (KENALOG) 0.1 % cream Apply topically 2 (two) times daily. Apply topically 2 (two) times daily.  Qty: 135 g, Refills: 4    Associated Diagnoses: Eczema, unspecified type       !! - Potential duplicate  medications found. Please discuss with provider.              Discharge Diagnosis: Lumbosacral radiculopathy [M54.17]  Condition on Discharge: Stable with no complications to procedure   Diet on Discharge: Same as before.  Activity: as per instruction sheet.  Discharge to: Home with a responsible adult.  Follow up: 2-4 weeks       Please call my office or pager at [phone] if experienced any weakness or loss of sensation, fever > 101.5, pain uncontrolled with oral medications, persistent nausea/vomiting/or diarrhea, redness or drainage from the incisions, or any other worrisome concerns. If physician on call was not reached or could not communicate with our office for any reason please go to the nearest emergency department      Britt Calix  12/10/2024

## 2024-12-10 NOTE — OP NOTE
Lumbar Transforaminal Epidural Steroid Injection under Fluoroscopic Guidance    The procedure, risks, benefits, and options were discussed with the patient. There are no contraindications to the procedure. The patent expressed understanding and agreed to the procedure. Informed written consent was obtained prior to the start of the procedure and can be found in the patient's chart.    PATIENT NAME: Mrs. Africa Jennings   MRN: [id number]     DATE OF PROCEDURE: 12/10/2024    PROCEDURE:  Right  L4/5 and L5/S1 Lumbar Transforaminal Epidural Steroid Injection under Fluoroscopic Guidance    PRE-OP DIAGNOSIS: Lumbosacral radiculopathy [M54.17] Lumbar radiculopathy [M54.16]    POST-OP DIAGNOSIS: Same    PHYSICIAN: Britt Calix MD    ASSISTANTS: Dr. Paul     MEDICATIONS INJECTED: Preservative-free Decadron 10mg with 5cc of Lidocaine 1% MPF     LOCAL ANESTHETIC INJECTED: Xylocaine 2%     SEDATION: None    ESTIMATED BLOOD LOSS: None    COMPLICATIONS: None    TECHNIQUE: Time-out was performed to identify the patient and procedure to be performed. With the patient laying in a prone position, the surgical area was prepped and draped in the usual sterile fashion using ChloraPrep and a fenestrated drape.The levels were determined under fluoroscopy guidance. Skin anesthesia was achieved by injecting Lidocaine 2% over the injection sites. The transforaminal spaces were then approached with a 22 gauge, 5 inch spinal quinke needle that was introduced under fluoroscopic guidance in the AP and Lateral views. Once the needle tip was in the area of the transforaminal space, and there was no blood, CSF or paraesthesias, contrast dye Omnipaque (300mg/mL) was injected to confirm placement and there was no vascular runoff. Fluoroscopic imaging in the AP and lateral views revealed a clear outline of the spinal nerve with proximal spread of agent through the neural foramen into the epidural space. 3 mL of the medication mixture listed  above was injected slowly at each site. Displacement of the radio opaque contrast after injection of the medication confirmed that the medication went into the area of the transforaminal spaces. The needles were removed and bleeding was nil. A sterile dressing was applied. No specimens collected. The patient tolerated the procedure well.       The patient was monitored after the procedure in the recovery area. They were given post-procedure and discharge instructions to follow at home. The patient was discharged in a stable condition.      Britt Calix MD

## 2024-12-10 NOTE — DISCHARGE INSTRUCTIONS

## 2024-12-15 ENCOUNTER — PATIENT MESSAGE (OUTPATIENT)
Dept: ADMINISTRATIVE | Facility: OTHER | Age: 54
End: 2024-12-15
Payer: COMMERCIAL

## 2024-12-24 ENCOUNTER — OFFICE VISIT (OUTPATIENT)
Dept: PAIN MEDICINE | Facility: CLINIC | Age: 54
End: 2024-12-24
Payer: COMMERCIAL

## 2024-12-24 DIAGNOSIS — M54.17 LUMBOSACRAL RADICULOPATHY: Primary | ICD-10-CM

## 2024-12-24 DIAGNOSIS — G89.4 CHRONIC PAIN SYNDROME: ICD-10-CM

## 2024-12-24 DIAGNOSIS — M70.62 TROCHANTERIC BURSITIS OF BOTH HIPS: ICD-10-CM

## 2024-12-24 DIAGNOSIS — M70.60 GREATER TROCHANTERIC BURSITIS, UNSPECIFIED LATERALITY: ICD-10-CM

## 2024-12-24 DIAGNOSIS — M70.61 TROCHANTERIC BURSITIS OF BOTH HIPS: ICD-10-CM

## 2024-12-24 PROCEDURE — 1160F RVW MEDS BY RX/DR IN RCRD: CPT | Mod: CPTII,95,, | Performed by: NURSE PRACTITIONER

## 2024-12-24 PROCEDURE — 1159F MED LIST DOCD IN RCRD: CPT | Mod: CPTII,95,, | Performed by: NURSE PRACTITIONER

## 2024-12-24 PROCEDURE — 99213 OFFICE O/P EST LOW 20 MIN: CPT | Mod: 95,,, | Performed by: NURSE PRACTITIONER

## 2024-12-24 PROCEDURE — 3066F NEPHROPATHY DOC TX: CPT | Mod: CPTII,95,, | Performed by: NURSE PRACTITIONER

## 2024-12-24 PROCEDURE — 3044F HG A1C LEVEL LT 7.0%: CPT | Mod: CPTII,95,, | Performed by: NURSE PRACTITIONER

## 2024-12-24 PROCEDURE — 3061F NEG MICROALBUMINURIA REV: CPT | Mod: CPTII,95,, | Performed by: NURSE PRACTITIONER

## 2024-12-24 NOTE — H&P (VIEW-ONLY)
Chronic Pain-Tele-Medicine-Established Note (Follow up visit)        The patient location is: Home  The chief complaint leading to consultation is: pain  Visit type: Virtual visit with synchronous audio and video  Total time spent with patient: 13 min  Each patient to whom he or she provides medical services by telemedicine is:  (1) informed of the relationship between the physician and patient and the respective role of any other health care provider with respect to management of the patient; and (2) notified that he or she may decline to receive medical services by telemedicine and may withdraw from such care at any time.      SUBJECTIVE:    Interval History 12/24/2024:  The patient presents for follow up after procedure. She is s/p right L4/5 and L5/S1 TF MARILEE on 12/10/24. She is reporting 80% relief of right leg pain. She is now having significant left sided pain. The pain starts across the lower back with radiation down the left leg with numbness. It is causing her significant pain and limitation to her ADLs. She is interested in an MARILEE for this side. She continues with daily PT exercises. Her pain today is 9/10.    Interval History 11/18/2024:  The patient returns for virtual follow up to discuss worsened back and right leg pain. The pain starts across the lower back and radiates down the back and side of the right leg to the foot. There is associated numbness and tingling. The pain worsens with prolonged activity and standing. Leg pain is severe. She previously had right L4/5 and L5/S1 TF MARILEE with 80% relief of right leg pain for over a year. She takes Lyrica with some benefit. Hip pain is tolerable. No additional complaints today. Her pain today is 7/10.    Interval History 10/10/2024:  The patient has a virtual follow up for right hip pain. She reports improvement in pain since previous visit. She says that the increase in Lyrica has been helpful. No side effects reported at this time. She has been  sleeping better as well. No additional complaints today.    Interval History 8/19/2024:  The patient has a virtual visit for 2 month follow up. She reports return of right hip pain. She says that previous GTB injection provided benefit for about 3 weeks. The pain bothers her with more activity. She has been taking Lyrica 75 mg twice daily for some time with mild benefit and no side effects. She is interested in increasing the medication. No additional complaints at this time.     Interval History 6/17/2024:  The patient returns for follow up of right hip pain. She is s/p right GTB injection on 6/4/24 with significant benefit. She still has some pain intermittently but says that it is mild. No other complaints. She continues to take Lyrica. Her pain today is 3/10.    Interval History 10/12/23: Africa Jennings returns for follow up. At our last visit, which was virtual, she was having a lot of radiating pain. Unfortunately, the TFESI we performed was not helpful. She presents today in person and I am able to examine her. Today, on examination, it is clear that this pain is reproduced with palpation of her GTBs. She continues to note 7/10 pain which is worse with laying on her side and improved with movement. It is impacting her work.     Interval History 8/28/2023:  Africa Jennings presents tele-medicine appointment for a follow-up appointment for R sided lower back and focal R L5 radicular pain. She states leg pain > back pain. She describes a sharp, shooting pain in her R buttocks that radiates to her R mid calf posteriorly and is associated with burning, numbness, and tingling. Worse with activity, bending, lifting, night time. She states the R leg is subjectively weaker than the left.. Since the last visit, Africa Jennings states the pain has been worsening. Current pain intensity is 7/10. Current medications include lyrica, zanaflex, tylenol with some relief. She has  completed physical therapy and aqua therapy (March 2023 - April 2023) to help strengthen her back and notes benefit. She continues with Home exercise program and is waiting to hear back from [facility] to schedule her. Patient was supposed to have R L4/5 and L5/S1 TFESI done since last visit, however, there was some issue with scheduling or insurance (the patient is unsure which). She is interested in having the procedure rescheduled to help alleviate her symptoms. Patient denies red flags including weakness, unexpected weight loss/gain, night sweats/fevers, saddle anesthesia, and symptoms of VERA.    Interval History 4/26/2023:  Mrs Jennings presents for follow up. Over interval she has had T9-12 Thoracic decompression by Dr Whitt on 10/28/2022. She is doing well since then but continues to have R sided lower back and focal R L5 radicular pain. She states back pain = leg pain. She states the R leg is subjectively weaker than the left. Denies any s/s concerning for cauda equina. Pain has limited functioning and she has Completed PT/Aquatherapy for >6 weeks.      Interval History 3/16/2022:  Pt presents for follow up of. She has had L4/5 ILESI and left SI injection in past with benefit. She has more vocal pain to right and associated radiculopathy down right leg in L4/5 pattern. She had no recent MRI in past 2 years and known lumbar discogenic issues but this was more focal to left. She has no complaint of loss of b/b or saddle paresthesias.      Interval History  2/22/2022:   Mrs Jennings presents for virtual follow up of lower back and left sided buttock pain. She is now s/p Left SIJ and states 90% improved pain and feels her relief was more significant than MARILEE. She states pain more noticeable to right side now without radicular complaint. There is no focal voicing of loss of b/b or saddle paresthesias.      Interval History 1/25/2021:  Mrs Jennings presents for follow up of lower back pain. She is  s/p L4/5 ILESI with 90% benefit from lower back pain. She has lingering lower buttock/back pain to left side. Increased pain when sitting or stairs. Denies radiculopathy. Denies any loss of b/b or saddle paresthesias.      Interval History 12/23/2021:  Mrs Jennings presents to establish care at [facility]. She states continued lower back pain to left side but leg pain/paresthesias=back pain. She states left lower back pain and radiation in L4/5 pattern but also on right side. She has MRI findings to left L4/5 NF disc protrusion. She has been doing HEP she learned through [facility] for over 6 months straight and continues with no lasting benefit. She is currently taking Mobic 15mg and Neurontin 300mg TID. There is no complaint of loss of b/b or saddle paresthesias.      Interval History (4/13/20):     The patient location is: home  The chief complaint leading to consultation is: follow up  Visit type: Virtual visit with audio.  Patient verbally consented for audio visit.  Total time spent with patient: 15 minutes  Each patient to whom he or she provides medical services by telemedicine is:  (1) informed of the relationship between the physician and patient and the respective role of any other health care provider with respect to management of the patient; and (2) notified that he or she may decline to receive medical services by telemedicine and may withdraw from such care at any time.        She returns today for follow up.  She reports that her right-sided low back and lower extremity pain has returned since last encounter.  She denies any changes in the quality or location of pain.  She continues to report associated numbness.  She denies any associated weakness or bowel bladder dysfunction.  She has attempted gabapentin 600 mg t.i.d..  She states this medication did not provide any relief.  She has also tried goody's powder, Motrin, Tylenol without any relief.          Interval History (2/17/20):  She  returns today for follow up and MRI review.  She reports that her pain is unchanged in quality and location since last encounter.  She does state that the pain has significantly improved and is overall not very bothersome for the time being.          Initial Encounter (2/3/20):  Africa Jennings is a 53 y.o. female who presents today with chronic right-sided low back and lower extremity pain. This pain has been present for 6-7 months.  No specific inciting event or injury noted.  Patient localizes the pain to the right lumbar region.  Pain radiates to the right posterior and anterior thigh.  She reports associated numbness in this area as well. She also reports weakness of the right lower extremity when ambulating.  She denies any associated bowel or bladder dysfunction.  The pain is constant and worsened with activity.  She states that the pain interrupt her sleep.  Patient does have a history of a similar problem roughly 8 years ago.  She underwent epidural steroid injections with good relief.  This pain is described in detail below.    Pain Scores:     Best:       6/10  Worst:     10/10  Usually:   8/10  Today:    0/10     Physical Therapy:  Feb-May, 2023  HEP: Continues HEP daily as prescribed at PT above     Non-pharmacologic Treatment:  Rest helps         TENS?  No     Pain Medications:         Currently taking:  Lyrica, Zanaflex     Has tried in the past:  Tylenol, Motrin     Has not tried:  Opioids, Tylenol, Muscle relaxants, TCAs, SNRIs, anticonvulsants, topical creams     report:  Reviewed and consistent with medication use as prescribed.     Blood thinners:  None     Relevant Surgeries:  None     Affecting sleep?  Yes     Affecting daily activities? yes     Depressive symptoms? no          SI/HI? No     Work status: Employed     Pain Procedures:   - L4-5 interlaminar epidural steroid injection x2  - 1/11/2022 L4/5 ILESI 90% benefit    - 2/8/2022 Left SIJ injection  9/26/23: Right L4/5,  L5/S1 TFESI - 80% relief for over a year  10/21/23 B/L GTB injection  6/4/24 Right GTB injection- 90% relief for 3 weeks  12/10/24 Right L4/5 and L5/S1 TF MARILEE    Imaging:   CT Thoracic Spine Without Contrast  Order: [id number]  Status: Final result     Visible to patient: Yes (seen)     Next appt: 09/18/2023 at 08:45 AM in Dermatology (Basilia Valdez MD)     Dx: S/P fusion of thoracic spine     0 Result Notes  Details    Reading Physician Reading Date Result Priority   Tony Powell MD  [phone] 1/30/2023 Routine   Dex Bojorquez MD  [phone] 1/30/2023      Narrative & Impression  EXAMINATION:  CT THORACIC SPINE WITHOUT CONTRAST     CLINICAL HISTORY:  s/p T9-12 fusion;  Arthrodesis status     TECHNIQUE:  CT thoracic spine performed without contrast.  Coronal and sagittal reformats provided.     COMPARISON:  X-ray thoracic spine 12/12/2022, x-ray thoracolumbar spine 10/29/2022, MRI thoracic spine 06/03/2022, CT thoracic spine 06/03/2022.     FINDINGS:  Postsurgical change of posterior spinal instrumented fusion T9-T12, T9-12 laminectomies and medial facetectomies and decortication of the T9-12 posterior elements and placement of a mixture of autologous and synthetic bone into the bilateral gutters for arthrodesis.  No hardware fracture or periprosthetic lucency to suggest hardware loosening.     Alignment: Stable, slightly pronounced kyphotic curvature.  Grade 1 retrolisthesis T11-12.     Vertebrae: Vertebral body heights are well maintained.  No fracture.  Stable sclerotic focus in the T5 vertebral body likely representing a bone island.  No lytic or blastic lesion.  Morselized synthetic and autologous bone in the bilateral T9-T12 gutters with noted abutment at the T12 posterior elements likely representing fusion.     Discs: Normal height.  Vacuum disc phenomenon at T8-9 and T11-12.     Degenerative findings:     C7-L1: No spinal canal stenosis or neural foraminal narrowing.      Paraspinal muscles & soft tissues: Cholecystectomy.  High attenuation focus in the posterior right upper quadrant possibly represent calcification or surgical clip.  Otherwise unremarkable.     Impression:     1. Postsurgical changes at T9-T12, as above, without evidence of hardware loosening or fracture.  2. Additional findings as above.     Electronically signed by resident: Dex Bojorquez  Date:                                            01/30/2023  Time:                                           13:59     Electronically signed by: Tony Powell MD  Date:                                            01/30/2023  Time:                                           15:43       MRI Thoracic Spine Without Contrast  Order: [id number]  Status: Final result     Visible to patient: Yes (seen)     Next appt: 09/18/2023 at 08:45 AM in Dermatology (Basilia Valdez MD)     Dx: Thoracic spinal stenosis     0 Result Notes  Details    Reading Physician Reading Date Result Priority   Tony Powell MD  [phone] 6/4/2022 Routine     Narrative & Impression  EXAMINATION:  MRI THORACIC SPINE WITHOUT CONTRAST     CLINICAL HISTORY:  T9-11 stenosis, please focus on these levels.;  Spinal stenosis, thoracic region     TECHNIQUE:  Multiplanar, multisequence images were performed through the thoracic spine.  Contrast was not administered.     COMPARISON:  MRI 03/31/2022, CT 06/03/2022.     FINDINGS:  Thoracic spine alignment demonstrates a slightly pronounced kyphotic curvature.  No spondylolisthesis.  Vertebral body heights are well maintained without evidence fracture.  There is slight heterogeneous marrow signal intensity, similar when compared to the previous exam and favored to relate to benign reconversion.  No marrow signal abnormality to suggest an infiltrative process.     Persistent focal area of increased T2/STIR cord signal at the T10-T11 level, similar when compared to the previous exam concerning for myelomalacia.   Remaining visualized spinal cord demonstrates normal contour and signal intensity.     There is a 1.1 cm T2 hypointense right thyroid nodule.  There are multiple colonic diverticuli.  Remaining visualized thoracic and upper abdominal organs demonstrate no significant abnormalities.  Paraspinal musculature demonstrates normal bulk and signal intensity.     T1-T2: Bilateral facet arthropathy contributes to moderate left neural foraminal narrowing.  No spinal canal stenosis.     T2-T3: Broad-based disc osteophyte complex causes mild mass effect on the ventral thecal sac.  Left facet arthropathy.  Findings contribute to mild-to-moderate left and mild right neural foraminal narrowing.  No spinal canal stenosis.     T7-T8: Circumferential disc osteophyte complex, bilateral facet arthropathy and right ligamentum flavum buckling.  Findings contribute to moderate right neural foraminal narrowing.  No spinal canal stenosis.     T8-T9: Circumferential disc osteophyte complex and mild bilateral ligamentum flavum buckling.  Findings contribute to mild right neural foraminal narrowing.  No spinal canal stenosis.     T9-T10: Circumferential disc osteophyte complex and bilateral ligamentum flavum buckling.  Findings contribute to mild spinal canal stenosis and moderate right neural foraminal narrowing.     T10-T11: Circumferential disc osteophyte complex, bilateral facet arthropathy and bilateral ligamentum flavum buckling.  Findings contribute to severe spinal canal stenosis and moderate right and mild left neural foraminal narrowing.     Impression:     1. Multilevel degenerative changes of the thoracic spine most pronounced at T10-T11 noting severe spinal canal stenosis and mild-to-moderate neural foraminal narrowing.  2. Persistent focal area of cord signal abnormality at T10-T11, similar when compared to MRI most suggestive of myelomalacia.        Electronically signed by: Tony Powell MD  Date:                                             06/04/2022  Time:                                           09:05       MRI Lumbar Spine Without Contrast  Order: [id number]  Status: Final result     Visible to patient: Yes (seen)     Next appt: 09/18/2023 at 08:45 AM in Dermatology (Basilia Valdez MD)     Dx: Dorsalgia, unspecified     1 Result Note  Details    Reading Physician Reading Date Result Priority   [facility] Maximo PETER Jr., MD  [phone] 3/25/2022 Routine     Narrative & Impression  EXAMINATION:  MRI LUMBAR SPINE WITHOUT CONTRAST     CLINICAL HISTORY:  Dorsalgia, unspecifiedLow back pain, symptoms persist with > 6wks conservative treatment;     TECHNIQUE:  Sagittal T1, sagittal T2, sagittal STIR, axial T1 and axial T2 weighted images of the lumbar spine obtained without contrast.     COMPARISON:  Similar study 02/10/2020     FINDINGS:  Lumbar spine alignment is within normal limits. The vertebral body heights are well maintained, with no fracture.  Degenerative sclerotic marrow endplate change at S1 and fatty metaplasia degenerative endplate change at T12.  Otherwise marrow signal normal.     The conus is normal in appearance.  The adjacent soft tissue structures show no significant abnormalities.     Spinal canal is congenitally narrow on the basis of short pedicles.  At the T10-11 level there is extradural indentation of the thecal sac from posteriorly and there is some broad-based disc bulging.  Transverse images do not cover this level.  There is at least moderate spinal stenosis at this level.     L1-L2: No focal disc herniation.No significant central canal or neural foraminal narrowing.     L2-L3: No focal disc herniation.  Mild facet arthrosis.  No significant central canal or neural foraminal narrowing.     L3-L4: No evidence of a disc herniation.  No significant central canal or neural foraminal narrowing.     L4-L5: Broad-based disc bulging with left-sided foraminal and far lateral annular fissure and superimposed mild  spondylitic spurring. Facet arthrosis contributes to severe left-sided neural foraminal narrowing.  Moderate central spinal stenosis.     L5-S1: Broad-based disc bulging, facet arthrosis and ligamentum flavum hypertrophy result in severe bilateral neural foraminal stenosis.  Mild central spinal stenosis.     Impression:     Extradural degenerative changes at T10-11, L4-5, and L5-S1 resulting in central canal and foraminal stenosis as detailed above.  Dedicated imaging of the thoracic spine could lend additional information regarding findings at T10-11     This report was flagged in Epic as abnormal.        Electronically signed by: Maximo Aguirre Jr  Date:                                            03/25/2022  Time:                                           09:06       Past Medical History:   Diagnosis Date    Diabetes mellitus     Diabetes mellitus, type 2     Eczema     GERD (gastroesophageal reflux disease)     Hypertension     Impaired fasting blood sugar     YSABEL on CPAP      Past Surgical History:   Procedure Laterality Date    breast reduction      CHOLECYSTECTOMY      laprascopic    COLONOSCOPY N/A 3/26/2024    Procedure: COLONOSCOPY;  Surgeon: Sangeetha Taylor MD;  Location: [facility];  Service: Endoscopy;  Laterality: N/A;  referral [facility]. Suprep Instr. to portal.EC Not taking Ozempic any longer.EC  3/12/24-LVM regarding last dose Ozempic on or before 3/18/24 if restarted, instr portal-DS  3/19- lvm and portal msg for pc- pt returned call, pc complete. prep reordered. states last dose of ozempic was 3/16    COLONOSCOPY N/A 8/27/2024    Procedure: COLONOSCOPY;  Surgeon: Manny Cronin MD;  Location: [facility] ([address] [place] [address]);  Service: Endoscopy;  Laterality: N/A;  Per Dr. Cronin- 60-min general GI slot, starting with the slim pediatric colonoscope.  We can have the single-balloon scope available in case needed.  Ozempic / Suprep / instr to portal. DBM  8/20-called pt for pre call-unable to lvm-portal message  sent-tb  8/26-last dose of    DECOMPRESSION OF THORACIC OUTLET N/A 10/28/2022    Procedure: T9-12 ROBOTIC DECOMPRESSION, THORACIC FUSION;  Surgeon: Edouard Whitt DO;  Location: [facility] OR 2ND FLR;  Service: Neurosurgery;  Laterality: N/A;  ANESTHESIA GENERAL [place] I BLOOD TYPE & SCREEN NERVE MONITORING EMG SEP MEP POSTION PRONE BED CARA 4 POST HEAD REST [facility] RADIOLOGY C-ARM GLOBUS PROTOCOL MISCELLANEOUS ALANIZ BRACE TLSO    EPIDURAL STEROID INJECTION N/A 1/11/2022    Procedure: INJECTION, STEROID, EPIDURAL IL L4/5 NEEDS CONSENT;  Surgeon: Britt Calix MD;  Location: [facility] PAIN MGT;  Service: Pain Management;  Laterality: N/A;    ESOPHAGOGASTRODUODENOSCOPY  8/18/14    HYSTERECTOMY  2007    laprascopic, total for fibroids.  Dr Polo    INJECTION OF JOINT Left 2/8/2022    Procedure: INJECTION, JOINT, SI LEFT NEEDS CONSENT;  Surgeon: Britt Calix MD;  Location: [facility] PAIN MGT;  Service: Pain Management;  Laterality: Left;    INJECTION OF JOINT Bilateral 10/31/2023    Procedure: INJECTION, JOINT BILATERAL GTB;  Surgeon: Britt Calix MD;  Location: [facility] PAIN MGT;  Service: Pain Management;  Laterality: Bilateral;    INJECTION OF JOINT Right 6/4/2024    Procedure: INJECTION, JOINT RIGHT GTB;  Surgeon: Britt Calix MD;  Location: [facility] PAIN MGT;  Service: Pain Management;  Laterality: Right;  [phone]  2 WK F/U AKBAR  *3/26 COLONOSCOPY*  *SCHEDULE AFTER 4/9*    OOPHORECTOMY      ROBOT-ASSISTED LAPAROSCOPIC SLEEVE GASTRECTOMY USING DA DEVI XI N/A 7/26/2023    Procedure: XI ROBOTIC SLEEVE GASTRECTOMY with intraop EGD;  Surgeon: Roge Rich MD;  Location: [facility] OR 2ND FLR;  Service: General;  Laterality: N/A;  with possible HHR    TOTAL REDUCTION MAMMOPLASTY      TRANSFORAMINAL EPIDURAL INJECTION OF STEROID Right 9/26/2023    Procedure: INJECTION, STEROID, EPIDURAL, TRANSFORAMINAL APPROACH, RIGHT L4/L5 AND L5/S1 SOONER DATE;  Surgeon: Britt Calix MD;   Location: [facility] PAIN MGT;  Service: Pain Management;  Laterality: Right;    TRANSFORAMINAL EPIDURAL INJECTION OF STEROID Right 12/10/2024    Procedure: LUMBAR TRANSFORAMINAL RIGHT L4/5 AND L5/S1;  Surgeon: Britt Calix MD;  Location: [facility] PAIN MGT;  Service: Pain Management;  Laterality: Right;  2 WK F/U JESUSITA COMBS OK     Social History     Socioeconomic History    Marital status:    Tobacco Use    Smoking status: Never    Smokeless tobacco: Never   Substance and Sexual Activity    Alcohol use: Never    Drug use: No    Sexual activity: Yes     Partners: Male     Birth control/protection: Surgical   Social History Narrative    Was  since 2000.      Has a friend since 2016.    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     Social Drivers of Health     Financial Resource Strain: Medium Risk (1/22/2024)    Overall Financial Resource Strain (CARDIA)     Difficulty of Paying Living Expenses: Somewhat hard   Food Insecurity: No Food Insecurity (1/22/2024)    Hunger Vital Sign     Worried About Running Out of Food in the Last Year: Never true     Ran Out of Food in the Last Year: Never true   Transportation Needs: No Transportation Needs (1/22/2024)    PRAPARE - Transportation     Lack of Transportation (Medical): No     Lack of Transportation (Non-Medical): No   Physical Activity: Insufficiently Active (1/22/2024)    Exercise Vital Sign     Days of Exercise per Week: 1 day     Minutes of Exercise per Session: 30 min   Stress: No Stress Concern Present (1/22/2024)    [place] [employer] - Occupational Stress Questionnaire     Feeling of Stress : Not at all   Housing Stability: Low Risk  (1/22/2024)    Housing Stability Vital Sign     Unable to Pay for Housing in the Last Year: No     Number of Places Lived in the Last Year: 1     Unstable Housing in the Last Year: No     Family History   Problem Relation Name Age of Onset    Diabetes Mother       Hypertension Mother      Heart disease Mother      Cancer Father  58        Prostate    Crohn's disease Sister      Breast cancer Sister      Diabetes Maternal Grandmother      Heart disease Maternal Grandmother      Hypertension Maternal Grandmother      Amblyopia Neg Hx      Blindness Neg Hx      Cataracts Neg Hx      Glaucoma Neg Hx      Macular degeneration Neg Hx      Retinal detachment Neg Hx      Strabismus Neg Hx         Review of patient's allergies indicates:  No Known Allergies    Current Outpatient Medications   Medication Sig    acetaminophen (TYLENOL) 500 MG tablet Take 2 tablets (1,000 mg total) by mouth every 8 (eight) hours as needed for Pain.    atorvastatin (LIPITOR) 20 MG tablet TAKE 1 TABLET BY MOUTH EVERY DAY    ergocalciferol (ERGOCALCIFEROL) 50,000 unit Cap Take 1 capsule (50,000 Units total) by mouth every 7 days.    estradioL (ESTRACE) 1 MG tablet Take 1 tablet (1 mg total) by mouth once daily.    finasteride (PROSCAR) 5 mg tablet TAKE 1 TABLET BY MOUTH EVERY DAY    finasteride (PROSCAR) 5 mg tablet Take 1 tablet by mouth daily    fluocinonide (LIDEX) 0.05 % ointment Apply to affected areas of scalp at bedtime    ketoconazole (NIZORAL) 2 % shampoo Wash hair with medicated shampoo at least 2x/week - let sit on scalp at least 5 minutes prior to rinsing    meclizine (ANTIVERT) 25 mg tablet Take 1 tablet (25 mg total) by mouth 3 (three) times daily as needed for Dizziness.    mometasone (ELOCON) 0.1 % solution APPLY TOPICALLY ONCE DAILY. TO THINNING AREA OF SCALP    neomycin-polymyxin-dexamethasone (MAXITROL) 3.5mg/mL-10,000 unit/mL-0.1 % DrpS as needed.    nystatin (MYCOSTATIN) ointment Apply topically 2 (two) times daily. USES PRN    omeprazole (PRILOSEC) 40 MG capsule Take 1 capsule (40 mg total) by mouth every morning.    ondansetron (ZOFRAN) 4 MG tablet TAKE 1 TABLET BY MOUTH EVERY 8 HOURS AS NEEDED    ondansetron (ZOFRAN-ODT) 8 MG TbDL Take 1 tablet (8 mg total) by mouth every 6 (six)  hours as needed.    prednisoLONE acetate (PRED FORTE) 1 % DrpS USES PRN    pregabalin (LYRICA) 100 MG capsule Take 1 capsule (100 mg total) by mouth 2 (two) times daily.    semaglutide (OZEMPIC) 1 mg/dose (4 mg/3 mL) Inject 1 mg into the skin every 7 days.    spironolactone (ALDACTONE) 50 MG tablet Take 1 tablet (50 mg total) by mouth once daily.    tiZANidine (ZANAFLEX) 4 MG tablet Take 1 tablet (4 mg total) by mouth every 8 (eight) hours.    triamcinolone acetonide 0.1% (KENALOG) 0.1 % cream Apply topically 2 (two) times daily. Apply topically 2 (two) times daily.     Current Facility-Administered Medications   Medication    triamcinolone acetonide injection 10 mg    triamcinolone acetonide injection 10 mg    triamcinolone acetonide injection 5 mg    triamcinolone acetonide injection 5 mg    triamcinolone acetonide injection 5 mg       REVIEW OF SYSTEMS:    GENERAL:  No weight loss, malaise or fevers.  HEENT:   No recent changes in vision or hearing  NECK:  Negative for lumps, no difficulty with swallowing.  RESPIRATORY:  Negative for cough, wheezing or shortness of breath, patient denies any recent URI.  CARDIOVASCULAR:  Negative for chest pain, leg swelling or palpitations.  GI:  Negative for abdominal discomfort, blood in stools or black stools or change in bowel habits.  MUSCULOSKELETAL:  See HPI.  SKIN:  Negative for lesions, rash, and itching.  PSYCH:  No mood disorder or recent psychosocial stressors.  Patients sleep is not disturbed secondary to pain.  HEMATOLOGY/LYMPHOLOGY:  Negative for prolonged bleeding, bruising easily or swollen nodes.  Patient is not currently taking any anti-coagulants  NEURO:   No history of headaches, syncope, paralysis, seizures or tremors.  All other reviewed and negative other than HPI.    OBJECTIVE:    LMP  (LMP Unknown)     PHYSICAL EXAMINATION:    General appearance: Well appearing, in no acute distress, alert and oriented x3.  Psych:  Mood and affect appropriate.  Skin:  Skin color normal, no rashes or lesions, in both upper and lower body.  Extremities: Moves all visualized extremities freely.      PREVIOUS PHYSICAL EXAM:   GENERAL: Well appearing, in no acute distress, alert and oriented x3.  PSYCH:  Mood and affect appropriate.  SKIN: Skin color, texture, turgor normal, no rashes or lesions.  HEENT:  Normocephalic, atraumatic. Cranial nerves grossly intact.  EXTREMITIES: No deformities, edema, or skin discoloration.   MUSCULOSKELETAL: Bilateral lower extremity strength is normal and symmetric. No atrophy is noted. No TTP over bilateral GTB. SLR is negative bilaterally.  NEURO: Sensation is equal and appropriate bilaterally.   GAIT: Normal.      ASSESSMENT: 54 y.o. year old female with pain consistent with     1. Lumbosacral radiculopathy  Procedure Order to Pain Management      2. Chronic pain syndrome        3. Trochanteric bursitis of both hips        4. Greater trochanteric bursitis, unspecified laterality            DISCUSSION: Ms. Jennings is a . She has a history of T9-12 decompression and posterior fusion with Dr. Whitt. She came to us with left low back pain and did well after SIJ. She then presented back with right radicular symptoms. MRI shows short pedicles, moderate canal stenosis at L4/5, and severe foraminal stenosis at L5/S1.     PLAN:   1) The patient will continue a home exercise routine to help with pain and strengthening.    2) Schedule for left L4/5 and L5/S1 TF MARILEE given current symptoms. She continues with PT exercises 3 days per week for over 12 weeks.  3) Continue Lyrica 100 mg BID. Refills on file.  4) Follow up 2 weeks after MARILEE in person. We will further discuss SCS at that time.      The above plan and management options were discussed at length with patient. Patient is in agreement with the above and verbalized understanding.     Munira Strauss  12/24/2024

## 2024-12-24 NOTE — PROGRESS NOTES
Chronic Pain-Tele-Medicine-Established Note (Follow up visit)        The patient location is: Home  The chief complaint leading to consultation is: pain  Visit type: Virtual visit with synchronous audio and video  Total time spent with patient: 13 min  Each patient to whom he or she provides medical services by telemedicine is:  (1) informed of the relationship between the physician and patient and the respective role of any other health care provider with respect to management of the patient; and (2) notified that he or she may decline to receive medical services by telemedicine and may withdraw from such care at any time.      SUBJECTIVE:    Interval History 12/24/2024:  The patient presents for follow up after procedure. She is s/p right L4/5 and L5/S1 TF MARILEE on 12/10/24. She is reporting 80% relief of right leg pain. She is now having significant left sided pain. The pain starts across the lower back with radiation down the left leg with numbness. It is causing her significant pain and limitation to her ADLs. She is interested in an MARILEE for this side. She continues with daily PT exercises. Her pain today is 9/10.    Interval History 11/18/2024:  The patient returns for virtual follow up to discuss worsened back and right leg pain. The pain starts across the lower back and radiates down the back and side of the right leg to the foot. There is associated numbness and tingling. The pain worsens with prolonged activity and standing. Leg pain is severe. She previously had right L4/5 and L5/S1 TF MARILEE with 80% relief of right leg pain for over a year. She takes Lyrica with some benefit. Hip pain is tolerable. No additional complaints today. Her pain today is 7/10.    Interval History 10/10/2024:  The patient has a virtual follow up for right hip pain. She reports improvement in pain since previous visit. She says that the increase in Lyrica has been helpful. No side effects reported at this time. She has been  sleeping better as well. No additional complaints today.    Interval History 8/19/2024:  The patient has a virtual visit for 2 month follow up. She reports return of right hip pain. She says that previous GTB injection provided benefit for about 3 weeks. The pain bothers her with more activity. She has been taking Lyrica 75 mg twice daily for some time with mild benefit and no side effects. She is interested in increasing the medication. No additional complaints at this time.     Interval History 6/17/2024:  The patient returns for follow up of right hip pain. She is s/p right GTB injection on 6/4/24 with significant benefit. She still has some pain intermittently but says that it is mild. No other complaints. She continues to take Lyrica. Her pain today is 3/10.    Interval History 10/12/23: Africa Jennings returns for follow up. At our last visit, which was virtual, she was having a lot of radiating pain. Unfortunately, the TFESI we performed was not helpful. She presents today in person and I am able to examine her. Today, on examination, it is clear that this pain is reproduced with palpation of her GTBs. She continues to note 7/10 pain which is worse with laying on her side and improved with movement. It is impacting her work.     Interval History 8/28/2023:  Africa Jennings presents tele-medicine appointment for a follow-up appointment for R sided lower back and focal R L5 radicular pain. She states leg pain > back pain. She describes a sharp, shooting pain in her R buttocks that radiates to her R mid calf posteriorly and is associated with burning, numbness, and tingling. Worse with activity, bending, lifting, night time. She states the R leg is subjectively weaker than the left.. Since the last visit, Africa Jennings states the pain has been worsening. Current pain intensity is 7/10. Current medications include lyrica, zanaflex, tylenol with some relief. She has  completed physical therapy and aqua therapy (March 2023 - April 2023) to help strengthen her back and notes benefit. She continues with Home exercise program and is waiting to hear back from [facility] to schedule her. Patient was supposed to have R L4/5 and L5/S1 TFESI done since last visit, however, there was some issue with scheduling or insurance (the patient is unsure which). She is interested in having the procedure rescheduled to help alleviate her symptoms. Patient denies red flags including weakness, unexpected weight loss/gain, night sweats/fevers, saddle anesthesia, and symptoms of VERA.    Interval History 4/26/2023:  Mrs Jennings presents for follow up. Over interval she has had T9-12 Thoracic decompression by Dr Whitt on 10/28/2022. She is doing well since then but continues to have R sided lower back and focal R L5 radicular pain. She states back pain = leg pain. She states the R leg is subjectively weaker than the left. Denies any s/s concerning for cauda equina. Pain has limited functioning and she has Completed PT/Aquatherapy for >6 weeks.      Interval History 3/16/2022:  Pt presents for follow up of. She has had L4/5 ILESI and left SI injection in past with benefit. She has more vocal pain to right and associated radiculopathy down right leg in L4/5 pattern. She had no recent MRI in past 2 years and known lumbar discogenic issues but this was more focal to left. She has no complaint of loss of b/b or saddle paresthesias.      Interval History  2/22/2022:   Mrs Jennings presents for virtual follow up of lower back and left sided buttock pain. She is now s/p Left SIJ and states 90% improved pain and feels her relief was more significant than MARILEE. She states pain more noticeable to right side now without radicular complaint. There is no focal voicing of loss of b/b or saddle paresthesias.      Interval History 1/25/2021:  Mrs Jennings presents for follow up of lower back pain. She is  s/p L4/5 ILESI with 90% benefit from lower back pain. She has lingering lower buttock/back pain to left side. Increased pain when sitting or stairs. Denies radiculopathy. Denies any loss of b/b or saddle paresthesias.      Interval History 12/23/2021:  Mrs Jennings presents to establish care at [facility]. She states continued lower back pain to left side but leg pain/paresthesias=back pain. She states left lower back pain and radiation in L4/5 pattern but also on right side. She has MRI findings to left L4/5 NF disc protrusion. She has been doing HEP she learned through [facility] for over 6 months straight and continues with no lasting benefit. She is currently taking Mobic 15mg and Neurontin 300mg TID. There is no complaint of loss of b/b or saddle paresthesias.      Interval History (4/13/20):     The patient location is: home  The chief complaint leading to consultation is: follow up  Visit type: Virtual visit with audio.  Patient verbally consented for audio visit.  Total time spent with patient: 15 minutes  Each patient to whom he or she provides medical services by telemedicine is:  (1) informed of the relationship between the physician and patient and the respective role of any other health care provider with respect to management of the patient; and (2) notified that he or she may decline to receive medical services by telemedicine and may withdraw from such care at any time.        She returns today for follow up.  She reports that her right-sided low back and lower extremity pain has returned since last encounter.  She denies any changes in the quality or location of pain.  She continues to report associated numbness.  She denies any associated weakness or bowel bladder dysfunction.  She has attempted gabapentin 600 mg t.i.d..  She states this medication did not provide any relief.  She has also tried goody's powder, Motrin, Tylenol without any relief.          Interval History (2/17/20):  She  returns today for follow up and MRI review.  She reports that her pain is unchanged in quality and location since last encounter.  She does state that the pain has significantly improved and is overall not very bothersome for the time being.          Initial Encounter (2/3/20):  Africa Jennings is a 53 y.o. female who presents today with chronic right-sided low back and lower extremity pain. This pain has been present for 6-7 months.  No specific inciting event or injury noted.  Patient localizes the pain to the right lumbar region.  Pain radiates to the right posterior and anterior thigh.  She reports associated numbness in this area as well. She also reports weakness of the right lower extremity when ambulating.  She denies any associated bowel or bladder dysfunction.  The pain is constant and worsened with activity.  She states that the pain interrupt her sleep.  Patient does have a history of a similar problem roughly 8 years ago.  She underwent epidural steroid injections with good relief.  This pain is described in detail below.    Pain Scores:     Best:       6/10  Worst:     10/10  Usually:   8/10  Today:    0/10     Physical Therapy:  Feb-May, 2023  HEP: Continues HEP daily as prescribed at PT above     Non-pharmacologic Treatment:  Rest helps         TENS?  No     Pain Medications:         Currently taking:  Lyrica, Zanaflex     Has tried in the past:  Tylenol, Motrin     Has not tried:  Opioids, Tylenol, Muscle relaxants, TCAs, SNRIs, anticonvulsants, topical creams     report:  Reviewed and consistent with medication use as prescribed.     Blood thinners:  None     Relevant Surgeries:  None     Affecting sleep?  Yes     Affecting daily activities? yes     Depressive symptoms? no          SI/HI? No     Work status: Employed     Pain Procedures:   - L4-5 interlaminar epidural steroid injection x2  - 1/11/2022 L4/5 ILESI 90% benefit    - 2/8/2022 Left SIJ injection  9/26/23: Right L4/5,  L5/S1 TFESI - 80% relief for over a year  10/21/23 B/L GTB injection  6/4/24 Right GTB injection- 90% relief for 3 weeks  12/10/24 Right L4/5 and L5/S1 TF MARIELE    Imaging:   CT Thoracic Spine Without Contrast  Order: [id number]  Status: Final result     Visible to patient: Yes (seen)     Next appt: 09/18/2023 at 08:45 AM in Dermatology (Basilia Valdez MD)     Dx: S/P fusion of thoracic spine     0 Result Notes  Details    Reading Physician Reading Date Result Priority   Tony Powell MD  [phone] 1/30/2023 Routine   Dex Bojorquez MD  [phone] 1/30/2023      Narrative & Impression  EXAMINATION:  CT THORACIC SPINE WITHOUT CONTRAST     CLINICAL HISTORY:  s/p T9-12 fusion;  Arthrodesis status     TECHNIQUE:  CT thoracic spine performed without contrast.  Coronal and sagittal reformats provided.     COMPARISON:  X-ray thoracic spine 12/12/2022, x-ray thoracolumbar spine 10/29/2022, MRI thoracic spine 06/03/2022, CT thoracic spine 06/03/2022.     FINDINGS:  Postsurgical change of posterior spinal instrumented fusion T9-T12, T9-12 laminectomies and medial facetectomies and decortication of the T9-12 posterior elements and placement of a mixture of autologous and synthetic bone into the bilateral gutters for arthrodesis.  No hardware fracture or periprosthetic lucency to suggest hardware loosening.     Alignment: Stable, slightly pronounced kyphotic curvature.  Grade 1 retrolisthesis T11-12.     Vertebrae: Vertebral body heights are well maintained.  No fracture.  Stable sclerotic focus in the T5 vertebral body likely representing a bone island.  No lytic or blastic lesion.  Morselized synthetic and autologous bone in the bilateral T9-T12 gutters with noted abutment at the T12 posterior elements likely representing fusion.     Discs: Normal height.  Vacuum disc phenomenon at T8-9 and T11-12.     Degenerative findings:     C7-L1: No spinal canal stenosis or neural foraminal narrowing.      Paraspinal muscles & soft tissues: Cholecystectomy.  High attenuation focus in the posterior right upper quadrant possibly represent calcification or surgical clip.  Otherwise unremarkable.     Impression:     1. Postsurgical changes at T9-T12, as above, without evidence of hardware loosening or fracture.  2. Additional findings as above.     Electronically signed by resident: Dex Bojorquez  Date:                                            01/30/2023  Time:                                           13:59     Electronically signed by: Tony Powell MD  Date:                                            01/30/2023  Time:                                           15:43       MRI Thoracic Spine Without Contrast  Order: [id number]  Status: Final result     Visible to patient: Yes (seen)     Next appt: 09/18/2023 at 08:45 AM in Dermatology (Basilia Valdez MD)     Dx: Thoracic spinal stenosis     0 Result Notes  Details    Reading Physician Reading Date Result Priority   Tony Powell MD  [phone] 6/4/2022 Routine     Narrative & Impression  EXAMINATION:  MRI THORACIC SPINE WITHOUT CONTRAST     CLINICAL HISTORY:  T9-11 stenosis, please focus on these levels.;  Spinal stenosis, thoracic region     TECHNIQUE:  Multiplanar, multisequence images were performed through the thoracic spine.  Contrast was not administered.     COMPARISON:  MRI 03/31/2022, CT 06/03/2022.     FINDINGS:  Thoracic spine alignment demonstrates a slightly pronounced kyphotic curvature.  No spondylolisthesis.  Vertebral body heights are well maintained without evidence fracture.  There is slight heterogeneous marrow signal intensity, similar when compared to the previous exam and favored to relate to benign reconversion.  No marrow signal abnormality to suggest an infiltrative process.     Persistent focal area of increased T2/STIR cord signal at the T10-T11 level, similar when compared to the previous exam concerning for myelomalacia.   Remaining visualized spinal cord demonstrates normal contour and signal intensity.     There is a 1.1 cm T2 hypointense right thyroid nodule.  There are multiple colonic diverticuli.  Remaining visualized thoracic and upper abdominal organs demonstrate no significant abnormalities.  Paraspinal musculature demonstrates normal bulk and signal intensity.     T1-T2: Bilateral facet arthropathy contributes to moderate left neural foraminal narrowing.  No spinal canal stenosis.     T2-T3: Broad-based disc osteophyte complex causes mild mass effect on the ventral thecal sac.  Left facet arthropathy.  Findings contribute to mild-to-moderate left and mild right neural foraminal narrowing.  No spinal canal stenosis.     T7-T8: Circumferential disc osteophyte complex, bilateral facet arthropathy and right ligamentum flavum buckling.  Findings contribute to moderate right neural foraminal narrowing.  No spinal canal stenosis.     T8-T9: Circumferential disc osteophyte complex and mild bilateral ligamentum flavum buckling.  Findings contribute to mild right neural foraminal narrowing.  No spinal canal stenosis.     T9-T10: Circumferential disc osteophyte complex and bilateral ligamentum flavum buckling.  Findings contribute to mild spinal canal stenosis and moderate right neural foraminal narrowing.     T10-T11: Circumferential disc osteophyte complex, bilateral facet arthropathy and bilateral ligamentum flavum buckling.  Findings contribute to severe spinal canal stenosis and moderate right and mild left neural foraminal narrowing.     Impression:     1. Multilevel degenerative changes of the thoracic spine most pronounced at T10-T11 noting severe spinal canal stenosis and mild-to-moderate neural foraminal narrowing.  2. Persistent focal area of cord signal abnormality at T10-T11, similar when compared to MRI most suggestive of myelomalacia.        Electronically signed by: Tony Powell MD  Date:                                             06/04/2022  Time:                                           09:05       MRI Lumbar Spine Without Contrast  Order: [id number]  Status: Final result     Visible to patient: Yes (seen)     Next appt: 09/18/2023 at 08:45 AM in Dermatology (Baislia Valdez MD)     Dx: Dorsalgia, unspecified     1 Result Note  Details    Reading Physician Reading Date Result Priority   [facility] Maximo PETER Jr., MD  [phone] 3/25/2022 Routine     Narrative & Impression  EXAMINATION:  MRI LUMBAR SPINE WITHOUT CONTRAST     CLINICAL HISTORY:  Dorsalgia, unspecifiedLow back pain, symptoms persist with > 6wks conservative treatment;     TECHNIQUE:  Sagittal T1, sagittal T2, sagittal STIR, axial T1 and axial T2 weighted images of the lumbar spine obtained without contrast.     COMPARISON:  Similar study 02/10/2020     FINDINGS:  Lumbar spine alignment is within normal limits. The vertebral body heights are well maintained, with no fracture.  Degenerative sclerotic marrow endplate change at S1 and fatty metaplasia degenerative endplate change at T12.  Otherwise marrow signal normal.     The conus is normal in appearance.  The adjacent soft tissue structures show no significant abnormalities.     Spinal canal is congenitally narrow on the basis of short pedicles.  At the T10-11 level there is extradural indentation of the thecal sac from posteriorly and there is some broad-based disc bulging.  Transverse images do not cover this level.  There is at least moderate spinal stenosis at this level.     L1-L2: No focal disc herniation.No significant central canal or neural foraminal narrowing.     L2-L3: No focal disc herniation.  Mild facet arthrosis.  No significant central canal or neural foraminal narrowing.     L3-L4: No evidence of a disc herniation.  No significant central canal or neural foraminal narrowing.     L4-L5: Broad-based disc bulging with left-sided foraminal and far lateral annular fissure and superimposed mild  spondylitic spurring. Facet arthrosis contributes to severe left-sided neural foraminal narrowing.  Moderate central spinal stenosis.     L5-S1: Broad-based disc bulging, facet arthrosis and ligamentum flavum hypertrophy result in severe bilateral neural foraminal stenosis.  Mild central spinal stenosis.     Impression:     Extradural degenerative changes at T10-11, L4-5, and L5-S1 resulting in central canal and foraminal stenosis as detailed above.  Dedicated imaging of the thoracic spine could lend additional information regarding findings at T10-11     This report was flagged in Epic as abnormal.        Electronically signed by: Maximo Aguirre Jr  Date:                                            03/25/2022  Time:                                           09:06       Past Medical History:   Diagnosis Date    Diabetes mellitus     Diabetes mellitus, type 2     Eczema     GERD (gastroesophageal reflux disease)     Hypertension     Impaired fasting blood sugar     YSABEL on CPAP      Past Surgical History:   Procedure Laterality Date    breast reduction      CHOLECYSTECTOMY      laprascopic    COLONOSCOPY N/A 3/26/2024    Procedure: COLONOSCOPY;  Surgeon: Sangeetha Taylor MD;  Location: [facility];  Service: Endoscopy;  Laterality: N/A;  referral [facility]. Suprep Instr. to portal.EC Not taking Ozempic any longer.EC  3/12/24-LVM regarding last dose Ozempic on or before 3/18/24 if restarted, instr portal-DS  3/19- lvm and portal msg for pc- pt returned call, pc complete. prep reordered. states last dose of ozempic was 3/16    COLONOSCOPY N/A 8/27/2024    Procedure: COLONOSCOPY;  Surgeon: Manny Cronin MD;  Location: [facility] ([address] [place] [address]);  Service: Endoscopy;  Laterality: N/A;  Per Dr. Cronin- 60-min general GI slot, starting with the slim pediatric colonoscope.  We can have the single-balloon scope available in case needed.  Ozempic / Suprep / instr to portal. DBM  8/20-called pt for pre call-unable to lvm-portal message  sent-tb  8/26-last dose of    DECOMPRESSION OF THORACIC OUTLET N/A 10/28/2022    Procedure: T9-12 ROBOTIC DECOMPRESSION, THORACIC FUSION;  Surgeon: Edouard Whitt DO;  Location: [facility] OR 2ND FLR;  Service: Neurosurgery;  Laterality: N/A;  ANESTHESIA GENERAL [place] I BLOOD TYPE & SCREEN NERVE MONITORING EMG SEP MEP POSTION PRONE BED CARA 4 POST HEAD REST [facility] RADIOLOGY C-ARM GLOBUS PROTOCOL MISCELLANEOUS ALANIZ BRACE TLSO    EPIDURAL STEROID INJECTION N/A 1/11/2022    Procedure: INJECTION, STEROID, EPIDURAL IL L4/5 NEEDS CONSENT;  Surgeon: Britt Calix MD;  Location: [facility] PAIN MGT;  Service: Pain Management;  Laterality: N/A;    ESOPHAGOGASTRODUODENOSCOPY  8/18/14    HYSTERECTOMY  2007    laprascopic, total for fibroids.  Dr Polo    INJECTION OF JOINT Left 2/8/2022    Procedure: INJECTION, JOINT, SI LEFT NEEDS CONSENT;  Surgeon: Britt Calix MD;  Location: [facility] PAIN MGT;  Service: Pain Management;  Laterality: Left;    INJECTION OF JOINT Bilateral 10/31/2023    Procedure: INJECTION, JOINT BILATERAL GTB;  Surgeon: Britt Calix MD;  Location: [facility] PAIN MGT;  Service: Pain Management;  Laterality: Bilateral;    INJECTION OF JOINT Right 6/4/2024    Procedure: INJECTION, JOINT RIGHT GTB;  Surgeon: Britt Calix MD;  Location: [facility] PAIN MGT;  Service: Pain Management;  Laterality: Right;  [phone]  2 WK F/U AKBAR  *3/26 COLONOSCOPY*  *SCHEDULE AFTER 4/9*    OOPHORECTOMY      ROBOT-ASSISTED LAPAROSCOPIC SLEEVE GASTRECTOMY USING DA DEVI XI N/A 7/26/2023    Procedure: XI ROBOTIC SLEEVE GASTRECTOMY with intraop EGD;  Surgeon: Roge Rich MD;  Location: [facility] OR 2ND FLR;  Service: General;  Laterality: N/A;  with possible HHR    TOTAL REDUCTION MAMMOPLASTY      TRANSFORAMINAL EPIDURAL INJECTION OF STEROID Right 9/26/2023    Procedure: INJECTION, STEROID, EPIDURAL, TRANSFORAMINAL APPROACH, RIGHT L4/L5 AND L5/S1 SOONER DATE;  Surgeon: Britt Calix MD;   Location: [facility] PAIN MGT;  Service: Pain Management;  Laterality: Right;    TRANSFORAMINAL EPIDURAL INJECTION OF STEROID Right 12/10/2024    Procedure: LUMBAR TRANSFORAMINAL RIGHT L4/5 AND L5/S1;  Surgeon: Britt Calix MD;  Location: [facility] PAIN MGT;  Service: Pain Management;  Laterality: Right;  2 WK F/U JESUSITA COMBS OK     Social History     Socioeconomic History    Marital status:    Tobacco Use    Smoking status: Never    Smokeless tobacco: Never   Substance and Sexual Activity    Alcohol use: Never    Drug use: No    Sexual activity: Yes     Partners: Male     Birth control/protection: Surgical   Social History Narrative    Was  since 2000.      Has a friend since 2016.    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     Social Drivers of Health     Financial Resource Strain: Medium Risk (1/22/2024)    Overall Financial Resource Strain (CARDIA)     Difficulty of Paying Living Expenses: Somewhat hard   Food Insecurity: No Food Insecurity (1/22/2024)    Hunger Vital Sign     Worried About Running Out of Food in the Last Year: Never true     Ran Out of Food in the Last Year: Never true   Transportation Needs: No Transportation Needs (1/22/2024)    PRAPARE - Transportation     Lack of Transportation (Medical): No     Lack of Transportation (Non-Medical): No   Physical Activity: Insufficiently Active (1/22/2024)    Exercise Vital Sign     Days of Exercise per Week: 1 day     Minutes of Exercise per Session: 30 min   Stress: No Stress Concern Present (1/22/2024)    [employer] - Occupational Stress Questionnaire     Feeling of Stress : Not at all   Housing Stability: Low Risk  (1/22/2024)    Housing Stability Vital Sign     Unable to Pay for Housing in the Last Year: No     Number of Places Lived in the Last Year: 1     Unstable Housing in the Last Year: No     Family History   Problem Relation Name Age of Onset    Diabetes Mother       Hypertension Mother      Heart disease Mother      Cancer Father  58        Prostate    Crohn's disease Sister      Breast cancer Sister      Diabetes Maternal Grandmother      Heart disease Maternal Grandmother      Hypertension Maternal Grandmother      Amblyopia Neg Hx      Blindness Neg Hx      Cataracts Neg Hx      Glaucoma Neg Hx      Macular degeneration Neg Hx      Retinal detachment Neg Hx      Strabismus Neg Hx         Review of patient's allergies indicates:  No Known Allergies    Current Outpatient Medications   Medication Sig    acetaminophen (TYLENOL) 500 MG tablet Take 2 tablets (1,000 mg total) by mouth every 8 (eight) hours as needed for Pain.    atorvastatin (LIPITOR) 20 MG tablet TAKE 1 TABLET BY MOUTH EVERY DAY    ergocalciferol (ERGOCALCIFEROL) 50,000 unit Cap Take 1 capsule (50,000 Units total) by mouth every 7 days.    estradioL (ESTRACE) 1 MG tablet Take 1 tablet (1 mg total) by mouth once daily.    finasteride (PROSCAR) 5 mg tablet TAKE 1 TABLET BY MOUTH EVERY DAY    finasteride (PROSCAR) 5 mg tablet Take 1 tablet by mouth daily    fluocinonide (LIDEX) 0.05 % ointment Apply to affected areas of scalp at bedtime    ketoconazole (NIZORAL) 2 % shampoo Wash hair with medicated shampoo at least 2x/week - let sit on scalp at least 5 minutes prior to rinsing    meclizine (ANTIVERT) 25 mg tablet Take 1 tablet (25 mg total) by mouth 3 (three) times daily as needed for Dizziness.    mometasone (ELOCON) 0.1 % solution APPLY TOPICALLY ONCE DAILY. TO THINNING AREA OF SCALP    neomycin-polymyxin-dexamethasone (MAXITROL) 3.5mg/mL-10,000 unit/mL-0.1 % DrpS as needed.    nystatin (MYCOSTATIN) ointment Apply topically 2 (two) times daily. USES PRN    omeprazole (PRILOSEC) 40 MG capsule Take 1 capsule (40 mg total) by mouth every morning.    ondansetron (ZOFRAN) 4 MG tablet TAKE 1 TABLET BY MOUTH EVERY 8 HOURS AS NEEDED    ondansetron (ZOFRAN-ODT) 8 MG TbDL Take 1 tablet (8 mg total) by mouth every 6 (six)  hours as needed.    prednisoLONE acetate (PRED FORTE) 1 % DrpS USES PRN    pregabalin (LYRICA) 100 MG capsule Take 1 capsule (100 mg total) by mouth 2 (two) times daily.    semaglutide (OZEMPIC) 1 mg/dose (4 mg/3 mL) Inject 1 mg into the skin every 7 days.    spironolactone (ALDACTONE) 50 MG tablet Take 1 tablet (50 mg total) by mouth once daily.    tiZANidine (ZANAFLEX) 4 MG tablet Take 1 tablet (4 mg total) by mouth every 8 (eight) hours.    triamcinolone acetonide 0.1% (KENALOG) 0.1 % cream Apply topically 2 (two) times daily. Apply topically 2 (two) times daily.     Current Facility-Administered Medications   Medication    triamcinolone acetonide injection 10 mg    triamcinolone acetonide injection 10 mg    triamcinolone acetonide injection 5 mg    triamcinolone acetonide injection 5 mg    triamcinolone acetonide injection 5 mg       REVIEW OF SYSTEMS:    GENERAL:  No weight loss, malaise or fevers.  HEENT:   No recent changes in vision or hearing  NECK:  Negative for lumps, no difficulty with swallowing.  RESPIRATORY:  Negative for cough, wheezing or shortness of breath, patient denies any recent URI.  CARDIOVASCULAR:  Negative for chest pain, leg swelling or palpitations.  GI:  Negative for abdominal discomfort, blood in stools or black stools or change in bowel habits.  MUSCULOSKELETAL:  See HPI.  SKIN:  Negative for lesions, rash, and itching.  PSYCH:  No mood disorder or recent psychosocial stressors.  Patients sleep is not disturbed secondary to pain.  HEMATOLOGY/LYMPHOLOGY:  Negative for prolonged bleeding, bruising easily or swollen nodes.  Patient is not currently taking any anti-coagulants  NEURO:   No history of headaches, syncope, paralysis, seizures or tremors.  All other reviewed and negative other than HPI.    OBJECTIVE:    LMP  (LMP Unknown)     PHYSICAL EXAMINATION:    General appearance: Well appearing, in no acute distress, alert and oriented x3.  Psych:  Mood and affect appropriate.  Skin:  Skin color normal, no rashes or lesions, in both upper and lower body.  Extremities: Moves all visualized extremities freely.      PREVIOUS PHYSICAL EXAM:   GENERAL: Well appearing, in no acute distress, alert and oriented x3.  PSYCH:  Mood and affect appropriate.  SKIN: Skin color, texture, turgor normal, no rashes or lesions.  HEENT:  Normocephalic, atraumatic. Cranial nerves grossly intact.  EXTREMITIES: No deformities, edema, or skin discoloration.   MUSCULOSKELETAL: Bilateral lower extremity strength is normal and symmetric. No atrophy is noted. No TTP over bilateral GTB. SLR is negative bilaterally.  NEURO: Sensation is equal and appropriate bilaterally.   GAIT: Normal.      ASSESSMENT: 54 y.o. year old female with pain consistent with     1. Lumbosacral radiculopathy  Procedure Order to Pain Management      2. Chronic pain syndrome        3. Trochanteric bursitis of both hips        4. Greater trochanteric bursitis, unspecified laterality            DISCUSSION: Ms. Jennings is a . She has a history of T9-12 decompression and posterior fusion with Dr. Whitt. She came to us with left low back pain and did well after SIJ. She then presented back with right radicular symptoms. MRI shows short pedicles, moderate canal stenosis at L4/5, and severe foraminal stenosis at L5/S1.     PLAN:   1) The patient will continue a home exercise routine to help with pain and strengthening.    2) Schedule for left L4/5 and L5/S1 TF MARILEE given current symptoms. She continues with PT exercises 3 days per week for over 12 weeks.  3) Continue Lyrica 100 mg BID. Refills on file.  4) Follow up 2 weeks after MARILEE in person. We will further discuss SCS at that time.      The above plan and management options were discussed at length with patient. Patient is in agreement with the above and verbalized understanding.     Munira Strauss  12/24/2024

## 2025-01-02 ENCOUNTER — PATIENT MESSAGE (OUTPATIENT)
Dept: PAIN MEDICINE | Facility: OTHER | Age: 55
End: 2025-01-02
Payer: COMMERCIAL

## 2025-01-02 DIAGNOSIS — M54.17 LUMBOSACRAL RADICULOPATHY: Primary | ICD-10-CM

## 2025-01-06 ENCOUNTER — PATIENT MESSAGE (OUTPATIENT)
Dept: BARIATRICS | Facility: CLINIC | Age: 55
End: 2025-01-06
Payer: COMMERCIAL

## 2025-01-06 ENCOUNTER — PATIENT OUTREACH (OUTPATIENT)
Dept: ADMINISTRATIVE | Facility: HOSPITAL | Age: 55
End: 2025-01-06
Payer: COMMERCIAL

## 2025-01-06 NOTE — LETTER
AUTHORIZATION FOR RELEASE OF   CONFIDENTIAL INFORMATION    Dear [facility],     We are seeing Africa Jennings, date of birth 1970, in the clinic at [facility] FAMILY MED/ INTERNAL MED/ PEDS. Brandi Thompson MD is the patient's PCP. Africa Jennings has an outstanding lab/procedure at the time we reviewed her chart. In order to help keep her health information updated, she has authorized us to request the following medical record(s):                                                (X)  DM EYE EXAM                   Please fax records to [facility], [phone] or to [email or web address].          Patient Name: Africa Jennings  : 1970  Patient Phone #: [phone]

## 2025-01-09 ENCOUNTER — PATIENT MESSAGE (OUTPATIENT)
Dept: ADMINISTRATIVE | Facility: OTHER | Age: 55
End: 2025-01-09
Payer: COMMERCIAL

## 2025-01-13 ENCOUNTER — LAB VISIT (OUTPATIENT)
Dept: LAB | Facility: HOSPITAL | Age: 55
End: 2025-01-13
Attending: FAMILY MEDICINE
Payer: COMMERCIAL

## 2025-01-13 DIAGNOSIS — E11.9 TYPE 2 DIABETES MELLITUS WITHOUT COMPLICATION, WITHOUT LONG-TERM CURRENT USE OF INSULIN: ICD-10-CM

## 2025-01-13 LAB
ALBUMIN SERPL BCP-MCNC: 4.1 G/DL (ref 3.5–5.2)
ALP SERPL-CCNC: 90 U/L (ref 40–150)
ALT SERPL W/O P-5'-P-CCNC: 23 U/L (ref 10–44)
ANION GAP SERPL CALC-SCNC: 8 MMOL/L (ref 8–16)
AST SERPL-CCNC: 16 U/L (ref 10–40)
BILIRUB SERPL-MCNC: 0.5 MG/DL (ref 0.1–1)
BUN SERPL-MCNC: 17 MG/DL (ref 6–20)
CALCIUM SERPL-MCNC: 10.2 MG/DL (ref 8.7–10.5)
CHLORIDE SERPL-SCNC: 104 MMOL/L (ref 95–110)
CO2 SERPL-SCNC: 26 MMOL/L (ref 23–29)
CREAT SERPL-MCNC: 0.9 MG/DL (ref 0.5–1.4)
EST. GFR  (NO RACE VARIABLE): >60 ML/MIN/1.73 M^2
ESTIMATED AVG GLUCOSE: 117 MG/DL (ref 68–131)
GLUCOSE SERPL-MCNC: 76 MG/DL (ref 70–110)
HBA1C MFR BLD: 5.7 % (ref 4–5.6)
POTASSIUM SERPL-SCNC: 5.3 MMOL/L (ref 3.5–5.1)
PROT SERPL-MCNC: 8.5 G/DL (ref 6–8.4)
SODIUM SERPL-SCNC: 138 MMOL/L (ref 136–145)

## 2025-01-13 PROCEDURE — 83036 HEMOGLOBIN GLYCOSYLATED A1C: CPT | Performed by: FAMILY MEDICINE

## 2025-01-13 PROCEDURE — 80053 COMPREHEN METABOLIC PANEL: CPT | Performed by: FAMILY MEDICINE

## 2025-01-13 PROCEDURE — 36415 COLL VENOUS BLD VENIPUNCTURE: CPT | Mod: PO | Performed by: FAMILY MEDICINE

## 2025-01-14 ENCOUNTER — HOSPITAL ENCOUNTER (OUTPATIENT)
Facility: OTHER | Age: 55
Discharge: HOME | End: 2025-01-14
Attending: ANESTHESIOLOGY | Admitting: ANESTHESIOLOGY
Payer: COMMERCIAL

## 2025-01-14 ENCOUNTER — PATIENT MESSAGE (OUTPATIENT)
Dept: BARIATRICS | Facility: CLINIC | Age: 55
End: 2025-01-14
Payer: COMMERCIAL

## 2025-01-14 VITALS
HEIGHT: 62 IN | DIASTOLIC BLOOD PRESSURE: 65 MMHG | WEIGHT: 177 LBS | HEART RATE: 65 BPM | BODY MASS INDEX: 32.57 KG/M2 | OXYGEN SATURATION: 98 % | RESPIRATION RATE: 18 BRPM | TEMPERATURE: 98 F | SYSTOLIC BLOOD PRESSURE: 125 MMHG

## 2025-01-14 DIAGNOSIS — G89.29 CHRONIC PAIN: ICD-10-CM

## 2025-01-14 LAB
POCT GLUCOSE: 47 MG/DL (ref 70–110)
POCT GLUCOSE: 51 MG/DL (ref 70–110)

## 2025-01-14 PROCEDURE — 64484 NJX AA&/STRD TFRM EPI L/S EA: CPT | Mod: LT,,, | Performed by: ANESTHESIOLOGY

## 2025-01-14 PROCEDURE — 64483 NJX AA&/STRD TFRM EPI L/S 1: CPT | Mod: LT,,, | Performed by: ANESTHESIOLOGY

## 2025-01-14 PROCEDURE — 64484 NJX AA&/STRD TFRM EPI L/S EA: CPT | Mod: LT | Performed by: ANESTHESIOLOGY

## 2025-01-14 PROCEDURE — 25500020 PHARM REV CODE 255: Performed by: ANESTHESIOLOGY

## 2025-01-14 PROCEDURE — 63600175 PHARM REV CODE 636 W HCPCS: Performed by: ANESTHESIOLOGY

## 2025-01-14 PROCEDURE — 64483 NJX AA&/STRD TFRM EPI L/S 1: CPT | Mod: LT | Performed by: ANESTHESIOLOGY

## 2025-01-14 RX ORDER — DEXAMETHASONE SODIUM PHOSPHATE 10 MG/ML
INJECTION INTRAMUSCULAR; INTRAVENOUS
Status: DISCONTINUED | OUTPATIENT
Start: 2025-01-14 | End: 2025-01-14 | Stop reason: HOSPADM

## 2025-01-14 RX ORDER — LIDOCAINE HYDROCHLORIDE 10 MG/ML
INJECTION, SOLUTION EPIDURAL; INFILTRATION; INTRACAUDAL; PERINEURAL
Status: DISCONTINUED | OUTPATIENT
Start: 2025-01-14 | End: 2025-01-14 | Stop reason: HOSPADM

## 2025-01-14 RX ORDER — LIDOCAINE HYDROCHLORIDE 20 MG/ML
INJECTION, SOLUTION INFILTRATION; PERINEURAL
Status: DISCONTINUED | OUTPATIENT
Start: 2025-01-14 | End: 2025-01-14 | Stop reason: HOSPADM

## 2025-01-14 RX ORDER — SODIUM CHLORIDE 9 MG/ML
INJECTION, SOLUTION INTRAVENOUS CONTINUOUS
Status: DISCONTINUED | OUTPATIENT
Start: 2025-01-14 | End: 2025-01-14 | Stop reason: HOSPADM

## 2025-01-14 NOTE — DISCHARGE SUMMARY
Discharge Note  Short Stay      SUMMARY     Admit Date: 1/14/2025    Attending Physician: Britt Calix MD    Discharge Physician: Britt Calix MD      Discharge Date: 1/14/2025 10:30 AM    Procedure(s) (LRB):  LUMBAR TRANSFORAMINAL LEFT L4/5 AND L5/S1 (Left)    Final Diagnosis: Lumbosacral radiculopathy [M54.17]    Disposition: Home or self care    Patient Instructions:   Current Discharge Medication List        CONTINUE these medications which have NOT CHANGED    Details   acetaminophen (TYLENOL) 500 MG tablet Take 2 tablets (1,000 mg total) by mouth every 8 (eight) hours as needed for Pain.  Qty: 30 tablet, Refills: 0      atorvastatin (LIPITOR) 20 MG tablet TAKE 1 TABLET BY MOUTH EVERY DAY  Qty: 90 tablet, Refills: 2    Associated Diagnoses: Hyperlipidemia, unspecified hyperlipidemia type      ergocalciferol (ERGOCALCIFEROL) 50,000 unit Cap Take 1 capsule (50,000 Units total) by mouth every 7 days.  Qty: 12 capsule, Refills: 3    Associated Diagnoses: Vitamin D deficiency      estradioL (ESTRACE) 1 MG tablet Take 1 tablet (1 mg total) by mouth once daily.  Qty: 90 tablet, Refills: 1    Associated Diagnoses: Menopause syndrome; Hot flash, menopausal      !! finasteride (PROSCAR) 5 mg tablet TAKE 1 TABLET BY MOUTH EVERY DAY  Qty: 90 tablet, Refills: 3    Associated Diagnoses: Androgenetic alopecia      !! finasteride (PROSCAR) 5 mg tablet Take 1 tablet by mouth daily  Qty: 90 tablet, Refills: 3    Associated Diagnoses: Androgenetic alopecia      fluocinonide (LIDEX) 0.05 % ointment Apply to affected areas of scalp at bedtime  Qty: 60 g, Refills: 3    Associated Diagnoses: Cicatricial alopecia      ketoconazole (NIZORAL) 2 % shampoo Wash hair with medicated shampoo at least 2x/week - let sit on scalp at least 5 minutes prior to rinsing  Qty: 120 mL, Refills: 5    Associated Diagnoses: Androgenetic alopecia      meclizine (ANTIVERT) 25 mg tablet Take 1 tablet (25 mg total) by mouth 3 (three) times daily as  needed for Dizziness.  Qty: 15 tablet, Refills: 0    Associated Diagnoses: Vertigo      mometasone (ELOCON) 0.1 % solution APPLY TOPICALLY ONCE DAILY. TO THINNING AREA OF SCALP  Qty: 60 mL, Refills: 5    Associated Diagnoses: Traction alopecia      neomycin-polymyxin-dexamethasone (MAXITROL) 3.5mg/mL-10,000 unit/mL-0.1 % DrpS as needed.      nystatin (MYCOSTATIN) ointment Apply topically 2 (two) times daily. USES PRN      omeprazole (PRILOSEC) 40 MG capsule Take 1 capsule (40 mg total) by mouth every morning.  Qty: 90 capsule, Refills: 3    Associated Diagnoses: Gastroesophageal reflux disease without esophagitis      ondansetron (ZOFRAN) 4 MG tablet TAKE 1 TABLET BY MOUTH EVERY 8 HOURS AS NEEDED  Qty: 90 tablet, Refills: 1    Associated Diagnoses: Gastroesophageal reflux disease without esophagitis      ondansetron (ZOFRAN-ODT) 8 MG TbDL Take 1 tablet (8 mg total) by mouth every 6 (six) hours as needed.  Qty: 30 tablet, Refills: 0      prednisoLONE acetate (PRED FORTE) 1 % DrpS USES PRN      pregabalin (LYRICA) 100 MG capsule Take 1 capsule (100 mg total) by mouth 2 (two) times daily.  Qty: 60 capsule, Refills: 5      semaglutide (OZEMPIC) 1 mg/dose (4 mg/3 mL) Inject 1 mg into the skin every 7 days.  Qty: 9 each, Refills: 3    Associated Diagnoses: Type 2 diabetes mellitus without complication, without long-term current use of insulin      spironolactone (ALDACTONE) 50 MG tablet Take 1 tablet (50 mg total) by mouth once daily.  Qty: 90 tablet, Refills: 3    Comments: .  Associated Diagnoses: Androgenetic alopecia      tiZANidine (ZANAFLEX) 4 MG tablet Take 1 tablet (4 mg total) by mouth every 8 (eight) hours.  Qty: 90 tablet, Refills: 2    Associated Diagnoses: Muscle spasm      triamcinolone acetonide 0.1% (KENALOG) 0.1 % cream Apply topically 2 (two) times daily. Apply topically 2 (two) times daily.  Qty: 135 g, Refills: 4    Associated Diagnoses: Eczema, unspecified type       !! - Potential duplicate  medications found. Please discuss with provider.              Discharge Diagnosis: Lumbosacral radiculopathy [M54.17]  Condition on Discharge: Stable with no complications to procedure   Diet on Discharge: Same as before.  Activity: as per instruction sheet.  Discharge to: Home with a responsible adult.  Follow up: 2-4 weeks       Please call my office or pager at [phone] if experienced any weakness or loss of sensation, fever > 101.5, pain uncontrolled with oral medications, persistent nausea/vomiting/or diarrhea, redness or drainage from the incisions, or any other worrisome concerns. If physician on call was not reached or could not communicate with our office for any reason please go to the nearest emergency department     Santana Hsu DO

## 2025-01-14 NOTE — OP NOTE
Lumbar Transforaminal Epidural Steroid Injection under Fluoroscopic Guidance    The procedure, risks, benefits, and options were discussed with the patient. There are no contraindications to the procedure. The patent expressed understanding and agreed to the procedure. Informed written consent was obtained prior to the start of the procedure and can be found in the patient's chart.    PATIENT NAME: Mrs. Africa Jennings   MRN: [id number]     DATE OF PROCEDURE: 01/14/2025    PROCEDURE:  Left  L4/5 and L5/S1 Lumbar Transforaminal Epidural Steroid Injection under Fluoroscopic Guidance    PRE-OP DIAGNOSIS: Lumbosacral radiculopathy [M54.17] Lumbar radiculopathy [M54.16]    POST-OP DIAGNOSIS: Same    PHYSICIAN: Britt Calix MD    ASSISTANTS: none     MEDICATIONS INJECTED: Preservative-free Decadron 10mg with 5cc of Lidocaine 1% MPF     LOCAL ANESTHETIC INJECTED: Xylocaine 2%     SEDATION: None    ESTIMATED BLOOD LOSS: None    COMPLICATIONS: None    TECHNIQUE: Time-out was performed to identify the patient and procedure to be performed. With the patient laying in a prone position, the surgical area was prepped and draped in the usual sterile fashion using ChloraPrep and a fenestrated drape.The levels were determined under fluoroscopy guidance. Skin anesthesia was achieved by injecting Lidocaine 2% over the injection sites. The transforaminal spaces were then approached with a 25 gauge, 3.5 inch spinal quinke needle that was introduced under fluoroscopic guidance in the AP and Lateral views. Once the needle tip was in the area of the transforaminal space, and there was no blood, CSF or paraesthesias, contrast dye Omnipaque (300mg/mL) was injected to confirm placement and there was no vascular runoff. Fluoroscopic imaging in the AP and lateral views revealed a clear outline of the spinal nerve with proximal spread of agent through the neural foramen into the epidural space. 4 mL of the medication mixture listed above  was injected slowly at each site. Displacement of the radio opaque contrast after injection of the medication confirmed that the medication went into the area of the transforaminal spaces. The needles were removed and bleeding was nil. A sterile dressing was applied. No specimens collected. The patient tolerated the procedure well.         The patient was monitored after the procedure in the recovery area. They were given post-procedure and discharge instructions to follow at home. The patient was discharged in a stable condition.        Santana Hsu MD     I reviewed and edited the fellow's note. I conducted my own interview and physical examination. I agree with the findings. I was present and supervising all critical portions of the procedure.

## 2025-01-14 NOTE — DISCHARGE INSTRUCTIONS

## 2025-01-15 ENCOUNTER — PATIENT MESSAGE (OUTPATIENT)
Dept: PAIN MEDICINE | Facility: CLINIC | Age: 55
End: 2025-01-15
Payer: COMMERCIAL

## 2025-01-15 ENCOUNTER — OFFICE VISIT (OUTPATIENT)
Dept: BARIATRICS | Facility: CLINIC | Age: 55
End: 2025-01-15
Payer: COMMERCIAL

## 2025-01-15 ENCOUNTER — PATIENT MESSAGE (OUTPATIENT)
Dept: DERMATOLOGY | Facility: CLINIC | Age: 55
End: 2025-01-15
Payer: COMMERCIAL

## 2025-01-15 VITALS — WEIGHT: 175 LBS | BODY MASS INDEX: 32.01 KG/M2

## 2025-01-15 DIAGNOSIS — Z98.84 S/P BARIATRIC SURGERY: Primary | ICD-10-CM

## 2025-01-15 PROCEDURE — 98004 SYNCH AUDIO-VIDEO EST SF 10: CPT | Mod: 95,,, | Performed by: NURSE PRACTITIONER

## 2025-01-15 PROCEDURE — 3008F BODY MASS INDEX DOCD: CPT | Mod: CPTII,95,, | Performed by: NURSE PRACTITIONER

## 2025-01-15 PROCEDURE — 1160F RVW MEDS BY RX/DR IN RCRD: CPT | Mod: CPTII,95,, | Performed by: NURSE PRACTITIONER

## 2025-01-15 PROCEDURE — 1159F MED LIST DOCD IN RCRD: CPT | Mod: CPTII,95,, | Performed by: NURSE PRACTITIONER

## 2025-01-15 PROCEDURE — 3044F HG A1C LEVEL LT 7.0%: CPT | Mod: CPTII,95,, | Performed by: NURSE PRACTITIONER

## 2025-01-15 RX ORDER — NYSTATIN 100000 U/G
CREAM TOPICAL 2 TIMES DAILY
Qty: 30 G | Refills: 3 | Status: SHIPPED | OUTPATIENT
Start: 2025-01-15

## 2025-01-15 NOTE — PROGRESS NOTES
BARIATRIC POST-OPERATIVE VISIT:    HPI:  Africa Jennings is a 54 y.o. year old female presents for 1 year post op visit following s/p sleeve.  she is doing well and tolerating the diet without difficulty.  she has no complaints.    Denies: nausea, vomiting, abdominal pain, changes in bowel movement pattern, fever, chills, dysphagia, chest pain, and shortness of breath.    Pt has c/o skin rashes to excess abdominal skin. Rx sent in     Review of Systems   Constitutional:  Negative for appetite change, fatigue and fever.   HENT:  Negative for ear discharge, tinnitus and trouble swallowing.    Eyes:  Negative for visual disturbance.   Respiratory:  Negative for cough, choking, chest tightness and shortness of breath.    Gastrointestinal:  Negative for abdominal pain, constipation, diarrhea, nausea and vomiting.   Genitourinary:  Negative for decreased urine volume and hematuria.   Skin:  Negative for rash.   Neurological:  Negative for dizziness, light-headedness and headaches.   Psychiatric/Behavioral:  Negative for dysphoric mood, sleep disturbance and suicidal ideas. The patient is not nervous/anxious.        EXERCISE & VITAMINS:  See Bariatric Assessment  Adherent to vitamin regimen     MEDICATIONS/ALLERGIES:  Have been reviewed.    DIET:   Protein ~ 80 grams  Fluids~ 64 ozs    See Dietician note from today for a more detailed assessment.      Physical Exam  Constitutional:       Appearance: She is obese.   HENT:      Head: Normocephalic and atraumatic.   Eyes:      Extraocular Movements: Extraocular movements intact.      Conjunctiva/sclera: Conjunctivae normal.      Pupils: Pupils are equal, round, and reactive to light.   Cardiovascular:      Rate and Rhythm: Normal rate and regular rhythm.      Pulses: Normal pulses.      Heart sounds: Normal heart sounds. No murmur heard.  Pulmonary:      Effort: Pulmonary effort is normal. No respiratory distress.      Breath sounds: Normal breath sounds.    Abdominal:      General: Bowel sounds are normal.      Palpations: Abdomen is soft.      Tenderness: There is no abdominal tenderness.   Musculoskeletal:      Cervical back: Normal range of motion and neck supple.   Skin:     Capillary Refill: Capillary refill takes less than 2 seconds.   Neurological:      General: No focal deficit present.      Mental Status: She is alert and oriented to person, place, and time.   Psychiatric:         Mood and Affect: Mood normal.         Behavior: Behavior normal.         Thought Content: Thought content normal.         Judgment: Judgment normal.       ASSESSMENT:  - Morbid obesity s/p sleeve gastrectomy 7/26/23  - Co-morbidities: diabetes mellitus, GERD, hypertension, and obstructive sleep apnea  - Good  Weight loss, 47#'s and 51% EWL  - Great Exercise routine   - Good Diet  - Good Vitamin regimen     PLAN:  - Miralax daily for constipation  - Emphasized the importance of regular exercise and adherence to bariatric diet to achieve maximum weight loss.  - Encouraged patient to start/continue regular exercise.  - Follow-up with dietician to advance diet.  - Continue daily vitamins and medications.  - RTC annually   - Call the office for any issues.  - Check labs today.  - Plastic Referral   - Yoladna sent  in

## 2025-01-16 ENCOUNTER — PATIENT MESSAGE (OUTPATIENT)
Dept: FAMILY MEDICINE | Facility: CLINIC | Age: 55
End: 2025-01-16
Payer: COMMERCIAL

## 2025-01-16 NOTE — TELEPHONE ENCOUNTER
Please advise Dr Thompson if you would like me to reschedule patient requesting 1:40 pm on 1/21 be change to a virtual visit.

## 2025-01-21 ENCOUNTER — OFFICE VISIT (OUTPATIENT)
Dept: FAMILY MEDICINE | Facility: CLINIC | Age: 55
End: 2025-01-21
Payer: COMMERCIAL

## 2025-01-21 DIAGNOSIS — M54.16 LUMBAR RADICULOPATHY: ICD-10-CM

## 2025-01-21 DIAGNOSIS — E11.9 TYPE 2 DIABETES MELLITUS WITHOUT COMPLICATION, WITHOUT LONG-TERM CURRENT USE OF INSULIN: ICD-10-CM

## 2025-01-21 DIAGNOSIS — I10 ESSENTIAL HYPERTENSION: Primary | Chronic | ICD-10-CM

## 2025-01-21 PROBLEM — J96.01 ACUTE RESPIRATORY FAILURE WITH HYPOXEMIA: Status: RESOLVED | Noted: 2020-03-14 | Resolved: 2025-01-21

## 2025-01-21 PROBLEM — J18.9 PNEUMONIA OF BOTH LUNGS DUE TO INFECTIOUS ORGANISM: Status: RESOLVED | Noted: 2020-03-14 | Resolved: 2025-01-21

## 2025-01-21 NOTE — PROGRESS NOTES
Telemedicine Office Visit    Africa Jennings    1970  [id number]    Subjective     The patient location is: home   The chief complaint leading to consultation is: diabetes   Visit type: Virtual visit with synchronous audio and video  Total time spent with patient: 20 minutes   Each patient to whom he or she provides medical services by telemedicine is:  (1) informed of the relationship between the physician and patient and the respective role of any other health care provider with respect to management of the patient; and (2) notified that he or she may decline to receive medical services by telemedicine and may withdraw from such care at any time.    History of Present Illness    CHIEF COMPLAINT:  - Patient presents for a follow-up visit to discuss recent lab results and medication management.  S/p bariatric surgery   Diabetes  Hypertension   Chronic back pain - s/p surgery    HPI:  Patient reports an epidural procedure last Tuesday, during which her blood sugar readings were unexpectedly low at 47 and 51 due to fasting. After eating, her blood sugar returned to a normal level of 90. She notes a 2-pound weight loss since her last visit, attributing it to portion control during the holiday season. Ozempic appears to be effective for her weight management. Patient has a history of back pain, characterized as nerve pain severe enough to impact her ability to walk or stand for extended periods. She recently received injections for this back pain, which have provided significant relief compared to previous treatments.    MEDICATIONS:  - Ozempic, effectiveness indicated for weight loss  - Spironolactone, taken daily for blood pressure    MEDICAL HISTORY:  - Diabetes    SURGICAL HISTORY:  - Epidural injection: Last Tuesday, for back pain/nerve damage. Performed twice during the same visit. Patient reports significant improvement after this treatment compared to previous injections.    TEST RESULTS:  - Blood  sugar: Last Tuesday, 47 and 51 (low readings during epidural procedure), later back to normal at 90  - A1C: Recent, 5.7  - Potassium: Recent, 5.3 (slightly elevated)  - Epidural procedure: Last Tuesday, completed twice    SOCIAL HISTORY:  - Occupation: Works from home      ROS:  General: no fever, no chills, no fatigue, no weight gain, +weight loss  Eyes: no vision changes, no redness, no discharge  ENT: no ear pain, no nasal congestion, no sore throat  Cardiovascular: no chest pain, no palpitations, no lower extremity edema  Respiratory: no cough, no shortness of breath  Gastrointestinal: no abdominal pain, no nausea, no vomiting, no diarrhea, no constipation, no blood in stool  Genitourinary: no dysuria, no hematuria, no frequency  Musculoskeletal: no joint pain, no muscle pain, +back pain  Skin: no rash, no lesion  Neurological: no headache, no dizziness, no numbness, no tingling  Psychiatric: no anxiety, no depression, no sleep difficulty           LMP  (LMP Unknown)   Physical Exam        Plan     Assessment & Plan      LABS:   Ordered CBC, CMP to be completed in 6 months.    FOLLOW UP:   Follow up in July.   Contact the office after scheduling appointment to schedule labs before the visit.           Problem List Items Addressed This Visit          Neuro    Lumbar radiculopathy / Chronic pain syndrome    Noted significant improvement in back pain following recent epidural injections.   Acknowledged the severity of the patient's previous nerve pain, which affected walking and standing.   Will continue to monitor the patient's response to epidural injections for back pain management.         Cardiac/Vascular    Essential hypertension - Primary (Chronic)    Relevant Orders    CBC Auto Differential    Comprehensive Metabolic Panel    Lipid Panel    Hemoglobin A1C    TSH  HYPERKALEMIA:   Evaluated slightly elevated potassium level of 5.3, potentially due to diet or spironolactone.   Advised the patient to avoid  high-potassium foods due to elevated potassium levels.   Considered changing medication if potassium remains elevated despite dietary changes.   Planned to monitor potassium levels closely.   Educated on elevated-potassium foods to avoid: spinach, broccoli, sweet potato, banana.   Informed patient that carrots are acceptable in diet.   Patient to avoid foods elevated in potassium.     Continued spironolactone for hypertension management.         Endocrine    Diabetes mellitus    Relevant Orders    CBC Auto Differential    Comprehensive Metabolic Panel    Lipid Panel    Hemoglobin A1C    TSH   Assessed the patient's blood sugar control: A1C of 5.7 indicates well-controlled diabetes.   Noted low blood sugar readings (47, 51) during epidural procedure, which returned to normal (90) after eating.   Acknowledged the patient's weight loss and good control during holidays.   Considered increasing Ozempic dose to 2 mg but deferred due to recent hypoglycemic episodes.   Continued Ozempic at current dose.   Ordered labs in 6 months to monitor diabetes control.   Instructed the patient to contact the office if experiencing changes in blood sugar or wanting to consider increasing Ozempic dose to 2 mg.               This note was generated with the assistance of ambient listening technology. Verbal consent was obtained by the patient and accompanying visitor(s) for the recording of patient appointment to facilitate this note. I attest to having reviewed and edited the generated note for accuracy, though some syntax or spelling errors may persist. Please contact the author of this note for any clarification.        Answers submitted by the patient for this visit:  Review of Systems Questionnaire (Submitted on 1/18/2025)  activity change: No  unexpected weight change: No  neck pain: No  hearing loss: No  rhinorrhea: No  trouble swallowing: No  eye discharge: No  visual disturbance: No  chest tightness: No  wheezing: No  chest  pain: No  palpitations: No  blood in stool: No  constipation: No  vomiting: No  diarrhea: No  polydipsia: No  polyuria: No  difficulty urinating: No  hematuria: No  menstrual problem: No  dysuria: No  joint swelling: No  arthralgias: No  headaches: No  weakness: No  confusion: No  dysphoric mood: No

## 2025-01-29 RX ORDER — ONDANSETRON 8 MG/1
8 TABLET, ORALLY DISINTEGRATING ORAL EVERY 6 HOURS PRN
Qty: 30 TABLET | Refills: 0 | Status: SHIPPED | OUTPATIENT
Start: 2025-01-29

## 2025-01-29 NOTE — TELEPHONE ENCOUNTER
Pt stated she forget to mention during last ov on 1/15 that she needs a refill for Zofran. Pt stated she has been having issues with nausea .

## 2025-01-30 ENCOUNTER — OFFICE VISIT (OUTPATIENT)
Dept: PAIN MEDICINE | Facility: CLINIC | Age: 55
End: 2025-01-30
Payer: COMMERCIAL

## 2025-01-30 DIAGNOSIS — M54.9 DORSALGIA, UNSPECIFIED: Primary | ICD-10-CM

## 2025-01-30 PROCEDURE — 1159F MED LIST DOCD IN RCRD: CPT | Mod: CPTII,95,, | Performed by: NURSE PRACTITIONER

## 2025-01-30 PROCEDURE — 98003 SYNCH AUDIO-VIDEO NEW HI 60: CPT | Mod: 95,,, | Performed by: NURSE PRACTITIONER

## 2025-01-30 PROCEDURE — 3044F HG A1C LEVEL LT 7.0%: CPT | Mod: CPTII,95,, | Performed by: NURSE PRACTITIONER

## 2025-01-30 PROCEDURE — 1160F RVW MEDS BY RX/DR IN RCRD: CPT | Mod: CPTII,95,, | Performed by: NURSE PRACTITIONER

## 2025-01-30 NOTE — PROGRESS NOTES
Chronic Pain-Tele-Medicine-Established Note (Follow up visit)        The patient location is: Home  The chief complaint leading to consultation is: pain  Visit type: Virtual visit with synchronous audio and video  Total time spent with patient: 12 min  Each patient to whom he or she provides medical services by telemedicine is:  (1) informed of the relationship between the physician and patient and the respective role of any other health care provider with respect to management of the patient; and (2) notified that he or she may decline to receive medical services by telemedicine and may withdraw from such care at any time.      SUBJECTIVE:    Interval History 1/30/2025:  The patient returns today for virtual follow up of back pain. She is s/p left L4/5 and L5/S1 TF MARILEE with about 70% relief. However, she is again having significant back pain with radiation into the right leg. There is numbness and tingling. No bowel or bladder incontinence. No recent trauma.     Interval History 12/24/2024:  The patient presents for follow up after procedure. She is s/p right L4/5 and L5/S1 TF MARILEE on 12/10/24. She is reporting 80% relief of right leg pain. She is now having significant left sided pain. The pain starts across the lower back with radiation down the left leg with numbness. It is causing her significant pain and limitation to her ADLs. She is interested in an MARILEE for this side. She continues with daily PT exercises. Her pain today is 9/10.    Interval History 11/18/2024:  The patient returns for virtual follow up to discuss worsened back and right leg pain. The pain starts across the lower back and radiates down the back and side of the right leg to the foot. There is associated numbness and tingling. The pain worsens with prolonged activity and standing. Leg pain is severe. She previously had right L4/5 and L5/S1 TF MARILEE with 80% relief of right leg pain for over a year. She takes Lyrica with some benefit. Hip pain  is tolerable. No additional complaints today. Her pain today is 7/10.    Interval History 10/10/2024:  The patient has a virtual follow up for right hip pain. She reports improvement in pain since previous visit. She says that the increase in Lyrica has been helpful. No side effects reported at this time. She has been sleeping better as well. No additional complaints today.    Interval History 8/19/2024:  The patient has a virtual visit for 2 month follow up. She reports return of right hip pain. She says that previous GTB injection provided benefit for about 3 weeks. The pain bothers her with more activity. She has been taking Lyrica 75 mg twice daily for some time with mild benefit and no side effects. She is interested in increasing the medication. No additional complaints at this time.     Interval History 6/17/2024:  The patient returns for follow up of right hip pain. She is s/p right GTB injection on 6/4/24 with significant benefit. She still has some pain intermittently but says that it is mild. No other complaints. She continues to take Lyrica. Her pain today is 3/10.    Interval History 10/12/23: Africa Jennings returns for follow up. At our last visit, which was virtual, she was having a lot of radiating pain. Unfortunately, the TFESI we performed was not helpful. She presents today in person and I am able to examine her. Today, on examination, it is clear that this pain is reproduced with palpation of her GTBs. She continues to note 7/10 pain which is worse with laying on her side and improved with movement. It is impacting her work.     Interval History 8/28/2023:  Africa Jennings presents tele-medicine appointment for a follow-up appointment for R sided lower back and focal R L5 radicular pain. She states leg pain > back pain. She describes a sharp, shooting pain in her R buttocks that radiates to her R mid calf posteriorly and is associated with burning, numbness, and  tingling. Worse with activity, bending, lifting, night time. She states the R leg is subjectively weaker than the left.. Since the last visit, Africa Ayaka Jennings states the pain has been worsening. Current pain intensity is 7/10. Current medications include lyrica, zanaflex, tylenol with some relief. She has completed physical therapy and aqua therapy (March 2023 - April 2023) to help strengthen her back and notes benefit. She continues with Home exercise program and is waiting to hear back from [facility] to schedule her. Patient was supposed to have R L4/5 and L5/S1 TFESI done since last visit, however, there was some issue with scheduling or insurance (the patient is unsure which). She is interested in having the procedure rescheduled to help alleviate her symptoms. Patient denies red flags including weakness, unexpected weight loss/gain, night sweats/fevers, saddle anesthesia, and symptoms of VERA.    Interval History 4/26/2023:  Mrs Jennings presents for follow up. Over interval she has had T9-12 Thoracic decompression by Dr Whitt on 10/28/2022. She is doing well since then but continues to have R sided lower back and focal R L5 radicular pain. She states back pain = leg pain. She states the R leg is subjectively weaker than the left. Denies any s/s concerning for cauda equina. Pain has limited functioning and she has Completed PT/Aquatherapy for >6 weeks.      Interval History 3/16/2022:  Pt presents for follow up of. She has had L4/5 ILESI and left SI injection in past with benefit. She has more vocal pain to right and associated radiculopathy down right leg in L4/5 pattern. She had no recent MRI in past 2 years and known lumbar discogenic issues but this was more focal to left. She has no complaint of loss of b/b or saddle paresthesias.      Interval History  2/22/2022:   Mrs Jennings presents for virtual follow up of lower back and left sided buttock pain. She is now s/p Left SIJ and  states 90% improved pain and feels her relief was more significant than MARILEE. She states pain more noticeable to right side now without radicular complaint. There is no focal voicing of loss of b/b or saddle paresthesias.      Interval History 1/25/2021:  Mrs Jennings presents for follow up of lower back pain. She is s/p L4/5 ILESI with 90% benefit from lower back pain. She has lingering lower buttock/back pain to left side. Increased pain when sitting or stairs. Denies radiculopathy. Denies any loss of b/b or saddle paresthesias.      Interval History 12/23/2021:  Mrs Jennings presents to establish care at [facility]. She states continued lower back pain to left side but leg pain/paresthesias=back pain. She states left lower back pain and radiation in L4/5 pattern but also on right side. She has MRI findings to left L4/5 NF disc protrusion. She has been doing HEP she learned through [facility] for over 6 months straight and continues with no lasting benefit. She is currently taking Mobic 15mg and Neurontin 300mg TID. There is no complaint of loss of b/b or saddle paresthesias.      Interval History (4/13/20):     The patient location is: home  The chief complaint leading to consultation is: follow up  Visit type: Virtual visit with audio.  Patient verbally consented for audio visit.  Total time spent with patient: 15 minutes  Each patient to whom he or she provides medical services by telemedicine is:  (1) informed of the relationship between the physician and patient and the respective role of any other health care provider with respect to management of the patient; and (2) notified that he or she may decline to receive medical services by telemedicine and may withdraw from such care at any time.        She returns today for follow up.  She reports that her right-sided low back and lower extremity pain has returned since last encounter.  She denies any changes in the quality or location of pain.  She  continues to report associated numbness.  She denies any associated weakness or bowel bladder dysfunction.  She has attempted gabapentin 600 mg t.i.d..  She states this medication did not provide any relief.  She has also tried goody's powder, Motrin, Tylenol without any relief.          Interval History (2/17/20):  She returns today for follow up and MRI review.  She reports that her pain is unchanged in quality and location since last encounter.  She does state that the pain has significantly improved and is overall not very bothersome for the time being.          Initial Encounter (2/3/20):  Africa Jennings is a 53 y.o. female who presents today with chronic right-sided low back and lower extremity pain. This pain has been present for 6-7 months.  No specific inciting event or injury noted.  Patient localizes the pain to the right lumbar region.  Pain radiates to the right posterior and anterior thigh.  She reports associated numbness in this area as well. She also reports weakness of the right lower extremity when ambulating.  She denies any associated bowel or bladder dysfunction.  The pain is constant and worsened with activity.  She states that the pain interrupt her sleep.  Patient does have a history of a similar problem roughly 8 years ago.  She underwent epidural steroid injections with good relief.  This pain is described in detail below.    Pain Scores:     Best:       6/10  Worst:     10/10  Usually:   8/10  Today:    0/10     Physical Therapy:  Feb-May, 2023  HEP: Continues HEP daily as prescribed at PT above     Non-pharmacologic Treatment:  Rest helps         TENS?  No     Pain Medications:         Currently taking:  Lyrica, Zanaflex     Has tried in the past:  Tylenol, Motrin     Has not tried:  Opioids, Tylenol, Muscle relaxants, TCAs, SNRIs, anticonvulsants, topical creams     report:  Reviewed and consistent with medication use as prescribed.     Blood thinners:  None      Relevant Surgeries:  None     Affecting sleep?  Yes     Affecting daily activities? yes     Depressive symptoms? no          SI/HI? No     Work status: Employed     Pain Procedures:   - L4-5 interlaminar epidural steroid injection x2  - 1/11/2022 L4/5 ILESI 90% benefit    - 2/8/2022 Left SIJ injection  9/26/23: Right L4/5, L5/S1 TFESI - 80% relief for over a year  10/21/23 B/L GTB injection  6/4/24 Right GTB injection- 90% relief for 3 weeks  12/10/24 Right L4/5 and L5/S1 TF MARILEE  1/14/25 Left L4/5 and L5/S1 TF MARILEE    Imaging:   CT Thoracic Spine Without Contrast  Order: [id number]  Status: Final result     Visible to patient: Yes (seen)     Next appt: 09/18/2023 at 08:45 AM in Dermatology (Basilia Valdez MD)     Dx: S/P fusion of thoracic spine     0 Result Notes  Details    Reading Physician Reading Date Result Priority   Tony Powell MD  [phone] 1/30/2023 Routine   Dex Bojorquez MD  [phone]  [phone] 1/30/2023      Narrative & Impression  EXAMINATION:  CT THORACIC SPINE WITHOUT CONTRAST     CLINICAL HISTORY:  s/p T9-12 fusion;  Arthrodesis status     TECHNIQUE:  CT thoracic spine performed without contrast.  Coronal and sagittal reformats provided.     COMPARISON:  X-ray thoracic spine 12/12/2022, x-ray thoracolumbar spine 10/29/2022, MRI thoracic spine 06/03/2022, CT thoracic spine 06/03/2022.     FINDINGS:  Postsurgical change of posterior spinal instrumented fusion T9-T12, T9-12 laminectomies and medial facetectomies and decortication of the T9-12 posterior elements and placement of a mixture of autologous and synthetic bone into the bilateral gutters for arthrodesis.  No hardware fracture or periprosthetic lucency to suggest hardware loosening.     Alignment: Stable, slightly pronounced kyphotic curvature.  Grade 1 retrolisthesis T11-12.     Vertebrae: Vertebral body heights are well maintained.  No fracture.  Stable sclerotic focus in the T5 vertebral body likely representing a  bone island.  No lytic or blastic lesion.  Morselized synthetic and autologous bone in the bilateral T9-T12 gutters with noted abutment at the T12 posterior elements likely representing fusion.     Discs: Normal height.  Vacuum disc phenomenon at T8-9 and T11-12.     Degenerative findings:     C7-L1: No spinal canal stenosis or neural foraminal narrowing.     Paraspinal muscles & soft tissues: Cholecystectomy.  High attenuation focus in the posterior right upper quadrant possibly represent calcification or surgical clip.  Otherwise unremarkable.     Impression:     1. Postsurgical changes at T9-T12, as above, without evidence of hardware loosening or fracture.  2. Additional findings as above.     Electronically signed by resident: Dex Bojorquez  Date:                                            01/30/2023  Time:                                           13:59     Electronically signed by: Tony Powell MD  Date:                                            01/30/2023  Time:                                           15:43       MRI Thoracic Spine Without Contrast  Order: [id number]  Status: Final result     Visible to patient: Yes (seen)     Next appt: 09/18/2023 at 08:45 AM in Dermatology (Basilia Valdez MD)     Dx: Thoracic spinal stenosis     0 Result Notes  Details    Reading Physician Reading Date Result Priority   Tony Powell MD  [phone] 6/4/2022 Routine     Narrative & Impression  EXAMINATION:  MRI THORACIC SPINE WITHOUT CONTRAST     CLINICAL HISTORY:  T9-11 stenosis, please focus on these levels.;  Spinal stenosis, thoracic region     TECHNIQUE:  Multiplanar, multisequence images were performed through the thoracic spine.  Contrast was not administered.     COMPARISON:  MRI 03/31/2022, CT 06/03/2022.     FINDINGS:  Thoracic spine alignment demonstrates a slightly pronounced kyphotic curvature.  No spondylolisthesis.  Vertebral body heights are well maintained without evidence fracture.   There is slight heterogeneous marrow signal intensity, similar when compared to the previous exam and favored to relate to benign reconversion.  No marrow signal abnormality to suggest an infiltrative process.     Persistent focal area of increased T2/STIR cord signal at the T10-T11 level, similar when compared to the previous exam concerning for myelomalacia.  Remaining visualized spinal cord demonstrates normal contour and signal intensity.     There is a 1.1 cm T2 hypointense right thyroid nodule.  There are multiple colonic diverticuli.  Remaining visualized thoracic and upper abdominal organs demonstrate no significant abnormalities.  Paraspinal musculature demonstrates normal bulk and signal intensity.     T1-T2: Bilateral facet arthropathy contributes to moderate left neural foraminal narrowing.  No spinal canal stenosis.     T2-T3: Broad-based disc osteophyte complex causes mild mass effect on the ventral thecal sac.  Left facet arthropathy.  Findings contribute to mild-to-moderate left and mild right neural foraminal narrowing.  No spinal canal stenosis.     T7-T8: Circumferential disc osteophyte complex, bilateral facet arthropathy and right ligamentum flavum buckling.  Findings contribute to moderate right neural foraminal narrowing.  No spinal canal stenosis.     T8-T9: Circumferential disc osteophyte complex and mild bilateral ligamentum flavum buckling.  Findings contribute to mild right neural foraminal narrowing.  No spinal canal stenosis.     T9-T10: Circumferential disc osteophyte complex and bilateral ligamentum flavum buckling.  Findings contribute to mild spinal canal stenosis and moderate right neural foraminal narrowing.     T10-T11: Circumferential disc osteophyte complex, bilateral facet arthropathy and bilateral ligamentum flavum buckling.  Findings contribute to severe spinal canal stenosis and moderate right and mild left neural foraminal narrowing.     Impression:     1. Multilevel  degenerative changes of the thoracic spine most pronounced at T10-T11 noting severe spinal canal stenosis and mild-to-moderate neural foraminal narrowing.  2. Persistent focal area of cord signal abnormality at T10-T11, similar when compared to MRI most suggestive of myelomalacia.        Electronically signed by: Tony Powell MD  Date:                                            06/04/2022  Time:                                           09:05       MRI Lumbar Spine Without Contrast  Order: [id number]  Status: Final result     Visible to patient: Yes (seen)     Next appt: 09/18/2023 at 08:45 AM in Dermatology (Basilia Valdez MD)     Dx: Dorsalgia, unspecified     1 Result Note  Details    Reading Physician Reading Date Result Priority   Maximo Mcallister Jr., MD  [phone] 3/25/2022 Routine     Narrative & Impression  EXAMINATION:  MRI LUMBAR SPINE WITHOUT CONTRAST     CLINICAL HISTORY:  Dorsalgia, unspecifiedLow back pain, symptoms persist with > 6wks conservative treatment;     TECHNIQUE:  Sagittal T1, sagittal T2, sagittal STIR, axial T1 and axial T2 weighted images of the lumbar spine obtained without contrast.     COMPARISON:  Similar study 02/10/2020     FINDINGS:  Lumbar spine alignment is within normal limits. The vertebral body heights are well maintained, with no fracture.  Degenerative sclerotic marrow endplate change at S1 and fatty metaplasia degenerative endplate change at T12.  Otherwise marrow signal normal.     The conus is normal in appearance.  The adjacent soft tissue structures show no significant abnormalities.     Spinal canal is congenitally narrow on the basis of short pedicles.  At the T10-11 level there is extradural indentation of the thecal sac from posteriorly and there is some broad-based disc bulging.  Transverse images do not cover this level.  There is at least moderate spinal stenosis at this level.     L1-L2: No focal disc herniation.No significant central canal or neural  foraminal narrowing.     L2-L3: No focal disc herniation.  Mild facet arthrosis.  No significant central canal or neural foraminal narrowing.     L3-L4: No evidence of a disc herniation.  No significant central canal or neural foraminal narrowing.     L4-L5: Broad-based disc bulging with left-sided foraminal and far lateral annular fissure and superimposed mild spondylitic spurring. Facet arthrosis contributes to severe left-sided neural foraminal narrowing.  Moderate central spinal stenosis.     L5-S1: Broad-based disc bulging, facet arthrosis and ligamentum flavum hypertrophy result in severe bilateral neural foraminal stenosis.  Mild central spinal stenosis.     Impression:     Extradural degenerative changes at T10-11, L4-5, and L5-S1 resulting in central canal and foraminal stenosis as detailed above.  Dedicated imaging of the thoracic spine could lend additional information regarding findings at T10-11     This report was flagged in Epic as abnormal.        Electronically signed by: Maximo Aguirre Jr  Date:                                            03/25/2022  Time:                                           09:06       Past Medical History:   Diagnosis Date    Diabetes mellitus     Diabetes mellitus, type 2     Eczema     GERD (gastroesophageal reflux disease)     Hypertension     Impaired fasting blood sugar     YSABEL on CPAP      Past Surgical History:   Procedure Laterality Date    breast reduction      CHOLECYSTECTOMY      laprascopic    COLONOSCOPY N/A 3/26/2024    Procedure: COLONOSCOPY;  Surgeon: Sangeetha Taylor MD;  Location: [facility];  Service: Endoscopy;  Laterality: N/A;  [place] [facility] [place]. to [place].EC Not taking Ozempic any longer.EC  3/12/24-LVM regarding last dose Ozempic on or before 3/18/24 if restarted, instr portal-DS  3/19- lvm and portal msg for pc- pt returned call, pc complete. prep reordered. states last dose of ozempic was 3/16    COLONOSCOPY N/A 8/27/2024    Procedure:  COLONOSCOPY;  Surgeon: Manny rConin MD;  Location: [facility] ENDO (4TH FLR);  Service: Endoscopy;  Laterality: N/A;  Per Dr. Cronin- 60-min general GI slot, starting with the slim pediatric colonoscope.  We can have the single-balloon scope available in case needed.  Ozempic / Suprep / instr to portal. DBM  8/20-called pt for pre call-unable to lvm-portal message sent-tb  8/26-last dose of    DECOMPRESSION OF THORACIC OUTLET N/A 10/28/2022    Procedure: T9-12 ROBOTIC DECOMPRESSION, THORACIC FUSION;  Surgeon: Edouard Whitt, DO;  Location: [facility] OR 2ND FLR;  Service: Neurosurgery;  Laterality: N/A;  ANESTHESIA GENERAL [place] I BLOOD TYPE & SCREEN NERVE MONITORING EMG SEP MEP POSTION PRONE BED CARA 4 POST HEAD REST [facility] RADIOLOGY C-ARM GLOBUS PROTOCOL MISCELLANEOUS ALANIZ BRACE TLSO    EPIDURAL STEROID INJECTION N/A 1/11/2022    Procedure: INJECTION, STEROID, EPIDURAL IL L4/5 NEEDS CONSENT;  Surgeon: Britt Calix MD;  Location: [facility] PAIN MGT;  Service: Pain Management;  Laterality: N/A;    ESOPHAGOGASTRODUODENOSCOPY  8/18/14    HYSTERECTOMY  2007    laprascopic, total for fibroids.  Dr Polo    INJECTION OF JOINT Left 2/8/2022    Procedure: INJECTION, JOINT, SI LEFT NEEDS CONSENT;  Surgeon: Britt Calix MD;  Location: [facility] PAIN MGT;  Service: Pain Management;  Laterality: Left;    INJECTION OF JOINT Bilateral 10/31/2023    Procedure: INJECTION, JOINT BILATERAL GTB;  Surgeon: Britt Calix MD;  Location: [facility] PAIN MGT;  Service: Pain Management;  Laterality: Bilateral;    INJECTION OF JOINT Right 6/4/2024    Procedure: INJECTION, JOINT RIGHT GTB;  Surgeon: Britt Calix MD;  Location: [facility] PAIN MGT;  Service: Pain Management;  Laterality: Right;  [phone]  2 WK F/U AKBAR  *3/26 COLONOSCOPY*  *SCHEDULE AFTER 4/9*    OOPHORECTOMY      ROBOT-ASSISTED LAPAROSCOPIC SLEEVE GASTRECTOMY USING DA DEVI XI N/A 7/26/2023    Procedure: XI ROBOTIC SLEEVE GASTRECTOMY with intraop EGD;   Surgeon: Roge Rich MD;  Location: [facility] OR 2ND FLR;  Service: General;  Laterality: N/A;  with possible HHR    TOTAL REDUCTION MAMMOPLASTY      TRANSFORAMINAL EPIDURAL INJECTION OF STEROID Right 9/26/2023    Procedure: INJECTION, STEROID, EPIDURAL, TRANSFORAMINAL APPROACH, RIGHT L4/L5 AND L5/S1 SOONER DATE;  Surgeon: Britt Calix MD;  Location: [facility] PAIN MGT;  Service: Pain Management;  Laterality: Right;    TRANSFORAMINAL EPIDURAL INJECTION OF STEROID Right 12/10/2024    Procedure: LUMBAR TRANSFORAMINAL RIGHT L4/5 AND L5/S1;  Surgeon: Britt Calix MD;  Location: [facility] PAIN MGT;  Service: Pain Management;  Laterality: Right;  2 WK F/U JESUSITA VIRTUAL OK    TRANSFORAMINAL EPIDURAL INJECTION OF STEROID Left 1/14/2025    Procedure: LUMBAR TRANSFORAMINAL LEFT L4/5 AND L5/S1;  Surgeon: Britt Calix MD;  Location: [facility] PAIN MGT;  Service: Pain Management;  Laterality: Left;  2 WK F/U JESUSITA     Social History     Socioeconomic History    Marital status:    Tobacco Use    Smoking status: Never    Smokeless tobacco: Never   Substance and Sexual Activity    Alcohol use: Never    Drug use: No    Sexual activity: Yes     Partners: Male     Birth control/protection: Surgical   Social History Narrative    Was  since 2000.      Has a friend since 2016.    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     Social Drivers of Health     Financial Resource Strain: Medium Risk (1/22/2024)    Overall Financial Resource Strain (CARDIA)     Difficulty of Paying Living Expenses: Somewhat hard   Food Insecurity: No Food Insecurity (1/22/2024)    Hunger Vital Sign     Worried About Running Out of Food in the Last Year: Never true     Ran Out of Food in the Last Year: Never true   Transportation Needs: No Transportation Needs (1/22/2024)    PRAPARE - Transportation     Lack of Transportation (Medical): No     Lack of Transportation (Non-Medical): No   Physical Activity:  Insufficiently Active (1/22/2024)    Exercise Vital Sign     Days of Exercise per Week: 1 day     Minutes of Exercise per Session: 30 min   Stress: No Stress Concern Present (1/22/2024)    [employer] [place] [employer] - Occupational Stress Questionnaire     Feeling of Stress : Not at all   Housing Stability: Low Risk  (1/22/2024)    Housing Stability Vital Sign     Unable to Pay for Housing in the Last Year: No     Number of Places Lived in the Last Year: 1     Unstable Housing in the Last Year: No     Family History   Problem Relation Name Age of Onset    Diabetes Mother      Hypertension Mother      Heart disease Mother      Cancer Father  58        Prostate    Crohn's disease Sister      Breast cancer Sister      Diabetes Maternal Grandmother      Heart disease Maternal Grandmother      Hypertension Maternal Grandmother      Amblyopia Neg Hx      Blindness Neg Hx      Cataracts Neg Hx      Glaucoma Neg Hx      Macular degeneration Neg Hx      Retinal detachment Neg Hx      Strabismus Neg Hx         Review of patient's allergies indicates:  No Known Allergies    Current Outpatient Medications   Medication Sig    acetaminophen (TYLENOL) 500 MG tablet Take 2 tablets (1,000 mg total) by mouth every 8 (eight) hours as needed for Pain.    atorvastatin (LIPITOR) 20 MG tablet TAKE 1 TABLET BY MOUTH EVERY DAY    ergocalciferol (ERGOCALCIFEROL) 50,000 unit Cap Take 1 capsule (50,000 Units total) by mouth every 7 days.    estradioL (ESTRACE) 1 MG tablet Take 1 tablet (1 mg total) by mouth once daily.    finasteride (PROSCAR) 5 mg tablet TAKE 1 TABLET BY MOUTH EVERY DAY    finasteride (PROSCAR) 5 mg tablet Take 1 tablet by mouth daily    fluocinonide (LIDEX) 0.05 % ointment Apply to affected areas of scalp at bedtime    ketoconazole (NIZORAL) 2 % shampoo Wash hair with medicated shampoo at least 2x/week - let sit on scalp at least 5 minutes prior to rinsing    meclizine (ANTIVERT) 25 mg tablet Take 1 tablet (25  mg total) by mouth 3 (three) times daily as needed for Dizziness.    mometasone (ELOCON) 0.1 % solution APPLY TOPICALLY ONCE DAILY. TO THINNING AREA OF SCALP    neomycin-polymyxin-dexamethasone (MAXITROL) 3.5mg/mL-10,000 unit/mL-0.1 % DrpS as needed.    nystatin (MYCOSTATIN) cream Apply topically 2 (two) times daily.    omeprazole (PRILOSEC) 40 MG capsule Take 1 capsule (40 mg total) by mouth every morning.    ondansetron (ZOFRAN) 4 MG tablet TAKE 1 TABLET BY MOUTH EVERY 8 HOURS AS NEEDED    ondansetron (ZOFRAN-ODT) 8 MG TbDL Take 1 tablet (8 mg total) by mouth every 6 (six) hours as needed (Nausea).    prednisoLONE acetate (PRED FORTE) 1 % DrpS USES PRN    pregabalin (LYRICA) 100 MG capsule Take 1 capsule (100 mg total) by mouth 2 (two) times daily.    semaglutide (OZEMPIC) 1 mg/dose (4 mg/3 mL) Inject 1 mg into the skin every 7 days.    spironolactone (ALDACTONE) 50 MG tablet Take 1 tablet (50 mg total) by mouth once daily.    tiZANidine (ZANAFLEX) 4 MG tablet Take 1 tablet (4 mg total) by mouth every 8 (eight) hours.    triamcinolone acetonide 0.1% (KENALOG) 0.1 % cream Apply topically 2 (two) times daily. Apply topically 2 (two) times daily.     Current Facility-Administered Medications   Medication    triamcinolone acetonide injection 10 mg    triamcinolone acetonide injection 10 mg    triamcinolone acetonide injection 5 mg    triamcinolone acetonide injection 5 mg    triamcinolone acetonide injection 5 mg       REVIEW OF SYSTEMS:    GENERAL:  No weight loss, malaise or fevers.  HEENT:   No recent changes in vision or hearing  NECK:  Negative for lumps, no difficulty with swallowing.  RESPIRATORY:  Negative for cough, wheezing or shortness of breath, patient denies any recent URI.  CARDIOVASCULAR:  Negative for chest pain, leg swelling or palpitations.  GI:  Negative for abdominal discomfort, blood in stools or black stools or change in bowel habits.  MUSCULOSKELETAL:  See HPI.  SKIN:  Negative for lesions,  rash, and itching.  PSYCH:  No mood disorder or recent psychosocial stressors.  Patients sleep is not disturbed secondary to pain.  HEMATOLOGY/LYMPHOLOGY:  Negative for prolonged bleeding, bruising easily or swollen nodes.  Patient is not currently taking any anti-coagulants  NEURO:   No history of headaches, syncope, paralysis, seizures or tremors.  All other reviewed and negative other than HPI.    OBJECTIVE:    LMP  (LMP Unknown)     PHYSICAL EXAMINATION:    General appearance: Well appearing, in no acute distress, alert and oriented x3.  Psych:  Mood and affect appropriate.  Skin: Skin color normal, no rashes or lesions, in both upper and lower body.  Extremities: Moves all visualized extremities freely.      PREVIOUS PHYSICAL EXAM:   GENERAL: Well appearing, in no acute distress, alert and oriented x3.  PSYCH:  Mood and affect appropriate.  SKIN: Skin color, texture, turgor normal, no rashes or lesions.  HEENT:  Normocephalic, atraumatic. Cranial nerves grossly intact.  EXTREMITIES: No deformities, edema, or skin discoloration.   MUSCULOSKELETAL: Bilateral lower extremity strength is normal and symmetric. No atrophy is noted. No TTP over bilateral GTB. SLR is negative bilaterally.  NEURO: Sensation is equal and appropriate bilaterally.   GAIT: Normal.      ASSESSMENT: 54 y.o. year old female with pain consistent with     1. Dorsalgia, unspecified  MRI Lumbar Spine Without Contrast            DISCUSSION: Ms. Jennings is a . She has a history of T9-12 decompression and posterior fusion with Dr. Whitt. She came to us with left low back pain and did well after SIJ. She then presented back with right radicular symptoms. MRI shows short pedicles, moderate canal stenosis at L4/5, and severe foraminal stenosis at L5/S1.     PLAN:   1) The patient will continue a home exercise routine to help with pain and strengthening.    2) She is s/p left L4/5 and L5/S1 TF MARILEE with benefit. However, she has  return of right leg pain. Will order updated lumbar MRI.  3) Continue Lyrica 100 mg BID.   4) Follow up after MRI to discuss findings and treatment options.      The above plan and management options were discussed at length with patient. Patient is in agreement with the above and verbalized understanding.     Munira Strauss  01/30/2025

## 2025-02-04 ENCOUNTER — HOSPITAL ENCOUNTER (OUTPATIENT)
Dept: RADIOLOGY | Facility: OTHER | Age: 55
Discharge: HOME | End: 2025-02-04
Attending: NURSE PRACTITIONER
Payer: COMMERCIAL

## 2025-02-04 DIAGNOSIS — M54.9 DORSALGIA, UNSPECIFIED: ICD-10-CM

## 2025-02-04 PROCEDURE — 72148 MRI LUMBAR SPINE W/O DYE: CPT | Mod: 26,,, | Performed by: RADIOLOGY

## 2025-02-04 PROCEDURE — 72148 MRI LUMBAR SPINE W/O DYE: CPT | Mod: TC

## 2025-02-07 ENCOUNTER — OFFICE VISIT (OUTPATIENT)
Dept: SPINE | Facility: CLINIC | Age: 55
End: 2025-02-07
Payer: COMMERCIAL

## 2025-02-07 VITALS
HEIGHT: 62 IN | SYSTOLIC BLOOD PRESSURE: 105 MMHG | HEART RATE: 87 BPM | RESPIRATION RATE: 19 BRPM | BODY MASS INDEX: 32.22 KG/M2 | WEIGHT: 175.06 LBS | OXYGEN SATURATION: 100 % | DIASTOLIC BLOOD PRESSURE: 61 MMHG

## 2025-02-07 DIAGNOSIS — M54.17 LUMBOSACRAL RADICULOPATHY: Primary | ICD-10-CM

## 2025-02-07 DIAGNOSIS — G89.4 CHRONIC PAIN SYNDROME: ICD-10-CM

## 2025-02-07 DIAGNOSIS — M48.07 SPINAL STENOSIS OF LUMBOSACRAL REGION: ICD-10-CM

## 2025-02-07 PROCEDURE — 1160F RVW MEDS BY RX/DR IN RCRD: CPT | Mod: CPTII,S$GLB,, | Performed by: NURSE PRACTITIONER

## 2025-02-07 PROCEDURE — 1159F MED LIST DOCD IN RCRD: CPT | Mod: CPTII,S$GLB,, | Performed by: NURSE PRACTITIONER

## 2025-02-07 PROCEDURE — 99214 OFFICE O/P EST MOD 30 MIN: CPT | Mod: S$GLB,,, | Performed by: NURSE PRACTITIONER

## 2025-02-07 PROCEDURE — 99999 PR PBB SHADOW E&M-EST. PATIENT-LVL V: CPT | Mod: PBBFAC,,, | Performed by: NURSE PRACTITIONER

## 2025-02-07 PROCEDURE — 3008F BODY MASS INDEX DOCD: CPT | Mod: CPTII,S$GLB,, | Performed by: NURSE PRACTITIONER

## 2025-02-07 PROCEDURE — 3044F HG A1C LEVEL LT 7.0%: CPT | Mod: CPTII,S$GLB,, | Performed by: NURSE PRACTITIONER

## 2025-02-07 PROCEDURE — 3078F DIAST BP <80 MM HG: CPT | Mod: CPTII,S$GLB,, | Performed by: NURSE PRACTITIONER

## 2025-02-07 PROCEDURE — 3074F SYST BP LT 130 MM HG: CPT | Mod: CPTII,S$GLB,, | Performed by: NURSE PRACTITIONER

## 2025-02-07 NOTE — PROGRESS NOTES
Chronic Pain-Established Note (Follow up visit)         SUBJECTIVE:      Interval History 2/7/2025:  The patient is here for follow up of back and leg pain. Since previous visit, she did have updated lumbar MRI which shows continued multilevel spondylosis with multiple areas of severe bilateral NF narrowing. There is associated numbness to both legs. The right leg pain is greater than the left leg pain. She did benefit with right L4/5 and L5/S1 TF MARILEE with 80% relief in the past. She continues to be significantly impacted by her pain. She has completed PT in the past without much relief. She does not want to pursue surgical intervention at this time. She has some questions regarding SCS. She continue to take Lyrica with some benefit. Her pain today is 5/10.    Interval History 1/30/2025:  The patient returns today for virtual follow up of back pain. She is s/p left L4/5 and L5/S1 TF MARILEE with about 70% relief. However, she is again having significant back pain with radiation into the right leg. There is numbness and tingling. No bowel or bladder incontinence. No recent trauma.     Interval History 12/24/2024:  The patient presents for follow up after procedure. She is s/p right L4/5 and L5/S1 TF MARILEE on 12/10/24. She is reporting 80% relief of right leg pain. She is now having significant left sided pain. The pain starts across the lower back with radiation down the left leg with numbness. It is causing her significant pain and limitation to her ADLs. She is interested in an MARILEE for this side. She continues with daily PT exercises. Her pain today is 9/10.    Interval History 11/18/2024:  The patient returns for virtual follow up to discuss worsened back and right leg pain. The pain starts across the lower back and radiates down the back and side of the right leg to the foot. There is associated numbness and tingling. The pain worsens with prolonged activity and standing. Leg pain is severe. She previously had right  L4/5 and L5/S1 TF MARILEE with 80% relief of right leg pain for over a year. She takes Lyrica with some benefit. Hip pain is tolerable. No additional complaints today. Her pain today is 7/10.    Interval History 10/10/2024:  The patient has a virtual follow up for right hip pain. She reports improvement in pain since previous visit. She says that the increase in Lyrica has been helpful. No side effects reported at this time. She has been sleeping better as well. No additional complaints today.    Interval History 8/19/2024:  The patient has a virtual visit for 2 month follow up. She reports return of right hip pain. She says that previous GTB injection provided benefit for about 3 weeks. The pain bothers her with more activity. She has been taking Lyrica 75 mg twice daily for some time with mild benefit and no side effects. She is interested in increasing the medication. No additional complaints at this time.     Interval History 6/17/2024:  The patient returns for follow up of right hip pain. She is s/p right GTB injection on 6/4/24 with significant benefit. She still has some pain intermittently but says that it is mild. No other complaints. She continues to take Lyrica. Her pain today is 3/10.    Interval History 10/12/23: Africa Jennings returns for follow up. At our last visit, which was virtual, she was having a lot of radiating pain. Unfortunately, the TFESI we performed was not helpful. She presents today in person and I am able to examine her. Today, on examination, it is clear that this pain is reproduced with palpation of her GTBs. She continues to note 7/10 pain which is worse with laying on her side and improved with movement. It is impacting her work.     Interval History 8/28/2023:  Africa Jennings presents tele-medicine appointment for a follow-up appointment for R sided lower back and focal R L5 radicular pain. She states leg pain > back pain. She describes a sharp, shooting  pain in her R buttocks that radiates to her R mid calf posteriorly and is associated with burning, numbness, and tingling. Worse with activity, bending, lifting, night time. She states the R leg is subjectively weaker than the left.. Since the last visit, Africa Ayaka Jennings states the pain has been worsening. Current pain intensity is 7/10. Current medications include lyrica, zanaflex, tylenol with some relief. She has completed physical therapy and aqua therapy (March 2023 - April 2023) to help strengthen her back and notes benefit. She continues with Home exercise program and is waiting to hear back from [facility] to schedule her. Patient was supposed to have R L4/5 and L5/S1 TFESI done since last visit, however, there was some issue with scheduling or insurance (the patient is unsure which). She is interested in having the procedure rescheduled to help alleviate her symptoms. Patient denies red flags including weakness, unexpected weight loss/gain, night sweats/fevers, saddle anesthesia, and symptoms of VERA.    Interval History 4/26/2023:  Mrs Jennings presents for follow up. Over interval she has had T9-12 Thoracic decompression by Dr Whitt on 10/28/2022. She is doing well since then but continues to have R sided lower back and focal R L5 radicular pain. She states back pain = leg pain. She states the R leg is subjectively weaker than the left. Denies any s/s concerning for cauda equina. Pain has limited functioning and she has Completed PT/Aquatherapy for >6 weeks.      Interval History 3/16/2022:  Pt presents for follow up of. She has had L4/5 ILESI and left SI injection in past with benefit. She has more vocal pain to right and associated radiculopathy down right leg in L4/5 pattern. She had no recent MRI in past 2 years and known lumbar discogenic issues but this was more focal to left. She has no complaint of loss of b/b or saddle paresthesias.      Interval History  2/22/2022:   Mrs  Michaela presents for virtual follow up of lower back and left sided buttock pain. She is now s/p Left SIJ and states 90% improved pain and feels her relief was more significant than MARILEE. She states pain more noticeable to right side now without radicular complaint. There is no focal voicing of loss of b/b or saddle paresthesias.      Interval History 1/25/2021:  Mrs Jennings presents for follow up of lower back pain. She is s/p L4/5 ILESI with 90% benefit from lower back pain. She has lingering lower buttock/back pain to left side. Increased pain when sitting or stairs. Denies radiculopathy. Denies any loss of b/b or saddle paresthesias.      Interval History 12/23/2021:  Mrs Jennings presents to establish care at [facility]. She states continued lower back pain to left side but leg pain/paresthesias=back pain. She states left lower back pain and radiation in L4/5 pattern but also on right side. She has MRI findings to left L4/5 NF disc protrusion. She has been doing HEP she learned through [facility] Daily for over 6 months straight and continues with no lasting benefit. She is currently taking Mobic 15mg and Neurontin 300mg TID. There is no complaint of loss of b/b or saddle paresthesias.      Interval History (4/13/20):     The patient location is: home  The chief complaint leading to consultation is: follow up  Visit type: Virtual visit with audio.  Patient verbally consented for audio visit.  Total time spent with patient: 15 minutes  Each patient to whom he or she provides medical services by telemedicine is:  (1) informed of the relationship between the physician and patient and the respective role of any other health care provider with respect to management of the patient; and (2) notified that he or she may decline to receive medical services by telemedicine and may withdraw from such care at any time.        She returns today for follow up.  She reports that her right-sided low back and lower extremity  pain has returned since last encounter.  She denies any changes in the quality or location of pain.  She continues to report associated numbness.  She denies any associated weakness or bowel bladder dysfunction.  She has attempted gabapentin 600 mg t.i.d..  She states this medication did not provide any relief.  She has also tried goody's powder, Motrin, Tylenol without any relief.          Interval History (2/17/20):  She returns today for follow up and MRI review.  She reports that her pain is unchanged in quality and location since last encounter.  She does state that the pain has significantly improved and is overall not very bothersome for the time being.          Initial Encounter (2/3/20):  Africa Jennings is a 53 y.o. female who presents today with chronic right-sided low back and lower extremity pain. This pain has been present for 6-7 months.  No specific inciting event or injury noted.  Patient localizes the pain to the right lumbar region.  Pain radiates to the right posterior and anterior thigh.  She reports associated numbness in this area as well. She also reports weakness of the right lower extremity when ambulating.  She denies any associated bowel or bladder dysfunction.  The pain is constant and worsened with activity.  She states that the pain interrupt her sleep.  Patient does have a history of a similar problem roughly 8 years ago.  She underwent epidural steroid injections with good relief.  This pain is described in detail below.    Pain Scores:     Best:       6/10  Worst:     10/10  Usually:   8/10  Today:    0/10     Physical Therapy:  Feb-May, 2023  HEP: Continues HEP daily as prescribed at PT above     Non-pharmacologic Treatment:  Rest helps         TENS?  No     Pain Medications:         Currently taking:  Lyrica, Zanaflex     Has tried in the past:  Tylenol, Motrin     Has not tried:  Opioids, Tylenol, Muscle relaxants, TCAs, SNRIs, anticonvulsants, topical creams      report:  Reviewed and consistent with medication use as prescribed.     Blood thinners:  None     Relevant Surgeries:  None     Affecting sleep?  Yes     Affecting daily activities? yes     Depressive symptoms? no          SI/HI? No     Work status: Employed     Pain Procedures:   - L4-5 interlaminar epidural steroid injection x2  - 1/11/2022 L4/5 ILESI 90% benefit    - 2/8/2022 Left SIJ injection  9/26/23: Right L4/5, L5/S1 TFESI - 80% relief for over a year  10/21/23 B/L GTB injection  6/4/24 Right GTB injection- 90% relief for 3 weeks  12/10/24 Right L4/5 and L5/S1 TF MARILEE  1/14/25 Left L4/5 and L5/S1 TF MARILEE    Imaging:     MRI Lumbar Spine Without Contrast  Order: [id number]  Status: Final result       Visible to patient: Yes (seen)       Next appt: 02/24/2025 at 03:30 PM in Dermatology (Sarah Baez MD)       Dx: Dorsalgia, unspecified    0 Result Notes  Details    Reading Physician Reading Date Result Priority   Laurie Rich MD  [phone] 2/5/2025 Routine     Narrative & Impression  EXAMINATION:  MRI LUMBAR SPINE WITHOUT CONTRAST     CLINICAL HISTORY:  Low back pain, symptoms persist with > 6wks conservative treatment; Dorsalgia, unspecified     TECHNIQUE:  Multiplanar, multisequence MR images were acquired from the thoracolumbar junction to the sacrum without the administration of contrast.     COMPARISON:  03/24/2022     FINDINGS:  The marrow demonstrates a homogeneous signal.  Vertebral body heights are maintained.     Mild disc space narrowing and disc desiccation L4-5 and L5-S1.     Conus terminates appropriately at L1.     Multilevel degenerative change as diesel below:     L1-2: Facet arthropathy without significant canal or neural foraminal narrowing.     L2-3: Facet arthropathy without significant canal or neural foraminal narrowing.     L3-4: Posterior circumferential disc bulge, facet arthropathy, and thickening of the ligamentum flavum.  Findings contribute to mild canal and mild left  neural foraminal narrowing.     L4-5: Posterior circumferential disc bulge, facet arthropathy, and thickening of the ligamentum flavum.  Left lateral annular fissure.  Findings contribute to moderate canal narrowing.  Severe left and moderate right neural foraminal narrowing.     L5-S1: Posterior circumferential disc bulge, facet arthropathy, and thickening of the ligamentum flavum.  No significant canal narrowing.  Severe bilateral neural foraminal narrowing.     Impression:     Multilevel degenerative change, fairly similar compared to prior.     Moderate canal narrowing L4-5.  Severe neural foraminal narrowing L4-5 and L5-S1.       Past Medical History:   Diagnosis Date    Diabetes mellitus     Diabetes mellitus, type 2     Eczema     GERD (gastroesophageal reflux disease)     Hypertension     Impaired fasting blood sugar     YSABEL on CPAP      Past Surgical History:   Procedure Laterality Date    breast reduction      CHOLECYSTECTOMY      laprascopic    COLONOSCOPY N/A 3/26/2024    Procedure: COLONOSCOPY;  Surgeon: Sangeetha Taylor MD;  Location: [facility] ENDO;  Service: Endoscopy;  Laterality: N/A;  [place] [facility] [place] Instr. to portal.EC Not taking Ozempic any longer.EC  3/12/24-LVM regarding last dose Ozempic on or before 3/18/24 if restarted, instr portal-DS  3/19- lvm and portal msg for pc- pt returned call, pc complete. prep reordered. states last dose of ozempic was 3/16    COLONOSCOPY N/A 8/27/2024    Procedure: COLONOSCOPY;  Surgeon: Manny Cronin MD;  [facility]  Service: Endoscopy;  Laterality: N/A;  Per Dr. Cronin- 60-min general GI slot, starting with the slim pediatric colonoscope.  We can have the single-balloon scope available in case needed.  Ozempic / Suprep / instr to portal. DBM  8/20-called pt for pre call-unable to lvm-portal message sent-tb  8/26-last dose of    DECOMPRESSION OF THORACIC OUTLET N/A 10/28/2022    Procedure: T9-12 ROBOTIC DECOMPRESSION, THORACIC FUSION;   Surgeon: Edouard Whitt DO;  Location: [facility] OR 2ND FLR;  Service: Neurosurgery;  Laterality: N/A;  ANESTHESIA GENERAL [place] I BLOOD TYPE & SCREEN NERVE MONITORING EMG SEP MEP POSTION PRONE BED CARA 4 POST HEAD REST [facility] RADIOLOGY C-ARM GLOBUS PROTOCOL MISCELLANEOUS ALANIZ BRACE TLSO    EPIDURAL STEROID INJECTION N/A 1/11/2022    Procedure: INJECTION, STEROID, EPIDURAL IL L4/5 NEEDS CONSENT;  Surgeon: Britt Calix MD;  Location: [facility] PAIN MGT;  Service: Pain Management;  Laterality: N/A;    ESOPHAGOGASTRODUODENOSCOPY  8/18/14    HYSTERECTOMY  2007    laprascopic, total for fibroids.  Dr Polo    INJECTION OF JOINT Left 2/8/2022    Procedure: INJECTION, JOINT, SI LEFT NEEDS CONSENT;  Surgeon: Britt Calix MD;  Location: [facility] PAIN MGT;  Service: Pain Management;  Laterality: Left;    INJECTION OF JOINT Bilateral 10/31/2023    Procedure: INJECTION, JOINT BILATERAL GTB;  Surgeon: Britt Calix MD;  Location: [facility] PAIN MGT;  Service: Pain Management;  Laterality: Bilateral;    INJECTION OF JOINT Right 6/4/2024    Procedure: INJECTION, JOINT RIGHT GTB;  Surgeon: Britt Calix MD;  Location: [facility] PAIN MGT;  Service: Pain Management;  Laterality: Right;  [phone]  2 WK F/U AKBAR  *3/26 COLONOSCOPY*  *SCHEDULE AFTER 4/9*    OOPHORECTOMY      ROBOT-ASSISTED LAPAROSCOPIC SLEEVE GASTRECTOMY USING DA DEVI XI N/A 7/26/2023    Procedure: XI ROBOTIC SLEEVE GASTRECTOMY with intraop EGD;  Surgeon: Roge Rich MD;  Location: [facility] OR 2ND FLR;  Service: General;  Laterality: N/A;  with possible HHR    TOTAL REDUCTION MAMMOPLASTY      TRANSFORAMINAL EPIDURAL INJECTION OF STEROID Right 9/26/2023    Procedure: INJECTION, STEROID, EPIDURAL, TRANSFORAMINAL APPROACH, RIGHT L4/L5 AND L5/S1 SOONER DATE;  Surgeon: Britt Calix MD;  Location: [facility] PAIN MGT;  Service: Pain Management;  Laterality: Right;    TRANSFORAMINAL EPIDURAL INJECTION OF STEROID Right 12/10/2024     Procedure: LUMBAR TRANSFORAMINAL RIGHT L4/5 AND L5/S1;  Surgeon: Britt Calix MD;  Location: [facility] PAIN MGT;  Service: Pain Management;  Laterality: Right;  2 WK F/U JESUSITA VIRTUAL OK    TRANSFORAMINAL EPIDURAL INJECTION OF STEROID Left 1/14/2025    Procedure: LUMBAR TRANSFORAMINAL LEFT L4/5 AND L5/S1;  Surgeon: Britt Calix MD;  Location: [facility] PAIN MGT;  Service: Pain Management;  Laterality: Left;  2 WK F/U JESUSITA     Social History     Socioeconomic History    Marital status:    Tobacco Use    Smoking status: Never    Smokeless tobacco: Never   Substance and Sexual Activity    Alcohol use: Never    Drug use: No    Sexual activity: Yes     Partners: Male     Birth control/protection: Surgical   Social History Narrative    Was  since 2000.      Has a friend since 2016.    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     Social Drivers of Health     Financial Resource Strain: Medium Risk (1/22/2024)    Overall Financial Resource Strain (CARDIA)     Difficulty of Paying Living Expenses: Somewhat hard   Food Insecurity: No Food Insecurity (1/22/2024)    Hunger Vital Sign     Worried About Running Out of Food in the Last Year: Never true     Ran Out of Food in the Last Year: Never true   Transportation Needs: No Transportation Needs (1/22/2024)    PRAPARE - Transportation     Lack of Transportation (Medical): No     Lack of Transportation (Non-Medical): No   Physical Activity: Insufficiently Active (1/22/2024)    Exercise Vital Sign     Days of Exercise per Week: 1 day     Minutes of Exercise per Session: 30 min   Stress: No Stress Concern Present (1/22/2024)    [employer] - Occupational Stress Questionnaire     Feeling of Stress : Not at all   Housing Stability: Low Risk  (1/22/2024)    Housing Stability Vital Sign     Unable to Pay for Housing in the Last Year: No     Number of Places Lived in the Last Year: 1     Unstable Housing in the  Last Year: No     Family History   Problem Relation Name Age of Onset    Diabetes Mother      Hypertension Mother      Heart disease Mother      Cancer Father  58        Prostate    Crohn's disease Sister      Breast cancer Sister      Diabetes Maternal Grandmother      Heart disease Maternal Grandmother      Hypertension Maternal Grandmother      Amblyopia Neg Hx      Blindness Neg Hx      Cataracts Neg Hx      Glaucoma Neg Hx      Macular degeneration Neg Hx      Retinal detachment Neg Hx      Strabismus Neg Hx         Review of patient's allergies indicates:  No Known Allergies    Current Outpatient Medications   Medication Sig    acetaminophen (TYLENOL) 500 MG tablet Take 2 tablets (1,000 mg total) by mouth every 8 (eight) hours as needed for Pain.    atorvastatin (LIPITOR) 20 MG tablet TAKE 1 TABLET BY MOUTH EVERY DAY    ergocalciferol (ERGOCALCIFEROL) 50,000 unit Cap Take 1 capsule (50,000 Units total) by mouth every 7 days.    estradioL (ESTRACE) 1 MG tablet Take 1 tablet (1 mg total) by mouth once daily.    finasteride (PROSCAR) 5 mg tablet TAKE 1 TABLET BY MOUTH EVERY DAY    finasteride (PROSCAR) 5 mg tablet Take 1 tablet by mouth daily    fluocinonide (LIDEX) 0.05 % ointment Apply to affected areas of scalp at bedtime    ketoconazole (NIZORAL) 2 % shampoo Wash hair with medicated shampoo at least 2x/week - let sit on scalp at least 5 minutes prior to rinsing    meclizine (ANTIVERT) 25 mg tablet Take 1 tablet (25 mg total) by mouth 3 (three) times daily as needed for Dizziness.    mometasone (ELOCON) 0.1 % solution APPLY TOPICALLY ONCE DAILY. TO THINNING AREA OF SCALP    neomycin-polymyxin-dexamethasone (MAXITROL) 3.5mg/mL-10,000 unit/mL-0.1 % DrpS as needed.    nystatin (MYCOSTATIN) cream Apply topically 2 (two) times daily.    omeprazole (PRILOSEC) 40 MG capsule Take 1 capsule (40 mg total) by mouth every morning.    ondansetron (ZOFRAN) 4 MG tablet TAKE 1 TABLET BY MOUTH EVERY 8 HOURS AS NEEDED     ondansetron (ZOFRAN-ODT) 8 MG TbDL Take 1 tablet (8 mg total) by mouth every 6 (six) hours as needed (Nausea).    prednisoLONE acetate (PRED FORTE) 1 % DrpS USES PRN    pregabalin (LYRICA) 100 MG capsule Take 1 capsule (100 mg total) by mouth 2 (two) times daily.    semaglutide (OZEMPIC) 1 mg/dose (4 mg/3 mL) Inject 1 mg into the skin every 7 days.    spironolactone (ALDACTONE) 50 MG tablet Take 1 tablet (50 mg total) by mouth once daily.    tiZANidine (ZANAFLEX) 4 MG tablet Take 1 tablet (4 mg total) by mouth every 8 (eight) hours.    triamcinolone acetonide 0.1% (KENALOG) 0.1 % cream Apply topically 2 (two) times daily. Apply topically 2 (two) times daily.     Current Facility-Administered Medications   Medication    triamcinolone acetonide injection 10 mg    triamcinolone acetonide injection 10 mg    triamcinolone acetonide injection 5 mg    triamcinolone acetonide injection 5 mg    triamcinolone acetonide injection 5 mg       REVIEW OF SYSTEMS:    GENERAL:  No weight loss, malaise or fevers.  HEENT:   No recent changes in vision or hearing  NECK:  Negative for lumps, no difficulty with swallowing.  RESPIRATORY:  Negative for cough, wheezing or shortness of breath, patient denies any recent URI.  CARDIOVASCULAR:  Negative for chest pain, leg swelling or palpitations.  GI:  Negative for abdominal discomfort, blood in stools or black stools or change in bowel habits.  MUSCULOSKELETAL:  See HPI.  SKIN:  Negative for lesions, rash, and itching.  PSYCH:  No mood disorder or recent psychosocial stressors.  Patients sleep is not disturbed secondary to pain.  HEMATOLOGY/LYMPHOLOGY:  Negative for prolonged bleeding, bruising easily or swollen nodes.  Patient is not currently taking any anti-coagulants  NEURO:   No history of headaches, syncope, paralysis, seizures or tremors.  All other reviewed and negative other than HPI.    OBJECTIVE:    /61 (BP Location: Right arm, Patient Position: Sitting)   Pulse 87   Resp  "19   Ht 5' 2" (1.575 m)   Wt 79.4 kg (175 lb 0.7 oz)   LMP  (LMP Unknown)   SpO2 100%   BMI 32.02 kg/m²       PHYSICAL EXAM:   GENERAL: Well appearing, in no acute distress, alert and oriented x3.  PSYCH:  Mood and affect appropriate.  SKIN: Skin color, texture, turgor normal, no rashes or lesions.  HEENT:  Normocephalic, atraumatic. Cranial nerves grossly intact.  EXTREMITIES: No deformities, edema, or skin discoloration.   BACK: SLR is positive at a bilateral S1 distribution. Limited ROM with pain on flexion. There is positive facet loading bilaterally.  MUSCULOSKELETAL: Bilateral lower extremity strength is normal and symmetric.   NEURO: Decreased sensation to RLE in an S1 distribution.  GAIT: Antalgic.      ASSESSMENT: 54 y.o. year old female with back and leg pain consistent with     1. Lumbosacral radiculopathy  Procedure Order to Pain Management      2. Chronic pain syndrome        3. Spinal stenosis of lumbosacral region              DISCUSSION: Ms. Jennings is a . She has a history of T9-12 decompression and posterior fusion with Dr. Whitt. She came to us with left low back pain and did well after SIJ. She then presented back with right radicular symptoms. MRI shows short pedicles, moderate canal stenosis at L4/5, and severe foraminal stenosis at L5/S1.     PLAN:   1) The patient will continue a home exercise routine to help with pain and strengthening.    2) She is s/p left L4/5 and L5/S1 TF MARILEE with benefit. However, she has return of right leg pain. Updated lumbar MRI does show NF stenosis bilaterally at the levels of L4/5, L5/S1 and S1. Will order caudal MARILEE given symptoms and MRI findings. Previous ESIs did provide over 50% relief. Will not attempt further ESIs in the near future if this does not provide long-lasting relief. Will further discuss SCS trial if limited benefit.  3) Continue Lyrica 100 mg BID.   4) Follow up 2 weeks after MARILEE.      The above plan and " management options were discussed at length with patient. Patient is in agreement with the above and verbalized understanding.     Munira Strauss  02/07/2025

## 2025-02-10 ENCOUNTER — PATIENT MESSAGE (OUTPATIENT)
Dept: PAIN MEDICINE | Facility: OTHER | Age: 55
End: 2025-02-10
Payer: COMMERCIAL

## 2025-02-10 ENCOUNTER — PATIENT MESSAGE (OUTPATIENT)
Dept: BARIATRICS | Facility: CLINIC | Age: 55
End: 2025-02-10
Payer: COMMERCIAL

## 2025-02-10 DIAGNOSIS — M54.17 LUMBOSACRAL RADICULOPATHY: Primary | ICD-10-CM

## 2025-02-19 ENCOUNTER — PATIENT MESSAGE (OUTPATIENT)
Dept: ADMINISTRATIVE | Facility: OTHER | Age: 55
End: 2025-02-19
Payer: COMMERCIAL

## 2025-03-10 ENCOUNTER — PATIENT MESSAGE (OUTPATIENT)
Dept: BARIATRICS | Facility: CLINIC | Age: 55
End: 2025-03-10
Payer: COMMERCIAL

## 2025-03-23 ENCOUNTER — PATIENT MESSAGE (OUTPATIENT)
Dept: FAMILY MEDICINE | Facility: CLINIC | Age: 55
End: 2025-03-23
Payer: COMMERCIAL

## 2025-03-24 DIAGNOSIS — E11.9 TYPE 2 DIABETES MELLITUS WITHOUT COMPLICATION, WITHOUT LONG-TERM CURRENT USE OF INSULIN: ICD-10-CM

## 2025-03-24 NOTE — TELEPHONE ENCOUNTER
No care due was identified.  [facility] Embedded Care Due Messages. Reference number: [id number].   3/24/2025 8:44:59 AM CDT

## 2025-03-26 ENCOUNTER — PATIENT MESSAGE (OUTPATIENT)
Dept: FAMILY MEDICINE | Facility: CLINIC | Age: 55
End: 2025-03-26
Payer: COMMERCIAL

## 2025-03-26 RX ORDER — SEMAGLUTIDE 1.34 MG/ML
1 INJECTION, SOLUTION SUBCUTANEOUS
Qty: 9 EACH | Refills: 3 | Status: SHIPPED | OUTPATIENT
Start: 2025-03-26

## 2025-04-08 ENCOUNTER — PATIENT MESSAGE (OUTPATIENT)
Dept: BARIATRICS | Facility: CLINIC | Age: 55
End: 2025-04-08
Payer: COMMERCIAL

## 2025-04-10 ENCOUNTER — PATIENT MESSAGE (OUTPATIENT)
Dept: OTHER | Facility: OTHER | Age: 55
End: 2025-04-10
Payer: COMMERCIAL

## 2025-04-15 RX ORDER — ONDANSETRON 8 MG/1
8 TABLET, ORALLY DISINTEGRATING ORAL EVERY 6 HOURS PRN
Qty: 30 TABLET | Refills: 0 | Status: SHIPPED | OUTPATIENT
Start: 2025-04-15

## 2025-05-13 ENCOUNTER — PATIENT MESSAGE (OUTPATIENT)
Dept: BARIATRICS | Facility: CLINIC | Age: 55
End: 2025-05-13
Payer: COMMERCIAL

## 2025-05-20 DIAGNOSIS — N95.1 HOT FLASH, MENOPAUSAL: ICD-10-CM

## 2025-05-20 DIAGNOSIS — N95.1 MENOPAUSE SYNDROME: ICD-10-CM

## 2025-05-20 RX ORDER — ESTRADIOL 1 MG/1
1 TABLET ORAL
Qty: 90 TABLET | Refills: 0 | Status: SHIPPED | OUTPATIENT
Start: 2025-05-20

## 2025-05-20 NOTE — TELEPHONE ENCOUNTER
Refill Routing Note   Medication(s) are not appropriate for processing by [facility] for the following reason(s):        Patient not seen by provider within 15 months    [facility] action(s):  Defer               Appointments  past 12m or future 3m with PCP    Date Provider   Last Visit   9/20/2023 Tobias Gonzales MD   Next Visit   Visit date not found Tobias Gonzales MD   ED visits in past 90 days: 0        Note composed:5:36 AM 05/20/2025

## 2025-06-01 ENCOUNTER — PATIENT MESSAGE (OUTPATIENT)
Dept: OTHER | Facility: OTHER | Age: 55
End: 2025-06-01
Payer: COMMERCIAL

## 2025-06-03 ENCOUNTER — PATIENT MESSAGE (OUTPATIENT)
Dept: BARIATRICS | Facility: CLINIC | Age: 55
End: 2025-06-03
Payer: COMMERCIAL

## 2025-06-15 DIAGNOSIS — K21.9 GASTROESOPHAGEAL REFLUX DISEASE WITHOUT ESOPHAGITIS: ICD-10-CM

## 2025-06-16 RX ORDER — OMEPRAZOLE 40 MG/1
40 CAPSULE, DELAYED RELEASE ORAL EVERY MORNING
Qty: 90 CAPSULE | Refills: 2 | Status: SHIPPED | OUTPATIENT
Start: 2025-06-16

## 2025-06-16 NOTE — TELEPHONE ENCOUNTER
Care Due:                  Date            Visit Type   Department     Provider  --------------------------------------------------------------------------------                                ESTABLISHED   [facility] FAM[facility] MED                              PATIENT -    / INTERNAL MED  Last Visit: 01-      GARRET      / EDI Thompson  Next Visit: None Scheduled  None         None Found                                                            Last  Test          Frequency    Reason                     Performed    Due Date  --------------------------------------------------------------------------------    HBA1C.......  6 months...  semaglutide..............  01- 07-    Lipid Panel.  12 months..  atorvastatin.............  07- 07-    Vitamin D...  12 months..  ergocalciferol...........  07- 07-    [employer] Embedded Care Due Messages. Reference number: [id number].   6/15/2025 10:51:05 PM CDT

## 2025-06-16 NOTE — TELEPHONE ENCOUNTER
Provider Staff:  Action required for this patient    Requires labs      Please see care gap opportunities below in Care Due Message.    Thanks!  [facility]     Appointments      Date Provider   Last Visit   1/21/2025 Brandi Thompson MD   Next Visit   Visit date not found Brandi Thompson MD     Refill Decision Note   Africa Jennings  is requesting a refill authorization.    Brief Assessment and Rationale for Refill:   Approve       Medication Therapy Plan:          Comments:     Note composed:12:17 PM 06/16/2025

## 2025-07-01 ENCOUNTER — PATIENT MESSAGE (OUTPATIENT)
Dept: DERMATOLOGY | Facility: CLINIC | Age: 55
End: 2025-07-01
Payer: COMMERCIAL

## 2025-07-01 DIAGNOSIS — E55.9 VITAMIN D DEFICIENCY: ICD-10-CM

## 2025-07-01 DIAGNOSIS — L65.8 TRACTION ALOPECIA: ICD-10-CM

## 2025-07-01 RX ORDER — ERGOCALCIFEROL 1.25 MG/1
50000 CAPSULE ORAL
Qty: 12 CAPSULE | Refills: 3 | Status: SHIPPED | OUTPATIENT
Start: 2025-07-01

## 2025-07-01 NOTE — TELEPHONE ENCOUNTER
No care due was identified.  [facility] Embedded Care Due Messages. Reference number: [id number].   7/01/2025 12:24:14 AM CDT

## 2025-07-01 NOTE — TELEPHONE ENCOUNTER
Refill Routing Note   Medication(s) are not appropriate for processing by [facility] for the following reason(s):        Outside of protocol    [facility] action(s):  Route               Appointments  past 12m or future 3m with PCP    Date Provider   Last Visit   1/21/2025 Brandi Thompson MD   Next Visit   Visit date not found Brandi Thompson MD   ED visits in past 90 days: 0        Note composed:9:53 AM 07/01/2025

## 2025-07-02 RX ORDER — MOMETASONE FUROATE 1 MG/ML
LOTION TOPICAL DAILY
Qty: 60 ML | Refills: 5 | Status: SHIPPED | OUTPATIENT
Start: 2025-07-02

## 2025-07-03 ENCOUNTER — PATIENT MESSAGE (OUTPATIENT)
Dept: FAMILY MEDICINE | Facility: CLINIC | Age: 55
End: 2025-07-03
Payer: COMMERCIAL

## 2025-07-07 ENCOUNTER — LAB VISIT (OUTPATIENT)
Dept: LAB | Facility: HOSPITAL | Age: 55
End: 2025-07-07
Attending: FAMILY MEDICINE
Payer: COMMERCIAL

## 2025-07-07 ENCOUNTER — OFFICE VISIT (OUTPATIENT)
Dept: FAMILY MEDICINE | Facility: CLINIC | Age: 55
End: 2025-07-07
Payer: COMMERCIAL

## 2025-07-07 ENCOUNTER — PATIENT MESSAGE (OUTPATIENT)
Dept: BARIATRICS | Facility: CLINIC | Age: 55
End: 2025-07-07
Payer: COMMERCIAL

## 2025-07-07 ENCOUNTER — PATIENT MESSAGE (OUTPATIENT)
Dept: OTHER | Facility: OTHER | Age: 55
End: 2025-07-07
Payer: COMMERCIAL

## 2025-07-07 VITALS
SYSTOLIC BLOOD PRESSURE: 116 MMHG | HEIGHT: 62 IN | HEART RATE: 64 BPM | TEMPERATURE: 98 F | OXYGEN SATURATION: 99 % | BODY MASS INDEX: 33.68 KG/M2 | WEIGHT: 183.06 LBS | DIASTOLIC BLOOD PRESSURE: 70 MMHG

## 2025-07-07 DIAGNOSIS — E11.9 TYPE 2 DIABETES MELLITUS WITHOUT COMPLICATION, WITHOUT LONG-TERM CURRENT USE OF INSULIN: ICD-10-CM

## 2025-07-07 DIAGNOSIS — G47.33 OSA ON CPAP: ICD-10-CM

## 2025-07-07 DIAGNOSIS — I10 ESSENTIAL HYPERTENSION: ICD-10-CM

## 2025-07-07 DIAGNOSIS — Z00.00 ANNUAL PHYSICAL EXAM: Primary | ICD-10-CM

## 2025-07-07 DIAGNOSIS — I10 ESSENTIAL HYPERTENSION: Chronic | ICD-10-CM

## 2025-07-07 LAB
ABSOLUTE EOSINOPHIL (OHS): 0.07 K/UL
ABSOLUTE MONOCYTE (OHS): 0.48 K/UL (ref 0.3–1)
ABSOLUTE NEUTROPHIL COUNT (OHS): 1.91 K/UL (ref 1.8–7.7)
ALBUMIN SERPL BCP-MCNC: 4.3 G/DL (ref 3.5–5.2)
ALP SERPL-CCNC: 97 UNIT/L (ref 40–150)
ALT SERPL W/O P-5'-P-CCNC: 17 UNIT/L (ref 10–44)
ANION GAP (OHS): 9 MMOL/L (ref 8–16)
AST SERPL-CCNC: 20 UNIT/L (ref 11–45)
BASOPHILS # BLD AUTO: 0.04 K/UL
BASOPHILS NFR BLD AUTO: 0.7 %
BILIRUB SERPL-MCNC: 0.5 MG/DL (ref 0.1–1)
BUN SERPL-MCNC: 20 MG/DL (ref 6–20)
CALCIUM SERPL-MCNC: 9.4 MG/DL (ref 8.7–10.5)
CHLORIDE SERPL-SCNC: 105 MMOL/L (ref 95–110)
CHOLEST SERPL-MCNC: 123 MG/DL (ref 120–199)
CHOLEST/HDLC SERPL: 2.7 {RATIO} (ref 2–5)
CO2 SERPL-SCNC: 25 MMOL/L (ref 23–29)
CREAT SERPL-MCNC: 0.9 MG/DL (ref 0.5–1.4)
EAG (OHS): 117 MG/DL (ref 68–131)
ERYTHROCYTE [DISTWIDTH] IN BLOOD BY AUTOMATED COUNT: 13.3 % (ref 11.5–14.5)
GFR SERPLBLD CREATININE-BSD FMLA CKD-EPI: >60 ML/MIN/1.73/M2
GLUCOSE SERPL-MCNC: 75 MG/DL (ref 70–110)
HBA1C MFR BLD: 5.7 % (ref 4–5.6)
HCT VFR BLD AUTO: 37.9 % (ref 37–48.5)
HDLC SERPL-MCNC: 46 MG/DL (ref 40–75)
HDLC SERPL: 37.4 % (ref 20–50)
HGB BLD-MCNC: 12.1 GM/DL (ref 12–16)
IMM GRANULOCYTES # BLD AUTO: 0.01 K/UL (ref 0–0.04)
IMM GRANULOCYTES NFR BLD AUTO: 0.2 % (ref 0–0.5)
LDLC SERPL CALC-MCNC: 66 MG/DL (ref 63–159)
LYMPHOCYTES # BLD AUTO: 3.19 K/UL (ref 1–4.8)
MCH RBC QN AUTO: 27.9 PG (ref 27–31)
MCHC RBC AUTO-ENTMCNC: 31.9 G/DL (ref 32–36)
MCV RBC AUTO: 88 FL (ref 82–98)
NONHDLC SERPL-MCNC: 77 MG/DL
NUCLEATED RBC (/100WBC) (OHS): 0 /100 WBC
PLATELET # BLD AUTO: 254 K/UL (ref 150–450)
PMV BLD AUTO: 11.6 FL (ref 9.2–12.9)
POTASSIUM SERPL-SCNC: 4.6 MMOL/L (ref 3.5–5.1)
PROT SERPL-MCNC: 7.7 GM/DL (ref 6–8.4)
RBC # BLD AUTO: 4.33 M/UL (ref 4–5.4)
RELATIVE EOSINOPHIL (OHS): 1.2 %
RELATIVE LYMPHOCYTE (OHS): 56 % (ref 18–48)
RELATIVE MONOCYTE (OHS): 8.4 % (ref 4–15)
RELATIVE NEUTROPHIL (OHS): 33.5 % (ref 38–73)
SODIUM SERPL-SCNC: 139 MMOL/L (ref 136–145)
TRIGL SERPL-MCNC: 55 MG/DL (ref 30–150)
TSH SERPL-ACNC: 1.54 UIU/ML (ref 0.4–4)
WBC # BLD AUTO: 5.7 K/UL (ref 3.9–12.7)

## 2025-07-07 PROCEDURE — 36415 COLL VENOUS BLD VENIPUNCTURE: CPT | Mod: PO

## 2025-07-07 PROCEDURE — 80053 COMPREHEN METABOLIC PANEL: CPT

## 2025-07-07 PROCEDURE — 85025 COMPLETE CBC W/AUTO DIFF WBC: CPT

## 2025-07-07 PROCEDURE — 83036 HEMOGLOBIN GLYCOSYLATED A1C: CPT

## 2025-07-07 PROCEDURE — 80061 LIPID PANEL: CPT

## 2025-07-07 PROCEDURE — 84443 ASSAY THYROID STIM HORMONE: CPT

## 2025-07-07 PROCEDURE — 99999 PR PBB SHADOW E&M-EST. PATIENT-LVL V: CPT | Mod: PBBFAC,,,

## 2025-07-07 NOTE — PROGRESS NOTES
Family Medicine      Patient Name: Africa Jennings  MRN: [id number]  : 1970    PCP: Brandi Thompson MD       HPI      Africa Jennings is a 55 y.o. female with multiple medical diagnoses as listed in the medical history and problem list that presents for   Chief Complaint   Patient presents with    Annual Exam         History of Present Illness    Patient presents today for her annual and diabetes follow up. She reports well-controlled diabetes with last A1C of 5.7. She manages her condition through dietary modifications and walking. She takes diabetes medications (Ozempic) but acknowledges intermittent non-adherence, occasionally missing prescribed injections. She denies any current diabetes-related symptoms or complications. She is currently not using her prescribed CPAP machine and denies any current sleeping or breathing concerns. She receives regular eye exams from [facility] and [facility], as well as podiatry care. She is otherwise up to date on routine health maintenance screenings.      ROS:  General: -fever, -chills, -fatigue, -weight gain, -weight loss  Eyes: -vision changes, -redness, -discharge  ENT: -ear pain, -nasal congestion, -sore throat  Cardiovascular: -chest pain, -palpitations, -lower extremity edema  Respiratory: -cough, -shortness of breath  Gastrointestinal: -abdominal pain, -nausea, -vomiting, -diarrhea, -constipation, -blood in stool  Genitourinary: -dysuria, -hematuria, -frequency  Musculoskeletal: -joint pain, -muscle pain  Skin: -rash, -lesion  Neurological: -headache, -dizziness, -numbness, -tingling  Psychiatric: -anxiety, -depression, -sleep difficulty              History      Past Medical History:  Past Medical History:   Diagnosis Date    Diabetes mellitus     Diabetes mellitus, type 2     Eczema     GERD (gastroesophageal reflux disease)     Hypertension     Impaired fasting blood sugar     YSABEL on CPAP        Past Surgical  History:  Past Surgical History:   Procedure Laterality Date    breast reduction      CHOLECYSTECTOMY      laprascopic    COLONOSCOPY N/A 3/26/2024    Procedure: COLONOSCOPY;  Surgeon: Sangeetha Taylor MD;  Location: [facility] ENDO;  Service: Endoscopy;  Laterality: N/A;  referral Vu Britney. Suprep Instr. to portal.EC Not taking Ozempic any longer.EC  3/12/24-LVM regarding last dose Ozempic on or before 3/18/24 if restarted, instr portal-DS  3/19- lvm and portal msg for pc- pt returned call, pc complete. prep reordered. states last dose of ozempic was 3/16    COLONOSCOPY N/A 8/27/2024    Procedure: COLONOSCOPY;  Surgeon: Manny Cronin MD;  Location: [facility] (4TH FLR);  Service: Endoscopy;  Laterality: N/A;  Per Dr. Cronin- 60-min general GI slot, starting with the slim pediatric colonoscope.  We can have the single-balloon scope available in case needed.  Ozempic / Suprep / instr to portal. DBM  8/20-called pt for pre call-unable to lvm-portal message sent-tb  8/26-last dose of    DECOMPRESSION OF THORACIC OUTLET N/A 10/28/2022    Procedure: T9-12 ROBOTIC DECOMPRESSION, THORACIC FUSION;  Surgeon: Edouard Whitt DO;  Location: [facility] OR 2ND FLR;  Service: Neurosurgery;  Laterality: N/A;  ANESTHESIA GENERAL [place] I BLOOD TYPE & SCREEN NERVE MONITORING EMG SEP MEP POSTION PRONE BED CARA 4 POST HEAD REST [facility] RADIOLOGY C-ARM GLOBUS PROTOCOL MISCELLANEOUS ALANIZ BRACE TLSO    EPIDURAL STEROID INJECTION N/A 1/11/2022    Procedure: INJECTION, STEROID, EPIDURAL IL L4/5 NEEDS CONSENT;  Surgeon: Britt Calix MD;  Location: [facility] PAIN MGT;  Service: Pain Management;  Laterality: N/A;    ESOPHAGOGASTRODUODENOSCOPY  8/18/14    HYSTERECTOMY  2007    laprascopic, total for fibroids.  Dr Polo    INJECTION OF JOINT Left 2/8/2022    Procedure: INJECTION, JOINT, SI LEFT NEEDS CONSENT;  Surgeon: Britt Calix MD;  Location: [facility] PAIN MGT;  Service: Pain Management;  Laterality: Left;    INJECTION OF JOINT  Bilateral 10/31/2023    Procedure: INJECTION, JOINT BILATERAL GTB;  Surgeon: Britt Calix MD;  Location: [facility] PAIN MGT;  Service: Pain Management;  Laterality: Bilateral;    INJECTION OF JOINT Right 6/4/2024    Procedure: INJECTION, JOINT RIGHT GTB;  Surgeon: Britt Calix MD;  Location: [facility] PAIN MGT;  Service: Pain Management;  Laterality: Right;  [phone]  2 WK F/U AKBAR  *3/26 COLONOSCOPY*  *SCHEDULE AFTER 4/9*    OOPHORECTOMY      ROBOT-ASSISTED LAPAROSCOPIC SLEEVE GASTRECTOMY USING DA DEVI XI N/A 7/26/2023    Procedure: XI ROBOTIC SLEEVE GASTRECTOMY with intraop EGD;  Surgeon: Roge Rich MD;  Location: [facility] OR 2ND FLR;  Service: General;  Laterality: N/A;  with possible HHR    TOTAL REDUCTION MAMMOPLASTY      TRANSFORAMINAL EPIDURAL INJECTION OF STEROID Right 9/26/2023    Procedure: INJECTION, STEROID, EPIDURAL, TRANSFORAMINAL APPROACH, RIGHT L4/L5 AND L5/S1 SOONER DATE;  Surgeon: Britt Calix MD;  Location: [facility] PAIN MGT;  Service: Pain Management;  Laterality: Right;    TRANSFORAMINAL EPIDURAL INJECTION OF STEROID Right 12/10/2024    Procedure: LUMBAR TRANSFORAMINAL RIGHT L4/5 AND L5/S1;  Surgeon: Britt Calix MD;  Location: [facility] PAIN MGT;  Service: Pain Management;  Laterality: Right;  2 WK F/U JESUSITA VIRTUAL OK    TRANSFORAMINAL EPIDURAL INJECTION OF STEROID Left 1/14/2025    Procedure: LUMBAR TRANSFORAMINAL LEFT L4/5 AND L5/S1;  Surgeon: Britt Calix MD;  Location: [facility]IN [facility]GT;  Service: Pain Management;  Laterality: Left;  2 WK F/U JESUSITA       Social History:  Social History[1]    Family History:  Family History   Problem Relation Name Age of Onset    Diabetes Mother      Hypertension Mother      Heart disease Mother      Cancer Father  58        Prostate    Crohn's disease Sister      Breast cancer Sister      Diabetes Maternal Grandmother      Heart disease Maternal Grandmother      Hypertension Maternal Grandmother      Amblyopia Neg Hx      Blindness  "Neg Hx      Cataracts Neg Hx      Glaucoma Neg Hx      Macular degeneration Neg Hx      Retinal detachment Neg Hx      Strabismus Neg Hx         Allergies and Medications: (updated and reviewed)  Review of patient's allergies indicates:  No Known Allergies  Current Medications[2]    Patient Care Team:  Brandi Thompson MD as PCP - General (Family Medicine)  Roge Hudson MD as Consulting Physician (Ophthalmology)  Brandi Thompson MD as Hypertension Digital Medicine Responsible Provider (Family Medicine)  Bel Kline PharmD as Hypertension Digital Medicine Clinician (Pharmacist)  Manny Llamas (Ophthalmology)         Exam      Review of Systems:  (as noted above)      Physical Exam:  Physical Exam  Vitals reviewed.   Constitutional:       General: She is not in acute distress.     Appearance: Normal appearance. She is normal weight. She is not ill-appearing.   HENT:      Head: Normocephalic and atraumatic.   Eyes:      Extraocular Movements: Extraocular movements intact.      Pupils: Pupils are equal, round, and reactive to light.   Cardiovascular:      Rate and Rhythm: Normal rate and regular rhythm.      Pulses: Normal pulses.      Heart sounds: Normal heart sounds.   Pulmonary:      Effort: Pulmonary effort is normal.   Abdominal:      General: Abdomen is flat. Bowel sounds are normal.      Palpations: Abdomen is soft.   Musculoskeletal:         General: Normal range of motion.      Cervical back: Normal range of motion.      Right lower leg: No edema.      Left lower leg: No edema.   Neurological:      Mental Status: She is alert and oriented to person, place, and time. Mental status is at baseline.   Psychiatric:         Mood and Affect: Mood normal.         Behavior: Behavior normal.       Vitals:    07/07/25 0802   BP: 116/70   Patient Position: Sitting   Pulse: 64   Temp: 97.8 °F (36.6 °C)   TempSrc: Oral   SpO2: 99%   Weight: 83 kg (183 lb 1.5 oz)   Height: 5' 2" (1.575 m)      Body mass index is " 33.49 kg/m².             Assessment & Plan      1. Annual physical exam  - Labs today. Patient is fasting.    2. Type 2 diabetes mellitus without complication, without long-term current use of insulin  - Microalbumin/creatinine urine ratio; Future    3. Essential hypertension  - WNL. Cont Spironolactone.    4. YSABEL on CPAP  - Patient is not currently using her CPAP.       Assessment & Plan    IMPRESSION:  - Diabetes well-controlled with last A1C at 5.7.  - Assessed need for routine diabetes screenings including foot and eye exams.  - Evaluated CPAP usage and breathing status.    TYPE 2 DIABETES MELLITUS:  - Monitored the patient's diabetes, noting the last A1C was 5.7, indicating well-controlled diabetes.  - Ordered labs including A1C, lipid panel, and urine screening to ensure continued management.  - Dr. Thompson will review lab results and send a message once they are available.  - Patient manages diabetes with dietary modifications, regular walking, and medication, though sometimes forgets to take diabetes medication.  - Discussed the importance of medication adherence.  - Cont Ozempic.    OBSTRUCTIVE SLEEP APNEA:  - Noted the patient is not currently using CPAP machine.  - Assessment indicates the patient is breathing adequately despite non-compliance with CPAP therapy.    FOLLOW-UP:  - Follow up in 6 months with Dr. Thompson, preferably in the morning.            --------------------------------------------      Health Maintenance:  Health Maintenance         Date Due Completion Date    COVID-19 Vaccine (5 - 2024-25 season) 09/01/2024 12/29/2022    Diabetic Eye Exam 01/15/2025 1/15/2024    Foot Exam 02/27/2025 2/27/2024    Override on 1/9/2023: Done    Override on 10/12/2021: Done    Override on 9/29/2020: Done    Override on 2/13/2020: Done    Diabetes Urine Screening 07/11/2025 7/11/2024    Lipid Panel 07/25/2025 7/25/2024    Influenza Vaccine (1) 09/01/2025 2/19/2024    Mammogram 12/04/2025 12/4/2024    Hemoglobin  A1c 01/07/2026 7/7/2025    Low Dose Statin 07/07/2026 7/7/2025    TETANUS VACCINE 05/13/2031 5/13/2021    Colorectal Cancer Screening 08/27/2034 8/27/2024    RSV Vaccine (Age 60+ and Pregnant patients) (1 - 1-dose 75+ series) 02/18/2044 ---            Health maintenance reviewed and Diabetic urine screening ordered    Follow Up:  No follow-ups on file.       - The patient is given an After Visit Summary that lists all medications with directions, allergies, education, orders placed during this encounter and follow-up instructions.      - I have reviewed the patient's medical information including past medical, family, and social history sections including the medications and allergies.      - We discussed the patient's current medications.     This note was generated with the assistance of ambient listening technology. Verbal consent was obtained by the patient and accompanying visitor(s) for the recording of patient appointment to facilitate this note. I attest to having reviewed and edited the generated note for accuracy, though some syntax or spelling errors may persist. Please contact the author of this note for any clarification.      This note was created by combination of typed  and MModal dictation.  Transcription errors may be present.  If there are any questions, please contact me.     Francoise John PA-C  [facility]               [1]   Social History  Socioeconomic History    Marital status:    Tobacco Use    Smoking status: Never    Smokeless tobacco: Never   Substance and Sexual Activity    Alcohol use: Never    Drug use: No    Sexual activity: Yes     Partners: Male     Birth control/protection: Surgical   Social History Narrative    Was  since 2000.      Has a friend since 2016.    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     Social Drivers of Health     Financial Resource Strain: Low Risk  (7/3/2025)    Overall  Financial Resource Strain (CARDIA)     Difficulty of Paying Living Expenses: Not very hard   Food Insecurity: Food Insecurity Present (7/3/2025)    Hunger Vital Sign     Worried About Running Out of Food in the Last Year: Sometimes true     Ran Out of Food in the Last Year: Never true   Transportation Needs: No Transportation Needs (7/3/2025)    PRAPARE - Transportation     Lack of Transportation (Medical): No     Lack of Transportation (Non-Medical): No   Physical Activity: Sufficiently Active (7/3/2025)    Exercise Vital Sign     Days of Exercise per Week: 3 days     Minutes of Exercise per Session: 60 min   Stress: No Stress Concern Present (7/3/2025)    [employer] - Occupational Stress Questionnaire     Feeling of Stress : Not at all   Housing Stability: Low Risk  (7/3/2025)    Housing Stability Vital Sign     Unable to Pay for Housing in the Last Year: No     Number of Times Moved in the Last Year: 0     Homeless in the Last Year: No   [2]   Current Outpatient Medications   Medication Sig Dispense Refill    acetaminophen (TYLENOL) 500 MG tablet Take 2 tablets (1,000 mg total) by mouth every 8 (eight) hours as needed for Pain. 30 tablet 0    atorvastatin (LIPITOR) 20 MG tablet TAKE 1 TABLET BY MOUTH EVERY DAY 90 tablet 2    ergocalciferol (ERGOCALCIFEROL) 50,000 unit Cap TAKE 1 CAPSULE BY MOUTH ONE TIME PER WEEK 12 capsule 3    estradioL (ESTRACE) 1 MG tablet TAKE 1 TABLET BY MOUTH EVERY DAY 90 tablet 0    finasteride (PROSCAR) 5 mg tablet TAKE 1 TABLET BY MOUTH EVERY DAY 90 tablet 3    finasteride (PROSCAR) 5 mg tablet Take 1 tablet by mouth daily 90 tablet 3    fluocinonide (LIDEX) 0.05 % ointment Apply to affected areas of scalp at bedtime 60 g 3    ketoconazole (NIZORAL) 2 % shampoo Wash hair with medicated shampoo at least 2x/week - let sit on scalp at least 5 minutes prior to rinsing 120 mL 5    mometasone (ELOCON) 0.1 % solution Apply topically once daily. To thinning area of  scalp 60 mL 5    neomycin-polymyxin-dexamethasone (MAXITROL) 3.5mg/mL-10,000 unit/mL-0.1 % DrpS as needed.      nystatin (MYCOSTATIN) cream Apply topically 2 (two) times daily. 30 g 3    omeprazole (PRILOSEC) 40 MG capsule Take 1 capsule (40 mg total) by mouth every morning. 90 capsule 2    ondansetron (ZOFRAN) 4 MG tablet TAKE 1 TABLET BY MOUTH EVERY 8 HOURS AS NEEDED 90 tablet 1    ondansetron (ZOFRAN-ODT) 8 MG TbDL Take 1 tablet (8 mg total) by mouth every 6 (six) hours as needed (Nausea). 30 tablet 0    prednisoLONE acetate (PRED FORTE) 1 % DrpS USES PRN      semaglutide (OZEMPIC) 1 mg/dose (4 mg/3 mL) Inject 1 mg into the skin every 7 days. 9 each 3    spironolactone (ALDACTONE) 50 MG tablet Take 1 tablet (50 mg total) by mouth once daily. 90 tablet 3    tiZANidine (ZANAFLEX) 4 MG tablet Take 1 tablet (4 mg total) by mouth every 8 (eight) hours. 90 tablet 2    triamcinolone acetonide 0.1% (KENALOG) 0.1 % cream Apply topically 2 (two) times daily. Apply topically 2 (two) times daily. 135 g 4    meclizine (ANTIVERT) 25 mg tablet Take 1 tablet (25 mg total) by mouth 3 (three) times daily as needed for Dizziness. 15 tablet 0    pregabalin (LYRICA) 100 MG capsule Take 1 capsule (100 mg total) by mouth 2 (two) times daily. 60 capsule 5     Current Facility-Administered Medications   Medication Dose Route Frequency Provider Last Rate Last Admin    triamcinolone acetonide injection 10 mg  10 mg Intradermal Once Basilia Valdez MD        triamcinolone acetonide injection 10 mg  10 mg Intradermal Once Basilia Valdez MD        triamcinolone acetonide injection 5 mg  5 mg Intradermal Once Sarah Baez MD        triamcinolone acetonide injection 5 mg  5 mg Intradermal Once         triamcinolone acetonide injection 5 mg  5 mg Intradermal Once

## 2025-07-08 ENCOUNTER — PATIENT MESSAGE (OUTPATIENT)
Dept: BARIATRICS | Facility: CLINIC | Age: 55
End: 2025-07-08
Payer: COMMERCIAL

## 2025-07-11 ENCOUNTER — PATIENT MESSAGE (OUTPATIENT)
Dept: ADMINISTRATIVE | Facility: OTHER | Age: 55
End: 2025-07-11
Payer: COMMERCIAL

## 2025-07-22 ENCOUNTER — PATIENT MESSAGE (OUTPATIENT)
Dept: ADMINISTRATIVE | Facility: OTHER | Age: 55
End: 2025-07-22
Payer: COMMERCIAL

## 2025-07-31 DIAGNOSIS — K21.9 GASTROESOPHAGEAL REFLUX DISEASE WITHOUT ESOPHAGITIS: ICD-10-CM

## 2025-07-31 DIAGNOSIS — E11.9 TYPE 2 DIABETES MELLITUS WITHOUT COMPLICATION, WITHOUT LONG-TERM CURRENT USE OF INSULIN: ICD-10-CM

## 2025-07-31 DIAGNOSIS — N95.1 HOT FLASH, MENOPAUSAL: ICD-10-CM

## 2025-07-31 DIAGNOSIS — M62.838 MUSCLE SPASM: ICD-10-CM

## 2025-07-31 DIAGNOSIS — L64.9 ANDROGENETIC ALOPECIA: ICD-10-CM

## 2025-07-31 DIAGNOSIS — N95.1 MENOPAUSE SYNDROME: ICD-10-CM

## 2025-07-31 RX ORDER — ONDANSETRON 8 MG/1
8 TABLET, ORALLY DISINTEGRATING ORAL EVERY 6 HOURS PRN
Qty: 30 TABLET | Refills: 0 | Status: SHIPPED | OUTPATIENT
Start: 2025-07-31

## 2025-07-31 RX ORDER — SPIRONOLACTONE 50 MG/1
50 TABLET, FILM COATED ORAL DAILY
Qty: 90 TABLET | Refills: 1 | Status: SHIPPED | OUTPATIENT
Start: 2025-07-31

## 2025-07-31 RX ORDER — SEMAGLUTIDE 1.34 MG/ML
1 INJECTION, SOLUTION SUBCUTANEOUS
Qty: 9 ML | Refills: 1 | Status: SHIPPED | OUTPATIENT
Start: 2025-07-31

## 2025-07-31 RX ORDER — ESTRADIOL 1 MG/1
1 TABLET ORAL DAILY
Qty: 90 TABLET | Refills: 0 | Status: SHIPPED | OUTPATIENT
Start: 2025-07-31

## 2025-07-31 RX ORDER — TIZANIDINE 4 MG/1
4 TABLET ORAL EVERY 8 HOURS
Qty: 90 TABLET | Refills: 2 | Status: SHIPPED | OUTPATIENT
Start: 2025-07-31

## 2025-07-31 RX ORDER — OMEPRAZOLE 40 MG/1
40 CAPSULE, DELAYED RELEASE ORAL EVERY MORNING
Qty: 90 CAPSULE | Refills: 1 | Status: SHIPPED | OUTPATIENT
Start: 2025-07-31

## 2025-07-31 NOTE — TELEPHONE ENCOUNTER
No care due was identified.  [facility] Embedded Care Due Messages. Reference number: [id number].   7/31/2025 3:41:43 AM CDT

## 2025-07-31 NOTE — TELEPHONE ENCOUNTER
Refill Routing Note   Medication(s) are not appropriate for processing by [facility] for the following reason(s):        No active prescription written by provider  Outside of protocol    [facility] action(s):  Defer  Route  Approve               Appointments  past 12m or future 3m with PCP    Date Provider   Last Visit   1/21/2025 Brandi Thompson MD   Next Visit   1/13/2026 Brandi Thompson MD   ED visits in past 90 days: 0        Note composed:7:42 AM 07/31/2025

## 2025-07-31 NOTE — TELEPHONE ENCOUNTER

## 2025-08-04 ENCOUNTER — OFFICE VISIT (OUTPATIENT)
Dept: PAIN MEDICINE | Facility: CLINIC | Age: 55
End: 2025-08-04
Payer: COMMERCIAL

## 2025-08-04 DIAGNOSIS — M70.61 GREATER TROCHANTERIC BURSITIS OF RIGHT HIP: ICD-10-CM

## 2025-08-04 DIAGNOSIS — M54.17 LUMBOSACRAL RADICULOPATHY: Primary | ICD-10-CM

## 2025-08-04 PROCEDURE — 3066F NEPHROPATHY DOC TX: CPT | Mod: CPTII,95,, | Performed by: ANESTHESIOLOGY

## 2025-08-04 PROCEDURE — 3044F HG A1C LEVEL LT 7.0%: CPT | Mod: CPTII,95,, | Performed by: ANESTHESIOLOGY

## 2025-08-04 PROCEDURE — 98006 SYNCH AUDIO-VIDEO EST MOD 30: CPT | Mod: 95,,, | Performed by: ANESTHESIOLOGY

## 2025-08-04 PROCEDURE — 3061F NEG MICROALBUMINURIA REV: CPT | Mod: CPTII,95,, | Performed by: ANESTHESIOLOGY

## 2025-08-04 RX ORDER — PREGABALIN 100 MG/1
100 CAPSULE ORAL 2 TIMES DAILY
Qty: 60 CAPSULE | Refills: 5 | Status: SHIPPED | OUTPATIENT
Start: 2025-08-04

## 2025-08-04 NOTE — PROGRESS NOTES
Chronic Pain-Tele-Medicine-Established Note (Follow up visit)        The patient location is: Home  The chief complaint leading to consultation is: pain  Visit type: Virtual visit with synchronous audio and video  Total time spent with patient: 12 min  Each patient to whom he or she provides medical services by telemedicine is:  (1) informed of the relationship between the physician and patient and the respective role of any other health care provider with respect to management of the patient; and (2) notified that he or she may decline to receive medical services by telemedicine and may withdraw from such care at any time.      SUBJECTIVE:  Interval History 8/4/25: Africa Jennings returns for virtual follow up. The patient location is: home. We are doing a virtual visit with synchronous audio and video. Each patient to whom he or she provides medical services by telemedicine is:  (1) informed of the relationship between the physician and patient and the respective role of any other health care provider with respect to management of the patient; and (2) notified that he or she may decline to receive medical services by telemedicine and may withdraw from such care at any time.   At our last visit we ordered an MRI for right leg pain and planned to follow up after MRI. She returns today noting bilateral pain down the side of each leg to the calf, worst with standing or laying on the right side, up to 10/10.     Interval History 1/30/2025:  The patient returns today for virtual follow up of back pain. She is s/p left L4/5 and L5/S1 TF MARILEE with about 70% relief. However, she is again having significant back pain with radiation into the right leg. There is numbness and tingling. No bowel or bladder incontinence. No recent trauma.     Interval History 12/24/2024:  The patient presents for follow up after procedure. She is s/p right L4/5 and L5/S1 TF MARILEE on 12/10/24. She is reporting 80% relief of right leg  pain. She is now having significant left sided pain. The pain starts across the lower back with radiation down the left leg with numbness. It is causing her significant pain and limitation to her ADLs. She is interested in an MARILEE for this side. She continues with daily PT exercises. Her pain today is 9/10.    Interval History 11/18/2024:  The patient returns for virtual follow up to discuss worsened back and right leg pain. The pain starts across the lower back and radiates down the back and side of the right leg to the foot. There is associated numbness and tingling. The pain worsens with prolonged activity and standing. Leg pain is severe. She previously had right L4/5 and L5/S1 TF MARILEE with 80% relief of right leg pain for over a year. She takes Lyrica with some benefit. Hip pain is tolerable. No additional complaints today. Her pain today is 7/10.    Interval History 10/10/2024:  The patient has a virtual follow up for right hip pain. She reports improvement in pain since previous visit. She says that the increase in Lyrica has been helpful. No side effects reported at this time. She has been sleeping better as well. No additional complaints today.    Interval History 8/19/2024:  The patient has a virtual visit for 2 month follow up. She reports return of right hip pain. She says that previous GTB injection provided benefit for about 3 weeks. The pain bothers her with more activity. She has been taking Lyrica 75 mg twice daily for some time with mild benefit and no side effects. She is interested in increasing the medication. No additional complaints at this time.     Interval History 6/17/2024:  The patient returns for follow up of right hip pain. She is s/p right GTB injection on 6/4/24 with significant benefit. She still has some pain intermittently but says that it is mild. No other complaints. She continues to take Lyrica. Her pain today is 3/10.    Interval History 10/12/23: Africa Jennings  returns for follow up. At our last visit, which was virtual, she was having a lot of radiating pain. Unfortunately, the TFESI we performed was not helpful. She presents today in person and I am able to examine her. Today, on examination, it is clear that this pain is reproduced with palpation of her GTBs. She continues to note 7/10 pain which is worse with laying on her side and improved with movement. It is impacting her work.     Interval History 8/28/2023:  Africa Jennings presents tele-medicine appointment for a follow-up appointment for R sided lower back and focal R L5 radicular pain. She states leg pain > back pain. She describes a sharp, shooting pain in her R buttocks that radiates to her R mid calf posteriorly and is associated with burning, numbness, and tingling. Worse with activity, bending, lifting, night time. She states the R leg is subjectively weaker than the left.. Since the last visit, Africa Jennings states the pain has been worsening. Current pain intensity is 7/10. Current medications include lyrica, zanaflex, tylenol with some relief. She has completed physical therapy and aqua therapy (March 2023 - April 2023) to help strengthen her back and notes benefit. She continues with Home exercise program and is waiting to hear back from [facility] to schedule her. Patient was supposed to have R L4/5 and L5/S1 TFESI done since last visit, however, there was some issue with scheduling or insurance (the patient is unsure which). She is interested in having the procedure rescheduled to help alleviate her symptoms. Patient denies red flags including weakness, unexpected weight loss/gain, night sweats/fevers, saddle anesthesia, and symptoms of VERA.    Interval History 4/26/2023:  Mrs Jennings presents for follow up. Over interval she has had T9-12 Thoracic decompression by Dr Whitt on 10/28/2022. She is doing well since then but continues to have R sided lower back  and focal R L5 radicular pain. She states back pain = leg pain. She states the R leg is subjectively weaker than the left. Denies any s/s concerning for cauda equina. Pain has limited functioning and she has Completed PT/Aquatherapy for >6 weeks.      Interval History 3/16/2022:  Pt presents for follow up of. She has had L4/5 ILESI and left SI injection in past with benefit. She has more vocal pain to right and associated radiculopathy down right leg in L4/5 pattern. She had no recent MRI in past 2 years and known lumbar discogenic issues but this was more focal to left. She has no complaint of loss of b/b or saddle paresthesias.      Interval History  2/22/2022:   Mrs Jennings presents for virtual follow up of lower back and left sided buttock pain. She is now s/p Left SIJ and states 90% improved pain and feels her relief was more significant than MARILEE. She states pain more noticeable to right side now without radicular complaint. There is no focal voicing of loss of b/b or saddle paresthesias.      Interval History 1/25/2021:  Mrs Jennings presents for follow up of lower back pain. She is s/p L4/5 ILESI with 90% benefit from lower back pain. She has lingering lower buttock/back pain to left side. Increased pain when sitting or stairs. Denies radiculopathy. Denies any loss of b/b or saddle paresthesias.      Interval History 12/23/2021:  Mrs Jennings presents to establish care at [facility]. She states continued lower back pain to left side but leg pain/paresthesias=back pain. She states left lower back pain and radiation in L4/5 pattern but also on right side. She has MRI findings to left L4/5 NF disc protrusion. She has been doing HEP she learned through [facility] for over 6 months straight and continues with no lasting benefit. She is currently taking Mobic 15mg and Neurontin 300mg TID. There is no complaint of loss of b/b or saddle paresthesias.      Interval History (4/13/20):     The patient location  is: home  The chief complaint leading to consultation is: follow up  Visit type: Virtual visit with audio.  Patient verbally consented for audio visit.  Total time spent with patient: 15 minutes  Each patient to whom he or she provides medical services by telemedicine is:  (1) informed of the relationship between the physician and patient and the respective role of any other health care provider with respect to management of the patient; and (2) notified that he or she may decline to receive medical services by telemedicine and may withdraw from such care at any time.        She returns today for follow up.  She reports that her right-sided low back and lower extremity pain has returned since last encounter.  She denies any changes in the quality or location of pain.  She continues to report associated numbness.  She denies any associated weakness or bowel bladder dysfunction.  She has attempted gabapentin 600 mg t.i.d..  She states this medication did not provide any relief.  She has also tried goody's powder, Motrin, Tylenol without any relief.          Interval History (2/17/20):  She returns today for follow up and MRI review.  She reports that her pain is unchanged in quality and location since last encounter.  She does state that the pain has significantly improved and is overall not very bothersome for the time being.          Initial Encounter (2/3/20):  Africa Jennings is a 53 y.o. female who presents today with chronic right-sided low back and lower extremity pain. This pain has been present for 6-7 months.  No specific inciting event or injury noted.  Patient localizes the pain to the right lumbar region.  Pain radiates to the right posterior and anterior thigh.  She reports associated numbness in this area as well. She also reports weakness of the right lower extremity when ambulating.  She denies any associated bowel or bladder dysfunction.  The pain is constant and worsened with activity.   She states that the pain interrupt her sleep.  Patient does have a history of a similar problem roughly 8 years ago.  She underwent epidural steroid injections with good relief.  This pain is described in detail below.    Pain Scores:     Best:       6/10  Worst:     10/10  Usually:   8/10  Today:    0/10     Physical Therapy:  Feb-May, 2023  HEP: Continues HEP daily as prescribed at PT above     Non-pharmacologic Treatment:  Rest helps         TENS?  No     Pain Medications:         Currently taking:  Lyrica, Zanaflex     Has tried in the past:  Tylenol, Motrin     Has not tried:  Opioids, Tylenol, Muscle relaxants, TCAs, SNRIs, anticonvulsants, topical creams     report:  Reviewed and consistent with medication use as prescribed.     Blood thinners:  None     Relevant Surgeries:  None     Affecting sleep?  Yes     Affecting daily activities? yes     Depressive symptoms? no          SI/HI? No     Work status: Employed     Pain Procedures:   - L4-5 interlaminar epidural steroid injection x2  - 1/11/2022 L4/5 ILESI 90% benefit    - 2/8/2022 Left SIJ injection  9/26/23: Right L4/5, L5/S1 TFESI - 80% relief for over a year  10/21/23 B/L GTB injection  6/4/24 Right GTB injection- 90% relief for 3 weeks  12/10/24 Right L4/5 and L5/S1 TF MARILEE  1/14/25 Left L4/5 and L5/S1 TF MARILEE 50% relief for 5 months    Imaging:   CT Thoracic Spine Without Contrast  Order: [id number]  Status: Final result     Visible to patient: Yes (seen)     Next appt: 09/18/2023 at 08:45 AM in Dermatology (Basilia Valdez MD)     Dx: S/P fusion of thoracic spine     0 Result Notes  Details    Reading Physician Reading Date Result Priority   Tony Powell MD  [phone] 1/30/2023 Routine   Dex Bojorquez MD  [phone] 1/30/2023      Narrative & Impression  EXAMINATION:  CT THORACIC SPINE WITHOUT CONTRAST     CLINICAL HISTORY:  s/p T9-12 fusion;  Arthrodesis status     TECHNIQUE:  CT thoracic spine performed without  contrast.  Coronal and sagittal reformats provided.     COMPARISON:  X-ray thoracic spine 12/12/2022, x-ray thoracolumbar spine 10/29/2022, MRI thoracic spine 06/03/2022, CT thoracic spine 06/03/2022.     FINDINGS:  Postsurgical change of posterior spinal instrumented fusion T9-T12, T9-12 laminectomies and medial facetectomies and decortication of the T9-12 posterior elements and placement of a mixture of autologous and synthetic bone into the bilateral gutters for arthrodesis.  No hardware fracture or periprosthetic lucency to suggest hardware loosening.     Alignment: Stable, slightly pronounced kyphotic curvature.  Grade 1 retrolisthesis T11-12.     Vertebrae: Vertebral body heights are well maintained.  No fracture.  Stable sclerotic focus in the T5 vertebral body likely representing a bone island.  No lytic or blastic lesion.  Morselized synthetic and autologous bone in the bilateral T9-T12 gutters with noted abutment at the T12 posterior elements likely representing fusion.     Discs: Normal height.  Vacuum disc phenomenon at T8-9 and T11-12.     Degenerative findings:     C7-L1: No spinal canal stenosis or neural foraminal narrowing.     Paraspinal muscles & soft tissues: Cholecystectomy.  High attenuation focus in the posterior right upper quadrant possibly represent calcification or surgical clip.  Otherwise unremarkable.     Impression:     1. Postsurgical changes at T9-T12, as above, without evidence of hardware loosening or fracture.  2. Additional findings as above.     Electronically signed by resident: Dex Bojorquez  Date:                                            01/30/2023  Time:                                           13:59     Electronically signed by: Tony Powell MD  Date:                                            01/30/2023  Time:                                           15:43       MRI Thoracic Spine Without Contrast  Order: [id number]  Status: Final result     Visible to patient:  Yes (seen)     Next appt: 09/18/2023 at 08:45 AM in Dermatology (Basilia Valdez MD)     Dx: Thoracic spinal stenosis     0 Result Notes  Details    Reading Physician Reading Date Result Priority   Tony Powell MD  [phone] 6/4/2022 Routine     Narrative & Impression  EXAMINATION:  MRI THORACIC SPINE WITHOUT CONTRAST     CLINICAL HISTORY:  T9-11 stenosis, please focus on these levels.;  Spinal stenosis, thoracic region     TECHNIQUE:  Multiplanar, multisequence images were performed through the thoracic spine.  Contrast was not administered.     COMPARISON:  MRI 03/31/2022, CT 06/03/2022.     FINDINGS:  Thoracic spine alignment demonstrates a slightly pronounced kyphotic curvature.  No spondylolisthesis.  Vertebral body heights are well maintained without evidence fracture.  There is slight heterogeneous marrow signal intensity, similar when compared to the previous exam and favored to relate to benign reconversion.  No marrow signal abnormality to suggest an infiltrative process.     Persistent focal area of increased T2/STIR cord signal at the T10-T11 level, similar when compared to the previous exam concerning for myelomalacia.  Remaining visualized spinal cord demonstrates normal contour and signal intensity.     There is a 1.1 cm T2 hypointense right thyroid nodule.  There are multiple colonic diverticuli.  Remaining visualized thoracic and upper abdominal organs demonstrate no significant abnormalities.  Paraspinal musculature demonstrates normal bulk and signal intensity.     T1-T2: Bilateral facet arthropathy contributes to moderate left neural foraminal narrowing.  No spinal canal stenosis.     T2-T3: Broad-based disc osteophyte complex causes mild mass effect on the ventral thecal sac.  Left facet arthropathy.  Findings contribute to mild-to-moderate left and mild right neural foraminal narrowing.  No spinal canal stenosis.     T7-T8: Circumferential disc osteophyte complex, bilateral facet  arthropathy and right ligamentum flavum buckling.  Findings contribute to moderate right neural foraminal narrowing.  No spinal canal stenosis.     T8-T9: Circumferential disc osteophyte complex and mild bilateral ligamentum flavum buckling.  Findings contribute to mild right neural foraminal narrowing.  No spinal canal stenosis.     T9-T10: Circumferential disc osteophyte complex and bilateral ligamentum flavum buckling.  Findings contribute to mild spinal canal stenosis and moderate right neural foraminal narrowing.     T10-T11: Circumferential disc osteophyte complex, bilateral facet arthropathy and bilateral ligamentum flavum buckling.  Findings contribute to severe spinal canal stenosis and moderate right and mild left neural foraminal narrowing.     Impression:     1. Multilevel degenerative changes of the thoracic spine most pronounced at T10-T11 noting severe spinal canal stenosis and mild-to-moderate neural foraminal narrowing.  2. Persistent focal area of cord signal abnormality at T10-T11, similar when compared to MRI most suggestive of myelomalacia.        Electronically signed by: Tony Powell MD  Date:                                            06/04/2022  Time:                                           09:05       MRI LUMBAR SPINE WITHOUT CONTRAST     CLINICAL HISTORY:  Low back pain, symptoms persist with > 6wks conservative treatment; Dorsalgia, unspecified     TECHNIQUE:  Multiplanar, multisequence MR images were acquired from the thoracolumbar junction to the sacrum without the administration of contrast.     COMPARISON:  03/24/2022     FINDINGS:  The marrow demonstrates a homogeneous signal.  Vertebral body heights are maintained.     Mild disc space narrowing and disc desiccation L4-5 and L5-S1.     Conus terminates appropriately at L1.     Multilevel degenerative change as diesel below:     L1-2: Facet arthropathy without significant canal or neural foraminal narrowing.     L2-3: Facet  arthropathy without significant canal or neural foraminal narrowing.     L3-4: Posterior circumferential disc bulge, facet arthropathy, and thickening of the ligamentum flavum.  Findings contribute to mild canal and mild left neural foraminal narrowing.     L4-5: Posterior circumferential disc bulge, facet arthropathy, and thickening of the ligamentum flavum.  Left lateral annular fissure.  Findings contribute to moderate canal narrowing.  Severe left and moderate right neural foraminal narrowing.     L5-S1: Posterior circumferential disc bulge, facet arthropathy, and thickening of the ligamentum flavum.  No significant canal narrowing.  Severe bilateral neural foraminal narrowing.     Impression:     Multilevel degenerative change, fairly similar compared to prior.     Moderate canal narrowing L4-5.  Severe neural foraminal narrowing L4-5 and L5-S1.        Electronically signed by:Laurie Rich  Date:                                            02/05/2025  Time:                                           08:39      Past Medical History:   Diagnosis Date    Diabetes mellitus     Diabetes mellitus, type 2     Eczema     GERD (gastroesophageal reflux disease)     Hypertension     Impaired fasting blood sugar     YSABEL on CPAP      Past Surgical History:   Procedure Laterality Date    breast reduction      CHOLECYSTECTOMY      laprascopic    COLONOSCOPY N/A 3/26/2024    Procedure: COLONOSCOPY;  Surgeon: Sangeetha Taylor MD;  Location: [facility] ENDO;  Service: Endoscopy;  Laterality: N/A;  referral [facility]. Suprep Instr. to portal.EC Not taking Ozempic any longer.EC  3/12/24-LVM regarding last dose Ozempic on or before 3/18/24 if restarted, instr portal-DS  3/19- lvm and portal msg for pc- pt returned call, pc complete. prep reordered. states last dose of ozempic was 3/16    COLONOSCOPY N/A 8/27/2024    Procedure: COLONOSCOPY;  Surgeon: Manny Cronin MD;  Location: [facility] ENDO (4TH FLR);  Service: Endoscopy;  Laterality: N/A;   Per Dr. Cronin- 60-min general GI slot, starting with the slim pediatric colonoscope.  We can have the single-balloon scope available in case needed.  Ozempic / Suprep / instr to portal. DBM  8/20-called pt for pre call-unable to lvm-portal message sent-tb  8/26-last dose of    DECOMPRESSION OF THORACIC OUTLET N/A 10/28/2022    Procedure: T9-12 ROBOTIC DECOMPRESSION, THORACIC FUSION;  Surgeon: Edouard Whitt DO;  Location: [facility] OR 2ND FLR;  Service: Neurosurgery;  Laterality: N/A;  ANESTHESIA GENERAL [place] I BLOOD TYPE & SCREEN NERVE MONITORING EMG SEP MEP POSTION PRONE BED CARA 4 POST HEAD REST [facility] RADIOLOGY C-ARM GLOBUS PROTOCOL MISCELLANEOUS ALANIZ BRACE TLSO    EPIDURAL STEROID INJECTION N/A 1/11/2022    Procedure: INJECTION, STEROID, EPIDURAL IL L4/5 NEEDS CONSENT;  Surgeon: Britt Calix MD;  Location: [facility] PAIN MGT;  Service: Pain Management;  Laterality: N/A;    ESOPHAGOGASTRODUODENOSCOPY  8/18/14    HYSTERECTOMY  2007    laprascopic, total for fibroids.  Dr Polo    INJECTION OF JOINT Left 2/8/2022    Procedure: INJECTION, JOINT, SI LEFT NEEDS CONSENT;  Surgeon: Britt Calix MD;  Location: [facility] PAIN MGT;  Service: Pain Management;  Laterality: Left;    INJECTION OF JOINT Bilateral 10/31/2023    Procedure: INJECTION, JOINT BILATERAL GTB;  Surgeon: Britt Calix MD;  Location: [facility] PAIN MGT;  Service: Pain Management;  Laterality: Bilateral;    INJECTION OF JOINT Right 6/4/2024    Procedure: INJECTION, JOINT RIGHT GTB;  Surgeon: Britt Calix MD;  Location: [facility] PAIN MGT;  Service: Pain Management;  Laterality: Right;  [phone]  2 WK F/U AKBAR  *3/26 COLONOSCOPY*  *SCHEDULE AFTER 4/9*    OOPHORECTOMY      ROBOT-ASSISTED LAPAROSCOPIC SLEEVE GASTRECTOMY USING DA DEVI XI N/A 7/26/2023    Procedure: XI ROBOTIC SLEEVE GASTRECTOMY with intraop EGD;  Surgeon: Roge Rich MD;  Location: [facility] OR 2ND FLR;  Service: General;  Laterality: N/A;  with possible  HHR    TOTAL REDUCTION MAMMOPLASTY      TRANSFORAMINAL EPIDURAL INJECTION OF STEROID Right 9/26/2023    Procedure: INJECTION, STEROID, EPIDURAL, TRANSFORAMINAL APPROACH, RIGHT L4/L5 AND L5/S1 SOONER DATE;  Surgeon: Britt Calix MD;  Location: [facility] PAIN MGT;  Service: Pain Management;  Laterality: Right;    TRANSFORAMINAL EPIDURAL INJECTION OF STEROID Right 12/10/2024    Procedure: LUMBAR TRANSFORAMINAL RIGHT L4/5 AND L5/S1;  Surgeon: Britt Calix MD;  Location: [facility] PAIN MGT;  Service: Pain Management;  Laterality: Right;  2 WK F/U JESUSITA VIRTUAL OK    TRANSFORAMINAL EPIDURAL INJECTION OF STEROID Left 1/14/2025    Procedure: LUMBAR TRANSFORAMINAL LEFT L4/5 AND L5/S1;  Surgeon: Britt Calix MD;  Location: [facility] PAIN MGT;  Service: Pain Management;  Laterality: Left;  2 WK F/U JESUSITA     Social History     Socioeconomic History    Marital status:    Tobacco Use    Smoking status: Never    Smokeless tobacco: Never   Substance and Sexual Activity    Alcohol use: Never    Drug use: No    Sexual activity: Yes     Partners: Male     Birth control/protection: Surgical   Social History Narrative    Was  since 2000.      Has a friend since 2016.    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     Social Drivers of Health     Financial Resource Strain: Low Risk  (7/3/2025)    Overall Financial Resource Strain (CARDIA)     Difficulty of Paying Living Expenses: Not very hard   Food Insecurity: Food Insecurity Present (7/3/2025)    Hunger Vital Sign     Worried About Running Out of Food in the Last Year: Sometimes true     Ran Out of Food in the Last Year: Never true   Transportation Needs: No Transportation Needs (7/3/2025)    PRAPARE - Transportation     Lack of Transportation (Medical): No     Lack of Transportation (Non-Medical): No   Physical Activity: Sufficiently Active (7/3/2025)    Exercise Vital Sign     Days of Exercise per Week: 3 days     Minutes of Exercise per  Session: 60 min   Stress: No Stress Concern Present (7/3/2025)    [employer] - Occupational Stress Questionnaire     Feeling of Stress : Not at all   Housing Stability: Low Risk  (7/3/2025)    Housing Stability Vital Sign     Unable to Pay for Housing in the Last Year: No     Number of Times Moved in the Last Year: 0     Homeless in the Last Year: No     Family History   Problem Relation Name Age of Onset    Diabetes Mother      Hypertension Mother      Heart disease Mother      Cancer Father  58        Prostate    Crohn's disease Sister      Breast cancer Sister      Diabetes Maternal Grandmother      Heart disease Maternal Grandmother      Hypertension Maternal Grandmother      Amblyopia Neg Hx      Blindness Neg Hx      Cataracts Neg Hx      Glaucoma Neg Hx      Macular degeneration Neg Hx      Retinal detachment Neg Hx      Strabismus Neg Hx         Review of patient's allergies indicates:  No Known Allergies    Current Outpatient Medications   Medication Sig    acetaminophen (TYLENOL) 500 MG tablet Take 2 tablets (1,000 mg total) by mouth every 8 (eight) hours as needed for Pain.    atorvastatin (LIPITOR) 20 MG tablet TAKE 1 TABLET BY MOUTH EVERY DAY    ergocalciferol (ERGOCALCIFEROL) 50,000 unit Cap TAKE 1 CAPSULE BY MOUTH ONE TIME PER WEEK    estradioL (ESTRACE) 1 MG tablet Take 1 tablet (1 mg total) by mouth once daily.    finasteride (PROSCAR) 5 mg tablet TAKE 1 TABLET BY MOUTH EVERY DAY    finasteride (PROSCAR) 5 mg tablet Take 1 tablet by mouth daily    fluocinonide (LIDEX) 0.05 % ointment Apply to affected areas of scalp at bedtime    ketoconazole (NIZORAL) 2 % shampoo Wash hair with medicated shampoo at least 2x/week - let sit on scalp at least 5 minutes prior to rinsing    meclizine (ANTIVERT) 25 mg tablet Take 1 tablet (25 mg total) by mouth 3 (three) times daily as needed for Dizziness.    mometasone (ELOCON) 0.1 % solution Apply topically once daily. To thinning area of  scalp    neomycin-polymyxin-dexamethasone (MAXITROL) 3.5mg/mL-10,000 unit/mL-0.1 % DrpS as needed.    nystatin (MYCOSTATIN) cream Apply topically 2 (two) times daily.    omeprazole (PRILOSEC) 40 MG capsule Take 1 capsule (40 mg total) by mouth every morning.    ondansetron (ZOFRAN-ODT) 8 MG TbDL Take 1 tablet (8 mg total) by mouth every 6 (six) hours as needed (Nausea).    prednisoLONE acetate (PRED FORTE) 1 % DrpS USES PRN    pregabalin (LYRICA) 100 MG capsule Take 1 capsule (100 mg total) by mouth 2 (two) times daily.    semaglutide (OZEMPIC) 1 mg/dose (4 mg/3 mL) Inject 1 mg into the skin every 7 days.    spironolactone (ALDACTONE) 50 MG tablet Take 1 tablet (50 mg total) by mouth once daily.    tiZANidine (ZANAFLEX) 4 MG tablet Take 1 tablet (4 mg total) by mouth every 8 (eight) hours.    triamcinolone acetonide 0.1% (KENALOG) 0.1 % cream Apply topically 2 (two) times daily. Apply topically 2 (two) times daily.     Current Facility-Administered Medications   Medication    triamcinolone acetonide injection 10 mg    triamcinolone acetonide injection 10 mg    triamcinolone acetonide injection 5 mg    triamcinolone acetonide injection 5 mg    triamcinolone acetonide injection 5 mg       REVIEW OF SYSTEMS:    GENERAL:  No weight loss, malaise or fevers.  HEENT:   No recent changes in vision or hearing  NECK:  Negative for lumps, no difficulty with swallowing.  RESPIRATORY:  Negative for cough, wheezing or shortness of breath, patient denies any recent URI.  CARDIOVASCULAR:  Negative for chest pain, leg swelling or palpitations.  GI:  Negative for abdominal discomfort, blood in stools or black stools or change in bowel habits.  MUSCULOSKELETAL:  See HPI.  SKIN:  Negative for lesions, rash, and itching.  PSYCH:  No mood disorder or recent psychosocial stressors.  Patients sleep is not disturbed secondary to pain.  HEMATOLOGY/LYMPHOLOGY:  Negative for prolonged bleeding, bruising easily or swollen nodes.  Patient is not  currently taking any anti-coagulants  NEURO:   No history of headaches, syncope, paralysis, seizures or tremors.  All other reviewed and negative other than HPI.    OBJECTIVE:    LMP  (LMP Unknown)     PHYSICAL EXAMINATION:    General appearance: Well appearing, in no acute distress, alert and oriented x3.  Psych:  Mood and affect appropriate.  Skin: Skin color normal, no rashes or lesions, in both upper and lower body.  Extremities: Moves all visualized extremities freely.      PREVIOUS PHYSICAL EXAM:   GENERAL: Well appearing, in no acute distress, alert and oriented x3.  PSYCH:  Mood and affect appropriate.  SKIN: Skin color, texture, turgor normal, no rashes or lesions.  HEENT:  Normocephalic, atraumatic. Cranial nerves grossly intact.  EXTREMITIES: No deformities, edema, or skin discoloration.   MUSCULOSKELETAL: Bilateral lower extremity strength is normal and symmetric. No atrophy is noted. No TTP over bilateral GTB. SLR is negative bilaterally.  NEURO: Sensation is equal and appropriate bilaterally.   GAIT: Normal.      ASSESSMENT: 55 y.o. year old female with pain consistent with     1. Lumbosacral radiculopathy        2. Greater trochanteric bursitis of right hip                DISCUSSION: Ms. Jennings is a . She has a history of T9-12 decompression and posterior fusion with Dr. Whitt. She came to us with left low back pain and did well after SIJ. She then presented back with right radicular symptoms. MRI shows short pedicles, moderate canal stenosis at L4/5, and severe foraminal stenosis at L5/S1.     PLAN:   1) The patient will continue a home exercise routine to help with pain and strengthening.    2) Schedule B/L L5/S1 TFESI  3) Continue Lyrica 100 mg BID.   4) Follow up after injection to evaluate the right GTB      The above plan and management options were discussed at length with patient. Patient is in agreement with the above and verbalized understanding.     Britt ROGER  Fifi  08/04/2025

## 2025-08-12 ENCOUNTER — PATIENT MESSAGE (OUTPATIENT)
Dept: BARIATRICS | Facility: CLINIC | Age: 55
End: 2025-08-12
Payer: COMMERCIAL

## 2025-09-02 ENCOUNTER — HOSPITAL ENCOUNTER (OUTPATIENT)
Facility: OTHER | Age: 55
Discharge: HOME | End: 2025-09-02
Attending: ANESTHESIOLOGY | Admitting: ANESTHESIOLOGY
Payer: COMMERCIAL

## 2025-09-02 VITALS
RESPIRATION RATE: 18 BRPM | SYSTOLIC BLOOD PRESSURE: 120 MMHG | WEIGHT: 177 LBS | BODY MASS INDEX: 32.57 KG/M2 | DIASTOLIC BLOOD PRESSURE: 84 MMHG | HEIGHT: 62 IN | HEART RATE: 66 BPM | OXYGEN SATURATION: 98 % | TEMPERATURE: 98 F

## 2025-09-02 DIAGNOSIS — G89.29 CHRONIC PAIN: ICD-10-CM

## 2025-09-02 DIAGNOSIS — M51.362 DEGENERATION OF INTERVERTEBRAL DISC OF LUMBAR REGION WITH DISCOGENIC BACK PAIN AND LOWER EXTREMITY PAIN: Primary | ICD-10-CM

## 2025-09-02 DIAGNOSIS — M54.16 LUMBAR RADICULOPATHY: ICD-10-CM

## 2025-09-02 LAB — POCT GLUCOSE: 78 MG/DL (ref 70–110)

## 2025-09-02 PROCEDURE — 25500020 PHARM REV CODE 255: Performed by: ANESTHESIOLOGY

## 2025-09-02 PROCEDURE — 64483 NJX AA&/STRD TFRM EPI L/S 1: CPT | Mod: 50,,, | Performed by: ANESTHESIOLOGY

## 2025-09-02 PROCEDURE — 64483 NJX AA&/STRD TFRM EPI L/S 1: CPT | Mod: 50 | Performed by: ANESTHESIOLOGY

## 2025-09-02 PROCEDURE — 63600175 PHARM REV CODE 636 W HCPCS: Performed by: ANESTHESIOLOGY

## 2025-09-02 RX ORDER — LIDOCAINE HYDROCHLORIDE 10 MG/ML
INJECTION, SOLUTION EPIDURAL; INFILTRATION; INTRACAUDAL; PERINEURAL
Status: DISCONTINUED | OUTPATIENT
Start: 2025-09-02 | End: 2025-09-02 | Stop reason: HOSPADM

## 2025-09-02 RX ORDER — SODIUM CHLORIDE 9 MG/ML
INJECTION, SOLUTION INTRAVENOUS CONTINUOUS
Status: DISCONTINUED | OUTPATIENT
Start: 2025-09-02 | End: 2025-09-02 | Stop reason: HOSPADM

## 2025-09-02 RX ORDER — DEXAMETHASONE SODIUM PHOSPHATE 10 MG/ML
INJECTION, SOLUTION INTRA-ARTICULAR; INTRALESIONAL; INTRAMUSCULAR; INTRAVENOUS; SOFT TISSUE
Status: DISCONTINUED | OUTPATIENT
Start: 2025-09-02 | End: 2025-09-02 | Stop reason: HOSPADM

## 2025-09-02 RX ORDER — LIDOCAINE HYDROCHLORIDE 20 MG/ML
INJECTION, SOLUTION INFILTRATION; PERINEURAL
Status: DISCONTINUED | OUTPATIENT
Start: 2025-09-02 | End: 2025-09-02 | Stop reason: HOSPADM

## (undated) DEVICE — DRESSING AQUACEL FOAM 3 X 3

## (undated) DEVICE — GOWN POLY REINF BRTH SLV XL

## (undated) DEVICE — KIT EVACUATOR 3-SPRING 1/8 DRN

## (undated) DEVICE — SUT STRATAFIX PDS PLUS VIO

## (undated) DEVICE — CANNULA REDUCER 12-8MM

## (undated) DEVICE — TRAY MINOR GEN SURG OMC

## (undated) DEVICE — ELECTRODE REM PLYHSV RETURN 9

## (undated) DEVICE — SPONGE LAP 4X18 PREWASHED

## (undated) DEVICE — SYR IRRIGATION BULB STER 60ML

## (undated) DEVICE — SEAL UNIVERSAL 5MM-8MM XI

## (undated) DEVICE — ADHESIVE DERMABOND ADVANCED

## (undated) DEVICE — Device

## (undated) DEVICE — DRAPE C-ARMOR EQUIPMENT COVER

## (undated) DEVICE — SUT GUT PL. 4-0 27 FS-2

## (undated) DEVICE — DRAPE STERI-DRAPE 1000 17X11IN

## (undated) DEVICE — DRESSING AQUACEL FOAM 5 X 5

## (undated) DEVICE — GUIDE POST LP QUATTRO MEDIUM

## (undated) DEVICE — RELOAD SUREFORM 60 2.5 WHT 6R

## (undated) DEVICE — TUBE FRAZIER 5MM 2FT SOFT TIP

## (undated) DEVICE — DRILL BIT SURG GPS HI SPD 4MM

## (undated) DEVICE — DRAPE INCISE IOBAN 2 23X17IN

## (undated) DEVICE — FRAZIER 18FR

## (undated) DEVICE — SUT PERMAHAND 0 BLK SH 30IN

## (undated) DEVICE — KIT SURGIFLO HEMOSTATIC MATRIX

## (undated) DEVICE — DRESSING AQUACEL SACRAL 9 X 9

## (undated) DEVICE — DRAPE ARM DAVINCI XI

## (undated) DEVICE — NDL HYPO REG 25G X 1 1/2

## (undated) DEVICE — DRESSING LEUKOPLAST FLEX 1X3IN

## (undated) DEVICE — BLADE 4IN EDGE INSULATED

## (undated) DEVICE — DRESSING AQUACEL FOAM 4 X 12

## (undated) DEVICE — STAPLER SKIN PROXIMATE WIDE

## (undated) DEVICE — BURR ROUND PRECISION 7.5MM

## (undated) DEVICE — CAUTERY BOVIE PENCIL

## (undated) DEVICE — COVER BACK TBL HD 2-TIER 72IN

## (undated) DEVICE — DRAPE C-ARM ELAS CLIP 42X120IN

## (undated) DEVICE — TRAY CATH FOL SIL URIMTR 16FR

## (undated) DEVICE — SUT CTD VICRYL 2-0 CR/CT-2

## (undated) DEVICE — SEE MEDLINE ITEM 156905

## (undated) DEVICE — CARTRIDGE OIL

## (undated) DEVICE — BAG INZII TISS RETRV 12/15MM

## (undated) DEVICE — TOWEL OR DISP STRL BLUE 4/PK

## (undated) DEVICE — OBTURATOR BLADELESS 8MM XI CLR

## (undated) DEVICE — DRESSING TRANS 4X4 TEGADERM

## (undated) DEVICE — SPONGE GAUZE 16PLY 4X4

## (undated) DEVICE — DRESSING SURGICAL 1/2X1/2

## (undated) DEVICE — SUT SILK 3-0 BLK BR SH 30IN

## (undated) DEVICE — SUT 0 VICRYL / UR6 (J603)

## (undated) DEVICE — DRAPE ABDOMINAL TIBURON 14X11

## (undated) DEVICE — HEMOSTAT SURGICEL 4X8IN

## (undated) DEVICE — BUR BONE CUT MICRO TPS 3X3.8MM

## (undated) DEVICE — KIT DRESSING PREVENA PLUS

## (undated) DEVICE — ROUTER TAPERED 2.3MM

## (undated) DEVICE — SUT VICRYL+ 27 UR-6 VIOL

## (undated) DEVICE — DRAPE STERI INSTRUMENT 1018

## (undated) DEVICE — SEAL CANNULA DA VINCI 12MM

## (undated) DEVICE — CHLORAPREP W TINT 26ML APPL

## (undated) DEVICE — BLADE SURG CARBON STEEL SZ11

## (undated) DEVICE — GAUZE SPONGE 4X4 12PLY

## (undated) DEVICE — SYS SMOKE EVACUATION LAP

## (undated) DEVICE — NDL SPINAL 18GX3.5 SPINOCAN

## (undated) DEVICE — SUT VICRYL+ 1 CT1 18IN

## (undated) DEVICE — DRAPE COLUMN DAVINCI XI

## (undated) DEVICE — COVER LIGHT HANDLE

## (undated) DEVICE — DRAPE THREE-QTR REINF 53X77IN

## (undated) DEVICE — RELOAD SUREFORM 60 3.5 BLU 6R

## (undated) DEVICE — STAPLER SUREFORM 60 SPU

## (undated) DEVICE — BIT DRILL REAM GPS 3.5MM

## (undated) DEVICE — DEVICE SYNCHROSEAL DA VINCI

## (undated) DEVICE — DIFFUSER

## (undated) DEVICE — GUIDE POST LP QUATTR SPIKE MED

## (undated) DEVICE — MARKER SKIN STND TIP BLUE BARR

## (undated) DEVICE — CORD BIPOLAR 12 FOOT

## (undated) DEVICE — DRAPE TOP 53X102IN

## (undated) DEVICE — DRAPE SCOPE PILLOW WARMER

## (undated) DEVICE — DRESSING MEPILEX BORDER 4 X 4

## (undated) DEVICE — KIT SPINAL PATIENT CARE JACK